# Patient Record
Sex: FEMALE | Race: WHITE | NOT HISPANIC OR LATINO | Employment: OTHER | URBAN - METROPOLITAN AREA
[De-identification: names, ages, dates, MRNs, and addresses within clinical notes are randomized per-mention and may not be internally consistent; named-entity substitution may affect disease eponyms.]

---

## 2017-01-25 ENCOUNTER — COMMUNICATION - HEALTHEAST (OUTPATIENT)
Dept: ONCOLOGY | Facility: HOSPITAL | Age: 67
End: 2017-01-25

## 2017-03-03 ENCOUNTER — COMMUNICATION - HEALTHEAST (OUTPATIENT)
Dept: ADMINISTRATIVE | Facility: HOSPITAL | Age: 67
End: 2017-03-03

## 2017-03-06 ENCOUNTER — OFFICE VISIT - HEALTHEAST (OUTPATIENT)
Dept: ONCOLOGY | Facility: HOSPITAL | Age: 67
End: 2017-03-06

## 2017-03-06 ENCOUNTER — AMBULATORY - HEALTHEAST (OUTPATIENT)
Dept: INFUSION THERAPY | Facility: HOSPITAL | Age: 67
End: 2017-03-06

## 2017-03-06 DIAGNOSIS — M85.80 OSTEOPENIA: ICD-10-CM

## 2017-03-06 DIAGNOSIS — C50.512 BREAST CANCER OF LOWER-OUTER QUADRANT OF LEFT FEMALE BREAST (H): ICD-10-CM

## 2017-06-26 ENCOUNTER — RECORDS - HEALTHEAST (OUTPATIENT)
Dept: GENERAL RADIOLOGY | Age: 67
End: 2017-06-26

## 2017-06-26 ENCOUNTER — OFFICE VISIT - HEALTHEAST (OUTPATIENT)
Dept: FAMILY MEDICINE | Facility: CLINIC | Age: 67
End: 2017-06-26

## 2017-06-26 DIAGNOSIS — R05.9 COUGH: ICD-10-CM

## 2017-06-26 DIAGNOSIS — J18.9 PNEUMONIA OF RIGHT LOWER LOBE DUE TO INFECTIOUS ORGANISM: ICD-10-CM

## 2017-07-05 ENCOUNTER — OFFICE VISIT - HEALTHEAST (OUTPATIENT)
Dept: FAMILY MEDICINE | Facility: CLINIC | Age: 67
End: 2017-07-05

## 2017-07-05 DIAGNOSIS — J18.9 PNEUMONIA OF RIGHT LOWER LOBE DUE TO INFECTIOUS ORGANISM: ICD-10-CM

## 2017-08-11 ENCOUNTER — COMMUNICATION - HEALTHEAST (OUTPATIENT)
Dept: ONCOLOGY | Facility: HOSPITAL | Age: 67
End: 2017-08-11

## 2017-08-11 ENCOUNTER — HOSPITAL ENCOUNTER (OUTPATIENT)
Dept: MAMMOGRAPHY | Facility: HOSPITAL | Age: 67
Discharge: HOME | End: 2017-08-11
Attending: FAMILY MEDICINE

## 2017-08-11 ENCOUNTER — COMMUNICATION - HEALTHEAST (OUTPATIENT)
Dept: ADMINISTRATIVE | Facility: HOSPITAL | Age: 67
End: 2017-08-11

## 2017-08-11 DIAGNOSIS — Z12.31 VISIT FOR SCREENING MAMMOGRAM: ICD-10-CM

## 2017-08-12 ENCOUNTER — COMMUNICATION - HEALTHEAST (OUTPATIENT)
Dept: FAMILY MEDICINE | Facility: CLINIC | Age: 67
End: 2017-08-12

## 2017-08-25 ENCOUNTER — COMMUNICATION - HEALTHEAST (OUTPATIENT)
Dept: ONCOLOGY | Facility: HOSPITAL | Age: 67
End: 2017-08-25

## 2017-08-29 ENCOUNTER — AMBULATORY - HEALTHEAST (OUTPATIENT)
Dept: INFUSION THERAPY | Facility: HOSPITAL | Age: 67
End: 2017-08-29

## 2017-08-29 ENCOUNTER — OFFICE VISIT - HEALTHEAST (OUTPATIENT)
Dept: ONCOLOGY | Facility: HOSPITAL | Age: 67
End: 2017-08-29

## 2017-08-29 DIAGNOSIS — C50.512 BREAST CANCER OF LOWER-OUTER QUADRANT OF LEFT FEMALE BREAST (H): ICD-10-CM

## 2017-08-29 DIAGNOSIS — M81.0 AGE-RELATED OSTEOPOROSIS WITHOUT CURRENT PATHOLOGICAL FRACTURE: ICD-10-CM

## 2017-08-29 DIAGNOSIS — M85.80 OSTEOPENIA: ICD-10-CM

## 2017-09-01 ENCOUNTER — OFFICE VISIT - HEALTHEAST (OUTPATIENT)
Dept: FAMILY MEDICINE | Facility: CLINIC | Age: 67
End: 2017-09-01

## 2017-09-01 DIAGNOSIS — R05.9 COUGH: ICD-10-CM

## 2017-09-07 ENCOUNTER — COMMUNICATION - HEALTHEAST (OUTPATIENT)
Dept: FAMILY MEDICINE | Facility: CLINIC | Age: 67
End: 2017-09-07

## 2017-09-08 ENCOUNTER — OFFICE VISIT - HEALTHEAST (OUTPATIENT)
Dept: FAMILY MEDICINE | Facility: CLINIC | Age: 67
End: 2017-09-08

## 2017-09-08 DIAGNOSIS — R05.9 COUGH: ICD-10-CM

## 2017-09-21 ENCOUNTER — OFFICE VISIT - HEALTHEAST (OUTPATIENT)
Dept: FAMILY MEDICINE | Facility: CLINIC | Age: 67
End: 2017-09-21

## 2017-09-21 DIAGNOSIS — R05.9 COUGH: ICD-10-CM

## 2018-02-05 ENCOUNTER — RECORDS - HEALTHEAST (OUTPATIENT)
Dept: ADMINISTRATIVE | Facility: OTHER | Age: 68
End: 2018-02-05

## 2018-02-14 ENCOUNTER — RECORDS - HEALTHEAST (OUTPATIENT)
Dept: ADMINISTRATIVE | Facility: OTHER | Age: 68
End: 2018-02-14

## 2018-02-14 ENCOUNTER — RECORDS - HEALTHEAST (OUTPATIENT)
Dept: BONE DENSITY | Facility: CLINIC | Age: 68
End: 2018-02-14

## 2018-02-14 DIAGNOSIS — C50.512 MALIGNANT NEOPLASM OF LOWER-OUTER QUADRANT OF LEFT FEMALE BREAST (H): ICD-10-CM

## 2018-02-14 DIAGNOSIS — M85.80 OTHER SPECIFIED DISORDERS OF BONE DENSITY AND STRUCTURE, UNSPECIFIED SITE: ICD-10-CM

## 2018-02-14 DIAGNOSIS — M81.0 AGE-RELATED OSTEOPOROSIS WITHOUT CURRENT PATHOLOGICAL FRACTURE: ICD-10-CM

## 2018-02-21 ENCOUNTER — COMMUNICATION - HEALTHEAST (OUTPATIENT)
Dept: ONCOLOGY | Facility: HOSPITAL | Age: 68
End: 2018-02-21

## 2018-02-26 ENCOUNTER — COMMUNICATION - HEALTHEAST (OUTPATIENT)
Dept: ADMINISTRATIVE | Facility: HOSPITAL | Age: 68
End: 2018-02-26

## 2018-02-28 ENCOUNTER — OFFICE VISIT - HEALTHEAST (OUTPATIENT)
Dept: ONCOLOGY | Facility: HOSPITAL | Age: 68
End: 2018-02-28

## 2018-02-28 ENCOUNTER — AMBULATORY - HEALTHEAST (OUTPATIENT)
Dept: INFUSION THERAPY | Facility: HOSPITAL | Age: 68
End: 2018-02-28

## 2018-02-28 DIAGNOSIS — Z17.0 MALIGNANT NEOPLASM OF LOWER-OUTER QUADRANT OF LEFT BREAST OF FEMALE, ESTROGEN RECEPTOR POSITIVE (H): ICD-10-CM

## 2018-02-28 DIAGNOSIS — M85.80 OSTEOPENIA: ICD-10-CM

## 2018-02-28 DIAGNOSIS — C50.512 BREAST CANCER OF LOWER-OUTER QUADRANT OF LEFT FEMALE BREAST (H): ICD-10-CM

## 2018-02-28 DIAGNOSIS — C50.512 MALIGNANT NEOPLASM OF LOWER-OUTER QUADRANT OF LEFT BREAST OF FEMALE, ESTROGEN RECEPTOR POSITIVE (H): ICD-10-CM

## 2018-02-28 LAB
ALBUMIN SERPL-MCNC: 3.9 G/DL (ref 3.5–5)
ALP SERPL-CCNC: 102 U/L (ref 45–120)
ALT SERPL W P-5'-P-CCNC: 19 U/L (ref 0–45)
ANION GAP SERPL CALCULATED.3IONS-SCNC: 8 MMOL/L (ref 5–18)
AST SERPL W P-5'-P-CCNC: 26 U/L (ref 0–40)
BILIRUB SERPL-MCNC: 1.1 MG/DL (ref 0–1)
BUN SERPL-MCNC: 14 MG/DL (ref 8–22)
CALCIUM SERPL-MCNC: 9.6 MG/DL (ref 8.5–10.5)
CHLORIDE BLD-SCNC: 104 MMOL/L (ref 98–107)
CO2 SERPL-SCNC: 28 MMOL/L (ref 22–31)
CREAT SERPL-MCNC: 0.89 MG/DL (ref 0.6–1.1)
GFR SERPL CREATININE-BSD FRML MDRD: >60 ML/MIN/1.73M2
GLUCOSE BLD-MCNC: 94 MG/DL (ref 70–125)
POTASSIUM BLD-SCNC: 3.9 MMOL/L (ref 3.5–5)
PROT SERPL-MCNC: 8.1 G/DL (ref 6–8)
SODIUM SERPL-SCNC: 140 MMOL/L (ref 136–145)

## 2018-06-04 ENCOUNTER — OFFICE VISIT - HEALTHEAST (OUTPATIENT)
Dept: FAMILY MEDICINE | Facility: CLINIC | Age: 68
End: 2018-06-04

## 2018-06-04 DIAGNOSIS — N88.9 CERVIX ABNORMALITY: ICD-10-CM

## 2018-06-04 DIAGNOSIS — M65.30 TRIGGER FINGER: ICD-10-CM

## 2018-06-04 DIAGNOSIS — C50.512 MALIGNANT NEOPLASM OF LOWER-OUTER QUADRANT OF LEFT BREAST OF FEMALE, ESTROGEN RECEPTOR POSITIVE (H): ICD-10-CM

## 2018-06-04 DIAGNOSIS — K21.9 GERD (GASTROESOPHAGEAL REFLUX DISEASE): ICD-10-CM

## 2018-06-04 DIAGNOSIS — Z17.0 MALIGNANT NEOPLASM OF LOWER-OUTER QUADRANT OF LEFT BREAST OF FEMALE, ESTROGEN RECEPTOR POSITIVE (H): ICD-10-CM

## 2018-06-04 DIAGNOSIS — Z01.419 WELL WOMAN EXAM: ICD-10-CM

## 2018-06-04 LAB
ALBUMIN SERPL-MCNC: 4 G/DL (ref 3.5–5)
ALP SERPL-CCNC: 100 U/L (ref 45–120)
ALT SERPL W P-5'-P-CCNC: 13 U/L (ref 0–45)
ANION GAP SERPL CALCULATED.3IONS-SCNC: 11 MMOL/L (ref 5–18)
AST SERPL W P-5'-P-CCNC: 25 U/L (ref 0–40)
BILIRUB SERPL-MCNC: 1.1 MG/DL (ref 0–1)
BUN SERPL-MCNC: 16 MG/DL (ref 8–22)
CALCIUM SERPL-MCNC: 9.8 MG/DL (ref 8.5–10.5)
CHLORIDE BLD-SCNC: 106 MMOL/L (ref 98–107)
CHOLEST SERPL-MCNC: 183 MG/DL
CO2 SERPL-SCNC: 25 MMOL/L (ref 22–31)
CREAT SERPL-MCNC: 0.85 MG/DL (ref 0.6–1.1)
ERYTHROCYTE [DISTWIDTH] IN BLOOD BY AUTOMATED COUNT: 12 % (ref 11–14.5)
FASTING STATUS PATIENT QL REPORTED: YES
GFR SERPL CREATININE-BSD FRML MDRD: >60 ML/MIN/1.73M2
GLUCOSE BLD-MCNC: 83 MG/DL (ref 70–125)
HBA1C MFR BLD: 5.6 % (ref 3.5–6)
HCT VFR BLD AUTO: 37.5 % (ref 35–47)
HDLC SERPL-MCNC: 57 MG/DL
HGB BLD-MCNC: 12.6 G/DL (ref 12–16)
LDLC SERPL CALC-MCNC: 112 MG/DL
MCH RBC QN AUTO: 31 PG (ref 27–34)
MCHC RBC AUTO-ENTMCNC: 33.6 G/DL (ref 32–36)
MCV RBC AUTO: 92 FL (ref 80–100)
PLATELET # BLD AUTO: 317 THOU/UL (ref 140–440)
PMV BLD AUTO: 7.2 FL (ref 7–10)
POTASSIUM BLD-SCNC: 4.1 MMOL/L (ref 3.5–5)
PROT SERPL-MCNC: 7.6 G/DL (ref 6–8)
RBC # BLD AUTO: 4.06 MILL/UL (ref 3.8–5.4)
SODIUM SERPL-SCNC: 142 MMOL/L (ref 136–145)
TRIGL SERPL-MCNC: 70 MG/DL
WBC: 8.5 THOU/UL (ref 4–11)

## 2018-06-06 LAB
HPV SOURCE: NORMAL
HUMAN PAPILLOMA VIRUS 16 DNA: NEGATIVE
HUMAN PAPILLOMA VIRUS 18 DNA: NEGATIVE
HUMAN PAPILLOMA VIRUS FINAL DIAGNOSIS: NORMAL
HUMAN PAPILLOMA VIRUS OTHER HR: NEGATIVE
SPECIMEN DESCRIPTION: NORMAL

## 2018-06-08 ENCOUNTER — COMMUNICATION - HEALTHEAST (OUTPATIENT)
Dept: FAMILY MEDICINE | Facility: CLINIC | Age: 68
End: 2018-06-08

## 2018-06-14 LAB
BKR LAB AP ABNORMAL BLEEDING: NO
BKR LAB AP BIRTH CONTROL/HORMONES: NORMAL
BKR LAB AP CERVICAL APPEARANCE: NORMAL
BKR LAB AP GYN ADEQUACY: NORMAL
BKR LAB AP GYN INTERPRETATION: NORMAL
BKR LAB AP HPV REFLEX: NORMAL
BKR LAB AP LMP: NORMAL
BKR LAB AP PATIENT STATUS: NORMAL
BKR LAB AP PREVIOUS ABNORMAL: NORMAL
BKR LAB AP PREVIOUS NORMAL: 2016
HIGH RISK?: NO
PATH REPORT.COMMENTS IMP SPEC: NORMAL
RESULT FLAG (HE HISTORICAL CONVERSION): NORMAL

## 2018-06-18 ENCOUNTER — COMMUNICATION - HEALTHEAST (OUTPATIENT)
Dept: ONCOLOGY | Facility: HOSPITAL | Age: 68
End: 2018-06-18

## 2018-08-13 ENCOUNTER — HOSPITAL ENCOUNTER (OUTPATIENT)
Dept: MAMMOGRAPHY | Facility: CLINIC | Age: 68
Discharge: HOME | End: 2018-08-13
Attending: INTERNAL MEDICINE

## 2018-08-13 DIAGNOSIS — Z12.31 VISIT FOR SCREENING MAMMOGRAM: ICD-10-CM

## 2018-08-29 ENCOUNTER — OFFICE VISIT - HEALTHEAST (OUTPATIENT)
Dept: ONCOLOGY | Facility: HOSPITAL | Age: 68
End: 2018-08-29

## 2018-08-29 ENCOUNTER — AMBULATORY - HEALTHEAST (OUTPATIENT)
Dept: INFUSION THERAPY | Facility: HOSPITAL | Age: 68
End: 2018-08-29

## 2018-08-29 DIAGNOSIS — C50.512 MALIGNANT NEOPLASM OF LOWER-OUTER QUADRANT OF LEFT BREAST OF FEMALE, ESTROGEN RECEPTOR POSITIVE (H): ICD-10-CM

## 2018-08-29 DIAGNOSIS — Z17.0 MALIGNANT NEOPLASM OF LOWER-OUTER QUADRANT OF LEFT BREAST OF FEMALE, ESTROGEN RECEPTOR POSITIVE (H): ICD-10-CM

## 2018-08-29 LAB
ALBUMIN SERPL-MCNC: 4.2 G/DL (ref 3.5–5)
ALP SERPL-CCNC: 101 U/L (ref 45–120)
ALT SERPL W P-5'-P-CCNC: 14 U/L (ref 0–45)
ANION GAP SERPL CALCULATED.3IONS-SCNC: 10 MMOL/L (ref 5–18)
AST SERPL W P-5'-P-CCNC: 26 U/L (ref 0–40)
BILIRUB SERPL-MCNC: 1 MG/DL (ref 0–1)
BUN SERPL-MCNC: 17 MG/DL (ref 8–22)
CALCIUM SERPL-MCNC: 10.1 MG/DL (ref 8.5–10.5)
CHLORIDE BLD-SCNC: 102 MMOL/L (ref 98–107)
CO2 SERPL-SCNC: 28 MMOL/L (ref 22–31)
CREAT SERPL-MCNC: 0.99 MG/DL (ref 0.6–1.1)
GFR SERPL CREATININE-BSD FRML MDRD: 56 ML/MIN/1.73M2
GLUCOSE BLD-MCNC: 94 MG/DL (ref 70–125)
POTASSIUM BLD-SCNC: 4 MMOL/L (ref 3.5–5)
PROT SERPL-MCNC: 8.2 G/DL (ref 6–8)
SODIUM SERPL-SCNC: 140 MMOL/L (ref 136–145)

## 2018-09-11 ENCOUNTER — COMMUNICATION - HEALTHEAST (OUTPATIENT)
Dept: FAMILY MEDICINE | Facility: CLINIC | Age: 68
End: 2018-09-11

## 2018-09-11 DIAGNOSIS — Z23 IMMUNIZATION DUE: ICD-10-CM

## 2018-09-13 ENCOUNTER — AMBULATORY - HEALTHEAST (OUTPATIENT)
Dept: NURSING | Facility: CLINIC | Age: 68
End: 2018-09-13

## 2018-09-13 DIAGNOSIS — Z23 NEED FOR VACCINATION: ICD-10-CM

## 2018-11-20 ENCOUNTER — OFFICE VISIT - HEALTHEAST (OUTPATIENT)
Dept: FAMILY MEDICINE | Facility: CLINIC | Age: 68
End: 2018-11-20

## 2018-11-20 DIAGNOSIS — K08.89 PAIN IN A TOOTH OR TEETH: ICD-10-CM

## 2018-11-20 DIAGNOSIS — R10.13 DYSPEPSIA: ICD-10-CM

## 2018-12-31 ENCOUNTER — OFFICE VISIT - HEALTHEAST (OUTPATIENT)
Dept: FAMILY MEDICINE | Facility: CLINIC | Age: 68
End: 2018-12-31

## 2018-12-31 DIAGNOSIS — R10.13 EPIGASTRIC ABDOMINAL PAIN: ICD-10-CM

## 2018-12-31 DIAGNOSIS — R11.0 NAUSEA: ICD-10-CM

## 2019-01-07 ENCOUNTER — RECORDS - HEALTHEAST (OUTPATIENT)
Dept: ADMINISTRATIVE | Facility: OTHER | Age: 69
End: 2019-01-07

## 2019-01-08 ENCOUNTER — RECORDS - HEALTHEAST (OUTPATIENT)
Dept: ADMINISTRATIVE | Facility: OTHER | Age: 69
End: 2019-01-08

## 2019-01-17 ENCOUNTER — AMBULATORY - HEALTHEAST (OUTPATIENT)
Dept: FAMILY MEDICINE | Facility: CLINIC | Age: 69
End: 2019-01-17

## 2019-01-17 ENCOUNTER — COMMUNICATION - HEALTHEAST (OUTPATIENT)
Dept: FAMILY MEDICINE | Facility: CLINIC | Age: 69
End: 2019-01-17

## 2019-01-17 DIAGNOSIS — K29.40 ATROPHIC GASTRITIS WITHOUT HEMORRHAGE: ICD-10-CM

## 2019-01-30 ENCOUNTER — RECORDS - HEALTHEAST (OUTPATIENT)
Dept: ADMINISTRATIVE | Facility: OTHER | Age: 69
End: 2019-01-30

## 2019-01-31 ENCOUNTER — RECORDS - HEALTHEAST (OUTPATIENT)
Dept: ADMINISTRATIVE | Facility: OTHER | Age: 69
End: 2019-01-31

## 2019-02-05 ENCOUNTER — RECORDS - HEALTHEAST (OUTPATIENT)
Dept: ADMINISTRATIVE | Facility: OTHER | Age: 69
End: 2019-02-05

## 2019-03-07 ENCOUNTER — OFFICE VISIT - HEALTHEAST (OUTPATIENT)
Dept: ONCOLOGY | Facility: HOSPITAL | Age: 69
End: 2019-03-07

## 2019-03-07 ENCOUNTER — AMBULATORY - HEALTHEAST (OUTPATIENT)
Dept: INFUSION THERAPY | Facility: HOSPITAL | Age: 69
End: 2019-03-07

## 2019-03-07 ENCOUNTER — TRANSFERRED RECORDS (OUTPATIENT)
Dept: HEALTH INFORMATION MANAGEMENT | Facility: CLINIC | Age: 69
End: 2019-03-07

## 2019-03-07 DIAGNOSIS — C50.512 MALIGNANT NEOPLASM OF LOWER-OUTER QUADRANT OF LEFT BREAST OF FEMALE, ESTROGEN RECEPTOR POSITIVE (H): ICD-10-CM

## 2019-03-07 DIAGNOSIS — Z17.0 MALIGNANT NEOPLASM OF LOWER-OUTER QUADRANT OF LEFT BREAST OF FEMALE, ESTROGEN RECEPTOR POSITIVE (H): ICD-10-CM

## 2019-03-07 LAB
ALBUMIN SERPL-MCNC: 3.8 G/DL (ref 3.5–5)
ALP SERPL-CCNC: 135 U/L (ref 45–120)
ALT SERPL W P-5'-P-CCNC: 25 U/L (ref 0–45)
ANION GAP SERPL CALCULATED.3IONS-SCNC: 10 MMOL/L (ref 5–18)
AST SERPL W P-5'-P-CCNC: 35 U/L (ref 0–40)
BILIRUB SERPL-MCNC: 0.7 MG/DL (ref 0–1)
BUN SERPL-MCNC: 15 MG/DL (ref 8–22)
CALCIUM SERPL-MCNC: 9.3 MG/DL (ref 8.5–10.5)
CHLORIDE BLD-SCNC: 106 MMOL/L (ref 98–107)
CO2 SERPL-SCNC: 26 MMOL/L (ref 22–31)
CREAT SERPL-MCNC: 0.8 MG/DL (ref 0.6–1.1)
GFR SERPL CREATININE-BSD FRML MDRD: >60 ML/MIN/1.73M2
GLUCOSE BLD-MCNC: 89 MG/DL (ref 70–125)
POTASSIUM BLD-SCNC: 4 MMOL/L (ref 3.5–5)
PROT SERPL-MCNC: 7.7 G/DL (ref 6–8)
SODIUM SERPL-SCNC: 142 MMOL/L (ref 136–145)

## 2019-04-15 ENCOUNTER — COMMUNICATION - HEALTHEAST (OUTPATIENT)
Dept: ONCOLOGY | Facility: HOSPITAL | Age: 69
End: 2019-04-15

## 2019-04-15 DIAGNOSIS — C50.512 BREAST CANCER OF LOWER-OUTER QUADRANT OF LEFT FEMALE BREAST (H): ICD-10-CM

## 2019-06-26 ENCOUNTER — OFFICE VISIT - HEALTHEAST (OUTPATIENT)
Dept: FAMILY MEDICINE | Facility: CLINIC | Age: 69
End: 2019-06-26

## 2019-06-26 ENCOUNTER — RECORDS - HEALTHEAST (OUTPATIENT)
Dept: GENERAL RADIOLOGY | Facility: CLINIC | Age: 69
End: 2019-06-26

## 2019-06-26 DIAGNOSIS — S89.92XA KNEE INJURY, LEFT, INITIAL ENCOUNTER: ICD-10-CM
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
## 2019-06-26 DIAGNOSIS — S89.92XA UNSPECIFIED INJURY OF LEFT LOWER LEG, INITIAL ENCOUNTER: ICD-10-CM

## 2019-06-27 ENCOUNTER — COMMUNICATION - HEALTHEAST (OUTPATIENT)
Dept: FAMILY MEDICINE | Facility: CLINIC | Age: 69
End: 2019-06-27

## 2019-09-10 ENCOUNTER — OFFICE VISIT - HEALTHEAST (OUTPATIENT)
Dept: FAMILY MEDICINE | Facility: CLINIC | Age: 69
End: 2019-09-10

## 2019-09-10 DIAGNOSIS — Z11.59 ENCOUNTER FOR HEPATITIS C SCREENING TEST FOR LOW RISK PATIENT: ICD-10-CM

## 2019-09-10 DIAGNOSIS — Z00.00 ENCOUNTER FOR MEDICARE ANNUAL WELLNESS EXAM: ICD-10-CM

## 2019-09-10 DIAGNOSIS — K29.40 ATROPHIC GASTRITIS WITHOUT HEMORRHAGE: ICD-10-CM

## 2019-09-10 DIAGNOSIS — Z17.0 MALIGNANT NEOPLASM OF LOWER-OUTER QUADRANT OF LEFT BREAST OF FEMALE, ESTROGEN RECEPTOR POSITIVE (H): ICD-10-CM

## 2019-09-10 DIAGNOSIS — C50.512 MALIGNANT NEOPLASM OF LOWER-OUTER QUADRANT OF LEFT BREAST OF FEMALE, ESTROGEN RECEPTOR POSITIVE (H): ICD-10-CM

## 2019-09-10 DIAGNOSIS — R74.8 ELEVATED ALKALINE PHOSPHATASE LEVEL: ICD-10-CM

## 2019-09-10 DIAGNOSIS — Z13.220 LIPID SCREENING: ICD-10-CM

## 2019-09-10 LAB
ALBUMIN SERPL-MCNC: 3.9 G/DL (ref 3.5–5)
ALP SERPL-CCNC: 142 U/L (ref 45–120)
ALT SERPL W P-5'-P-CCNC: 13 U/L (ref 0–45)
ANION GAP SERPL CALCULATED.3IONS-SCNC: 11 MMOL/L (ref 5–18)
AST SERPL W P-5'-P-CCNC: 25 U/L (ref 0–40)
BILIRUB SERPL-MCNC: 0.9 MG/DL (ref 0–1)
BUN SERPL-MCNC: 13 MG/DL (ref 8–22)
CALCIUM SERPL-MCNC: 9.9 MG/DL (ref 8.5–10.5)
CHLORIDE BLD-SCNC: 104 MMOL/L (ref 98–107)
CHOLEST SERPL-MCNC: 212 MG/DL
CO2 SERPL-SCNC: 26 MMOL/L (ref 22–31)
CREAT SERPL-MCNC: 0.88 MG/DL (ref 0.6–1.1)
FASTING STATUS PATIENT QL REPORTED: YES
GFR SERPL CREATININE-BSD FRML MDRD: >60 ML/MIN/1.73M2
GLUCOSE BLD-MCNC: 84 MG/DL (ref 70–125)
HDLC SERPL-MCNC: 57 MG/DL
LDLC SERPL CALC-MCNC: 139 MG/DL
POTASSIUM BLD-SCNC: 4.4 MMOL/L (ref 3.5–5)
PROT SERPL-MCNC: 7.7 G/DL (ref 6–8)
SODIUM SERPL-SCNC: 141 MMOL/L (ref 136–145)
TRIGL SERPL-MCNC: 82 MG/DL

## 2019-09-10 ASSESSMENT — ANXIETY QUESTIONNAIRES
2. NOT BEING ABLE TO STOP OR CONTROL WORRYING: NOT AT ALL
1. FEELING NERVOUS, ANXIOUS, OR ON EDGE: NOT AT ALL

## 2019-09-10 ASSESSMENT — MIFFLIN-ST. JEOR: SCORE: 1285.53

## 2019-09-11 LAB
GGT SERPL-CCNC: 22 U/L (ref 0–50)
HCV AB SERPL QL IA: NEGATIVE

## 2019-09-13 ENCOUNTER — COMMUNICATION - HEALTHEAST (OUTPATIENT)
Dept: FAMILY MEDICINE | Facility: CLINIC | Age: 69
End: 2019-09-13

## 2019-09-23 ENCOUNTER — OFFICE VISIT - HEALTHEAST (OUTPATIENT)
Dept: FAMILY MEDICINE | Facility: CLINIC | Age: 69
End: 2019-09-23

## 2019-09-23 DIAGNOSIS — L03.116 CELLULITIS OF LEFT LOWER EXTREMITY: ICD-10-CM

## 2019-10-01 ENCOUNTER — HOSPITAL ENCOUNTER (OUTPATIENT)
Dept: MAMMOGRAPHY | Facility: CLINIC | Age: 69
Discharge: HOME | End: 2019-10-01
Attending: INTERNAL MEDICINE

## 2019-10-01 ENCOUNTER — COMMUNICATION - HEALTHEAST (OUTPATIENT)
Dept: FAMILY MEDICINE | Facility: CLINIC | Age: 69
End: 2019-10-01

## 2019-10-01 DIAGNOSIS — Z12.31 VISIT FOR SCREENING MAMMOGRAM: ICD-10-CM

## 2019-10-01 LAB — MAMMOGRAM: NORMAL

## 2019-12-19 ENCOUNTER — OFFICE VISIT - HEALTHEAST (OUTPATIENT)
Dept: FAMILY MEDICINE | Facility: CLINIC | Age: 69
End: 2019-12-19

## 2019-12-19 DIAGNOSIS — J40 BRONCHITIS: ICD-10-CM

## 2019-12-31 ENCOUNTER — OFFICE VISIT - HEALTHEAST (OUTPATIENT)
Dept: FAMILY MEDICINE | Facility: CLINIC | Age: 69
End: 2019-12-31

## 2019-12-31 ENCOUNTER — RECORDS - HEALTHEAST (OUTPATIENT)
Dept: GENERAL RADIOLOGY | Facility: CLINIC | Age: 69
End: 2019-12-31

## 2019-12-31 DIAGNOSIS — R05.3 PERSISTENT COUGH: ICD-10-CM

## 2019-12-31 DIAGNOSIS — R05.9 COUGH: ICD-10-CM

## 2019-12-31 DIAGNOSIS — K29.40 ATROPHIC GASTRITIS WITHOUT HEMORRHAGE: ICD-10-CM

## 2020-01-10 ENCOUNTER — OFFICE VISIT - HEALTHEAST (OUTPATIENT)
Dept: FAMILY MEDICINE | Facility: CLINIC | Age: 70
End: 2020-01-10

## 2020-01-10 DIAGNOSIS — R05.3 PERSISTENT COUGH: ICD-10-CM

## 2020-01-10 LAB
BASOPHILS # BLD AUTO: 0 THOU/UL (ref 0–0.2)
BASOPHILS NFR BLD AUTO: 1 % (ref 0–2)
EOSINOPHIL # BLD AUTO: 0.1 THOU/UL (ref 0–0.4)
EOSINOPHIL NFR BLD AUTO: 2 % (ref 0–6)
ERYTHROCYTE [DISTWIDTH] IN BLOOD BY AUTOMATED COUNT: 12.2 % (ref 11–14.5)
HCT VFR BLD AUTO: 37.8 % (ref 35–47)
HGB BLD-MCNC: 12.6 G/DL (ref 12–16)
LYMPHOCYTES # BLD AUTO: 1.4 THOU/UL (ref 0.8–4.4)
LYMPHOCYTES NFR BLD AUTO: 22 % (ref 20–40)
MCH RBC QN AUTO: 30.3 PG (ref 27–34)
MCHC RBC AUTO-ENTMCNC: 33.4 G/DL (ref 32–36)
MCV RBC AUTO: 91 FL (ref 80–100)
MONOCYTES # BLD AUTO: 0.5 THOU/UL (ref 0–0.9)
MONOCYTES NFR BLD AUTO: 8 % (ref 2–10)
NEUTROPHILS # BLD AUTO: 4.5 THOU/UL (ref 2–7.7)
NEUTROPHILS NFR BLD AUTO: 68 % (ref 50–70)
PLATELET # BLD AUTO: 391 THOU/UL (ref 140–440)
PMV BLD AUTO: 7.4 FL (ref 7–10)
RBC # BLD AUTO: 4.17 MILL/UL (ref 3.8–5.4)
WBC: 6.6 THOU/UL (ref 4–11)

## 2020-01-12 LAB
B PARAPERT DNA SPEC QL NAA+PROBE: NOT DETECTED
B PERT DNA SPEC QL NAA+PROBE: NOT DETECTED
BORDETELLA COMMENT: NORMAL

## 2020-01-14 ENCOUNTER — COMMUNICATION - HEALTHEAST (OUTPATIENT)
Dept: FAMILY MEDICINE | Facility: CLINIC | Age: 70
End: 2020-01-14

## 2020-01-21 ENCOUNTER — COMMUNICATION - HEALTHEAST (OUTPATIENT)
Dept: FAMILY MEDICINE | Facility: CLINIC | Age: 70
End: 2020-01-21

## 2020-01-21 DIAGNOSIS — R05.3 PERSISTENT COUGH: ICD-10-CM

## 2020-01-21 DIAGNOSIS — J32.9 CHRONIC SINUSITIS, UNSPECIFIED LOCATION: ICD-10-CM

## 2020-01-22 ENCOUNTER — OFFICE VISIT - HEALTHEAST (OUTPATIENT)
Dept: FAMILY MEDICINE | Facility: CLINIC | Age: 70
End: 2020-01-22

## 2020-01-22 ENCOUNTER — COMMUNICATION - HEALTHEAST (OUTPATIENT)
Dept: FAMILY MEDICINE | Facility: CLINIC | Age: 70
End: 2020-01-22

## 2020-01-22 DIAGNOSIS — R05.3 PERSISTENT COUGH: ICD-10-CM

## 2020-01-22 DIAGNOSIS — J32.9 CHRONIC SINUSITIS, UNSPECIFIED LOCATION: ICD-10-CM

## 2020-01-27 ENCOUNTER — COMMUNICATION - HEALTHEAST (OUTPATIENT)
Dept: OTOLARYNGOLOGY | Facility: CLINIC | Age: 70
End: 2020-01-27

## 2020-01-27 ENCOUNTER — OFFICE VISIT - HEALTHEAST (OUTPATIENT)
Dept: OTOLARYNGOLOGY | Facility: CLINIC | Age: 70
End: 2020-01-27

## 2020-01-27 ENCOUNTER — TRANSFERRED RECORDS (OUTPATIENT)
Dept: HEALTH INFORMATION MANAGEMENT | Facility: CLINIC | Age: 70
End: 2020-01-27

## 2020-01-27 DIAGNOSIS — R22.1 NECK SWELLING: ICD-10-CM

## 2020-01-27 DIAGNOSIS — R05.3 CHRONIC COUGH: ICD-10-CM

## 2020-01-27 DIAGNOSIS — R22.1 NECK MASS: ICD-10-CM

## 2020-01-28 ENCOUNTER — COMMUNICATION - HEALTHEAST (OUTPATIENT)
Dept: FAMILY MEDICINE | Facility: CLINIC | Age: 70
End: 2020-01-28

## 2020-01-29 ENCOUNTER — COMMUNICATION - HEALTHEAST (OUTPATIENT)
Dept: OTOLARYNGOLOGY | Facility: CLINIC | Age: 70
End: 2020-01-29

## 2020-01-29 ENCOUNTER — MEDICAL CORRESPONDENCE (OUTPATIENT)
Dept: HEALTH INFORMATION MANAGEMENT | Facility: CLINIC | Age: 70
End: 2020-01-29

## 2020-01-29 DIAGNOSIS — R22.1 NECK MASS: ICD-10-CM

## 2020-01-29 NOTE — TELEPHONE ENCOUNTER
FUTURE VISIT INFORMATION      FUTURE VISIT INFORMATION:    Date: 1/31/2020    Time: 1:20PM    Location: [facility]  REFERRAL INFORMATION:    Referring provider:  Josiah Sanders MD     Referring providers clinic:  [facility]      Reason for visit/diagnosis  Neck Mass    RECORDS REQUESTED FROM:       Clinic name Comments Records Status Imaging Status   [facility]   1/29/2020 referral   1/27/2020 notes with Dr Sanders Care Everywhere     [facility] Medicine/OB 01/22/2020 notes with Sofy Liang MD   Care Everywhere     [facility] Radiology imaging 1/27/2020 CT Neck and CT Sinus Care Everywhere req 1/29/2020 - PACS   [facility] images 10/14/16 CT Neck  Care Everywhere  req 1/30/2020 - PACS                 1/29/2020 sent a fax to [facility] for images - amay   1/30/2020 10:40am images were done at [facility] radiology, called and images will be in PACs shortly. Patient also had 2016 images done at [facility], calling for those images as well. - Amay

## 2020-01-30 ENCOUNTER — DOCUMENTATION ONLY (OUTPATIENT)
Dept: CARE COORDINATION | Facility: CLINIC | Age: 70
End: 2020-01-30

## 2020-01-30 ENCOUNTER — TRANSFERRED RECORDS (OUTPATIENT)
Dept: HEALTH INFORMATION MANAGEMENT | Facility: CLINIC | Age: 70
End: 2020-01-30

## 2020-01-30 ENCOUNTER — HOSPITAL ENCOUNTER (OUTPATIENT)
Dept: ULTRASOUND IMAGING | Facility: HOSPITAL | Age: 70
Discharge: HOME | End: 2020-01-30
Attending: OTOLARYNGOLOGY | Admitting: RADIOLOGY

## 2020-01-30 DIAGNOSIS — R22.1 NECK MASS: ICD-10-CM

## 2020-01-31 ENCOUNTER — OFFICE VISIT (OUTPATIENT)
Dept: OTOLARYNGOLOGY | Facility: CLINIC | Age: 70
End: 2020-01-31
Payer: COMMERCIAL

## 2020-01-31 ENCOUNTER — COMMUNICATION - HEALTHEAST (OUTPATIENT)
Dept: ONCOLOGY | Facility: HOSPITAL | Age: 70
End: 2020-01-31

## 2020-01-31 ENCOUNTER — THERAPY VISIT (OUTPATIENT)
Dept: OTOLARYNGOLOGY | Facility: CLINIC | Age: 70
End: 2020-01-31
Payer: COMMERCIAL

## 2020-01-31 ENCOUNTER — PRE VISIT (OUTPATIENT)
Dept: OTOLARYNGOLOGY | Facility: CLINIC | Age: 70
End: 2020-01-31

## 2020-01-31 VITALS
OXYGEN SATURATION: 99 % | SYSTOLIC BLOOD PRESSURE: 145 MMHG | DIASTOLIC BLOOD PRESSURE: 81 MMHG | WEIGHT: 169.5 LBS | HEART RATE: 80 BPM

## 2020-01-31 DIAGNOSIS — R22.1 NECK MASS: Primary | ICD-10-CM

## 2020-01-31 DIAGNOSIS — R22.1 NECK MASS: ICD-10-CM

## 2020-01-31 DIAGNOSIS — C50.512 BREAST CANCER OF LOWER-OUTER QUADRANT OF LEFT FEMALE BREAST (H): ICD-10-CM

## 2020-01-31 DIAGNOSIS — R13.10 DYSPHAGIA, UNSPECIFIED TYPE: Primary | ICD-10-CM

## 2020-01-31 DIAGNOSIS — E07.9 THYROID MASS: ICD-10-CM

## 2020-01-31 RX ORDER — ANASTROZOLE 1 MG/1
TABLET ORAL
Status: ON HOLD | COMMUNITY
Start: 2019-04-15 | End: 2020-03-06

## 2020-01-31 RX ORDER — PREDNISONE 10 MG/1
TABLET ORAL
COMMUNITY
Start: 2020-01-22 | End: 2020-02-14

## 2020-01-31 RX ORDER — LORATADINE 10 MG/1
10 TABLET ORAL
Status: ON HOLD | COMMUNITY
Start: 2017-09-08 | End: 2020-03-11

## 2020-01-31 ASSESSMENT — PAIN SCALES - GENERAL: PAINLEVEL: NO PAIN (0)

## 2020-01-31 NOTE — LETTER
"1/31/2020       RE: Chuyita Porter  [address]  [place] [facility] [address]     Dear Colleague,    Thank you for referring your patient, Chuyita Porter, to the [facility] EAR NOSE AND THROAT at [facility]. Please see a copy of my visit note below.    Dear Dr. Sanders:    I had the pleasure of meeting Chuyita Porter in consultation today at the [facility] Otolaryng[facility] at your request.     History of Present Illness:   Chuyita Porter is a 69 year old woman with a large thyroid mass. The patient says that she regularly has a cough in the winter time, developed it this year around Thanksgiving. This was treated with a zpak, with no improvement in the cough or congestion. Then around New Years Jodi her breathing became more noisy. She had a CXR and was treated with a nebulizer along with \"sinus tablets\" which helped some. She has a persistent feeling of something sticking in the back of her throat. She was treated with prednisone with some improvement but then she plateaued and then became worse. She has been sleeping sitting up because she has trouble with breathing. She feels like she breathes better when laying on her right side. She has been recently placed back on a longer course of steroids and feels like today is a good day for her breathing. She has occasional feeling of throat fullness. She says that with regards to her swallowing she has to think about she eats. She takes pills with yogurt to help swallow them. She feels that at times the swallowing of the pills blocks her from breathing. She feels like periodically her voice changes. She feels a compressive sense in her neck. She denies any coughing with drinking. She has no sticking of pills. She has no odynophagia. She has no neck mass. She has never been on thyroid medications. She had radiation to her neck related to her breast cancer radiation. She has some dyspnea on exertion. She denies any apnea but " does have snoring. She had a biopsy performed yesterday at [facility].    Past medical history: Breast cancer treated with lumpectomy and radiation (2005 and 2015)    Past surgical history: Bilateral breast lumpectomy (2005 and 2015), T&A as kid    Social history: No smoking, no chewing tobacco, no alcohol use. Retired -  at [employer]. Lives with     Family history: negative for thyroid cancer    MEDICATIONS:     Current Outpatient Medications   Medication Sig Dispense Refill     anastrozole (ARIMIDEX) 1 MG tablet TAKE 1 TABLET EVERY DAY       loratadine (CLARITIN) 10 MG tablet Take 10 mg by mouth       predniSONE (DELTASONE) 10 MG tablet Take 4 tabs daily for 5 days, then 3 tabs daily for 5 days, then 2 tabs daily for 5 days, then 1 tab daily for 5 days, then stop..       calcium carbonate-vitamin D (CALCIUM HIGH POTENCY/VITAMIN D) 600-200 MG-UNIT TABS Take 1 tablet by mouth       Multiple Vitamins-Minerals (MULTIVITAMIN ADULT PO) Take 1 tablet by mouth daily         ALLERGIES:  No Known Allergies    HABITS/SOCIAL HISTORY:   No smoking, no chewing tobacco, no alcohol use  Retired -  at [employer]  Lives with     Social History     Socioeconomic History     Marital status:      Spouse name: Not on file     Number of children: Not on file     Years of education: Not on file     Highest education level: Not on file   Occupational History     Not on file   Social Needs     Financial resource strain: Not on file     Food insecurity:     Worry: Not on file     Inability: Not on file     Transportation needs:     Medical: Not on file     Non-medical: Not on file   Tobacco Use     Smoking status: Never Smoker     Smokeless tobacco: Never Used   Substance and Sexual Activity     Alcohol use: Never     Drug use: Never     Sexual activity: Yes     Partners: Male     Birth control/protection: Post-menopausal   Lifestyle     Physical activity:     Days per week: Not on file      Minutes per session: Not on file     Stress: Not on file   Relationships     Social connections:     Talks on phone: Not on file     Gets together: Not on file     Attends Sikhism service: Not on file     Active member of club or organization: Not on file     Attends meetings of clubs or organizations: Not on file     Relationship status: Not on file     Intimate partner violence:     Fear of current or ex partner: Not on file     Emotionally abused: Not on file     Physically abused: Not on file     Forced sexual activity: Not on file   Other Topics Concern     Not on file   Social History Narrative     Not on file       PAST MEDICAL HISTORY: No past medical history on file.     PAST SURGICAL HISTORY:   Past Surgical History:   Procedure Laterality Date     ADENOIDECTOMY       TONSILLECTOMY         FAMILY HISTORY:  No family history on file.    REVIEW OF SYSTEMS:  12 point ROS was negative other than the symptoms noted above in the HPI.  Patient Supplied Answers to Review of Systems  [facility] ROS 1/31/2020   Ears, Nose, Throat Nasal congestion or drainage   Cardiopulmonary Cough, Breathing problems         PHYSICAL EXAMINATION:   BP (!) 145/81   Pulse 80   Wt 76.9 kg (169 lb 8 oz)   SpO2 99%    Appearance:   normal; NAD, age-appropriate appearance, well-developed, normal habitus   Communication:   normal; communicates verbally, normal voice quality   Head/Face:   inspection -  Normal; no scars or visible lesions   Salivary glands -  Normal size, no tenderness, swelling, or palpable masses   Facial strength -  Normal and symmetric bilateral; H/B I/VI   Skin:  normal, no rash   Ocular Motility:  normal occular movements   Ears:  auricle (AD) -  normal  EAC (AD) -  normal  TM (AD) -  Normal, no effusion  auricle (AS) -  normal  EAC (AS) -  normal  TM (AS) -  Normal, no effusion  Normal clinical speech reception   Nose:  Ext. inspection -  Normal  Internal Inspection -  Normal mucosa, septum, and turbinates    Nasopharynx:  normal mucosa, no masses  Scar tissue from adenoidectomy   Oral Cavity:  lips -  Normal mucosa, oral competence, and stoma size   Age-appropriate dentition, healthy gingival mucosa   Hard palate, buccal, floor of mouth mucosa normal   Tongue - normal movement, no lesions   Oropharynx:  mucosa -  Normal, no lesions  soft palate -  Normal, no lesions, no asymmetry, normal elevation  tonsils -  absent   Hypopharynx:  Normal pyriform sinus and pharyngeal wall mucosa   No pooled secretions    Larynx:  Epiglottis, AE folds, false vocal cords, true vocal cords, arytenoids normal in appearance, bilaterally mobile cords    Neck: No visible mass or asymmetry   Wide neck circumference  thyroid -  Enlarged on right, hard to discern borders  Normal range of motion   Lymphatic:  no abnormal nodes   Cardiovascular: warm, pink, well-perfused extremities without swelling, tenderness, or edema   Respiratory: Normal respiratory effort, no stridor   Neuro/Psych.:  mood/affect -  normal  mental status -  normal       PROCEDURES:   Flexible fiberoptic laryngoscopy: Scope exam was indicated due to thyroid mass. Verbal consent was obtained. The nasal cavity was prepped with an aerosolized solution of topical anesthetic and vasoconstrictive agent. The scope was passed through the anterior nasal cavity and advanced. Inspection of the nasopharynx revealed no gross abnormality. The base of tongue and vallecula are normal. The epiglottis, AE folds, false cords, true cords, arytenoids are normal.  Inspection of the larynx revealed bilaterally mobile vocal cords. Pyriform sinuses are symmetric. The airway is patent. Procedure tolerated well with no immediate complications noted.      RESULTS REVIEWED:   I reviewed the referral records which are summarized above    I independently reviewed the CT scan images which show a large thyroid mass with no clear plane between the mass and the esophagus, concerns for intratracheal extension,  narrowing of the distal trachea, abutting of carotid and subclavian, no obvious lymphadenopathy    IMPRESSION AND PLAN:   Chuyita Porter is a 69 year old woman who is referred for evaluation of a thyroid mass. I explained to the patient and her  that this mass is quite extensive and potentially involving the esophagus and trachea as well as abutting the carotid and subclavian.      I explained to the patient that the management cannot be determined until a pathologic diagnosis is obtained. She had a biopsy done at [facility] yesterday. We will need to have the slides reviewed here. The pathology could certainly be a malignancy especially in light of her previous radiation for breast cancer.    We briefly discussed that if this requires surgical resection, this would potentially be a very large operation and would need to be done likely in conjunction with thoracic surgery given the inferior extent of the mass.     We discussed if this is a pathology that cannot be treated surgically, then we would need to determine appropriate treatment in conjunction with our radiation and medical oncology colleagues. Her airway would also be of consideration. She has compressive narrowing of the trachea in addition to potential intraluminal involvement. Given the distal extent, I am not confident a trach would bypass the issue completely.    I did have her see SLP today for strategies with swallowing and because they will likely be involved in her future care.    We will see her back in about 2 weeks and plan on reviewing her imaging and path at tumor board, most likely on 2/14/2020 to give time to obtain the slides and have them reviewed.    Thank you very much for the opportunity to participate in the care of your patient.      Kiersten Valdez MD, M.D.  Otolaryngology- Head & Neck Surgery    This note was dictated with voice recognition software and then edited. Please excuse any unintentional errors.     CC:  Juan Jose Sanders,  MD [facility]

## 2020-01-31 NOTE — PROGRESS NOTES
[facility] VOICE CLINIC    Clinician: PEGGY Abreu M.A. (music), CFY-SLP  Seen in conjunction with: Dr. Valdez  Patient: Chuyita Porter  Date of Visit: 1/31/2020    HISTORY  PATIENT INFORMATION  Chuyita Porter was seen for brief allied health visit today in conjunction with ENT clinic visit per MD request.  Patient has newly identified right thyroid lesion and plan of treatment is pending further testing and tumor board recommendation.  Introduced self and services.  Patient stated the only difficulties she is having at this time is occasional feeling of pharyngeal stasis when she takes her pills.  She takes pills in a purée texture, primarily yogurt and states she occasionally feels like this gets stuck.  Denies coughing with p.o. and odynophagia.  Trained patient to use a liquid wash following taking her pills with purée.  Informed patient SLP services of swallowing may be needed depending on course of treatment.  Patient denied further questions at this time.  I provided the patient with my contact info and encouraged her to reach out if she had questions.     No skilled services provided today.   Time spent with patient: 5 minutes.     No charge for today s session; charges will be billed at the completion of the evaluation  NO CHARGE FACILITY FEE (56997)    ICD-10 code (s): R13.10 (Dysphagia, unspecified)    PEGGY Abreu M.A. (music), CFY-SLP  Speech Language Pathology Clinical Fellow  formerly [facility] Trained Vocologist   [facility] Voice [facility]   [phone]  [email or web address]

## 2020-01-31 NOTE — NURSING NOTE
Chief Complaint   Patient presents with     Consult     neck mass   BP (!) 145/81   Pulse 80   Wt 76.9 kg (169 lb 8 oz)   SpO2 99%     Radha Lloyd LPN

## 2020-01-31 NOTE — LETTER
"1/31/2020      RE: Chuyita Porter  [address]  [place] [address]       Dear Dr. Sanders:    I had the pleasure of meeting Chuyita Porter in consultation today at the [facility] Otol[facility] at your request.     History of Present Illness:   Chuyita Porter is a 69 year old woman with a large thyroid mass. The patient says that she regularly has a cough in the winter time, developed it this year around Thanksgiving. This was treated with a zpak, with no improvement in the cough or congestion. Then around New Years Jodi her breathing became more noisy. She had a CXR and was treated with a nebulizer along with \"sinus tablets\" which helped some. She has a persistent feeling of something sticking in the back of her throat. She was treated with prednisone with some improvement but then she plateaued and then became worse. She has been sleeping sitting up because she has trouble with breathing. She feels like she breathes better when laying on her right side. She has been recently placed back on a longer course of steroids and feels like today is a good day for her breathing. She has occasional feeling of throat fullness. She says that with regards to her swallowing she has to think about she eats. She takes pills with yogurt to help swallow them. She feels that at times the swallowing of the pills blocks her from breathing. She feels like periodically her voice changes. She feels a compressive sense in her neck. She denies any coughing with drinking. She has no sticking of pills. She has no odynophagia. She has no neck mass. She has never been on thyroid medications. She had radiation to her neck related to her breast cancer radiation. She has some dyspnea on exertion. She denies any apnea but does have snoring. She had a biopsy performed yesterday at [facility].    Past medical history: Breast cancer treated with lumpectomy and radiation (2005 and 2015)    Past surgical history: Bilateral breast " lumpectomy (2005 and 2015), T&A as kid    Social history: No smoking, no chewing tobacco, no alcohol use. Retired -  at [employer]. Lives with     Family history: negative for thyroid cancer    MEDICATIONS:     Current Outpatient Medications   Medication Sig Dispense Refill     anastrozole (ARIMIDEX) 1 MG tablet TAKE 1 TABLET EVERY DAY       loratadine (CLARITIN) 10 MG tablet Take 10 mg by mouth       predniSONE (DELTASONE) 10 MG tablet Take 4 tabs daily for 5 days, then 3 tabs daily for 5 days, then 2 tabs daily for 5 days, then 1 tab daily for 5 days, then stop..       calcium carbonate-vitamin D (CALCIUM HIGH POTENCY/VITAMIN D) 600-200 MG-UNIT TABS Take 1 tablet by mouth       Multiple Vitamins-Minerals (MULTIVITAMIN ADULT PO) Take 1 tablet by mouth daily         ALLERGIES:  No Known Allergies    HABITS/SOCIAL HISTORY:   No smoking, no chewing tobacco, no alcohol use  Retired -  at [employer]  Lives with     Social History     Socioeconomic History     Marital status:      Spouse name: Not on file     Number of children: Not on file     Years of education: Not on file     Highest education level: Not on file   Occupational History     Not on file   Social Needs     Financial resource strain: Not on file     Food insecurity:     Worry: Not on file     Inability: Not on file     Transportation needs:     Medical: Not on file     Non-medical: Not on file   Tobacco Use     Smoking status: Never Smoker     Smokeless tobacco: Never Used   Substance and Sexual Activity     Alcohol use: Never     Drug use: Never     Sexual activity: Yes     Partners: Male     Birth control/protection: Post-menopausal   Lifestyle     Physical activity:     Days per week: Not on file     Minutes per session: Not on file     Stress: Not on file   Relationships     Social connections:     Talks on phone: Not on file     Gets together: Not on file     Attends Hindu service: Not on file      Active member of club or organization: Not on file     Attends meetings of clubs or organizations: Not on file     Relationship status: Not on file     Intimate partner violence:     Fear of current or ex partner: Not on file     Emotionally abused: Not on file     Physically abused: Not on file     Forced sexual activity: Not on file   Other Topics Concern     Not on file   Social History Narrative     Not on file       PAST MEDICAL HISTORY: No past medical history on file.     PAST SURGICAL HISTORY:   Past Surgical History:   Procedure Laterality Date     ADENOIDECTOMY       TONSILLECTOMY         FAMILY HISTORY:  No family history on file.    REVIEW OF SYSTEMS:  12 point ROS was negative other than the symptoms noted above in the HPI.  Patient Supplied Answers to Review of Systems  [facility] ROS 1/31/2020   Ears, Nose, Throat Nasal congestion or drainage   Cardiopulmonary Cough, Breathing problems         PHYSICAL EXAMINATION:   BP (!) 145/81   Pulse 80   Wt 76.9 kg (169 lb 8 oz)   SpO2 99%    Appearance:   normal; NAD, age-appropriate appearance, well-developed, normal habitus   Communication:   normal; communicates verbally, normal voice quality   Head/Face:   inspection -  Normal; no scars or visible lesions   Salivary glands -  Normal size, no tenderness, swelling, or palpable masses   Facial strength -  Normal and symmetric bilateral; H/B I/VI   Skin:  normal, no rash   Ocular Motility:  normal occular movements   Ears:  auricle (AD) -  normal  EAC (AD) -  normal  TM (AD) -  Normal, no effusion  auricle (AS) -  normal  EAC (AS) -  normal  TM (AS) -  Normal, no effusion  Normal clinical speech reception   Nose:  Ext. inspection -  Normal  Internal Inspection -  Normal mucosa, septum, and turbinates   Nasopharynx:  normal mucosa, no masses  Scar tissue from adenoidectomy   Oral Cavity:  lips -  Normal mucosa, oral competence, and stoma size   Age-appropriate dentition, healthy gingival mucosa   Hard palate,  buccal, floor of mouth mucosa normal   Tongue - normal movement, no lesions   Oropharynx:  mucosa -  Normal, no lesions  soft palate -  Normal, no lesions, no asymmetry, normal elevation  tonsils -  absent   Hypopharynx:  Normal pyriform sinus and pharyngeal wall mucosa   No pooled secretions    Larynx:  Epiglottis, AE folds, false vocal cords, true vocal cords, arytenoids normal in appearance, bilaterally mobile cords    Neck: No visible mass or asymmetry   Wide neck circumference  thyroid -  Enlarged on right, hard to discern borders  Normal range of motion   Lymphatic:  no abnormal nodes   Cardiovascular: warm, pink, well-perfused extremities without swelling, tenderness, or edema   Respiratory: Normal respiratory effort, no stridor   Neuro/Psych.:  mood/affect -  normal  mental status -  normal       PROCEDURES:   Flexible fiberoptic laryngoscopy: Scope exam was indicated due to thyroid mass. Verbal consent was obtained. The nasal cavity was prepped with an aerosolized solution of topical anesthetic and vasoconstrictive agent. The scope was passed through the anterior nasal cavity and advanced. Inspection of the nasopharynx revealed no gross abnormality. The base of tongue and vallecula are normal. The epiglottis, AE folds, false cords, true cords, arytenoids are normal.  Inspection of the larynx revealed bilaterally mobile vocal cords. Pyriform sinuses are symmetric. The airway is patent. Procedure tolerated well with no immediate complications noted.      RESULTS REVIEWED:   I reviewed the referral records which are summarized above    I independently reviewed the CT scan images which show a large thyroid mass with no clear plane between the mass and the esophagus, concerns for intratracheal extension, narrowing of the distal trachea, abutting of carotid and subclavian, no obvious lymphadenopathy    IMPRESSION AND PLAN:   Chuyita Porter is a 69 year old woman who is referred for evaluation of a thyroid mass.  I explained to the patient and her  that this mass is quite extensive and potentially involving the esophagus and trachea as well as abutting the carotid and subclavian.      I explained to the patient that the management cannot be determined until a pathologic diagnosis is obtained. She had a biopsy done at [facility] yesterday. We will need to have the slides reviewed here. The pathology could certainly be a malignancy especially in light of her previous radiation for breast cancer.    We briefly discussed that if this requires surgical resection, this would potentially be a very large operation and would need to be done likely in conjunction with thoracic surgery given the inferior extent of the mass.     We discussed if this is a pathology that cannot be treated surgically, then we would need to determine appropriate treatment in conjunction with our radiation and medical oncology colleagues. Her airway would also be of consideration. She has compressive narrowing of the trachea in addition to potential intraluminal involvement. Given the distal extent, I am not confident a trach would bypass the issue completely.    I did have her see SLP today for strategies with swallowing and because they will likely be involved in her future care.    We will see her back in about 2 weeks and plan on reviewing her imaging and path at tumor board, most likely on 2/14/2020 to give time to obtain the slides and have them reviewed.    Thank you very much for the opportunity to participate in the care of your patient.      Kiersten Valdez MD, M.D.  Otolaryngology- Head & Neck Surgery      This note was dictated with voice recognition software and then edited. Please excuse any unintentional errors.         CC:  Juan Jose Sanders MD  [facility]      Kiersten Valdez MD

## 2020-02-02 NOTE — PROGRESS NOTES
"Dear Dr. Sanders:    I had the pleasure of meeting Chuyita Porter in consultation today at the [facility] at your request.     History of Present Illness:   Chuyita Porter is a 69 year old woman with a large thyroid mass. The patient says that she regularly has a cough in the winter time, developed it this year around Thanksgiving. This was treated with a zpak, with no improvement in the cough or congestion. Then around New Years Jodi her breathing became more noisy. She had a CXR and was treated with a nebulizer along with \"sinus tablets\" which helped some. She has a persistent feeling of something sticking in the back of her throat. She was treated with prednisone with some improvement but then she plateaued and then became worse. She has been sleeping sitting up because she has trouble with breathing. She feels like she breathes better when laying on her right side. She has been recently placed back on a longer course of steroids and feels like today is a good day for her breathing. She has occasional feeling of throat fullness. She says that with regards to her swallowing she has to think about she eats. She takes pills with yogurt to help swallow them. She feels that at times the swallowing of the pills blocks her from breathing. She feels like periodically her voice changes. She feels a compressive sense in her neck. She denies any coughing with drinking. She has no sticking of pills. She has no odynophagia. She has no neck mass. She has never been on thyroid medications. She had radiation to her neck related to her breast cancer radiation. She has some dyspnea on exertion. She denies any apnea but does have snoring. She had a biopsy performed yesterday at [facility].    Past medical history: Breast cancer treated with lumpectomy and radiation (2005 and 2015)    Past surgical history: Bilateral breast lumpectomy (2005 and 2015), T&A as kid    Social history: No smoking, no " chewing tobacco, no alcohol use. Retired -  at [employer]. Lives with     Family history: negative for thyroid cancer    MEDICATIONS:     Current Outpatient Medications   Medication Sig Dispense Refill     anastrozole (ARIMIDEX) 1 MG tablet TAKE 1 TABLET EVERY DAY       loratadine (CLARITIN) 10 MG tablet Take 10 mg by mouth       predniSONE (DELTASONE) 10 MG tablet Take 4 tabs daily for 5 days, then 3 tabs daily for 5 days, then 2 tabs daily for 5 days, then 1 tab daily for 5 days, then stop..       calcium carbonate-vitamin D (CALCIUM HIGH POTENCY/VITAMIN D) 600-200 MG-UNIT TABS Take 1 tablet by mouth       Multiple Vitamins-Minerals (MULTIVITAMIN ADULT PO) Take 1 tablet by mouth daily         ALLERGIES:  No Known Allergies    HABITS/SOCIAL HISTORY:   No smoking, no chewing tobacco, no alcohol use  Retired -  at [employer]  Lives with     Social History     Socioeconomic History     Marital status:      Spouse name: Not on file     Number of children: Not on file     Years of education: Not on file     Highest education level: Not on file   Occupational History     Not on file   Social Needs     Financial resource strain: Not on file     Food insecurity:     Worry: Not on file     Inability: Not on file     Transportation needs:     Medical: Not on file     Non-medical: Not on file   Tobacco Use     Smoking status: Never Smoker     Smokeless tobacco: Never Used   Substance and Sexual Activity     Alcohol use: Never     Drug use: Never     Sexual activity: Yes     Partners: Male     Birth control/protection: Post-menopausal   Lifestyle     Physical activity:     Days per week: Not on file     Minutes per session: Not on file     Stress: Not on file   Relationships     Social connections:     Talks on phone: Not on file     Gets together: Not on file     Attends Bahai service: Not on file     Active member of club or organization: Not on file     Attends meetings  of clubs or organizations: Not on file     Relationship status: Not on file     Intimate partner violence:     Fear of current or ex partner: Not on file     Emotionally abused: Not on file     Physically abused: Not on file     Forced sexual activity: Not on file   Other Topics Concern     Not on file   Social History Narrative     Not on file       PAST MEDICAL HISTORY: No past medical history on file.     PAST SURGICAL HISTORY:   Past Surgical History:   Procedure Laterality Date     ADENOIDECTOMY       TONSILLECTOMY         FAMILY HISTORY:  No family history on file.    REVIEW OF SYSTEMS:  12 point ROS was negative other than the symptoms noted above in the HPI.  Patient Supplied Answers to Review of Systems  [facility] ROS 1/31/2020   Ears, Nose, Throat Nasal congestion or drainage   Cardiopulmonary Cough, Breathing problems         PHYSICAL EXAMINATION:   BP (!) 145/81   Pulse 80   Wt 76.9 kg (169 lb 8 oz)   SpO2 99%    Appearance:   normal; NAD, age-appropriate appearance, well-developed, normal habitus   Communication:   normal; communicates verbally, normal voice quality   Head/Face:   inspection -  Normal; no scars or visible lesions   Salivary glands -  Normal size, no tenderness, swelling, or palpable masses   Facial strength -  Normal and symmetric bilateral; H/B I/VI   Skin:  normal, no rash   Ocular Motility:  normal occular movements   Ears:  auricle (AD) -  normal  EAC (AD) -  normal  TM (AD) -  Normal, no effusion  auricle (AS) -  normal  EAC (AS) -  normal  TM (AS) -  Normal, no effusion  Normal clinical speech reception   Nose:  Ext. inspection -  Normal  Internal Inspection -  Normal mucosa, septum, and turbinates   Nasopharynx:  normal mucosa, no masses  Scar tissue from adenoidectomy   Oral Cavity:  lips -  Normal mucosa, oral competence, and stoma size   Age-appropriate dentition, healthy gingival mucosa   Hard palate, buccal, floor of mouth mucosa normal   Tongue - normal movement, no lesions    Oropharynx:  mucosa -  Normal, no lesions  soft palate -  Normal, no lesions, no asymmetry, normal elevation  tonsils -  absent   Hypopharynx:  Normal pyriform sinus and pharyngeal wall mucosa   No pooled secretions    Larynx:  Epiglottis, AE folds, false vocal cords, true vocal cords, arytenoids normal in appearance, bilaterally mobile cords    Neck: No visible mass or asymmetry   Wide neck circumference  thyroid -  Enlarged on right, hard to discern borders  Normal range of motion   Lymphatic:  no abnormal nodes   Cardiovascular: warm, pink, well-perfused extremities without swelling, tenderness, or edema   Respiratory: Normal respiratory effort, no stridor   Neuro/Psych.:  mood/affect -  normal  mental status -  normal       PROCEDURES:   Flexible fiberoptic laryngoscopy: Scope exam was indicated due to thyroid mass. Verbal consent was obtained. The nasal cavity was prepped with an aerosolized solution of topical anesthetic and vasoconstrictive agent. The scope was passed through the anterior nasal cavity and advanced. Inspection of the nasopharynx revealed no gross abnormality. The base of tongue and vallecula are normal. The epiglottis, AE folds, false cords, true cords, arytenoids are normal.  Inspection of the larynx revealed bilaterally mobile vocal cords. Pyriform sinuses are symmetric. The airway is patent. Procedure tolerated well with no immediate complications noted.      RESULTS REVIEWED:   I reviewed the referral records which are summarized above    I independently reviewed the CT scan images which show a large thyroid mass with no clear plane between the mass and the esophagus, concerns for intratracheal extension, narrowing of the distal trachea, abutting of carotid and subclavian, no obvious lymphadenopathy    IMPRESSION AND PLAN:   Chuyita Porter is a 69 year old woman who is referred for evaluation of a thyroid mass. I explained to the patient and her  that this mass is quite extensive  and potentially involving the esophagus and trachea as well as abutting the carotid and subclavian.      I explained to the patient that the management cannot be determined until a pathologic diagnosis is obtained. She had a biopsy done at [facility] yesterday. We will need to have the slides reviewed here. The pathology could certainly be a malignancy especially in light of her previous radiation for breast cancer.    We briefly discussed that if this requires surgical resection, this would potentially be a very large operation and would need to be done likely in conjunction with thoracic surgery given the inferior extent of the mass.     We discussed if this is a pathology that cannot be treated surgically, then we would need to determine appropriate treatment in conjunction with our radiation and medical oncology colleagues. Her airway would also be of consideration. She has compressive narrowing of the trachea in addition to potential intraluminal involvement. Given the distal extent, I am not confident a trach would bypass the issue completely.    I did have her see SLP today for strategies with swallowing and because they will likely be involved in her future care.    We will see her back in about 2 weeks and plan on reviewing her imaging and path at tumor board, most likely on 2/14/2020 to give time to obtain the slides and have them reviewed.    Thank you very much for the opportunity to participate in the care of your patient.      Kiersten Valdez MD, M.D.  Otolaryngology- Head & Neck Surgery      This note was dictated with voice recognition software and then edited. Please excuse any unintentional errors.         CC:  Juan Jose Sanders MD  [facility]

## 2020-02-03 ENCOUNTER — COMMUNICATION - HEALTHEAST (OUTPATIENT)
Dept: FAMILY MEDICINE | Facility: CLINIC | Age: 70
End: 2020-02-03

## 2020-02-03 ENCOUNTER — TELEPHONE (OUTPATIENT)
Dept: OTOLARYNGOLOGY | Facility: CLINIC | Age: 70
End: 2020-02-03

## 2020-02-03 ENCOUNTER — PATIENT OUTREACH (OUTPATIENT)
Dept: OTOLARYNGOLOGY | Facility: CLINIC | Age: 70
End: 2020-02-03

## 2020-02-03 LAB
LAB AP CHARGES (HE HISTORICAL CONVERSION): ABNORMAL
PATH REPORT.COMMENTS IMP SPEC: ABNORMAL
PATH REPORT.COMMENTS IMP SPEC: ABNORMAL
PATH REPORT.FINAL DX SPEC: ABNORMAL
PATH REPORT.MICROSCOPIC SPEC OTHER STN: ABNORMAL
RESULT FLAG (HE HISTORICAL CONVERSION): ABNORMAL

## 2020-02-03 NOTE — TELEPHONE ENCOUNTER
Records Requested      Facility  [facility]   Ph. [phone]  Fx.    Outcome Pathology slides are pending, was provided the case number (Case: [id number]). Sent a request for pathology slides when they are ready to get mailed out.   *trackin     2020 3:30PM received pathology from [facility], sent to surgical path with consult form - Amay

## 2020-02-03 NOTE — PROGRESS NOTES
Received a call from patient with questions regarding her prednisone taper. She is concerned that as she tapers from the prednisone she will start to have new symptoms of shortness of breath and breathing difficulty. Currently denies any breathing difficulty or shortness of breath and is tapering. She is questioning if Dr. Valdez would write her a prescription to have on hand if she does start to have concerns.     Reviewed with Dr. Valdez and advised patient that she can contact writer if she starts to have concerns or seek care at the ER with any urgent breathing concerns. She will continue her steroid taper as she was instructed and she has not had any issues currently.     She will contact writer with concerns or with further questions. Also provided patient with on call number to call after clinic hours or over weekend with urgent concerns.     Ansley Cardoso, RN, BSN

## 2020-02-10 PROCEDURE — 00000346 ZZHCL STATISTIC REVIEW OUTSIDE SLIDES TC 88321: Performed by: OTOLARYNGOLOGY

## 2020-02-11 LAB — COPATH REPORT: NORMAL

## 2020-02-11 NOTE — PROGRESS NOTES
Head & Neck Tumor Conference Note   Tumor Conference:  ENT  Specialties Present:  Medical oncology, Pathology, Radiology, Radiation oncology, Surgery  Patient Status:  A current patient  Pathology:  Discussed (see comment)  Treatment to Date:  None  Clinical Trial Eligibility:  Not discussed  Recommended Plan:  Biopsy, Referral to (see comment)  Did the review exceed 30 minutes?:  did not         Status: Current  Staff: Dr. Valdez    Tumor Site: Right thyroid lobe  Tumor Pathology: Indeterminate, possibly B-cell lymphoma  Tumor Stage: Indeterminate  Tumor Treatment: None to date    Reason for Review: Review imaging, pathology, and POC    Brief History: Chuyita Porter is a 69-year-old woman who is referred for evaluation of a thyroid mass. I explained to the patient and her  that this mass is quite extensive and potentially involving the esophagus and trachea as well as abutting the carotid and subclavian. Her imaging was reviewed at Tumor Board on 1/31/2020, at which time the recommendation was to follow-up pathology results.    Pertinent PMH: No past medical history on file.     Smoking Hx:   Social History     Tobacco Use     Smoking status: Never Smoker     Smokeless tobacco: Never Used   Substance Use Topics     Alcohol use: Never     Drug use: Never     Imaging: CT Neck (1/27/2020)-  There is a large right-sided thyroid lesion possibly with involvement of right lateral esophageal wall and irregularity of the right tracheal mucosal surface with possible extension into the tracheal lumen. The lesion is abutting the subclavian and carotid arteries. The strap muscles are involved. The prevertebral muscles are likely involved on the right side.    Pathology:   - Fine needle aspiration (obtained from [facility]; 1/30/2020)- indeterminate, but pathology concerning for possible B-cell lymphoma    Tumor Board Recommendation:   Discussion: This is a 69-year-old female with a recently discovered right thyroid  lesion. Pathology was reviewed today- pathology consistent with lymphoma.    Plan: Pulmonology and thoracic surgery have been consulted for possible tracheal stent, prior to treatment. Incisional biopsy by otolaryngology, versus biopsy by pulmonology, at time of stenting. Likely treatment with radiation.      Alejandra Reed MD  Otolaryngology- Head and Neck Surgery  Please contact ENT with questions by dialing [phone] and entering job code [phone] when prompted.      Documentation / Disclaimer Cancer Tumor Board Note  Cancer tumor board recommendations do not override what is determined to be reasonable care and treatment, which is dependent on the circumstances of a patient's case; the patient's medical, social, and personal concerns; and the clinical judgment of the oncologist [physician].

## 2020-02-14 ENCOUNTER — TUMOR CONFERENCE (OUTPATIENT)
Dept: ONCOLOGY | Facility: CLINIC | Age: 70
End: 2020-02-14
Payer: MEDICARE

## 2020-02-14 ENCOUNTER — ANCILLARY PROCEDURE (OUTPATIENT)
Dept: CT IMAGING | Facility: CLINIC | Age: 70
End: 2020-02-14
Attending: OTOLARYNGOLOGY
Payer: COMMERCIAL

## 2020-02-14 ENCOUNTER — APPOINTMENT (OUTPATIENT)
Dept: RADIATION ONCOLOGY | Facility: CLINIC | Age: 70
End: 2020-02-14
Attending: RADIOLOGY
Payer: MEDICARE

## 2020-02-14 ENCOUNTER — PREP FOR PROCEDURE (OUTPATIENT)
Dept: SURGERY | Facility: CLINIC | Age: 70
End: 2020-02-14

## 2020-02-14 ENCOUNTER — OFFICE VISIT (OUTPATIENT)
Dept: OTOLARYNGOLOGY | Facility: CLINIC | Age: 70
End: 2020-02-14
Payer: COMMERCIAL

## 2020-02-14 VITALS
HEART RATE: 125 BPM | DIASTOLIC BLOOD PRESSURE: 94 MMHG | SYSTOLIC BLOOD PRESSURE: 162 MMHG | WEIGHT: 165.9 LBS | OXYGEN SATURATION: 97 %

## 2020-02-14 VITALS
HEART RATE: 99 BPM | DIASTOLIC BLOOD PRESSURE: 88 MMHG | OXYGEN SATURATION: 96 % | SYSTOLIC BLOOD PRESSURE: 157 MMHG | WEIGHT: 166.1 LBS

## 2020-02-14 DIAGNOSIS — C76.0 HEAD AND NECK CANCER (H): Primary | ICD-10-CM

## 2020-02-14 DIAGNOSIS — E07.9 THYROID MASS: Primary | ICD-10-CM

## 2020-02-14 DIAGNOSIS — E07.9 THYROID MASS: ICD-10-CM

## 2020-02-14 DIAGNOSIS — R22.1 NECK MASS: Primary | ICD-10-CM

## 2020-02-14 DIAGNOSIS — R22.1 NECK MASS: ICD-10-CM

## 2020-02-14 PROCEDURE — G0463 HOSPITAL OUTPT CLINIC VISIT: HCPCS | Mod: 25 | Performed by: RADIOLOGY

## 2020-02-14 PROCEDURE — 77290 THER RAD SIMULAJ FIELD CPLX: CPT | Performed by: RADIOLOGY

## 2020-02-14 RX ORDER — IOPAMIDOL 755 MG/ML
81 INJECTION, SOLUTION INTRAVASCULAR ONCE
Status: COMPLETED | OUTPATIENT
Start: 2020-02-14 | End: 2020-02-14

## 2020-02-14 RX ORDER — PREDNISONE 20 MG/1
40 TABLET ORAL DAILY
Qty: 6 TABLET | Refills: 0 | Status: ON HOLD | OUTPATIENT
Start: 2020-02-14 | End: 2020-03-06

## 2020-02-14 RX ADMIN — IOPAMIDOL 81 ML: 755 INJECTION, SOLUTION INTRAVASCULAR at 13:52

## 2020-02-14 ASSESSMENT — ENCOUNTER SYMPTOMS
DOUBLE VISION: 0
FLANK PAIN: 0
TINGLING: 0
FEVER: 0
HEMATURIA: 0
SHORTNESS OF BREATH: 0
WEIGHT LOSS: 0
SORE THROAT: 0
CHILLS: 0
BACK PAIN: 0
EYE PAIN: 0
CONSTIPATION: 0
DEPRESSION: 0
VOMITING: 0
BRUISES/BLEEDS EASILY: 0
HEADACHES: 0
FALLS: 0
NERVOUS/ANXIOUS: 1
BLURRED VISION: 0
DIAPHORESIS: 0
BLOOD IN STOOL: 0
SEIZURES: 0
NECK PAIN: 0
NAUSEA: 0
INSOMNIA: 0
DIZZINESS: 0
DIARRHEA: 0
FREQUENCY: 0

## 2020-02-14 ASSESSMENT — PAIN SCALES - GENERAL: PAINLEVEL: NO PAIN (0)

## 2020-02-14 NOTE — LETTER
2/14/2020       RE: Chuyita Porter  [address]  [facility] [address]     Dear Colleague,    Thank you for referring your patient, Chuyita Porter, to the [facility] EAR NOSE AND THROAT at [facility]. Please see a copy of my visit note below.    Dear Dr. Sanders:    I had the pleasure of seeing Chuyita Porter in follow-up today at the [facility].     History of Present Illness:   Chuyita Porter is a 69 year old woman with a large thyroid mass.  She experienced a cough for some time.  There was no improvement with antibiotics.  She developed noisy breathing around New Year's Jodi. She had a CXR and was treated with a nebulizer. She was treated with prednisone with some improvement but then she plateaued and then became worse. She started having to sleep sitting up because she has trouble with breathing. She feels like she breathes better when laying on her right side. She was recently placed back on a longer course of steroids which helped with her breathing.  This was weaned off about a week ago.  She had a CT scan which demonstrated a large thyroid mass with no clear plane between the mass and the esophagus, concerns for intratracheal extension, narrowing of the distal trachea, abutting of carotid and subclavian, no obvious lymphadenopathy.  She had biopsy performed on 1/30/2020 at [facility]. Pathology was reviewed here at the [facility] and was most consistent with a B-cell lymphoma that could not be further characterized. Her case was reviewed at tumor conference today with plans for likely urgent radiation.    She thinks that since about Monday her breathing has become progressively worse after the steroids were stopped.  She is unable to lay flat still.  She denies any fevers, chills, or nightsweats. She denies any changes in her swallowing.  She does feel like her neck is more tight and this is been progressing  gradually.      MEDICATIONS:     Current Outpatient Medications   Medication Sig Dispense Refill     predniSONE (DELTASONE) 20 MG tablet Take 2 tablets (40 mg) by mouth daily 6 tablet 0     anastrozole (ARIMIDEX) 1 MG tablet TAKE 1 TABLET EVERY DAY       calcium carbonate-vitamin D (CALCIUM HIGH POTENCY/VITAMIN D) 600-200 MG-UNIT TABS Take 1 tablet by mouth       loratadine (CLARITIN) 10 MG tablet Take 10 mg by mouth       Multiple Vitamins-Minerals (MULTIVITAMIN ADULT PO) Take 1 tablet by mouth daily         ALLERGIES:  No Known Allergies    HABITS/SOCIAL HISTORY:   No smoking, no chewing tobacco, no alcohol use  Retired -  at [employer]  Lives with     Social History     Socioeconomic History     Marital status:      Spouse name: Not on file     Number of children: 2     Years of education: Not on file     Highest education level: Not on file   Occupational History     Not on file   Social Needs     Financial resource strain: Not on file     Food insecurity:     Worry: Not on file     Inability: Not on file     Transportation needs:     Medical: Not on file     Non-medical: Not on file   Tobacco Use     Smoking status: Never Smoker     Smokeless tobacco: Never Used   Substance and Sexual Activity     Alcohol use: Never     Drug use: Never     Sexual activity: Yes     Partners: Male     Birth control/protection: Post-menopausal   Lifestyle     Physical activity:     Days per week: Not on file     Minutes per session: Not on file     Stress: Not on file   Relationships     Social connections:     Talks on phone: Not on file     Gets together: Not on file     Attends Buddhist service: Not on file     Active member of club or organization: Not on file     Attends meetings of clubs or organizations: Not on file     Relationship status: Not on file     Intimate partner violence:     Fear of current or ex partner: Not on file     Emotionally abused: Not on file     Physically abused: Not on  file     Forced sexual activity: Not on file   Other Topics Concern     Not on file   Social History Narrative     Not on file       PAST MEDICAL HISTORY:   Past Medical History:   Diagnosis Date     Breast cancer (H) 2005     Hx of radiation therapy     2005 +2015        PAST SURGICAL HISTORY:   Past Surgical History:   Procedure Laterality Date     ADENOIDECTOMY       LUMPECTOMY BREAST      RT lunmpectomy 2005 with radiation. Lt lumpectomy 2015 with radiation     TONSILLECTOMY         FAMILY HISTORY:    Family History   Problem Relation Age of Onset     Breast Cancer Paternal Aunt         2 aunts who had breast cancer in their 70's       REVIEW OF SYSTEMS:  12 point ROS was negative other than the symptoms noted above in the HPI.  Patient Supplied Answers to Review of Systems   ENT ROS 2/14/2020   Ears, Nose, Throat -   Cardiopulmonary Breathing problems         PHYSICAL EXAMINATION:   BP (!) 162/94   Pulse 125   Wt 75.3 kg (165 lb 14.4 oz)   SpO2 97%    Appearance:   normal; NAD, age-appropriate appearance, well-developed, normal habitus   Communication:   normal; communicates verbally, normal voice quality   Head/Face:   inspection -  Normal; no scars or visible lesions   Ears:  auricle (AD) -  normal  auricle (AS) -  normal  Normal clinical speech reception   Nose:  Ext. inspection -  Normal   Oral Cavity:  lips -  Normal mucosa, oral competence, and stoma size   Age-appropriate dentition   Neck: No visible mass or asymmetry   Wide neck circumference   Respiratory: Normal respiratory effort, no stridor   Neuro/Psych.:  mood/affect -  normal  mental status -  normal       PROCEDURES:         RESULTS REVIEWED:   TEST(S):   26 slides, case #[id number]     FINAL DIAGNOSIS:   CASE FROM [place], [place] ([id number], OBTAINED   01/30/2020):   A. Thyroid, right, mass fine needle aspirate with cell block   - Concerning for possible B-cell lymphoma, recommend excisional biopsy   with complete  "lymphoma workup or, at   minimum, ample core biopsy sampling with complete lymphoma workup if   further evaluation is clinically   indicated   - Please read the Comment     COMMENT:   - Accompanying report for flow cytometric immunophenotyping performed on   aspirated cells for this case   describes a minor population of kappa-monotypic B-cells. Accompanying   report for FISH performed on aspirated   cells by [facility] reports \"Absent\" for clonal rearrangements for BCL6,   MYC, and IGH/BCL2.   - All of the slides for this case along with accompanying reports are   reviewed by [facility] Cardona and Tomy   (Hematopathology) who concur with the above diagnosis and the following   comments:       - Detection of minor populations of clonal B-cells, even in a lymph   node, is not necessarily indicative of   malignancy (Blood 118:2976), particularly when detected in a very limited   cell aspirate specimen such as this   one.       - Comparison of CD3 positive small lymphocytes to CD20 positive cells   on the limited cell block tissue   shows that the CD20 B-cells are comparable in size to the small   (presumably reactive) T-cells.       IMPRESSION AND PLAN:   Chuyita Porter is a 69 year old woman with a large thyroid mass, consistent with a likely lymphoma on her previous FNA.     Her case was discussed at tumor board today. The biggest concern at this time is her airway status as she has considerable narrowing of her trachea from extrinsic compression. I spoke with Dr Stone from thoracic surgery earlier this week and we discussed a tracheal stent with concurrent PEG tube placement due to concerns for esophageal compression with the stent placement. I spoke with Dr Montoya during the patient's clinic visit and he agreeable to the plan. He did state that the stent may migrate once the tumor starts regressing which can cause pain issues. I think we do not have any other option as the concern would be tumor swelling with treatment " which could cause further airway compromise. Dr Montoya is amenable to doing the stent placement on Monday. I spoke with his  and care coordinator to help coordinate the procedure being scheduled. We will plan on a postoperative admission for airway monitoring and likely try to start urgent treatment.    I discussed with the patient that we also need to get more tissue for a definitive diagnosis. We could do a core biopsy but I am concerned about sedation without having her airway secured. Since she already needs a procedure in the OR for the stent placement I can perform a small open incisional biopsy to obtain further tissue for pathology.     I have reached out to the thoracic surgery team to try to coordinate the PEG happening on the same day. I explained to the patient that the goal would be for the PEG to be as a security in case of dysphagia from the stent or with treatment. I would advocate her still eating as long as possible but the PEG will provide us ability to ensure nutrition to not delay any of her treatment. I had her obtain a CT chest today to assess ability to place the PEG by thoracic surgery.    I spoke with Dr Ray from radiation oncology on the phone during clinic today. Given the airway compression he agrees that she will likely need urgent start of treatment once the stent is placed. He cannot start treatment until the stent is placed due to concerns for airway edema causing progression of her obstruction. He saw her in clinic today as an urgent consult so he can be prepared to start treatment next week.    The family did have lots of overall prognosis questions. I explained that right now we do not even have a specific diagnosis other than likely lymphoma. Once we have a diagnosis this will help with answering this question. We will need to have her see medical oncology once we have a tissue diagnosis.    While awaiting the procedure on Monday we will start her back on a course of  prednisone to try to obtain some decrease in size of the mass and improvement in her breathing. If she does not improve we will do 4 mg dexamethasone Q6H oral course at the recommendation of radiation oncology.    The patient was understandably slightly overwhelmed by the information today and the rush to do workup and start treatment but were grateful for the expedited care.       Thank you very much for the opportunity to participate in the care of your patient.      Kiersten Valdez MD, M.D.  Otolaryngology- Head & Neck Surgery      This note was dictated with voice recognition software and then edited. Please excuse any unintentional errors.         I spent a total of 60 minutes (1230-130) with the patient and coordination of care with > 75% of time spent in counseling to the patient about care recommendations as per above.        CC:  Josiah Sanders MD  [facility]  [address] [place], [place] [address]  [facility] [address]      Charly Ray MD  Department of Radiation Oncology  [facility]

## 2020-02-14 NOTE — Clinical Note
2/14/2020       RE: Chuyita Porter  [address]  [facility] [address]     Dear Colleague,    Thank you for referring your patient, Chuyita Porter, to the RADIATION ONCOLOGY CLINIC. Please see a copy of my visit note below.    No notes on file    Again, thank you for allowing me to participate in the care of your patient.      Sincerely,    Charly Ray MD

## 2020-02-14 NOTE — NURSING NOTE
Chief Complaint   Patient presents with     Follow Up     review tumor board recommendation     Breathing Problem     short of breath and wheezing        BP (!) 162/94   Pulse 125   Wt 75.3 kg (165 lb 14.4 oz)   SpO2 97%     Ani Macdonald LPN

## 2020-02-14 NOTE — NURSING NOTE
Radiation Therapy Patient Education    Person involved with teaching: Patient,  and Granddaughters    Patient educational needs for self management of treatment-related side effects assessment completed.  EPIC Patient Ed tab contains Patient Learning Assessment    Education Materials Given  Sim pamphlet and schedule    Educational Topics Discussed  Side effects expected, Pain management, Skin care, Activity, Nutrition and weight loss and When to call MD/RN    Response To Teaching  Verbalizes understanding    Referrals sent: None    Chemotherapy?  No

## 2020-02-14 NOTE — LETTER
2/14/2020      RE: Chuyita Porter  [address]  [facility] [address]       Attending addendum:   I saw and examined the patient with the resident and agree with the documented plan of care.    Ms. Porter is a 69 year old female with an oncologic history significant for bilateral breast cancer treated with lumpectomy and adjuvant radiotherapy in 2005 (right breast) and 2015 (left breast). From her description to me it appears that she received tangents alone to each breast although we are currently working on obtaining her outside radiotherapy records.     She now presents with a large central neck mass which extends into the mediastinum and causes extrinsic compression of the airway just proximal to the ezekiel. A definitive tissue diagnosis has not yet been obtained but preliminary pathology is suspicious for a B-cell neoplasm. She has been restarted on steroids earlier today by my ENT colleagues due to increased difficulty breathing over the past week and is scheduled for airway stenting on 2/17/2020. The plan is to also proceed with biopsy of the mass intraoperatively in order to potentially establish a diagnosis.    Ms. Porter is referred to radiation oncology clinic today to discuss the possible use of radiotherapy to prevent further disease progression and airway compromise. I recommended that she undergo a CT simulation in order to expedite planning if we need to start radiotherapy urgently over the weekend and this was completed following our exam. If she is unable to undergo stent placement, we can consider radiotherapy to reduce the size of her thoracic tumor burden and relieve her airway obstruction. The one concern I have with this approach would be transient edema and enlargement of the mass with initiation of radiotherapy that in turn could exacerbate her respiratory symptoms. For this reason, I strongly agree with my ENT colleagues on continuing steroids for the time being as the risk of airway  obstruction far outweighs the risk of reduced diagnostic yield of subsequent tumor sampling. The radiation oncology service will continue to follow along with updated recommendations as the situation dictates.    Charly Ray MD/PhD    Dept of Radiation Oncology  [facility]           Department of Radiation Oncology  [address] [address]  [phone]       Consultation Note    Name: Chuyita Porter MRN: [id number]   : 1950   Date of Service: 2020 Referring: Dr. Kiersten Valdez / head and neck surgery     Reason for consultation: Consideration of radiotherapy for treatment of a suspected lymphoma with airway compression    History of Present Illness   Ms. Porter is a 69 year old female with a history of right breast cancer status post lumpectomy and adjuvant radiotherapy in  and left breast cancer status post lumpectomy and adjuvant radiotherapy in . She initially had a long history of chronic cough and congestion over the last year which was thought to be sinusitis. She tried antibiotics and antiallergic medications with no relief. She then sought medical attention after she experienced right neck swelling and worsening shortness of breath on 2020. She had a CT neck which demonstrated a large mass measuring 6.0 x 6.0 x 8.6 cm arising from the thyroid, extending into the superior mediastinum with encasement of the trachea and right lateral esophagus. No evidence of lymphadenopathy. She also had a CT sinus which showed no evidence of sinusitis. She then underwent an ultrasound guided biopsy at [facility] on 2020. Surgical pathology ([id number]) was felt to be consistent with B-cell lymphoma. Flow cytometry showed CD20 positive, kappa light chain monoclonal B-cell population (13%) and CD19 negative. The patient was referred to Dr. Valdez in ENT on 2020. The patient's case was presented  at ENT multidisciplinary tumor board earlier today. After review of imaging and pathology, the recommendation was placement of a tracheal stent with incisional biopsy for a definitive tissue diagnosis and work-up and referral to radiation oncology for consideration of urgent radiotherapy given her airway compression.    On interview today, Ms. Porter reports a longstanding cough with congestion that has been resistant to conservative therapies. She started noticing an enlarging right neck mass with worsening of her breathing to the degree that she could not lie flat over the last 4 months. She otherwise denies any fever, chills, headaches, nausea, vomiting, weakness, numbness, other neck swellings, lymphadenopathy, urinary or bowel symptoms, weight or appetite changes.     Past Medical History:  Bilateral breast cancer    Past Surgical History:  Bilateral lumpectomy (right side on 2005 and left side on 2015)    Chemotherapy History:  None    Radiation History:  Adjuvant radiotherapy to the right and left breast in 2005 and 2015 (prior RT records are not available at time of visit).     Pregnant: No  Implanted Cardiac Devices: No    Medications:  Iopamidol  Prednisone    Allergies:  No known allergies    Social History:  No smoking, no chewing tobacco, no alcohol use. Retired -  at [employer]. Lives with     Family History:  Paternal aunts: breast cancer    Review of Systems   A 10-point review of systems was performed. Pertinent findings are noted in the HPI.    Physical Exam   ECOG Status: 0    Vitals:  BP: 157/88  P: 99  Weight: 75.3 kg    General: 69 year old female seated on the examination table in NAD  HEENT: NC/AT. EOMI. No rhinorrhea or epistaxis. MMM. No visible oral cavity lesions.  Neck: Significant bilateral neck fulness. There is a large firm mass extending caudally from the thyroid into the superior mediastinum. No palpable cervical adenopathy bilaterally.  Pulmonary: No wheezing,  stridor or respiratory distress.  Skin: Normal color and turgor  Neuro: A/Ox3. CN II-XII grossly intact.    Imaging/Path/Labs   Imaging: Reviewed    Path: Reviewed    Assessment    Ms. Porter is a 69 year old female with a presumed lymphoma with encasement of the trachea and esophagus with concern for airway obstruction.    Plan   We agree with the multidisciplinary tumor board's recommendation regarding airway stent placement and biopsy for tissue diagnosis with consideration of radiotherapy to prevent further airway compromise. We discussed the rationale, logistics and side effects associated with the treatment. At end of discussion, the patient agreed with the plan and signed the treatment consent. She underwent the simulation shortly after this visit.    The patient was seen and discussed with staff, Dr. Ray. Thank you for involving us in the care of this patient. Please feel free to contact us with questions or concerns at any time.    Randell Ackerman MD  PGY-3 Resident, Radiation Oncology  [facility]            HPI  INITIAL PATIENT ASSESSMENT    Diagnosis: Hx of breast cancer presenting with thoraxic mass    Prior radiation therapy:   Working on getting records. Rt breast radiation 2005, Lt breast radiation 2015.    Prior chemotherapy: None    Prior hormonal therapy:Yes: Arimadex    Pain Eval:  Denies    Psychosocial  Living arrangements:   Fall Risk: independent  Falls Risk Screening Questions - Consult    1. Have you fallen in the past one week?  No.  2. Have you felt unsteady when walking or standing in the past one week?  No.     referral needs: Not needed    Advanced Directive: Yes - Location: with their   Implantable Cardiac Device? No      Review of Systems   Constitutional: Negative for chills, diaphoresis, fever, malaise/fatigue and weight loss.   HENT: Negative for ear pain, hearing loss, nosebleeds and sore throat.    Eyes: Negative for  blurred vision, double vision and pain.   Respiratory: Negative for shortness of breath (Can't lay on her back at this time).    Cardiovascular: Negative for chest pain (But feels like she has something sitting on her chest) and leg swelling.   Gastrointestinal: Negative for blood in stool, constipation, diarrhea, nausea and vomiting.   Genitourinary: Negative for flank pain, frequency, hematuria and urgency.   Musculoskeletal: Negative for back pain, falls, joint pain and neck pain.   Skin: Negative for rash.   Neurological: Negative for dizziness, tingling, seizures and headaches.   Endo/Heme/Allergies: Does not bruise/bleed easily.   Psychiatric/Behavioral: Negative for depression. The patient is nervous/anxious (With appointments and dx). The patient does not have insomnia.        Nurse face-to-face time: Level 4:  15 min face to face time            Charly Ray MD

## 2020-02-14 NOTE — PROGRESS NOTES
Attending addendum:   I saw and examined the patient with the resident and agree with the documented plan of care.    Ms. Porter is a 69 year old female with an oncologic history significant for bilateral breast cancer treated with lumpectomy and adjuvant radiotherapy in 2005 (right breast) and 2015 (left breast). From her description to me it appears that she received tangents alone to each breast although we are currently working on obtaining her outside radiotherapy records.     She now presents with a large central neck mass which extends into the mediastinum and causes extrinsic compression of the airway just proximal to the ezekiel. A definitive tissue diagnosis has not yet been obtained but preliminary pathology is suspicious for a B-cell neoplasm. She has been restarted on steroids earlier today by my ENT colleagues due to increased difficulty breathing over the past week and is scheduled for airway stenting on 2/17/2020. The plan is to also proceed with biopsy of the mass intraoperatively in order to potentially establish a diagnosis.    Ms. Porter is referred to radiation oncology clinic today to discuss the possible use of radiotherapy to prevent further disease progression and airway compromise. I recommended that she undergo a CT simulation in order to expedite planning if we need to start radiotherapy urgently over the weekend and this was completed following our exam. If she is unable to undergo stent placement, we can consider radiotherapy to reduce the size of her thoracic tumor burden and relieve her airway obstruction. The one concern I have with this approach would be transient edema and enlargement of the mass with initiation of radiotherapy that in turn could exacerbate her respiratory symptoms. For this reason, I strongly agree with my ENT colleagues on continuing steroids for the time being as the risk of airway obstruction far outweighs the risk of reduced diagnostic yield of subsequent tumor  sampling. The radiation oncology service will continue to follow along with updated recommendations as the situation dictates.    Charly Ray MD/PhD    Dept of Radiation Oncology  [facility]           Department of Radiation Oncology  [address] [address] [phone]       Consultation Note    Name: Chuyita Porter MRN: [id number]   : 1950   Date of Service: 2020 Referring: Dr. Kiersten Valdez / head and neck surgery     Reason for consultation: Consideration of radiotherapy for treatment of a suspected lymphoma with airway compression    History of Present Illness   Ms. Porter is a 69 year old female with a history of right breast cancer status post lumpectomy and adjuvant radiotherapy in  and left breast cancer status post lumpectomy and adjuvant radiotherapy in . She initially had a long history of chronic cough and congestion over the last year which was thought to be sinusitis. She tried antibiotics and antiallergic medications with no relief. She then sought medical attention after she experienced right neck swelling and worsening shortness of breath on 2020. She had a CT neck which demonstrated a large mass measuring 6.0 x 6.0 x 8.6 cm arising from the thyroid, extending into the superior mediastinum with encasement of the trachea and right lateral esophagus. No evidence of lymphadenopathy. She also had a CT sinus which showed no evidence of sinusitis. She then underwent an ultrasound guided biopsy at [facility] on 2020. Surgical pathology ([id number]) was felt to be consistent with B-cell lymphoma. Flow cytometry showed CD20 positive, kappa light chain monoclonal B-cell population (13%) and CD19 negative. The patient was referred to Dr. Valdez in ENT on 2020. The patient's case was presented at ENT multidisciplinary tumor board earlier today. After review of imaging and  pathology, the recommendation was placement of a tracheal stent with incisional biopsy for a definitive tissue diagnosis and work-up and referral to radiation oncology for consideration of urgent radiotherapy given her airway compression.    On interview today, Ms. Porter reports a longstanding cough with congestion that has been resistant to conservative therapies. She started noticing an enlarging right neck mass with worsening of her breathing to the degree that she could not lie flat over the last 4 months. She otherwise denies any fever, chills, headaches, nausea, vomiting, weakness, numbness, other neck swellings, lymphadenopathy, urinary or bowel symptoms, weight or appetite changes.     Past Medical History:  Bilateral breast cancer    Past Surgical History:  Bilateral lumpectomy (right side on 2005 and left side on 2015)    Chemotherapy History:  None    Radiation History:  Adjuvant radiotherapy to the right and left breast in 2005 and 2015 (prior RT records are not available at time of visit).     Pregnant: No  Implanted Cardiac Devices: No    Medications:  Iopamidol  Prednisone    Allergies:  No known allergies    Social History:  No smoking, no chewing tobacco, no alcohol use. Retired -  at [employer]. Lives with     Family History:  Paternal aunts: breast cancer    Review of Systems   A 10-point review of systems was performed. Pertinent findings are noted in the HPI.    Physical Exam   ECOG Status: 0    Vitals:  BP: 157/88  P: 99  Weight: 75.3 kg    General: 69 year old female seated on the examination table in NAD  HEENT: NC/AT. EOMI. No rhinorrhea or epistaxis. MMM. No visible oral cavity lesions.  Neck: Significant bilateral neck fulness. There is a large firm mass extending caudally from the thyroid into the superior mediastinum. No palpable cervical adenopathy bilaterally.  Pulmonary: No wheezing, stridor or respiratory distress.  Skin: Normal color and turgor  Neuro: A/Ox3.  CN II-XII grossly intact.    Imaging/Path/Labs   Imaging: Reviewed    Path: Reviewed    Assessment    Ms. Porter is a 69 year old female with a presumed lymphoma with encasement of the trachea and esophagus with concern for airway obstruction.    Plan   We agree with the multidisciplinary tumor board's recommendation regarding airway stent placement and biopsy for tissue diagnosis with consideration of radiotherapy to prevent further airway compromise. We discussed the rationale, logistics and side effects associated with the treatment. At end of discussion, the patient agreed with the plan and signed the treatment consent. She underwent the simulation shortly after this visit.    The patient was seen and discussed with staff, Dr. Ray. Thank you for involving us in the care of this patient. Please feel free to contact us with questions or concerns at any time.    Randell Ackerman MD  PGY-3 Resident, Radiation Oncology  [facility]

## 2020-02-14 NOTE — PROGRESS NOTES
HPI  INITIAL PATIENT ASSESSMENT    Diagnosis: Hx of breast cancer presenting with thoraxic mass    Prior radiation therapy:   Working on getting records. Rt breast radiation 2005, Lt breast radiation 2015.    Prior chemotherapy: None    Prior hormonal therapy:Yes: Arimadex    Pain Eval:  Denies    Psychosocial  Living arrangements:   Fall Risk: independent  Falls Risk Screening Questions - Consult    1. Have you fallen in the past one week?  No.  2. Have you felt unsteady when walking or standing in the past one week?  No.     referral needs: Not needed    Advanced Directive: Yes - Location: with their   Implantable Cardiac Device? No      Review of Systems   Constitutional: Negative for chills, diaphoresis, fever, malaise/fatigue and weight loss.   HENT: Negative for ear pain, hearing loss, nosebleeds and sore throat.    Eyes: Negative for blurred vision, double vision and pain.   Respiratory: Negative for shortness of breath (Can't lay on her back at this time).    Cardiovascular: Negative for chest pain (But feels like she has something sitting on her chest) and leg swelling.   Gastrointestinal: Negative for blood in stool, constipation, diarrhea, nausea and vomiting.   Genitourinary: Negative for flank pain, frequency, hematuria and urgency.   Musculoskeletal: Negative for back pain, falls, joint pain and neck pain.   Skin: Negative for rash.   Neurological: Negative for dizziness, tingling, seizures and headaches.   Endo/Heme/Allergies: Does not bruise/bleed easily.   Psychiatric/Behavioral: Negative for depression. The patient is nervous/anxious (With appointments and dx). The patient does not have insomnia.        Nurse face-to-face time: Level 4:  15 min face to face time

## 2020-02-15 ENCOUNTER — ANESTHESIA EVENT (OUTPATIENT)
Dept: SURGERY | Facility: CLINIC | Age: 70
DRG: 840 | End: 2020-02-15
Payer: COMMERCIAL

## 2020-02-15 ENCOUNTER — TELEPHONE (OUTPATIENT)
Dept: OTOLARYNGOLOGY | Facility: CLINIC | Age: 70
End: 2020-02-15

## 2020-02-15 NOTE — TELEPHONE ENCOUNTER
Brief Otolaryngology Note    I was called by this patient regarding subjective tachycardia and warm sensation. Patient was started on oral steroids this evening for airway swelling associated with mass. Patient reports that her breathing has improved since starting steroids.    Patient was reassured that the improvement in her breathing is encouraging, and that the symptoms of tachycardia and warm sensation are not uncommon with systemic steroids. Patient was counseled to seek medical attention should she develop worsening breathing, chest pain, or continued sensation of tachycardia. Patient expressed understanding and agreement with the plan of care. All of patient's questions were answered.      Alejandra Reed MD  Otolaryngology- Head and Neck Surgery  Please contact ENT with questions by dialing [phone] and entering job code [phone] when prompted.

## 2020-02-15 NOTE — PROGRESS NOTES
Dear Dr. Sanders:    I had the pleasure of seeing Chuyita Porter in follow-up today at the [facility].     History of Present Illness:   Chuyita Porter is a 69 year old woman with a large thyroid mass.  She experienced a cough for some time.  There was no improvement with antibiotics.  She developed noisy breathing around New Year's Jodi. She had a CXR and was treated with a nebulizer. She was treated with prednisone with some improvement but then she plateaued and then became worse. She started having to sleep sitting up because she has trouble with breathing. She feels like she breathes better when laying on her right side. She was recently placed back on a longer course of steroids which helped with her breathing.  This was weaned off about a week ago.  She had a CT scan which demonstrated a large thyroid mass with no clear plane between the mass and the esophagus, concerns for intratracheal extension, narrowing of the distal trachea, abutting of carotid and subclavian, no obvious lymphadenopathy.  She had biopsy performed on 1/30/2020 at [facility]. Pathology was reviewed here at the [facility] and was most consistent with a B-cell lymphoma that could not be further characterized. Her case was reviewed at tumor conference today with plans for likely urgent radiation.    She thinks that since about Monday her breathing has become progressively worse after the steroids were stopped.  She is unable to lay flat still.  She denies any fevers, chills, or nightsweats. She denies any changes in her swallowing.  She does feel like her neck is more tight and this is been progressing gradually.      MEDICATIONS:     Current Outpatient Medications   Medication Sig Dispense Refill     predniSONE (DELTASONE) 20 MG tablet Take 2 tablets (40 mg) by mouth daily 6 tablet 0     anastrozole (ARIMIDEX) 1 MG tablet TAKE 1 TABLET EVERY DAY       calcium carbonate-vitamin D (CALCIUM HIGH  POTENCY/VITAMIN D) 600-200 MG-UNIT TABS Take 1 tablet by mouth       loratadine (CLARITIN) 10 MG tablet Take 10 mg by mouth       Multiple Vitamins-Minerals (MULTIVITAMIN ADULT PO) Take 1 tablet by mouth daily         ALLERGIES:  No Known Allergies    HABITS/SOCIAL HISTORY:   No smoking, no chewing tobacco, no alcohol use  Retired -  at [employer]  Lives with     Social History     Socioeconomic History     Marital status:      Spouse name: Not on file     Number of children: 2     Years of education: Not on file     Highest education level: Not on file   Occupational History     Not on file   Social Needs     Financial resource strain: Not on file     Food insecurity:     Worry: Not on file     Inability: Not on file     Transportation needs:     Medical: Not on file     Non-medical: Not on file   Tobacco Use     Smoking status: Never Smoker     Smokeless tobacco: Never Used   Substance and Sexual Activity     Alcohol use: Never     Drug use: Never     Sexual activity: Yes     Partners: Male     Birth control/protection: Post-menopausal   Lifestyle     Physical activity:     Days per week: Not on file     Minutes per session: Not on file     Stress: Not on file   Relationships     Social connections:     Talks on phone: Not on file     Gets together: Not on file     Attends Latter day service: Not on file     Active member of club or organization: Not on file     Attends meetings of clubs or organizations: Not on file     Relationship status: Not on file     Intimate partner violence:     Fear of current or ex partner: Not on file     Emotionally abused: Not on file     Physically abused: Not on file     Forced sexual activity: Not on file   Other Topics Concern     Not on file   Social History Narrative     Not on file       PAST MEDICAL HISTORY:   Past Medical History:   Diagnosis Date     Breast cancer (H) 2005     Hx of radiation therapy     2005 +2015        PAST SURGICAL HISTORY:    Past Surgical History:   Procedure Laterality Date     ADENOIDECTOMY       LUMPECTOMY BREAST      RT lunmpectomy 2005 with radiation. Lt lumpectomy 2015 with radiation     TONSILLECTOMY         FAMILY HISTORY:    Family History   Problem Relation Age of Onset     Breast Cancer Paternal Aunt         2 aunts who had breast cancer in their 70's       REVIEW OF SYSTEMS:  12 point ROS was negative other than the symptoms noted above in the HPI.  Patient Supplied Answers to Review of Systems  [facility] ROS 2/14/2020   Ears, Nose, Throat -   Cardiopulmonary Breathing problems         PHYSICAL EXAMINATION:   BP (!) 162/94   Pulse 125   Wt 75.3 kg (165 lb 14.4 oz)   SpO2 97%    Appearance:   normal; NAD, age-appropriate appearance, well-developed, normal habitus   Communication:   normal; communicates verbally, normal voice quality   Head/Face:   inspection -  Normal; no scars or visible lesions   Ears:  auricle (AD) -  normal  auricle (AS) -  normal  Normal clinical speech reception   Nose:  Ext. inspection -  Normal   Oral Cavity:  lips -  Normal mucosa, oral competence, and stoma size   Age-appropriate dentition   Neck: No visible mass or asymmetry   Wide neck circumference   Respiratory: Normal respiratory effort, no stridor   Neuro/Psych.:  mood/affect -  normal  mental status -  normal       PROCEDURES:         RESULTS REVIEWED:   TEST(S):   26 slides, case #[id number]     FINAL DIAGNOSIS:   CASE FROM [place], [place] ([id number], OBTAINED   01/30/2020):   A. Thyroid, right, mass fine needle aspirate with cell block   - Concerning for possible B-cell lymphoma, recommend excisional biopsy   with complete lymphoma workup or, at   minimum, ample core biopsy sampling with complete lymphoma workup if   further evaluation is clinically   indicated   - Please read the Comment     COMMENT:   - Accompanying report for flow cytometric immunophenotyping performed on   aspirated cells for this case   describes  "a minor population of kappa-monotypic B-cells. Accompanying   report for FISH performed on aspirated   cells by [facility] reports \"Absent\" for clonal rearrangements for BCL6,   MYC, and IGH/BCL2.   - All of the slides for this case along with accompanying reports are   reviewed by [facility] Cardona and Tomy   (Hematopathology) who concur with the above diagnosis and the following   comments:       - Detection of minor populations of clonal B-cells, even in a lymph   node, is not necessarily indicative of   malignancy (Blood 118:2976), particularly when detected in a very limited   cell aspirate specimen such as this   one.       - Comparison of CD3 positive small lymphocytes to CD20 positive cells   on the limited cell block tissue   shows that the CD20 B-cells are comparable in size to the small   (presumably reactive) T-cells.       IMPRESSION AND PLAN:   Chuyita Porter is a 69 year old woman with a large thyroid mass, consistent with a likely lymphoma on her previous FNA.     Her case was discussed at tumor board today. The biggest concern at this time is her airway status as she has considerable narrowing of her trachea from extrinsic compression. I spoke with Dr Stone from thoracic surgery earlier this week and we discussed a tracheal stent with concurrent PEG tube placement due to concerns for esophageal compression with the stent placement. I spoke with Dr Montoya during the patient's clinic visit and he agreeable to the plan. He did state that the stent may migrate once the tumor starts regressing which can cause pain issues. I think we do not have any other option as the concern would be tumor swelling with treatment which could cause further airway compromise. Dr Montoya is amenable to doing the stent placement on Monday. I spoke with his  and care coordinator to help coordinate the procedure being scheduled. We will plan on a postoperative admission for airway monitoring and likely try to start urgent " treatment.    I discussed with the patient that we also need to get more tissue for a definitive diagnosis. We could do a core biopsy but I am concerned about sedation without having her airway secured. Since she already needs a procedure in the OR for the stent placement I can perform a small open incisional biopsy to obtain further tissue for pathology.     I have reached out to the thoracic surgery team to try to coordinate the PEG happening on the same day. I explained to the patient that the goal would be for the PEG to be as a security in case of dysphagia from the stent or with treatment. I would advocate her still eating as long as possible but the PEG will provide us ability to ensure nutrition to not delay any of her treatment. I had her obtain a CT chest today to assess ability to place the PEG by thoracic surgery.    I spoke with Dr Ray from radiation oncology on the phone during clinic today. Given the airway compression he agrees that she will likely need urgent start of treatment once the stent is placed. He cannot start treatment until the stent is placed due to concerns for airway edema causing progression of her obstruction. He saw her in clinic today as an urgent consult so he can be prepared to start treatment next week.    The family did have lots of overall prognosis questions. I explained that right now we do not even have a specific diagnosis other than likely lymphoma. Once we have a diagnosis this will help with answering this question. We will need to have her see medical oncology once we have a tissue diagnosis.    While awaiting the procedure on Monday we will start her back on a course of prednisone to try to obtain some decrease in size of the mass and improvement in her breathing. If she does not improve we will do 4 mg dexamethasone Q6H oral course at the recommendation of radiation oncology.    The patient was understandably slightly overwhelmed by the information today and the  rush to do workup and start treatment but were grateful for the expedited care.       Thank you very much for the opportunity to participate in the care of your patient.      Kiersten Valdez MD, M.D.  Otolaryngology- Head & Neck Surgery      This note was dictated with voice recognition software and then edited. Please excuse any unintentional errors.         I spent a total of 60 minutes (1230-130) with the patient and coordination of care with > 75% of time spent in counseling to the patient about care recommendations as per above.        CC:  Josiah Sanders MD  [facility]  [address] [place], [place] [address]  [facility] [id number]      Charly Ray MD  Department of Radiation Oncology  [facility]

## 2020-02-16 RX ORDER — ONDANSETRON 4 MG/1
4 TABLET, ORALLY DISINTEGRATING ORAL EVERY 30 MIN PRN
Status: CANCELLED | OUTPATIENT
Start: 2020-02-16

## 2020-02-16 RX ORDER — FENTANYL CITRATE 50 UG/ML
25-50 INJECTION, SOLUTION INTRAMUSCULAR; INTRAVENOUS EVERY 5 MIN PRN
Status: CANCELLED | OUTPATIENT
Start: 2020-02-16

## 2020-02-16 RX ORDER — HYDRALAZINE HYDROCHLORIDE 20 MG/ML
2.5-5 INJECTION INTRAMUSCULAR; INTRAVENOUS EVERY 10 MIN PRN
Status: CANCELLED | OUTPATIENT
Start: 2020-02-16

## 2020-02-16 RX ORDER — SODIUM CHLORIDE, SODIUM LACTATE, POTASSIUM CHLORIDE, CALCIUM CHLORIDE 600; 310; 30; 20 MG/100ML; MG/100ML; MG/100ML; MG/100ML
INJECTION, SOLUTION INTRAVENOUS CONTINUOUS
Status: CANCELLED | OUTPATIENT
Start: 2020-02-16

## 2020-02-16 RX ORDER — LABETALOL HYDROCHLORIDE 5 MG/ML
10 INJECTION, SOLUTION INTRAVENOUS
Status: CANCELLED | OUTPATIENT
Start: 2020-02-16

## 2020-02-16 RX ORDER — DEXAMETHASONE SODIUM PHOSPHATE 4 MG/ML
4 INJECTION, SOLUTION INTRA-ARTICULAR; INTRALESIONAL; INTRAMUSCULAR; INTRAVENOUS; SOFT TISSUE
Status: CANCELLED | OUTPATIENT
Start: 2020-02-16

## 2020-02-16 RX ORDER — ONDANSETRON 2 MG/ML
4 INJECTION INTRAMUSCULAR; INTRAVENOUS EVERY 30 MIN PRN
Status: CANCELLED | OUTPATIENT
Start: 2020-02-16

## 2020-02-16 RX ORDER — NALOXONE HYDROCHLORIDE 0.4 MG/ML
.1-.4 INJECTION, SOLUTION INTRAMUSCULAR; INTRAVENOUS; SUBCUTANEOUS
Status: CANCELLED | OUTPATIENT
Start: 2020-02-16 | End: 2020-02-17

## 2020-02-17 ENCOUNTER — RECORDS - HEALTHEAST (OUTPATIENT)
Dept: ADMINISTRATIVE | Facility: OTHER | Age: 70
End: 2020-02-17

## 2020-02-17 ENCOUNTER — HOSPITAL ENCOUNTER (INPATIENT)
Facility: CLINIC | Age: 70
LOS: 18 days | Discharge: NURSING, REHAB OR LONG-TERM CARE | DRG: 840 | End: 2020-03-06
Attending: OTOLARYNGOLOGY | Admitting: OTOLARYNGOLOGY
Payer: COMMERCIAL

## 2020-02-17 ENCOUNTER — APPOINTMENT (OUTPATIENT)
Dept: GENERAL RADIOLOGY | Facility: CLINIC | Age: 70
DRG: 840 | End: 2020-02-17
Attending: STUDENT IN AN ORGANIZED HEALTH CARE EDUCATION/TRAINING PROGRAM
Payer: COMMERCIAL

## 2020-02-17 ENCOUNTER — ANESTHESIA (OUTPATIENT)
Dept: SURGERY | Facility: CLINIC | Age: 70
DRG: 840 | End: 2020-02-17
Payer: COMMERCIAL

## 2020-02-17 DIAGNOSIS — E07.9 THYROID MASS: ICD-10-CM

## 2020-02-17 DIAGNOSIS — B37.0 THRUSH: ICD-10-CM

## 2020-02-17 DIAGNOSIS — C83.398 DIFFUSE LARGE B-CELL LYMPHOMA OF EXTRANODAL SITE: Primary | ICD-10-CM

## 2020-02-17 PROBLEM — J98.59 MEDIASTINAL MASS: Status: ACTIVE | Noted: 2020-02-17

## 2020-02-17 LAB
ABO + RH BLD: NORMAL
ABO + RH BLD: NORMAL
ALBUMIN SERPL-MCNC: 3 G/DL (ref 3.4–5)
ALP SERPL-CCNC: 88 U/L (ref 40–150)
ALT SERPL W P-5'-P-CCNC: 20 U/L (ref 0–50)
ANION GAP SERPL CALCULATED.3IONS-SCNC: 2 MMOL/L (ref 3–14)
AST SERPL W P-5'-P-CCNC: 21 U/L (ref 0–45)
BASE DEFICIT BLDA-SCNC: 0.9 MMOL/L
BILIRUB SERPL-MCNC: 0.3 MG/DL (ref 0.2–1.3)
BLD GP AB SCN SERPL QL: NORMAL
BLOOD BANK CMNT PATIENT-IMP: NORMAL
BUN SERPL-MCNC: 14 MG/DL (ref 7–30)
CALCIUM SERPL-MCNC: 8.8 MG/DL (ref 8.5–10.1)
CAP COMMENT: ABNORMAL
CHLORIDE SERPL-SCNC: 106 MMOL/L (ref 94–109)
CO2 SERPL-SCNC: 29 MMOL/L (ref 20–32)
CREAT SERPL-MCNC: 0.8 MG/DL (ref 0.52–1.04)
ERYTHROCYTE [DISTWIDTH] IN BLOOD BY AUTOMATED COUNT: 14.4 % (ref 10–15)
GFR SERPL CREATININE-BSD FRML MDRD: 76 ML/MIN/{1.73_M2}
GLUCOSE BLDC GLUCOMTR-MCNC: 74 MG/DL (ref 70–99)
GLUCOSE BLDC GLUCOMTR-MCNC: 94 MG/DL (ref 70–99)
GLUCOSE SERPL-MCNC: 102 MG/DL (ref 70–99)
HBA1C MFR BLD: 5.8 % (ref 0–5.6)
HCO3 BLD-SCNC: 23 MMOL/L (ref 21–28)
HCT VFR BLD AUTO: 34.3 % (ref 35–47)
HGB BLD-MCNC: 11.1 G/DL (ref 11.7–15.7)
LAB AP CHARGES (HE HISTORICAL CONVERSION): ABNORMAL
LAB AP INITIAL CYTO EVAL (HE HISTORICAL CONVERSION): ABNORMAL
LAB MED GENERAL PATH INTERP (HE HISTORICAL CONVERSION): ABNORMAL
LACTATE BLD-SCNC: 0.9 MMOL/L (ref 0.7–2)
MAGNESIUM SERPL-MCNC: 2.2 MG/DL (ref 1.6–2.3)
MCH RBC QN AUTO: 30.6 PG (ref 26.5–33)
MCHC RBC AUTO-ENTMCNC: 32.4 G/DL (ref 31.5–36.5)
MCV RBC AUTO: 95 FL (ref 78–100)
MRSA DNA SPEC QL NAA+PROBE: NEGATIVE
O2/TOTAL GAS SETTING VFR VENT: 50 %
OXYHGB MFR BLD: 99 % (ref 92–100)
PATH REPORT.ADDENDUM SPEC: ABNORMAL
PATH REPORT.ADDENDUM SPEC: ABNORMAL
PATH REPORT.COMMENTS IMP SPEC: ABNORMAL
PATH REPORT.COMMENTS IMP SPEC: ABNORMAL
PATH REPORT.FINAL DX SPEC: ABNORMAL
PATH REPORT.MICROSCOPIC SPEC OTHER STN: ABNORMAL
PATH REPORT.MICROSCOPIC SPEC OTHER STN: ABNORMAL
PATH REPORT.RELEVANT HX SPEC: ABNORMAL
PCO2 BLD: 35 MM HG (ref 35–45)
PH BLD: 7.43 PH (ref 7.35–7.45)
PHOSPHATE SERPL-MCNC: 2.3 MG/DL (ref 2.5–4.5)
PLATELET # BLD AUTO: 254 10E9/L (ref 150–450)
PO2 BLD: 210 MM HG (ref 80–105)
POTASSIUM SERPL-SCNC: 3.4 MMOL/L (ref 3.4–5.3)
PROT SERPL-MCNC: 6.5 G/DL (ref 6.8–8.8)
RBC # BLD AUTO: 3.63 10E12/L (ref 3.8–5.2)
SODIUM SERPL-SCNC: 137 MMOL/L (ref 133–144)
SPECIMEN DESCRIPTION: ABNORMAL
SPECIMEN EXP DATE BLD: NORMAL
SPECIMEN SOURCE: NORMAL
WBC # BLD AUTO: 7.7 10E9/L (ref 4–11)

## 2020-02-17 PROCEDURE — 40000275 ZZH STATISTIC RCP TIME EA 10 MIN

## 2020-02-17 PROCEDURE — 25800030 ZZH RX IP 258 OP 636: Performed by: STUDENT IN AN ORGANIZED HEALTH CARE EDUCATION/TRAINING PROGRAM

## 2020-02-17 PROCEDURE — 88185 FLOWCYTOMETRY/TC ADD-ON: CPT | Performed by: OTOLARYNGOLOGY

## 2020-02-17 PROCEDURE — 87640 STAPH A DNA AMP PROBE: CPT | Performed by: STUDENT IN AN ORGANIZED HEALTH CARE EDUCATION/TRAINING PROGRAM

## 2020-02-17 PROCEDURE — 87641 MR-STAPH DNA AMP PROBE: CPT | Performed by: STUDENT IN AN ORGANIZED HEALTH CARE EDUCATION/TRAINING PROGRAM

## 2020-02-17 PROCEDURE — 86900 BLOOD TYPING SEROLOGIC ABO: CPT | Performed by: ANESTHESIOLOGY

## 2020-02-17 PROCEDURE — 25000125 ZZHC RX 250: Performed by: ANESTHESIOLOGY

## 2020-02-17 PROCEDURE — 85027 COMPLETE CBC AUTOMATED: CPT | Performed by: STUDENT IN AN ORGANIZED HEALTH CARE EDUCATION/TRAINING PROGRAM

## 2020-02-17 PROCEDURE — 27210794 ZZH OR GENERAL SUPPLY STERILE: Performed by: OTOLARYNGOLOGY

## 2020-02-17 PROCEDURE — 93010 ELECTROCARDIOGRAM REPORT: CPT | Mod: 59 | Performed by: INTERNAL MEDICINE

## 2020-02-17 PROCEDURE — 88261 CHROMOSOME ANALYSIS 5: CPT | Performed by: OTOLARYNGOLOGY

## 2020-02-17 PROCEDURE — 88239 TISSUE CULTURE TUMOR: CPT | Performed by: OTOLARYNGOLOGY

## 2020-02-17 PROCEDURE — 25000125 ZZHC RX 250: Performed by: STUDENT IN AN ORGANIZED HEALTH CARE EDUCATION/TRAINING PROGRAM

## 2020-02-17 PROCEDURE — 0DH63UZ INSERTION OF FEEDING DEVICE INTO STOMACH, PERCUTANEOUS APPROACH: ICD-10-PCS | Performed by: THORACIC SURGERY (CARDIOTHORACIC VASCULAR SURGERY)

## 2020-02-17 PROCEDURE — 36415 COLL VENOUS BLD VENIPUNCTURE: CPT | Performed by: STUDENT IN AN ORGANIZED HEALTH CARE EDUCATION/TRAINING PROGRAM

## 2020-02-17 PROCEDURE — 00000146 ZZHCL STATISTIC GLUCOSE BY METER IP

## 2020-02-17 PROCEDURE — 84100 ASSAY OF PHOSPHORUS: CPT | Performed by: STUDENT IN AN ORGANIZED HEALTH CARE EDUCATION/TRAINING PROGRAM

## 2020-02-17 PROCEDURE — 80053 COMPREHEN METABOLIC PANEL: CPT | Performed by: STUDENT IN AN ORGANIZED HEALTH CARE EDUCATION/TRAINING PROGRAM

## 2020-02-17 PROCEDURE — 83735 ASSAY OF MAGNESIUM: CPT | Performed by: STUDENT IN AN ORGANIZED HEALTH CARE EDUCATION/TRAINING PROGRAM

## 2020-02-17 PROCEDURE — 25800030 ZZH RX IP 258 OP 636: Performed by: INTERNAL MEDICINE

## 2020-02-17 PROCEDURE — 40001004 ZZHCL STATISTIC FLOW INT 9-15 ABY TC 88188: Performed by: OTOLARYNGOLOGY

## 2020-02-17 PROCEDURE — 83036 HEMOGLOBIN GLYCOSYLATED A1C: CPT | Performed by: STUDENT IN AN ORGANIZED HEALTH CARE EDUCATION/TRAINING PROGRAM

## 2020-02-17 PROCEDURE — 71000014 ZZH RECOVERY PHASE 1 LEVEL 2 FIRST HR: Performed by: OTOLARYNGOLOGY

## 2020-02-17 PROCEDURE — 25000128 H RX IP 250 OP 636: Performed by: STUDENT IN AN ORGANIZED HEALTH CARE EDUCATION/TRAINING PROGRAM

## 2020-02-17 PROCEDURE — 5A1955Z RESPIRATORY VENTILATION, GREATER THAN 96 CONSECUTIVE HOURS: ICD-10-PCS | Performed by: THORACIC SURGERY (CARDIOTHORACIC VASCULAR SURGERY)

## 2020-02-17 PROCEDURE — 20000004 ZZH R&B ICU UMMC

## 2020-02-17 PROCEDURE — 37000009 ZZH ANESTHESIA TECHNICAL FEE, EACH ADDTL 15 MIN: Performed by: OTOLARYNGOLOGY

## 2020-02-17 PROCEDURE — 82805 BLOOD GASES W/O2 SATURATION: CPT | Performed by: STUDENT IN AN ORGANIZED HEALTH CARE EDUCATION/TRAINING PROGRAM

## 2020-02-17 PROCEDURE — 25000128 H RX IP 250 OP 636: Performed by: INTERNAL MEDICINE

## 2020-02-17 PROCEDURE — 88280 CHROMOSOME KARYOTYPE STUDY: CPT | Performed by: OTOLARYNGOLOGY

## 2020-02-17 PROCEDURE — 40000014 ZZH STATISTIC ARTERIAL MONITORING DAILY

## 2020-02-17 PROCEDURE — 25000566 ZZH SEVOFLURANE, EA 15 MIN: Performed by: OTOLARYNGOLOGY

## 2020-02-17 PROCEDURE — 88271 CYTOGENETICS DNA PROBE: CPT | Performed by: OTOLARYNGOLOGY

## 2020-02-17 PROCEDURE — 41000018 ZZH PER-PERFUSION 1ST 30 MIN: Performed by: OTOLARYNGOLOGY

## 2020-02-17 PROCEDURE — 86850 RBC ANTIBODY SCREEN: CPT | Performed by: ANESTHESIOLOGY

## 2020-02-17 PROCEDURE — 27211024 ZZHC OR SUPPLY OTHER OPNP: Performed by: OTOLARYNGOLOGY

## 2020-02-17 PROCEDURE — 88341 IMHCHEM/IMCYTCHM EA ADD ANTB: CPT | Performed by: OTOLARYNGOLOGY

## 2020-02-17 PROCEDURE — 36000059 ZZH SURGERY LEVEL 3 EA 15 ADDTL MIN UMMC: Performed by: OTOLARYNGOLOGY

## 2020-02-17 PROCEDURE — 40000986 XR CHEST PORT 1 VW

## 2020-02-17 PROCEDURE — 88305 TISSUE EXAM BY PATHOLOGIST: CPT | Performed by: OTOLARYNGOLOGY

## 2020-02-17 PROCEDURE — 88342 IMHCHEM/IMCYTCHM 1ST ANTB: CPT | Performed by: OTOLARYNGOLOGY

## 2020-02-17 PROCEDURE — 37000008 ZZH ANESTHESIA TECHNICAL FEE, 1ST 30 MIN: Performed by: OTOLARYNGOLOGY

## 2020-02-17 PROCEDURE — 88275 CYTOGENETICS 100-300: CPT | Performed by: OTOLARYNGOLOGY

## 2020-02-17 PROCEDURE — 0BB18ZX EXCISION OF TRACHEA, VIA NATURAL OR ARTIFICIAL OPENING ENDOSCOPIC, DIAGNOSTIC: ICD-10-PCS | Performed by: INTERNAL MEDICINE

## 2020-02-17 PROCEDURE — 88264 CHROMOSOME ANALYSIS 20-25: CPT | Performed by: OTOLARYNGOLOGY

## 2020-02-17 PROCEDURE — 88184 FLOWCYTOMETRY/ TC 1 MARKER: CPT | Performed by: OTOLARYNGOLOGY

## 2020-02-17 PROCEDURE — 3E0G76Z INTRODUCTION OF NUTRITIONAL SUBSTANCE INTO UPPER GI, VIA NATURAL OR ARTIFICIAL OPENING: ICD-10-PCS | Performed by: THORACIC SURGERY (CARDIOTHORACIC VASCULAR SURGERY)

## 2020-02-17 PROCEDURE — 86901 BLOOD TYPING SEROLOGIC RH(D): CPT | Performed by: ANESTHESIOLOGY

## 2020-02-17 PROCEDURE — 36000061 ZZH SURGERY LEVEL 3 W FLUORO 1ST 30 MIN - UMMC: Performed by: OTOLARYNGOLOGY

## 2020-02-17 PROCEDURE — 00000159 ZZHCL STATISTIC H-SEND OUTS PREP: Performed by: OTOLARYNGOLOGY

## 2020-02-17 PROCEDURE — 83605 ASSAY OF LACTIC ACID: CPT | Performed by: STUDENT IN AN ORGANIZED HEALTH CARE EDUCATION/TRAINING PROGRAM

## 2020-02-17 PROCEDURE — 94002 VENT MGMT INPAT INIT DAY: CPT

## 2020-02-17 PROCEDURE — 40000170 ZZH STATISTIC PRE-PROCEDURE ASSESSMENT II: Performed by: OTOLARYNGOLOGY

## 2020-02-17 PROCEDURE — 99291 CRITICAL CARE FIRST HOUR: CPT | Mod: 25 | Performed by: SURGERY

## 2020-02-17 RX ORDER — SODIUM CHLORIDE, SODIUM LACTATE, POTASSIUM CHLORIDE, CALCIUM CHLORIDE 600; 310; 30; 20 MG/100ML; MG/100ML; MG/100ML; MG/100ML
INJECTION, SOLUTION INTRAVENOUS CONTINUOUS
Status: DISCONTINUED | OUTPATIENT
Start: 2020-02-17 | End: 2020-02-17 | Stop reason: HOSPADM

## 2020-02-17 RX ORDER — EPHEDRINE SULFATE 50 MG/ML
INJECTION, SOLUTION INTRAMUSCULAR; INTRAVENOUS; SUBCUTANEOUS PRN
Status: DISCONTINUED | OUTPATIENT
Start: 2020-02-17 | End: 2020-02-17

## 2020-02-17 RX ORDER — DEXTROSE MONOHYDRATE 25 G/50ML
25-50 INJECTION, SOLUTION INTRAVENOUS
Status: DISCONTINUED | OUTPATIENT
Start: 2020-02-17 | End: 2020-03-06 | Stop reason: HOSPADM

## 2020-02-17 RX ORDER — POTASSIUM CHLORIDE 1.5 G/1.58G
20-40 POWDER, FOR SOLUTION ORAL
Status: DISCONTINUED | OUTPATIENT
Start: 2020-02-17 | End: 2020-03-06 | Stop reason: HOSPADM

## 2020-02-17 RX ORDER — NICOTINE POLACRILEX 4 MG
15-30 LOZENGE BUCCAL
Status: DISCONTINUED | OUTPATIENT
Start: 2020-02-17 | End: 2020-03-06 | Stop reason: HOSPADM

## 2020-02-17 RX ORDER — FENTANYL CITRATE 50 UG/ML
0.5 INJECTION, SOLUTION INTRAMUSCULAR; INTRAVENOUS
Status: DISCONTINUED | OUTPATIENT
Start: 2020-02-17 | End: 2020-02-18

## 2020-02-17 RX ORDER — LIDOCAINE 40 MG/G
CREAM TOPICAL
Status: DISCONTINUED | OUTPATIENT
Start: 2020-02-17 | End: 2020-02-17 | Stop reason: HOSPADM

## 2020-02-17 RX ORDER — CEFAZOLIN SODIUM 1 G/3ML
INJECTION, POWDER, FOR SOLUTION INTRAMUSCULAR; INTRAVENOUS PRN
Status: DISCONTINUED | OUTPATIENT
Start: 2020-02-17 | End: 2020-02-17

## 2020-02-17 RX ORDER — FENTANYL CITRATE 50 UG/ML
INJECTION, SOLUTION INTRAMUSCULAR; INTRAVENOUS PRN
Status: DISCONTINUED | OUTPATIENT
Start: 2020-02-17 | End: 2020-02-17

## 2020-02-17 RX ORDER — POTASSIUM CHLORIDE 7.45 MG/ML
10 INJECTION INTRAVENOUS
Status: DISCONTINUED | OUTPATIENT
Start: 2020-02-17 | End: 2020-03-06 | Stop reason: HOSPADM

## 2020-02-17 RX ORDER — POLYETHYLENE GLYCOL 3350 17 G/17G
17 POWDER, FOR SOLUTION ORAL DAILY PRN
Status: DISCONTINUED | OUTPATIENT
Start: 2020-02-17 | End: 2020-02-22

## 2020-02-17 RX ORDER — DEXMEDETOMIDINE HYDROCHLORIDE 4 UG/ML
0.2-0.7 INJECTION, SOLUTION INTRAVENOUS CONTINUOUS
Status: DISCONTINUED | OUTPATIENT
Start: 2020-02-17 | End: 2020-02-20

## 2020-02-17 RX ORDER — METOPROLOL TARTRATE 1 MG/ML
1-2 INJECTION, SOLUTION INTRAVENOUS EVERY 5 MIN PRN
Status: DISCONTINUED | OUTPATIENT
Start: 2020-02-17 | End: 2020-02-17 | Stop reason: HOSPADM

## 2020-02-17 RX ORDER — ALLOPURINOL 100 MG/1
100 TABLET ORAL DAILY
Status: DISCONTINUED | OUTPATIENT
Start: 2020-02-18 | End: 2020-02-18

## 2020-02-17 RX ORDER — NALOXONE HYDROCHLORIDE 0.4 MG/ML
.1-.4 INJECTION, SOLUTION INTRAMUSCULAR; INTRAVENOUS; SUBCUTANEOUS
Status: DISCONTINUED | OUTPATIENT
Start: 2020-02-17 | End: 2020-02-17

## 2020-02-17 RX ORDER — MAGNESIUM SULFATE HEPTAHYDRATE 40 MG/ML
4 INJECTION, SOLUTION INTRAVENOUS EVERY 4 HOURS PRN
Status: DISCONTINUED | OUTPATIENT
Start: 2020-02-17 | End: 2020-03-06 | Stop reason: HOSPADM

## 2020-02-17 RX ORDER — OXYCODONE HCL 5 MG/5 ML
5 SOLUTION, ORAL ORAL EVERY 4 HOURS PRN
Status: DISCONTINUED | OUTPATIENT
Start: 2020-02-17 | End: 2020-03-06 | Stop reason: HOSPADM

## 2020-02-17 RX ORDER — ONDANSETRON 2 MG/ML
4 INJECTION INTRAMUSCULAR; INTRAVENOUS EVERY 6 HOURS PRN
Status: DISCONTINUED | OUTPATIENT
Start: 2020-02-17 | End: 2020-02-27

## 2020-02-17 RX ORDER — PROPOFOL 10 MG/ML
5-75 INJECTION, EMULSION INTRAVENOUS CONTINUOUS
Status: DISCONTINUED | OUTPATIENT
Start: 2020-02-17 | End: 2020-02-17

## 2020-02-17 RX ORDER — ONDANSETRON 4 MG/1
4 TABLET, ORALLY DISINTEGRATING ORAL EVERY 6 HOURS PRN
Status: DISCONTINUED | OUTPATIENT
Start: 2020-02-17 | End: 2020-02-27

## 2020-02-17 RX ORDER — HYDRALAZINE HYDROCHLORIDE 20 MG/ML
2.5-5 INJECTION INTRAMUSCULAR; INTRAVENOUS EVERY 10 MIN PRN
Status: DISCONTINUED | OUTPATIENT
Start: 2020-02-17 | End: 2020-02-17 | Stop reason: HOSPADM

## 2020-02-17 RX ORDER — POTASSIUM CL/LIDO/0.9 % NACL 10MEQ/0.1L
10 INTRAVENOUS SOLUTION, PIGGYBACK (ML) INTRAVENOUS
Status: DISCONTINUED | OUTPATIENT
Start: 2020-02-17 | End: 2020-03-06 | Stop reason: HOSPADM

## 2020-02-17 RX ORDER — IPRATROPIUM BROMIDE AND ALBUTEROL SULFATE 2.5; .5 MG/3ML; MG/3ML
3 SOLUTION RESPIRATORY (INHALATION) EVERY 4 HOURS PRN
Status: DISCONTINUED | OUTPATIENT
Start: 2020-02-17 | End: 2020-03-06 | Stop reason: HOSPADM

## 2020-02-17 RX ORDER — FENTANYL CITRATE 50 UG/ML
25-50 INJECTION, SOLUTION INTRAMUSCULAR; INTRAVENOUS
Status: DISCONTINUED | OUTPATIENT
Start: 2020-02-17 | End: 2020-02-17 | Stop reason: HOSPADM

## 2020-02-17 RX ORDER — DEXAMETHASONE SODIUM PHOSPHATE 4 MG/ML
INJECTION, SOLUTION INTRA-ARTICULAR; INTRALESIONAL; INTRAMUSCULAR; INTRAVENOUS; SOFT TISSUE PRN
Status: DISCONTINUED | OUTPATIENT
Start: 2020-02-17 | End: 2020-02-17

## 2020-02-17 RX ORDER — SODIUM CHLORIDE, SODIUM LACTATE, POTASSIUM CHLORIDE, CALCIUM CHLORIDE 600; 310; 30; 20 MG/100ML; MG/100ML; MG/100ML; MG/100ML
INJECTION, SOLUTION INTRAVENOUS CONTINUOUS PRN
Status: DISCONTINUED | OUTPATIENT
Start: 2020-02-17 | End: 2020-02-17

## 2020-02-17 RX ORDER — PANTOPRAZOLE SODIUM 40 MG/1
40 TABLET, DELAYED RELEASE ORAL DAILY
Status: DISCONTINUED | OUTPATIENT
Start: 2020-02-18 | End: 2020-02-27

## 2020-02-17 RX ORDER — PREDNISONE 20 MG/1
40 TABLET ORAL DAILY
Status: DISCONTINUED | OUTPATIENT
Start: 2020-02-17 | End: 2020-02-17

## 2020-02-17 RX ORDER — ONDANSETRON 4 MG/1
4 TABLET, ORALLY DISINTEGRATING ORAL EVERY 30 MIN PRN
Status: DISCONTINUED | OUTPATIENT
Start: 2020-02-17 | End: 2020-02-17 | Stop reason: HOSPADM

## 2020-02-17 RX ORDER — POTASSIUM CHLORIDE 750 MG/1
20-40 TABLET, EXTENDED RELEASE ORAL
Status: DISCONTINUED | OUTPATIENT
Start: 2020-02-17 | End: 2020-03-06 | Stop reason: HOSPADM

## 2020-02-17 RX ORDER — NALOXONE HYDROCHLORIDE 0.4 MG/ML
.1-.4 INJECTION, SOLUTION INTRAMUSCULAR; INTRAVENOUS; SUBCUTANEOUS
Status: DISCONTINUED | OUTPATIENT
Start: 2020-02-17 | End: 2020-03-06 | Stop reason: HOSPADM

## 2020-02-17 RX ORDER — HYDROMORPHONE HYDROCHLORIDE 1 MG/ML
0.5 INJECTION, SOLUTION INTRAMUSCULAR; INTRAVENOUS; SUBCUTANEOUS
Status: DISCONTINUED | OUTPATIENT
Start: 2020-02-17 | End: 2020-02-18

## 2020-02-17 RX ORDER — DEXMEDETOMIDINE HYDROCHLORIDE 4 UG/ML
0.2-0.7 INJECTION, SOLUTION INTRAVENOUS CONTINUOUS
Status: DISCONTINUED | OUTPATIENT
Start: 2020-02-17 | End: 2020-02-17 | Stop reason: HOSPADM

## 2020-02-17 RX ORDER — DEXAMETHASONE SODIUM PHOSPHATE 4 MG/ML
4 INJECTION, SOLUTION INTRA-ARTICULAR; INTRALESIONAL; INTRAMUSCULAR; INTRAVENOUS; SOFT TISSUE EVERY 6 HOURS
Status: DISCONTINUED | OUTPATIENT
Start: 2020-02-17 | End: 2020-02-19

## 2020-02-17 RX ORDER — NOREPINEPHRINE BITARTRATE 0.06 MG/ML
0.03-0.4 INJECTION, SOLUTION INTRAVENOUS CONTINUOUS
Status: DISCONTINUED | OUTPATIENT
Start: 2020-02-17 | End: 2020-02-17 | Stop reason: HOSPADM

## 2020-02-17 RX ORDER — PROPOFOL 10 MG/ML
10-20 INJECTION, EMULSION INTRAVENOUS EVERY 30 MIN PRN
Status: DISCONTINUED | OUTPATIENT
Start: 2020-02-17 | End: 2020-02-17

## 2020-02-17 RX ORDER — POTASSIUM CHLORIDE 29.8 MG/ML
20 INJECTION INTRAVENOUS
Status: DISCONTINUED | OUTPATIENT
Start: 2020-02-17 | End: 2020-03-06 | Stop reason: HOSPADM

## 2020-02-17 RX ORDER — ONDANSETRON 2 MG/ML
4 INJECTION INTRAMUSCULAR; INTRAVENOUS EVERY 30 MIN PRN
Status: DISCONTINUED | OUTPATIENT
Start: 2020-02-17 | End: 2020-02-17 | Stop reason: HOSPADM

## 2020-02-17 RX ADMIN — Medication 5 MG: at 14:55

## 2020-02-17 RX ADMIN — DEXAMETHASONE SODIUM PHOSPHATE 4 MG: 4 INJECTION, SOLUTION INTRAMUSCULAR; INTRAVENOUS at 20:17

## 2020-02-17 RX ADMIN — MIDAZOLAM 1 MG: 1 INJECTION INTRAMUSCULAR; INTRAVENOUS at 14:58

## 2020-02-17 RX ADMIN — DEXAMETHASONE SODIUM PHOSPHATE 20 MG: 4 INJECTION, SOLUTION INTRA-ARTICULAR; INTRALESIONAL; INTRAMUSCULAR; INTRAVENOUS; SOFT TISSUE at 14:58

## 2020-02-17 RX ADMIN — FENTANYL CITRATE 50 MCG: 50 INJECTION, SOLUTION INTRAMUSCULAR; INTRAVENOUS at 15:16

## 2020-02-17 RX ADMIN — DEXMEDETOMIDINE 0.7 MCG/KG/HR: 100 INJECTION, SOLUTION, CONCENTRATE INTRAVENOUS at 19:54

## 2020-02-17 RX ADMIN — PHENYLEPHRINE HYDROCHLORIDE 100 MCG: 10 INJECTION INTRAVENOUS at 15:08

## 2020-02-17 RX ADMIN — SODIUM PHOSPHATE, MONOBASIC, MONOHYDRATE AND SODIUM PHOSPHATE, DIBASIC, ANHYDROUS 15 MMOL: 276; 142 INJECTION, SOLUTION INTRAVENOUS at 19:55

## 2020-02-17 RX ADMIN — LIDOCAINE HYDROCHLORIDE 3 ML: 40 INJECTION, SOLUTION RETROBULBAR; TOPICAL at 14:18

## 2020-02-17 RX ADMIN — NOREPINEPHRINE BITARTRATE 6.4 MCG: 1 INJECTION, SOLUTION, CONCENTRATE INTRAVENOUS at 15:19

## 2020-02-17 RX ADMIN — DEXMEDETOMIDINE HYDROCHLORIDE 1 MCG/KG/HR: 4 INJECTION, SOLUTION INTRAVENOUS at 14:37

## 2020-02-17 RX ADMIN — PHENYLEPHRINE HYDROCHLORIDE 100 MCG: 10 INJECTION INTRAVENOUS at 14:55

## 2020-02-17 RX ADMIN — SODIUM CHLORIDE, POTASSIUM CHLORIDE, SODIUM LACTATE AND CALCIUM CHLORIDE: 600; 310; 30; 20 INJECTION, SOLUTION INTRAVENOUS at 14:30

## 2020-02-17 RX ADMIN — Medication 10 MG: at 15:15

## 2020-02-17 RX ADMIN — NOREPINEPHRINE BITARTRATE 6.4 MCG: 1 INJECTION, SOLUTION, CONCENTRATE INTRAVENOUS at 15:27

## 2020-02-17 RX ADMIN — MIDAZOLAM 1 MG: 1 INJECTION INTRAMUSCULAR; INTRAVENOUS at 14:34

## 2020-02-17 RX ADMIN — DEXMEDETOMIDINE 0.7 MCG/KG/HR: 100 INJECTION, SOLUTION, CONCENTRATE INTRAVENOUS at 18:18

## 2020-02-17 RX ADMIN — ENOXAPARIN SODIUM 40 MG: 40 INJECTION SUBCUTANEOUS at 20:14

## 2020-02-17 RX ADMIN — Medication 50 MCG/HR: at 20:13

## 2020-02-17 RX ADMIN — PHENYLEPHRINE HYDROCHLORIDE 100 MCG: 10 INJECTION INTRAVENOUS at 15:15

## 2020-02-17 RX ADMIN — CEFAZOLIN 2 G: 1 INJECTION, POWDER, FOR SOLUTION INTRAMUSCULAR; INTRAVENOUS at 14:58

## 2020-02-17 ASSESSMENT — MIFFLIN-ST. JEOR
SCORE: 1312.75
SCORE: 1305.75

## 2020-02-17 ASSESSMENT — ACTIVITIES OF DAILY LIVING (ADL)
AMBULATION: 0-->INDEPENDENT
DRESS: 0-->INDEPENDENT
SWALLOWING: 0-->SWALLOWS FOODS/LIQUIDS WITHOUT DIFFICULTY
RETIRED_COMMUNICATION: 0-->UNDERSTANDS/COMMUNICATES WITHOUT DIFFICULTY
ADLS_ACUITY_SCORE: 22
TOILETING: 0-->INDEPENDENT
TRANSFERRING: 0-->INDEPENDENT
BATHING: 0-->INDEPENDENT
RETIRED_EATING: 0-->INDEPENDENT
FALL_HISTORY_WITHIN_LAST_SIX_MONTHS: NO
COGNITION: 0 - NO COGNITION ISSUES REPORTED

## 2020-02-17 NOTE — CONSULTS
Interventional Pulmonology          Initial Inpatient Consultation Note                                     February 17, 2020            Patient: Chuyita Porter    Date of Admission: 2/17/2020  Reason for Consultation: Central airway obstruction   Requesting Physician: MICU      Assessment:   Chuyita Porter is a 69 year old woman admitted on 2/17/2020 for central airway obstruction. Interventional Pulmonology is consulted today for this reason. Newly diagnosed anterior mediastinal mass with significant compression of the trachea. 1/30/20 percutaneous FNA demonstrates B cell lymphoma. Symptomatic improvement as an outpatient with steroids. Presented at head/neck conference here. Has completed simulation for radiation. Unclear if she has met with heme/onc thus far. Taken to OR as outpatient today for planned open neck biopsy, tracheal stent and PEG. Fiberoptically intubated while awake with 8.5 armored ETT, positioned ~1 cm proximal to the mainstem ezekiel (becomes obstructed if any higher). There is >75% mixed extrinsic>intrinsic endo-tracheal stenosis with mild burden of endoluminal tumor identified. This was biopsied and negated the need for open neck biopsy. We are unable to place a tracheal stent at this time due to a variety of technical limitations. A PEG tube is being placed by Dr. Parks at this time. We have had extensive conversations with surgery, radiation oncology, and her family; the plan is to keep her intubated with urgent initiation of radiation therapy tomorrow. She will need to remain intubated for at least the next several days as we get her through the maximal degree of added inflammation expected for the radiation therapy itself.      Plan:    Please consult heme/onc to initiate inpatient evaluation and treatment discussions     Please consult radiation oncology for plans to begin urgent radiation to begin tomorrow    Recommend obtaining usual labs but to include tumor lysis  "work-up    Continue outpatient steroids    Ensure deep sedation for tonight to prevent self-extubation    ETT is secured so that distal end is beyond distal point of obstruction; do not pull ETT back beyond where it is currently secured at the time of transfer out of the OR    In an emergency, re-intubate fiberoptically with armored ETT if able    In an emergency, this patient can not undergo trach/cric due to mass    This plan has been discussed with the patient and primary service        Thank you for this consultation, we will continue to follow along. Please see Durga for the on-call IP attending schedule, or page the fellow at [phone] with any questions. For future Interventional Pulmonology consults, the Interventional Pulmonology consult order is now active within the pulmonary consult order panel.               Chief Complaint: \"I know I have a mass pushing on my wind pipe\"    History of Present Illness:   Chuyita Porter is a 69 year old woman admitted on 2/17/2020 from the OR for central airway obstruction and impending airway compromise. Chuyita has an anterior mediastinal mass for which she is s/p percutaneous mass FNA on 1/30/20 at [facility] where a diagnosis of B-cell lymphoma was obtained (but unable to further characterize without additional tissue). The most recent CT (2/14/20) demonstrates a 8x6.7x10 CM anterior mediastinal mass with marked extrinsic compression along nearly the entirety of the trachea. No stridor or hemoptysis but worsening dyspnea/exercise intolerance and orthopnea over the last few weeks. She was presented at tumor board and last seen in the ENT clinic on 2/14. Has been back on steroids with improvement in her dyspnea. Seen in the outpatient surgical clinic where an open surgical biopsy was planned, to be done contemporaneously with airway stenting, PEG, and likely admission.  She was seen by rad/onc (Dr. Ray) on 2/14 and has undergone simulation for radiation. " She is otherwise treatment naive.     In pre-op, we had an in depth discussion with the patient and her family regarding the risks of proceeding with intubation and attempts at airway stenting. We also discussed the likelihood she would remain admitted and likely even intubated as we move forward with starting therapy. Dr. Montoya discussed this case with her radiation Oncologist today.     In the OR, she was intubated fiberoptically with an armored ETT. This was passed under direct visualization beyond the most distal point of extrinsic tracheal compression, ~1 CM proximal to the mainstem ezekiel. There is significant extrinsic compression along the entirety of the trachea (>75% extrinsically compressed); this is a mixed type obstruction as there is also evidence of endobronchial tumor which was biopsied. We are unable to place a tracheal stent at this time.            Data:   All pertinent laboratory and imaging data reviewed.      2/14/20 CT Chest:  On my interpretation, there is a large anterior mediastinal mass with significant extrinsic compression on the trachea.          Past Medical History:     Past Medical History:   Diagnosis Date     Breast cancer (H) 2005     Hx of radiation therapy     2005 +2015            Past Surgical History:     Past Surgical History:   Procedure Laterality Date     ADENOIDECTOMY       LUMPECTOMY BREAST      RT lunmpectomy 2005 with radiation. Lt lumpectomy 2015 with radiation     TONSILLECTOMY              Social History:     Social History     Socioeconomic History     Marital status:      Spouse name: Not on file     Number of children: 2     Years of education: Not on file     Highest education level: Not on file   Occupational History     Not on file   Social Needs     Financial resource strain: Not on file     Food insecurity:     Worry: Not on file     Inability: Not on file     Transportation needs:     Medical: Not on file     Non-medical: Not on file   Tobacco Use      Smoking status: Never Smoker     Smokeless tobacco: Never Used   Substance and Sexual Activity     Alcohol use: Never     Drug use: Never     Sexual activity: Yes     Partners: Male     Birth control/protection: Post-menopausal   Lifestyle     Physical activity:     Days per week: Not on file     Minutes per session: Not on file     Stress: Not on file   Relationships     Social connections:     Talks on phone: Not on file     Gets together: Not on file     Attends [employer] service: Not on file     Active member of club or organization: Not on file     Attends meetings of clubs or organizations: Not on file     Relationship status: Not on file     Intimate partner violence:     Fear of current or ex partner: Not on file     Emotionally abused: Not on file     Physically abused: Not on file     Forced sexual activity: Not on file   Other Topics Concern     Not on file   Social History Narrative     Not on file          Family History:     Family History   Problem Relation Age of Onset     Breast Cancer Paternal Aunt         2 aunts who had breast cancer in their 70's            Allergies:   No Known Allergies         Medications:       EPINEPHrine 0.2mg (0.2mL of 1:1,000) in 20mL saline   Topical Once     EPINEPHrine 0.2mg (0.2mL of 1:1,000) in 20mL saline   Topical Once     sodium chloride (PF)  3 mL Intracatheter Q8H            Review of Systems:   A 12 point review of systems is negative aside for as noted above         Physical Exam:   Temp:  [98.1  F (36.7  C)] 98.1  F (36.7  C)  Pulse:  [84] 84  Resp:  [18] 18  BP: (141)/(76) 141/76  SpO2:  [99 %] 99 %  General: Awake, alert and in no apparent distress, no stridor, sitting upright in bed  EENT: mmm  Neck: Trachea supple/midline  Lungs: CTAB  Cardiovascular: Normal S1 and S2.  RRR.    Abdomen: Soft, non-tender, non-distended   MSK: No edema, no clubbing   Neurologic: AOx3, moving all extremities   Skin: Warm/dry, no obvious rash        Herrera Dean MD,  2/17/2020, 3:28 PM  Interventional Pulmonology Fellow  Department of Pulmonary, Allergy, Critical Care & Sleep Medicine   Pager: [phone]

## 2020-02-17 NOTE — ANESTHESIA POSTPROCEDURE EVALUATION
Anesthesia POST Procedure Evaluation    Patient: Chuyita Porter   MRN:     [id number] Gender:   female   Age:    69 year old :      1950        Preoperative Diagnosis: Thyroid mass [E07.9]   Procedure(s):  DIAGNOSTIC BRONCHOSCOPY WITH BIOPSY,.  Endoscopic insertion tube gastrostomy   Postop Comments: No value filed.       Anesthesia Type:  Not documented  General    Reportable Event: NO     PAIN: Uncomplicated   Sign Out status: Comfortable, Well controlled pain     PONV: No PONV   Sign Out status:  No Nausea or Vomiting     Neuro/Psych: Uneventful perioperative course   Sign Out Status: Preoperative baseline; Age appropriate mentation     Airway/Resp.: Uneventful perioperative course   Sign Out Status: Airway Device present     Airway Device: ETT                 Reason: Planned (pre-op)     CV: Uneventful perioperative course   Sign Out status: Appropriate BP and perfusion indices; Appropriate HR/Rhythm     Disposition:   Sign Out in:  PACU  Disposition:  Phase II; Home  Recovery Course: Uneventful  Follow-Up: Not required           Last Anesthesia Record Vitals:  CRNA VITALS  2020 1514 - 2020 1558      2020             Pulse:  64    ART BP:  118/76    ART Mean:  93    SpO2:  100 %    Resp Rate (observed):  12    Resp Rate (set):  12          Last PACU Vitals:  Vitals Value Taken Time   /68 2020  3:50 PM   Temp     Pulse 71 2020  3:50 PM   Resp     SpO2 100 % 2020  3:57 PM   Temp src     NIBP     Pulse     SpO2     Resp     Temp     Ht Rate     Temp 2     Vitals shown include unvalidated device data.      Electronically Signed By: Manuel Vigil MD, 2020, 3:58 PM

## 2020-02-17 NOTE — LETTER
Transition Communication Hand-off for Care Transitions to Next Level of Care Provider    Name: Chuyita Porter  : 1950  MRN #: [id number]  Primary Care Provider: Phyllis Juarez     Primary Clinic: [address] [address]     Reason for Hospitalization:  Thyroid mass [E07.9]  Admit Date/Time: 2020 12:05 PM  Discharge Date: 3/6/20  Payor Source: Payor: HUMANA / Plan: HUMANA MEDICARE ADVANTAGE / Product Type: Medicare        Reason for Communication Hand-off Referral: Care Transitions    Discharge Plan: [facility]  [address] [place] [address]  [id number]  [phone]     Discharge Needs Assessment:  Needs      Most Recent Value   Equipment Currently Used at Home  none        Follow-up specialty is recommended: Yes    Follow-up plan:    Future Appointments   Date Time Provider Department Center   3/7/2020  6:00 AM Zina Scott OT [facility]   3/13/2020  9:45 AM  [facility] LAB DRAW ONL [facility]   3/13/2020 10:20 AM Magali Bruner PA-C [facility]   3/13/2020 12:00 PM  ONCOLOGY INFUSION [facility]   2020  7:30 AM [address] [place] [address]   2020  1:00 PM Rosy Sprague MD [facility]     JACQUI Harp Hematology/Oncology Social Worker  Phone: [phone]  Pager: [phone]  [email or web address]

## 2020-02-17 NOTE — H&P
[facility], [place]    History and Physical - MICU Service        Date of Admission:  2/17/2020    Assessment & Plan   Chuyita Porter is a 69 year old female admitted on 2/17/2020. She has a history of breast cancer s/p radiation (2005, 2015) and is admitted for 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     PLAN:    ===NEURO===    Sedation: Precedex gtt ALISE 0 to -1 (0.7 on 2/17)  Analgesia: Fentanyl gtt      ===CARDIOVASCULAR===    No acute issues       ===PULMONARY===    Central airway obstruction  Impending airway obstruction noted during workup for mediastinal mass shown to be B-cell lymphoma on previous FNA (1/30/20). Patient intubated during biopsy and decision to maintain ETT for early stages of treatment. Plan for rad-onc to initiate chemotherapy on 2/18 and monitor size of mass to determine when ETT can be safely removed.   - Daily CXR to confirm ETT positioning  - ETT at ~1 cm above ezekiel    Ventilation Mode: CMV/AC  (Continuous Mandatory Ventilation/ Assist Control)  FiO2 (%): 50 %  Rate Set (breaths/minute): 14 breaths/min  Tidal Volume Set (mL): 400 mL  PEEP (cm H2O): 5 cmH2O  Oxygen Concentration (%): 50 %  Resp: 14      ===GASTROINTESTINAL===    GI Prophylaxis  - Pantoprazole 40mg daily     Nutrition  PEG tube placed 2/17.  - Nutrition consult placed for TF    ===RENAL===    Concern for Tumor Lysis Syndrome  Plan for radiation therapy beginning 2/18.   - BID Phos, Ca, Uric Acid  - Consider rasburicase  - Begin allopurinol 100mg daily   - Consider aggressive hydration if signs of TLS     ===HEME/ONC===     B-cell Lymphoma  8x6.7x10cm anterior mediastinal mass shown to be B-cell lymphoma on previous biopsy (1/30/20), now s/p FNA.  - Decadron 4mg QID  - Plan for radiation therapy per Rad-Onc  - Rad-Onc, Med-Onc consulted     ===ENDOCRINE===    Steroid associated hyperglycemia  - Medium resistance sliding scale insulin      ===INFECTIOUS DISEASE===    No  "acute issues     ===SKIN/MSK===    No acute issues    - PT/OT consulted     Diet: NPO for Medical/Clinical Reasons Except for: No Exceptions    Fluids: N/A  DVT Prophylaxis: Enoxaparin (Lovenox) SQ and Pneumatic Compression Devices  Reid Catheter: not present  Code Status: Full Code  DNR per patient conversation at bedside    Disposition Plan   Expected discharge: > 7 days, recommended to TBD once radiation treatment intiated and no longer requiring intubation to protect airway.  Entered: Crystal Chen MD 02/17/2020, 5:49 PM       The patient's care was discussed with the Attending Physician, Dr. Knight, Bedside Nurse, Patient and Patient's Family.    Crystal Chen MD  [facility] Service  [facility], [place]  Pager: [phone], phone [phone]  Please see sticky note for cross cover information  ______________________________________________________________________    Chief Complaint   Dyspnea    History is obtained from the patient and electronic health record    History of Present Illness   Chuyita Porter is a 69 year old female with PMH of breast cancer s/p radiation (2005, 2015) and is admitted for 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    Patient first noticed symptoms of difficulty breathing and feeling of pressure on her neck/chest which she described as feeling like \"a albert cat curled up on my chest.\" FNA biopsy on 1/30/20 demonstrated B cell lymphoma. She was initially managed as an outpatient with oral steroids. Her case was completed at head/neck conference on morning of 2/17/20. She was taken to OR today for planned open neck biopsy, tracheal stent and PEG tube placement as part of planned treatment regimen. However, in OR, decision made to intubate patient with concern for threatened airway. And 8.5 armored ETT, ~1cm proximal to the mainstem ezekiel was placed and patient is to remain intubated for the next several days as radiation therapy is initiated. "     Rad-Onc, Med-Onc, ENT, and Interventional Pulm are all participating in this patient's care.     Review of Systems    The 5 point Review of Systems is negative other than noted in the HPI or here.     Past Medical History    I have reviewed this patient's medical history and updated it with pertinent information if needed.   Past Medical History:   Diagnosis Date     Breast cancer (H) 2005     Hx of radiation therapy     2005 +2015        Past Surgical History   I have reviewed this patient's surgical history and updated it with pertinent information if needed.  Past Surgical History:   Procedure Laterality Date     ADENOIDECTOMY       LUMPECTOMY BREAST      RT lunmpectomy 2005 with radiation. Lt lumpectomy 2015 with radiation     TONSILLECTOMY          Social History   I have reviewed this patient's social history and updated it with pertinent information if needed. Chuyita Porter  reports that she has never smoked. She has never used smokeless tobacco. She reports that she does not drink alcohol or use drugs.    Family History   I have reviewed this patient's family history and updated it with pertinent information if needed.   Family History   Problem Relation Age of Onset     Breast Cancer Paternal Aunt         2 aunts who had breast cancer in their 70's       Prior to Admission Medications   Prior to Admission Medications   Prescriptions Last Dose Informant Patient Reported? Taking?   Multiple Vitamins-Minerals (MULTIVITAMIN ADULT PO) Past Week at Unknown time  Yes Yes   Sig: Take 1 tablet by mouth daily   anastrozole (ARIMIDEX) 1 MG tablet 2/16/2020 at 2100  Yes Yes   Sig: TAKE 1 TABLET EVERY DAY   calcium carbonate-vitamin D (CALCIUM HIGH POTENCY/VITAMIN D) 600-200 MG-UNIT TABS Past Week at Unknown time  Yes Yes   Sig: Take 1 tablet by mouth   loratadine (CLARITIN) 10 MG tablet Past Week at Unknown time  Yes Yes   Sig: Take 10 mg by mouth   predniSONE (DELTASONE) 20 MG tablet 2/16/2020 at 1230  No Yes    Sig: Take 2 tablets (40 mg) by mouth daily      Facility-Administered Medications: None     Allergies   No Known Allergies  Objective    Temp: 97  F (36.1  C) Temp  Min: 97  F (36.1  C)  Max: 98.1  F (36.7  C)  Resp: 14 Resp  Min: 14  Max: 21  SpO2: 100 % SpO2  Min: 99 %  Max: 100 %  Heart Rate: 70 Heart Rate  Min: 70  Max: 85  BP: 112/80 Systolic (24hrs), Av , Min:104 , Max:148   Diastolic (24hrs), Av, Min:70, Max:103      Ventilation Mode: CMV/AC  (Continuous Mandatory Ventilation/ Assist Control)  FiO2 (%): 50 %  Rate Set (breaths/minute): 14 breaths/min  Tidal Volume Set (mL): 400 mL  PEEP (cm H2O): 5 cmH2O  Oxygen Concentration (%): 50 %  Resp: 14      PIP:  Plateau:     I/O last 3 completed shifts:  In: 300 [I.V.:300]  Out: -     Physical Exam  Constitutional: Alert, no distress and cooperative   HEENT: Normocephalic, PERLL, EOMI, anicteric sclera, palpable anterior cervical mass  Cardiovascular: RRR. No murmurs, gallops or rub, No edema or JVD.  Respiratory: Shallow breaths with ventilator, No wheezes, No rhonchi    Gastrointestinal: Abdomen soft, non-tender. BS normal.   : Deferred  Musculoskeletal: Extremities normal with no gross deformities noted, normal muscle tone, peripheral pulses normal and normal ROM   Skin: No suspicious lesions or rashes  Neurologic: Gait normal. Reflexes normal and symmetric. Sensation grossly WNL.  Psychiatric: Mentation appears normal and affect normal/bright      Labs:  Arterial Blood Gas  No lab results found in last 7 days.    Venous Blood Gas  No lab results found in last 7 days.    CBC  Recent Labs   Lab 20  1735   WBC 7.7   RBC 3.63*   HGB 11.1*   HCT 34.3*   MCV 95   MCH 30.6   MCHC 32.4   RDW 14.4          Coagulation  No lab results found in last 7 days.   No lab results found in last 7 days.     Basic Metabolic Panel  No lab results found in last 7 days.        Data   Data reviewed today: I reviewed all medications, new labs and imaging  results over the last 24 hours.     Recent Labs   Lab 02/17/20  1735   WBC 7.7   HGB 11.1*   MCV 95        No results found for this or any previous visit (from the past 24 hour(s)).

## 2020-02-17 NOTE — ANESTHESIA CARE TRANSFER NOTE
Patient: Chuyita Porter    Procedure(s):  DIAGNOSTIC BRONCHOSCOPY WITH BIOPSY,.  Endoscopic insertion tube gastrostomy    Diagnosis: Thyroid mass [E07.9]  Diagnosis Additional Information: No value filed.    Anesthesia Type:   General     Note:  Airway :ETT  Patient transferred to:PACU  Comments: Sedated with precidex gtt.  Intubated 500, 1460% peep 5.  VSS.Handoff Report: Identifed the Patient, Identified the Reponsible Provider, Reviewed the pertinent medical history, Discussed the surgical course, Reviewed Intra-OP anesthesia mangement and issues during anesthesia, Set expectations for post-procedure period and Allowed opportunity for questions and acknowledgement of understanding      Vitals: (Last set prior to Anesthesia Care Transfer)    CRNA VITALS  2/17/2020 1514 - 2/17/2020 1605      2/17/2020             ART BP:  130/86    Ht Rate:  76                Electronically Signed By: BRITNEY Solano CRNA  February 17, 2020  4:05 PM

## 2020-02-17 NOTE — OP NOTE
Procedure Date: 02/17/2020      PREOPERATIVE DIAGNOSES:   1.  Anterior mediastinal mass with significant compression of the trachea.   2.  Need for durable enteral access.      POSTOPERATIVE DIAGNOSES:   1.  Anterior mediastinal mass with significant compression of the trachea.   2.  Need for durable enteral access.      PROCEDURES PERFORMED:  EGD and PEG tube placement.      This was a combined case with Dr. Deanne Montoya.  Please see his note for his part of the operation.  I personally performed an EGD and percutaneous endoscopic gastrostomy tube placement.      ANESTHESIA:  General endotracheal anesthesia.      SURGEON:  Jaswinder Smith MD      ASSISTANT:  Garcia Zhu MD, General Surgery resident      DESCRIPTION OF PROCEDURE IN DETAIL:  The patient was taken to the operating room, placed in a supine position.  The patient was intubated by a combination of Interventional Pulmonology and Anesthesiology.   The patient had an armored ET tube placed past the level of compression of the airway.  Once the airway was established, then the case was turned over to me to perform the PEG tube.  We introduced the adult scope into the esophagus and advanced into the level of the stomach, which was insufflated and transilluminated.  The patient was placed in reverse Trendelenburg.  The gastric lumen was accessed with a needle, 2 fingerbreadths below the costal margin.  A wire was advanced, which was grasped and pulled out through the mouth.  The PEG tube was connected to the wire, which was then pulled from the abdominal side, bringing the PEG tube through the abdominal wall.  Then, we placed a 3-point gastropexy around the PEG tube.  Hemostasis was confirmed.  The bolster was secured at 3 cm.  The tube was cut to the appropriate length and connected to gravity.      All instrument and sponge counts were correct at the end of the case.  There were no complications.       ESTIMATED BLOOD LOSS:  Minimal.      SPECIMENS:   None from our part of the operation.      DRAINS AND TUBES:  A 20-[place] Ponsky tube to gravity.  The patient was then transferred to the MICU intubated for airway protection, with plans of having in-house radiation and chemotherapy.         RADHA VALERIO MD             D: 2020   T: 2020   MT: KRISHNA      Name:     MELODY ANDERSEN   MRN:      [id number]        Account:        [id number]   :      1950           Procedure Date: 2020      Document: [id number]

## 2020-02-17 NOTE — ANESTHESIA PROCEDURE NOTES
Arterial Line Procedure Note  Staff:     Anesthesiologist:  Manuel Vigil MD    Resident/CRNA:  Tucker Luis MD    Arterial line performed by resident/CRNA in presence of a teaching physician    Location: In OR Before Induction  Procedure Start/Stop Times:     patient identified, IV checked, site marked, risks and benefits discussed, informed consent, monitors and equipment checked, pre-op evaluation and at physician/surgeon's request      Correct Patient: Yes      Correct Position: Yes      Correct Site: Yes      Correct Procedure: Yes      Correct Laterality:  Yes    Site Marked:  Yes  Line Placement:     Procedure:  Arterial Line    Insertion Site:  Radial    Insertion laterality:  Right    Skin Prep: Chloraprep      Patient Prep: patient draped, mask, sterile gloves, hat and hand hygiene      Local skin infiltration:  1% lidocaine    amount (mL):  2    Ultrasound Guided?: Yes      Artery evaluated via ultrasound confirming patency.   Using realtime imaging, the artery was punctured and the needle was observed entering the artery.      Catheter size:  20 gauge, 12 cm    Cath secured with: anchor securement device      Dressing:  Tegaderm and Occlusive Gauze    Complications:  None obvious    Arterial waveform: Yes      IBP within 10% of NIBP: Yes

## 2020-02-17 NOTE — ANESTHESIA PREPROCEDURE EVALUATION
Anesthesia Pre-Procedure Evaluation    Patient: Chuyita Porter   MRN:     [id number] Gender:   female   Age:    69 year old :      1950        Preoperative Diagnosis: Thyroid mass [E07.9]   Procedure(s):  Open biopsy of neck  BRONCHOSCOPY WITH STENT INSERTION     LABS:  CBC: No results found for: WBC, HGB, HCT, PLT  BMP: No results found for: NA, POTASSIUM, CHLORIDE, CO2, BUN, CR, GLC  COAGS: No results found for: PTT, INR, FIBR  POC: No results found for: BGM, HCG, HCGS  OTHER: No results found for: PH, LACT, A1C, GABI, PHOS, MAG, ALBUMIN, PROTTOTAL, ALT, AST, GGT, ALKPHOS, BILITOTAL, BILIDIRECT, LIPASE, AMYLASE, ANTIONETTE, TSH, T4, T3, CRP, SED     Preop Vitals    BP Readings from Last 3 Encounters:   20 (!) 157/88   20 (!) 162/94   20 (!) 145/81    Pulse Readings from Last 3 Encounters:   20 99   20 125   20 80      Resp Readings from Last 3 Encounters:   No data found for Resp    SpO2 Readings from Last 3 Encounters:   20 96%   20 97%   20 99%      Temp Readings from Last 1 Encounters:   No data found for Temp    Ht Readings from Last 1 Encounters:   No data found for Ht      Wt Readings from Last 1 Encounters:   20 75.3 kg (166 lb 1.6 oz)    There is no height or weight on file to calculate BMI.     LDA:        Past Medical History:   Diagnosis Date     Breast cancer (H)      Hx of radiation therapy      +      Past Surgical History:   Procedure Laterality Date     ADENOIDECTOMY       LUMPECTOMY BREAST      RT lunmpectomy  with radiation. Lt lumpectomy  with radiation     TONSILLECTOMY        No Known Allergies     Anesthesia Evaluation     . Pt has had prior anesthetic. Type: General           ROS/MED HX    ENT/Pulmonary: Comment: Extrinsic compression of airway proximal to ezekiel from large central neck mass arising from thyroid?  Patient experiences dyspnea when supine.  6x6x8.6cm  Probably B cell lymphoma    (+)other ENT-  large thyroid mass withr tracheal compression, , . .    Neurologic:  - neg neurologic ROS     Cardiovascular:  - neg cardiovascular ROS   (+) ----. : . . . :. . No previous cardiac testing       METS/Exercise Tolerance:  3 - Able to walk 1-2 blocks without stopping   Hematologic:  - neg hematologic  ROS       Musculoskeletal:  - neg musculoskeletal ROS       GI/Hepatic:        (-) GERD   Renal/Genitourinary:         Endo:  - neg endo ROS   (+) Chronic steroid usage for Date most recently used: anterior mediastinal mass,.      Psychiatric:         Infectious Disease:         Malignancy:   (+) Malignancy History of Lymphoma/Leukemia and Breast  Breast CA Remission status post Surgery, Chemo and Radiation. Lymph CA Active status post,         Other:                         PHYSICAL EXAM:   Mental Status/Neuro: A/A/O   Airway: Facies: Thick Neck  Mallampati: III  Mouth/Opening: Limited  TM distance: < 6 cm  Neck ROM: Limited   Respiratory: Auscultation: CTAB     Resp. Rate: Normal     Resp. Effort: Normal      CV: Rhythm: Regular  Rate: Age appropriate  Heart: Normal Sounds  Edema: None   Comments: Neck full,      Dental: Normal Dentition                Assessment:   ASA SCORE: 4      Smoking Status:  Non-Smoker/Unknown        Plan:   Anes. Type:  General   Pre-Medication: None   Induction:  IV (Standard)   Airway: ETT; Oral; FOB   Access/Monitoring: PIV; A-Line   Maintenance: Balanced     Postop Plan:   Postop Pain: Opioids  Postop Sedation/Airway: Not planned  Disposition: Inpatient/Admit     PONV Management:   Adult Risk Factors: Female, Non-Smoker, Postop Opioids   Prevention: Ondansetron, Dexamethasone     CONSENT: Direct conversation   Plan and risks discussed with: Patient          Comments for Plan/Consent:  Plan:  GA via mask induction with spontaneous ventilation versus awake fiberoptic    Femoral venous access vs saphenous vein  - IVC distribution    Possible [facility] standby for ECMO     Rigid bronchoscopy in  room    MD Lisy Fountain MD

## 2020-02-17 NOTE — PROCEDURES
INTERVENTIONAL PULMONOLOGY       Procedure(s):    A flexible bronchoscopy  Airway exam  Endobronchial forcep biopsies (1 sites)    Procedure Date: 2/17/2020    Indication:  Central airway obstruction, anterior mediastinal mass    Attending of Record:  Deanne Montoya MD    Interventional Pulmonary Fellow   Herrera Dean MD     Trainees Present:   None    Medications:    General Anesthesia - See anesthesia flowsheet for details    Sedation Time:   Per Anesthesia Care Provider    Time Out:  Performed    The patient's medical record has been reviewed.  The indication for the procedure was reviewed.  The necessary history and physical examination was performed and reviewed.  The risks, benefits and alternatives of the procedure were discussed with the the patient in detail and she had the opportunity to ask questions.  I discussed in particular the potential complications including risks of minor or life-threatening bleeding and/or infection, respiratory failure, vocal cord trauma / paralysis, pneumothorax, and discomfort. Sedation risks were also discussed including abnormal heart rhythms, low blood pressure, and respiratory failure. All questions were answered to the best of my ability.  Verbal and written informed consent was obtained.  The proposed procedure and the patient's identification were verified prior to the procedure by the physician and the nurse.    The patient was assessed for the adequacy for the procedure and to receive medications.   Mental Status:  Alert and oriented x 3  Airway examination:  Class III (Visualization of only the base of uvula)  Pulmonary:  Clear to ausculation bilaterally  CV:  RRR, no murmurs or gallops  ASA Grade:  (IV)  Severe systemic disease that is a constant threat to life    After clinical evaluation and reviewing the indication, risks, alternatives and benefits of the procedure the patient was deemed to be in satisfactory condition to undergo the procedure.      A  Tuberculosis risk assessment was performed:  The patient has no known RISK of Tuberculosis    The procedure was performed in a negative airflow room: The patient could not be moved to a negative airflow room because of needed OR for the procedure    Maneuvers / Procedure:    A Flexible  bronchoscope was used for the procedure. The patient was orally intubated with a # 8.5 ETT and the flexible scope was passed through (see anesthesia note, this was an awake/upright fiberoptic intubation with an 8.5 armored ETT).     Airway Examination: A complete airway examination was performed from the distal trachea to the subsegmental level in each lobe of both lungs.  Pertinent findings include >75% fixed/structural, mixed extrinsic/inta-luminal (predominantly extrinsic) tracheal stenosis with involves the upper, middle and lower thirds of the trachea. There are a few areas of polypoid appearing, fungating foci of tumor noted as well.     Endobronchial biopsies: One Site - The bronchoscope was inserted.  Topical epinephrine was not administered.  The biopsy forceps were introduced and endobronchial biopsies were obtained from the trachea.  A total of 5 biopsies were obtained in RPMI.    Any disposable equipment was visually inspected and deemed to be intact immediately post procedure.      Relevant Pictures      Recommendations:     Successful airway examination    Successful endotracheal biopsies sent for RPMI    Keep intubated, admit to ICU, see consult note from today     Await pending results in the next 3-5 business days          Herrera Dean MD, 2/17/2020, 4:04 PM  Interventional Pulmonology Fellow  Department of Pulmonary, Allergy, Critical Care & Sleep Medicine   Pager: [phone]        I was present with the patient, Chuyita Porter, for the entire viewing portion of the bronchscopy procedure (including scope insertion and withdrawal) and agree with the interpretation and report as documented by Dr. Dean.    Deanne Montoya MD

## 2020-02-18 ENCOUNTER — ALLIED HEALTH/NURSE VISIT (OUTPATIENT)
Dept: RADIATION ONCOLOGY | Facility: CLINIC | Age: 70
End: 2020-02-18
Attending: RADIOLOGY
Payer: MEDICARE

## 2020-02-18 ENCOUNTER — APPOINTMENT (OUTPATIENT)
Dept: GENERAL RADIOLOGY | Facility: CLINIC | Age: 70
DRG: 840 | End: 2020-02-18
Attending: OTOLARYNGOLOGY
Payer: COMMERCIAL

## 2020-02-18 ENCOUNTER — APPOINTMENT (OUTPATIENT)
Dept: GENERAL RADIOLOGY | Facility: CLINIC | Age: 70
DRG: 840 | End: 2020-02-18
Attending: STUDENT IN AN ORGANIZED HEALTH CARE EDUCATION/TRAINING PROGRAM
Payer: COMMERCIAL

## 2020-02-18 ENCOUNTER — TELEPHONE (OUTPATIENT)
Dept: RADIATION ONCOLOGY | Facility: CLINIC | Age: 70
End: 2020-02-18

## 2020-02-18 ENCOUNTER — APPOINTMENT (OUTPATIENT)
Dept: PHYSICAL THERAPY | Facility: CLINIC | Age: 70
DRG: 840 | End: 2020-02-18
Attending: OTOLARYNGOLOGY
Payer: COMMERCIAL

## 2020-02-18 LAB
ANION GAP SERPL CALCULATED.3IONS-SCNC: 4 MMOL/L (ref 3–14)
BASOPHILS # BLD AUTO: 0 10E9/L (ref 0–0.2)
BASOPHILS NFR BLD AUTO: 0.2 %
BUN SERPL-MCNC: 13 MG/DL (ref 7–30)
CA-I BLD-MCNC: 4.8 MG/DL (ref 4.4–5.2)
CA-I BLD-MCNC: 4.9 MG/DL (ref 4.4–5.2)
CALCIUM SERPL-MCNC: 8.5 MG/DL (ref 8.5–10.1)
CHLORIDE SERPL-SCNC: 108 MMOL/L (ref 94–109)
CO2 SERPL-SCNC: 26 MMOL/L (ref 20–32)
COPATH REPORT: NORMAL
CREAT SERPL-MCNC: 0.74 MG/DL (ref 0.52–1.04)
DIFFERENTIAL METHOD BLD: ABNORMAL
EOSINOPHIL # BLD AUTO: 0 10E9/L (ref 0–0.7)
EOSINOPHIL NFR BLD AUTO: 0.1 %
ERYTHROCYTE [DISTWIDTH] IN BLOOD BY AUTOMATED COUNT: 14.4 % (ref 10–15)
GFR SERPL CREATININE-BSD FRML MDRD: 83 ML/MIN/{1.73_M2}
GLUCOSE BLDC GLUCOMTR-MCNC: 100 MG/DL (ref 70–99)
GLUCOSE BLDC GLUCOMTR-MCNC: 113 MG/DL (ref 70–99)
GLUCOSE BLDC GLUCOMTR-MCNC: 117 MG/DL (ref 70–99)
GLUCOSE BLDC GLUCOMTR-MCNC: 131 MG/DL (ref 70–99)
GLUCOSE BLDC GLUCOMTR-MCNC: 133 MG/DL (ref 70–99)
GLUCOSE BLDC GLUCOMTR-MCNC: 134 MG/DL (ref 70–99)
GLUCOSE SERPL-MCNC: 133 MG/DL (ref 70–99)
HBV CORE AB SERPL QL IA: NONREACTIVE
HBV SURFACE AB SERPL IA-ACNC: 0 M[IU]/ML
HBV SURFACE AG SERPL QL IA: NONREACTIVE
HCT VFR BLD AUTO: 34.4 % (ref 35–47)
HGB BLD-MCNC: 11.2 G/DL (ref 11.7–15.7)
HIV 1+2 AB+HIV1 P24 AG SERPL QL IA: NONREACTIVE
IMM GRANULOCYTES # BLD: 0 10E9/L (ref 0–0.4)
IMM GRANULOCYTES NFR BLD: 0.3 %
INTERPRETATION ECG - MUSE: NORMAL
INTERPRETATION ECG - MUSE: NORMAL
LDH SERPL L TO P-CCNC: 230 U/L (ref 81–234)
LDH SERPL L TO P-CCNC: 269 U/L (ref 81–234)
LYMPHOCYTES # BLD AUTO: 0.5 10E9/L (ref 0.8–5.3)
LYMPHOCYTES NFR BLD AUTO: 4 %
MCH RBC QN AUTO: 30.3 PG (ref 26.5–33)
MCHC RBC AUTO-ENTMCNC: 32.6 G/DL (ref 31.5–36.5)
MCV RBC AUTO: 93 FL (ref 78–100)
MONOCYTES # BLD AUTO: 0.6 10E9/L (ref 0–1.3)
MONOCYTES NFR BLD AUTO: 4.8 %
NEUTROPHILS # BLD AUTO: 11.3 10E9/L (ref 1.6–8.3)
NEUTROPHILS NFR BLD AUTO: 90.6 %
NRBC # BLD AUTO: 0 10*3/UL
NRBC BLD AUTO-RTO: 0 /100
PHOSPHATE SERPL-MCNC: 4.2 MG/DL (ref 2.5–4.5)
PHOSPHATE SERPL-MCNC: 4.9 MG/DL (ref 2.5–4.5)
PLATELET # BLD AUTO: 257 10E9/L (ref 150–450)
POTASSIUM SERPL-SCNC: 4.3 MMOL/L (ref 3.4–5.3)
POTASSIUM SERPL-SCNC: 4.6 MMOL/L (ref 3.4–5.3)
RBC # BLD AUTO: 3.7 10E12/L (ref 3.8–5.2)
SODIUM SERPL-SCNC: 138 MMOL/L (ref 133–144)
URATE SERPL-MCNC: 4.2 MG/DL (ref 2.6–6)
URATE SERPL-MCNC: 4.2 MG/DL (ref 2.6–6)
WBC # BLD AUTO: 12.5 10E9/L (ref 4–11)

## 2020-02-18 PROCEDURE — 40000275 ZZH STATISTIC RCP TIME EA 10 MIN

## 2020-02-18 PROCEDURE — 71045 X-RAY EXAM CHEST 1 VIEW: CPT

## 2020-02-18 PROCEDURE — 25000128 H RX IP 250 OP 636: Performed by: STUDENT IN AN ORGANIZED HEALTH CARE EDUCATION/TRAINING PROGRAM

## 2020-02-18 PROCEDURE — 99291 CRITICAL CARE FIRST HOUR: CPT | Mod: 24 | Performed by: INTERNAL MEDICINE

## 2020-02-18 PROCEDURE — G0499 HEPB SCREEN HIGH RISK INDIV: HCPCS | Performed by: INTERNAL MEDICINE

## 2020-02-18 PROCEDURE — 97162 PT EVAL MOD COMPLEX 30 MIN: CPT | Mod: GP

## 2020-02-18 PROCEDURE — 84550 ASSAY OF BLOOD/URIC ACID: CPT | Performed by: STUDENT IN AN ORGANIZED HEALTH CARE EDUCATION/TRAINING PROGRAM

## 2020-02-18 PROCEDURE — 80048 BASIC METABOLIC PNL TOTAL CA: CPT | Performed by: STUDENT IN AN ORGANIZED HEALTH CARE EDUCATION/TRAINING PROGRAM

## 2020-02-18 PROCEDURE — 97110 THERAPEUTIC EXERCISES: CPT | Mod: GP

## 2020-02-18 PROCEDURE — 25000125 ZZHC RX 250: Performed by: STUDENT IN AN ORGANIZED HEALTH CARE EDUCATION/TRAINING PROGRAM

## 2020-02-18 PROCEDURE — 36415 COLL VENOUS BLD VENIPUNCTURE: CPT | Performed by: INTERNAL MEDICINE

## 2020-02-18 PROCEDURE — 27210429 ZZH NUTRITION PRODUCT INTERMEDIATE LITER

## 2020-02-18 PROCEDURE — 20000004 ZZH R&B ICU UMMC

## 2020-02-18 PROCEDURE — 86704 HEP B CORE ANTIBODY TOTAL: CPT | Performed by: INTERNAL MEDICINE

## 2020-02-18 PROCEDURE — 77300 RADIATION THERAPY DOSE PLAN: CPT | Performed by: RADIOLOGY

## 2020-02-18 PROCEDURE — 40000281 ZZH STATISTIC TRANSPORT TIME EA 15 MIN

## 2020-02-18 PROCEDURE — 82330 ASSAY OF CALCIUM: CPT | Performed by: STUDENT IN AN ORGANIZED HEALTH CARE EDUCATION/TRAINING PROGRAM

## 2020-02-18 PROCEDURE — 40000141 ZZH STATISTIC PERIPHERAL IV START W/O US GUIDANCE

## 2020-02-18 PROCEDURE — 84550 ASSAY OF BLOOD/URIC ACID: CPT | Performed by: INTERNAL MEDICINE

## 2020-02-18 PROCEDURE — 84100 ASSAY OF PHOSPHORUS: CPT | Performed by: INTERNAL MEDICINE

## 2020-02-18 PROCEDURE — 25000132 ZZH RX MED GY IP 250 OP 250 PS 637: Performed by: STUDENT IN AN ORGANIZED HEALTH CARE EDUCATION/TRAINING PROGRAM

## 2020-02-18 PROCEDURE — 40000014 ZZH STATISTIC ARTERIAL MONITORING DAILY

## 2020-02-18 PROCEDURE — 97530 THERAPEUTIC ACTIVITIES: CPT | Mod: GP

## 2020-02-18 PROCEDURE — 93010 ELECTROCARDIOGRAM REPORT: CPT | Performed by: INTERNAL MEDICINE

## 2020-02-18 PROCEDURE — 25000128 H RX IP 250 OP 636: Performed by: OTOLARYNGOLOGY

## 2020-02-18 PROCEDURE — 77334 RADIATION TREATMENT AID(S): CPT | Performed by: RADIOLOGY

## 2020-02-18 PROCEDURE — 77412 RADIATION TX DELIVERY LVL 3: CPT | Performed by: RADIOLOGY

## 2020-02-18 PROCEDURE — 94003 VENT MGMT INPAT SUBQ DAY: CPT

## 2020-02-18 PROCEDURE — 83615 LACTATE (LD) (LDH) ENZYME: CPT | Performed by: INTERNAL MEDICINE

## 2020-02-18 PROCEDURE — 99223 1ST HOSP IP/OBS HIGH 75: CPT | Mod: GC | Performed by: INTERNAL MEDICINE

## 2020-02-18 PROCEDURE — 87389 HIV-1 AG W/HIV-1&-2 AB AG IA: CPT | Performed by: INTERNAL MEDICINE

## 2020-02-18 PROCEDURE — 00000146 ZZHCL STATISTIC GLUCOSE BY METER IP

## 2020-02-18 PROCEDURE — 86706 HEP B SURFACE ANTIBODY: CPT | Performed by: INTERNAL MEDICINE

## 2020-02-18 PROCEDURE — 84100 ASSAY OF PHOSPHORUS: CPT | Performed by: STUDENT IN AN ORGANIZED HEALTH CARE EDUCATION/TRAINING PROGRAM

## 2020-02-18 PROCEDURE — 77295 3-D RADIOTHERAPY PLAN: CPT | Performed by: RADIOLOGY

## 2020-02-18 PROCEDURE — 84132 ASSAY OF SERUM POTASSIUM: CPT | Performed by: INTERNAL MEDICINE

## 2020-02-18 PROCEDURE — 25800030 ZZH RX IP 258 OP 636: Performed by: STUDENT IN AN ORGANIZED HEALTH CARE EDUCATION/TRAINING PROGRAM

## 2020-02-18 PROCEDURE — 82330 ASSAY OF CALCIUM: CPT | Performed by: INTERNAL MEDICINE

## 2020-02-18 PROCEDURE — 85025 COMPLETE CBC W/AUTO DIFF WBC: CPT | Performed by: STUDENT IN AN ORGANIZED HEALTH CARE EDUCATION/TRAINING PROGRAM

## 2020-02-18 PROCEDURE — 40000986 XR ABDOMEN PORT 1 VW

## 2020-02-18 PROCEDURE — 82962 GLUCOSE BLOOD TEST: CPT

## 2020-02-18 PROCEDURE — 77280 THER RAD SIMULAJ FIELD SMPL: CPT | Performed by: RADIOLOGY

## 2020-02-18 RX ORDER — DEXTROSE MONOHYDRATE 100 MG/ML
INJECTION, SOLUTION INTRAVENOUS CONTINUOUS PRN
Status: DISCONTINUED | OUTPATIENT
Start: 2020-02-18 | End: 2020-03-06 | Stop reason: HOSPADM

## 2020-02-18 RX ORDER — ALLOPURINOL 300 MG/1
300 TABLET ORAL DAILY
Status: DISCONTINUED | OUTPATIENT
Start: 2020-02-19 | End: 2020-02-24

## 2020-02-18 RX ORDER — SODIUM CHLORIDE, SODIUM LACTATE, POTASSIUM CHLORIDE, CALCIUM CHLORIDE 600; 310; 30; 20 MG/100ML; MG/100ML; MG/100ML; MG/100ML
INJECTION, SOLUTION INTRAVENOUS CONTINUOUS
Status: DISCONTINUED | OUTPATIENT
Start: 2020-02-18 | End: 2020-02-18

## 2020-02-18 RX ORDER — SODIUM CHLORIDE, SODIUM LACTATE, POTASSIUM CHLORIDE, CALCIUM CHLORIDE 600; 310; 30; 20 MG/100ML; MG/100ML; MG/100ML; MG/100ML
INJECTION, SOLUTION INTRAVENOUS CONTINUOUS
Status: DISCONTINUED | OUTPATIENT
Start: 2020-02-18 | End: 2020-02-24

## 2020-02-18 RX ORDER — FENTANYL CITRATE 50 UG/ML
25-50 INJECTION, SOLUTION INTRAMUSCULAR; INTRAVENOUS
Status: DISCONTINUED | OUTPATIENT
Start: 2020-02-18 | End: 2020-02-24

## 2020-02-18 RX ORDER — LORATADINE 10 MG/1
10 TABLET ORAL DAILY
Status: DISCONTINUED | OUTPATIENT
Start: 2020-02-18 | End: 2020-03-06 | Stop reason: HOSPADM

## 2020-02-18 RX ADMIN — Medication 40 MG: at 14:16

## 2020-02-18 RX ADMIN — SODIUM CHLORIDE, POTASSIUM CHLORIDE, SODIUM LACTATE AND CALCIUM CHLORIDE: 600; 310; 30; 20 INJECTION, SOLUTION INTRAVENOUS at 11:56

## 2020-02-18 RX ADMIN — DEXMEDETOMIDINE 0.4 MCG/KG/HR: 100 INJECTION, SOLUTION, CONCENTRATE INTRAVENOUS at 04:45

## 2020-02-18 RX ADMIN — DEXAMETHASONE SODIUM PHOSPHATE 4 MG: 4 INJECTION, SOLUTION INTRAMUSCULAR; INTRAVENOUS at 15:08

## 2020-02-18 RX ADMIN — ENOXAPARIN SODIUM 40 MG: 40 INJECTION SUBCUTANEOUS at 07:54

## 2020-02-18 RX ADMIN — LORATADINE 10 MG: 10 TABLET ORAL at 16:05

## 2020-02-18 RX ADMIN — DEXAMETHASONE SODIUM PHOSPHATE 4 MG: 4 INJECTION, SOLUTION INTRAMUSCULAR; INTRAVENOUS at 03:56

## 2020-02-18 RX ADMIN — DEXAMETHASONE SODIUM PHOSPHATE 4 MG: 4 INJECTION, SOLUTION INTRAMUSCULAR; INTRAVENOUS at 20:18

## 2020-02-18 RX ADMIN — DEXAMETHASONE SODIUM PHOSPHATE 4 MG: 4 INJECTION, SOLUTION INTRAMUSCULAR; INTRAVENOUS at 11:47

## 2020-02-18 ASSESSMENT — ACTIVITIES OF DAILY LIVING (ADL)
ADLS_ACUITY_SCORE: 16

## 2020-02-18 ASSESSMENT — MIFFLIN-ST. JEOR: SCORE: 1320.75

## 2020-02-18 NOTE — CONSULTS
HEMATOLOGY CONSULT NOTE    Subjective     REFERRAL SOURCE  MICU 1    CONSULT INDICATION:  B-cell lymphoma mediastinal mass    HISTORY OF PRESENT ILLNESS:  Ms. Chuyita Porter is a 69 year old female with history of [place]+ right-sided breast cancer in 2005 and left-sided breast cancer in 2015 (both treated with lumpectomy and adjuvant XRT/anastrozole), osteopenia, and atrophic gastritis who was recently found to have a large right thyroid/mediastinal mass with FNA concerning for B-cell lymphoma.  She was admitted to the MICU on 2/17/2020 for intubation/airway protection while initiating radiation therapy.    Patient was seen by ENT at [facility] on 1/27/2020 for several months of progressive nonproductive cough (did not respond to azithromycin, Flonase, antihistamines, and prednisone burst) and more recently some midline neck swelling and worsening shortness of breath. CT neck and sinuses was obtained and showed a 6 x 6 x 8.6 cm bulky mass arising along the inferior thyroid gland extending into the superior mediastinum and lateral aspect of the trachea and esophagus, with moderate to severe narrowing of the airway.  Ultrasound-guided FNA biopsy on 1/30/2020 later returned suspicious for B-cell neoplasm with flow cytometry showing 13% CD20+ kappa light chain restricted monoclonal B-cell population (reviewed here as well).  Patient's case was discussed at [facility] ENT multidisciplinary tumor board on 2/14/2020 where the consensus was to proceed to more definitive tissue biopsy and airway stenting followed by radiation therapy to prevent further airway compromise.  She was also started on dexamethasone 4 mg q6h at that time.  She has not yet had a chance to meet with hematology / oncology here.    Patient underwent bronchoscopy with interventional pulmonology yesterday, which showed greater than 75% tracheal stenosis (predominantly extrinsic) and a few areas of polypoid appearing fungating  foci of tumor noted as well.  A total of 5 endobronchial biopsies were obtained.  However, a tracheal stent was unable to be placed.  After further discussion, she was kept intubated and admitted to the MICU, with plans to remain intubated while initiating urgent radiation therapy.  She had already gone radiation simulation on 2/14/2020; therefore, she will likely commence radiotherapy this afternoon with tentative plan of 30 Gy in 10 daily fractions.    Patient states (communicating via writing) that her SOB has improved since starting steroids. Otherwise denies fever/chills, night sweats, unintentional weight loss, chest pain, N/V, abd pain, diarrhea, or bleeding.     Past Medical History:  1. ER/ID+ right-sided breast cancer in 2005 s/p lumpectomy and adjuvant XRT/anastrozole for 5 years  2. ER/ID+ left-sided breast cancer in 2015 s/p lumpectomy and adjuvant XRT/anastrozole (due to finish 8/2020)  3. Osteopenia  4. Atrophic gastritis    Family History:  Breast cancer in 2 paternal aunts (both in their 70s).  Lung cancer, stomach cancer, and kidney cancer on mother's side (in 70-80s).   She does not think she had BRCA testing done with breast cancer diagnoses.     Social History:  Never smoker. Denies alcohol use.  Lives in [place] with  Ranjit. Has 2 daughters, one lives in [place] and one lives in [place].   Retired but previously worked at [employer] for 47 years.      No Known Allergies  Medications Prior to Admission   Medication Sig Dispense Refill Last Dose     anastrozole (ARIMIDEX) 1 MG tablet TAKE 1 TABLET EVERY DAY   2/16/2020 at 2100     calcium carbonate-vitamin D (CALCIUM HIGH POTENCY/VITAMIN D) 600-200 MG-UNIT TABS Take 1 tablet by mouth   Past Week at Unknown time     loratadine (CLARITIN) 10 MG tablet Take 10 mg by mouth   Past Week at Unknown time     Multiple Vitamins-Minerals (MULTIVITAMIN ADULT PO) Take 1 tablet by mouth daily   Past Week at Unknown time     predniSONE  "(DELTASONE) 20 MG tablet Take 2 tablets (40 mg) by mouth daily 6 tablet 0 2020 at 1230       REVIEW OF SYSTEMS:  Reviewed and negative other than that mentioned in HPI.     Objective     VITAL SIGNS:  BP 93/69   Pulse 52   Temp 96.8  F (36  C) (Axillary)   Resp 12   Ht 1.676 m (5' 6\")   Wt 76.9 kg (169 lb 8.5 oz)   SpO2 100%   BMI 27.36 kg/m       PHYSICAL EXAM:  Vitals reviewed.  General: Intubated but awake and in no acute distress.   HEENT: Normocephalic, atraumatic.   Resp: Intubated, normal work of breathing.   Abd: Soft, nontender, nondistended.   Ext: No peripheral edema, cyanosis, or clubbing.   Neuro: CN II-XII grossly intact. Moves all extremities spontaneously.   Skin: No rash, unusual bruising or prominent lesions.    LABS:  Lab Results   Component Value Date    WBC 12.5 (H) 2020    HGB 11.2 (L) 2020    HCT 34.4 (L) 2020    MCV 93 2020     2020     Lab Results   Component Value Date     2020    HCO3 23 2020    BUN 13 2020    ANIONGAP 4 2020     Lab Results   Component Value Date    ALT 20 2020    AST 21 2020    ALKPHOS 88 2020     No results found for: INR, PT    IMAGIN20 OSH CT Neck:  1.  Bulky mass arising along the inferior thyroid gland extending into the  superior mediastinum measuring 6.0 x 6.0 x 8.6 cm. This extends along the right lateral aspect of the trachea and esophagus with partial encasement of the subglottic trachea. Flattening of the intrathoracic trachea with moderate to  severe narrowing of the airway. Considerations include primary thyroid neoplasm or lymphoma. This should be amenable to ultrasound-guided tissue sampling.  2.  No mass or lymphadenopathy elsewhere within the neck.    20 CT Chest:  1. 8.0 x 6.7 x 10.0 cm mass centered in the right anterior  mediastinum, appearing to arise from the right lobe of the thyroid  gland. Mass effect within the mediastinum, including " compression of  the superior vena cava with associated collateral vessels seen in the  neck.  2. Clear lungs without evidence for additional malignant involvement  in the chest or upper abdomen.      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  FNA suggests B-cell lymphoma but this will need to be confirmed by endobronchial biopsy (taken 2/17/20). This may be a secondary malignancy related to previous radiotherapy for breast cancer in both 2005 and 2015 (radiation records are being obtained).    Due to urgent airway obstruction, she has not yet had a chance to meet with Hematology-Oncology and undergo staging prior to initiating radiation. The tentative plan is for 10 fractions of daily radiation.  We will not plan to initiate any chemotherapy until this is completed. However, we will complete staging work-up and pretreatment evaluation while she is inpatient, as outlined below. Once patient is ready to transfer out of the ICU, she will likely need to transfer to the Heme Malignancy service.     Recommendations:  1. Will follow-up on more definitive tissue biopsy and flow cytometry/cytogenetics from 2/17/20.  2. Patient will need staging with PET-CT (please obtain as soon as possible) and unilateral bone marrow biopsy. We will let you know about timing of bone marrow biopsy.   3. In preparation for chemotherapy, please obtain HIV and hepatitis serologies (added on for you) and TTE to evaluate heart function.   4. Monitor TLS labs (BMP, Phos, uric acid) BID and LDH daily.   5. Start allopurinol 300 mg  daily and mIVF for TLS prophylaxis.   6. Please check coags once (INR, aPTT, fibrinogen) just to make sure baseline is normal.   7. Would hold patient's home anastrozole for now due to increased VTE risk currently.   8. Consider outpatient genetic testing given personal history of bilateral breast cancer and significant family history of cancers.     ADDENDUM:  After discussion with Dr. Sprague (Heme Malignancy attending), we would like to switch her dexamethasone to prednisone 100 mg BID instead.     Thank you for this consult. This patient was seen and discussed with Dr. Thomas.    Susu Santillan MD   Hematology-Oncology Fellow, PGY-4        HEMATOLOGY STAFF:  Seen with fellow, whose note reflects our joint evaluation, assessment, and plan.      Thomas Thomas MD  Associate Professor of Medicine  Division of Hematology, Oncology, and Transplantation  Director, [facility]

## 2020-02-18 NOTE — PLAN OF CARE
ICU End of Shift Summary. See flowsheets for vital signs and detailed assessment.    Changes this shift: Pt is alert and oriented x4. States pain is well tolerated with fentanyl pump at 50. HR dropped from 70's to 40's overnight; MD notified, EKG done. Sinus ramin. Pt unable to void, straight cathed for 500 mLs. Pt states she is unable to lie on her left side due to increasing SOB. Intubated. LS clear. PEG to gravity.     Plan: Start radiation treatment. Continue to monitor and assess pt condition. Notify MD of any changes.

## 2020-02-18 NOTE — PROGRESS NOTES
SPIRITUAL HEALTH SERVICES  SPIRITUAL ASSESSMENT Progress Note  [facility] ([place] 4C  On-Call    REASON FOR [facility]LAIN[facility] CARE: Hospital  request    ILLNESS CIRCUMSTANCES:   Reflective conversation shared with Chuyita and her  (Ranjit) integrating aspects of her serious illness and family narratives.     Context of Serious Illness/Symptom(s) - Due to Chuyita's intubation, Ranjit functioned as the default spokesperson during our conversation. Chuyita, however, was quite non-verbally participative during our interaction.    Resources for Support - 2 daughters, grandkids; relatives; friends; Rastafarian [place] community    DISTRESS:     Emotional/Spiritual/Existential Distress - They affirmed that her recent diagnosis related to her having to be intubated has been challenging.     Pentecostalism Distress - not discussed    Social/Cultural/Economic Distress - not discussed    SPIRITUAL/Adventist COPING:     Samaritan/Mariya - Discussed how their Rastafarian mariya is a positive source of coping through the activity within their parish as well as their volunteer activity that is a reflection of their mariya.    Spiritual Practice(s) - Prayer, which was shared. Also discussed the many ways in which they volunteer, especially with how much Chuyita crochets.     Emotional/Relational/Existential Connections - Importance of family discussed during this conversation.    GOALS OF CARE:    Goals of Care - They spoke of Chuyita just beginning the radiation treatment today and are hopeful that he need to protect her airway will diminish.    Meaning/Sense-Making - They spoke of her cancer treatment with the expectation of desired outcomes.    PLAN: I will inform unit  of care provided. I have no plan for follow-up today.    Vargas Kaye, MPH, M.Div., [facility]  Staff   Pager [phone]  [facility] remains available 24/7 for emergent requests/referrals, either by having the switchboard page the on-call  or by entering an  ASAP/STAT consult in Epic (this will also page the on-call ).

## 2020-02-18 NOTE — PHARMACY
Pharmacy Tube Feeding Consult    Medication reviewed for administration by feeding tube and for potential food/drug interactions.    Recommendation: No changes are needed at this time.     Pharmacy will continue to follow as new medications are ordered.    Corin RubalcavaD

## 2020-02-18 NOTE — PROGRESS NOTES
02/18/20 1500   Quick Adds   Type of Visit Initial PT Evaluation   Student Supervision-Eval Only    Student Supervision Direct supervision provided;Therapy services provided with the co-signing licensed therapist guiding and directing the services, and providing the skilled judgement and assessment throughout the session       Present no   Living Environment   Lives With spouse   Living Arrangements house   Home Accessibility stairs within home   Number of Stairs, Within Home, Primary 7   Stair Railings, Within Home, Primary other (see comments)  (1 rail; split level home)   Transportation Anticipated car, drives self   Living Environment Comment No stairs to enter home, stairs within home, bedroom on second floor,  able to drive pt   Self-Care   Usual Activity Tolerance good   Current Activity Tolerance moderate   Regular Exercise Yes   Activity/Exercise Type walking  (daily walks with )   Equipment Currently Used at Home none   Activity/Exercise/Self-Care Comment Pt IND with ADLs prior to admission; exercises frequently with ,  is able to assist pt with ADLs once home   Functional Level Prior   Ambulation 0-->independent   Transferring 0-->independent   Cognition 0 - no cognition issues reported   Fall history within last six months no   Which of the above functional risks had a recent onset or change? none   General Information   Onset of Illness/Injury or Date of Surgery - Date 02/17/20   Patient/Family Goals Statement return home and to get better   Pertinent History of Current Problem (include personal factors and/or comorbidities that impact the POC) 69 year old woman admitted to the ICU for airway protection.  The patient has a mediastinal mass with over 75% stenosis of the trachea.  An ET tube has been placed beyond the obstruction.  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   Precautions/Limitations other (see comments)  (strict bed rest)  "  General Observations Pt supine in bed upon arrival, very pleasant and agreeable to therapy   Cognitive Status Examination   Orientation orientation to person, place and time   Level of Consciousness alert;lethargic/somnolent   Follows Commands and Answers Questions 100% of the time   Personal Safety and Judgment intact   Memory intact   Integumentary/Edema   Integumentary/Edema Comments no significant edema noted   Posture    Posture Comments not assessed d/t medical status   Range of Motion (ROM)   ROM Comment B LE/UE AROM grossly WFL   Strength   Strength Comments B LE/UE grossly WFL   Bed Mobility   Bed Mobility Comments MinAx1 for B rolling   Transfer Skills   Transfer Comments not assessed d/t medical status   Gait   Gait Comments not assessed d/t medical status   Balance   Balance Comments not assessed d/t medical status   General Therapy Interventions   Planned Therapy Interventions bed mobility training;gait training;ROM;stretching;strengthening;transfer training;home program guidelines   Clinical Impression   Criteria for Skilled Therapeutic Intervention yes, treatment indicated   PT Diagnosis impaired functional mobility   Influenced by the following impairments decreased activity tolerance, bed rest restriction, medical status, fatigue   Functional limitations due to impairments bed mobility, transfers, gait, stairs   Clinical Presentation Evolving/Changing   Clinical Presentation Rationale clinical judgement   Clinical Decision Making (Complexity) Moderate complexity   Therapy Frequency 3x/week   Predicted Duration of Therapy Intervention (days/wks) 4 weeks   Anticipated Discharge Disposition Other (see comments)  (defer until formal mobility can be assessed)   Risk & Benefits of therapy have been explained Yes   Patient, Family & other staff in agreement with plan of care Yes   [facility] AM-PAC  \"6 Clicks\" V.2 Basic Mobility Inpatient Short Form   1. Turning from your back to your side while " in a flat bed without using bedrails? 3 - A Little   2. Moving from lying on your back to sitting on the side of a flat bed without using bedrails? 1 - Total   3. Moving to and from a bed to a chair (including a wheelchair)? 1 - Total   4. Standing up from a chair using your arms (e.g., wheelchair, or bedside chair)? 1 - Total   5. To walk in hospital room? 1 - Total   6. Climbing 3-5 steps with a railing? 1 - Total   Basic Mobility Raw Score (Score out of 24.Lower scores equate to lower levels of function) 8   Total Evaluation Time   Total Evaluation Time (Minutes) 15

## 2020-02-18 NOTE — PROGRESS NOTES
I discussed intraoperative findings from the OR with the patient's family. Given IP team inability to place stent, patient will need to remain intubated and be started on radiation.    Dr Montoya and I both spoke with Dr Ray from radiation oncology who has already performed CT simulation last Friday at my urgent request. He is aware of the need for treatment to be started tomorrow, aware that patient will be intubated. He recommends starting dexamethasone 4 mg QID to help with swelling and potentially decrease tumor while starting treatment.     Endotracheal tube will need to be carefully maintained in place as she has significant airway collapse/compression and is unable to lay flat. Do not allow patient to self extubate. If she were to self extubate, will need to be reintubated, likely in upright position, use wire reinforced ETT. Laying the patient flat resulted in drop in BP. 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 A tracheostomy will not overcome the extrinsic compression issues due to the distal location.    Will follow-up pathology from biopsy today in the OR. Medical oncology has not seen patient as outpatient given urgent nature of the procedure today arranged on Friday and previous inconclusive tissue diagnosis.    Kiersten Valdez MD    Department of Otolaryngology

## 2020-02-18 NOTE — PROGRESS NOTES
"CLINICAL NUTRITION SERVICES - ASSESSMENT NOTE     Nutrition Prescription    RECOMMENDATIONS FOR MDs/PROVIDERS TO ORDER:  Consider endo consult if pt has a dx of newly diagnosed DM type 2.    Malnutrition Status:    Pt does not meet two of the established criteria necessary for diagnosing malnutrition but is at risk for malnutrition.    Recommendations already ordered by Registered Dietitian (RD):  Nutren 1.5 @ 55 mL/hr to provide 1980 kcals (31 kcal/kg/day), 90 g PRO (1.4 g/kg/day), 1003 mL H2O, 232 g CHO and no fiber daily. No plan to start multivitamin with TF d/t pt actively having cancer.       REASON FOR ASSESSMENT  Chuyita Porter is a 69 year old female assessed by the dietitian for Provider Order - Registered Dietitian to Assess and Order TF per Medical Nutrition Therapy Protocol.    NUTRITION HISTORY  PEG placed on 2/17. Unable to speak with pt given intubation though she is able to gesture with hands. Per OSH chart notes from Rad Onc, no issues with weight or appetite PTA. Pt was unable to comfortably lie flat over the past 4 months and breathing during sleep was difficult. Pt had been taking PO meds via puree (yogurt mostly).     CURRENT NUTRITION ORDERS  Diet: NPO  Intake/Tolerance: as above    LABS  Labs reviewed- no h/o DM2 listed in pt's chart, and she has been on corticosteroids (on decadron now). However, HgbA1c is 5.8% (2/17) and BG are high at a range of 113-133 mg/dL so far today.    MEDICATIONS  Medications reviewed    ANTHROPOMETRICS  Height: 167.6 cm (5' 6\")  Admission Weight: 75.4 kg (2/17/20)  Most Recent Weight: 76.9 kg (169 lb 8.5 oz)    IBW: 59.1 kg  BMI: Overweight BMI 25-29.9  Weight History:   Wt Readings from Last 10 Encounters:   02/18/20 76.9 kg (169 lb 8.5 oz)   02/14/20 75.3 kg (166 lb 1.6 oz)   02/14/20 75.3 kg (165 lb 14.4 oz)   01/31/20 76.9 kg (169 lb 8 oz)   10/14/16 80.7 kg    Dosing Weight: 63 kg (adj wt using adm wt of 75.4 kg)    ASSESSED NUTRITION NEEDS  Estimated Energy " Needs: 0449-9993 kcals/day (25 - 30 kcals/kg)  Justification: Maintenance  Estimated Protein Needs: 75-95 g/day (1.2 - 1.5 g/kg)  Justification: Hypercatabolism with acute illness  Estimated Fluid Needs: Per provider pending fluid status    PHYSICAL FINDINGS  See malnutrition section below.  Sarcopenia     MALNUTRITION  % Intake: Decreased intake does not meet criteria  % Weight Loss: None noted  Subcutaneous Fat Loss: None observed  Muscle Loss: Scapular bone, Thoracic region (clavicle, acromium bone, deltoid, trapezius, pectoral), Upper arm (bicep, tricep) and Posterior calf: mild, most likely r/t sarcopenia  Fluid Accumulation/Edema: None noted  Malnutrition Diagnosis: Pt does not meet two of the established criteria necessary for diagnosing malnutrition but is at risk for malnutrition.    NUTRITION DIAGNOSIS  Inadequate protein-energy intake related to resp status inhibiting ability to take PO as evidenced by pt NPO x 1 day, pt with mild muscle wasting, pt has a new PEG in place but no TF started yet.       INTERVENTIONS  Implementation  Nutrition Education: See education flowsheet   Collaboration with other providers  Enteral Nutrition - Initiate via PEG    Goals  Total avg nutritional intake to meet a minimum of 25 kcal/kg and 1.2 g PRO/kg daily (per dosing wt 63 kg).     Monitoring/Evaluation  Progress toward goals will be monitored and evaluated per protocol.    Kat Dumont RD, LD  ([facility] diet[facility], lni- [phone])

## 2020-02-18 NOTE — PLAN OF CARE
4C:  Discharge Planner PT   Patient plan for discharge: unknown, open to rehab  Current status: Pt A/Ox4, intubated VC/AC PEEP 5 FiO2 30%. Completed initial PT evaluation. Completed supine B LE and UE exercises x 10-20 repetitions. Further activity limited d/t bed rest orders. All VSS.   Barriers to return to prior living situation: medical status, decreased activity tolerance, weakness  Recommendations for discharge: defer until formal mobility can be assessed  Rationale for recommendations: clinical judgement; pt on strict bed rest at this time therefore unable to make an appropriate discharge recommendation.       Entered by: Macie Woodall 02/18/2020 3:27 PM

## 2020-02-18 NOTE — PLAN OF CARE
OT 4C. Hold. OT orders acknowledged and appreciated. Per conversation with PT, pt with strict bedrest orders,not appropriate for two disciplines to follow at this time. PT to follow to address AROM. OT to hold and follow from a periphery.

## 2020-02-18 NOTE — PROGRESS NOTES
I discussed Ms. Porter's case with Savage Montoya and José Miguel yesterday following attempted stent placement. She has previously undergone a radiotherapy simulation session for treatment planning purposes on Friday, 2/14/2020. We will coordinate with the ICU team to have her brought down this afternoon to commence radiotherapy to the mediastinal mass with the goal of reducing her disease burden and relieving her airway and SVC obstruction. My tentative plan is to deliver a dose of 30 Gy / 10 fractions with daily treatments while she remains hospitalized. In the meantime, please continue dexamethasone (4 mg QID) to mitigate radiation-induced edema and transient worsening of the mass effect of the tumor with the initiation of treatment.    Charly Ray MD/PhD    Dept of Radiation Oncology  [facility]

## 2020-02-18 NOTE — OP NOTE
Date of Procedure: 2/17/2020    Attending Physician: Kiersetn Valdez MD    Resident Physicians: Em Cheney MD    Procedure:  Standby services - 30 minutes    Preoperative Diagnosis:   1. Extrinsic tracheal compression  2. Concern for lymphoma    Postoperative Diagnosis: same    Anesthesia: General    Complications: None    Findings:  Significant tracheal compression from the large thyroid mass with inability of patient to lay flat without drop in blood pressure - preventing placement of tracheal stent by pulmonary  Intratracheal tumor biopsied by pulmonary service, decision made not to proceed with open thyroid biopsy    Indications:  Chuyita Porter is a 69 year old woman with a large thyroid mass concerning for a B cell lymphoma.  She developed progressive respiratory complaints with inability to lay down while sleeping. She had a CT scan which demonstrated a large thyroid mass with no clear plane between the mass and the esophagus, concerns for intratracheal extension, narrowing of the distal trachea, abutting of carotid and subclavian, no obvious lymphadenopathy.  She had biopsy performed on 1/30/2020 at [facility]. Pathology was reviewed here at the [place] and was most consistent with a B-cell lymphoma that could not be further characterized. Her case was reviewed at tumor conference with plans for likely urgent radiation. She requires placement of a tracheal stent in order to secure her airway in the setting of airway edema from radiation, additional tissue for diagnosis, and PEG placement.     Description of Procedure:  After informed consent was obtained, the patient was brought back to the main operating room and placed in an upright position. Anesthesia performed an awake fiberoptic intubation. Bronchoscopy was then performed by Dr Montoya's team. The patient was attempted to be placed supine with drop in blood pressure. Dr Montoya team made the decision that they were unable to safely perform  the tracheal stent placement. There was tumor identified within the tracheal lumen and biopsies were obtained by Dr Montoya for further tissue diagnosis. Given the additional tissue obtained by the pulmonary team, the decision was made not to proceed with the open thyroid biopsy. The decision was made to proceed with PEG placement given the likely future tracheal stent placement. The decision was made to leave the patient intubated and she was transported to the MICU in stable condition with no immediate complications.    I was present in the OR from the time of intubation until the PEG placement portion of the procedure.      Kiersten Valdez MD    Department of Otolaryngology

## 2020-02-18 NOTE — PROGRESS NOTES
[facility], [place]    Progress Note - MICU Service        Date of Admission:  2/17/2020    Assessment & Plan   Chuyita Porter is a 69 year old female admitted on 2/17/2020. 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     Changes today:  - irradiation initiated today  - Heme consulted, Rad-Onc consulted  - Begin TLS prophylaxis with mIVF and allopurinol 300mg daily  - okay to use PEG tube  - Begin tube feeds  - Will need repeat CT Chest on Friday, 2/21/20        PLAN:     ===NEURO===     Sedation: Precedex gtt ALISE 0 to -1   - If patient bradycardic or hypotensive in setting of precedex gtt, consider versed gtt as alternative sedation   - If patient agitated or concern for tube displacement, increase sedation to ALISE -3 to -4 as needed to prevent ETT displacement    Analgesia: Fentanyl gtt      ===CARDIOVASCULAR===     Bradycardia  SVC Syndrome  Patient with asymptomatic bradycardia overnight. Known SVC syndrome vs Precedex gtt.   - If bradycardia becomes symptomatic, discontinue precedex and begin versed gtt instead  - If hypotensive, elevate HOB; do not lay patient flat 2/2 known SVC syndrome     Need for TTE  Per Heme, will need TTE prior to initiating chemotherapy.  - plan to order TTE prior to discharge.      ===PULMONARY===     Central airway obstruction  Impending airway obstruction noted during workup for mediastinal mass shown to be B-cell lymphoma on previous FNA (1/30/20). Patient intubated during biopsy and decision to maintain ETT for early stages of treatment. Plan for rad-onc to initiate chemotherapy on 2/18 and monitor size of mass to determine when ETT can be safely removed.   - Will need repeat CT Chest on Friday, 2/21/20   - Daily CXR to confirm ETT positioning  - ETT at ~1 cm above ezekiel  - Bedrest restricted to prevent ETT displacement in setting  of threatened airway     ===GASTROINTESTINAL===     GI Prophylaxis  - Pantoprazole 40mg daily      Nutrition  PEG tube placed 2/17.  - Per thoracic surgery, okay to use PEG as of 2/18  - Nutrition consult placed for TF      ===RENAL===     Tumor Lysis Syndrome Prophylaxis  Radiation therapy beginning 2/18.   - Heme consulted, appreciate recs  - BID BMP, Phos, Uric Acid labs  - Cardiac telemetry  - Electrolyte replacement protocol  - Begin allopurinol 300mg daily   - mIVF, LR at 125cc/hr    ===HEME/ONC===      B-cell Lymphoma  8x6.7x10cm anterior mediastinal mass shown to be B-cell lymphoma on previous biopsy (1/30/20), now s/p FNA.  - Pathology (2/17) pending  - Decadron 4mg QID  - Plan for radiation therapy per Rad-Onc  - Rad-Onc, Heme consulted  - Per Heme, when patient extubated/no longer requiring MICU, can transfer to malignant heme service for further inpatient cares     ===ENDOCRINE===     Steroid associated hyperglycemia  - Medium resistance sliding scale insulin      ===INFECTIOUS DISEASE===     No acute issues     ===SKIN/MSK===     No acute issues     - PT/OT consulted     Diet: Adult Formula Drip Feeding: Continuous Nutren 1.5; Gastrostomy; Goal Rate: 55; mL/hr; Medication - Feeding Tube Flush Frequency: At least 15-30 mL water before and after medication administration and with tube clogging; Amount to Send (Nutrition u...  NPO for Medical/Clinical Reasons Except for: NPO but receiving Tube Feeding, Other; Specify: Meds via PEG tube    Fluids: NA  Lines: PIV, osborn, PEG, ETT  DVT Prophylaxis: Enoxaparin (Lovenox) SQ and Pneumatic Compression Devices  Osborn Catheter: not present  Code Status: DNR      Disposition Plan   Expected discharge: > 7 days, recommended to TBD once initiated radiation/chemotherapy and extubated or with safe disposition plan in place.  Entered: Crystal Chen MD 02/18/2020, 4:35 PM       The patient's care was discussed with the Attending Physician, Dr. Mirza, Bedside Nurse,  Patient and Patient's Family.    Crystal Chen MD  MICU Service  [facility], [place]  Pager: [phone] or phone [phone]  Please see sticky note for cross cover information      Attending note:  Patient seen, examined and discussed with the Resident physicians and SHANTELL. All data reviewed including vital signs, laboratory studies, medications and imaging.  The patient remains critically ill with acute respiratory failure, mediastinal mass with biopsy showing B cell lymphoma, significant extrinsic tracheal compression.  I personally managed her ventilator to manage her acute respiratory failure and her endotracheal tube with the tracheal stenosis.  To start XRT today, need to follow closely for tumor lysis syndrome.     Total Critical Care 40 minutes excluding teaching time and time for procedures     Pamela Mirza MD  [phone]  ______________________________________________________________________    Interval History   NAEO. Patient sedated on precedex. Per nursing, ETT at 26, was at 28. Also, patient asymptomatic bradycardia in the 40s overnight. HOB elevated per IP note. Patient awake, easily rouse to voice No complaints. Denies pain. Okay with sedation if needed to maintain ETT placement.    Objective    Temp: 98  F (36.7  C) Temp  Min: 96.8  F (36  C)  Max: 98.8  F (37.1  C)  Resp: 12 Resp  Min: 8  Max: 33  SpO2: 100 % SpO2  Min: 98 %  Max: 100 %  Heart Rate: (S) (!) 39 Heart Rate  Min: 39  Max: 77  BP: 91/53 Systolic (24hrs), Av , Min:91 , Max:112   Diastolic (24hrs), Av, Min:53, Max:80      Ventilation Mode: CMV/AC  (Continuous Mandatory Ventilation/ Assist Control)  FiO2 (%): 30 %  Rate Set (breaths/minute): 12 breaths/min  Tidal Volume Set (mL): 400 mL  PEEP (cm H2O): 5 cmH2O  Pressure Support (cm H2O): 7 cmH2O  Oxygen Concentration (%): 30 %  Resp: 12      PIP: 14  Plateau:  13    I/O last 3 completed shifts:  In: 697.36 [I.V.:697.36]  Out: 780 [Urine:750; Emesis/NG  output:30]    Physical Exam  Constitutional: awake, alert, interactive  HEENT: ETT at 27 (repositioned overnight per charting--ok per IP)  Cardiovascular: RRR. No murmurs, gallops or rub, No edema or JVD.   Respiratory:  Rhonchi RLL; decreased breath sounds mid-chest, decreased breath sounds LLL  Gastrointestinal: Abdomen soft, non-tender. BS normal.  : Deferred  Musculoskeletal: Extremities normal with no gross deformities noted   Skin: No suspicious lesions or rashes  Neurologic: moving all extremities, full ROM  Psychiatric: affect bright/appropriate      Labs:  Arterial Blood Gas  Recent Labs   Lab 02/17/20  1826   PH 7.43   PCO2 35   PO2 210*   HCO3 23   O2PER 50.0       Venous Blood Gas  Recent Labs   Lab 02/17/20  1826   O2PER 50.0       CBC  Recent Labs   Lab 02/18/20 0345 02/17/20  1735   WBC 12.5* 7.7   RBC 3.70* 3.63*   HGB 11.2* 11.1*   HCT 34.4* 34.3*   MCV 93 95   MCH 30.3 30.6   MCHC 32.6 32.4   RDW 14.4 14.4    254       Coagulation  No lab results found in last 7 days.   No lab results found in last 7 days.     Basic Metabolic Panel  Recent Labs   Lab 02/18/20  1411 02/18/20  0345 02/17/20  1735   NA  --  138 137   POTASSIUM 4.6 4.3 3.4   CHLORIDE  --  108 106   CO2  --  26 29   ANIONGAP  --  4 2*   GLC  --  133* 102*   BUN  --  13 14   CR  --  0.74 0.80   GABI  --  8.5 8.8           Data reviewed today: I reviewed all medications, new labs and imaging results over the last 24 hours.       Data   Recent Labs   Lab 02/18/20  1411 02/18/20  0345 02/17/20  1735   WBC  --  12.5* 7.7   HGB  --  11.2* 11.1*   MCV  --  93 95   PLT  --  257 254   NA  --  138 137   POTASSIUM 4.6 4.3 3.4   CHLORIDE  --  108 106   CO2  --  26 29   BUN  --  13 14   CR  --  0.74 0.80   ANIONGAP  --  4 2*   GABI  --  8.5 8.8   GLC  --  133* 102*   ALBUMIN  --   --  3.0*   PROTTOTAL  --   --  6.5*   BILITOTAL  --   --  0.3   ALKPHOS  --   --  88   ALT  --   --  20   AST  --   --  21     Recent Results (from the past 24  hour(s))   XR Chest Port 1 View    Narrative    EXAM: XR CHEST PORT 1 VW  2/17/2020 6:31 PM     HISTORY:  Monitor ETT placement in OR.       COMPARISON:  12/14/2020    FINDINGS:   Single frontal radiograph of the chest.    The trachea slightly rightward deviated. Upper mediastinal opacity  represents known mediastinal mass. The endotracheal tube projects over  the mid/low thoracic trachea.No pneumothorax. Minimal basilar  atelectasis.    The included osseous structures, soft tissues and upper abdomen and  are within normal limits.        Impression    IMPRESSION: Endotracheal tube projects over the mid/low thoracic  trachea.     I have personally reviewed the examination and initial interpretation  and I agree with the findings.    NAVJOT CABALLERO MD   XR Chest Port 1 View    Narrative    EXAM: XR CHEST PORT 1 VW  2/18/2020 6:34 AM     HISTORY:  confirm ETT tube positioning       COMPARISON:  Chest x-ray 2/17/2020    FINDINGS:   Portable semiupright view of the chest. Endotracheal tube projects  over the mid to lower thoracic trachea. Cardiac silhouette is stable  in size. Persistent low lung volumes with interstitial pulmonary  opacities. No significant effusion. No pneumothorax.      Impression    IMPRESSION:   Endotracheal tube projects over the mid to low thoracic trachea.    I have personally reviewed the examination and initial interpretation  and I agree with the findings.    YULY MCKEON MD   XR Abdomen Port 1 View    Narrative    EXAMINATION: XR ABDOMEN PORT 1 VW, 2/18/2020 2:14 PM    COMPARISON: None    HISTORY: PEG placement    FINDINGS: Gastrostomy tube in the stomach. Calcified mass in the  pelvis likely representing fibroid. Unremarkable appearance of the  colon. No air-filled dilated small bowel loops.      Impression    IMPRESSION: Gastrostomy tube overlying the stomach.     MARCO PONCE MD     Medications     dexmedetomidine 0.2 mcg/kg/hr (02/18/20 1600)     dextrose       fentaNYL 50  mcg/hr (02/18/20 1600)     lactated ringers 125 mL/hr at 02/18/20 1600       [START ON 2/19/2020] allopurinol  300 mg Oral Daily     dexamethasone  4 mg Intravenous Q6H     enoxaparin ANTICOAGULANT  40 mg Subcutaneous Q24H     insulin aspart  1-6 Units Subcutaneous Q4H     loratadine  10 mg Oral or Feeding Tube Daily     pantoprazole  40 mg Oral Daily    Or     pantoprazole  40 mg Oral or Feeding Tube Daily

## 2020-02-18 NOTE — PROGRESS NOTES
"Otolaryngology Progress Note  02/18/2020    S: No acute events overnight. Patient awake and alert and reports that she is doing well. Had her first round of radiation therapy today and said it went well, sleeping through most of it.     O:  BP 91/53   Pulse 57   Temp 98  F (36.7  C) (Axillary)   Resp 13   Ht 1.676 m (5' 6\")   Wt 76.9 kg (169 lb 8.5 oz)   SpO2 100%   BMI 27.36 kg/m     General: Sitting upright, comfortably in bed. No acute distress.   HEENT: 8.5 wire-reinforced oral ETT in place and secured at 26cm at the lips. Well-secured.   Respiratory: Comfortable on vent.   Ventilation Mode: CMV/AC  (Continuous Mandatory Ventilation/ Assist Control)  FiO2 (%): 30 %  Rate Set (breaths/minute): 12 breaths/min  Tidal Volume Set (mL): 400 mL  PEEP (cm H2O): 5 cmH2O  Pressure Support (cm H2O): 7 cmH2O  Oxygen Concentration (%): 30 %  Resp: 13       A/P: Chuyita Porter is a 69 year old female with a large central neck mass extending into the mediastinum and causing extrinsic compression of the trachea just proximal to the ezekiel. She is now s/p awake fiberoptic intubation and biopsy in the operating room on 2/17/2020.       -dexamethasone 4 mg QID to help with swelling and potentially decrease tumor while starting treatment.   -Endotracheal tube will need to be carefully maintained in place as she has significant airway collapse/compression and is unable to lay flat. Do not allow patient to self extubate. If she were to self extubate, will need to be reintubated, likely in upright position, use wire reinforced ETT. Laying the patient flat resulted in drop in BP. 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 A tracheostomy will not overcome the extrinsic compression issues due to the distal location.   -Will follow-up pathology     Patient and plan discussed with staff, Dr. José Miguel Estrada MD PGY-4  Otolaryngology-Head & Neck Surgery  Please contact ENT by dialing [phone] and entering job code [phone] when prompted.       "

## 2020-02-18 NOTE — PLAN OF CARE
Admitted/transferred from: PACU at 1730.   Reason for admission/transfer: Planned radiation therapy of mediastinal B-cell lymphoma that requires patient to be intubated for it.  Patient status upon admission/transfer: Intubated. Alert and opens eyes spontaneously. Precedex infusing at 0.7 mcg/kg/hr. PEG tube to gravity.    Interventions: Fentanyl drip started at 50 mcg/hr. Phosphorus currently being replaced for a level of 2.3.   Plan: Maintain ETT for early stages of treatment that includes starting radiation tomorrow. Nutrition to see patient for TF. PT/OT to assess patient.  2 RN skin assessment: Completed by Natacha Hubbard, MARIKA and Rhonda Kelly RN.  Result of skin assessment and interventions/actions: Skin WNL. No interventions needed.  Height, weight, drug calc weight: Done.  Patient belongings (see Flowsheet - Adult Profile for details): Belongings remain with patient.  MDRO education (if applicable): N/A.

## 2020-02-19 ENCOUNTER — APPOINTMENT (OUTPATIENT)
Dept: GENERAL RADIOLOGY | Facility: CLINIC | Age: 70
DRG: 840 | End: 2020-02-19
Attending: STUDENT IN AN ORGANIZED HEALTH CARE EDUCATION/TRAINING PROGRAM
Payer: COMMERCIAL

## 2020-02-19 ENCOUNTER — APPOINTMENT (OUTPATIENT)
Dept: CARDIOLOGY | Facility: CLINIC | Age: 70
DRG: 840 | End: 2020-02-19
Attending: OTOLARYNGOLOGY
Payer: COMMERCIAL

## 2020-02-19 ENCOUNTER — APPOINTMENT (OUTPATIENT)
Dept: CT IMAGING | Facility: CLINIC | Age: 70
DRG: 840 | End: 2020-02-19
Attending: STUDENT IN AN ORGANIZED HEALTH CARE EDUCATION/TRAINING PROGRAM
Payer: COMMERCIAL

## 2020-02-19 LAB
ANION GAP SERPL CALCULATED.3IONS-SCNC: 3 MMOL/L (ref 3–14)
ANION GAP SERPL CALCULATED.3IONS-SCNC: 4 MMOL/L (ref 3–14)
APTT PPP: 31 SEC (ref 22–37)
BASOPHILS # BLD AUTO: 0 10E9/L (ref 0–0.2)
BASOPHILS NFR BLD AUTO: 0.1 %
BUN SERPL-MCNC: 25 MG/DL (ref 7–30)
BUN SERPL-MCNC: 27 MG/DL (ref 7–30)
CALCIUM SERPL-MCNC: 8.5 MG/DL (ref 8.5–10.1)
CALCIUM SERPL-MCNC: 8.7 MG/DL (ref 8.5–10.1)
CHLORIDE SERPL-SCNC: 107 MMOL/L (ref 94–109)
CHLORIDE SERPL-SCNC: 109 MMOL/L (ref 94–109)
CO2 SERPL-SCNC: 27 MMOL/L (ref 20–32)
CO2 SERPL-SCNC: 28 MMOL/L (ref 20–32)
CREAT SERPL-MCNC: 0.64 MG/DL (ref 0.52–1.04)
CREAT SERPL-MCNC: 0.69 MG/DL (ref 0.52–1.04)
DIFFERENTIAL METHOD BLD: ABNORMAL
EOSINOPHIL # BLD AUTO: 0 10E9/L (ref 0–0.7)
EOSINOPHIL NFR BLD AUTO: 0 %
ERYTHROCYTE [DISTWIDTH] IN BLOOD BY AUTOMATED COUNT: 14.6 % (ref 10–15)
FIBRINOGEN PPP-MCNC: 478 MG/DL (ref 200–420)
GFR SERPL CREATININE-BSD FRML MDRD: 90 ML/MIN/{1.73_M2}
GFR SERPL CREATININE-BSD FRML MDRD: >90 ML/MIN/{1.73_M2}
GLUCOSE BLDC GLUCOMTR-MCNC: 132 MG/DL (ref 70–99)
GLUCOSE BLDC GLUCOMTR-MCNC: 154 MG/DL (ref 70–99)
GLUCOSE BLDC GLUCOMTR-MCNC: 163 MG/DL (ref 70–99)
GLUCOSE BLDC GLUCOMTR-MCNC: 170 MG/DL (ref 70–99)
GLUCOSE BLDC GLUCOMTR-MCNC: 172 MG/DL (ref 70–99)
GLUCOSE BLDC GLUCOMTR-MCNC: 177 MG/DL (ref 70–99)
GLUCOSE SERPL-MCNC: 172 MG/DL (ref 70–99)
GLUCOSE SERPL-MCNC: 185 MG/DL (ref 70–99)
HCT VFR BLD AUTO: 34 % (ref 35–47)
HGB BLD-MCNC: 10.8 G/DL (ref 11.7–15.7)
IMM GRANULOCYTES # BLD: 0 10E9/L (ref 0–0.4)
IMM GRANULOCYTES NFR BLD: 0.5 %
INR PPP: 1.21 (ref 0.86–1.14)
LDH SERPL L TO P-CCNC: 210 U/L (ref 81–234)
LYMPHOCYTES # BLD AUTO: 0.7 10E9/L (ref 0.8–5.3)
LYMPHOCYTES NFR BLD AUTO: 8.3 %
MCH RBC QN AUTO: 30.3 PG (ref 26.5–33)
MCHC RBC AUTO-ENTMCNC: 31.8 G/DL (ref 31.5–36.5)
MCV RBC AUTO: 95 FL (ref 78–100)
MONOCYTES # BLD AUTO: 0.8 10E9/L (ref 0–1.3)
MONOCYTES NFR BLD AUTO: 9.6 %
NEUTROPHILS # BLD AUTO: 6.9 10E9/L (ref 1.6–8.3)
NEUTROPHILS NFR BLD AUTO: 81.5 %
NRBC # BLD AUTO: 0 10*3/UL
NRBC BLD AUTO-RTO: 0 /100
PHOSPHATE SERPL-MCNC: 2.1 MG/DL (ref 2.5–4.5)
PHOSPHATE SERPL-MCNC: 2.6 MG/DL (ref 2.5–4.5)
PLATELET # BLD AUTO: 266 10E9/L (ref 150–450)
POTASSIUM SERPL-SCNC: 4.2 MMOL/L (ref 3.4–5.3)
POTASSIUM SERPL-SCNC: 4.4 MMOL/L (ref 3.4–5.3)
RBC # BLD AUTO: 3.57 10E12/L (ref 3.8–5.2)
SODIUM SERPL-SCNC: 138 MMOL/L (ref 133–144)
SODIUM SERPL-SCNC: 140 MMOL/L (ref 133–144)
URATE SERPL-MCNC: 3.8 MG/DL (ref 2.6–6)
URATE SERPL-MCNC: 4.8 MG/DL (ref 2.6–6)
WBC # BLD AUTO: 8.5 10E9/L (ref 4–11)

## 2020-02-19 PROCEDURE — 82962 GLUCOSE BLOOD TEST: CPT

## 2020-02-19 PROCEDURE — 25000128 H RX IP 250 OP 636: Performed by: STUDENT IN AN ORGANIZED HEALTH CARE EDUCATION/TRAINING PROGRAM

## 2020-02-19 PROCEDURE — 85384 FIBRINOGEN ACTIVITY: CPT | Performed by: STUDENT IN AN ORGANIZED HEALTH CARE EDUCATION/TRAINING PROGRAM

## 2020-02-19 PROCEDURE — 84100 ASSAY OF PHOSPHORUS: CPT | Performed by: STUDENT IN AN ORGANIZED HEALTH CARE EDUCATION/TRAINING PROGRAM

## 2020-02-19 PROCEDURE — 83615 LACTATE (LD) (LDH) ENZYME: CPT | Performed by: STUDENT IN AN ORGANIZED HEALTH CARE EDUCATION/TRAINING PROGRAM

## 2020-02-19 PROCEDURE — 25000125 ZZHC RX 250: Performed by: STUDENT IN AN ORGANIZED HEALTH CARE EDUCATION/TRAINING PROGRAM

## 2020-02-19 PROCEDURE — 25800030 ZZH RX IP 258 OP 636: Performed by: STUDENT IN AN ORGANIZED HEALTH CARE EDUCATION/TRAINING PROGRAM

## 2020-02-19 PROCEDURE — 40000986 XR CHEST PORT 1 VW

## 2020-02-19 PROCEDURE — 25000132 ZZH RX MED GY IP 250 OP 250 PS 637: Performed by: STUDENT IN AN ORGANIZED HEALTH CARE EDUCATION/TRAINING PROGRAM

## 2020-02-19 PROCEDURE — 25000131 ZZH RX MED GY IP 250 OP 636 PS 637

## 2020-02-19 PROCEDURE — 74177 CT ABD & PELVIS W/CONTRAST: CPT

## 2020-02-19 PROCEDURE — 40000264 ECHOCARDIOGRAM COMPLETE

## 2020-02-19 PROCEDURE — 25500064 ZZH RX 255 OP 636: Performed by: INTERNAL MEDICINE

## 2020-02-19 PROCEDURE — 84100 ASSAY OF PHOSPHORUS: CPT | Performed by: INTERNAL MEDICINE

## 2020-02-19 PROCEDURE — 84550 ASSAY OF BLOOD/URIC ACID: CPT | Performed by: STUDENT IN AN ORGANIZED HEALTH CARE EDUCATION/TRAINING PROGRAM

## 2020-02-19 PROCEDURE — 80048 BASIC METABOLIC PNL TOTAL CA: CPT | Performed by: INTERNAL MEDICINE

## 2020-02-19 PROCEDURE — 93010 ELECTROCARDIOGRAM REPORT: CPT | Performed by: INTERNAL MEDICINE

## 2020-02-19 PROCEDURE — 94003 VENT MGMT INPAT SUBQ DAY: CPT

## 2020-02-19 PROCEDURE — 87799 DETECT AGENT NOS DNA QUANT: CPT | Performed by: STUDENT IN AN ORGANIZED HEALTH CARE EDUCATION/TRAINING PROGRAM

## 2020-02-19 PROCEDURE — 25000128 H RX IP 250 OP 636: Performed by: INTERNAL MEDICINE

## 2020-02-19 PROCEDURE — 40000275 ZZH STATISTIC RCP TIME EA 10 MIN

## 2020-02-19 PROCEDURE — 36415 COLL VENOUS BLD VENIPUNCTURE: CPT | Performed by: INTERNAL MEDICINE

## 2020-02-19 PROCEDURE — 20000004 ZZH R&B ICU UMMC

## 2020-02-19 PROCEDURE — 36415 COLL VENOUS BLD VENIPUNCTURE: CPT | Performed by: STUDENT IN AN ORGANIZED HEALTH CARE EDUCATION/TRAINING PROGRAM

## 2020-02-19 PROCEDURE — 71045 X-RAY EXAM CHEST 1 VIEW: CPT

## 2020-02-19 PROCEDURE — 25000131 ZZH RX MED GY IP 250 OP 636 PS 637: Performed by: STUDENT IN AN ORGANIZED HEALTH CARE EDUCATION/TRAINING PROGRAM

## 2020-02-19 PROCEDURE — 93005 ELECTROCARDIOGRAM TRACING: CPT

## 2020-02-19 PROCEDURE — 80048 BASIC METABOLIC PNL TOTAL CA: CPT | Performed by: STUDENT IN AN ORGANIZED HEALTH CARE EDUCATION/TRAINING PROGRAM

## 2020-02-19 PROCEDURE — 99291 CRITICAL CARE FIRST HOUR: CPT | Mod: 24 | Performed by: INTERNAL MEDICINE

## 2020-02-19 PROCEDURE — 85610 PROTHROMBIN TIME: CPT | Performed by: STUDENT IN AN ORGANIZED HEALTH CARE EDUCATION/TRAINING PROGRAM

## 2020-02-19 PROCEDURE — 93306 TTE W/DOPPLER COMPLETE: CPT | Mod: 26 | Performed by: INTERNAL MEDICINE

## 2020-02-19 PROCEDURE — 84550 ASSAY OF BLOOD/URIC ACID: CPT | Performed by: INTERNAL MEDICINE

## 2020-02-19 PROCEDURE — 85025 COMPLETE CBC W/AUTO DIFF WBC: CPT | Performed by: STUDENT IN AN ORGANIZED HEALTH CARE EDUCATION/TRAINING PROGRAM

## 2020-02-19 PROCEDURE — 85730 THROMBOPLASTIN TIME PARTIAL: CPT | Performed by: STUDENT IN AN ORGANIZED HEALTH CARE EDUCATION/TRAINING PROGRAM

## 2020-02-19 RX ORDER — IOPAMIDOL 755 MG/ML
104 INJECTION, SOLUTION INTRAVASCULAR ONCE
Status: COMPLETED | OUTPATIENT
Start: 2020-02-19 | End: 2020-02-19

## 2020-02-19 RX ORDER — LIDOCAINE 40 MG/G
CREAM TOPICAL
Status: DISCONTINUED | OUTPATIENT
Start: 2020-02-19 | End: 2020-02-21

## 2020-02-19 RX ORDER — HEPARIN SODIUM,PORCINE 10 UNIT/ML
2-5 VIAL (ML) INTRAVENOUS
Status: DISCONTINUED | OUTPATIENT
Start: 2020-02-19 | End: 2020-03-06 | Stop reason: HOSPADM

## 2020-02-19 RX ORDER — PREDNISONE 5 MG/ML
100 SOLUTION ORAL 2 TIMES DAILY
Status: DISCONTINUED | OUTPATIENT
Start: 2020-02-19 | End: 2020-02-19

## 2020-02-19 RX ORDER — PREDNISONE 50 MG/1
100 TABLET ORAL 2 TIMES DAILY
Status: DISCONTINUED | OUTPATIENT
Start: 2020-02-19 | End: 2020-02-21

## 2020-02-19 RX ORDER — SALIVA STIMULANT COMB. NO.3
2 SPRAY, NON-AEROSOL (ML) MUCOUS MEMBRANE 4 TIMES DAILY PRN
Status: DISCONTINUED | OUTPATIENT
Start: 2020-02-19 | End: 2020-03-06 | Stop reason: HOSPADM

## 2020-02-19 RX ADMIN — INSULIN ASPART 1 UNITS: 100 INJECTION, SOLUTION INTRAVENOUS; SUBCUTANEOUS at 12:21

## 2020-02-19 RX ADMIN — Medication 40 MG: at 08:52

## 2020-02-19 RX ADMIN — HUMAN ALBUMIN MICROSPHERES AND PERFLUTREN 6 ML: 10; .22 INJECTION, SOLUTION INTRAVENOUS at 13:30

## 2020-02-19 RX ADMIN — INSULIN ASPART 1 UNITS: 100 INJECTION, SOLUTION INTRAVENOUS; SUBCUTANEOUS at 04:23

## 2020-02-19 RX ADMIN — DEXMEDETOMIDINE 0.2 MCG/KG/HR: 100 INJECTION, SOLUTION, CONCENTRATE INTRAVENOUS at 00:08

## 2020-02-19 RX ADMIN — Medication 50 MCG/HR: at 18:19

## 2020-02-19 RX ADMIN — ALLOPURINOL 300 MG: 300 TABLET ORAL at 14:53

## 2020-02-19 RX ADMIN — PREDNISONE 100 MG: 50 TABLET ORAL at 12:21

## 2020-02-19 RX ADMIN — DEXMEDETOMIDINE 0.2 MCG/KG/HR: 100 INJECTION, SOLUTION, CONCENTRATE INTRAVENOUS at 20:50

## 2020-02-19 RX ADMIN — SODIUM PHOSPHATE, MONOBASIC, MONOHYDRATE AND SODIUM PHOSPHATE, DIBASIC, ANHYDROUS 15 MMOL: 276; 142 INJECTION, SOLUTION INTRAVENOUS at 18:10

## 2020-02-19 RX ADMIN — IOPAMIDOL 104 ML: 755 INJECTION, SOLUTION INTRAVENOUS at 14:30

## 2020-02-19 RX ADMIN — DEXAMETHASONE SODIUM PHOSPHATE 4 MG: 4 INJECTION, SOLUTION INTRAMUSCULAR; INTRAVENOUS at 04:12

## 2020-02-19 RX ADMIN — SODIUM CHLORIDE, POTASSIUM CHLORIDE, SODIUM LACTATE AND CALCIUM CHLORIDE: 600; 310; 30; 20 INJECTION, SOLUTION INTRAVENOUS at 12:27

## 2020-02-19 RX ADMIN — ENOXAPARIN SODIUM 40 MG: 40 INJECTION SUBCUTANEOUS at 08:47

## 2020-02-19 RX ADMIN — DEXAMETHASONE SODIUM PHOSPHATE 4 MG: 4 INJECTION, SOLUTION INTRAMUSCULAR; INTRAVENOUS at 08:57

## 2020-02-19 RX ADMIN — SODIUM CHLORIDE, POTASSIUM CHLORIDE, SODIUM LACTATE AND CALCIUM CHLORIDE: 600; 310; 30; 20 INJECTION, SOLUTION INTRAVENOUS at 20:50

## 2020-02-19 RX ADMIN — INSULIN ASPART 1 UNITS: 100 INJECTION, SOLUTION INTRAVENOUS; SUBCUTANEOUS at 09:00

## 2020-02-19 RX ADMIN — LORATADINE 10 MG: 10 TABLET ORAL at 08:52

## 2020-02-19 RX ADMIN — SODIUM CHLORIDE, POTASSIUM CHLORIDE, SODIUM LACTATE AND CALCIUM CHLORIDE: 600; 310; 30; 20 INJECTION, SOLUTION INTRAVENOUS at 04:09

## 2020-02-19 RX ADMIN — INSULIN ASPART 1 UNITS: 100 INJECTION, SOLUTION INTRAVENOUS; SUBCUTANEOUS at 16:22

## 2020-02-19 RX ADMIN — PREDNISONE 100 MG: 50 TABLET ORAL at 20:51

## 2020-02-19 ASSESSMENT — ACTIVITIES OF DAILY LIVING (ADL)
ADLS_ACUITY_SCORE: 16

## 2020-02-19 ASSESSMENT — MIFFLIN-ST. JEOR: SCORE: 1325.75

## 2020-02-19 NOTE — PROGRESS NOTES
"IP Note    E: some concern with changes in ETT depth.  Patient started radiotherapy today.    S: per nursing, patient doing well, fairly comfortable on the vent, no issues with pressures.  Patient shakes head no to discomfort.    O: BP (!) 89/58 (BP Location: Left arm)   Pulse (!) 45   Temp 97.9  F (36.6  C) (Axillary)   Resp 14   Ht 1.676 m (5' 6\")   Wt 76.9 kg (169 lb 8.5 oz)   SpO2 100%   BMI 27.36 kg/m    Ventilator pressures within normal limits.    CXR interpreted: ETT at reasonable position.    CXR discussed with ICU MD    A/P: 69F with neck mass due to lymphoma, complicated by respiratory failure.  Now intubated undergoing treatment.  ETT looks to be in good position.   -monitor ETT depth closely with ETT depth on tube, CXR and and vent parameters.   -if she self extubates, should be reintubated fiberoptically   -she cannot be trach/cric due to neck anatomy    We will follow with you. Please contact us with questions.    Yoni Santamaria MD  Interventional Pulm  15 minutes, 100% counseling and coordination of care.  "

## 2020-02-19 NOTE — PROGRESS NOTES
"Otolaryngology Progress Note  February 19, 2020    S: No acute events overnight. Received first dose of radiation therapy, waiting for second course this afternoon as unable to attend this morning. She is doing well, has not noted new swelling or changes in breathing. She states that she slept well last night.    O: /68   Pulse 60   Temp 98.5  F (36.9  C)   Resp 16   Ht 1.676 m (5' 6\")   Wt 77.4 kg (170 lb 10.2 oz)   SpO2 99%   BMI 27.54 kg/m     General: Sitting up in bed, no acute distress   HEENT: EOMI.    Pulmonary: Breathing non-labored, no stridor, no accessory muscle use, endotracheally intubated with 8.5 reinforced breathing tube secured at 27 1/2 cm at the lip.    A/P: Chuyita Porter is a 69 year old female with a past medical history of breast cancer, large right neck mass most consistent with B-cell lymphoma now s/p attempted tracheal stent, tracheal mass biopsy, and PEG tube placement 2/17/2020. She remains endotracheally intubated with the tube secured at 27.5 cm at the teeth.     - Decadron per Med Onc, Radiation Oncology, and MICU to help with swelling and potentially decrease tumor while starting treatment.   - Endotracheal tube will need to be carefully maintained in place as she has significant airway collapse/compression and is unable to lay flat. Do not allow patient to self extubate. If she were to self extubate, will need to be reintubated, likely in upright position, use wire reinforced ETT. Laying the patient flat resulted in drop in BP. 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 A tracheostomy will not overcome the extrinsic compression issues due to the distal location.   -Will follow-up pathology results    -- Patient and above plan discussed with Dr. Valdez.    Em Cheney MD PGY1  Otolaryngology - Head & Neck Surgery   Please page the on-call resident with questions. If after hours, contact ENT with questions by dialing [phone] and entering job code [phone] when prompted.    "

## 2020-02-19 NOTE — PROGRESS NOTES
[facility], [place]    Progress Note - MICU Service        Date of Admission:  2/17/2020    Assessment & Plan   Chuyita Porter is a 69 year old female admitted on 2/17/2020. 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     Changes today:  - Begin prednisone to 100mg BID; stop decadron QID  - CT AP for staging prior to initiating chemotherapy on 2/20  - TTE prior to beginning chemotherapy  - Continue TLS prophylaxis with mIVF and allopurinol 300mg daily  - biotene spray for dry mouth     PLAN:     ===NEURO===     Sedation: Precedex gtt ALISE 0 to -1   - If patient bradycardic or hypotensive in setting of precedex gtt, consider versed gtt as alternative sedation   - If patient agitated or concern for tube displacement, increase sedation to ALISE -3 to -4 as needed to prevent ETT displacement    Analgesia: Fentanyl gtt      ===CARDIOVASCULAR===     Bradycardia  SVC Syndrome  Patient with asymptomatic bradycardia overnight. Known SVC syndrome vs Precedex gtt.   - If bradycardia becomes symptomatic, discontinue precedex and begin versed gtt instead  - If hypotensive, elevate HOB; do not lay patient flat 2/2 known SVC syndrome     Need for TTE  Per Heme, will need TTE prior to initiating chemotherapy on 2/20.  - TTE on 2/19, normal LV function with LVEF 60-65%.     ===PULMONARY===     Central airway obstruction  Impending airway obstruction noted during workup for mediastinal mass shown to be B-cell lymphoma on previous FNA (1/30/20). Patient intubated during biopsy and decision to maintain ETT for early stages of treatment. Plan for rad-onc to initiate chemotherapy on 2/18 and monitor size of mass to determine when ETT can be safely removed.   - Plan to repeat CT Chest on Friday, 2/21/20   - Daily CXR to confirm ETT positioning  - ETT at ~1 cm above ezekiel  - Bedrest  restricted to prevent ETT displacement in setting of threatened airway     ===GASTROINTESTINAL===     GI Prophylaxis  - Pantoprazole 40mg daily      Nutrition  PEG tube placed 2/17. Nutrition consulted for TF.     ===RENAL===     Tumor Lysis Syndrome Prophylaxis  Radiation therapy beginning 2/18.   - Heme consulted, appreciate recs  - BID BMP, Phos, Uric Acid labs  - Cardiac telemetry  - Electrolyte replacement protocol  - Allopurinol 300mg daily   - mIVF, LR at 125cc/hr    ===HEME/ONC===      B-cell Lymphoma  8x6.7x10cm anterior mediastinal mass shown to be B-cell lymphoma on previous biopsy (1/30/20), now s/p FNA. Per Rad-Onc, no further radiation therapy. Plan to begin chemotherapy on 2/20/2020.   - Pathology (2/17) pending  - CT AP for staging prior to initiating chemotherapy on 2/20  - TTE prior to beginning chemotherapy  - Begin prednisone to 100mg BID; stop decadron QID  - Per Heme, when patient extubated/no longer requiring MICU, can transfer to malignant heme service for further inpatient cares  - IR consulted to place port for chemotherapy to begin on 2/20.      ===ENDOCRINE===     Steroid associated hyperglycemia  - High resistance sliding scale insulin      ===INFECTIOUS DISEASE===     No acute issues     ===SKIN/MSK===     No acute issues     - PT/OT consulted     Diet: Adult Formula Drip Feeding: Continuous Nutren 1.5; Gastrostomy; Goal Rate: 55; mL/hr; Medication - Feeding Tube Flush Frequency: At least 15-30 mL water before and after medication administration and with tube clogging; Amount to Send (Nutrition u...  NPO for Medical/Clinical Reasons Except for: NPO but receiving Tube Feeding, Other; Specify: Meds via PEG tube    Fluids: NA  Lines: PIV, osborn, PEG, ETT  DVT Prophylaxis: Enoxaparin (Lovenox) SQ and Pneumatic Compression Devices  Osborn Catheter: in place, indication: Retention  Code Status: DNR      Disposition Plan   Expected discharge: > 7 days, recommended to TBD once initiated  radiation/chemotherapy and extubated or with safe disposition plan in place.  Entered: Crystal Chen MD 2020, 7:03 AM       The patient's care was discussed with the Attending Physician, Dr. Mirza, Bedside Nurse, Patient and Patient's Family.    Crystal Chen MD  [facility] Service  [facility], [place]  Pager: [phone] or phone [phone]  Please see sticky note for cross cover information      Attending note:  Patient seen, examined and discussed with the Resident physicians and SHANTELL. All data reviewed including vital signs, laboratory studies, medications and imaging.  The patient remains critically ill with acute respiratory failure, mediastinal mass with biopsy showing B cell lymphoma, significant extrinsic tracheal compression.  I personally managed her ventilator to manage her acute respiratory failure and her endotracheal tube with the tracheal stenosis.  Day #2 XRT, need to follow closely for tumor lysis syndrome.  Comfortable on ventilator.  Oncology following. Significant bradycardia when lays flat.     Total Critical Care 40 minutes excluding teaching time and time for procedures     Pamela Mirza MD  [phone]  ______________________________________________________________________    Interval History   NAEO. Patient sedated on precedex. Per nursing, ETT at 26, was advanced to 27 overnight. Also, patient with MAPs 60-65 overnight. Per RN notes, patient refusing repositioning due to ETT discomfort. Discussed need for repositioning to prevent pressure sores--patient agreeable. Patient complains of dry mouth--biotene spray given.     4 point ROS otherwise negative.    Objective    Temp: 98  F (36.7  C) Temp  Min: 96.8  F (36  C)  Max: 98.2  F (36.8  C)  Resp: 12 Resp  Min: 8  Max: 33  SpO2: 100 % SpO2  Min: 98 %  Max: 100 %  Heart Rate: 62 Heart Rate  Min: 39  Max: 68  BP: 99/81 Systolic (24hrs), Av , Min:75 , Max:111   Diastolic (24hrs), Av, Min:47, Max:81      Ventilation  Mode: CMV/AC  (Continuous Mandatory Ventilation/ Assist Control)  FiO2 (%): 30 %  Rate Set (breaths/minute): 12 breaths/min  Tidal Volume Set (mL): 400 mL  PEEP (cm H2O): 5 cmH2O  Pressure Support (cm H2O): 7 cmH2O  Oxygen Concentration (%): 30 %  Resp: 12    PIP: 14    I/O last 3 completed shifts:  In: 3003.76 [I.V.:2413.76; NG/GT:160]  Out: 835 [Urine:805; Emesis/NG output:30]     Physical Exam  Constitutional: awake, alert, interactive  HEENT: ETT at 27   Cardiovascular: RRR. No murmurs, gallops or rub, No edema or JVD.   Respiratory:  Good air movement throughout. No stridor  Gastrointestinal: Abdomen soft, non-tender. BS normal.  : Deferred  Musculoskeletal: Extremities normal with no gross deformities noted   Skin: No suspicious lesions or rashes  Neurologic: moving all extremities, full ROM  Psychiatric: affect bright/appropriate      Labs:  Arterial Blood Gas  Recent Labs   Lab 02/17/20  1826   PH 7.43   PCO2 35   PO2 210*   HCO3 23   O2PER 50.0       Venous Blood Gas  Recent Labs   Lab 02/17/20  1826   O2PER 50.0       CBC  Recent Labs   Lab 02/19/20  0438 02/18/20  0345 02/17/20  1735   WBC 8.5 12.5* 7.7   RBC 3.57* 3.70* 3.63*   HGB 10.8* 11.2* 11.1*   HCT 34.0* 34.4* 34.3*   MCV 95 93 95   MCH 30.3 30.3 30.6   MCHC 31.8 32.6 32.4   RDW 14.6 14.4 14.4    257 254       Coagulation  Recent Labs   Lab 02/19/20  0438   INR 1.21*      Recent Labs   Lab 02/19/20  0438   PTT 31        Basic Metabolic Panel  Recent Labs   Lab 02/19/20  0438 02/18/20  1411 02/18/20  0345 02/17/20  1735     --  138 137   POTASSIUM 4.2 4.6 4.3 3.4   CHLORIDE 107  --  108 106   CO2 28  --  26 29   ANIONGAP 3  --  4 2*   *  --  133* 102*   BUN 27  --  13 14   CR 0.69  --  0.74 0.80   GABI 8.7  --  8.5 8.8     Results for MLEODY ANDERSEN (MRN [id number]) as of 2/19/2020 07:06   Ref. Range 2/18/2020 14:11 2/19/2020 04:38   Uric Acid Latest Ref Range: 2.6 - 6.0 mg/dL 4.2 4.8   Lactate Dehydrogenase Latest Ref Range:  81 - 234 U/L 269 (H) 210   Phosphorus Latest Ref Range: 2.5 - 4.5 mg/dL 4.2 2.6       Data reviewed today: I reviewed all medications, new labs and imaging results over the last 24 hours.       Data   Recent Labs   Lab 02/19/20  0438 02/18/20  1411 02/18/20  0345 02/17/20  1735   WBC 8.5  --  12.5* 7.7   HGB 10.8*  --  11.2* 11.1*   MCV 95  --  93 95     --  257 254   INR 1.21*  --   --   --      --  138 137   POTASSIUM 4.2 4.6 4.3 3.4   CHLORIDE 107  --  108 106   CO2 28  --  26 29   BUN 27  --  13 14   CR 0.69  --  0.74 0.80   ANIONGAP 3  --  4 2*   GABI 8.7  --  8.5 8.8   *  --  133* 102*   ALBUMIN  --   --   --  3.0*   PROTTOTAL  --   --   --  6.5*   BILITOTAL  --   --   --  0.3   ALKPHOS  --   --   --  88   ALT  --   --   --  20   AST  --   --   --  21     Recent Results (from the past 24 hour(s))   XR Abdomen Port 1 View    Narrative    EXAMINATION: XR ABDOMEN PORT 1 , 2/18/2020 2:14 PM    COMPARISON: None    HISTORY: PEG placement    FINDINGS: Gastrostomy tube in the stomach. Calcified mass in the  pelvis likely representing fibroid. Unremarkable appearance of the  colon. No air-filled dilated small bowel loops.      Impression    IMPRESSION: Gastrostomy tube overlying the stomach.     MARCO PONCE MD     Medications     dexmedetomidine 0.2 mcg/kg/hr (02/19/20 0600)     dextrose       fentaNYL 50 mcg/hr (02/19/20 0600)     lactated ringers 125 mL/hr at 02/19/20 0600       allopurinol  300 mg Oral Daily     dexamethasone  4 mg Intravenous Q6H     enoxaparin ANTICOAGULANT  40 mg Subcutaneous Q24H     insulin aspart  1-6 Units Subcutaneous Q4H     loratadine  10 mg Oral or Feeding Tube Daily     pantoprazole  40 mg Oral Daily    Or     pantoprazole  40 mg Oral or Feeding Tube Daily

## 2020-02-19 NOTE — PLAN OF CARE
ICU End of Shift Summary. See flowsheets for vital signs and detailed assessment.    Changes this shift: Sinus ramin overnight. MAPs consistently 60-65, provider aware and advised to continue to montitor. Resp status stable, ETT 27 @ lip. Pt on strict bedrest. Refusing q2h turns at R/L positioning in bed d/t discomfort r/t ETT tube. Reid with adequate urine output. Alert, able to make needs known and utilize written communication. ETT noted to be 26 @ lip at 0630, provider notified and order received to advance to 27. Repeat Xray ordered.     Plan:  Continue radiation treatments, maintain stable airway

## 2020-02-19 NOTE — PROGRESS NOTES
ICU End of Shift Summary. See flowsheets for vital signs and detailed assessment.    Changes this shift: ETT advanced this morning per MICU and ENT. CXR confirmed placement. Currently secured at 27.5 at the lip. Tidal volumes and peak pressures are within normal limits, patient is in no acute respiratory distress, oxygen saturations 100%. Patient is alert and communicates with whiteboard. Moves all extremities. Precedex gtt at 0.2 and fentanyl at 50. Patient denies pain/anxiety. Continues to be bradycardic in the 50s. Blood pressures stable. Tube feeds at goal.  Adequate UOP via osborn catheter. No BM today. Patient is passing gas. Phos replaced.     12-lead EKG and bedside echocardiogram completed today. Radiation treatment and CT abdomen pelvis completed today.     Need central access for chemotherapy. PICC placement canceled r/t history of breast cancer and lymph node removal. MICU to follow up with different option for central access.     Plan: Closely monitor ETT, keep it secured at 27.5. Chemotherapy start tomorrow.

## 2020-02-19 NOTE — PROGRESS NOTES
Brief IP Note    Patient is stable with low peak pressures.   Appears ETT has advanced proximally per recent CXR however clinically stable without change in airway pressures.   If ETT advancement is needed, recommend performing with bronchoscopic assistance.   Please call IP for any questions or assistance.     Mason Lovett MD   of Medicine  Interventional Pulmonary  Department of Pulmonary, Allergy, Critical Care and Sleep Medicine   [facility]  Pager: [phone]   Office: [phone]  Email: [email or web address]    Natacha PIMENTEL, OCN  Clinical Nurse Specialist  Department of Thoracic Surgery  Office: [phone]  Email: [email or web address]    Jeane Solis  Interventional Pulmonology Surgery Scheduler  Office: [phone]  Email: [email or web address]

## 2020-02-19 NOTE — PROGRESS NOTES
I rounded on the patient on 2/19/2020. Patient is doing well. Not sedated, sitting in bed and crocheting. Has no pain. She had her first XRT yesterday and repeat again today at 9:30 am. Nursing had questions about tube positioning. Unable to see in notes where tube was secured at the lips at end of procedure, per sign on door, supposed to be at 27 cm. Tube is about 26-26.5 at the lips. Spoke with MICU resident since not clear what the plan is for placement. Questions regarding dosing of steroids - recommend conversation with med onc and rad onc to determine, defer to their recommendations.    Kiersten Valdez MD    Department of Otolaryngology

## 2020-02-19 NOTE — PLAN OF CARE
ICU End of Shift Summary. See flowsheets for vital signs and detailed assessment.    Changes this shift: Pt went for her first radiation treatment. Denies pain, SOB, or discomfort. PS for 15 minutes this morning w/ multiple apneic spells. Utilizing 50 mcg/hr of Fentanyl & 0.2 of Dex for pt comfort. Continues to be bradycardic in the 50s consistently & dropping to 40s occasionally. No change in ETT position, daily CXR. Reid placed for retention. Started on TF via new PEG placed 2/17, c/o no discomfort. Pt remains on strict bedrest. Refuses left side positioning.     Plan:  Hem/Onc and Rad/Onc continue to follow pt. QID IV steroids. Plan for 10 radiation treatments followed by possible chemo. Plan to remain intubated during this time until tumor size shrinks for safe removal.

## 2020-02-19 NOTE — PROGRESS NOTES
Ms. Porter's case was discussed with Dr. Sprague from medical oncology earlier today. With a working diagnosis of a primary mediastinal B-cell lymphoma, my medical oncology colleagues are planning to initiate chemotherapy and requested that I hold further radiotherapy to avoid potential toxicity with combined modality therapy.     I discussed this with Ms. Porter and her family and we will discontinue treatment after today's fraction.    Radiation oncology will sign off at this time. Please feel free to re-consult the radiation oncology service as needed.    Charly Ray MD/PhD    Dept of Radiation Oncology  [facility]

## 2020-02-19 NOTE — PROGRESS NOTES
PICC ordered but patient had bilateral lymphenodes removed. Dr. Crystal Chen notified. Recommended IR consult.

## 2020-02-20 ENCOUNTER — APPOINTMENT (OUTPATIENT)
Dept: PHYSICAL THERAPY | Facility: CLINIC | Age: 70
DRG: 840 | End: 2020-02-20
Attending: OTOLARYNGOLOGY
Payer: COMMERCIAL

## 2020-02-20 ENCOUNTER — APPOINTMENT (OUTPATIENT)
Dept: GENERAL RADIOLOGY | Facility: CLINIC | Age: 70
DRG: 840 | End: 2020-02-20
Attending: STUDENT IN AN ORGANIZED HEALTH CARE EDUCATION/TRAINING PROGRAM
Payer: COMMERCIAL

## 2020-02-20 PROBLEM — C83.398 DIFFUSE LARGE B-CELL LYMPHOMA OF EXTRANODAL SITE: Status: ACTIVE | Noted: 2020-02-20

## 2020-02-20 LAB
ANION GAP SERPL CALCULATED.3IONS-SCNC: 2 MMOL/L (ref 3–14)
ANION GAP SERPL CALCULATED.3IONS-SCNC: <1 MMOL/L (ref 3–14)
BASOPHILS # BLD AUTO: 0 10E9/L (ref 0–0.2)
BASOPHILS NFR BLD AUTO: 0.1 %
BUN SERPL-MCNC: 21 MG/DL (ref 7–30)
BUN SERPL-MCNC: 22 MG/DL (ref 7–30)
CALCIUM SERPL-MCNC: 8.5 MG/DL (ref 8.5–10.1)
CALCIUM SERPL-MCNC: 8.5 MG/DL (ref 8.5–10.1)
CHLORIDE SERPL-SCNC: 106 MMOL/L (ref 94–109)
CHLORIDE SERPL-SCNC: 110 MMOL/L (ref 94–109)
CO2 SERPL-SCNC: 30 MMOL/L (ref 20–32)
CO2 SERPL-SCNC: 34 MMOL/L (ref 20–32)
CREAT SERPL-MCNC: 0.59 MG/DL (ref 0.52–1.04)
CREAT SERPL-MCNC: 0.67 MG/DL (ref 0.52–1.04)
DIFFERENTIAL METHOD BLD: ABNORMAL
EBV DNA # SPEC NAA+PROBE: NORMAL {COPIES}/ML
EBV DNA SPEC NAA+PROBE-LOG#: NORMAL {LOG_COPIES}/ML
EOSINOPHIL # BLD AUTO: 0 10E9/L (ref 0–0.7)
EOSINOPHIL NFR BLD AUTO: 0 %
ERYTHROCYTE [DISTWIDTH] IN BLOOD BY AUTOMATED COUNT: 14.7 % (ref 10–15)
GFR SERPL CREATININE-BSD FRML MDRD: >90 ML/MIN/{1.73_M2}
GFR SERPL CREATININE-BSD FRML MDRD: >90 ML/MIN/{1.73_M2}
GLUCOSE BLDC GLUCOMTR-MCNC: 104 MG/DL (ref 70–99)
GLUCOSE BLDC GLUCOMTR-MCNC: 104 MG/DL (ref 70–99)
GLUCOSE BLDC GLUCOMTR-MCNC: 131 MG/DL (ref 70–99)
GLUCOSE BLDC GLUCOMTR-MCNC: 135 MG/DL (ref 70–99)
GLUCOSE BLDC GLUCOMTR-MCNC: 165 MG/DL (ref 70–99)
GLUCOSE BLDC GLUCOMTR-MCNC: 171 MG/DL (ref 70–99)
GLUCOSE SERPL-MCNC: 110 MG/DL (ref 70–99)
GLUCOSE SERPL-MCNC: 157 MG/DL (ref 70–99)
HCT VFR BLD AUTO: 32.6 % (ref 35–47)
HGB BLD-MCNC: 10.1 G/DL (ref 11.7–15.7)
IMM GRANULOCYTES # BLD: 0.1 10E9/L (ref 0–0.4)
IMM GRANULOCYTES NFR BLD: 0.4 %
INTERPRETATION ECG - MUSE: NORMAL
LDH SERPL L TO P-CCNC: 221 U/L (ref 81–234)
LYMPHOCYTES # BLD AUTO: 0.3 10E9/L (ref 0.8–5.3)
LYMPHOCYTES NFR BLD AUTO: 2.1 %
MCH RBC QN AUTO: 30 PG (ref 26.5–33)
MCHC RBC AUTO-ENTMCNC: 31 G/DL (ref 31.5–36.5)
MCV RBC AUTO: 97 FL (ref 78–100)
MONOCYTES # BLD AUTO: 0.6 10E9/L (ref 0–1.3)
MONOCYTES NFR BLD AUTO: 4.7 %
NEUTROPHILS # BLD AUTO: 12.5 10E9/L (ref 1.6–8.3)
NEUTROPHILS NFR BLD AUTO: 92.7 %
NRBC # BLD AUTO: 0 10*3/UL
NRBC BLD AUTO-RTO: 0 /100
PHOSPHATE SERPL-MCNC: 2.2 MG/DL (ref 2.5–4.5)
PHOSPHATE SERPL-MCNC: 2.8 MG/DL (ref 2.5–4.5)
PLATELET # BLD AUTO: 260 10E9/L (ref 150–450)
POTASSIUM SERPL-SCNC: 3.9 MMOL/L (ref 3.4–5.3)
POTASSIUM SERPL-SCNC: 4.4 MMOL/L (ref 3.4–5.3)
RBC # BLD AUTO: 3.37 10E12/L (ref 3.8–5.2)
SODIUM SERPL-SCNC: 141 MMOL/L (ref 133–144)
SODIUM SERPL-SCNC: 142 MMOL/L (ref 133–144)
URATE SERPL-MCNC: 2.2 MG/DL (ref 2.6–6)
URATE SERPL-MCNC: 2.8 MG/DL (ref 2.6–6)
WBC # BLD AUTO: 13.5 10E9/L (ref 4–11)

## 2020-02-20 PROCEDURE — 99291 CRITICAL CARE FIRST HOUR: CPT | Mod: 24 | Performed by: INTERNAL MEDICINE

## 2020-02-20 PROCEDURE — 25800030 ZZH RX IP 258 OP 636: Performed by: STUDENT IN AN ORGANIZED HEALTH CARE EDUCATION/TRAINING PROGRAM

## 2020-02-20 PROCEDURE — 83615 LACTATE (LD) (LDH) ENZYME: CPT | Performed by: INTERNAL MEDICINE

## 2020-02-20 PROCEDURE — 71045 X-RAY EXAM CHEST 1 VIEW: CPT

## 2020-02-20 PROCEDURE — 25000132 ZZH RX MED GY IP 250 OP 250 PS 637: Performed by: STUDENT IN AN ORGANIZED HEALTH CARE EDUCATION/TRAINING PROGRAM

## 2020-02-20 PROCEDURE — 84100 ASSAY OF PHOSPHORUS: CPT | Performed by: INTERNAL MEDICINE

## 2020-02-20 PROCEDURE — 40000275 ZZH STATISTIC RCP TIME EA 10 MIN

## 2020-02-20 PROCEDURE — 25000131 ZZH RX MED GY IP 250 OP 636 PS 637

## 2020-02-20 PROCEDURE — 82962 GLUCOSE BLOOD TEST: CPT

## 2020-02-20 PROCEDURE — 97110 THERAPEUTIC EXERCISES: CPT | Mod: GP

## 2020-02-20 PROCEDURE — 27210429 ZZH NUTRITION PRODUCT INTERMEDIATE LITER

## 2020-02-20 PROCEDURE — 85025 COMPLETE CBC W/AUTO DIFF WBC: CPT | Performed by: INTERNAL MEDICINE

## 2020-02-20 PROCEDURE — 20000004 ZZH R&B ICU UMMC

## 2020-02-20 PROCEDURE — 84550 ASSAY OF BLOOD/URIC ACID: CPT | Performed by: INTERNAL MEDICINE

## 2020-02-20 PROCEDURE — 3E04305 INTRODUCTION OF OTHER ANTINEOPLASTIC INTO CENTRAL VEIN, PERCUTANEOUS APPROACH: ICD-10-PCS

## 2020-02-20 PROCEDURE — 94003 VENT MGMT INPAT SUBQ DAY: CPT

## 2020-02-20 PROCEDURE — 36415 COLL VENOUS BLD VENIPUNCTURE: CPT | Performed by: INTERNAL MEDICINE

## 2020-02-20 PROCEDURE — 25000125 ZZHC RX 250: Performed by: STUDENT IN AN ORGANIZED HEALTH CARE EDUCATION/TRAINING PROGRAM

## 2020-02-20 PROCEDURE — 80048 BASIC METABOLIC PNL TOTAL CA: CPT | Performed by: INTERNAL MEDICINE

## 2020-02-20 RX ORDER — LORAZEPAM 0.5 MG/1
0.5 TABLET ORAL EVERY 4 HOURS PRN
Status: DISCONTINUED | OUTPATIENT
Start: 2020-02-20 | End: 2020-02-27

## 2020-02-20 RX ADMIN — PREDNISONE 100 MG: 50 TABLET ORAL at 08:48

## 2020-02-20 RX ADMIN — PREDNISONE 100 MG: 50 TABLET ORAL at 20:32

## 2020-02-20 RX ADMIN — LORATADINE 10 MG: 10 TABLET ORAL at 08:48

## 2020-02-20 RX ADMIN — SODIUM PHOSPHATE, MONOBASIC, MONOHYDRATE AND SODIUM PHOSPHATE, DIBASIC, ANHYDROUS 15 MMOL: 276; 142 INJECTION, SOLUTION INTRAVENOUS at 08:48

## 2020-02-20 RX ADMIN — SODIUM CHLORIDE, POTASSIUM CHLORIDE, SODIUM LACTATE AND CALCIUM CHLORIDE: 600; 310; 30; 20 INJECTION, SOLUTION INTRAVENOUS at 14:05

## 2020-02-20 RX ADMIN — SODIUM CHLORIDE, POTASSIUM CHLORIDE, SODIUM LACTATE AND CALCIUM CHLORIDE: 600; 310; 30; 20 INJECTION, SOLUTION INTRAVENOUS at 05:58

## 2020-02-20 RX ADMIN — Medication 40 MG: at 08:48

## 2020-02-20 RX ADMIN — ALLOPURINOL 300 MG: 300 TABLET ORAL at 08:47

## 2020-02-20 ASSESSMENT — ACTIVITIES OF DAILY LIVING (ADL)
ADLS_ACUITY_SCORE: 15
ADLS_ACUITY_SCORE: 15
ADLS_ACUITY_SCORE: 16
ADLS_ACUITY_SCORE: 15

## 2020-02-20 NOTE — PLAN OF CARE
ICU End of Shift Summary. See flowsheets for vital signs and detailed assessment.    Changes this shift: ETT at 27.5cm throughout shift. Vitally stable. New diffuse rash noted, MICU provider notified. Pt asymptomatic and denied itchiness, pain or other discomfort. IV maintenance. Dex and fentanyl unchanged. AM phos low, replacement ordered and awaiting arrival from pharmacy. Sinus ramin majority of night. Reid with good urine output. Refusing q2h repositioning and positioning on L side.  updated overnight.    Plan:  Central access placement today by IR, chemo

## 2020-02-20 NOTE — PLAN OF CARE
ICU End of Shift Summary. See flowsheets for vital signs and detailed assessment.    Changes this shift: A/O x 4, denies pain. Precedex gtt discontinued. Afebrile, VSS. Minimal secretions from ETT. Placement remains unchanged. TF resumed this evening at previous goal rate of 55 mL/hr.     Plan: Plan to receive 1st dose of IV chemo tomorrow, 2/21 via PIV for 1x dose. Reassess Monday/Tuesday mass size and reevaluate port/PICC placement.       Problem: Pain Acute  Goal: Optimal Pain Control  2/20/2020 1642 by Taryn aMrina, RN  Outcome: Improving  2/20/2020 0651 by Rosalba Anderson, RN  Outcome: No Change

## 2020-02-20 NOTE — PROGRESS NOTES
"Otolaryngology Progress Note  February 20, 2020    S: No acute events overnight. Pathology showing diffuse large B-cell lymphoma, radiation therapy discontinued with the plan to begin chemotherapy today.     O: /81   Pulse 58   Temp 98.9  F (37.2  C) (Axillary)   Resp 16   Ht 1.676 m (5' 6\")   Wt 77.4 kg (170 lb 10.2 oz)   SpO2 100%   BMI 27.54 kg/m     General: Sleeping in bed, no acute distress   HEENT: EOMI.    Pulmonary: Breathing non-labored, no stridor, no accessory muscle use, endotracheally intubated with 8.5 reinforced breathing tube secured at 27 1/2 cm at the lip.    A/P: Chuyita Porter is a 69 year old female with a past medical history of breast cancer, large right neck mass with pathology demonstrating diffuse B-cell lymphoma now s/p attempted tracheal stent, tracheal mass biopsy, and PEG tube placement 2/17/2020. She remains endotracheally intubated with the tube secured at 27.5 cm at the lip.     - Decadron per Med Onc, Radiation Oncology, and MICU to help with swelling and potentially decrease tumor while starting treatment.   - Endotracheal tube will need to be carefully maintained in place as she has significant airway collapse/compression and is unable to lay flat. Do not allow patient to self extubate. If she were to self extubate, will need to be reintubated, likely in upright position, use wire reinforced ETT. Laying the patient flat resulted in drop in BP. 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 A tracheostomy will not overcome the extrinsic compression issues due to the distal location.    -- Patient and above plan discussed with Dr. Valdez.    Em Cheney MD PGY1  Otolaryngology - Head & Neck Surgery   Please page the on-call resident with questions. If after hours, contact ENT with questions by dialing [phone] and entering job code [phone] when prompted.    "

## 2020-02-20 NOTE — CONSULTS
IR consulted for chest port placement    This is a 69 year old female with a past medical history of breast cancer, large right neck mass most consistent with B-cell lymphoma now s/p attempted tracheal stent, tracheal mass biopsy, and PEG tube placement 2/17/2020. Team planning to initiate chemotherapy and have requested access for this. PICC placement was deferred by vascular due to breast cancer hx.    Case and imaging was reviewed with Dr. Duvall from IR. IR would not offer inpatient port placement due to known increased risk of infection. Would recommend PICC line placement (the upper extremity veins do look open on most recent CT chest) vs IJ/femoral CVC for chemotherapy initiation. In general we would recommend avoiding PICC lines in the setting of hx of LN dissection, but in a patient with hx of remote bilateral LN dissection and now with new malignancy she will require upper extremity access. Risk of thrombosis and swelling should be discussed with the patient. Additionally, there was some concern regarding worsening hypotension and bradycardia with laying flat. IR will need the patient to lay flat for any intervention that we do.    Recommendations were discussed with ICU team.    Tracey Biggs DNP, APRN  Interventional Radiology   Pager: [phone]

## 2020-02-20 NOTE — PLAN OF CARE
4C:  Discharge Planner PT   Patient plan for discharge: unknown, open to rehab  Current status: Pt A/Ox4, intubated VC/AC PEEP 5 FiO2 30%. Completed initial PT evaluation. Completed supine B LE and UE exercises x 20-30 repetitions. Further activity limited d/t bed rest orders. All VSS.   Barriers to return to prior living situation: medical status, decreased activity tolerance, weakness  Recommendations for discharge: defer until formal mobility can be assessed  Rationale for recommendations: clinical judgement; pt on strict bed rest at this time therefore unable to make an appropriate discharge recommendation.       Entered by: Macie Woodall 02/20/2020 10:48 AM

## 2020-02-20 NOTE — PROGRESS NOTES
"Hematology&Oncology Progress Note:  Chuyita Porter    Date: 02/20/2020  Admission Date: 2/17/2020  Primary Heme/Oncologist:    INTERVAL HISTORY:   She feels good.  No shortness of breath or chest pain.  Patient still on ventilator with minimal setting for airway protection given care with mediastinal mass.  No nausea or vomiting or abdominal pain, patient has a PEG tube.  No fever or chills, vital signs are stable except mild sinus bradycardia like from pracedex. Otherwise, she is waiting for central IV access today for potential start chemo today.     R-CHOP will start today, Rituximab will start tomorrow 2/21/20.     ROS: Negative other than as stated in above interval history.      PHYSICAL EXAM:  BP 99/58 (BP Location: Left arm)   Pulse 66   Temp 97.7  F (36.5  C) (Axillary)   Resp 12   Ht 1.676 m (5' 6\")   Wt 77.4 kg (170 lb 10.2 oz)   SpO2 99%   BMI 27.54 kg/m    Wt Readings from Last 3 Encounters:   02/19/20 77.4 kg (170 lb 10.2 oz)   02/14/20 75.3 kg (166 lb 1.6 oz)   02/14/20 75.3 kg (165 lb 14.4 oz)     General:  no acute distress.  Heme/Lymph: No overt bleeding. No cervical or clavicular adenopathy.  HEENT: NCAT. PERRL, EOMI, anicteric sclera. ETT.   Neck: supple. No JVD.  Lungs clear to auscultation bilaterally.  CVS: RRR. No murmur or rub.   Abd: Soft, NT, ND, BS+. No palpable masses or organomegaly. PEG tube in place.   Skin: No rash.  EXTs: No edema.   Neurologic: A&O x 3, CNs 2-12 grossly intact, speech normal, gait normal, sensation to light touch grossly WNL. Grossly non-focal. Strength WNL          Current Facility-Administered Medications   Medication     allopurinol (ZYLOPRIM) tablet 300 mg     artificial saliva (BIOTENE MT) solution 2 spray     dextrose 10% infusion     glucose gel 15-30 g    Or     dextrose 50 % injection 25-50 mL    Or     glucagon injection 1 mg     enoxaparin ANTICOAGULANT (LOVENOX) injection 40 mg     EPINEPHrine 200 mcg/20 mL (1:100,000) in NS (Pharmacy Prepared " for OR)     fentaNYL (PF) (SUBLIMAZE) injection 25-50 mcg     fentaNYL (SUBLIMAZE) infusion     heparin lock flush 10 UNIT/ML injection 2-5 mL     HOLD MEDICATION (one time)     insulin aspart (NovoLOG) injection (RAPID ACTING)     ipratropium - albuterol 0.5 mg/2.5 mg/3 mL (DUONEB) neb solution 3 mL     lactated ringers infusion     lidocaine (LMX4) cream     lidocaine 1 % 0.1-1 mL     loratadine (CLARITIN) tablet 10 mg     LORazepam (ATIVAN) tablet 0.5 mg     magnesium sulfate 4 g in 100 mL sterile water (premade)     naloxone (NARCAN) injection 0.1-0.4 mg     ondansetron (ZOFRAN-ODT) ODT tab 4 mg    Or     ondansetron (ZOFRAN) injection 4 mg     oxyCODONE (ROXICODONE) solution 5 mg     pantoprazole (PROTONIX) EC tablet 40 mg    Or     pantoprazole (PROTONIX) 2 mg/mL suspension 40 mg     polyethylene glycol (MIRALAX) Packet 17 g     potassium chloride (KLOR-CON) Packet 20-40 mEq     potassium chloride 10 mEq in 100 mL intermittent infusion with 10 mg lidocaine     potassium chloride 10 mEq in 100 mL sterile water intermittent infusion (premix)     potassium chloride 20 mEq in 50 mL intermittent infusion     potassium chloride ER (KLOR-CON M) CR tablet 20-40 mEq     predniSONE (DELTASONE) tablet 100 mg     sodium chloride (PF) 0.9% PF flush 5-50 mL     sodium phosphate 15 mmol in D5W intermittent infusion     sodium phosphate 20 mmol in D5W intermittent infusion     sodium phosphate 25 mmol in D5W intermittent infusion       LABS:  CBC  Recent Labs   Lab 02/20/20  0522 02/19/20  0438 02/18/20  0345 02/17/20  1735   WBC 13.5* 8.5 12.5* 7.7   RBC 3.37* 3.57* 3.70* 3.63*   HGB 10.1* 10.8* 11.2* 11.1*   HCT 32.6* 34.0* 34.4* 34.3*   MCV 97 95 93 95   MCH 30.0 30.3 30.3 30.6   MCHC 31.0* 31.8 32.6 32.4   RDW 14.7 14.6 14.4 14.4    266 257 254     CMP  Recent Labs   Lab 02/20/20  0522 02/19/20  1645 02/19/20  0438 02/18/20  1411 02/18/20  0345 02/17/20  1735    140 138  --  138 137   POTASSIUM 4.4 4.4 4.2  4.6 4.3 3.4   CHLORIDE 110* 109 107  --  108 106   CO2 30 27 28  --  26 29   ANIONGAP 2* 4 3  --  4 2*   * 185* 172*  --  133* 102*   BUN 22 25 27  --  13 14   CR 0.59 0.64 0.69  --  0.74 0.80   GFRESTIMATED >90 >90 90  --  83 76   GFRESTBLACK >90 >90 >90  --  >90 88   GABI 8.5 8.5 8.7  --  8.5 8.8   MAG  --   --   --   --   --  2.2   PHOS 2.2* 2.1* 2.6 4.2 4.9* 2.3*   PROTTOTAL  --   --   --   --   --  6.5*   ALBUMIN  --   --   --   --   --  3.0*   BILITOTAL  --   --   --   --   --  0.3   ALKPHOS  --   --   --   --   --  88   AST  --   --   --   --   --  21   ALT  --   --   --   --   --  20     INR  Recent Labs   Lab 02/19/20  0438   INR 1.21*   PTT 31         IMAGING/PROCEDURES: Reviewed  ASSESSMENT/PLAN:  A 69-year-old female who presented with several months of worsening cough and shortness of breath and was found to have a large right thyroid/superior mediastinal mass with encasement of the subglottic trachea causing greater than 75% tracheal stenosis.  FNA of right thyroid suggests B-cell lymphoma.  This was confirmed by endobronchial biopsy (taken 2/17/20). Patient went to the OR on 2/17/2020 where she had PEG tube and it was planned to have tracheal tube but was risky and instead she had an elective ET tube to protect her airway.  Patient case was discussed in ENT tumor board and plan to have 10 sessions of radiotherapy.  However oncology was consulted and plan to start chemotherapy on 2/20/ 2020.  Patient received radiation only 2 sessions on 2/17-18.    # Extranodal Diffuse large B-cell lymphoma, non-germinal center type of thyroid  - 2/14/20 CT chest showed 8.0 x 6.7 x 10.0 cm mass centered in the right anterior mediastinum, appearing to arise from the right lobe of the thyroid gland. Mass effect within the mediastinum, including compression of  the superior vena cava with associated collateral vessels seen in the  neck.  - 2/19 CT abdomen and pelvis unremarkable  - PET scan is preferable but could  not have it given patient on ventilator   -  Unilateral bone marrow biopsy is still considered at some point for staging.   - IR consulted to place port for chemotherapy to begin on 2/20.  - Coag (INR, aPTT, fibrinogen)  WNL   -  HIV and Hep panel negative   - Echo:EF of 60-65%, no valve issues.  - FISH studies for BCL-2, BCL-6, and MYC rearrangement are pending.  - Dex 4 mg q 6h was started then changed to prednisone 100 mg bid on 2/18/20  - Plan to start R-EPOCH today 2/20. Rituximab scheduled 2/21/2020.   - Issues in having a port and instead we consider Midline.  Patient can not have PICC line in her extremities giving previous radiation for breast cancer.  - Response to the treatment will be evaluated on Monday/Tuesday next week with deflated cuff of ET tube and see if there is any leak, also imaging is needed to reassess her mass.     OI PPx:   - none so far    #TLS prophylaxis:   - TLS labs (BMP, Phos, uric acid) BID and LDH daily  - Allopurinol and IV fluid    # Airway obstruction 2/2 to mediastinal mass  -  now intubated    # History of right breast cancer in 2005 and left breast cancer in 2015  - Would hold patient's home anastrozole for now due to increased VTE risk currently.      GI Prophylaxis  - Pantoprazole 40mg daily      Nutrition  PEG tube placed 2/17. Nutrition consulted for TF.       Patient is seen and examined by Deborah Ellis.  Assessment and plan are discussed and delivered to the patient.     Latha Pickett MD  Hematology&Oncology Fellow  Pager: [phone]

## 2020-02-21 ENCOUNTER — APPOINTMENT (OUTPATIENT)
Dept: GENERAL RADIOLOGY | Facility: CLINIC | Age: 70
DRG: 840 | End: 2020-02-21
Attending: STUDENT IN AN ORGANIZED HEALTH CARE EDUCATION/TRAINING PROGRAM
Payer: COMMERCIAL

## 2020-02-21 ENCOUNTER — APPOINTMENT (OUTPATIENT)
Dept: GENERAL RADIOLOGY | Facility: CLINIC | Age: 70
DRG: 840 | End: 2020-02-21
Attending: OTOLARYNGOLOGY
Payer: COMMERCIAL

## 2020-02-21 LAB
ALBUMIN SERPL-MCNC: 2.6 G/DL (ref 3.4–5)
ALP SERPL-CCNC: 124 U/L (ref 40–150)
ALT SERPL W P-5'-P-CCNC: 102 U/L (ref 0–50)
ANION GAP SERPL CALCULATED.3IONS-SCNC: 2 MMOL/L (ref 3–14)
ANION GAP SERPL CALCULATED.3IONS-SCNC: 2 MMOL/L (ref 3–14)
ANION GAP SERPL CALCULATED.3IONS-SCNC: 3 MMOL/L (ref 3–14)
AST SERPL W P-5'-P-CCNC: 88 U/L (ref 0–45)
BASOPHILS # BLD AUTO: 0 10E9/L (ref 0–0.2)
BASOPHILS # BLD AUTO: 0 10E9/L (ref 0–0.2)
BASOPHILS NFR BLD AUTO: 0.2 %
BASOPHILS NFR BLD AUTO: 0.2 %
BILIRUB SERPL-MCNC: 0.4 MG/DL (ref 0.2–1.3)
BUN SERPL-MCNC: 22 MG/DL (ref 7–30)
BUN SERPL-MCNC: 23 MG/DL (ref 7–30)
BUN SERPL-MCNC: 25 MG/DL (ref 7–30)
CALCIUM SERPL-MCNC: 8.5 MG/DL (ref 8.5–10.1)
CALCIUM SERPL-MCNC: 8.6 MG/DL (ref 8.5–10.1)
CALCIUM SERPL-MCNC: 8.6 MG/DL (ref 8.5–10.1)
CHLORIDE SERPL-SCNC: 107 MMOL/L (ref 94–109)
CO2 SERPL-SCNC: 31 MMOL/L (ref 20–32)
CO2 SERPL-SCNC: 32 MMOL/L (ref 20–32)
CO2 SERPL-SCNC: 32 MMOL/L (ref 20–32)
COPATH REPORT: NORMAL
CREAT SERPL-MCNC: 0.57 MG/DL (ref 0.52–1.04)
CREAT SERPL-MCNC: 0.6 MG/DL (ref 0.52–1.04)
CREAT SERPL-MCNC: 0.65 MG/DL (ref 0.52–1.04)
DIFFERENTIAL METHOD BLD: ABNORMAL
DIFFERENTIAL METHOD BLD: ABNORMAL
EOSINOPHIL # BLD AUTO: 0 10E9/L (ref 0–0.7)
EOSINOPHIL # BLD AUTO: 0 10E9/L (ref 0–0.7)
EOSINOPHIL NFR BLD AUTO: 0 %
EOSINOPHIL NFR BLD AUTO: 0 %
ERYTHROCYTE [DISTWIDTH] IN BLOOD BY AUTOMATED COUNT: 14.7 % (ref 10–15)
ERYTHROCYTE [DISTWIDTH] IN BLOOD BY AUTOMATED COUNT: 14.9 % (ref 10–15)
GFR SERPL CREATININE-BSD FRML MDRD: >90 ML/MIN/{1.73_M2}
GLUCOSE BLDC GLUCOMTR-MCNC: 147 MG/DL (ref 70–99)
GLUCOSE BLDC GLUCOMTR-MCNC: 157 MG/DL (ref 70–99)
GLUCOSE BLDC GLUCOMTR-MCNC: 167 MG/DL (ref 70–99)
GLUCOSE BLDC GLUCOMTR-MCNC: 177 MG/DL (ref 70–99)
GLUCOSE BLDC GLUCOMTR-MCNC: 182 MG/DL (ref 70–99)
GLUCOSE SERPL-MCNC: 130 MG/DL (ref 70–99)
GLUCOSE SERPL-MCNC: 160 MG/DL (ref 70–99)
GLUCOSE SERPL-MCNC: 171 MG/DL (ref 70–99)
HCT VFR BLD AUTO: 34.4 % (ref 35–47)
HCT VFR BLD AUTO: 36.5 % (ref 35–47)
HGB BLD-MCNC: 10.6 G/DL (ref 11.7–15.7)
HGB BLD-MCNC: 11.5 G/DL (ref 11.7–15.7)
IMM GRANULOCYTES # BLD: 0.2 10E9/L (ref 0–0.4)
IMM GRANULOCYTES # BLD: 0.4 10E9/L (ref 0–0.4)
IMM GRANULOCYTES NFR BLD: 1.8 %
IMM GRANULOCYTES NFR BLD: 3 %
LDH SERPL L TO P-CCNC: 245 U/L (ref 81–234)
LDH SERPL L TO P-CCNC: 267 U/L (ref 81–234)
LDH SERPL L TO P-CCNC: 315 U/L (ref 81–234)
LYMPHOCYTES # BLD AUTO: 0.4 10E9/L (ref 0.8–5.3)
LYMPHOCYTES # BLD AUTO: 0.4 10E9/L (ref 0.8–5.3)
LYMPHOCYTES NFR BLD AUTO: 3.3 %
LYMPHOCYTES NFR BLD AUTO: 3.4 %
MCH RBC QN AUTO: 30 PG (ref 26.5–33)
MCH RBC QN AUTO: 30.5 PG (ref 26.5–33)
MCHC RBC AUTO-ENTMCNC: 30.8 G/DL (ref 31.5–36.5)
MCHC RBC AUTO-ENTMCNC: 31.5 G/DL (ref 31.5–36.5)
MCV RBC AUTO: 97 FL (ref 78–100)
MCV RBC AUTO: 98 FL (ref 78–100)
MONOCYTES # BLD AUTO: 0.6 10E9/L (ref 0–1.3)
MONOCYTES # BLD AUTO: 0.8 10E9/L (ref 0–1.3)
MONOCYTES NFR BLD AUTO: 4.7 %
MONOCYTES NFR BLD AUTO: 6.2 %
NEUTROPHILS # BLD AUTO: 11.2 10E9/L (ref 1.6–8.3)
NEUTROPHILS # BLD AUTO: 11.8 10E9/L (ref 1.6–8.3)
NEUTROPHILS NFR BLD AUTO: 87.3 %
NEUTROPHILS NFR BLD AUTO: 89.9 %
NRBC # BLD AUTO: 0 10*3/UL
NRBC # BLD AUTO: 0 10*3/UL
NRBC BLD AUTO-RTO: 0 /100
NRBC BLD AUTO-RTO: 0 /100
PHOSPHATE SERPL-MCNC: 2 MG/DL (ref 2.5–4.5)
PHOSPHATE SERPL-MCNC: 2.2 MG/DL (ref 2.5–4.5)
PHOSPHATE SERPL-MCNC: 2.9 MG/DL (ref 2.5–4.5)
PLATELET # BLD AUTO: 267 10E9/L (ref 150–450)
PLATELET # BLD AUTO: 332 10E9/L (ref 150–450)
PLATELET # BLD EST: ABNORMAL 10*3/UL
POTASSIUM SERPL-SCNC: 3.7 MMOL/L (ref 3.4–5.3)
POTASSIUM SERPL-SCNC: 4.1 MMOL/L (ref 3.4–5.3)
POTASSIUM SERPL-SCNC: 4.2 MMOL/L (ref 3.4–5.3)
PROT SERPL-MCNC: 6.2 G/DL (ref 6.8–8.8)
RBC # BLD AUTO: 3.53 10E12/L (ref 3.8–5.2)
RBC # BLD AUTO: 3.77 10E12/L (ref 3.8–5.2)
SODIUM SERPL-SCNC: 140 MMOL/L (ref 133–144)
SODIUM SERPL-SCNC: 142 MMOL/L (ref 133–144)
SODIUM SERPL-SCNC: 142 MMOL/L (ref 133–144)
URATE SERPL-MCNC: 1.9 MG/DL (ref 2.6–6)
URATE SERPL-MCNC: 2 MG/DL (ref 2.6–6)
URATE SERPL-MCNC: 2.3 MG/DL (ref 2.6–6)
WBC # BLD AUTO: 12.4 10E9/L (ref 4–11)
WBC # BLD AUTO: 13.5 10E9/L (ref 4–11)

## 2020-02-21 PROCEDURE — 40000556 ZZH STATISTIC PERIPHERAL IV START W US GUIDANCE

## 2020-02-21 PROCEDURE — 94003 VENT MGMT INPAT SUBQ DAY: CPT

## 2020-02-21 PROCEDURE — 80048 BASIC METABOLIC PNL TOTAL CA: CPT | Performed by: INTERNAL MEDICINE

## 2020-02-21 PROCEDURE — 25000132 ZZH RX MED GY IP 250 OP 250 PS 637: Performed by: STUDENT IN AN ORGANIZED HEALTH CARE EDUCATION/TRAINING PROGRAM

## 2020-02-21 PROCEDURE — 40000275 ZZH STATISTIC RCP TIME EA 10 MIN

## 2020-02-21 PROCEDURE — 20000004 ZZH R&B ICU UMMC

## 2020-02-21 PROCEDURE — 71045 X-RAY EXAM CHEST 1 VIEW: CPT | Mod: 77

## 2020-02-21 PROCEDURE — 25000131 ZZH RX MED GY IP 250 OP 636 PS 637

## 2020-02-21 PROCEDURE — 83615 LACTATE (LD) (LDH) ENZYME: CPT | Performed by: INTERNAL MEDICINE

## 2020-02-21 PROCEDURE — 99291 CRITICAL CARE FIRST HOUR: CPT | Mod: 24 | Performed by: INTERNAL MEDICINE

## 2020-02-21 PROCEDURE — 25000125 ZZHC RX 250: Performed by: STUDENT IN AN ORGANIZED HEALTH CARE EDUCATION/TRAINING PROGRAM

## 2020-02-21 PROCEDURE — 36415 COLL VENOUS BLD VENIPUNCTURE: CPT | Performed by: HOSPITALIST

## 2020-02-21 PROCEDURE — 25800030 ZZH RX IP 258 OP 636: Performed by: STUDENT IN AN ORGANIZED HEALTH CARE EDUCATION/TRAINING PROGRAM

## 2020-02-21 PROCEDURE — 25800030 ZZH RX IP 258 OP 636: Performed by: HOSPITALIST

## 2020-02-21 PROCEDURE — 84100 ASSAY OF PHOSPHORUS: CPT | Performed by: INTERNAL MEDICINE

## 2020-02-21 PROCEDURE — 40000141 ZZH STATISTIC PERIPHERAL IV START W/O US GUIDANCE

## 2020-02-21 PROCEDURE — 25000128 H RX IP 250 OP 636: Performed by: INTERNAL MEDICINE

## 2020-02-21 PROCEDURE — 84100 ASSAY OF PHOSPHORUS: CPT | Performed by: HOSPITALIST

## 2020-02-21 PROCEDURE — 25000128 H RX IP 250 OP 636: Performed by: STUDENT IN AN ORGANIZED HEALTH CARE EDUCATION/TRAINING PROGRAM

## 2020-02-21 PROCEDURE — 31622 DX BRONCHOSCOPE/WASH: CPT | Mod: GC | Performed by: INTERNAL MEDICINE

## 2020-02-21 PROCEDURE — 84550 ASSAY OF BLOOD/URIC ACID: CPT | Performed by: HOSPITALIST

## 2020-02-21 PROCEDURE — 31622 DX BRONCHOSCOPE/WASH: CPT

## 2020-02-21 PROCEDURE — 85025 COMPLETE CBC W/AUTO DIFF WBC: CPT | Performed by: HOSPITALIST

## 2020-02-21 PROCEDURE — 25000128 H RX IP 250 OP 636: Performed by: HOSPITALIST

## 2020-02-21 PROCEDURE — 36415 COLL VENOUS BLD VENIPUNCTURE: CPT | Performed by: INTERNAL MEDICINE

## 2020-02-21 PROCEDURE — 83615 LACTATE (LD) (LDH) ENZYME: CPT | Performed by: HOSPITALIST

## 2020-02-21 PROCEDURE — 25000125 ZZHC RX 250

## 2020-02-21 PROCEDURE — 85025 COMPLETE CBC W/AUTO DIFF WBC: CPT | Performed by: INTERNAL MEDICINE

## 2020-02-21 PROCEDURE — 82962 GLUCOSE BLOOD TEST: CPT

## 2020-02-21 PROCEDURE — 80053 COMPREHEN METABOLIC PANEL: CPT | Performed by: HOSPITALIST

## 2020-02-21 PROCEDURE — 71045 X-RAY EXAM CHEST 1 VIEW: CPT

## 2020-02-21 PROCEDURE — 25000128 H RX IP 250 OP 636

## 2020-02-21 PROCEDURE — 25800030 ZZH RX IP 258 OP 636

## 2020-02-21 PROCEDURE — 84550 ASSAY OF BLOOD/URIC ACID: CPT | Performed by: INTERNAL MEDICINE

## 2020-02-21 PROCEDURE — 27211261 ZZH BRONCHOSCOPE, DISPOSABLE

## 2020-02-21 PROCEDURE — 94640 AIRWAY INHALATION TREATMENT: CPT

## 2020-02-21 PROCEDURE — 27211040 ZZH CONTINUOUS NEBULIZER MICRO PUMP

## 2020-02-21 PROCEDURE — 0BC18ZZ EXTIRPATION OF MATTER FROM TRACHEA, VIA NATURAL OR ARTIFICIAL OPENING ENDOSCOPIC: ICD-10-PCS | Performed by: INTERNAL MEDICINE

## 2020-02-21 RX ORDER — LIDOCAINE 40 MG/G
CREAM TOPICAL
Status: DISCONTINUED | OUTPATIENT
Start: 2020-02-21 | End: 2020-02-21

## 2020-02-21 RX ORDER — NALOXONE HYDROCHLORIDE 0.4 MG/ML
.1-.4 INJECTION, SOLUTION INTRAMUSCULAR; INTRAVENOUS; SUBCUTANEOUS
Status: DISCONTINUED | OUTPATIENT
Start: 2020-02-21 | End: 2020-02-22

## 2020-02-21 RX ORDER — LIDOCAINE HYDROCHLORIDE 10 MG/ML
INJECTION, SOLUTION EPIDURAL; INFILTRATION; INTRACAUDAL; PERINEURAL
Status: COMPLETED
Start: 2020-02-21 | End: 2020-02-21

## 2020-02-21 RX ORDER — MEPERIDINE HYDROCHLORIDE 25 MG/ML
25 INJECTION INTRAMUSCULAR; INTRAVENOUS; SUBCUTANEOUS EVERY 30 MIN PRN
Status: DISCONTINUED | OUTPATIENT
Start: 2020-02-21 | End: 2020-02-27

## 2020-02-21 RX ORDER — PROCHLORPERAZINE MALEATE 5 MG
5-10 TABLET ORAL EVERY 6 HOURS PRN
Status: DISCONTINUED | OUTPATIENT
Start: 2020-02-21 | End: 2020-03-06 | Stop reason: HOSPADM

## 2020-02-21 RX ORDER — DIPHENHYDRAMINE HCL 25 MG
50 CAPSULE ORAL ONCE
Status: COMPLETED | OUTPATIENT
Start: 2020-02-22 | End: 2020-02-22

## 2020-02-21 RX ORDER — LIDOCAINE 40 MG/G
CREAM TOPICAL
Status: DISCONTINUED | OUTPATIENT
Start: 2020-02-21 | End: 2020-02-27

## 2020-02-21 RX ORDER — PREDNISONE 50 MG/1
100 TABLET ORAL DAILY
Status: COMPLETED | OUTPATIENT
Start: 2020-02-22 | End: 2020-02-25

## 2020-02-21 RX ORDER — EPINEPHRINE 1 MG/ML
0.3 INJECTION, SOLUTION, CONCENTRATE INTRAVENOUS EVERY 5 MIN PRN
Status: DISCONTINUED | OUTPATIENT
Start: 2020-02-21 | End: 2020-02-27

## 2020-02-21 RX ORDER — ONDANSETRON 8 MG/1
16 TABLET, FILM COATED ORAL ONCE
Status: COMPLETED | OUTPATIENT
Start: 2020-02-21 | End: 2020-02-21

## 2020-02-21 RX ORDER — SODIUM CHLORIDE 9 MG/ML
1000 INJECTION, SOLUTION INTRAVENOUS CONTINUOUS PRN
Status: DISCONTINUED | OUTPATIENT
Start: 2020-02-21 | End: 2020-02-27 | Stop reason: CLARIF

## 2020-02-21 RX ORDER — ACETYLCYSTEINE 100 MG/ML
4 SOLUTION ORAL; RESPIRATORY (INHALATION) 3 TIMES DAILY
Status: DISCONTINUED | OUTPATIENT
Start: 2020-02-21 | End: 2020-02-27

## 2020-02-21 RX ORDER — DEXTROSE MONOHYDRATE 50 MG/ML
10-20 INJECTION, SOLUTION INTRAVENOUS
Status: DISCONTINUED | OUTPATIENT
Start: 2020-02-22 | End: 2020-03-03

## 2020-02-21 RX ORDER — METHYLPREDNISOLONE SODIUM SUCCINATE 125 MG/2ML
125 INJECTION, POWDER, LYOPHILIZED, FOR SOLUTION INTRAMUSCULAR; INTRAVENOUS
Status: DISCONTINUED | OUTPATIENT
Start: 2020-02-21 | End: 2020-02-24

## 2020-02-21 RX ORDER — ALBUTEROL SULFATE 0.83 MG/ML
2.5 SOLUTION RESPIRATORY (INHALATION)
Status: DISCONTINUED | OUTPATIENT
Start: 2020-02-21 | End: 2020-02-24

## 2020-02-21 RX ORDER — LORAZEPAM 2 MG/ML
.5-1 INJECTION INTRAMUSCULAR EVERY 6 HOURS PRN
Status: DISCONTINUED | OUTPATIENT
Start: 2020-02-21 | End: 2020-02-27

## 2020-02-21 RX ORDER — DIPHENHYDRAMINE HYDROCHLORIDE 50 MG/ML
50 INJECTION INTRAMUSCULAR; INTRAVENOUS
Status: DISCONTINUED | OUTPATIENT
Start: 2020-02-21 | End: 2020-02-24

## 2020-02-21 RX ORDER — LORAZEPAM 0.5 MG/1
.5-1 TABLET ORAL EVERY 6 HOURS PRN
Status: DISCONTINUED | OUTPATIENT
Start: 2020-02-21 | End: 2020-02-27

## 2020-02-21 RX ORDER — ALBUTEROL SULFATE 90 UG/1
1-2 AEROSOL, METERED RESPIRATORY (INHALATION)
Status: DISCONTINUED | OUTPATIENT
Start: 2020-02-21 | End: 2020-02-24

## 2020-02-21 RX ORDER — ACETAMINOPHEN 325 MG/1
650 TABLET ORAL ONCE
Status: COMPLETED | OUTPATIENT
Start: 2020-02-22 | End: 2020-02-22

## 2020-02-21 RX ORDER — ALBUTEROL SULFATE 0.83 MG/ML
2.5 SOLUTION RESPIRATORY (INHALATION) EVERY 6 HOURS PRN
Status: DISCONTINUED | OUTPATIENT
Start: 2020-02-21 | End: 2020-03-06 | Stop reason: HOSPADM

## 2020-02-21 RX ADMIN — FENTANYL CITRATE 50 MCG: 50 INJECTION, SOLUTION INTRAMUSCULAR; INTRAVENOUS at 11:46

## 2020-02-21 RX ADMIN — SODIUM CHLORIDE 95 MG: 9 INJECTION, SOLUTION INTRAVENOUS at 22:41

## 2020-02-21 RX ADMIN — CYCLOPHOSPHAMIDE 1425 MG: 2 INJECTION, POWDER, FOR SOLUTION INTRAVENOUS; ORAL at 23:09

## 2020-02-21 RX ADMIN — ALLOPURINOL 300 MG: 300 TABLET ORAL at 07:52

## 2020-02-21 RX ADMIN — SODIUM CHLORIDE 150 MG: 9 INJECTION, SOLUTION INTRAVENOUS at 17:19

## 2020-02-21 RX ADMIN — SODIUM PHOSPHATE, MONOBASIC, MONOHYDRATE AND SODIUM PHOSPHATE, DIBASIC, ANHYDROUS 15 MMOL: 276; 142 INJECTION, SOLUTION INTRAVENOUS at 13:49

## 2020-02-21 RX ADMIN — LORAZEPAM 0.5 MG: 0.5 TABLET ORAL at 17:14

## 2020-02-21 RX ADMIN — PREDNISONE 100 MG: 50 TABLET ORAL at 07:52

## 2020-02-21 RX ADMIN — ENOXAPARIN SODIUM 40 MG: 40 INJECTION SUBCUTANEOUS at 07:52

## 2020-02-21 RX ADMIN — ACETYLCYSTEINE 4 ML: 100 INHALANT RESPIRATORY (INHALATION) at 19:51

## 2020-02-21 RX ADMIN — ALBUTEROL SULFATE 2.5 MG: 2.5 SOLUTION RESPIRATORY (INHALATION) at 19:51

## 2020-02-21 RX ADMIN — LORATADINE 10 MG: 10 TABLET ORAL at 07:52

## 2020-02-21 RX ADMIN — Medication 50 MCG/HR: at 17:31

## 2020-02-21 RX ADMIN — Medication 40 MG: at 07:52

## 2020-02-21 RX ADMIN — SODIUM PHOSPHATE, MONOBASIC, MONOHYDRATE 15 MMOL: 276; 142 INJECTION, SOLUTION INTRAVENOUS at 09:33

## 2020-02-21 RX ADMIN — FENTANYL CITRATE 50 MCG: 50 INJECTION, SOLUTION INTRAMUSCULAR; INTRAVENOUS at 18:31

## 2020-02-21 RX ADMIN — SODIUM CHLORIDE, POTASSIUM CHLORIDE, SODIUM LACTATE AND CALCIUM CHLORIDE: 600; 310; 30; 20 INJECTION, SOLUTION INTRAVENOUS at 04:09

## 2020-02-21 RX ADMIN — SODIUM CHLORIDE, POTASSIUM CHLORIDE, SODIUM LACTATE AND CALCIUM CHLORIDE: 600; 310; 30; 20 INJECTION, SOLUTION INTRAVENOUS at 20:26

## 2020-02-21 RX ADMIN — LIDOCAINE HYDROCHLORIDE 3 ML: 10 INJECTION, SOLUTION EPIDURAL; INFILTRATION; INTRACAUDAL; PERINEURAL at 18:42

## 2020-02-21 RX ADMIN — ONDANSETRON HYDROCHLORIDE 16 MG: 8 TABLET, FILM COATED ORAL at 17:15

## 2020-02-21 RX ADMIN — VINCRISTINE SULFATE 2 MG: 1 INJECTION, SOLUTION INTRAVENOUS at 22:34

## 2020-02-21 RX ADMIN — FENTANYL CITRATE 50 MCG: 50 INJECTION, SOLUTION INTRAMUSCULAR; INTRAVENOUS at 18:04

## 2020-02-21 ASSESSMENT — ACTIVITIES OF DAILY LIVING (ADL)
ADLS_ACUITY_SCORE: 17
ADLS_ACUITY_SCORE: 15
ADLS_ACUITY_SCORE: 17
ADLS_ACUITY_SCORE: 15

## 2020-02-21 ASSESSMENT — MIFFLIN-ST. JEOR: SCORE: 1370.75

## 2020-02-21 NOTE — PROGRESS NOTES
"Otolaryngology Progress Note  February 21, 2020    S: No acute events overnight. Patient beginning chemotherapy today. Patient and  doing well, patient's breathing is stable, no questions this morning. Requested repositioning of endotracheal tube.    O: /77   Pulse 66   Temp 98.1  F (36.7  C) (Axillary)   Resp 16   Ht 1.676 m (5' 6\")   Wt 81.9 kg (180 lb 8.9 oz)   SpO2 98%   BMI 29.14 kg/m     General: Sitting up in bed, no acute distress   HEENT: EOMI.    Pulmonary: Breathing non-labored, no stridor, no accessory muscle use, endotracheally intubated with 8.5 reinforced breathing tube secured at 27 1/2 cm at the lip.    A/P: Chuyita Porter is a 69 year old female with a past medical history of breast cancer, large right neck mass with pathology demonstrating diffuse B-cell lymphoma now s/p attempted tracheal stent, tracheal mass biopsy, and PEG tube placement 2/17/2020. She remains endotracheally intubated with the tube secured at 27.5 cm at the lip.     - Decadron per Med Onc, Radiation Oncology, and MICU to help with swelling and potentially decrease tumor while starting treatment.   - Endotracheal tube will need to be carefully maintained in place as she has significant airway collapse/compression and is unable to lay flat. Do not allow patient to self extubate. If she were to self extubate, will need to be reintubated, likely in upright position, use wire reinforced ETT. Laying the patient flat resulted in drop in BP. 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 A tracheostomy will not overcome the extrinsic compression issues due to the distal location.    -- Patient and above plan will be discussed with Dr. Valdez.    Em Cheney MD PGY1  Otolaryngology - Head & Neck Surgery   Please page the on-call resident with questions. If after hours, contact ENT with questions by dialing [phone] and entering job code [phone] when prompted.    "

## 2020-02-21 NOTE — PROGRESS NOTES
"SPIRITUAL HEALTH SERVICES  SPIRITUAL ASSESSMENT Progress Note  [facility] ([place]) 4C     REFERRAL SOURCE: Length of stay    Pt welcomed me into room with gestures as she was intubated. She readily communicated with me via white board:    She gave a \"thumbs up\" response when asked how she was doing.    Said she is concerned about her  who is concerned about her health. They have been ,arried 46 years and have 2 daughters and a few grandchildren.    She was crocheting a hat for newborns when I arrived.    Her  arrived and was in tears as he expressed his love for his wife. He expressed gratitude for my visit as he said, \"I was hoping to get a  to visit today.\"      Her  and daughters give the pt strong support.    Her  desrcibed the pt as \"the most optimistic person I know.\"     PATIENT ANOINTED by pt's home  Father Gilbert Juarez 2/21/2020     PLAN: I hope to visit again this Sunday if pt is on 4th fl.    Gilbert Juarez M.Div.     Pager [phone]    "

## 2020-02-21 NOTE — PROGRESS NOTES
[facility], [place]    Progress Note - MICU Service        Date of Admission:  2/17/2020    Assessment & Plan   Chuyita Porter is a 69 year old female admitted on 2/17/2020. 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      Changes today:  - IR consulted re: PICC/port; per IR, no procedure if patient cannot lie flat  - Stopped precedex gtt and now with PRN ativan PO  - Chemotherapy will be initiated via PIV with plan for eventual port placement  - Decreased IVF today iso edema, plan to increase to mIVF 125cc/hr when chemotherapy begins as TLS PPX      PLAN:     ===NEURO===     Sedation: Ativan 0.5mg PO q4h PRN     Analgesia: Fentanyl gtt      ===CARDIOVASCULAR===     Bradycardia, improved  SVC Syndrome  Patient with asymptomatic bradycardia 2/. Known SVC syndrome vs Precedex gtt. Stopped precedex gtt 2/20 and hypotension/bradycardia is largely resolved  - If hypotensive, elevate HOB; do not lay patient flat 2/2 known SVC syndrome  - Patient cannot lie flat for procedures at this time 2/2 positional hypotension     Need for TTE, competed  Per Heme, will need TTE prior to initiating chemotherapy on 2/20.  - TTE on 2/19, normal LV function with LVEF 60-65%.     ===PULMONARY===     Central airway obstruction  Impending airway obstruction noted during workup for mediastinal mass shown to be B-cell lymphoma on previous FNA (1/30/20). Patient intubated during biopsy and decision to maintain ETT for early stages of treatment. Plan for rad-onc to initiate chemotherapy on 2/18 and monitor size of mass to determine when ETT can be safely removed.   - Plan to repeat CT Chest on Friday, 2/21/20   - Daily CXR to confirm ETT positioning  - ETT at ~1 cm above ezekiel (tube position marked at 27.5 at the teeth)  - Bedrest restricted to prevent ETT displacement in setting of  threatened airway     ===GASTROINTESTINAL===     GI Prophylaxis  - Pantoprazole 40mg daily      Nutrition  - PEG tube placed 2/17. Nutrition consulted for TF (Nutren 1.5 at 55mL/hr)     ===RENAL===     Tumor Lysis Syndrome Prophylaxis  Radiation therapy 2/18-2/19. Plan to initiate chemotherapy 2/21.   - Heme consulted, appreciate recs  - BID BMP, Phos, Uric Acid labs  - Cardiac telemetry  - Electrolyte replacement protocol  - Allopurinol 300mg daily   - mIVF, LR at 125cc/hr -- decreased temporarily to 75cc/hr on 2/20 iso edema     ===HEME/ONC===      B-cell Lymphoma  8x6.7x10cm anterior mediastinal mass shown to be B-cell lymphoma on previous biopsy (1/30/20), now s/p FNA. Surgical pathology (2/17) shows diffuse large B-cell lymphoma, non-germinal center type. FISH studies for BCL-2, BCL-6, and MYC rearrangement are pending at this time. Per Rad-Onc, no further radiation therapy. Plan to begin chemotherapy on 2/21/2020 via PIV. CT AP for staging prior to initiating chemotherapy completed on 2/20.   - Prednisone to 100mg BID  - Per Heme, when patient no longer requiring MICU, transfer to inpatient malignant heme service  - IR consulted re: PICC/port; per IR, no procedure if patient cannot lie flat; will administer chemo via PIV for now     ===ENDOCRINE===     Steroid associated hyperglycemia  - High resistance sliding scale insulin      ===INFECTIOUS DISEASE===     No acute issues     ===SKIN/MSK===     No acute issues     - PT/OT consulted       Diet: Adult Formula Drip Feeding: Continuous Nutren 1.5; Gastrostomy; Goal Rate: 55; mL/hr; Medication - Feeding Tube Flush Frequency: At least 15-30 mL water before and after medication administration and with tube clogging; Infuse @ 55 mL/hr.    Fluids: 75 ml/hr LR  Lines: PIV, osborn, PEG, ETT  DVT Prophylaxis: Enoxaparin (Lovenox) SQ and Pneumatic Compression Devices  Osborn Catheter: in place, indication: Retention  Code Status: DNR      Disposition Plan   Expected  discharge: > 7 days, recommended to TBD once extubated, chemotherapy cycles completed per HemoOnc.  Entered: Crystal Chen MD 2020, 10:23 PM       The patient's care was discussed with the Attending Physician, Dr. Mirza, Bedside Nurse, Patient and Patient's Family.    Crystal Chen MD  [facility] Service  [facility], [place]  Pager: [phone]  Please see sticky note for cross cover information      Attending note:  Patient seen, examined and discussed with the Resident physicians and SHANTELL. All data reviewed including vital signs, laboratory studies, medications and imaging.  The patient remains critically ill with acute respiratory failure, mediastinal mass with biopsy showing B cell lymphoma, significant extrinsic tracheal compression.  I personally managed her ventilator to manage her acute respiratory failure and her endotracheal tube with the tracheal stenosis.  Completed 2 days of XRT, Oncology now switching to chemotherapy.  No evidence of tumor lysis syndrome.  Comfortable on ventilator.  Repeat chest CT on Monday with re-examination of her airway.       Total Critical Care 40 minutes excluding teaching time and time for procedures     Pamela Mirza MD  [phone]  ______________________________________________________________________    Interval History   NAEO. Patient appears comfortable. Questions answered. Family updated as well.    4 point ROS otherwise negative.    Data reviewed today: I reviewed all medications, new labs and imaging results over the last 24 hours.    Objective    Temp: 99.2  F (37.3  C) Temp  Min: 97.3  F (36.3  C)  Max: 99.2  F (37.3  C)  Resp: 17 Resp  Min: 11  Max: 27  SpO2: 96 % SpO2  Min: 90 %  Max: 100 %  Heart Rate: 73 Heart Rate  Min: 44  Max: 76  BP: 116/72 Systolic (24hrs), Av , Min:86 , Max:130   Diastolic (24hrs), Av, Min:52, Max:81    I/O last 3 completed shifts:  In: 4582.43 [I.V.:3652.43; NG/GT:270]  Out: 1135  [Urine:1135]    Ventilation Mode: CMV/AC  (Continuous Mandatory Ventilation/ Assist Control)  FiO2 (%): 30 %  Rate Set (breaths/minute): 12 breaths/min  Tidal Volume Set (mL): 400 mL  PEEP (cm H2O): 5 cmH2O  Pressure Support (cm H2O): 7 cmH2O  Oxygen Concentration (%): 30 %  Resp: 17    PIP: 10.2  Secretions: Scant    Physical Exam  Constitutional: awake, alert, interactive  HEENT: ETT at 27.5 (per chart)  Cardiovascular: RRR. No murmurs, gallops or rub, No edema or JVD.   Respiratory:  Good air movement throughout. No stridor  Gastrointestinal: Abdomen soft, non-tender. BS normal.  : Deferred  Musculoskeletal: Extremities normal with no gross deformities noted   Skin: No suspicious lesions or rashes  Neurologic: moving all extremities, full ROM  Psychiatric: affect bright/appropriate      Labs:    CBC  Recent Labs   Lab 02/20/20  0522 02/19/20  0438 02/18/20  0345   WBC 13.5* 8.5 12.5*   RBC 3.37* 3.57* 3.70*   HGB 10.1* 10.8* 11.2*   HCT 32.6* 34.0* 34.4*   MCV 97 95 93   MCH 30.0 30.3 30.3   MCHC 31.0* 31.8 32.6   RDW 14.7 14.6 14.4    266 257       Coagulation  Recent Labs   Lab 02/19/20  0438   INR 1.21*      Recent Labs   Lab 02/19/20  0438   PTT 31        Basic Metabolic Panel  Recent Labs   Lab 02/20/20  1409 02/20/20  0522 02/19/20  1645    142 140   POTASSIUM 3.9 4.4 4.4   CHLORIDE 106 110* 109   CO2 34* 30 27   ANIONGAP <1* 2* 4   * 157* 185*   BUN 21 22 25   CR 0.67 0.59 0.64   GABI 8.5 8.5 8.5         Data   Recent Labs   Lab 02/20/20  1409 02/20/20  0522 02/19/20  1645 02/19/20  0438  02/18/20  0345 02/17/20  1735   WBC  --  13.5*  --  8.5  --  12.5* 7.7   HGB  --  10.1*  --  10.8*  --  11.2* 11.1*   MCV  --  97  --  95  --  93 95   PLT  --  260  --  266  --  257 254   INR  --   --   --  1.21*  --   --   --     142 140 138  --  138 137   POTASSIUM 3.9 4.4 4.4 4.2   < > 4.3 3.4   CHLORIDE 106 110* 109 107  --  108 106   CO2 34* 30 27 28  --  26 29   BUN 21 22 25 27  --  13 14    CR 0.67 0.59 0.64 0.69  --  0.74 0.80   ANIONGAP <1* 2* 4 3  --  4 2*   GABI 8.5 8.5 8.5 8.7  --  8.5 8.8   * 157* 185* 172*  --  133* 102*   ALBUMIN  --   --   --   --   --   --  3.0*   PROTTOTAL  --   --   --   --   --   --  6.5*   BILITOTAL  --   --   --   --   --   --  0.3   ALKPHOS  --   --   --   --   --   --  88   ALT  --   --   --   --   --   --  20   AST  --   --   --   --   --   --  21    < > = values in this interval not displayed.     Recent Results (from the past 24 hour(s))   XR Chest Port 1 View    Narrative    Exam: XR CHEST PORT 1 VW, 2/20/2020 5:35 AM    Indication: confirm ETT tube positioning    Comparison: 2/19/2020.    Findings:   Single portable AP view of the chest. ET tube tip mid thoracic trachea  2.5 cm from the ezekiel. Lung volumes are lower. Cardiac silhouette is  unchanged. Stable small bilateral pleural effusions with overlying  basilar opacities. Mild pulmonary vascular congestion. No  pneumothorax. Visualized upper abdomen is unremarkable.  Dextroscoliotic curvature thoracic spine.      Impression    Impression:   1. Endotracheal tube in the low thoracic trachea 2.5 cm from the  ezekiel.  2. Stable small bilateral pleural effusions with overlying basilar  opacities.    I have personally reviewed the examination and initial interpretation  and I agree with the findings.    MARCO PONCE MD     Medications     dextrose       fentaNYL 50 mcg/hr (02/20/20 2100)     lactated ringers 75 mL/hr at 02/20/20 1408       allopurinol  300 mg Oral Daily     enoxaparin ANTICOAGULANT  40 mg Subcutaneous Q24H     insulin aspart  1-12 Units Subcutaneous Q4H DAMON     loratadine  10 mg Oral or Feeding Tube Daily     pantoprazole  40 mg Oral Daily    Or     pantoprazole  40 mg Oral or Feeding Tube Daily     predniSONE  100 mg Oral or Feeding Tube BID

## 2020-02-21 NOTE — PROGRESS NOTES
[facility] [place]    Progress Note - MICU Service        Date of Admission:  2/17/2020  Date of Service: 02/21/2020    Assessment & Plan   Chuyita Porter is a 69 year old female admitted on 2/17/2020. She has a history of breast cancer s/p radiation (2005, 2015) and is admitted for planned chemoradiation for bulky mediastinal DLBCL NOS involving thyroid now s/p biopsy and intubation due to threatened airway.      Changes today:  - R-CHOP will be initiated today via PIV with plan for eventual port placement  - TLS labs BID  - will discuss with interventional pulmonary if patient needs strict bed rest  - IR consulted re: PICC/port; per IR, no procedure if patient cannot lie flat  - mIVF, LR at 125cc/hr -- decreased temporarily to 50 cc/hr on 2/20 for pulmonary and peripheral edema      PLAN:     ===NEURO===     Sedation: Ativan 0.5 mg PO q4h PRN     Analgesia: Fentanyl gtt      ===CARDIOVASCULAR===    Bradycardia, improved  SVC Syndrome  Patient with asymptomatic bradycardia 2/. Known SVC syndrome vs Precedex gtt. Stopped precedex gtt 2/20 and hypotension/bradycardia is largely resolved  - If hypotensive, elevate HOB; do not lay patient flat 2/2 known SVC syndrome  - Patient cannot lie flat for procedures at this time 2/2 positional hypotension      ===PULMONARY===     Central airway obstruction  Impending airway obstruction noted during workup for mediastinal mass shown to be B-cell lymphoma on previous FNA (1/30/20). Patient intubated during biopsy and decision to maintain ETT for early stages of treatment. Plan for rad-onc to initiate chemotherapy on 2/18 and monitor size of mass to determine when ETT can be safely removed.   - Plan to repeat CT Chest early next week after receiving chemotherapy  - Daily CXR to confirm ETT positioning  - ETT at ~1 cm above ezekiel (tube position marked at 27.5 at the teeth)  - Bedrest restricted to prevent ETT displacement in setting of  threatened airway     ===GASTROINTESTINAL===     GI Prophylaxis  - Pantoprazole 40mg daily      Nutrition  - PEG tube placed 2/17  - Nutrition consulted for TF (Nutren 1.5 at 55mL/hr)     ===RENAL===     Tumor Lysis Syndrome Prophylaxis  Radiation therapy 2/18-2/19. Plan to initiate chemotherapy 2/21.   - Heme consulted, appreciate recs  - BID BMP, Phos, Uric Acid labs  - Cardiac telemetry  - Electrolyte replacement protocol  - Allopurinol 300 mg daily   - mIVF, LR at 125cc/hr -- decreased temporarily to 50 cc/hr on 2/20 for pulmonary and peripheral edema     ===HEME/ONC===      # Stage IIE bulky mediastinal B-cell Lymphoma  8x6.7x10cm anterior mediastinal mass shown to be B-cell lymphoma on previous biopsy (1/30/20), now s/p FNA. Surgical pathology (2/17) shows diffuse large B-cell lymphoma, non-germinal center type. FISH studies for BCL-2, BCL-6, and MYC rearrangement are pending at this time. Per Rad-Onc, no further radiation therapy. Plan to begin chemotherapy on 2/21/2020 via PIV. CT AP for staging prior to initiating chemotherapy completed on 2/20. TTE on 2/19 prior to initiation of chemotherapy showed normal LV function with LVEF 60-65%.  - R-CHOP will be initiated today via PIV with plan for eventual port placement  - Prednisone to 100 mg BID  - Per Heme, when patient no longer requiring MICU, transfer to inpatient malignant heme service  - IR consulted re: PICC/port; per IR, no procedure if patient cannot lie flat; will administer chemo via PIV for now     ===ENDOCRINE===     Steroid associated hyperglycemia  - High resistance sliding scale insulin      ===INFECTIOUS DISEASE===     No acute issues     ===SKIN/MSK===     No acute issues     - PT/OT consulted       Diet: Adult Formula Drip Feeding: Continuous Nutren 1.5; Gastrostomy; Goal Rate: 55; mL/hr; Medication - Feeding Tube Flush Frequency: At least 15-30 mL water before and after medication administration and with tube clogging; Infuse @ 55 mL/hr.     Fluids: 50 ml/hr LR  Lines: PIV, osborn, PEG, ETT  DVT Prophylaxis: Enoxaparin (Lovenox) SQ and Pneumatic Compression Devices  Osborn Catheter: in place, indication: Retention  Code Status: DNR      Disposition Plan   Expected discharge: > 7 days, recommended to TBD once extubated, [facility]  Entered: Charly Fontenot MD 2020, 7:10 AM       The patient's care was discussed with the Attending Physician, Dr. Mirza, Bedside Nurse, Patient and Patient's Family.    Charly Fontenot MD on 2020 at 7:10 AM  Internal Medicine PGY-3  Pager [phone]      Attending note:  Patient seen, examined and discussed with the Resident physicians and SHANTELL. All data reviewed including vital signs, laboratory studies, medications and imaging.  The patient remains critically ill with acute respiratory failure, mediastinal mass with biopsy showing B cell lymphoma, significant extrinsic tracheal compression.  I personally managed her ventilator to manage her acute respiratory failure and her endotracheal tube with the tracheal stenosis.  To start chemotherapy today.  No evidence of tumor lysis syndrome.  Comfortable on ventilator.  Repeat chest CT on Monday with re-examination of her airway and assess for ability to extubate.       Total Critical Care 40 minutes excluding teaching time and time for procedures     Pamela Mirza MD  [phone]  ______________________________________________________________________    Interval History   NAEO. Patient appears comfortable. ETT is comfortable, no secretions.    4 point ROS otherwise negative.    Data reviewed today: I reviewed all medications, new labs and imaging results over the last 24 hours.    Objective    Temp: 98.8  F (37.1  C) Temp  Min: 97.3  F (36.3  C)  Max: 99.2  F (37.3  C)  Resp: 11 Resp  Min: 11  Max: 27  SpO2: 100 % SpO2  Min: 90 %  Max: 100 %  Heart Rate: 66 Heart Rate  Min: 44  Max: 85  BP: 135/80 Systolic (24hrs), Av , Min:99  , Max:141   Diastolic (24hrs), Av, Min:58, Max:81    I/O last 3 completed shifts:  In: 3439.6 [I.V.:2544.6; NG/GT:290]  Out: 975 [Urine:975]    Ventilation Mode: CMV/AC  (Continuous Mandatory Ventilation/ Assist Control)  FiO2 (%): 30 %  Rate Set (breaths/minute): 12 breaths/min  Tidal Volume Set (mL): 400 mL  PEEP (cm H2O): 5 cmH2O  Oxygen Concentration (%): 30 %  Resp: 11    PIP: 14  Secretions: Scant    Physical Exam  Constitutional: awake, alert, interactive  HEENT: ETT in good position, mild crackles, spontaneously breathing on vent  Cardiovascular: RRR. No murmurs, gallops or rub, No edema or JVD.   Respiratory:  Good air movement throughout. No stridor  Gastrointestinal: Abdomen soft, non-tender. BS normal.  : Deferred  Musculoskeletal: Extremities normal with no gross deformities noted   Skin: No suspicious lesions or rashes  Neurologic: moving all extremities, full ROM  Psychiatric: affect bright/appropriate    Labs:    CBC  Recent Labs   Lab 20  0522 20  0438   WBC 12.4* 13.5* 8.5   RBC 3.53* 3.37* 3.57*   HGB 10.6* 10.1* 10.8*   HCT 34.4* 32.6* 34.0*   MCV 98 97 95   MCH 30.0 30.0 30.3   MCHC 30.8* 31.0* 31.8   RDW 14.7 14.7 14.6    260 266     Coagulation  Recent Labs   Lab 20  0438   INR 1.21*      Recent Labs   Lab 20  0438   PTT 31        Basic Metabolic Panel  Recent Labs   Lab 20  1409 20  05    141 142   POTASSIUM 4.1 3.9 4.4   CHLORIDE 107 106 110*   CO2 31 34* 30   ANIONGAP 2* <1* 2*   * 110* 157*   BUN 25 21 22   CR 0.65 0.67 0.59   GABI 8.5 8.5 8.5     Data   Recent Labs   Lab 20  1409 20  0522  20  0438  20  1735   WBC 12.4*  --  13.5*  --  8.5   < > 7.7   HGB 10.6*  --  10.1*  --  10.8*   < > 11.1*   MCV 98  --  97  --  95   < > 95     --  260  --  266   < > 254   INR  --   --   --   --  1.21*  --   --     141 142   < > 138   < > 137   POTASSIUM 4.1  3.9 4.4   < > 4.2   < > 3.4   CHLORIDE 107 106 110*   < > 107   < > 106   CO2 31 34* 30   < > 28   < > 29   BUN 25 21 22   < > 27   < > 14   CR 0.65 0.67 0.59   < > 0.69   < > 0.80   ANIONGAP 2* <1* 2*   < > 3   < > 2*   GABI 8.5 8.5 8.5   < > 8.7   < > 8.8   * 110* 157*   < > 172*   < > 102*   ALBUMIN  --   --   --   --   --   --  3.0*   PROTTOTAL  --   --   --   --   --   --  6.5*   BILITOTAL  --   --   --   --   --   --  0.3   ALKPHOS  --   --   --   --   --   --  88   ALT  --   --   --   --   --   --  20   AST  --   --   --   --   --   --  21    < > = values in this interval not displayed.     No results found for this or any previous visit (from the past 24 hour(s)).  Medications     dextrose       fentaNYL 50 mcg/hr (02/21/20 0700)     lactated ringers 75 mL/hr at 02/21/20 0409       allopurinol  300 mg Oral Daily     enoxaparin ANTICOAGULANT  40 mg Subcutaneous Q24H     insulin aspart  1-12 Units Subcutaneous Q4H DAMON     loratadine  10 mg Oral or Feeding Tube Daily     pantoprazole  40 mg Oral Daily    Or     pantoprazole  40 mg Oral or Feeding Tube Daily     predniSONE  100 mg Oral or Feeding Tube BID     sodium chloride (PF)  3 mL Intracatheter Q8H

## 2020-02-21 NOTE — PLAN OF CARE
ICU End of Shift Summary. See flowsheets for vital signs and detailed assessment.    Changes this shift: Higher PIPs noted this afternoon, STAT chest xray obtained. Phos low despite PO dosing, IV currently infusing. 1x dose of Fentanyl given with excessive coughing and pain to ETT on throat. MIVF decreased to 50 mL/hr.     Plan:  Pre-med for 1st dose of chemo this evening.       Problem: Pain Acute  Goal: Optimal Pain Control  2/21/2020 1659 by Taryn Marina, RN  Outcome: Improving  2/21/2020 0703 by Sunita Starr, RN  Outcome: No Change

## 2020-02-21 NOTE — PLAN OF CARE
ICU End of Shift Summary. See flowsheets for vital signs and detailed assessment.    Changes this shift: No acute changes, pt rested comfortably overnight, ETT remains in place.  Phos replacement ordered for 2.2; new PIV for chemo today.    Plan: Replace phos when available, continue to monitor.

## 2020-02-22 ENCOUNTER — APPOINTMENT (OUTPATIENT)
Dept: GENERAL RADIOLOGY | Facility: CLINIC | Age: 70
DRG: 840 | End: 2020-02-22
Attending: STUDENT IN AN ORGANIZED HEALTH CARE EDUCATION/TRAINING PROGRAM
Payer: COMMERCIAL

## 2020-02-22 LAB
ALBUMIN SERPL-MCNC: 2.2 G/DL (ref 3.4–5)
ALP SERPL-CCNC: 125 U/L (ref 40–150)
ALT SERPL W P-5'-P-CCNC: 86 U/L (ref 0–50)
ANION GAP SERPL CALCULATED.3IONS-SCNC: 1 MMOL/L (ref 3–14)
ANION GAP SERPL CALCULATED.3IONS-SCNC: 1 MMOL/L (ref 3–14)
AST SERPL W P-5'-P-CCNC: 52 U/L (ref 0–45)
BASOPHILS # BLD AUTO: 0 10E9/L (ref 0–0.2)
BASOPHILS NFR BLD AUTO: 0.2 %
BILIRUB DIRECT SERPL-MCNC: <0.1 MG/DL (ref 0–0.2)
BILIRUB SERPL-MCNC: 0.2 MG/DL (ref 0.2–1.3)
BUN SERPL-MCNC: 20 MG/DL (ref 7–30)
BUN SERPL-MCNC: 22 MG/DL (ref 7–30)
CALCIUM SERPL-MCNC: 7.6 MG/DL (ref 8.5–10.1)
CALCIUM SERPL-MCNC: 8.1 MG/DL (ref 8.5–10.1)
CHLORIDE SERPL-SCNC: 106 MMOL/L (ref 94–109)
CHLORIDE SERPL-SCNC: 106 MMOL/L (ref 94–109)
CO2 SERPL-SCNC: 32 MMOL/L (ref 20–32)
CO2 SERPL-SCNC: 33 MMOL/L (ref 20–32)
CREAT SERPL-MCNC: 0.58 MG/DL (ref 0.52–1.04)
CREAT SERPL-MCNC: 0.62 MG/DL (ref 0.52–1.04)
DIFFERENTIAL METHOD BLD: ABNORMAL
EOSINOPHIL # BLD AUTO: 0 10E9/L (ref 0–0.7)
EOSINOPHIL NFR BLD AUTO: 0 %
ERYTHROCYTE [DISTWIDTH] IN BLOOD BY AUTOMATED COUNT: 14.1 % (ref 10–15)
GFR SERPL CREATININE-BSD FRML MDRD: >90 ML/MIN/{1.73_M2}
GFR SERPL CREATININE-BSD FRML MDRD: >90 ML/MIN/{1.73_M2}
GLUCOSE BLDC GLUCOMTR-MCNC: 113 MG/DL (ref 70–99)
GLUCOSE BLDC GLUCOMTR-MCNC: 119 MG/DL (ref 70–99)
GLUCOSE BLDC GLUCOMTR-MCNC: 119 MG/DL (ref 70–99)
GLUCOSE BLDC GLUCOMTR-MCNC: 134 MG/DL (ref 70–99)
GLUCOSE BLDC GLUCOMTR-MCNC: 146 MG/DL (ref 70–99)
GLUCOSE BLDC GLUCOMTR-MCNC: 192 MG/DL (ref 70–99)
GLUCOSE BLDC GLUCOMTR-MCNC: 76 MG/DL (ref 70–99)
GLUCOSE SERPL-MCNC: 142 MG/DL (ref 70–99)
GLUCOSE SERPL-MCNC: 148 MG/DL (ref 70–99)
GRAM STN SPEC: NORMAL
GRAM STN SPEC: NORMAL
HCT VFR BLD AUTO: 34.4 % (ref 35–47)
HGB BLD-MCNC: 10.8 G/DL (ref 11.7–15.7)
IMM GRANULOCYTES # BLD: 0.3 10E9/L (ref 0–0.4)
IMM GRANULOCYTES NFR BLD: 2.7 %
LDH SERPL L TO P-CCNC: 224 U/L (ref 81–234)
LDH SERPL L TO P-CCNC: 276 U/L (ref 81–234)
LYMPHOCYTES # BLD AUTO: 1.1 10E9/L (ref 0.8–5.3)
LYMPHOCYTES NFR BLD AUTO: 9.4 %
MAGNESIUM SERPL-MCNC: 2.2 MG/DL (ref 1.6–2.3)
MCH RBC QN AUTO: 30.2 PG (ref 26.5–33)
MCHC RBC AUTO-ENTMCNC: 31.4 G/DL (ref 31.5–36.5)
MCV RBC AUTO: 96 FL (ref 78–100)
MONOCYTES # BLD AUTO: 1.2 10E9/L (ref 0–1.3)
MONOCYTES NFR BLD AUTO: 9.6 %
NEUTROPHILS # BLD AUTO: 9.4 10E9/L (ref 1.6–8.3)
NEUTROPHILS NFR BLD AUTO: 78.1 %
NRBC # BLD AUTO: 0 10*3/UL
NRBC BLD AUTO-RTO: 0 /100
PHOSPHATE SERPL-MCNC: 1.6 MG/DL (ref 2.5–4.5)
PHOSPHATE SERPL-MCNC: 2.6 MG/DL (ref 2.5–4.5)
PLATELET # BLD AUTO: 271 10E9/L (ref 150–450)
POTASSIUM SERPL-SCNC: 3.6 MMOL/L (ref 3.4–5.3)
POTASSIUM SERPL-SCNC: 3.7 MMOL/L (ref 3.4–5.3)
POTASSIUM SERPL-SCNC: 3.8 MMOL/L (ref 3.4–5.3)
PROT SERPL-MCNC: 5.4 G/DL (ref 6.8–8.8)
RBC # BLD AUTO: 3.58 10E12/L (ref 3.8–5.2)
SODIUM SERPL-SCNC: 140 MMOL/L (ref 133–144)
SODIUM SERPL-SCNC: 140 MMOL/L (ref 133–144)
SPECIMEN SOURCE: NORMAL
URATE SERPL-MCNC: 1.7 MG/DL (ref 2.6–6)
URATE SERPL-MCNC: 1.8 MG/DL (ref 2.6–6)
WBC # BLD AUTO: 12 10E9/L (ref 4–11)

## 2020-02-22 PROCEDURE — 25000132 ZZH RX MED GY IP 250 OP 250 PS 637: Performed by: STUDENT IN AN ORGANIZED HEALTH CARE EDUCATION/TRAINING PROGRAM

## 2020-02-22 PROCEDURE — 80048 BASIC METABOLIC PNL TOTAL CA: CPT | Performed by: STUDENT IN AN ORGANIZED HEALTH CARE EDUCATION/TRAINING PROGRAM

## 2020-02-22 PROCEDURE — 71045 X-RAY EXAM CHEST 1 VIEW: CPT

## 2020-02-22 PROCEDURE — 27210429 ZZH NUTRITION PRODUCT INTERMEDIATE LITER

## 2020-02-22 PROCEDURE — 84100 ASSAY OF PHOSPHORUS: CPT | Performed by: INTERNAL MEDICINE

## 2020-02-22 PROCEDURE — 25800030 ZZH RX IP 258 OP 636: Performed by: HOSPITALIST

## 2020-02-22 PROCEDURE — 94003 VENT MGMT INPAT SUBQ DAY: CPT

## 2020-02-22 PROCEDURE — 80048 BASIC METABOLIC PNL TOTAL CA: CPT | Performed by: INTERNAL MEDICINE

## 2020-02-22 PROCEDURE — 82962 GLUCOSE BLOOD TEST: CPT

## 2020-02-22 PROCEDURE — 20000004 ZZH R&B ICU UMMC

## 2020-02-22 PROCEDURE — 85025 COMPLETE CBC W/AUTO DIFF WBC: CPT | Performed by: INTERNAL MEDICINE

## 2020-02-22 PROCEDURE — 83615 LACTATE (LD) (LDH) ENZYME: CPT | Performed by: STUDENT IN AN ORGANIZED HEALTH CARE EDUCATION/TRAINING PROGRAM

## 2020-02-22 PROCEDURE — 94640 AIRWAY INHALATION TREATMENT: CPT

## 2020-02-22 PROCEDURE — 25000132 ZZH RX MED GY IP 250 OP 250 PS 637: Performed by: HOSPITALIST

## 2020-02-22 PROCEDURE — 25000128 H RX IP 250 OP 636: Performed by: HOSPITALIST

## 2020-02-22 PROCEDURE — 83010 ASSAY OF HAPTOGLOBIN QUANT: CPT | Performed by: INTERNAL MEDICINE

## 2020-02-22 PROCEDURE — 87205 SMEAR GRAM STAIN: CPT | Performed by: STUDENT IN AN ORGANIZED HEALTH CARE EDUCATION/TRAINING PROGRAM

## 2020-02-22 PROCEDURE — 36415 COLL VENOUS BLD VENIPUNCTURE: CPT | Performed by: STUDENT IN AN ORGANIZED HEALTH CARE EDUCATION/TRAINING PROGRAM

## 2020-02-22 PROCEDURE — 25000125 ZZHC RX 250: Performed by: STUDENT IN AN ORGANIZED HEALTH CARE EDUCATION/TRAINING PROGRAM

## 2020-02-22 PROCEDURE — 87070 CULTURE OTHR SPECIMN AEROBIC: CPT | Performed by: STUDENT IN AN ORGANIZED HEALTH CARE EDUCATION/TRAINING PROGRAM

## 2020-02-22 PROCEDURE — 94640 AIRWAY INHALATION TREATMENT: CPT | Mod: 76

## 2020-02-22 PROCEDURE — 40000275 ZZH STATISTIC RCP TIME EA 10 MIN

## 2020-02-22 PROCEDURE — 83615 LACTATE (LD) (LDH) ENZYME: CPT | Performed by: INTERNAL MEDICINE

## 2020-02-22 PROCEDURE — 36415 COLL VENOUS BLD VENIPUNCTURE: CPT | Performed by: RADIOLOGY

## 2020-02-22 PROCEDURE — 83735 ASSAY OF MAGNESIUM: CPT | Performed by: RADIOLOGY

## 2020-02-22 PROCEDURE — 25800030 ZZH RX IP 258 OP 636: Performed by: STUDENT IN AN ORGANIZED HEALTH CARE EDUCATION/TRAINING PROGRAM

## 2020-02-22 PROCEDURE — 36415 COLL VENOUS BLD VENIPUNCTURE: CPT | Performed by: INTERNAL MEDICINE

## 2020-02-22 PROCEDURE — 84550 ASSAY OF BLOOD/URIC ACID: CPT | Performed by: STUDENT IN AN ORGANIZED HEALTH CARE EDUCATION/TRAINING PROGRAM

## 2020-02-22 PROCEDURE — 80076 HEPATIC FUNCTION PANEL: CPT | Performed by: INTERNAL MEDICINE

## 2020-02-22 PROCEDURE — 25000131 ZZH RX MED GY IP 250 OP 636 PS 637: Performed by: HOSPITALIST

## 2020-02-22 PROCEDURE — 84550 ASSAY OF BLOOD/URIC ACID: CPT | Performed by: INTERNAL MEDICINE

## 2020-02-22 PROCEDURE — 25000128 H RX IP 250 OP 636: Performed by: STUDENT IN AN ORGANIZED HEALTH CARE EDUCATION/TRAINING PROGRAM

## 2020-02-22 PROCEDURE — 84100 ASSAY OF PHOSPHORUS: CPT | Performed by: STUDENT IN AN ORGANIZED HEALTH CARE EDUCATION/TRAINING PROGRAM

## 2020-02-22 PROCEDURE — 99291 CRITICAL CARE FIRST HOUR: CPT | Performed by: PHYSICIAN ASSISTANT

## 2020-02-22 PROCEDURE — 84132 ASSAY OF SERUM POTASSIUM: CPT | Performed by: RADIOLOGY

## 2020-02-22 RX ORDER — POLYETHYLENE GLYCOL 3350 17 G/17G
17 POWDER, FOR SOLUTION ORAL 2 TIMES DAILY PRN
Status: DISCONTINUED | OUTPATIENT
Start: 2020-02-22 | End: 2020-02-24

## 2020-02-22 RX ADMIN — DEXTROSE MONOHYDRATE 20 ML: 50 INJECTION, SOLUTION INTRAVENOUS at 21:48

## 2020-02-22 RX ADMIN — ALLOPURINOL 300 MG: 300 TABLET ORAL at 07:59

## 2020-02-22 RX ADMIN — ACETYLCYSTEINE 4 ML: 100 INHALANT RESPIRATORY (INHALATION) at 19:38

## 2020-02-22 RX ADMIN — DIPHENHYDRAMINE HYDROCHLORIDE 50 MG: 25 CAPSULE ORAL at 09:18

## 2020-02-22 RX ADMIN — ALBUTEROL SULFATE 2.5 MG: 2.5 SOLUTION RESPIRATORY (INHALATION) at 19:37

## 2020-02-22 RX ADMIN — Medication 40 MG: at 08:00

## 2020-02-22 RX ADMIN — Medication 400 MCG: at 21:48

## 2020-02-22 RX ADMIN — ENOXAPARIN SODIUM 40 MG: 40 INJECTION SUBCUTANEOUS at 07:59

## 2020-02-22 RX ADMIN — RITUXIMAB 700 MG: 10 INJECTION, SOLUTION INTRAVENOUS at 09:59

## 2020-02-22 RX ADMIN — LORATADINE 10 MG: 10 TABLET ORAL at 07:59

## 2020-02-22 RX ADMIN — IPRATROPIUM BROMIDE AND ALBUTEROL SULFATE 3 ML: .5; 3 SOLUTION RESPIRATORY (INHALATION) at 13:12

## 2020-02-22 RX ADMIN — ACETYLCYSTEINE 4 ML: 100 INHALANT RESPIRATORY (INHALATION) at 13:12

## 2020-02-22 RX ADMIN — SODIUM CHLORIDE, POTASSIUM CHLORIDE, SODIUM LACTATE AND CALCIUM CHLORIDE: 600; 310; 30; 20 INJECTION, SOLUTION INTRAVENOUS at 17:34

## 2020-02-22 RX ADMIN — SODIUM PHOSPHATE, MONOBASIC, MONOHYDRATE AND SODIUM PHOSPHATE, DIBASIC, ANHYDROUS 20 MMOL: 276; 142 INJECTION, SOLUTION INTRAVENOUS at 07:59

## 2020-02-22 RX ADMIN — ACETAMINOPHEN 650 MG: 325 TABLET, FILM COATED ORAL at 09:19

## 2020-02-22 RX ADMIN — IPRATROPIUM BROMIDE AND ALBUTEROL SULFATE 3 ML: .5; 3 SOLUTION RESPIRATORY (INHALATION) at 07:37

## 2020-02-22 RX ADMIN — ACETYLCYSTEINE 4 ML: 100 INHALANT RESPIRATORY (INHALATION) at 07:38

## 2020-02-22 RX ADMIN — POTASSIUM & SODIUM PHOSPHATES POWDER PACK 280-160-250 MG 1 PACKET: 280-160-250 PACK at 15:55

## 2020-02-22 RX ADMIN — POTASSIUM & SODIUM PHOSPHATES POWDER PACK 280-160-250 MG 1 PACKET: 280-160-250 PACK at 20:40

## 2020-02-22 RX ADMIN — PREDNISONE 100 MG: 50 TABLET ORAL at 07:59

## 2020-02-22 RX ADMIN — POLYETHYLENE GLYCOL 3350 17 G: 17 POWDER, FOR SOLUTION ORAL at 07:59

## 2020-02-22 ASSESSMENT — ACTIVITIES OF DAILY LIVING (ADL)
ADLS_ACUITY_SCORE: 17

## 2020-02-22 ASSESSMENT — MIFFLIN-ST. JEOR: SCORE: 1347.75

## 2020-02-22 NOTE — PROGRESS NOTES
"Hematology&Oncology Progress Note:  Chuyita Porter    Date: 02/21/2020  Admission Date: 2/17/2020  Primary Heme/Oncologist:    INTERVAL HISTORY:   She feels good.  No new events. Has peripheral IV for chemotherapy.       No shortness of breath or chest pain.  Patient still on ventilator with minimal setting for airway protection given care with mediastinal mass.  No nausea or vomiting or abdominal pain, patient has a PEG tube.  No fever or chills, vital signs are stable except mild sinus bradycardia like from pracedex. Otherwise, she is waiting for central IV access today for potential start chemo today.     R-CHOP will start today, Rituximab will start tomorrow 2/22/20.     ROS: Negative other than as stated in above interval history.      PHYSICAL EXAM:  /66 (BP Location: Left arm)   Pulse 59   Temp 97.7  F (36.5  C) (Axillary)   Resp 11   Ht 1.676 m (5' 6\")   Wt 81.9 kg (180 lb 8.9 oz)   SpO2 100%   BMI 29.14 kg/m    Wt Readings from Last 3 Encounters:   02/21/20 81.9 kg (180 lb 8.9 oz)   02/14/20 75.3 kg (166 lb 1.6 oz)   02/14/20 75.3 kg (165 lb 14.4 oz)     General:  no acute distress.  Heme/Lymph: No overt bleeding. No cervical or clavicular adenopathy.  HEENT: NCAT. PERRL, EOMI, anicteric sclera. ETT.   Neck: supple. No JVD.  Lungs clear to auscultation bilaterally.  CVS: RRR. No murmur or rub.   Abd: Soft, NT, ND, BS+. No palpable masses or organomegaly. PEG tube in place.   Skin: No rash.  EXTs: No edema.   Neurologic: A&O x 3, CNs 2-12 grossly intact, speech normal, gait normal, sensation to light touch grossly WNL. Grossly non-focal. Strength WNL          Current Facility-Administered Medications   Medication     [START ON 2/22/2020] acetaminophen (TYLENOL) tablet 650 mg     acetylcysteine (MUCOMYST) 10 % nebulizer solution 4 mL     albuterol (PROAIR HFA/PROVENTIL HFA/VENTOLIN HFA) 108 (90 Base) MCG/ACT inhaler 1-2 puff     albuterol (PROVENTIL) neb solution 2.5 mg     albuterol (PROVENTIL) " neb solution 2.5 mg     allopurinol (ZYLOPRIM) tablet 300 mg     artificial saliva (BIOTENE MT) solution 2 spray     cyclophosphamide (CYTOXAN) 1,425 mg in sodium chloride 0.9 % 347 mL infusion     dextrose 10% infusion     [START ON 2/22/2020] dextrose 5 % flush PRE/POST medication     [START ON 2/22/2020] dextrose 5 % flush PRE/POST medication     glucose gel 15-30 g    Or     dextrose 50 % injection 25-50 mL    Or     glucagon injection 1 mg     [START ON 2/22/2020] diphenhydrAMINE (BENADRYL) capsule 50 mg     diphenhydrAMINE (BENADRYL) injection 50 mg     DOXOrubicin (ADRIAMYCIN) 95 mg in sodium chloride 0.9 % 103 mL infusion     enoxaparin ANTICOAGULANT (LOVENOX) injection 40 mg     EPINEPHrine PF (ADRENALIN) injection 0.3 mg     fentaNYL (PF) (SUBLIMAZE) injection 25-50 mcg     fentaNYL (SUBLIMAZE) infusion     [START ON 2/22/2020] filgrastim 15 mcg/mL (in Dextrose) (NEUPOGEN) infusion 400 mcg     heparin lock flush 10 UNIT/ML injection 2-5 mL     insulin aspart (NovoLOG) injection (RAPID ACTING)     ipratropium - albuterol 0.5 mg/2.5 mg/3 mL (DUONEB) neb solution 3 mL     lactated ringers infusion     lidocaine (LMX4) cream     lidocaine 1 % 0.1-1 mL     loratadine (CLARITIN) tablet 10 mg     LORazepam (ATIVAN) injection 0.5-1 mg     LORazepam (ATIVAN) tablet 0.5 mg     LORazepam (ATIVAN) tablet 0.5-1 mg     magnesium sulfate 4 g in 100 mL sterile water (premade)     MEDICATION INSTRUCTION     meperidine (DEMEROL) injection 25 mg     methylPREDNISolone sodium succinate (solu-MEDROL) injection 125 mg     naloxone (NARCAN) injection 0.1-0.4 mg     naloxone (NARCAN) injection 0.1-0.4 mg     ondansetron (ZOFRAN-ODT) ODT tab 4 mg    Or     ondansetron (ZOFRAN) injection 4 mg     oxyCODONE (ROXICODONE) solution 5 mg     pantoprazole (PROTONIX) EC tablet 40 mg    Or     pantoprazole (PROTONIX) 2 mg/mL suspension 40 mg     polyethylene glycol (MIRALAX) Packet 17 g     potassium chloride (KLOR-CON) Packet 20-40 mEq      potassium chloride 10 mEq in 100 mL intermittent infusion with 10 mg lidocaine     potassium chloride 10 mEq in 100 mL sterile water intermittent infusion (premix)     potassium chloride 20 mEq in 50 mL intermittent infusion     potassium chloride ER (KLOR-CON M) CR tablet 20-40 mEq     [START ON 2/22/2020] predniSONE (DELTASONE) tablet 100 mg     prochlorperazine (COMPAZINE) injection 5-10 mg     prochlorperazine (COMPAZINE) tablet 5-10 mg     [START ON 2/22/2020] riTUXimab (RITUXAN) 700 mg in sodium chloride 0.9 % 700 mL     sodium chloride (PF) 0.9% PF flush 3 mL     sodium chloride (PF) 0.9% PF flush 3 mL     sodium chloride (PF) 0.9% PF flush 5-50 mL     sodium chloride 0.9% infusion     sodium phosphate 15 mmol in D5W intermittent infusion     sodium phosphate 20 mmol in D5W intermittent infusion     sodium phosphate 25 mmol in D5W intermittent infusion     vinCRIStine (ONCOVIN) 2 mg in sodium chloride 0.9 % 30 mL infusion       LABS:  Lab Results   Component Value Date    WBC 13.5 (H) 02/21/2020    ANEU 11.8 (H) 02/21/2020    HGB 11.5 (L) 02/21/2020    HCT 36.5 02/21/2020     02/21/2020     02/21/2020    POTASSIUM 3.7 02/21/2020    CHLORIDE 107 02/21/2020    CO2 32 02/21/2020     (H) 02/21/2020    BUN 22 02/21/2020    CR 0.57 02/21/2020    MAG 2.2 02/17/2020    INR 1.21 (H) 02/19/2020    AST 88 (H) 02/21/2020     (H) 02/21/2020         IMAGING/PROCEDURES: Reviewed  ASSESSMENT/PLAN:    A 69-year-old female with newly diagnosed DLBCL non-GCB stage IIE with neck/thyroid/anterior mediastinal mass. Not PML based on biopsy.     IPI 2 (extranodal ds, age)  S/p XRT 2 sessions on 2/17-18.  Start CHOP today   Rituximab tomorrow.  I explained the rationale and expectations today.      Mass effect within the mediastinum, including compression of  the superior vena cava with associated collateral vessels seen in the  neck.  - 2/19 CT abdomen and pelvis unremarkable  - PET scan is preferable  but could not have it given patient on ventilator   -  Unilateral bone marrow biopsy is still considered at some point for staging.   - IR consulted to place port for chemotherapy to begin on 2/20.  - Coag (INR, aPTT, fibrinogen)  WNL   -  HIV and Hep panel negative   - Echo:EF of 60-65%, no valve issues.  - FISH studies for BCL-2, BCL-6, and MYC rearrangement are negative.   - Dex 4 mg q 6h was started then changed to prednisone 100 mg bid on 2/18/20  - Response to the treatment will be evaluated on Monday/Tuesday next week with deflated cuff of ET tube and see if there is any leak, also imaging is needed to reassess her mass.     OI PPx:   - none so far    #TLS prophylaxis:   - TLS labs (BMP, Phos, uric acid) BID and LDH daily  - Allopurinol and IV fluid    # Airway obstruction 2/2 to mediastinal mass  -  now intubated    # History of right breast cancer in 2005 and left breast cancer in 2015  - Would hold patient's home anastrozole for now due to increased VTE risk currently.      GI Prophylaxis  - Pantoprazole 40mg daily      Nutrition  PEG tube placed 2/17. Nutrition consulted for TF.     Rosy Sprague MD  Associate Professor of Medicine  [phone]

## 2020-02-22 NOTE — PROGRESS NOTES
"Hematology&Oncology Progress Note:  Chuyita Porter    Date: 02/22/2020  Admission Date: 2/17/2020  Primary Heme/Oncologist:    INTERVAL HISTORY:   No events over night. She feels good this am and no SOB or chest pain. Patient still on ventilator for airway protection given care with mediastinal mass.  No nausea or vomiting or abdominal pain, patient has a PEG tube and osborn catheter. VSS and no complains.   She started CHOP-R yesterday and will get Rituximab today. No chemo side effects.       ROS: Negative other than as stated in above interval history.      PHYSICAL EXAM:  /57   Pulse 59   Temp 97.7  F (36.5  C) (Axillary)   Resp 14   Ht 1.676 m (5' 6\")   Wt 79.6 kg (175 lb 7.8 oz)   SpO2 93%   BMI 28.32 kg/m    Wt Readings from Last 3 Encounters:   02/22/20 79.6 kg (175 lb 7.8 oz)   02/14/20 75.3 kg (166 lb 1.6 oz)   02/14/20 75.3 kg (165 lb 14.4 oz)     General:  no acute distress.  Heme/Lymph: No overt bleeding. No cervical or clavicular adenopathy.  HEENT: NCAT. PERRL, EOMI, anicteric sclera. ETT.   Neck: supple. No JVD.  Lungs clear to auscultation bilaterally.  CVS: RRR. No murmur or rub.   Abd: Soft, NT, ND, BS+. No palpable masses or organomegaly. PEG tube in place.   Skin: No rash.  EXTs: No edema.   Neurologic: A&O x 3.          Current Facility-Administered Medications   Medication     acetylcysteine (MUCOMYST) 10 % nebulizer solution 4 mL     albuterol (PROAIR HFA/PROVENTIL HFA/VENTOLIN HFA) 108 (90 Base) MCG/ACT inhaler 1-2 puff     albuterol (PROVENTIL) neb solution 2.5 mg     albuterol (PROVENTIL) neb solution 2.5 mg     allopurinol (ZYLOPRIM) tablet 300 mg     artificial saliva (BIOTENE MT) solution 2 spray     dextrose 10% infusion     dextrose 5 % flush PRE/POST medication     dextrose 5 % flush PRE/POST medication     glucose gel 15-30 g    Or     dextrose 50 % injection 25-50 mL    Or     glucagon injection 1 mg     diphenhydrAMINE (BENADRYL) injection 50 mg     enoxaparin " ANTICOAGULANT (LOVENOX) injection 40 mg     EPINEPHrine PF (ADRENALIN) injection 0.3 mg     fentaNYL (PF) (SUBLIMAZE) injection 25-50 mcg     fentaNYL (SUBLIMAZE) infusion     filgrastim 15 mcg/mL (in Dextrose) (NEUPOGEN) infusion 400 mcg     heparin lock flush 10 UNIT/ML injection 2-5 mL     insulin aspart (NovoLOG) injection (RAPID ACTING)     ipratropium - albuterol 0.5 mg/2.5 mg/3 mL (DUONEB) neb solution 3 mL     lactated ringers infusion     lidocaine (LMX4) cream     lidocaine 1 % 0.1-1 mL     loratadine (CLARITIN) tablet 10 mg     LORazepam (ATIVAN) injection 0.5-1 mg     LORazepam (ATIVAN) tablet 0.5 mg     LORazepam (ATIVAN) tablet 0.5-1 mg     magnesium sulfate 4 g in 100 mL sterile water (premade)     MEDICATION INSTRUCTION     meperidine (DEMEROL) injection 25 mg     methylPREDNISolone sodium succinate (solu-MEDROL) injection 125 mg     naloxone (NARCAN) injection 0.1-0.4 mg     ondansetron (ZOFRAN-ODT) ODT tab 4 mg    Or     ondansetron (ZOFRAN) injection 4 mg     oxyCODONE (ROXICODONE) solution 5 mg     pantoprazole (PROTONIX) EC tablet 40 mg    Or     pantoprazole (PROTONIX) 2 mg/mL suspension 40 mg     polyethylene glycol (MIRALAX) Packet 17 g     potassium & sodium phosphates (NEUTRA-PHOS) Packet 1 packet     potassium chloride (KLOR-CON) Packet 20-40 mEq     potassium chloride 10 mEq in 100 mL intermittent infusion with 10 mg lidocaine     potassium chloride 10 mEq in 100 mL sterile water intermittent infusion (premix)     potassium chloride 20 mEq in 50 mL intermittent infusion     potassium chloride ER (KLOR-CON M) CR tablet 20-40 mEq     predniSONE (DELTASONE) tablet 100 mg     prochlorperazine (COMPAZINE) injection 5-10 mg     prochlorperazine (COMPAZINE) tablet 5-10 mg     sodium chloride (PF) 0.9% PF flush 3 mL     sodium chloride (PF) 0.9% PF flush 3 mL     sodium chloride (PF) 0.9% PF flush 5-50 mL     sodium chloride 0.9% infusion     sodium phosphate 15 mmol in D5W intermittent infusion      sodium phosphate 20 mmol in D5W intermittent infusion     sodium phosphate 25 mmol in D5W intermittent infusion       LABS:  CBC  Recent Labs   Lab 02/22/20  0442 02/21/20  1053 02/21/20  0418 02/20/20  0522   WBC 12.0* 13.5* 12.4* 13.5*   RBC 3.58* 3.77* 3.53* 3.37*   HGB 10.8* 11.5* 10.6* 10.1*   HCT 34.4* 36.5 34.4* 32.6*   MCV 96 97 98 97   MCH 30.2 30.5 30.0 30.0   MCHC 31.4* 31.5 30.8* 31.0*   RDW 14.1 14.9 14.7 14.7    332 267 260     CMP  Recent Labs   Lab 02/22/20  0442 02/22/20  0150 02/21/20  1657 02/21/20  1053 02/21/20  0418  02/17/20  1735     --  142 142 140   < > 137   POTASSIUM 3.6 3.7 3.7 4.2 4.1   < > 3.4   CHLORIDE 106  --  107 107 107   < > 106   CO2 33*  --  32 32 31   < > 29   ANIONGAP 1*  --  3 2* 2*   < > 2*   *  --  160* 130* 171*   < > 102*   BUN 22  --  22 23 25   < > 14   CR 0.62  --  0.57 0.60 0.65   < > 0.80   GFRESTIMATED >90  --  >90 >90 >90   < > 76   GFRESTBLACK >90  --  >90 >90 >90   < > 88   GABI 8.1*  --  8.6 8.6 8.5   < > 8.8   MAG  --  2.2  --   --   --   --  2.2   PHOS 1.6*  --  2.9 2.0* 2.2*   < > 2.3*   PROTTOTAL 5.4*  --   --  6.2*  --   --  6.5*   ALBUMIN 2.2*  --   --  2.6*  --   --  3.0*   BILITOTAL 0.2  --   --  0.4  --   --  0.3   ALKPHOS 125  --   --  124  --   --  88   AST 52*  --   --  88*  --   --  21   ALT 86*  --   --  102*  --   --  20    < > = values in this interval not displayed.     INR  Recent Labs   Lab 02/19/20  0438   INR 1.21*   PTT 31         IMAGING/PROCEDURES: Reviewed  ASSESSMENT/PLAN:  A 69-year-old female who presented with several months of worsening cough and shortness of breath and was found to have a large right thyroid/superior mediastinal mass with encasement of the subglottic trachea causing greater than 75% tracheal stenosis.  FNA of right thyroid suggests B-cell lymphoma.  This was confirmed by endobronchial biopsy (taken 2/17/20). Patient went to the OR on 2/17/2020 where she had PEG tube and it was planned to  have tracheal tube but was risky and instead she had an elective ET tube to protect her airway.  Patient case was discussed in ENT tumor board and plan to have 10 sessions of radiotherapy.  However oncology was consulted and plan to start chemotherapy on 2/20/ 2020.  Patient received radiation only 2 sessions on 2/17-18.    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     - 2/14/20 CT chest showed 8.0 x 6.7 x 10.0 cm mass centered in the right anterior mediastinum, appearing to arise from the right lobe of the thyroid gland. Mass effect within the mediastinum, including compression of  the SVC with associated collateral vessels seen in the neck.  - No mediastinal LN involvement   - 2/19 CT abdomen and pelvis unremarkable  - PET scan is preferable but could not have it given patient on ventilator   -  Unilateral bone marrow biopsy is still considered at some point for staging.   - IR consulted for port placement however, could not have for H/o radiation and mass effect on her chest.   - Has IV peripheral   - Coag (INR, aPTT, fibrinogen)  WNL   -  HIV and Hep panel negative   - Echo:EF of 60-65%, no valve issues.  - FISH studies for BCL-2, BCL-6, and MYC rearrangement are negative.  - Dex 4 mg q 6h was started then changed to prednisone 100 mg bid on 2/18/20  - R-EPOCH started 2/21/20. Rituximab scheduled 2/22/2020.   - Issues in having a port and instead we consider Midline.  Patient can not have PICC line in her extremities giving previous radiation for breast cancer.  - Response to the treatment will be evaluated on Monday/Tuesday next week with deflated cuff of ET tube and see if there is any leak, also imaging is needed to reassess her mass.     OI PPx:   - none so far    #TLS prophylaxis:   - TLS labs (BMP, Phos, uric acid) BID and LDH daily  - Allopurinol and IV fluid    # Airway obstruction 2/2 to mediastinal mass  -   now intubated    # History of right breast cancer in 2005 and left breast cancer in 2015  - Would hold patient's home anastrozole for now due to increased VTE risk currently.      GI Prophylaxis  - Pantoprazole 40mg daily      Nutrition  PEG tube placed 2/17. Nutrition consulted for TF.       Patient is seen and examined by Deborah Ellis.  Assessment and plan are discussed and delivered to the patient.     Latha Pickett MD  Hematology&Oncology Fellow  Pager: [phone]

## 2020-02-22 NOTE — PLAN OF CARE
Nursing Focus: Chemotherapy  D: Positive blood return via PIV. Insertion site is clean/dry/intact, dressing intact with no complaints of pain.  Urine output is recorded in intake in Doc Flowsheet.    I: Premedications given per order (see electronic medical administration record). Vincristine started to infuse by gravity over 5 minutes. Reviewed pt teaching on chemotherapy side effects.  Pt denies need for further teaching. Chemotherapy double checked per protocol by two chemotherapy competent RN's.   A: Tolerating procedure well. Denies nausea and or pain.   P: Continue to monitor urine output and symptoms of nausea. Screen for symptoms of toxicity.    Nursing Focus: Chemotherapy  D: Positive blood return via PIV. Insertion site is clean/dry/intact, dressing intact with no complaints of pain.  Urine output is recorded in intake in Doc Flowsheet.    I: Premedications given per order (see electronic medical administration record). Doxorubicin started to infuse over 30 minutes by gravity. Reviewed pt teaching on chemotherapy side effects.  Pt denies need for further teaching. Chemotherapy double checked per protocol by two chemotherapy competent RN's.   A: Tolerating procedure well. Denies nausea and or pain.   P: Continue to monitor urine output and symptoms of nausea. Screen for symptoms of toxicity.    Nursing Focus: Chemotherapy  D: Positive blood return via PIV. Insertion site is clean/dry/intact, dressing intact with no complaints of pain.  Urine output is recorded in intake in Doc Flowsheet.    I: Premedications given per order (see electronic medical administration record). Cyclophosphamide started to infuse over 60 minutes. Reviewed pt teaching on chemotherapy side effects.  Pt denies need for further teaching. Chemotherapy double checked per protocol by two chemotherapy competent RN's.   A: Tolerating procedure well. Denies nausea and or pain.   P: Continue to monitor urine output and symptoms of nausea.  Screen for symptoms of toxicity.

## 2020-02-22 NOTE — PROGRESS NOTES
[facility], [place]    Progress Note - MICU Service        Date of Admission:  2/17/2020  Date of Service: 02/22/2020    Assessment & Plan   Chuyita Porter is a 69 year old female admitted on 2/17/2020. She has a history of breast cancer s/p radiation (2005, 2015) and is admitted for planned chemoradiation for bulky mediastinal DLBCL NOS involving thyroid now s/p biopsy and intubation due to threatened airway.      Changes today:  - Rituxan today  - TLS labs BID  - IR consulted re: PICC/port; per IR, no procedure if patient cannot lie flat  - mIVF, LR at 125cc/hr -- decreased temporarily to 50 cc/hr on 2/20 for pulmonary and peripheral edema  - Continue to monitor peak pressures  - Palliative care consult placed       PLAN:     ===NEURO===     Sedation: Ativan 0.5 mg PO q4h PRN     Analgesia: Fentanyl gtt      ===CARDIOVASCULAR===    Bradycardia, improved  SVC Syndrome  Patient with asymptomatic bradycardia 2/. Known SVC syndrome vs Precedex gtt. Stopped precedex gtt 2/20 and hypotension/bradycardia is largely resolved  - If hypotensive, elevate HOB; do not lay patient flat 2/2 known SVC syndrome  - Patient cannot lie flat for procedures at this time 2/2 positional hypotension      ===PULMONARY===     Central airway obstruction  Impending airway obstruction noted during workup for mediastinal mass shown to be B-cell lymphoma on previous FNA (1/30/20). Patient intubated during biopsy and decision to maintain ETT for early stages of treatment. Plan for rad-onc to initiate chemotherapy on 2/18 and monitor size of mass to determine when ETT can be safely removed.   - Plan to repeat CT Chest early next week after receiving chemotherapy  - Daily CXR to confirm ETT positioning  - ETT at ~1 cm above ezekiel (tube position marked at 27.5 at the teeth)  - Bedrest restricted to prevent ETT displacement in setting of threatened airway     ===GASTROINTESTINAL===     GI Prophylaxis  -  Pantoprazole 40mg daily      Nutrition  - PEG tube placed 2/17  - Nutrition consulted for TF (Nutren 1.5 at 55mL/hr)     ===RENAL===     Tumor Lysis Syndrome Prophylaxis  Radiation therapy 2/18-2/19. Plan to initiate chemotherapy 2/21.   - Heme consulted, appreciate recs  - BID BMP, Phos, Uric Acid labs  - Cardiac telemetry  - Electrolyte replacement protocol  - Allopurinol 300 mg daily   - mIVF, LR at 125cc/hr -- decreased temporarily to 50 cc/hr on 2/20 for pulmonary and peripheral edema     ===HEME/ONC===      Stage IIE bulky mediastinal B-cell Lymphoma  8x6.7x10cm anterior mediastinal mass shown to be B-cell lymphoma on previous biopsy (1/30/20), now s/p FNA. Surgical pathology (2/17) shows diffuse large B-cell lymphoma, non-germinal center type. FISH studies for BCL-2, BCL-6, and MYC rearrangement are pending at this time. Per Rad-Onc, no further radiation therapy. Plan to begin chemotherapy on 2/21/2020 via PIV. CT AP for staging prior to initiating chemotherapy completed on 2/20. TTE on 2/19 prior to initiation of chemotherapy showed normal LV function with LVEF 60-65%.  - R-CHOP via PIV wit plan for eventual port placementh  - Prednisone to 100 mg BID  - Per Heme, when patient no longer requiring MICU, transfer to inpatient malignant heme service  - IR consulted re: PICC/port; per IR, no procedure if patient cannot lie flat; will administer chemo via PIV for now     ===ENDOCRINE===     Steroid associated hyperglycemia  - High resistance sliding scale insulin      ===INFECTIOUS DISEASE===     No acute issues     ===SKIN/MSK===     No acute issues     - PT/OT consulted       Diet: Adult Formula Drip Feeding: Continuous Nutren 1.5; Gastrostomy; Goal Rate: 55; mL/hr; Medication - Feeding Tube Flush Frequency: At least 15-30 mL water before and after medication administration and with tube clogging; Infuse @ 55 mL/hr.    Fluids: 50 ml/hr LR  Lines: PIV, osborn, PEG, ETT  DVT Prophylaxis: Enoxaparin (Lovenox) SQ  and Pneumatic Compression Devices  Reid Catheter: in place, indication: Retention  Code Status: DNR      Disposition Plan   Expected discharge: > 7 days, recommended to TBD once extubated, chemotherapy cycles completed per [facility].  Entered: Anna Salinas MD 2020, 6:51 AM       The patient's care was discussed with the Attending Physician, Dr. Dean.    Anna Salinas MD on 2020 at 7:10 AM  Internal Medicine PGY-3    ______________________________________________________________________    Interval History   NAEO. Patient appears comfortable. ETT is comfortable, no secretions. Denies chest pain. Less uncomfortable after bronch yesterday with removal of debris    4 point ROS otherwise negative.    Data reviewed today: I reviewed all medications, new labs and imaging results over the last 24 hours.    Objective    Temp: 98.7  F (37.1  C) Temp  Min: 97.3  F (36.3  C)  Max: 98.7  F (37.1  C)  Resp: 12 Resp  Min: 8  Max: 39  SpO2: 98 % SpO2  Min: 93 %  Max: 100 %  Heart Rate: 58 Heart Rate  Min: 52  Max: 90  BP: 100/52 Systolic (24hrs), Av , Min:100 , Max:185   Diastolic (24hrs), Av, Min:52, Max:103    I/O last 3 completed shifts:  In: 3765.75 [I.V.:2062.75; NG/GT:470; IV Piggyback:133]  Out: 1800 [Urine:1800]    Ventilation Mode: CMV/AC  (Continuous Mandatory Ventilation/ Assist Control)  FiO2 (%): 30 %  Rate Set (breaths/minute): 12 breaths/min  Tidal Volume Set (mL): 400 mL  PEEP (cm H2O): 5 cmH2O  Pressure Support (cm H2O): 7 cmH2O  Oxygen Concentration (%): 30 %  Resp: 12      Physical Exam  Constitutional: awake, alert, interactive  HEENT: ETT in good position, spontaneously breathing on vent  Cardiovascular: RRR. No murmurs, gallops or rub, No edema or JVD.   Respiratory:  Good air movement throughout. No stridor  Gastrointestinal: Abdomen soft, non-tender. BS normal.  Musculoskeletal: Extremities normal with no gross deformities noted   Skin: No suspicious lesions or rashes  Neurologic:  moving all extremities, full ROM  Psychiatric: affect bright/appropriate    Labs/Imaging  Reviewed in Epic. Pertinent discussed in A&P.

## 2020-02-22 NOTE — PLAN OF CARE
ICU End of Shift Summary. See flowsheets for vital signs and detailed assessment.    Changes this shift: Tolerated Rituximab without complications. Afebrile, VSS. A/O x 4. Order received for up to chair therapy - ETT remains positioned well. No BM, PRN meds ordered. Sputum from bronch 2/21 sent for analysis. Palliative consult for additional support for .     Plan: Maintain safety of ETT while up in chair. Encourage rehab participation. Reassess tumor on Monday.

## 2020-02-22 NOTE — PROVIDER NOTIFICATION
DATE/TIME  (DOT-TD, DOT-NOW) CHEMO CHECK ACTIVITY (REGIMEN & DOSE CHECK, DAY, DOSE #, NAME OF CHEMO #1)  CHEMO DRUG #2  CHEMO DRUG #3 NAME OF RN #1 (USE DOT-ME HERE) NAME OF RN#2 (2ND RN TO LOG IN SEPARATELY)   2/21/2020  10:37 PM   Vincristine double check  Doxoruibicin double check  cyclophosphamide double check  Cruz Siegel, RN   Jr Lee RN

## 2020-02-22 NOTE — PROGRESS NOTES
Called to bedside for increased peak pressures. Dr. Logan performed bedside bronchoscopy and removed debris per photo.

## 2020-02-22 NOTE — PLAN OF CARE
Problem: Adult Inpatient Plan of Care  Goal: Optimal Comfort and Wellbeing  Outcome: No Change     ICU End of Shift Summary. See flowsheets for vital signs and detailed assessment.    Changes this shift: Pt alert and oriented x 4, strong, moving all extremities, and helps with repositioning. Chemo administered by 7D RN. Pt not complaining of any side effects. Moderate amount of clear, thin secretions out of ETT. RN noted frequent PVCs and bradycardia - MD notified - labs drawn and EKG ordered - WNL. No other changes overnight.    Plan: Repeat scan early next week. Continue to monitor respiratory status.

## 2020-02-22 NOTE — PROGRESS NOTES
[facility] MICU STAFF BRIEF ICU NOTE    Hospital day #: 6  Ventilator day #: 6    Assessment:  Chuyita Porter is a 69 year old woman admitted on 2/17/2020 with acute hypoxemic respiratory failure in the setting of central airway obstruction from mixed extrinsic/intraluminal obstruction from DLBCL. She came in as an outpatient for planned open neck biopsy; we fiberoptically intubtaed her with an #8.5 armored ETT in the OR under stable conditions and performed endo-tracheal biopsies which negated the need for open neck biopsy. We were unable to attempt tracheal stent placement as we initially planned on doing. She also had a contemporaneous PEG placed and was admitted to the MICU as planned to begin therapy. Overnight had acute increase in peak pressures requiring urgent bronchoscopy with removal of bloody appearing devitalized tissue from the distal end of the ETT with resolution of the high peak pressures. The ETT remains about 1 CM above the ezekiel (27 cm at the lip) which corresponds to the least obstructed distal segment of trachea. She received two fractions of radiation (the last session was 2/19) and she was started on R-CHOP on 2/21 with plans to start Rituxan today. She is receiving adjunct high dose steroids.  Serial labs as requested. Receiving tube feeds via PEG. Port-A-Cath placement being postponed by IR due to inability to position supine. A chest CT is planned for Monday. Receiving range of motion with PT/OT, I would like to see her up in a chair.    Acute Issues:    Diffuse large B-cell lymphoma    SVC syndrome    Mixed extrinsic/endoluminal tracheal obstruction    Central airway obstruction     Protein calorie malnutrition     Normocytic anemia    Leukocytosis    Plan:    Started R-CHOP yesterday; Rituxan infusion day #1 today    Continues on high dose steroids    Serial TLS and standard labs continue    Continue tube feeds; port-A-cath when stable per IR    A chest CT is planned for  Monday     Aim for up in chair today    Emergent Intubation Plan:    She has an #8.5 armored ETT and was intubated upright/awake fiberoptically without issue    The ETT should be secured at 27 cm at the lip so the distal end extends beyond mid-tracheal compression    She can be intubated with DL in an emergency; if possible, awake fiberoptic is far more preferable     She can NOT undergo tracheostomy or emergency cricothyrotomy at this time due to tumor loaction        Please see the resident/FNP note from today for full details. The patient was seen and examined with the resident/fellow physician and/or FNP.  We have discussed the patient in detail and I agree with the findings, assessment, and plan as documented when the separate resident/FNP note was cosigned on this day. The plan was formulated in conjunction with pharmacy, ICU nurses, and respiratory therapist. I have evaluated all laboratory values and imaging studies for the past 24 hours. I have reviewed all the consults that have been ordered and are active for this patient.      Critical Care Time: 30 min.  I spent this time (excluding procedures) personally providing and directing critical care services at the bedside and on the critical care unit.          Herrera Dean MD, 2/22/2020, 7:48 AM  MICU Attending  Department of Pulmonary, Allergy, Critical Care & Sleep Medicine   Pager: [phone]

## 2020-02-22 NOTE — PROCEDURES
Bronchoscopy Note     Procedure: Bronchoscopy (diagnostic and therapeutic)     Indication:      Performed by: Monique Logan MD      Anesthesia: Sedated with fentanyl; 1% lidocaine through ETT     Findings: Following administration of lidocaine through the ETT, the bronchoscope was introduced. The ETT was patent without kinks or narrowing until ~1-2cm to the tip. At this point a large mass of what appeared to be mucous possibly mixed necrotic tumor was visualized. Approximately 80-85% of the ETT lumen was blocked by this mass. Saline was instilled and the mass was gradually broken apart and suctioned out with the therapeutic (large) bronchoscopy (see image below). A large suction catheter was also introduced and additional material was suctioned out through this. After removal of this material, the ezekiel was clearly visualized. The right and left mainstem bronchi and segmental bronchi were visualized and were patent. The bronchoscopy was slowly removed and a few remaining pieces of tissue were suctioned and removed. The full length of the ETT was visualized to be patent before removal of the bronchoscope. Peak pressures improved from ~45 pre-procedure to ~18 post-procedure.    No immediate complications. Staff physician Dr. Mirza was present for the entire procedure.    Monique Logan MD  Critical Care Fellow      Attending note:  Bedside bronchoscopy for evaluation of increased peak airway pressures with history of anterior mediastinal lymphoma.  Large mucus plug at end of endotracheal tube, successfully removed. I was preset for the entire viewing period from scope insertion to scope removal.    Pamela Mirza MD  [phone]

## 2020-02-22 NOTE — PROGRESS NOTES
"Otolaryngology Progress Note  February 22, 2020    S: No acute events overnight. Patient tolerating chemotherapy well. Underwent bronchoscopy yesterday with removal of airway debris after increased peak inspiratory pressures were noted. Ventilation settings stable this morning, no respiratory distress. Moderate clear, thin secretions noted from tube overnight by nursing. Patient has been crocheting to pass the time.    O: /68   Pulse 59   Temp 97.7  F (36.5  C) (Axillary)   Resp 25   Ht 1.676 m (5' 6\")   Wt 79.6 kg (175 lb 7.8 oz)   SpO2 93%   BMI 28.32 kg/m     General: Sitting up in bed, no acute distress   HEENT: EOMI.    Pulmonary: Breathing non-labored, no stridor, no accessory muscle use, endotracheally intubated with 8.5 reinforced breathing tube secured at 27 1/2 cm at the lip.    A/P: Chuyita Porter is a 69 year old female with a past medical history of breast cancer, large right neck mass with pathology demonstrating diffuse B-cell lymphoma now s/p attempted tracheal stent, tracheal mass biopsy, and PEG tube placement 2/17/2020. She remains endotracheally intubated with the tube secured at 27.5 cm at the lip.     - Steroids per Med Onc, Radiation Oncology, and MICU to help with swelling and potentially decrease tumor while starting treatment.   - Endotracheal tube will need to be carefully maintained in place as she has significant airway collapse/compression and is unable to lay flat. Do not allow patient to self extubate. If she were to self extubate, will need to be reintubated, likely in upright position, use wire reinforced ETT. Laying the patient flat resulted in drop in BP. 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 A tracheostomy will not overcome the extrinsic compression issues due to the distal location.    -- Patient seen and discussed with Dr. Valdez.    Lorraine Burciaga MD PGY-2  Otolaryngology - Head & Neck Surgery   Please page the on-call resident with questions. If after hours, contact ENT " with questions by dialing [phone] and entering job code [phone] when prompted.

## 2020-02-23 ENCOUNTER — APPOINTMENT (OUTPATIENT)
Dept: GENERAL RADIOLOGY | Facility: CLINIC | Age: 70
DRG: 840 | End: 2020-02-23
Attending: STUDENT IN AN ORGANIZED HEALTH CARE EDUCATION/TRAINING PROGRAM
Payer: COMMERCIAL

## 2020-02-23 LAB
ANION GAP SERPL CALCULATED.3IONS-SCNC: 2 MMOL/L (ref 3–14)
BASOPHILS # BLD AUTO: 0.1 10E9/L (ref 0–0.2)
BASOPHILS NFR BLD AUTO: 0.2 %
BUN SERPL-MCNC: 20 MG/DL (ref 7–30)
CALCIUM SERPL-MCNC: 8.1 MG/DL (ref 8.5–10.1)
CHLORIDE SERPL-SCNC: 108 MMOL/L (ref 94–109)
CO2 SERPL-SCNC: 33 MMOL/L (ref 20–32)
CREAT SERPL-MCNC: 0.53 MG/DL (ref 0.52–1.04)
DIFFERENTIAL METHOD BLD: ABNORMAL
EOSINOPHIL # BLD AUTO: 0 10E9/L (ref 0–0.7)
EOSINOPHIL NFR BLD AUTO: 0 %
ERYTHROCYTE [DISTWIDTH] IN BLOOD BY AUTOMATED COUNT: 14 % (ref 10–15)
GFR SERPL CREATININE-BSD FRML MDRD: >90 ML/MIN/{1.73_M2}
GLUCOSE BLDC GLUCOMTR-MCNC: 118 MG/DL (ref 70–99)
GLUCOSE BLDC GLUCOMTR-MCNC: 136 MG/DL (ref 70–99)
GLUCOSE BLDC GLUCOMTR-MCNC: 146 MG/DL (ref 70–99)
GLUCOSE BLDC GLUCOMTR-MCNC: 161 MG/DL (ref 70–99)
GLUCOSE BLDC GLUCOMTR-MCNC: 200 MG/DL (ref 70–99)
GLUCOSE SERPL-MCNC: 124 MG/DL (ref 70–99)
HCT VFR BLD AUTO: 34.5 % (ref 35–47)
HGB BLD-MCNC: 11 G/DL (ref 11.7–15.7)
IMM GRANULOCYTES # BLD: 0.3 10E9/L (ref 0–0.4)
IMM GRANULOCYTES NFR BLD: 1.2 %
LDH SERPL L TO P-CCNC: 221 U/L (ref 81–234)
LYMPHOCYTES # BLD AUTO: 0.4 10E9/L (ref 0.8–5.3)
LYMPHOCYTES NFR BLD AUTO: 1.4 %
MCH RBC QN AUTO: 30.2 PG (ref 26.5–33)
MCHC RBC AUTO-ENTMCNC: 31.9 G/DL (ref 31.5–36.5)
MCV RBC AUTO: 95 FL (ref 78–100)
MONOCYTES # BLD AUTO: 1.1 10E9/L (ref 0–1.3)
MONOCYTES NFR BLD AUTO: 4.4 %
NEUTROPHILS # BLD AUTO: 23.2 10E9/L (ref 1.6–8.3)
NEUTROPHILS NFR BLD AUTO: 92.8 %
NRBC # BLD AUTO: 0 10*3/UL
NRBC BLD AUTO-RTO: 0 /100
PHOSPHATE SERPL-MCNC: 2.1 MG/DL (ref 2.5–4.5)
PHOSPHATE SERPL-MCNC: 2.8 MG/DL (ref 2.5–4.5)
PLATELET # BLD AUTO: 244 10E9/L (ref 150–450)
POTASSIUM SERPL-SCNC: 4.2 MMOL/L (ref 3.4–5.3)
RBC # BLD AUTO: 3.64 10E12/L (ref 3.8–5.2)
SODIUM SERPL-SCNC: 142 MMOL/L (ref 133–144)
URATE SERPL-MCNC: 1.9 MG/DL (ref 2.6–6)
WBC # BLD AUTO: 25 10E9/L (ref 4–11)

## 2020-02-23 PROCEDURE — 36415 COLL VENOUS BLD VENIPUNCTURE: CPT | Performed by: STUDENT IN AN ORGANIZED HEALTH CARE EDUCATION/TRAINING PROGRAM

## 2020-02-23 PROCEDURE — 25000128 H RX IP 250 OP 636: Performed by: STUDENT IN AN ORGANIZED HEALTH CARE EDUCATION/TRAINING PROGRAM

## 2020-02-23 PROCEDURE — 40000275 ZZH STATISTIC RCP TIME EA 10 MIN

## 2020-02-23 PROCEDURE — 20000004 ZZH R&B ICU UMMC

## 2020-02-23 PROCEDURE — 25000125 ZZHC RX 250: Performed by: STUDENT IN AN ORGANIZED HEALTH CARE EDUCATION/TRAINING PROGRAM

## 2020-02-23 PROCEDURE — 83615 LACTATE (LD) (LDH) ENZYME: CPT | Performed by: INTERNAL MEDICINE

## 2020-02-23 PROCEDURE — 85025 COMPLETE CBC W/AUTO DIFF WBC: CPT | Performed by: INTERNAL MEDICINE

## 2020-02-23 PROCEDURE — 25000131 ZZH RX MED GY IP 250 OP 636 PS 637: Performed by: HOSPITALIST

## 2020-02-23 PROCEDURE — 71045 X-RAY EXAM CHEST 1 VIEW: CPT

## 2020-02-23 PROCEDURE — 84100 ASSAY OF PHOSPHORUS: CPT | Performed by: STUDENT IN AN ORGANIZED HEALTH CARE EDUCATION/TRAINING PROGRAM

## 2020-02-23 PROCEDURE — 94640 AIRWAY INHALATION TREATMENT: CPT | Mod: 76

## 2020-02-23 PROCEDURE — 25000128 H RX IP 250 OP 636: Performed by: INTERNAL MEDICINE

## 2020-02-23 PROCEDURE — 94003 VENT MGMT INPAT SUBQ DAY: CPT

## 2020-02-23 PROCEDURE — 80048 BASIC METABOLIC PNL TOTAL CA: CPT | Performed by: INTERNAL MEDICINE

## 2020-02-23 PROCEDURE — 25800030 ZZH RX IP 258 OP 636

## 2020-02-23 PROCEDURE — 27210429 ZZH NUTRITION PRODUCT INTERMEDIATE LITER

## 2020-02-23 PROCEDURE — 94640 AIRWAY INHALATION TREATMENT: CPT

## 2020-02-23 PROCEDURE — 25800030 ZZH RX IP 258 OP 636: Performed by: STUDENT IN AN ORGANIZED HEALTH CARE EDUCATION/TRAINING PROGRAM

## 2020-02-23 PROCEDURE — 25000132 ZZH RX MED GY IP 250 OP 250 PS 637: Performed by: STUDENT IN AN ORGANIZED HEALTH CARE EDUCATION/TRAINING PROGRAM

## 2020-02-23 PROCEDURE — 84550 ASSAY OF BLOOD/URIC ACID: CPT | Performed by: INTERNAL MEDICINE

## 2020-02-23 PROCEDURE — 84100 ASSAY OF PHOSPHORUS: CPT | Performed by: INTERNAL MEDICINE

## 2020-02-23 PROCEDURE — 36415 COLL VENOUS BLD VENIPUNCTURE: CPT | Performed by: INTERNAL MEDICINE

## 2020-02-23 PROCEDURE — 99291 CRITICAL CARE FIRST HOUR: CPT | Performed by: PHYSICIAN ASSISTANT

## 2020-02-23 PROCEDURE — 82962 GLUCOSE BLOOD TEST: CPT

## 2020-02-23 RX ORDER — CODEINE PHOSPHATE AND GUAIFENESIN 10; 100 MG/5ML; MG/5ML
5 SOLUTION ORAL EVERY 4 HOURS PRN
Status: DISCONTINUED | OUTPATIENT
Start: 2020-02-23 | End: 2020-03-06 | Stop reason: HOSPADM

## 2020-02-23 RX ORDER — AMOXICILLIN 250 MG
1 CAPSULE ORAL 2 TIMES DAILY
Status: DISCONTINUED | OUTPATIENT
Start: 2020-02-23 | End: 2020-02-27

## 2020-02-23 RX ADMIN — POTASSIUM & SODIUM PHOSPHATES POWDER PACK 280-160-250 MG 1 PACKET: 280-160-250 PACK at 15:56

## 2020-02-23 RX ADMIN — ONDANSETRON 4 MG: 2 INJECTION INTRAMUSCULAR; INTRAVENOUS at 14:26

## 2020-02-23 RX ADMIN — PREDNISONE 100 MG: 50 TABLET ORAL at 07:46

## 2020-02-23 RX ADMIN — Medication 40 MG: at 07:31

## 2020-02-23 RX ADMIN — POTASSIUM & SODIUM PHOSPHATES POWDER PACK 280-160-250 MG 1 PACKET: 280-160-250 PACK at 12:38

## 2020-02-23 RX ADMIN — SENNOSIDES AND DOCUSATE SODIUM 1 TABLET: 8.6; 5 TABLET ORAL at 20:12

## 2020-02-23 RX ADMIN — SODIUM PHOSPHATE, MONOBASIC, MONOHYDRATE AND SODIUM PHOSPHATE, DIBASIC, ANHYDROUS 15 MMOL: 276; 142 INJECTION, SOLUTION INTRAVENOUS at 07:46

## 2020-02-23 RX ADMIN — POLYETHYLENE GLYCOL 3350 17 G: 17 POWDER, FOR SOLUTION ORAL at 07:45

## 2020-02-23 RX ADMIN — ALLOPURINOL 300 MG: 300 TABLET ORAL at 07:46

## 2020-02-23 RX ADMIN — Medication 50 MCG/HR: at 22:05

## 2020-02-23 RX ADMIN — SENNOSIDES AND DOCUSATE SODIUM 1 TABLET: 8.6; 5 TABLET ORAL at 12:38

## 2020-02-23 RX ADMIN — ENOXAPARIN SODIUM 40 MG: 40 INJECTION SUBCUTANEOUS at 07:45

## 2020-02-23 RX ADMIN — ACETYLCYSTEINE 4 ML: 100 INHALANT RESPIRATORY (INHALATION) at 08:57

## 2020-02-23 RX ADMIN — LORAZEPAM 0.5 MG: 0.5 TABLET ORAL at 17:58

## 2020-02-23 RX ADMIN — POTASSIUM & SODIUM PHOSPHATES POWDER PACK 280-160-250 MG 1 PACKET: 280-160-250 PACK at 07:46

## 2020-02-23 RX ADMIN — ALBUTEROL SULFATE 2.5 MG: 2.5 SOLUTION RESPIRATORY (INHALATION) at 19:34

## 2020-02-23 RX ADMIN — POTASSIUM & SODIUM PHOSPHATES POWDER PACK 280-160-250 MG 1 PACKET: 280-160-250 PACK at 20:12

## 2020-02-23 RX ADMIN — LORATADINE 10 MG: 10 TABLET ORAL at 07:46

## 2020-02-23 RX ADMIN — FENTANYL CITRATE 25 MCG: 50 INJECTION, SOLUTION INTRAMUSCULAR; INTRAVENOUS at 20:12

## 2020-02-23 RX ADMIN — SODIUM CHLORIDE, POTASSIUM CHLORIDE, SODIUM LACTATE AND CALCIUM CHLORIDE: 600; 310; 30; 20 INJECTION, SOLUTION INTRAVENOUS at 07:45

## 2020-02-23 RX ADMIN — SODIUM CHLORIDE, POTASSIUM CHLORIDE, SODIUM LACTATE AND CALCIUM CHLORIDE: 600; 310; 30; 20 INJECTION, SOLUTION INTRAVENOUS at 22:05

## 2020-02-23 RX ADMIN — ACETYLCYSTEINE 4 ML: 100 INHALANT RESPIRATORY (INHALATION) at 19:34

## 2020-02-23 RX ADMIN — ALBUTEROL SULFATE 2.5 MG: 2.5 SOLUTION RESPIRATORY (INHALATION) at 08:57

## 2020-02-23 ASSESSMENT — ACTIVITIES OF DAILY LIVING (ADL)
ADLS_ACUITY_SCORE: 17
ADLS_ACUITY_SCORE: 17
ADLS_ACUITY_SCORE: 18
ADLS_ACUITY_SCORE: 17
ADLS_ACUITY_SCORE: 18
ADLS_ACUITY_SCORE: 18

## 2020-02-23 ASSESSMENT — MIFFLIN-ST. JEOR: SCORE: 1406.75

## 2020-02-23 NOTE — PROGRESS NOTES
[facility], [place]    Progress Note - MICU Service        Date of Admission:  2/17/2020  Date of Service: 02/23/2020    Assessment & Plan   Chuyita Porter is a 69 year old female admitted on 2/17/2020. She has a history of breast cancer s/p radiation (2005, 2015) and is admitted for planned chemoradiation for bulky mediastinal DLBCL NOS involving thyroid now s/p biopsy and intubation due to threatened airway.      Changes today:  - Continue to monitor peak pressures  - Lantus 10U in addition to sliding scale insulin for steroid associated hyperglycemia  - mIVF at 75   - not on bedrest -- can cautiously get up to chair/ambulate with assist and special care regarding ETT stability      PLAN:     ===NEURO===     Sedation: Ativan 0.5 mg PO q4h PRN     Analgesia: Fentanyl gtt  -- decrease fentanyl gtt and replace with PRN oxycodone as tolerated   - Oxycodone oral solution 5mg q4h PRN      ===CARDIOVASCULAR===    Bradycardia, improved  SVC Syndrome  Patient with asymptomatic bradycardia 2/. Known SVC syndrome vs Precedex gtt. Stopped precedex gtt 2/20 and hypotension/bradycardia is largely resolved  - If hypotensive, elevate HOB; do not lay patient flat 2/2 known SVC syndrome  - Patient cannot lie flat for procedures at this time 2/2 positional hypotension      ===PULMONARY===     Central airway obstruction  Impending airway obstruction noted during workup for mediastinal mass shown to be B-cell lymphoma on previous FNA (1/30/20). Patient intubated during biopsy and decision to maintain ETT for early stages of treatment. Plan for rad-onc to initiate chemotherapy on 2/18 and monitor size of mass to determine when ETT can be safely removed. On exam 2/23, deflated ETT with NO cuff leak heard.  - Plan to repeat CT Chest early next week after receiving chemotherapy  - PRN CXR to confirm ETT positioning  - ETT at ~1 cm above ezekiel (tube position marked at 27.5 at the teeth)  - Activity  up with assist and careful attention to ETT stability, as ETT displacement will threaten airway     ===GASTROINTESTINAL===     GI Prophylaxis  - Pantoprazole 40mg daily      Nutrition  - PEG tube placed 2/17  - Nutrition consulted for TF (Nutren 1.5 at 55mL/hr)     ===RENAL===     Tumor Lysis Syndrome Prophylaxis  Radiation therapy 2/18-2/19. Plan to initiate chemotherapy 2/21.   - Heme consulted, appreciate recs  - Daily BMP, Phos, Uric Acid, LDH labs -- switch to BID if concerning for TLS  - Cardiac telemetry  - Electrolyte replacement protocol  - Allopurinol 300 mg daily   - mIVF, LR at 75 ml/hr -- consider increase to 125 ml/hr if signs of TLS on labs     ===HEME/ONC===      Stage IIE bulky mediastinal B-cell Lymphoma  8x6.7x10cm anterior mediastinal mass shown to be B-cell lymphoma on previous biopsy (1/30/20), now s/p FNA. Surgical pathology (2/17) shows diffuse large B-cell lymphoma, non-germinal center type. FISH studies for BCL-2, BCL-6, and MYC rearrangement are pending at this time. Per Rad-Onc, no further radiation therapy. Plan to begin chemotherapy on 2/21/2020 via PIV. CT AP for staging prior to initiating chemotherapy completed on 2/20. TTE on 2/19 prior to initiation of chemotherapy showed normal LV function with LVEF 60-65%.  - R-CHOP via PIV with plan for eventual port placement  - Prednisone to 100 mg BID  - Per Heme, when patient no longer requiring MICU, transfer to inpatient malignant heme service  - IR consulted re: PICC/port; per IR, no procedure if patient cannot lie flat; will administer chemo via PIV for now  - Plan to CT Chest on Tuesday/Wednesday of coming week     ===ENDOCRINE===     Steroid associated hyperglycemia  - High resistance sliding scale insulin   - Lantus 10U daily (2/23- )     ===INFECTIOUS DISEASE===     No acute issues     ===SKIN/MSK===     No acute issues     - PT/OT consulted       Diet: Adult Formula Drip Feeding: Continuous Nutren 1.5; Gastrostomy; Goal Rate: 55;  mL/hr; Medication - Feeding Tube Flush Frequency: At least 15-30 mL water before and after medication administration and with tube clogging; Infuse @ 55 mL/hr.    Fluids: 50 ml/hr LR  Lines: PIV, osborn, PEG, ETT  DVT Prophylaxis: Enoxaparin (Lovenox) SQ and Pneumatic Compression Devices  Osborn Catheter: in place, indication: Retention  Code Status: DNR      Disposition Plan   Expected discharge: > 7 days, recommended to TBD once extubated, chemotherapy cycles completed per HemoOn.  Entered: Crystal Chen MD 2020, 9:14 AM       The patient's care was discussed with the Attending Physician, Dr. Dean.    Crystal Chen MD on 2020 at 7:10 AM  Internal Medicine PGY-3    ______________________________________________________________________    Interval History   NAEO. Patient appears comfortable. ETT is comfortable, no secretions. Denies chest pain. Asking for cough medicine. No BM since .     4 point ROS otherwise negative.    Data reviewed today: I reviewed all medications, new labs and imaging results over the last 24 hours.    Objective    Temp: 98.2  F (36.8  C) Temp  Min: 97.8  F (36.6  C)  Max: 98.6  F (37  C)  Resp: 15 Resp  Min: 10  Max: 25  SpO2: 98 % SpO2  Min: 92 %  Max: 99 %  Heart Rate: 64 Heart Rate  Min: 63  Max: 84  BP: 115/65 Systolic (24hrs), Av , Min:95 , Max:124   Diastolic (24hrs), Av, Min:57, Max:77      I/O last 3 completed shifts:  In: 4475.67 [I.V.:1885.67; NG/GT:570; IV Piggyback:700]  Out: 995 [Urine:995]    Ventilation Mode: CMV/AC  (Continuous Mandatory Ventilation/ Assist Control)  FiO2 (%): 30 %  Rate Set (breaths/minute): 12 breaths/min  Tidal Volume Set (mL): 400 mL  PEEP (cm H2O): 5 cmH2O  Oxygen Concentration (%): 30 %  Resp: 15    PIP: 12     Physical Exam  Constitutional: awake, alert, interactive  HEENT: ETT in good position, spontaneously breathing on vent   Cardiovascular: RRR. No murmurs, gallops or rub, No edema or JVD.   Respiratory:  Good air  movement throughout. No stridor.  Gastrointestinal: Abdomen soft, non-tender. BS normal.  Musculoskeletal: Extremities normal with no gross deformities noted   Skin: No suspicious lesions or rashes  Neurologic: moving all extremities, full ROM  Psychiatric: affect bright/appropriate    Labs:    CBC RESULTS:   Recent Labs   Lab Test 02/23/20  0344   WBC 25.0*   RBC 3.64*   HGB 11.0*   HCT 34.5*   MCV 95   MCH 30.2   MCHC 31.9   RDW 14.0          Results for MELODY ANDERSEN (MRN [id number]) as of 2/23/2020 07:06   Ref. Range 2/22/2020 04:42 2/22/2020 13:31 2/22/2020 16:08 2/23/2020 03:44   Sodium Latest Ref Range: 133 - 144 mmol/L 140 140  142   Potassium Latest Ref Range: 3.4 - 5.3 mmol/L 3.6 3.8  4.2   Chloride Latest Ref Range: 94 - 109 mmol/L 106 106  108   Carbon Dioxide Latest Ref Range: 20 - 32 mmol/L 33 (H) 32  33 (H)   Urea Nitrogen Latest Ref Range: 7 - 30 mg/dL 22 20  20   Creatinine Latest Ref Range: 0.52 - 1.04 mg/dL 0.62 0.58  0.53   GFR Estimate Latest Ref Range: >60 mL/min/1.73_m2 >90 >90  >90   GFR Estimate If Black Latest Ref Range: >60 mL/min/1.73_m2 >90 >90  >90   Calcium Latest Ref Range: 8.5 - 10.1 mg/dL 8.1 (L) 7.6 (L)  8.1 (L)   Anion Gap Latest Ref Range: 3 - 14 mmol/L 1 (L) 1 (L)  2 (L)   Phosphorus Latest Ref Range: 2.5 - 4.5 mg/dL 1.6 (L)  2.6 2.1 (L)   Albumin Latest Ref Range: 3.4 - 5.0 g/dL 2.2 (L)      Protein Total Latest Ref Range: 6.8 - 8.8 g/dL 5.4 (L)      Bilirubin Total Latest Ref Range: 0.2 - 1.3 mg/dL 0.2      Alkaline Phosphatase Latest Ref Range: 40 - 150 U/L 125      ALT Latest Ref Range: 0 - 50 U/L 86 (H)      AST Latest Ref Range: 0 - 45 U/L 52 (H)      Bilirubin Direct Latest Ref Range: 0.0 - 0.2 mg/dL <0.1      Lactate Dehydrogenase Latest Ref Range: 81 - 234 U/L 224  276 (H) 221   Uric Acid Latest Ref Range: 2.6 - 6.0 mg/dL 1.8 (L)  1.7 (L) 1.9 (L)         Labs/Imaging  Reviewed in Epic. Pertinent discussed in A&P.

## 2020-02-23 NOTE — PROGRESS NOTES
"SPIRITUAL HEALTH SERVICES  SPIRITUAL ASSESSMENT Progress Note  [facility] ([place]) 4C     The  said, \"She is feeing a little queasy right now.\" I said I would try again.    PLAN: I will follow while pt is on 4th floor.    Gilbert Juarez M.Div.     Pager [phone]    "

## 2020-02-23 NOTE — PROGRESS NOTES
[facility] MICU STAFF BRIEF ICU NOTE    Hospital day #: 7  Ventilator day #: 7    Assessment:  Chuyita Porter is a 69 year old woman admitted on 2/17/2020 with acute hypoxemic respiratory failure in the setting of central airway obstruction from mixed extrinsic/intraluminal obstruction from DLBCL. She came in as an outpatient for planned open neck biopsy; we fiberoptically intubtaed her in the OR with an #8.5 armored ETT and performed endo-tracheal biopsies which negated the need for open neck biopsy. We were unable to attempt tracheal stent placement as we initially planned on doing. She also had a contemporaneous PEG placed and was admitted to the MICU as planned to begin therapy.  ETT remains about 1 CM above the ezekiel (27 cm at the lip) which corresponds to the least obstructed distal segment of trachea. She received two fractions of radiation (the last session was 2/19) and she was started on R-CHOP on 2/21 and receiving adjunct high dose steroids.  Serial labs as requested. Receiving tube feeds via PEG. Port-A-Cath placement being postponed by IR due to inability to position supine. A chest CT is planned for Monday. Receiving range of motion with PT/OT and we have started getting her up into a chair. There was no cuff leak when checked today.    Acute Issues:    Diffuse large B-cell lymphoma    SVC syndrome    Mixed extrinsic/endoluminal tracheal obstruction    Central airway obstruction     Protein calorie malnutrition     Normocytic anemia    Leukocytosis    Plan:    Chemotherapy as per Oncology     Continues on high dose steroids    Serial TLS and standard labs continue    Continue tube feeds; port-A-cath when stable per IR    A chest CT is planned for early week (must have pre-extubation)    Continue up in chair    Wean Fentanyl infusion further     Remove osborn     End time placed on LR infusion (TF at goal)    TLS labs daily    Emergent Intubation Plan:    She has an #8.5 armored ETT and  was intubated upright/awake fiberoptically without issue    The ETT should be secured at 27 cm at the lip so the distal end extends beyond mid-tracheal compression    She can be intubated with DL in an emergency; if possible, awake fiberoptic is far more preferable     She can NOT undergo tracheostomy or emergency cricothyrotomy at this time due to tumor loaction        Please see the resident/FNP note from today for full details. The patient was seen and examined with the resident/fellow physician and/or FNP.  We have discussed the patient in detail and I agree with the findings, assessment, and plan as documented when the separate resident/FNP note was cosigned on this day. The plan was formulated in conjunction with pharmacy, ICU nurses, and respiratory therapist. I have evaluated all laboratory values and imaging studies for the past 24 hours. I have reviewed all the consults that have been ordered and are active for this patient.      Critical Care Time: 30 min.  I spent this time (excluding procedures) personally providing and directing critical care services at the bedside and on the critical care unit.          Herrera Dean MD, 2/23/2020, 7:35 AM  Staff Physician  Department of Pulmonary, Allergy, Critical Care & Sleep Medicine   Pager: [phone]

## 2020-02-23 NOTE — PLAN OF CARE
"ICU End of Shift Summary. See flowsheets for vital signs and detailed assessment.    Changes this shift: Weaned Fentanyl, continue to wean as able; denies pain. Used PRN Ativan x1 this evening for some reported anxiety and \"queasiness\". Pressure support trial for ~2 hrs, tolerated well (7/5). Zofran given x1 for complaints of \"queasiness\".  Stood at side of bed with staff and marched in place, sat up in chair for several hours throughout day. No BM but denies discomfort; bowel regimen increased. Adequate urine output. IVMF continues at 75 ml/hr. Phos replaced per protocol, recheck WNL. Family updated at bedside.    Plan:  Continue POC, notify provider of changes.      "

## 2020-02-23 NOTE — PROGRESS NOTES
"Hematology&Oncology Progress Note:  Chuyita Porter    Date: 02/23/2020  Admission Date: 2/17/2020  Primary Heme/Oncologist:    INTERVAL HISTORY:   No events over night. Doing fine and still on ventilatorhas some whitish secretion. No SOB or chest pain. No nausea or vomiting or abdominal pain, patient has a PEG tube and osborn catheter. VSS and no complains.   Her WBC increased today to 25K and we held G-CSF.       ROS: Negative other than as stated in above interval history.      PHYSICAL EXAM:  /67   Pulse 63   Temp 98.2  F (36.8  C) (Axillary)   Resp 13   Ht 1.676 m (5' 6\")   Wt 85.5 kg (188 lb 7.9 oz)   SpO2 97%   BMI 30.42 kg/m    Wt Readings from Last 3 Encounters:   02/23/20 85.5 kg (188 lb 7.9 oz)   02/14/20 75.3 kg (166 lb 1.6 oz)   02/14/20 75.3 kg (165 lb 14.4 oz)     General:  no acute distress. Intubated. PEG and osborn.   Heme/Lymph: No overt bleeding. No cervical or clavicular adenopathy.  HEENT: NCAT. PERRL, EOMI, anicteric sclera. ETT.   Neck: supple. No JVD.  Lungs clear to auscultation bilaterally.  CVS: RRR. No murmur or rub.   Abd: Soft, NT, ND, BS+. No palpable masses or organomegaly. PEG tube in place.   Skin: No rash.  EXTs: No edema.   Neurologic: A&O x 3.      Current Facility-Administered Medications   Medication     acetylcysteine (MUCOMYST) 10 % nebulizer solution 4 mL     albuterol (PROAIR HFA/PROVENTIL HFA/VENTOLIN HFA) 108 (90 Base) MCG/ACT inhaler 1-2 puff     albuterol (PROVENTIL) neb solution 2.5 mg     albuterol (PROVENTIL) neb solution 2.5 mg     allopurinol (ZYLOPRIM) tablet 300 mg     artificial saliva (BIOTENE MT) solution 2 spray     dextrose 10% infusion     dextrose 5 % flush PRE/POST medication     dextrose 5 % flush PRE/POST medication     glucose gel 15-30 g    Or     dextrose 50 % injection 25-50 mL    Or     glucagon injection 1 mg     diphenhydrAMINE (BENADRYL) injection 50 mg     enoxaparin ANTICOAGULANT (LOVENOX) injection 40 mg     EPINEPHrine PF " (ADRENALIN) injection 0.3 mg     fentaNYL (PF) (SUBLIMAZE) injection 25-50 mcg     fentaNYL (SUBLIMAZE) infusion     [Held by provider] filgrastim 15 mcg/mL (in Dextrose) (NEUPOGEN) infusion 400 mcg     heparin lock flush 10 UNIT/ML injection 2-5 mL     insulin aspart (NovoLOG) injection (RAPID ACTING)     ipratropium - albuterol 0.5 mg/2.5 mg/3 mL (DUONEB) neb solution 3 mL     lactated ringers infusion     lidocaine (LMX4) cream     lidocaine 1 % 0.1-1 mL     loratadine (CLARITIN) tablet 10 mg     LORazepam (ATIVAN) injection 0.5-1 mg     LORazepam (ATIVAN) tablet 0.5 mg     LORazepam (ATIVAN) tablet 0.5-1 mg     magnesium sulfate 4 g in 100 mL sterile water (premade)     MEDICATION INSTRUCTION     meperidine (DEMEROL) injection 25 mg     methylPREDNISolone sodium succinate (solu-MEDROL) injection 125 mg     naloxone (NARCAN) injection 0.1-0.4 mg     ondansetron (ZOFRAN-ODT) ODT tab 4 mg    Or     ondansetron (ZOFRAN) injection 4 mg     oxyCODONE (ROXICODONE) solution 5 mg     pantoprazole (PROTONIX) EC tablet 40 mg    Or     pantoprazole (PROTONIX) 2 mg/mL suspension 40 mg     polyethylene glycol (MIRALAX) Packet 17 g     potassium & sodium phosphates (NEUTRA-PHOS) Packet 1 packet     potassium chloride (KLOR-CON) Packet 20-40 mEq     potassium chloride 10 mEq in 100 mL intermittent infusion with 10 mg lidocaine     potassium chloride 10 mEq in 100 mL sterile water intermittent infusion (premix)     potassium chloride 20 mEq in 50 mL intermittent infusion     potassium chloride ER (KLOR-CON M) CR tablet 20-40 mEq     predniSONE (DELTASONE) tablet 100 mg     prochlorperazine (COMPAZINE) injection 5-10 mg     prochlorperazine (COMPAZINE) tablet 5-10 mg     sodium chloride (PF) 0.9% PF flush 3 mL     sodium chloride (PF) 0.9% PF flush 3 mL     sodium chloride (PF) 0.9% PF flush 5-50 mL     sodium chloride 0.9% infusion     sodium phosphate 15 mmol in D5W intermittent infusion     sodium phosphate 20 mmol in D5W  intermittent infusion     sodium phosphate 25 mmol in D5W intermittent infusion       LABS:  CBC  Recent Labs   Lab 02/23/20  0344 02/22/20  0442 02/21/20  1053 02/21/20  0418   WBC 25.0* 12.0* 13.5* 12.4*   RBC 3.64* 3.58* 3.77* 3.53*   HGB 11.0* 10.8* 11.5* 10.6*   HCT 34.5* 34.4* 36.5 34.4*   MCV 95 96 97 98   MCH 30.2 30.2 30.5 30.0   MCHC 31.9 31.4* 31.5 30.8*   RDW 14.0 14.1 14.9 14.7    271 332 267     CMP  Recent Labs   Lab 02/23/20  0344 02/22/20  1608 02/22/20  1331 02/22/20  0442 02/22/20  0150 02/21/20  1657 02/21/20  1053  02/17/20  1735     --  140 140  --  142 142   < > 137   POTASSIUM 4.2  --  3.8 3.6 3.7 3.7 4.2   < > 3.4   CHLORIDE 108  --  106 106  --  107 107   < > 106   CO2 33*  --  32 33*  --  32 32   < > 29   ANIONGAP 2*  --  1* 1*  --  3 2*   < > 2*   *  --  148* 142*  --  160* 130*   < > 102*   BUN 20  --  20 22  --  22 23   < > 14   CR 0.53  --  0.58 0.62  --  0.57 0.60   < > 0.80   GFRESTIMATED >90  --  >90 >90  --  >90 >90   < > 76   GFRESTBLACK >90  --  >90 >90  --  >90 >90   < > 88   GABI 8.1*  --  7.6* 8.1*  --  8.6 8.6   < > 8.8   MAG  --   --   --   --  2.2  --   --   --  2.2   PHOS 2.1* 2.6  --  1.6*  --  2.9 2.0*   < > 2.3*   PROTTOTAL  --   --   --  5.4*  --   --  6.2*  --  6.5*   ALBUMIN  --   --   --  2.2*  --   --  2.6*  --  3.0*   BILITOTAL  --   --   --  0.2  --   --  0.4  --  0.3   ALKPHOS  --   --   --  125  --   --  124  --  88   AST  --   --   --  52*  --   --  88*  --  21   ALT  --   --   --  86*  --   --  102*  --  20    < > = values in this interval not displayed.     INR  Recent Labs   Lab 02/19/20  0438   INR 1.21*   PTT 31         IMAGING/PROCEDURES: Reviewed  ASSESSMENT/PLAN:  A 69-year-old female who presented with several months of worsening cough and shortness of breath and was found to have a large right thyroid/superior mediastinal mass with encasement of the subglottic trachea causing greater than 75% tracheal stenosis.  FNA of right  thyroid suggests B-cell lymphoma.  This was confirmed by endobronchial biopsy (taken 2/17/20). Patient went to the OR on 2/17/2020 where she had PEG tube and it was planned to have tracheal tube but was risky and instead she had an elective ET tube to protect her airway.  Patient case was discussed in ENT tumor board and plan to have 10 sessions of radiotherapy.  However oncology was consulted and plan to start chemotherapy on 2/20/ 2020.  Patient received radiation only 2 sessions on 2/17-18.    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   - 2/14/20 CT chest showed 8.0 x 6.7 x 10.0 cm mass centered in the right anterior mediastinum, appearing to arise from the right lobe of the thyroid gland. Mass effect within the mediastinum, including compression of  the SVC with associated collateral vessels seen in the neck.  - No mediastinal LN involvement   - 2/19 CT abdomen and pelvis unremarkable  - PET scan is preferable but could not have it given patient on ventilator   -  Unilateral bone marrow biopsy is still considered at some point for staging.   - IR consulted for port placement however, could not have for H/o radiation and mass effect on her chest.   - Has IV peripheral   - Coag (INR, aPTT, fibrinogen)  WNL   -  HIV and Hep panel negative   - Echo:EF of 60-65%, no valve issues.  - FISH studies for BCL-2, BCL-6, and MYC rearrangement are negative.  - Dex 4 mg q 6h was started then changed to prednisone 100 mg bid on 2/18/20  - R-EPOCH started 2/21/20. Rituximab scheduled 2/22/2020.   - Issues in having a port and instead we consider Midline.  Patient can not have PICC line in her extremities giving previous radiation for breast cancer.  - Response to the treatment will be evaluated on Monday/Tuesday next week with deflated cuff of ET tube and see if there is any leak, also imaging is needed to reassess her mass.      Leukocytosis   - WBC increased today to 25K 2/23 from 12k 2/22  - No sign/sym of infection and leukocytosis is likely from G-CSF  - 2/23 held G-CSF.     OI PPx:   - none so far    #TLS prophylaxis:   - TLS labs (BMP, Phos, uric acid) BID and LDH daily  - Allopurinol and IV fluid    # Airway obstruction 2/2 to mediastinal mass  -  now intubated    # History of right breast cancer in 2005 and left breast cancer in 2015  - Would hold patient's home anastrozole for now due to increased VTE risk currently.      GI Prophylaxis  - Pantoprazole 40mg daily      Nutrition  PEG tube placed 2/17. Nutrition consulted for TF.     Patient is seen and examined by Rosy Ellis and CARLTON.  Assessment and plan are discussed and delivered to the patient.     Latha Pickett MD  Hematology&Oncology Fellow  Pager: [phone]

## 2020-02-23 NOTE — PROGRESS NOTES
"Otolaryngology Progress Note  February 23, 2020    S: No acute events overnight. Patient tolerating chemotherapy well, rituximab added yesterday. Ventilation settings stable this morning, no respiratory distress. Patient has been crocheting to pass the time. Sat up in chair once yesterday, ETT position stable.    O: /65   Pulse 63   Temp 98.2  F (36.8  C)   Resp 15   Ht 1.676 m (5' 6\")   Wt 85.5 kg (188 lb 7.9 oz)   SpO2 98%   BMI 30.42 kg/m     General: Sitting up in bed, no acute distress   HEENT: EOMI.    Pulmonary: Breathing non-labored, no stridor, no accessory muscle use, endotracheally intubated with 8.5 reinforced breathing tube secured at 27 1/2 cm at the lip. Neck swelling stable, soft.    A/P: Chuyita Porter is a 69 year old female with a past medical history of breast cancer, large right neck mass with pathology demonstrating diffuse B-cell lymphoma now s/p attempted tracheal stent, tracheal mass biopsy, and PEG tube placement 2/17/2020. She remains endotracheally intubated with the tube secured at 27.5 cm at the lip.     - Steroids per Med Onc, Radiation Oncology, and MICU to help with swelling and potentially decrease tumor while starting treatment.   - Endotracheal tube will need to be carefully maintained in place as she has significant airway collapse/compression and is unable to lay flat. Do not allow patient to self extubate. If she were to self extubate, will need to be reintubated, likely in upright position, use wire reinforced ETT. Laying the patient flat resulted in drop in BP. 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 A tracheostomy will not overcome the extrinsic compression issues due to the distal location.    -- Patient discussed with Dr. Valdez.    Em Cheney MD   Otolaryngology - Head & Neck Surgery   Please page the on-call resident with questions. If after hours, contact ENT with questions by dialing [phone] and entering job code [phone] when prompted.    "

## 2020-02-23 NOTE — PLAN OF CARE
Problem: Adult Inpatient Plan of Care  Goal: Optimal Comfort and Wellbeing  Outcome: No Change    ICU End of Shift Summary. See flowsheets for vital signs and detailed assessment.    Changes this shift: Pt alert and oriented x 4 and moves all extremities. No pain. Moderate amount of clear, thin secretions out of ETT. PIPs in high teens - low 20's during shift. UOP lower this AM - MD notified - no new orders at this time. No other changes overnight.    Plan: Repeat scan early this week. Continue to monitor respiratory status.

## 2020-02-24 ENCOUNTER — APPOINTMENT (OUTPATIENT)
Dept: GENERAL RADIOLOGY | Facility: CLINIC | Age: 70
DRG: 840 | End: 2020-02-24
Attending: STUDENT IN AN ORGANIZED HEALTH CARE EDUCATION/TRAINING PROGRAM
Payer: COMMERCIAL

## 2020-02-24 ENCOUNTER — APPOINTMENT (OUTPATIENT)
Dept: PHYSICAL THERAPY | Facility: CLINIC | Age: 70
DRG: 840 | End: 2020-02-24
Attending: OTOLARYNGOLOGY
Payer: COMMERCIAL

## 2020-02-24 LAB
ANION GAP SERPL CALCULATED.3IONS-SCNC: 1 MMOL/L (ref 3–14)
BACTERIA SPEC CULT: NO GROWTH
BASOPHILS # BLD AUTO: 0.1 10E9/L (ref 0–0.2)
BASOPHILS NFR BLD AUTO: 0.2 %
BUN SERPL-MCNC: 21 MG/DL (ref 7–30)
CALCIUM SERPL-MCNC: 8 MG/DL (ref 8.5–10.1)
CHLORIDE SERPL-SCNC: 106 MMOL/L (ref 94–109)
CO2 SERPL-SCNC: 32 MMOL/L (ref 20–32)
COPATH REPORT: NORMAL
CREAT SERPL-MCNC: 0.54 MG/DL (ref 0.52–1.04)
DIFFERENTIAL METHOD BLD: ABNORMAL
EOSINOPHIL # BLD AUTO: 0 10E9/L (ref 0–0.7)
EOSINOPHIL NFR BLD AUTO: 0 %
ERYTHROCYTE [DISTWIDTH] IN BLOOD BY AUTOMATED COUNT: 14 % (ref 10–15)
GFR SERPL CREATININE-BSD FRML MDRD: >90 ML/MIN/{1.73_M2}
GLUCOSE BLDC GLUCOMTR-MCNC: 110 MG/DL (ref 70–99)
GLUCOSE BLDC GLUCOMTR-MCNC: 115 MG/DL (ref 70–99)
GLUCOSE BLDC GLUCOMTR-MCNC: 127 MG/DL (ref 70–99)
GLUCOSE BLDC GLUCOMTR-MCNC: 137 MG/DL (ref 70–99)
GLUCOSE BLDC GLUCOMTR-MCNC: 165 MG/DL (ref 70–99)
GLUCOSE BLDC GLUCOMTR-MCNC: 182 MG/DL (ref 70–99)
GLUCOSE SERPL-MCNC: 140 MG/DL (ref 70–99)
HAPTOGLOB SERPL-MCNC: 269 MG/DL (ref 32–197)
HCT VFR BLD AUTO: 31.8 % (ref 35–47)
HGB BLD-MCNC: 10.1 G/DL (ref 11.7–15.7)
IMM GRANULOCYTES # BLD: 0.3 10E9/L (ref 0–0.4)
IMM GRANULOCYTES NFR BLD: 1.4 %
LDH SERPL L TO P-CCNC: 203 U/L (ref 81–234)
LYMPHOCYTES # BLD AUTO: 0.2 10E9/L (ref 0.8–5.3)
LYMPHOCYTES NFR BLD AUTO: 1 %
MCH RBC QN AUTO: 30.1 PG (ref 26.5–33)
MCHC RBC AUTO-ENTMCNC: 31.8 G/DL (ref 31.5–36.5)
MCV RBC AUTO: 95 FL (ref 78–100)
MONOCYTES # BLD AUTO: 0.5 10E9/L (ref 0–1.3)
MONOCYTES NFR BLD AUTO: 2.4 %
NEUTROPHILS # BLD AUTO: 21.2 10E9/L (ref 1.6–8.3)
NEUTROPHILS NFR BLD AUTO: 95 %
NRBC # BLD AUTO: 0 10*3/UL
NRBC BLD AUTO-RTO: 0 /100
PHOSPHATE SERPL-MCNC: 2.4 MG/DL (ref 2.5–4.5)
PLATELET # BLD AUTO: 197 10E9/L (ref 150–450)
POTASSIUM SERPL-SCNC: 4.2 MMOL/L (ref 3.4–5.3)
RBC # BLD AUTO: 3.36 10E12/L (ref 3.8–5.2)
SODIUM SERPL-SCNC: 140 MMOL/L (ref 133–144)
SPECIMEN SOURCE: NORMAL
URATE SERPL-MCNC: 2.1 MG/DL (ref 2.6–6)
WBC # BLD AUTO: 22.3 10E9/L (ref 4–11)

## 2020-02-24 PROCEDURE — 94640 AIRWAY INHALATION TREATMENT: CPT | Mod: 76

## 2020-02-24 PROCEDURE — 85025 COMPLETE CBC W/AUTO DIFF WBC: CPT | Performed by: INTERNAL MEDICINE

## 2020-02-24 PROCEDURE — 25800030 ZZH RX IP 258 OP 636: Performed by: STUDENT IN AN ORGANIZED HEALTH CARE EDUCATION/TRAINING PROGRAM

## 2020-02-24 PROCEDURE — 27210429 ZZH NUTRITION PRODUCT INTERMEDIATE LITER

## 2020-02-24 PROCEDURE — 84100 ASSAY OF PHOSPHORUS: CPT | Performed by: INTERNAL MEDICINE

## 2020-02-24 PROCEDURE — 36415 COLL VENOUS BLD VENIPUNCTURE: CPT | Performed by: INTERNAL MEDICINE

## 2020-02-24 PROCEDURE — 25000132 ZZH RX MED GY IP 250 OP 250 PS 637: Performed by: STUDENT IN AN ORGANIZED HEALTH CARE EDUCATION/TRAINING PROGRAM

## 2020-02-24 PROCEDURE — 94003 VENT MGMT INPAT SUBQ DAY: CPT

## 2020-02-24 PROCEDURE — 20000004 ZZH R&B ICU UMMC

## 2020-02-24 PROCEDURE — 83615 LACTATE (LD) (LDH) ENZYME: CPT | Performed by: INTERNAL MEDICINE

## 2020-02-24 PROCEDURE — 25000125 ZZHC RX 250: Performed by: STUDENT IN AN ORGANIZED HEALTH CARE EDUCATION/TRAINING PROGRAM

## 2020-02-24 PROCEDURE — 94640 AIRWAY INHALATION TREATMENT: CPT

## 2020-02-24 PROCEDURE — 74019 RADEX ABDOMEN 2 VIEWS: CPT

## 2020-02-24 PROCEDURE — 25000131 ZZH RX MED GY IP 250 OP 636 PS 637: Performed by: HOSPITALIST

## 2020-02-24 PROCEDURE — 25800030 ZZH RX IP 258 OP 636

## 2020-02-24 PROCEDURE — 40000275 ZZH STATISTIC RCP TIME EA 10 MIN

## 2020-02-24 PROCEDURE — 97530 THERAPEUTIC ACTIVITIES: CPT | Mod: GP

## 2020-02-24 PROCEDURE — 99221 1ST HOSP IP/OBS SF/LOW 40: CPT | Performed by: NURSE PRACTITIONER

## 2020-02-24 PROCEDURE — 80048 BASIC METABOLIC PNL TOTAL CA: CPT | Performed by: INTERNAL MEDICINE

## 2020-02-24 PROCEDURE — 82962 GLUCOSE BLOOD TEST: CPT

## 2020-02-24 PROCEDURE — 25000128 H RX IP 250 OP 636: Performed by: STUDENT IN AN ORGANIZED HEALTH CARE EDUCATION/TRAINING PROGRAM

## 2020-02-24 PROCEDURE — 84550 ASSAY OF BLOOD/URIC ACID: CPT | Performed by: INTERNAL MEDICINE

## 2020-02-24 PROCEDURE — 99291 CRITICAL CARE FIRST HOUR: CPT | Mod: GC | Performed by: INTERNAL MEDICINE

## 2020-02-24 RX ORDER — POLYETHYLENE GLYCOL 3350 17 G/17G
17 POWDER, FOR SOLUTION ORAL 2 TIMES DAILY
Status: DISCONTINUED | OUTPATIENT
Start: 2020-02-24 | End: 2020-02-27

## 2020-02-24 RX ORDER — ALLOPURINOL 100 MG/1
100 TABLET ORAL DAILY
Status: DISCONTINUED | OUTPATIENT
Start: 2020-02-25 | End: 2020-02-28

## 2020-02-24 RX ORDER — MINERAL OIL 100 G/100G
1 OIL RECTAL ONCE
Status: COMPLETED | OUTPATIENT
Start: 2020-02-24 | End: 2020-02-24

## 2020-02-24 RX ADMIN — ALBUTEROL SULFATE 2.5 MG: 2.5 SOLUTION RESPIRATORY (INHALATION) at 19:58

## 2020-02-24 RX ADMIN — LORAZEPAM 0.5 MG: 0.5 TABLET ORAL at 01:34

## 2020-02-24 RX ADMIN — IPRATROPIUM BROMIDE AND ALBUTEROL SULFATE 3 ML: .5; 3 SOLUTION RESPIRATORY (INHALATION) at 14:03

## 2020-02-24 RX ADMIN — SENNOSIDES AND DOCUSATE SODIUM 1 TABLET: 8.6; 5 TABLET ORAL at 08:03

## 2020-02-24 RX ADMIN — POTASSIUM & SODIUM PHOSPHATES POWDER PACK 280-160-250 MG 1 PACKET: 280-160-250 PACK at 08:03

## 2020-02-24 RX ADMIN — IPRATROPIUM BROMIDE AND ALBUTEROL SULFATE 3 ML: .5; 3 SOLUTION RESPIRATORY (INHALATION) at 09:19

## 2020-02-24 RX ADMIN — ONDANSETRON 4 MG: 2 INJECTION INTRAMUSCULAR; INTRAVENOUS at 00:57

## 2020-02-24 RX ADMIN — ONDANSETRON 4 MG: 2 INJECTION INTRAMUSCULAR; INTRAVENOUS at 07:59

## 2020-02-24 RX ADMIN — LORAZEPAM 0.5 MG: 0.5 TABLET ORAL at 15:47

## 2020-02-24 RX ADMIN — SODIUM CHLORIDE, POTASSIUM CHLORIDE, SODIUM LACTATE AND CALCIUM CHLORIDE: 600; 310; 30; 20 INJECTION, SOLUTION INTRAVENOUS at 10:57

## 2020-02-24 RX ADMIN — Medication 40 MG: at 08:03

## 2020-02-24 RX ADMIN — ENOXAPARIN SODIUM 40 MG: 40 INJECTION SUBCUTANEOUS at 08:01

## 2020-02-24 RX ADMIN — MINERAL OIL 1 ENEMA: 118 ENEMA RECTAL at 05:35

## 2020-02-24 RX ADMIN — ACETYLCYSTEINE 4 ML: 100 INHALANT RESPIRATORY (INHALATION) at 14:03

## 2020-02-24 RX ADMIN — ACETYLCYSTEINE 4 ML: 100 INHALANT RESPIRATORY (INHALATION) at 19:58

## 2020-02-24 RX ADMIN — POLYETHYLENE GLYCOL 3350 17 G: 17 POWDER, FOR SOLUTION ORAL at 19:31

## 2020-02-24 RX ADMIN — SODIUM PHOSPHATE, MONOBASIC, MONOHYDRATE AND SODIUM PHOSPHATE, DIBASIC, ANHYDROUS 15 MMOL: 276; 142 INJECTION, SOLUTION INTRAVENOUS at 08:39

## 2020-02-24 RX ADMIN — POLYETHYLENE GLYCOL 3350 17 G: 17 POWDER, FOR SOLUTION ORAL at 08:03

## 2020-02-24 RX ADMIN — ACETYLCYSTEINE 4 ML: 100 INHALANT RESPIRATORY (INHALATION) at 09:19

## 2020-02-24 RX ADMIN — ALLOPURINOL 300 MG: 300 TABLET ORAL at 08:03

## 2020-02-24 RX ADMIN — SENNOSIDES AND DOCUSATE SODIUM 1 TABLET: 8.6; 5 TABLET ORAL at 19:31

## 2020-02-24 RX ADMIN — LORATADINE 10 MG: 10 TABLET ORAL at 08:03

## 2020-02-24 RX ADMIN — PREDNISONE 100 MG: 50 TABLET ORAL at 08:03

## 2020-02-24 ASSESSMENT — ACTIVITIES OF DAILY LIVING (ADL)
ADLS_ACUITY_SCORE: 16

## 2020-02-24 ASSESSMENT — MIFFLIN-ST. JEOR: SCORE: 1428.75

## 2020-02-24 NOTE — PLAN OF CARE
Problem: Nausea and Vomiting (Chemotherapy Effects)  Goal: Fluid and Electrolyte Balance  Outcome: No Change     ICU End of Shift Summary. See flowsheets for vital signs and detailed assessment.    Changes this shift: Pt alert and oriented x 4 and up to chair with assist of two with gait belt and walker. Pt felt quite uncomfortable, so fentanyl gtt increased back to 50 mcg/hr. PRN Zofran given for nausea. No BM since 2/16 - MD notified - abd xray ordered and scheduled bowel meds. One time enema given with no results. Reid removed around 0000 - pt unable to void and bladder scanned for 372 mL - MD notified - straight cathed for 375 mL. No other changes overnight.     Plan: Continue to monitor respiratory status. Repeat enema this AM. Replace Ph. Repeat scan today.

## 2020-02-24 NOTE — PLAN OF CARE
Discharge Planner PT   Patient plan for discharge: Unknown  Current status: Pt on pressure support 40% FIO2. Assisted EOB, standing and then pivoted to chair with min to mod A and walker. Mainly limited by anxiety/dizziness. BP elevated 180s/100s upon initial sitting. Slowly improved/lowered with prolonged sitting/standing.  Barriers to return to prior living situation: Anxiety, deconditioning  Recommendations for discharge: TCU  Rationale for recommendations: Current level of function       Entered by: Prosper Baer 02/24/2020 12:57 PM

## 2020-02-24 NOTE — PROGRESS NOTES
[facility] [place]    Progress Note - MICU Service        Date of Admission:  2/17/2020  Date of Service: 02/24/2020    Assessment & Plan   Chuyita Porter is a 69 year old female admitted on 2/17/2020. She has a history of breast cancer s/p radiation (2005, 2015) and is admitted for planned chemoradiation for bulky mediastinal DLBCL NOS involving thyroid now s/p biopsy and intubation due to threatened airway.      Changes today:  - Continue to closely monitor peak pressures  - Continue Lantus 10U in addition to sliding scale insulin for steroid associated hyperglycemia  - Discontinued mIVF and begin free water flushes 150mL q4h  - Decrease allopurinol from 300 to 100mg daily  - Decrease fentanyl gtt and replace with PRN oxycodone as tolerated  - Bowel regimen: BID miralax, BID senna; PRN enemas; consider lactulose or methylnaltrexone  - not on bedrest -- can cautiously get up to chair/ambulate with assist and special care regarding ETT stability  - Plan for CT Chest on Wednesday 2/26      PLAN:     ===NEURO===     Sedation: Ativan 0.5 mg PO q4h PRN     Analgesia: Fentanyl gtt  -- decrease fentanyl gtt and replace with PRN oxycodone as tolerated   - Oxycodone oral solution 5mg q4h PRN      ===CARDIOVASCULAR===    Bradycardia, improved  SVC Syndrome  Patient with asymptomatic bradycardia 2/. Known SVC syndrome vs Precedex gtt. Stopped precedex gtt 2/20 and hypotension/bradycardia is largely resolved  - If hypotensive, elevate HOB; do not lay patient flat 2/2 known SVC syndrome  - Patient cannot lie flat for procedures at this time 2/2 positional hypotension      ===PULMONARY===     Central airway obstruction  Impending airway obstruction noted during workup for mediastinal mass shown to be B-cell lymphoma on previous FNA (1/30/20). Patient intubated during biopsy and decision to maintain ETT for early stages of treatment. Plan to monitor size of mass to determine when ETT can  be safely removed. Per IP, ETT will need to be removed in controlled setting (?OR) as high risk of airway obstruction. On exam 2/23, deflated ETT with NO cuff leak heard.   - Plan to repeat CT Chest to assess progress on Wednesday 2/26  - Daily CXR, with additional PRN CXR to confirm ETT positioning  - ETT at ~1 cm above ezekiel (tube position marked at 27.5 at the teeth)  - Activity up with assist and careful attention to ETT stability, as ETT displacement will threaten airway     ===GASTROINTESTINAL===     Bowel regimen  Constipation  No bowel movement since 2/16. Abdominal XR on 2/23 showing large stool burden.   - Scheduled Miralax BID  - Scheduled Senna BID  - PRN enema until bowel movement  - Consider methynaltrexone iso fentanyl gtt  - Per HemeOnc, vincristine can cause constipation and lactulose q2-3h with free water via PEG is effective    GI Prophylaxis  - Pantoprazole 40mg daily      Nutrition  - PEG tube placed 2/17  - Nutrition consulted for TF (Nutren 1.5 at 55mL/hr)  - Free water flushes 150 mL q4h      ===RENAL===     Tumor Lysis Syndrome Prophylaxis  Radiation therapy 2/18-2/19. Initiated R-EPOCH 2/21.   - Heme consulted, appreciate recs  - Daily BMP, Phos, Uric Acid, LDH labs   - Cardiac telemetry, okay to discontinue  - Electrolyte replacement protocol  - Allopurinol 300 mg decreased to 100 mg daily on 2/14  - Discontinue mIVF and increased free water flushes as above     ===HEME/ONC===      Stage IIE bulky mediastinal B-cell Lymphoma  8x6.7x10cm anterior mediastinal mass shown to be B-cell lymphoma on previous biopsy (1/30/20), now s/p FNA. Surgical pathology (2/17) shows diffuse large B-cell lymphoma, non-germinal center type. FISH studies for BCL-2, BCL-6, and MYC rearrangement are negative. Per Rad-Onc, no further radiation therapy. Plan to begin chemotherapy on 2/21/2020 via PIV. CT AP for staging prior to initiating chemotherapy completed on 2/20. TTE on 2/19 prior to initiation of  chemotherapy showed normal LV function with LVEF 60-65%. IR consulted re: PICC/port; per IR, no procedure if patient cannot lie flat; will administer chemo via PIV for now.   - R-EPOCH via PIV with plan for eventual port placement  - Doses:    Fosaprepitant//VincristineCyclophosphamide/Doxorubicin: 2/21   Rituximab: 2/22  - Prednisone to 100 mg BID  - Per Heme, when patient no longer requiring MICU, transfer to inpatient malignant heme service  - Plan to CT Chest to assess progress on Wednesday 2.26     ===ENDOCRINE===     Steroid associated hyperglycemia  - High resistance sliding scale insulin   - Lantus 10U daily (2/23- )     ===INFECTIOUS DISEASE===     No acute issues     ===SKIN/MSK===     No acute issues     - PT/OT consulted       Diet: Adult Formula Drip Feeding: Continuous Nutren 1.5; Gastrostomy; Goal Rate: 55; mL/hr; Medication - Feeding Tube Flush Frequency: At least 15-30 mL water before and after medication administration and with tube clogging; Infuse @ 55 mL/hr.    Fluids: 50 ml/hr LR  Lines: PIV, osborn, PEG, ETT  DVT Prophylaxis: Enoxaparin (Lovenox) SQ and Pneumatic Compression Devices  Osborn Catheter: not present  Code Status: DNR      Disposition Plan   Expected discharge: > 7 days, recommended to TBD once extubated, chemotherapy cycles completed per HemoOnc.  Entered: Crystal Chen MD 02/24/2020, 2:59 PM       The patient's care was discussed with the Attending Physician, Dr. Dean.    Crystal Chen MD on 2/21/2020 at 7:10 AM  Internal Medicine PGY-3    ______________________________________________________________________    Interval History   NAEO. Per nursing, recurrent urinary retention requiring straight cath x2 overnight. Still no BM since 2/16. Enema attempted 2/24 early AM, with no BM.     4 point ROS otherwise negative.    Data reviewed today: I reviewed all medications, new labs and imaging results over the last 24 hours.    Objective    Temp: 98  F (36.7  C) Temp  Min: 97.9  F  (36.6  C)  Max: 98.9  F (37.2  C)  Resp: 10 Resp  Min: 10  Max: 26  SpO2: 97 % SpO2  Min: 94 %  Max: 100 %  Heart Rate: 71 Heart Rate  Min: 68  Max: 120  BP: 131/75 Systolic (24hrs), Av , Min:112 , Max:183   Diastolic (24hrs), Av, Min:57, Max:105      I/O last 3 completed shifts:  In: 3953.13 [I.V.:2078.13; NG/GT:555]  Out: 1410 [Urine:1410]    Ventilation Mode: CMV/AC  (Continuous Mandatory Ventilation/ Assist Control)  FiO2 (%): 30 %  Rate Set (breaths/minute): 12 breaths/min  Tidal Volume Set (mL): 400 mL  PEEP (cm H2O): 5 cmH2O  Pressure Support (cm H2O): 7 cmH2O  Oxygen Concentration (%): 30 %  Resp: 10    PIP: 17 up from 14 per chart, but PIP 10 on evaluation at bedside    Physical Exam  Constitutional: awake, alert, interactive  HEENT: ETT in good position, spontaneously breathing on vent   Cardiovascular: RRR. No murmurs, gallops or rub, No edema or JVD.   Respiratory:  Good air movement throughout. No stridor.  Gastrointestinal: Abdomen soft, non-tender. Hypoactive bowel sounds.  Musculoskeletal: Extremities normal with no gross deformities noted   Skin: No suspicious lesions or rashes  Neurologic: moving all extremities, full ROM  Psychiatric: affect bright/appropriate    Labs:    CBC  Recent Labs   Lab 20   WBC 22.3* 25.0* 12.0*   RBC 3.36* 3.64* 3.58*   HGB 10.1* 11.0* 10.8*   HCT 31.8* 34.5* 34.4*   MCV 95 95 96   MCH 30.1 30.2 30.2   MCHC 31.8 31.9 31.4*   RDW 14.0 14.0 14.1    244 271       Coagulation  Recent Labs   Lab 20  0438   INR 1.21*      Recent Labs   Lab 20  0438   PTT 31        Basic Metabolic Panel  Recent Labs   Lab 20  1331    142 140   POTASSIUM 4.2 4.2 3.8   CHLORIDE 106 108 106   CO2 32 33* 32   ANIONGAP 1* 2* 1*   * 124* 148*   BUN 21 20 20   CR 0.54 0.53 0.58   GABI 8.0* 8.1* 7.6*       Imaging      Exam: XR ABDOMEN PORT 2 VW, 2020 1:38 AM     Indication: Last  BM 2/16, concern for ileus vs. SBO, please obtain  supine and decubitus films     Comparison: CT 2/19/2020     Findings:   Single portable AP radiograph of the abdomen. Air distended colon. PEG  tube. No portal venous gas, pneumoperitoneum, or pneumatosis. Lung  bases demonstrate small bilateral pleural effusions and overlying  atelectasis. Heart size is within normal limits. Soft tissues within  normal limits. No acute osseous abnormality.                                                                      Impression:   1. Air distention of large bowel without evidence of small bowel  obstruction.  2. Small bilateral pleural effusions with overlying atelectasis.

## 2020-02-24 NOTE — PROGRESS NOTES
"Otolaryngology Progress Note  February 24, 2020    S: No acute events overnight. Pressure supported for 2 hours yesterday. Able to get out of bed and to chair cautiously. CT chest planned today.    O: BP (!) 147/83   Pulse 70   Temp 98.9  F (37.2  C) (Axillary)   Resp 16   Ht 1.676 m (5' 6\")   Wt 87.7 kg (193 lb 5.5 oz)   SpO2 97%   BMI 31.21 kg/m     General: Sitting up in bed, no acute distress   HEENT: EOMI.    Pulmonary: Breathing non-labored, no stridor, no accessory muscle use, endotracheally intubated with 8.5 reinforced breathing tube secured at 27 cm at the lip. Neck swelling stable, soft.    A/P: Chuyita Porter is a 69 year old female with a past medical history of breast cancer, large right neck mass with pathology demonstrating diffuse B-cell lymphoma now s/p attempted tracheal stent, tracheal mass biopsy, and PEG tube placement 2/17/2020. She remains endotracheally intubated with the tube secured at 27 cm at the lip.     - Steroids per Med Onc, Radiation Oncology, and MICU to help with swelling and potentially decrease tumor while starting treatment.    - Endotracheal tube will need to be carefully maintained in place as she has significant airway collapse/compression and is unable to lay flat. Do not allow patient to self extubate. If she were to self extubate, will need to be reintubated, likely in upright position, use wire reinforced ETT. Laying the patient flat resulted in drop in BP. 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 A tracheostomy will not overcome the extrinsic compression issues due to the distal location.    -- Patient discussed with Dr. Valdez.    Em Cheney MD   Otolaryngology - Head & Neck Surgery   Please page the on-call resident with questions. If after hours, contact ENT with questions by dialing [phone] and entering job code [phone] when prompted.    "

## 2020-02-24 NOTE — PROGRESS NOTES
"Social Work: Assessment with Discharge Plan    Patient Name:  Chuyita Porter  :  1950  Age:  69 year old  MRN:  [id number]  Risk/Complexity Score:  Filed Complexity Screen Score: 6  Completed assessment with:  Pt & pt's  Ranjit    Presenting Information   Reason for Referral:  Length of stay  Date of Intake:  2020  Referral Source:  Chart Review  Decision Maker:  Pt at baseline  Alternate Decision Maker:  Pt's  per [facility] policy  Health Care Directive:  None in chart. Readdress when pt extubated.  Living Situation:  House with . House has several levels.  Previous Functional Status:  Independent  Patient and family understanding of hospitalization:  \"It's the cancer.\"  Cultural/Language/Spiritual Considerations:  Taoist. Pt has supportive Hoahaoism community  Adjustment to Illness:  Appropriate. Pt's  states \"I go home and cry every night because she's not there,\" but otherwise appears in good spirits. Pt intubated so unable to speak, but nodding and shaking head appropriately.    Physical Health  Reason for Admission:    1. Diffuse large B-cell lymphoma of extranodal site (H)    2. Thyroid mass    3. Thyroid mass      Services Needed/Recommended:  Other:  Pending therapy recommendations    Mental Health/Chemical Dependency  Diagnosis:  None  Support/Services in Place:  N/A  Services Needed/Recommended:  N/A    Support System  Significant relationship at present time:  Spouse  Family of origin is available for support:  Yes - 2 adult daughters  Other support available:  Extended family, friends, Hoahaoism community  Gaps in support system:  None  Patient is caregiver to:  None     Provider Information   Primary Care Physician:  Phyllis Juarez   [phone]   Clinic:  [address] / [facility] *      :  N/A    Financial   Income Source:  Not discussed  Financial Concerns:  None identified  Insurance:    Payor/Plan Subscriber " Name Rel Member # Group #   [employer] - [employer]* [facility] [id number]      [address] [address]       Discharge Plan   Patient and family discharge goal:  Home, but open to rehab if necessary  Provided education on discharge plan:  YES  Patient agreeable to discharge plan:  YES. Pt and  indicated that they are open to TCU if needed.  said they have friends and family who have been to rehab in the past, and SW recommended talking to those people to get recommendations for facilities if wanted.  A list of Medicare Certified Facilities was provided to the patient and/or family to encourage patient choice. Patient's choices for facility are:  Premature to provide list. However, pt has [employer] Medicare Advantage, which will limit options for placement.  Education was given to pt/family that star ratings are updated/maintained by Medicare and can be reviewed by visiting www.medicare.gov Yes  Will NH provide Skilled rehabilitation or complex medical:  YES  General information regarding anticipated insurance coverage and possible out of pocket cost was discussed. Patient and patient's family are aware patient may incur the cost of transportation to the facility, pending insurance payment: YES  Barriers to discharge:  Pt remains critically ill in ICU    Discharge Recommendations   Anticipated Disposition:  TBD  Transportation Needs:  Other:  Pending pt needs at discharge  Name of Transportation Company and Phone:  TBD    Additional comments   SW met with pt and pt's  in room. SW introduced self and role of SW in ICU. Pt and  were pleasant and cooperative and open to SW visit. They deny psychosocial concerns at this time. SW will continue to remain available for patient and family support, discharge planning, other resources and support PRN.    Macie Parham, RENETTA, [facility]  ICU    [facility]   P: [phone]  Pager: [phone]

## 2020-02-24 NOTE — PLAN OF CARE
ICU End of Shift Summary. See flowsheets for vital signs and detailed assessment.    Changes this shift: Tolerated PST 7/5 for 2 hours, tiring out at the end. Continues to require straight cath for retention. No BM, scheduled bowel regimen increased. Pivoted to chair, complaining of dizziness with movement.     Plan: Chest CT on Wednesday to evaluate tumor size. Notify MICU team with concerns/changes.

## 2020-02-24 NOTE — CONSULTS
[facility]  Palliative Care Consultation Note    Patient: Chuyita Porter  Date of Admission:  2/17/2020    [facility]ing Clinician / Team:  [facility]  Reason for consult: Patient and family support    Recommendations: Palliative care will continue to follow for support.  -Symptoms appear reasonably controlled at this time.  There are some underlying anxiety regarding the tenuousness of her airway.  She leans heavily into her family support as well as her renita to get through difficult times.  -Seemed affirmed by the optimism of her oncologist and ENT staff that she will respond to treatment.  Awaiting results of CT scheduled today to reevaluate neck/airway restrictive lesion.  -Palliative Religion  to follow    These recommendations have been discussed with       Thank you for the opportunity to participate in the care of this patient and family. Our team: will continue to follow.     During regular M-F work hours -- if you are not sure who specifically to contact -- please contact us by sending a text page to our team consult pager at [phone].    After regular work hours and on weekends/holidays, you can call our answering service at [phone]. Also, who's on call for us is available in [employer].   Yousuf TAN NP ACHPN  Nurse Practitioner- Lead Advanced Practice Provider  [facility] Palliative Medicine Consult Service   [phone]  Total of 45 minutes was spent in patient face-to-face time, with 50 minutes total time spent- the remainder 5 minutes in care coordination and reviewing case with primary team.  Assessments:   Chuyita Porter is a 69 year old female with sx of several months of increasing shortness of breath and activity intolerance, some sense of needing to be careful with swallowing, with a known past medical history of breast cancer, large right neck mass with pathology demonstrating diffuse B-cell lymphoma, now s/p attempted  tracheal stent, tracheal mass biopsy, and PEG tube placement 2/17/2020.The patient has a mediastinal mass with over 75% stenosis of the trachea.  An ET tube has been placed beyond the obstruction. 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 Airway vulnerable to collapse if not intubated. Oncology optimistic that disease will respond to treatment  Today, the patient was seen for PC consult in support of family.    Prognosis, Goals, & Planning:      Functional Status just prior to hospitalization: 1 (Restricted in physically strenuous activity but ambulatory and able to carry out work of a light or sedentary nature)      Prognosis, Goals, and/or Advance Care Planning were addressed today: Yes        Summary/Comments: Patient hopeful that treatments may result in significant symptom management improvement and prolong life expectancy.  Very supportive family.  Has completed healthcare directives and her adult children are aware of her wishes as is her spouse.      Patient's decision making preferences: shared with support from loved ones          Patient has decision-making capacity today for complex decisions: Partial (needs assistance with complex decisions)            I have concerns about the patient/family's health literacy today: No           Patient has a completed Health Care Directive: No.       Code status: DNR    Coping, Meaning, & Spirituality:   Mood, coping, and/or meaning in the context of serious illness were addressed today: Yes  Summary/Comments: Strong renita and family support provides context and meeting to her life.  She enjoys leisure activities of crocheting and crafts.  She and her  enjoy traveling and supporting their granddaughters in their athletic activities.    Social:     Living situation: Lives at [facility] in [place]. 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     History of Present Illness:  History gathered today from: family/loved ones, medical chart  Patient feels well cared for and has good communication with her primary team.  Denies pain or discomfort at rest.  Oral ET tube sometimes causes distress.  She is able to sleep.  Spouse is present and helps keep her relaxed.   Key Palliative Symptom Data:  We are not helping to manage these symptoms currently in this patient.    Patient is on opioids: bowels not assessed today.    ROS:  Comprehensive ROS is reviewed and is negative except as here & per HPI     Past Medical History:  Past Medical History:   Diagnosis Date     Breast cancer (H) 2005     Hx of radiation therapy     2005 +2015        Past Surgical History:  Past Surgical History:   Procedure Laterality Date     ADENOIDECTOMY       BRONCHOSCOPY (RIGID OR FLEXIBLE), DIAGNOSTIC N/A 2/17/2020    Procedure: DIAGNOSTIC BRONCHOSCOPY WITH BIOPSY,.;  Surgeon: Jered Montoya MD;  Location: [facility] OR     ENDOSCOPIC INSERTION TUBE GASTROSTOMY Left 2/17/2020    Procedure: Endoscopic insertion tube gastrostomy;  Surgeon: Jaswinder Parker MD;  Location: [facility] OR     LUMPECTOMY BREAST      RT lunmpectomy 2005 with radiation. Lt lumpectomy 2015 with radiation     TONSILLECTOMY           Family History:  Family History   Problem Relation Age of Onset     Breast Cancer Paternal Aunt         2 aunts who had breast cancer in their 70's         Allergies:  No Known Allergies     Medications:  I have reviewed this patient's medication profile and medications from this hospitalization.    Physical Exam:  Vital Signs: Temp: 98.9  F (37.2  C) Temp src: Axillary BP: 123/72 Pulse: 72 Heart Rate: 70 Resp: 11 SpO2: 97 % O2 Device: Mechanical Ventilator    Weight: 193 lbs 5.49  oz  General appearance: Patient sitting upright in bedside chair with spouse present.  She is able to make needs known either through handwriting or gestures.  HEENT: Orally intubated , large neck mass readily visible.  Symmetric facial features.  Dentition appear to be in good repair.  Respiratory: Good air movement.  Mechanical breath sounds.  No wheeze.  Cardiovascular: RRR.  S1-S2 without murmur.  Pulses regular.  Heart rate in the 70s at rest.  Monitor with sinus rhythm.  Abdomen: Brief exam without focal findings.  Soft nontender hypoactive bowel sounds no masses.  Musculoskeletal: No redness or swelling of exposed joint surfaces.  Appears to have movement of all fours.  Not observed transferring or ambulating.  Integument: No visible or suspicious lesions or rashes on exposed surfaces.  No open areas.  Neurologic: Moving all extremities.  Appears to have full range of motion.  No resting tremor.  Psychiatric: Appears to have reasonable fund of knowledge.  Affect appropriate.  Communicates well with writing materials and with facial gestures.  Has forward thinking goals.  Data reviewed:  ROUTINE LABS (Last four results)  CMP  Recent Labs   Lab 02/24/20  0442 02/23/20  1537 02/23/20  0344 02/22/20  1608 02/22/20  1331 02/22/20  0442 02/22/20  0150  02/21/20  1053  02/17/20  1735     --  142  --  140 140  --    < > 142   < > 137   POTASSIUM 4.2  --  4.2  --  3.8 3.6 3.7   < > 4.2   < > 3.4   CHLORIDE 106  --  108  --  106 106  --    < > 107   < > 106   CO2 32  --  33*  --  32 33*  --    < > 32   < > 29   ANIONGAP 1*  --  2*  --  1* 1*  --    < > 2*   < > 2*   *  --  124*  --  148* 142*  --    < > 130*   < > 102*   BUN 21  --  20  --  20 22  --    < > 23   < > 14   CR 0.54  --  0.53  --  0.58 0.62  --    < > 0.60   < > 0.80   GFRESTIMATED >90  --  >90  --  >90 >90  --    < > >90   < > 76   GFRESTBLACK >90  --  >90  --  >90 >90  --    < > >90   < > 88   GABI 8.0*  --  8.1*  --  7.6* 8.1*  --    < > 8.6    < > 8.8   MAG  --   --   --   --   --   --  2.2  --   --   --  2.2   PHOS 2.4* 2.8 2.1* 2.6  --  1.6*  --    < > 2.0*   < > 2.3*   PROTTOTAL  --   --   --   --   --  5.4*  --   --  6.2*  --  6.5*   ALBUMIN  --   --   --   --   --  2.2*  --   --  2.6*  --  3.0*   BILITOTAL  --   --   --   --   --  0.2  --   --  0.4  --  0.3   ALKPHOS  --   --   --   --   --  125  --   --  124  --  88   AST  --   --   --   --   --  52*  --   --  88*  --  21   ALT  --   --   --   --   --  86*  --   --  102*  --  20    < > = values in this interval not displayed.     CBC  Recent Labs   Lab 02/24/20  0442 02/23/20  0344 02/22/20  0442 02/21/20  1053   WBC 22.3* 25.0* 12.0* 13.5*   RBC 3.36* 3.64* 3.58* 3.77*   HGB 10.1* 11.0* 10.8* 11.5*   HCT 31.8* 34.5* 34.4* 36.5   MCV 95 95 96 97   MCH 30.1 30.2 30.2 30.5   MCHC 31.8 31.9 31.4* 31.5   RDW 14.0 14.0 14.1 14.9    244 271 332     INR  Recent Labs   Lab 02/19/20  0438   INR 1.21*     Arterial Blood Gas  Recent Labs   Lab 02/17/20  1826   PH 7.43   PCO2 35   PO2 210*   HCO3 23   O2PER 50.0

## 2020-02-25 ENCOUNTER — APPOINTMENT (OUTPATIENT)
Dept: GENERAL RADIOLOGY | Facility: CLINIC | Age: 70
DRG: 840 | End: 2020-02-25
Attending: PHYSICIAN ASSISTANT
Payer: COMMERCIAL

## 2020-02-25 ENCOUNTER — APPOINTMENT (OUTPATIENT)
Dept: PHYSICAL THERAPY | Facility: CLINIC | Age: 70
DRG: 840 | End: 2020-02-25
Attending: OTOLARYNGOLOGY
Payer: COMMERCIAL

## 2020-02-25 ENCOUNTER — APPOINTMENT (OUTPATIENT)
Dept: CT IMAGING | Facility: CLINIC | Age: 70
DRG: 840 | End: 2020-02-25
Attending: STUDENT IN AN ORGANIZED HEALTH CARE EDUCATION/TRAINING PROGRAM
Payer: COMMERCIAL

## 2020-02-25 ENCOUNTER — APPOINTMENT (OUTPATIENT)
Dept: GENERAL RADIOLOGY | Facility: CLINIC | Age: 70
DRG: 840 | End: 2020-02-25
Attending: STUDENT IN AN ORGANIZED HEALTH CARE EDUCATION/TRAINING PROGRAM
Payer: COMMERCIAL

## 2020-02-25 DIAGNOSIS — C83.398 DIFFUSE LARGE B-CELL LYMPHOMA OF EXTRANODAL SITE: Primary | ICD-10-CM

## 2020-02-25 LAB
ANION GAP SERPL CALCULATED.3IONS-SCNC: <1 MMOL/L (ref 3–14)
BASOPHILS # BLD AUTO: 0 10E9/L (ref 0–0.2)
BASOPHILS NFR BLD AUTO: 0.2 %
BUN SERPL-MCNC: 20 MG/DL (ref 7–30)
CALCIUM SERPL-MCNC: 7.9 MG/DL (ref 8.5–10.1)
CHLORIDE SERPL-SCNC: 102 MMOL/L (ref 94–109)
CO2 SERPL-SCNC: 35 MMOL/L (ref 20–32)
CREAT SERPL-MCNC: 0.48 MG/DL (ref 0.52–1.04)
DIFFERENTIAL METHOD BLD: ABNORMAL
EOSINOPHIL # BLD AUTO: 0 10E9/L (ref 0–0.7)
EOSINOPHIL NFR BLD AUTO: 0 %
ERYTHROCYTE [DISTWIDTH] IN BLOOD BY AUTOMATED COUNT: 14 % (ref 10–15)
GFR SERPL CREATININE-BSD FRML MDRD: >90 ML/MIN/{1.73_M2}
GLUCOSE BLDC GLUCOMTR-MCNC: 107 MG/DL (ref 70–99)
GLUCOSE BLDC GLUCOMTR-MCNC: 117 MG/DL (ref 70–99)
GLUCOSE BLDC GLUCOMTR-MCNC: 123 MG/DL (ref 70–99)
GLUCOSE BLDC GLUCOMTR-MCNC: 125 MG/DL (ref 70–99)
GLUCOSE BLDC GLUCOMTR-MCNC: 138 MG/DL (ref 70–99)
GLUCOSE BLDC GLUCOMTR-MCNC: 151 MG/DL (ref 70–99)
GLUCOSE BLDC GLUCOMTR-MCNC: 96 MG/DL (ref 70–99)
GLUCOSE SERPL-MCNC: 114 MG/DL (ref 70–99)
HCT VFR BLD AUTO: 30.4 % (ref 35–47)
HGB BLD-MCNC: 9.8 G/DL (ref 11.7–15.7)
IMM GRANULOCYTES # BLD: 0.2 10E9/L (ref 0–0.4)
IMM GRANULOCYTES NFR BLD: 1.3 %
LDH SERPL L TO P-CCNC: 183 U/L (ref 81–234)
LYMPHOCYTES # BLD AUTO: 0.2 10E9/L (ref 0.8–5.3)
LYMPHOCYTES NFR BLD AUTO: 1.4 %
MCH RBC QN AUTO: 30.2 PG (ref 26.5–33)
MCHC RBC AUTO-ENTMCNC: 32.2 G/DL (ref 31.5–36.5)
MCV RBC AUTO: 94 FL (ref 78–100)
MONOCYTES # BLD AUTO: 0.1 10E9/L (ref 0–1.3)
MONOCYTES NFR BLD AUTO: 0.6 %
NEUTROPHILS # BLD AUTO: 15.4 10E9/L (ref 1.6–8.3)
NEUTROPHILS NFR BLD AUTO: 96.5 %
NRBC # BLD AUTO: 0 10*3/UL
NRBC BLD AUTO-RTO: 0 /100
PHOSPHATE SERPL-MCNC: 2.5 MG/DL (ref 2.5–4.5)
PLATELET # BLD AUTO: 172 10E9/L (ref 150–450)
POTASSIUM SERPL-SCNC: 4.6 MMOL/L (ref 3.4–5.3)
RADIOLOGIST FLAGS: ABNORMAL
RBC # BLD AUTO: 3.24 10E12/L (ref 3.8–5.2)
SODIUM SERPL-SCNC: 136 MMOL/L (ref 133–144)
URATE SERPL-MCNC: 1.9 MG/DL (ref 2.6–6)
WBC # BLD AUTO: 15.9 10E9/L (ref 4–11)

## 2020-02-25 PROCEDURE — 71045 X-RAY EXAM CHEST 1 VIEW: CPT

## 2020-02-25 PROCEDURE — 25000132 ZZH RX MED GY IP 250 OP 250 PS 637: Performed by: STUDENT IN AN ORGANIZED HEALTH CARE EDUCATION/TRAINING PROGRAM

## 2020-02-25 PROCEDURE — 25000132 ZZH RX MED GY IP 250 OP 250 PS 637: Performed by: PHYSICIAN ASSISTANT

## 2020-02-25 PROCEDURE — 80048 BASIC METABOLIC PNL TOTAL CA: CPT | Performed by: INTERNAL MEDICINE

## 2020-02-25 PROCEDURE — 25000128 H RX IP 250 OP 636: Performed by: STUDENT IN AN ORGANIZED HEALTH CARE EDUCATION/TRAINING PROGRAM

## 2020-02-25 PROCEDURE — 25000125 ZZHC RX 250: Performed by: STUDENT IN AN ORGANIZED HEALTH CARE EDUCATION/TRAINING PROGRAM

## 2020-02-25 PROCEDURE — 25000131 ZZH RX MED GY IP 250 OP 636 PS 637: Performed by: HOSPITALIST

## 2020-02-25 PROCEDURE — 99291 CRITICAL CARE FIRST HOUR: CPT | Performed by: INTERNAL MEDICINE

## 2020-02-25 PROCEDURE — 97110 THERAPEUTIC EXERCISES: CPT | Mod: GP

## 2020-02-25 PROCEDURE — 82962 GLUCOSE BLOOD TEST: CPT

## 2020-02-25 PROCEDURE — 94640 AIRWAY INHALATION TREATMENT: CPT | Mod: 76

## 2020-02-25 PROCEDURE — 20000004 ZZH R&B ICU UMMC

## 2020-02-25 PROCEDURE — 71260 CT THORAX DX C+: CPT

## 2020-02-25 PROCEDURE — 40000275 ZZH STATISTIC RCP TIME EA 10 MIN

## 2020-02-25 PROCEDURE — 94640 AIRWAY INHALATION TREATMENT: CPT

## 2020-02-25 PROCEDURE — 36415 COLL VENOUS BLD VENIPUNCTURE: CPT | Performed by: INTERNAL MEDICINE

## 2020-02-25 PROCEDURE — 85025 COMPLETE CBC W/AUTO DIFF WBC: CPT | Performed by: INTERNAL MEDICINE

## 2020-02-25 PROCEDURE — 84100 ASSAY OF PHOSPHORUS: CPT | Performed by: INTERNAL MEDICINE

## 2020-02-25 PROCEDURE — 40000809 ZZH STATISTIC NO DOCUMENTATION TO SUPPORT CHARGE

## 2020-02-25 PROCEDURE — 70491 CT SOFT TISSUE NECK W/DYE: CPT

## 2020-02-25 PROCEDURE — 74018 RADEX ABDOMEN 1 VIEW: CPT

## 2020-02-25 PROCEDURE — 99291 CRITICAL CARE FIRST HOUR: CPT | Performed by: PHYSICIAN ASSISTANT

## 2020-02-25 PROCEDURE — 40000281 ZZH STATISTIC TRANSPORT TIME EA 15 MIN

## 2020-02-25 PROCEDURE — 94003 VENT MGMT INPAT SUBQ DAY: CPT

## 2020-02-25 PROCEDURE — 83615 LACTATE (LD) (LDH) ENZYME: CPT | Performed by: INTERNAL MEDICINE

## 2020-02-25 PROCEDURE — 25800030 ZZH RX IP 258 OP 636: Performed by: PHYSICIAN ASSISTANT

## 2020-02-25 PROCEDURE — 25000132 ZZH RX MED GY IP 250 OP 250 PS 637: Performed by: ANESTHESIOLOGY

## 2020-02-25 PROCEDURE — 84550 ASSAY OF BLOOD/URIC ACID: CPT | Performed by: INTERNAL MEDICINE

## 2020-02-25 PROCEDURE — 27210429 ZZH NUTRITION PRODUCT INTERMEDIATE LITER

## 2020-02-25 RX ORDER — BISACODYL 10 MG
10 SUPPOSITORY, RECTAL RECTAL DAILY PRN
Status: DISCONTINUED | OUTPATIENT
Start: 2020-02-25 | End: 2020-03-06 | Stop reason: HOSPADM

## 2020-02-25 RX ORDER — IOPAMIDOL 755 MG/ML
100 INJECTION, SOLUTION INTRAVASCULAR ONCE
Status: COMPLETED | OUTPATIENT
Start: 2020-02-25 | End: 2020-02-25

## 2020-02-25 RX ADMIN — ACETYLCYSTEINE 4 ML: 100 INHALANT RESPIRATORY (INHALATION) at 07:34

## 2020-02-25 RX ADMIN — IOPAMIDOL 100 ML: 755 INJECTION, SOLUTION INTRAVENOUS at 11:45

## 2020-02-25 RX ADMIN — ENOXAPARIN SODIUM 40 MG: 40 INJECTION SUBCUTANEOUS at 07:43

## 2020-02-25 RX ADMIN — SENNOSIDES AND DOCUSATE SODIUM 1 TABLET: 8.6; 5 TABLET ORAL at 07:43

## 2020-02-25 RX ADMIN — ALBUTEROL SULFATE 2.5 MG: 2.5 SOLUTION RESPIRATORY (INHALATION) at 12:36

## 2020-02-25 RX ADMIN — SODIUM CHLORIDE 500 ML: 9 INJECTION, SOLUTION INTRAVENOUS at 10:10

## 2020-02-25 RX ADMIN — POLYETHYLENE GLYCOL 3350 17 G: 17 POWDER, FOR SOLUTION ORAL at 19:50

## 2020-02-25 RX ADMIN — LORAZEPAM 0.5 MG: 0.5 TABLET ORAL at 11:06

## 2020-02-25 RX ADMIN — PREDNISONE 100 MG: 50 TABLET ORAL at 07:43

## 2020-02-25 RX ADMIN — ACETYLCYSTEINE 4 ML: 100 INHALANT RESPIRATORY (INHALATION) at 12:37

## 2020-02-25 RX ADMIN — IPRATROPIUM BROMIDE AND ALBUTEROL SULFATE 3 ML: .5; 3 SOLUTION RESPIRATORY (INHALATION) at 19:38

## 2020-02-25 RX ADMIN — BISACODYL 10 MG: 10 SUPPOSITORY RECTAL at 12:45

## 2020-02-25 RX ADMIN — GUAIFENESIN AND CODEINE PHOSPHATE 5 ML: 10; 100 LIQUID ORAL at 12:53

## 2020-02-25 RX ADMIN — ACETYLCYSTEINE 4 ML: 100 INHALANT RESPIRATORY (INHALATION) at 19:38

## 2020-02-25 RX ADMIN — Medication 40 MG: at 07:43

## 2020-02-25 RX ADMIN — ALBUTEROL SULFATE 2.5 MG: 2.5 SOLUTION RESPIRATORY (INHALATION) at 07:34

## 2020-02-25 RX ADMIN — SENNOSIDES AND DOCUSATE SODIUM 1 TABLET: 8.6; 5 TABLET ORAL at 19:50

## 2020-02-25 RX ADMIN — OXYCODONE HYDROCHLORIDE 5 MG: 5 SOLUTION ORAL at 07:43

## 2020-02-25 RX ADMIN — LORATADINE 10 MG: 10 TABLET ORAL at 07:43

## 2020-02-25 RX ADMIN — LORAZEPAM 0.5 MG: 0.5 TABLET ORAL at 18:12

## 2020-02-25 RX ADMIN — ALLOPURINOL 100 MG: 100 TABLET ORAL at 07:43

## 2020-02-25 RX ADMIN — POLYETHYLENE GLYCOL 3350 17 G: 17 POWDER, FOR SOLUTION ORAL at 07:43

## 2020-02-25 RX ADMIN — ONDANSETRON 4 MG: 2 INJECTION INTRAMUSCULAR; INTRAVENOUS at 19:50

## 2020-02-25 ASSESSMENT — ACTIVITIES OF DAILY LIVING (ADL)
ADLS_ACUITY_SCORE: 16

## 2020-02-25 ASSESSMENT — MIFFLIN-ST. JEOR: SCORE: 1441.75

## 2020-02-25 NOTE — PROGRESS NOTES
"Otolaryngology Progress Note  February 25, 2020    S: No acute events overnight. Pressure supported for 2 hours yesterday. Able to get out of bed and to chair cautiously. Planning for CT chest pending ability to lie flat. This morning patient without complaints.     O: /74   Pulse 72   Temp 98.2  F (36.8  C) (Axillary)   Resp 12   Ht 1.676 m (5' 6\")   Wt 87.7 kg (193 lb 5.5 oz)   SpO2 99%   BMI 31.21 kg/m     General: Sitting up in bed, no acute distress   HEENT: EOMI.    Pulmonary: Breathing non-labored, no stridor, no accessory muscle use, endotracheally intubated with 8.5 reinforced breathing tube secured at 27.5 cm at the lip. Neck swelling stable, soft.    A/P: Chuyita Porter is a 69 year old female with a past medical history of breast cancer, large right neck mass with pathology demonstrating diffuse B-cell lymphoma now s/p attempted tracheal stent, tracheal mass biopsy, and PEG tube placement 2/17/2020. She remains endotracheally intubated with the tube secured at 27.5 cm at the lip.     - Steroids per Med Onc, Radiation Oncology, and MICU to help with swelling and potentially decrease tumor while starting treatment.    - Endotracheal tube will need to be carefully maintained in place as she has significant airway collapse/compression and is unable to lay flat. Do not allow patient to self extubate. If she were to self extubate, will need to be reintubated, likely in upright position, use wire reinforced ETT. Laying the patient flat resulted in drop in BP. 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 A tracheostomy will not overcome the extrinsic compression issues due to the distal location.    -- Patient will be discussed with Dr. Valdez.    Em Cheney MD   Otolaryngology - Head & Neck Surgery   Please page the on-call resident with questions. If after hours, contact ENT with questions by dialing [phone] and entering job code [phone] when prompted.    "

## 2020-02-25 NOTE — PLAN OF CARE
OT/4C - OT previously holding due to bedrest orders. Pt now with updated activity orders and has started pivoting to the chair with PT. Pt would likely benefit from initiation of OT services. Will check back and/or reschedule as appropriate.

## 2020-02-25 NOTE — PROGRESS NOTES
I rounded on the patient on 2/25/2020. Patient quite anxious this am about the CT scan. I spent time providing reassurance to her this morning.     On exam patient is tearful. Remains on vent. ETT in place in good position.    Continue airway cares as previously recommended. F/u CT scan for airway findings. Ideally will get extubated in coming days to reduce injury to vocal cords from prolonged intubation as a tracheostomy will not bypass the compression issue.    Kiersten Valdez MD    Department of Otolaryngology    I agree with the resident's note from the same day except where it differs from my own.

## 2020-02-25 NOTE — PLAN OF CARE
Discharge Planner PT   Patient plan for discharge: Unknown  Current status: Pt A/Ox4, intubated VC/AC, PEEP 5, 30% FiO2. Completed supine B LE and UE exercises x 20 repetitions. All VSS.  Barriers to return to prior living situation: Anxiety, deconditioning, medical status  Recommendations for discharge: TCU  Rationale for recommendations: Current level of function; pt will continue to benefit from therapy services to increase functional mobility and independence.        Entered by: Macie Woodall 02/25/2020 9:46 AM

## 2020-02-25 NOTE — PLAN OF CARE
ICU End of Shift Summary. See flowsheets for vital signs and detailed assessment.    Changes this shift: ETT retracted 2cm per MICU MD, no follow-up xray odered. Currently comfortable on PST 7/5 since 1640. Tolerated laying flat for CT without hypotension or bradycardia. Fentanyl gtt off, denies pain but coughs frequently and strongly against ETT, lidocaine neb available. Anxiety controlled with PRN Ativan. PRN suppository given in addition to scheduled bowel meds, only smears resulted. Abdomen slightly more distended, patient denies pain/discomfort and has active bowel sounds, abdominal xray obtained.     Plan: Considering extubation tomorrow 2/26 in the OR. Notify MICU team with concerns/changes.

## 2020-02-25 NOTE — PROGRESS NOTES
Interventional Pulmonology Update:  Chuyita Porter had a repeat CT today which demonstrates significant interval reduction in size of the anterior mediastinal mass. There is still some apparent mass effect on the proximal 1/3 of the trachea; difficult to determine the full extent with a reinforced endotracheal tube in place. Still unclear if she will require tracheal stenting. I agree it makes sense to extubate Chuyita in the OR with interventional pulmonology. During extubation we can examine the trachea and make a determination in real time if stenting is necessary prior to return to the ICU. A case request for the OR has been sent and final timing is still TBD. Primary team updated of plan.        Herrera Dean MD, 2/25/2020, 2:26 PM  Interventional Pulmonology Fellow  Department of Pulmonary, Allergy, Critical Care & Sleep Medicine   Pager: [phone]

## 2020-02-25 NOTE — PROGRESS NOTES
"Hematology&Oncology Progress Note:  Chuyita Porter    Date: 02/25/2020  Admission Date: 2/17/2020  Primary Heme/Oncologist:    INTERVAL HISTORY:   Continues to be on ventilator.  No fever or chills and VSS. Doing fine overall. Awake and communicable by board writing.   She had small BM.   Her WBC is down form 22.3 to 15.9 K.   CT chest and neck showed improvement in mass size from 8x7x6 cm to approximately 5.5 x 4.5 x 6.1 cm within the upper mediastinum.     ROS: Negative other than as stated in above interval history.  PHYSICAL EXAM:  /69   Pulse 72   Temp 98.9  F (37.2  C) (Axillary)   Resp 13   Ht 1.676 m (5' 6\")   Wt 89 kg (196 lb 3.4 oz)   SpO2 97%   BMI 31.67 kg/m    Wt Readings from Last 3 Encounters:   02/25/20 89 kg (196 lb 3.4 oz)   02/14/20 75.3 kg (166 lb 1.6 oz)   02/14/20 75.3 kg (165 lb 14.4 oz)     General:  no acute distress. Intubated. PEG and osborn.   Heme/Lymph: No overt bleeding. No cervical or clavicular adenopathy.  HEENT: NCAT. PERRL, EOMI, anicteric sclera. ETT.   Neck: supple. No JVD.  Lungs clear to auscultation bilaterally.  CVS: RRR. No murmur or rub.   Abd: Soft, NT, ND, BS+. No palpable masses or organomegaly. PEG tube in place.   Skin: No rash.  EXTs: No edema.   Neurologic: A&O x 3.      Current Facility-Administered Medications   Medication     acetylcysteine (MUCOMYST) 10 % nebulizer solution 4 mL     albuterol (PROVENTIL) neb solution 2.5 mg     allopurinol (ZYLOPRIM) tablet 100 mg     artificial saliva (BIOTENE MT) solution 2 spray     bisacodyl (DULCOLAX) Suppository 10 mg     dextrose 10% infusion     dextrose 5 % flush PRE/POST medication     dextrose 5 % flush PRE/POST medication     glucose gel 15-30 g    Or     dextrose 50 % injection 25-50 mL    Or     glucagon injection 1 mg     enoxaparin ANTICOAGULANT (LOVENOX) injection 40 mg     EPINEPHrine PF (ADRENALIN) injection 0.3 mg     fentaNYL (SUBLIMAZE) infusion     [Held by provider] filgrastim 15 mcg/mL (in " Dextrose) (NEUPOGEN) infusion 400 mcg     guaiFENesin-codeine (ROBITUSSIN AC) 100-10 MG/5ML solution 5 mL     heparin lock flush 10 UNIT/ML injection 2-5 mL     insulin aspart (NovoLOG) injection (RAPID ACTING)     ipratropium - albuterol 0.5 mg/2.5 mg/3 mL (DUONEB) neb solution 3 mL     lidocaine (LMX4) cream     lidocaine 1 % 0.1-1 mL     loratadine (CLARITIN) tablet 10 mg     LORazepam (ATIVAN) injection 0.5-1 mg     LORazepam (ATIVAN) tablet 0.5 mg     LORazepam (ATIVAN) tablet 0.5-1 mg     magnesium sulfate 4 g in 100 mL sterile water (premade)     MEDICATION INSTRUCTION     meperidine (DEMEROL) injection 25 mg     naloxone (NARCAN) injection 0.1-0.4 mg     ondansetron (ZOFRAN-ODT) ODT tab 4 mg    Or     ondansetron (ZOFRAN) injection 4 mg     oxyCODONE (ROXICODONE) solution 5 mg     pantoprazole (PROTONIX) EC tablet 40 mg    Or     pantoprazole (PROTONIX) 2 mg/mL suspension 40 mg     polyethylene glycol (MIRALAX) Packet 17 g     potassium chloride (KLOR-CON) Packet 20-40 mEq     potassium chloride 10 mEq in 100 mL intermittent infusion with 10 mg lidocaine     potassium chloride 10 mEq in 100 mL sterile water intermittent infusion (premix)     potassium chloride 20 mEq in 50 mL intermittent infusion     potassium chloride ER (KLOR-CON M) CR tablet 20-40 mEq     prochlorperazine (COMPAZINE) injection 5-10 mg     prochlorperazine (COMPAZINE) tablet 5-10 mg     senna-docusate (SENOKOT-S/PERICOLACE) 8.6-50 MG per tablet 1 tablet     sodium chloride (PF) 0.9% PF flush 3 mL     sodium chloride (PF) 0.9% PF flush 3 mL     sodium chloride (PF) 0.9% PF flush 5-50 mL     sodium chloride 0.9% infusion     sodium phosphate 15 mmol in D5W intermittent infusion     sodium phosphate 20 mmol in D5W intermittent infusion     sodium phosphate 25 mmol in D5W intermittent infusion       LABS:  CBC  Recent Labs   Lab 02/25/20  0313 02/24/20  0442 02/23/20  0344 02/22/20  0442   WBC 15.9* 22.3* 25.0* 12.0*   RBC 3.24* 3.36* 3.64*  3.58*   HGB 9.8* 10.1* 11.0* 10.8*   HCT 30.4* 31.8* 34.5* 34.4*   MCV 94 95 95 96   MCH 30.2 30.1 30.2 30.2   MCHC 32.2 31.8 31.9 31.4*   RDW 14.0 14.0 14.0 14.1    197 244 271     CMP  Recent Labs   Lab 02/25/20  0313 02/24/20  0442 02/23/20  1537 02/23/20  0344  02/22/20  1331 02/22/20  0442 02/22/20  0150  02/21/20  1053    140  --  142  --  140 140  --    < > 142   POTASSIUM 4.6 4.2  --  4.2  --  3.8 3.6 3.7   < > 4.2   CHLORIDE 102 106  --  108  --  106 106  --    < > 107   CO2 35* 32  --  33*  --  32 33*  --    < > 32   ANIONGAP <1* 1*  --  2*  --  1* 1*  --    < > 2*   * 140*  --  124*  --  148* 142*  --    < > 130*   BUN 20 21  --  20  --  20 22  --    < > 23   CR 0.48* 0.54  --  0.53  --  0.58 0.62  --    < > 0.60   GFRESTIMATED >90 >90  --  >90  --  >90 >90  --    < > >90   GFRESTBLACK >90 >90  --  >90  --  >90 >90  --    < > >90   GABI 7.9* 8.0*  --  8.1*  --  7.6* 8.1*  --    < > 8.6   MAG  --   --   --   --   --   --   --  2.2  --   --    PHOS 2.5 2.4* 2.8 2.1*   < >  --  1.6*  --    < > 2.0*   PROTTOTAL  --   --   --   --   --   --  5.4*  --   --  6.2*   ALBUMIN  --   --   --   --   --   --  2.2*  --   --  2.6*   BILITOTAL  --   --   --   --   --   --  0.2  --   --  0.4   ALKPHOS  --   --   --   --   --   --  125  --   --  124   AST  --   --   --   --   --   --  52*  --   --  88*   ALT  --   --   --   --   --   --  86*  --   --  102*    < > = values in this interval not displayed.     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  FNA of right thyroid suggests B-cell lymphoma.  This was confirmed by endobronchial biopsy (taken 2/17/20). Patient went to the OR on 2/17/2020 where she  had PEG tube and elective ET tube to protect her airway (  tracheal tube was risky).  Patient case was discussed in ENT tumor board and plan to have 10 sessions of radiotherapy.   Patient received radiation only 2 sessions on 2/17-18. However oncology was consulted patient started on R-CHOP 2/21/ 2020.     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   - 2/14/20 CT chest showed 8.0 x 6.7 x 10.0 cm mass centered in the right anterior mediastinum, appearing to arise from the right lobe of the thyroid gland. Mass effect within the mediastinum, including compression of  the SVC with associated collateral vessels seen in the neck.  - No mediastinal LN involvement   - 2/19 CT abdomen and pelvis unremarkable. PET scan is preferable but could not have it given patient on ventilator   -  Unilateral bone marrow biopsy is still considered at some point for staging.   - IR consulted for port placement however, could not have for H/o radiation and mass effect on her chest.   - Has IV peripheral   - Coag (INR, aPTT, fibrinogen)  WNL   -  HIV and Hep panel negative   - Echo:EF of 60-65%, no valve issues.  - FISH studies for BCL-2, BCL-6, and MYC rearrangement are negative.  - Dex 4 mg q 6h was started then changed to prednisone 100 mg bid on 2/18/20, pred completed on day5.   - R-CHOP started 2/21/20. Rituximab scheduled 2/22/2020.   - Issues in having a port and instead we consider Midline.  Patient can not have PICC line in her extremities giving previous radiation for breast cancer.  - 2/25 CT chest and neck showed improvement in mass size from 8.0 x 6.7 x 10.0 cm to approximately 5.5 x 4.5 x 6.1 cm within the upper mediastinum.   - 2/23 ICU team tried to deflate cuff of ET tube and was no leak likely for small tube or still pressure from the mass effect.      Leukocytosis   - WBC improving  - No sign/sym of infection and leukocytosis  is likely from G-CSF  - 2/23 held G-CSF.     OI PPx:   - none so far    #TLS prophylaxis:   - TLS labs (BMP, Phos, uric acid) daily   - Allopurinol 100mg   - IV fluid discontinued     # Airway obstruction 2/2 to mediastinal mass  -  now intubated    # History of right breast cancer in 2005 and left breast cancer in 2015  - Would hold patient's home anastrozole for now due to increased VTE risk currently.      GI Prophylaxis  - Pantoprazole 40mg daily     Constipation   - on Mirlax and senna-S  - Have lactulose for now till have BM  - Patient on fentanyl drip and may opoid related at some degree, Relistor is suggested tomorrow if she did not have BM.      Nutrition  PEG tube placed 2/17. Nutrition consulted for TF.     Patient is seen and examined by Rosy Ellis and I.  Assessment and plan are discussed and delivered to the patient.     Latha Pickett MD  Hematology&Oncology Fellow  Pager: [phone]     Attending note.   I have independently seen and examined the patient, reviewed the laboratory results and imaging and I have discussed todays' plan with the team of midlevel provider/fellow. My independent assessment and critical recommendations for the patient's care today are summarized below:       Mrs. Fernandez has newly diagnosed DLBCL non-GCB involving thyroid and anterior mediastinum. No myc and blc-2 re-arrangement.   Did well with  CHOP chemotherapy and Rituximab per SOC. Hope she can extubate in 2-3 days . Plan to decrease DXM dose.     Overall her prognosis is very good. Cont enteral feeding,.   Plan CT neck+chest on Tuesday.   No TLS, ok to stop checking. Low risk now.     Rosy Sprague MD  Associate Professor of Medicine  [phone]

## 2020-02-25 NOTE — PROGRESS NOTES
MICU PROGRESS NOTE  02/25/2020        Date of Service (when I saw the patient): 02/25/2020    ASSESSMENT: Chuyita Porter is a 69 year old female admitted on 2/17/2020. She has a history of breast cancer s/p radiation (2005, 2015) and is admitted for planned chemoradiation for bulky mediastinal DLBCL NOS involving thyroid now s/p biopsy and intubation due to threatened airway    CHANGES and MAJOR THINGS TODAY:   -  bolus now  - X-ray abdomen for evaluation of constipation   - CT chest for evaluation of mediastinal mass effect.   - Tap water enema  - Lactulose after x-ray   - Hold fentanyl gtt.     PLAN:    Neurological:  # Sedation-  GCS of 15, RASS 0. Alert and oriented. Ativan 0.5 mg PO q4h PRN   # Pain: Had been on fentanyl gtt, currently on hold.   - Monitor neurological status. Delirium prevention and precautions.   Pulmonary:  #  Central Airway Obstruction   - Impending airway obstruction noted during workup for mediastinal mass shown to be B-cell lymphoma on previous FNA (1/30/20). Patient intubated during biopsy and decision to maintain ETT for early stages of treatment. Plan to monitor size of mass to determine when ETT can be safely removed. Per IP, ETT will need to be removed in controlled setting (?OR) as high risk of airway obstruction. On exam 2/23, deflated ETT with NO cuff leak heard.   - Plan to repeat CT Chest and neck soft tissue to assess progress on Wednesday 2/25  - Daily CXR, with additional PRN CXR to confirm ETT positioning  - ETT at ~1 cm above ezekiel (tube position marked at 27.5 at the teeth)  - Activity up with assist and careful attention to ETT stability, as ETT displacement will threaten airway     - VENT: CMV/AC: 12/400/5/30%  - Vent plan:   - Ventilatory bundle, HOB elevation for VAP prevention.     Cardiovascular:    #  SVC Syndrome   # Bradycardia -  Resolved. She had asymptomatic Suspect 2/2 precedex  - If hypotensive, elevate HOB; do not lay patient flat 2/2 known SVC  syndrome  - Patient cannot lie flat for procedures at this time 2/2 positional hypotension      - Last Echo:   Interpretation Summary:   Global and regional left ventricular function is normal with an EF of 60-65%.  Right ventricular function, chamber size, wall motion, and thickness are  normal.  No pericardial effusion is present.  Previous study not available for comparison.  - Monitor hemodynamic status. .     GI/Nutrition:    # Constipation  No bowel movement since 2/16. Abdominal XR on 2/23 showing large stool burden  - Repeat X-ray abdomen now  - Tap water enema .   - Scheduled Miralax BID  - Scheduled Senna BID  -  Consider methynaltrexone iso fentanyl gtt  - Per HemeOnc, vincristine can cause constipation and lactulose q2-3h with free water via PEG is effective    # Protein calorie deficit malnutrition   - Tube feeds: Nutren 1.5 55cc/hr.  Free water 150 q4h.   - Appreciate RD following   Adult Formula Drip Feeding: Continuous Nutren 1.5; Gastrostomy; Goal Rate: 55; mL/hr; Medication - Feeding Tube Flush Frequency: At least 15-30 mL water before and after medication administration and with tube clogging; Infuse @ 55 mL/hr     GI Prophylaxis  - Pantoprazole 40mg daily     Fluids/Electrolytes:  # No acute issues   - No  for IV fluid hydration.   - electrolyte replacement protocol in place.     Renal:   # Tumor Lysis Syndrome Prophylaxis  Radiation therapy 2/18-2/19. Initiated R-EPOCH 2/21.   - Appreciate hematology/onology following.   - Daily BMP, Phos, Uric Acid, LDH labs   - Cardiac telemetry, okay to discontinue  - Electrolyte replacement protocol  - Continue allopurinol 100 mg daily, decreased from 300 mg on 2/14/20    Intake/Output Summary (Last 24 hours) at 2/25/2020 1024  Last data filed at 2/25/2020 1000  Gross per 24 hour   Intake 3642.13 ml   Output 2670 ml   Net 972.13 ml       Endocrine:  # Steroid induced hyperglycemia   - Sliding scale for glucose management. Goal to keep BG < 180 for optimal  healing   -  Infectious disease:   # Leukocytosis - steroid associated   - No indications for antibiotics.  - Monitor for s/sx of infection.      Hematology/Oncology:    # Stage IIE bulky mediastinal B-cell Lymphoma  # Critical illness anemia.   8x6.7x10cm anterior mediastinal mass shown to be B-cell lymphoma on previous biopsy (1/30/20), now s/p FNA. Surgical pathology (2/17) shows diffuse large B-cell lymphoma, non-germinal center type. FISH studies for BCL-2, BCL-6, and MYC rearrangement are negative. Per Rad-Onc, no further radiation therapy. Plan to begin chemotherapy on 2/21/2020 via PIV. CT AP for staging prior to initiating chemotherapy completed on 2/20. TTE on 2/19 prior to initiation of chemotherapy showed normal LV function with LVEF 60-65%. IR consulted re: PICC/port; per IR, no procedure if patient cannot lie flat; will administer chemo via PIV for now.   - R-EPOCH via PIV with plan for eventual port placement  - Doses:                Fosaprepitant//VincristineCyclophosphamide/Doxorubicin: 2/21               Rituximab: 2/22  - Prednisone to 100 mg BID  - Per Heme, when patient no longer requiring MICU, transfer to inpatient malignant heme service  - Plan to CT Chest and neck soft tissue to assess progress on Wednesday 2.26       Musculoskeletal:  # Weakness and deconditioning of critical illness   - Physical and occupational therapy consults.    Skin:  #  No acute issues   - dilgent cares to prevent skin breakdown and wound formation.      General Cares/Prophylaxis:    DVT Prophylaxis: Enoxaparin (Lovenox) SQ  GI Prophylaxis: PPI  Restraints: Restraints for medical healing needed: NO    Lines/ tubes/ drains:  - ETT, Reid, PIV, PEG     Disposition:  - MICU    Patient seen, findings and plan discussed with surgical ICU staff, Dr. Pompa .    Time spent on this Encounter   Billing:  I spent 35 minutes bedside and on the inpatient unit today managing the critical care of Chuyita Porter in relation to  the issues listed in this note.      Matthew Parsons  MICU  ====================================  INTERVAL HISTORY:   Course overnight reviewed. No acute event overnight. No complaints but anxious about lying flat for CT scan. Denies fever, chills, CP, abdominal pain.     ROS 10 Points ROS completed, all negative except what is mentioned in interval history.     OBJECTIVE:   1. VITAL SIGNS:   Temp:  [97.7  F (36.5  C)-98.7  F (37.1  C)] 98.7  F (37.1  C)  Heart Rate:  [] 80  Resp:  [10-26] 13  BP: (104-183)/() 134/76  FiO2 (%):  [30 %] 30 %  SpO2:  [95 %-100 %] 95 %  Ventilation Mode: CMV/AC  (Continuous Mandatory Ventilation/ Assist Control)  FiO2 (%): 30 %  Rate Set (breaths/minute): 12 breaths/min  Tidal Volume Set (mL): 400 mL  PEEP (cm H2O): 5 cmH2O  Pressure Support (cm H2O): 7 cmH2O  Oxygen Concentration (%): 30 %  Resp: 13      2. INTAKE/ OUTPUT:   I/O last 3 completed shifts:  In: 3267.13 [I.V.:941.13; NG/GT:1006]  Out: 2195 [Urine:2195]    Temp  Av.2  F (36.8  C)  Min: 96.8  F (36  C)  Max: 99.2  F (37.3  C)  Arterial Line BP  Min: 82/71  Max: 130/76  Arterial Line MAP (mmHg)  Av.7 mmHg  Min: 74 mmHg  Max: 92 mmHg      Pulse  Av.6  Min: 45  Max: 87 Resp  Av.8  Min: 8  Max: 39  FiO2 (%)  Av.3 %  Min: 30 %  Max: 50 %  SpO2  Av.3 %  Min: 90 %  Max: 100 %         Intake/Output Summary (Last 24 hours) at 2020 1024  Last data filed at 2020 1000  Gross per 24 hour   Intake 3642.13 ml   Output 2670 ml   Net 972.13 ml       3. PHYSICAL EXAMINATION:  General: Anxious apppearing  HEENT:ETT in place.   Neuro: A&Ox3, NAD.   Pulm/Resp: Clear breath sounds bilaterally without rhonchi, crackles or wheeze, breathing non-labored  CV: RRR, No m/g/c/r   Abdomen: Soft, non-distended, non-tender, no rebound tenderness or guarding, no masses  : osborn catheter in place, urine yellow and clear  Skin:  No rashes or lesion   MSK/Extremities: no peripheral edema, moving all  extremities, peripheral pulses intact, calves non-tender , extremities well perfused    4. INVESTIGATIONS:   Arterial Blood Gases   No lab results found in last 7 days.  Complete Blood Count   Recent Labs   Lab 02/25/20 0313 02/24/20 0442 02/23/20 0344 02/22/20 0442   WBC 15.9* 22.3* 25.0* 12.0*   HGB 9.8* 10.1* 11.0* 10.8*    197 244 271     Basic Metabolic Panel  Recent Labs   Lab 02/25/20 0313 02/24/20 0442 02/23/20  0344 02/22/20  1331    140 142 140   POTASSIUM 4.6 4.2 4.2 3.8   CHLORIDE 102 106 108 106   CO2 35* 32 33* 32   BUN 20 21 20 20   CR 0.48* 0.54 0.53 0.58   * 140* 124* 148*     Liver Function Tests  Recent Labs   Lab 02/22/20  0442 02/21/20  1053 02/19/20  0438   AST 52* 88*  --    ALT 86* 102*  --    ALKPHOS 125 124  --    BILITOTAL 0.2 0.4  --    ALBUMIN 2.2* 2.6*  --    INR  --   --  1.21*     Pancreatic Enzymes  No lab results found in last 7 days.  Coagulation Profile  Recent Labs   Lab 02/19/20 0438   INR 1.21*   PTT 31         5. RADIOLOGY:   No results found for this or any previous visit (from the past 24 hour(s)).    =========================================

## 2020-02-25 NOTE — PLAN OF CARE
ICU End of Shift Summary. See flowsheets for vital signs and detailed assessment.    Changes this shift: A/O x4. Denies pain. Fentanyl gtt on at 50 mcg/hr to assist with ETT tolerance. Ativan given x1 for anxiety. VSS on 30%/PEEP 5. PST in am and tolerated well. TF at goal. Constipation continues but verbalizes passing of gas. Unable to void on own so osborn placed for retention. Up to chair for most of day.  and daughters at bedside and updated on plan of care along with pt.     Plan: Monitor and assess. Notify MD of changes. Continue with plan of care.

## 2020-02-26 ENCOUNTER — APPOINTMENT (OUTPATIENT)
Dept: GENERAL RADIOLOGY | Facility: CLINIC | Age: 70
DRG: 840 | End: 2020-02-26
Attending: OTOLARYNGOLOGY
Payer: COMMERCIAL

## 2020-02-26 ENCOUNTER — APPOINTMENT (OUTPATIENT)
Dept: PHYSICAL THERAPY | Facility: CLINIC | Age: 70
DRG: 840 | End: 2020-02-26
Attending: OTOLARYNGOLOGY
Payer: COMMERCIAL

## 2020-02-26 ENCOUNTER — ANESTHESIA (OUTPATIENT)
Dept: SURGERY | Facility: CLINIC | Age: 70
End: 2020-02-26

## 2020-02-26 LAB
ANION GAP SERPL CALCULATED.3IONS-SCNC: 3 MMOL/L (ref 3–14)
BASOPHILS # BLD AUTO: 0 10E9/L (ref 0–0.2)
BASOPHILS NFR BLD AUTO: 0 %
BUN SERPL-MCNC: 19 MG/DL (ref 7–30)
CALCIUM SERPL-MCNC: 7.8 MG/DL (ref 8.5–10.1)
CHLORIDE SERPL-SCNC: 100 MMOL/L (ref 94–109)
CO2 SERPL-SCNC: 32 MMOL/L (ref 20–32)
CREAT SERPL-MCNC: 0.5 MG/DL (ref 0.52–1.04)
DIFFERENTIAL METHOD BLD: ABNORMAL
EOSINOPHIL # BLD AUTO: 0 10E9/L (ref 0–0.7)
EOSINOPHIL NFR BLD AUTO: 0 %
ERYTHROCYTE [DISTWIDTH] IN BLOOD BY AUTOMATED COUNT: 13.9 % (ref 10–15)
GFR SERPL CREATININE-BSD FRML MDRD: >90 ML/MIN/{1.73_M2}
GLUCOSE BLDC GLUCOMTR-MCNC: 105 MG/DL (ref 70–99)
GLUCOSE BLDC GLUCOMTR-MCNC: 113 MG/DL (ref 70–99)
GLUCOSE BLDC GLUCOMTR-MCNC: 123 MG/DL (ref 70–99)
GLUCOSE BLDC GLUCOMTR-MCNC: 85 MG/DL (ref 70–99)
GLUCOSE BLDC GLUCOMTR-MCNC: 87 MG/DL (ref 70–99)
GLUCOSE BLDC GLUCOMTR-MCNC: 95 MG/DL (ref 70–99)
GLUCOSE SERPL-MCNC: 117 MG/DL (ref 70–99)
HCT VFR BLD AUTO: 29.5 % (ref 35–47)
HGB BLD-MCNC: 9.8 G/DL (ref 11.7–15.7)
LDH SERPL L TO P-CCNC: 197 U/L (ref 81–234)
LYMPHOCYTES # BLD AUTO: 0.1 10E9/L (ref 0.8–5.3)
LYMPHOCYTES NFR BLD AUTO: 0.9 %
MCH RBC QN AUTO: 30.9 PG (ref 26.5–33)
MCHC RBC AUTO-ENTMCNC: 33.2 G/DL (ref 31.5–36.5)
MCV RBC AUTO: 93 FL (ref 78–100)
MONOCYTES # BLD AUTO: 0 10E9/L (ref 0–1.3)
MONOCYTES NFR BLD AUTO: 0 %
NEUTROPHILS # BLD AUTO: 9.9 10E9/L (ref 1.6–8.3)
NEUTROPHILS NFR BLD AUTO: 99.1 %
PHOSPHATE SERPL-MCNC: 2.5 MG/DL (ref 2.5–4.5)
PLATELET # BLD AUTO: 176 10E9/L (ref 150–450)
PLATELET # BLD EST: ABNORMAL 10*3/UL
POTASSIUM SERPL-SCNC: 4.2 MMOL/L (ref 3.4–5.3)
RBC # BLD AUTO: 3.17 10E12/L (ref 3.8–5.2)
RBC MORPH BLD: NORMAL
SODIUM SERPL-SCNC: 135 MMOL/L (ref 133–144)
URATE SERPL-MCNC: 1.9 MG/DL (ref 2.6–6)
WBC # BLD AUTO: 10 10E9/L (ref 4–11)

## 2020-02-26 PROCEDURE — 82962 GLUCOSE BLOOD TEST: CPT

## 2020-02-26 PROCEDURE — 97530 THERAPEUTIC ACTIVITIES: CPT | Mod: GP

## 2020-02-26 PROCEDURE — 94640 AIRWAY INHALATION TREATMENT: CPT | Mod: 76

## 2020-02-26 PROCEDURE — 94640 AIRWAY INHALATION TREATMENT: CPT

## 2020-02-26 PROCEDURE — 25000128 H RX IP 250 OP 636: Performed by: STUDENT IN AN ORGANIZED HEALTH CARE EDUCATION/TRAINING PROGRAM

## 2020-02-26 PROCEDURE — 40000275 ZZH STATISTIC RCP TIME EA 10 MIN

## 2020-02-26 PROCEDURE — 85025 COMPLETE CBC W/AUTO DIFF WBC: CPT | Performed by: INTERNAL MEDICINE

## 2020-02-26 PROCEDURE — 94003 VENT MGMT INPAT SUBQ DAY: CPT

## 2020-02-26 PROCEDURE — 25000132 ZZH RX MED GY IP 250 OP 250 PS 637: Performed by: STUDENT IN AN ORGANIZED HEALTH CARE EDUCATION/TRAINING PROGRAM

## 2020-02-26 PROCEDURE — 40000986 XR CHEST PORT 1 VW

## 2020-02-26 PROCEDURE — 25000125 ZZHC RX 250: Performed by: STUDENT IN AN ORGANIZED HEALTH CARE EDUCATION/TRAINING PROGRAM

## 2020-02-26 PROCEDURE — 25000125 ZZHC RX 250

## 2020-02-26 PROCEDURE — 25800025 ZZH RX 258: Performed by: STUDENT IN AN ORGANIZED HEALTH CARE EDUCATION/TRAINING PROGRAM

## 2020-02-26 PROCEDURE — 84100 ASSAY OF PHOSPHORUS: CPT | Performed by: INTERNAL MEDICINE

## 2020-02-26 PROCEDURE — 97110 THERAPEUTIC EXERCISES: CPT | Mod: GP

## 2020-02-26 PROCEDURE — 83615 LACTATE (LD) (LDH) ENZYME: CPT | Performed by: INTERNAL MEDICINE

## 2020-02-26 PROCEDURE — 25000128 H RX IP 250 OP 636: Performed by: INTERNAL MEDICINE

## 2020-02-26 PROCEDURE — 36415 COLL VENOUS BLD VENIPUNCTURE: CPT | Performed by: INTERNAL MEDICINE

## 2020-02-26 PROCEDURE — 20000004 ZZH R&B ICU UMMC

## 2020-02-26 PROCEDURE — 80048 BASIC METABOLIC PNL TOTAL CA: CPT | Performed by: INTERNAL MEDICINE

## 2020-02-26 PROCEDURE — 31645 BRNCHSC W/THER ASPIR 1ST: CPT | Mod: GC | Performed by: INTERNAL MEDICINE

## 2020-02-26 PROCEDURE — 0BP1XDZ REMOVAL OF INTRALUMINAL DEVICE FROM TRACHEA, EXTERNAL APPROACH: ICD-10-PCS | Performed by: INTERNAL MEDICINE

## 2020-02-26 PROCEDURE — 27210429 ZZH NUTRITION PRODUCT INTERMEDIATE LITER

## 2020-02-26 PROCEDURE — 31622 DX BRONCHOSCOPE/WASH: CPT

## 2020-02-26 PROCEDURE — 84550 ASSAY OF BLOOD/URIC ACID: CPT | Performed by: INTERNAL MEDICINE

## 2020-02-26 PROCEDURE — 99233 SBSQ HOSP IP/OBS HIGH 50: CPT | Mod: 25 | Performed by: INTERNAL MEDICINE

## 2020-02-26 PROCEDURE — 40000047 ZZH STATISTIC CTO2 CONT OXYGEN TECH TIME EA 90 MIN

## 2020-02-26 RX ORDER — VECURONIUM BROMIDE 1 MG/ML
10 INJECTION, POWDER, LYOPHILIZED, FOR SOLUTION INTRAVENOUS ONCE
Status: DISCONTINUED | OUTPATIENT
Start: 2020-02-26 | End: 2020-02-26

## 2020-02-26 RX ORDER — FENTANYL CITRATE 50 UG/ML
INJECTION, SOLUTION INTRAMUSCULAR; INTRAVENOUS
Status: DISCONTINUED
Start: 2020-02-26 | End: 2020-02-26 | Stop reason: HOSPADM

## 2020-02-26 RX ORDER — PROPOFOL 10 MG/ML
INJECTION, EMULSION INTRAVENOUS PRN
Status: COMPLETED | OUTPATIENT
Start: 2020-02-26 | End: 2020-02-26

## 2020-02-26 RX ORDER — LIDOCAINE HYDROCHLORIDE 10 MG/ML
INJECTION, SOLUTION EPIDURAL; INFILTRATION; INTRACAUDAL; PERINEURAL
Status: COMPLETED
Start: 2020-02-26 | End: 2020-02-26

## 2020-02-26 RX ORDER — PROPOFOL 10 MG/ML
5-75 INJECTION, EMULSION INTRAVENOUS CONTINUOUS
Status: DISCONTINUED | OUTPATIENT
Start: 2020-02-26 | End: 2020-02-26

## 2020-02-26 RX ADMIN — IPRATROPIUM BROMIDE AND ALBUTEROL SULFATE 3 ML: .5; 3 SOLUTION RESPIRATORY (INHALATION) at 19:36

## 2020-02-26 RX ADMIN — ONDANSETRON 4 MG: 2 INJECTION INTRAMUSCULAR; INTRAVENOUS at 09:56

## 2020-02-26 RX ADMIN — ALLOPURINOL 100 MG: 100 TABLET ORAL at 08:28

## 2020-02-26 RX ADMIN — PROPOFOL 20 MG: 10 INJECTION, EMULSION INTRAVENOUS at 11:30

## 2020-02-26 RX ADMIN — ALBUTEROL SULFATE 2.5 MG: 2.5 SOLUTION RESPIRATORY (INHALATION) at 07:37

## 2020-02-26 RX ADMIN — ACETYLCYSTEINE 4 ML: 100 INHALANT RESPIRATORY (INHALATION) at 19:36

## 2020-02-26 RX ADMIN — ONDANSETRON 4 MG: 2 INJECTION INTRAMUSCULAR; INTRAVENOUS at 22:06

## 2020-02-26 RX ADMIN — Medication 40 MG: at 08:30

## 2020-02-26 RX ADMIN — DEXTROSE MONOHYDRATE 1000 ML: 100 INJECTION, SOLUTION INTRAVENOUS at 10:39

## 2020-02-26 RX ADMIN — PROPOFOL 30 MCG/KG/MIN: 10 INJECTION, EMULSION INTRAVENOUS at 11:28

## 2020-02-26 RX ADMIN — PROPOFOL 20 MG: 10 INJECTION, EMULSION INTRAVENOUS at 11:35

## 2020-02-26 RX ADMIN — LORAZEPAM 0.5 MG: 0.5 TABLET ORAL at 23:53

## 2020-02-26 RX ADMIN — LORAZEPAM 0.5 MG: 0.5 TABLET ORAL at 04:12

## 2020-02-26 RX ADMIN — SENNOSIDES AND DOCUSATE SODIUM 1 TABLET: 8.6; 5 TABLET ORAL at 19:23

## 2020-02-26 RX ADMIN — ALBUTEROL SULFATE 2.5 MG: 2.5 SOLUTION RESPIRATORY (INHALATION) at 12:03

## 2020-02-26 RX ADMIN — SENNOSIDES AND DOCUSATE SODIUM 1 TABLET: 8.6; 5 TABLET ORAL at 08:28

## 2020-02-26 RX ADMIN — LIDOCAINE HYDROCHLORIDE 30 MG: 10 INJECTION, SOLUTION EPIDURAL; INFILTRATION; INTRACAUDAL; PERINEURAL at 11:18

## 2020-02-26 RX ADMIN — ENOXAPARIN SODIUM 40 MG: 40 INJECTION SUBCUTANEOUS at 08:28

## 2020-02-26 RX ADMIN — LORATADINE 10 MG: 10 TABLET ORAL at 08:28

## 2020-02-26 RX ADMIN — LORAZEPAM 0.5 MG: 0.5 TABLET ORAL at 09:56

## 2020-02-26 RX ADMIN — POLYETHYLENE GLYCOL 3350 17 G: 17 POWDER, FOR SOLUTION ORAL at 08:28

## 2020-02-26 RX ADMIN — ONDANSETRON 4 MG: 4 TABLET, ORALLY DISINTEGRATING ORAL at 17:06

## 2020-02-26 RX ADMIN — PROPOFOL 40 MG: 10 INJECTION, EMULSION INTRAVENOUS at 11:40

## 2020-02-26 RX ADMIN — ACETYLCYSTEINE 4 ML: 100 INHALANT RESPIRATORY (INHALATION) at 07:37

## 2020-02-26 ASSESSMENT — ACTIVITIES OF DAILY LIVING (ADL)
ADLS_ACUITY_SCORE: 17
ADLS_ACUITY_SCORE: 17
ADLS_ACUITY_SCORE: 16
ADLS_ACUITY_SCORE: 17
ADLS_ACUITY_SCORE: 16
ADLS_ACUITY_SCORE: 17

## 2020-02-26 ASSESSMENT — MIFFLIN-ST. JEOR: SCORE: 1431.75

## 2020-02-26 NOTE — PROGRESS NOTES
Interventional Pulmonology          Follow-up Inpatient Progress Note                                      February 26, 2020              Patient: Chuyita Porter  Date of Service: 2/26/2020  Date of Consultation: 2/17/20    Date of Admission: 2/17/2020      Assessment & Plan:   Chuyita Porter is a 69 year old woman with a planned admission through the PACU on 2/17/2020 with central airway obstruction secondary to treatment naive DLBCL. Interventional Pulmonology was initially consulted for this reason. She was awake/fiberoptically intubtaed with an #8.5 armored ETT with ease. S/p PEG placement. She received two fractions of radiation (the last session was 2/19) and she was started on R-CHOP on 2/21. A repeat CT on 2/25 demonstrated interval reduction in size of the anterior mediastinal mass from 8x7x6 cm to 5.5 x 4.5 x 6.1 cm. There is still approximation with the proximal 1/3 of the trachea, but difficult to assess the degree of extrinsic compression with the reinforced ETT in position. Today she has a positive, audible cuff leak for the first time. She is receiving adjunct high dose steroids. Aside from concerns of airway compromise, there are no additional barriers to extubation. Ideally she should be extubated in the OR with interventional pulmonology available for immediate stent placement if necessary. Unfortunately, there are no OR slots available for the remainder of this week. In addition, given how proximal a stent would need to be placed, it's unclear how well this would be tolerated. I also appreciate how quickly the tumor is decreasing in size over a short time interval with therapy. Our plans today are to move forward with a bedside bronchoscopy as we withdrawal the ETT to the thoracic inlet; this will allow us to directly inspect the degree of extrinsic/endoluminal tracheal obstruction present and better inform our next steps.    Plan:    Bronchoscopy with ETT withdrawal (not extubation)  at 1130 AM today      Thank you for this consultation, we will continue to follow along. Please see [facility] for the on-call IP attending schedule, or page the fellow at [phone] with any questions. For future Interventional Pulmonology consults, the Interventional Pulmonology consult order is now active within the pulmonary consult order panel.             Interval History:   Doing well day to day, ventilator settings remain minimal, some additional suctioning this AM, feeling anxious about future extubation plans.                       Data:   All pertinent laboratory and imaging data reviewed.      2/25/20 CT Chest:  Decreased size of the biopsy-proven mediastinal/lower neck lymphoma  from 2/14/2020 and noticeably less mass effect on the surrounding  vascular structures. This is only partially visualized on this  examination, please see dedicated chest CT for further details  regarding the mediastinal mass.         Review of Systems:   A 12 point review of systems is negative aside for as noted above         Physical Exam:   Temp:  [98  F (36.7  C)-99.8  F (37.7  C)] 98  F (36.7  C)  Heart Rate:  [] 84  Resp:  [0-23] 14  BP: (104-165)/(57-89) 120/69  FiO2 (%):  [30 %] 30 %  SpO2:  [94 %-100 %] 96 %    Intake/Output Summary (Last 24 hours) at 2/26/2020 1052  Last data filed at 2/26/2020 1000  Gross per 24 hour   Intake 2192.03 ml   Output 4025 ml   Net -1832.97 ml       General: Awake, alert, mildly anxiosu  EENT: dry mm  Neck: Trachea supple/midline  Lungs: CTAB, intubated  Cardiovascular: Normal S1 and S2.  RRR.    Abdomen: Soft, non-tender, non-distended   MSK: No edema  Neurologic: AOx3, moving all extremities   Skin: Warm/dry         Herrera Dean MD, 2/26/2020, 10:52 AM  Interventional Pulmonology Fellow  Department of Pulmonary, Allergy, Critical Care & Sleep Medicine   Pager: [phone]

## 2020-02-26 NOTE — PLAN OF CARE
ICU End of Shift Summary. See flowsheets for vital signs and detailed assessment.     Changes this shift: PRN ativan given for anxiety. Pt expressing worries about possible extubation today, explained to pt that ETT would be removed under controlled environment (ie OR), pt understands. Gagging on ETT this shift, zofran given, with 1 episode of emesis. Chest XR following pt's concerns of increased gagging and c/o not being able to breathe, follow up CXR was not obtained following retraction of ETT yesterday (per MD). ETT stable and in correct position. Reid remains in place for retention, with good UO.      Plan: Possible extubation today 2/26 in OR. Notify MICU of changes. Continue POC.

## 2020-02-26 NOTE — PROGRESS NOTES
[facility], [place]    Progress Note - MICU Service        Date of Admission:  2/17/2020  Date of Service: 02/26/2020    Assessment & Plan   Chuyita Porter is a 69 year old female admitted on 2/17/2020. She has a history of breast cancer s/p radiation (2005, 2015) and is admitted for planned chemoradiation for bulky mediastinal DLBCL NOS involving thyroid now s/p biopsy and intubation due to threatened airway.      Changes today:  - Planned extubation in OR per IP and ENT if schedule allows  - Overnight with nausea, CXR shows ETT mid-trachea, monitor  - Continue bowel regimen      PLAN:     ===NEURO===     Sedation: Ativan 0.5 mg PO q4h PRN     Analgesia:    - d/c fentanyl gtt    - Oxycodone oral solution 5mg q4h PRN      ===CARDIOVASCULAR===    Bradycardia, improved  SVC Syndrome  Patient with asymptomatic bradycardia 2/. Known SVC syndrome vs Precedex gtt. Stopped precedex gtt 2/20 and hypotension/bradycardia is largely resolved  - If hypotensive, elevate HOB; do not lay patient flat 2/2 known SVC syndrome  - Patient cannot lie flat for procedures at this time 2/2 positional hypotension      ===PULMONARY===     Central airway obstruction  Impending airway obstruction noted during workup for mediastinal mass shown to be B-cell lymphoma on previous FNA (1/30/20). Patient intubated during biopsy and decision to maintain ETT for early stages of treatment. Plan to monitor size of mass to determine when ETT can be safely removed. Per IP, ETT will need to be removed in controlled setting (?OR) as high risk of airway obstruction. On exam 2/23, deflated ETT with NO cuff leak heard.  Repeat CT Chest/Neck 2/25 shows significant reduction in tumor burden.  - ETT at ~1 cm above ezekiel (tube position marked at 25 at the teeth)  - Activity up with assist and careful attention to ETT stability, as ETT displacement will threaten airway  - Cuff leak on bedside exam today, 2/26, IP/ENT will  perform bedside bronch and assess airway this afternoon      ===GASTROINTESTINAL===     Bowel regimen  Constipation  No bowel movement since 2/16. Abdominal XR on 2/23 showing large stool burden. Bowel movement overnight 2/25.   - Scheduled Miralax BID  - Scheduled Senna BID  - Hold scheduled bowel regimen if stools become loose    GI Prophylaxis  - Pantoprazole 40mg daily      Nutrition  - PEG tube placed 2/17  - Nutrition consulted for TF (Nutren 1.5 at 55mL/hr)  - Free water flushes 60 mL q4h      ===RENAL===     Tumor Lysis Syndrome Prophylaxis  Radiation therapy 2/18-2/19. Initiated R-EPOCH 2/21.   - Heme consulted, appreciate recs  - Daily BMP, Phos, Uric Acid, LDH labs -- TB with heme-onc re:   - Cardiac telemetry, okay to discontinue  - Electrolyte replacement protocol  - Allopurinol 100 mg daily on 2/14  ===HEME/ONC===      Stage IIE bulky mediastinal B-cell Lymphoma  8x6.7x10cm anterior mediastinal mass shown to be B-cell lymphoma on previous biopsy (1/30/20), now s/p FNA. Surgical pathology (2/17) shows diffuse large B-cell lymphoma, non-germinal center type. FISH studies for BCL-2, BCL-6, and MYC rearrangement are negative. Per Rad-Onc, no further radiation therapy. Plan to begin chemotherapy on 2/21/2020 via PIV. CT AP for staging prior to initiating chemotherapy completed on 2/20. TTE on 2/19 prior to initiation of chemotherapy showed normal LV function with LVEF 60-65%. IR consulted re: PICC/port; per IR, no procedure if patient cannot lie flat; will administer chemo via PIV for now.   - R-EPOCH via PIV with plan for eventual port placement  - Doses:    Fosaprepitant//VincristineCyclophosphamide/Doxorubicin: 2/21   Rituximab: 2/22  - Prednisone to 100 mg BID for 7 days (now stopped)  - Per Heme, when patient no longer requiring MICU, transfer to inpatient malignant heme service     ===ENDOCRINE===     Steroid associated hyperglycemia  - High resistance sliding scale insulin -- taper down now that  steroids have stopped     ===INFECTIOUS DISEASE===     No acute issues     ===SKIN/MSK===     No acute issues     - PT/OT consulted       Diet: Adult Formula Drip Feeding: Continuous Nutren 1.5; Gastrostomy; Goal Rate: 55; mL/hr; Medication - Feeding Tube Flush Frequency: At least 15-30 mL water before and after medication administration and with tube clogging; Infuse @ 55 mL/hr.    Fluids: 50 ml/hr LR  Lines: PIV, osborn, PEG, ETT  DVT Prophylaxis: Enoxaparin (Lovenox) SQ and Pneumatic Compression Devices  Osborn Catheter: in place, indication: Retention;Strict 1-2 Hour I&O, Strict 1-2 Hour I&O  Code Status: DNR      Disposition Plan   Expected discharge: > 7 days, recommended to TBD once extubated, chemotherapy cycles completed per HemoOnc.  Entered: Crystal Chen MD 2020, 7:11 AM       The patient's care was discussed with the Attending Physician, Dr. Dean.    Crystal Chen MD  Internal Medicine, PGY-1  Pager: [phone]  ______________________________________________________________________    Interval History   NAEO. ETT withdrawn 2 cm 2/2 CT Chest showed ETT in right mainstem. Nausea/emesis overnight. One large BM overnight.     4 point ROS otherwise negative.    Data reviewed today: I reviewed all medications, new labs and imaging results over the last 24 hours.    Objective    Temp: 98.4  F (36.9  C) Temp  Min: 98.4  F (36.9  C)  Max: 99.8  F (37.7  C)  Resp: 15 Resp  Min: 9  Max: 23  SpO2: 99 % SpO2  Min: 94 %  Max: 100 %  Heart Rate: 77 Heart Rate  Min: 71  Max: 128  BP: 105/68 Systolic (24hrs), Av , Min:104 , Max:165   Diastolic (24hrs), Av, Min:57, Max:89      I/O last 3 completed shifts:  In: 2966.03 [I.V.:59.03; NG/GT:1087; IV Piggyback:500]  Out: 3775 [Urine:3775]    Ventilation Mode: CMV/AC  (Continuous Mandatory Ventilation/ Assist Control)  FiO2 (%): 30 %  Rate Set (breaths/minute): 12 breaths/min  Tidal Volume Set (mL): 400 mL  PEEP (cm H2O): 5 cmH2O  Pressure Support (cm H2O):  7 cmH2O  Oxygen Concentration (%): 30 %  Resp: 15    PIP: 12      Physical Exam  Constitutional: awake, alert, interactive  HEENT: ETT  present, spontaneously breathing on vent  Cardiovascular: RRR. No murmurs, gallops or rub, No edema or JVD.   Respiratory:  Good air movement throughout. No stridor.  Gastrointestinal: Abdomen soft, non-tender. Mildly decreased bowel sounds.  Musculoskeletal: Extremities normal with no gross deformities noted   Skin: No suspicious lesions or rashes  Neurologic: moving all extremities, full ROM  Psychiatric: affect bright/appropriate    Labs:    CBC  Recent Labs   Lab 02/26/20 0417 02/25/20 0313 02/24/20 0442   WBC 10.0 15.9* 22.3*   RBC 3.17* 3.24* 3.36*   HGB 9.8* 9.8* 10.1*   HCT 29.5* 30.4* 31.8*   MCV 93 94 95   MCH 30.9 30.2 30.1   MCHC 33.2 32.2 31.8   RDW 13.9 14.0 14.0    172 197       Coagulation  No lab results found in last 7 days.   No lab results found in last 7 days.     Basic Metabolic Panel  Recent Labs   Lab 02/26/20 0417 02/25/20 0313 02/24/20 0442    136 140   POTASSIUM 4.2 4.6 4.2   CHLORIDE 100 102 106   CO2 32 35* 32   ANIONGAP 3 <1* 1*   * 114* 140*   BUN 19 20 21   CR 0.50* 0.48* 0.54   GABI 7.8* 7.9* 8.0*       Imaging    EXAMINATION: XR CHEST PORT 1 VW, 2/26/2020 12:50 AM     INDICATION: ETT placement     COMPARISON: CT 2/25/2020. Plain film 2/25/2020.     FINDINGS: Single portable AP radiograph the chest. ET tube projects in  the mid thoracic trachea. Cardiomediastinal silhouette is stable in  size. Low lung volumes. Prominent pulmonary vasculature. Small  bilateral pleural effusions with overlying basilar opacities. No acute  osseous abnormality.                                                                      IMPRESSION:  1. ET tube projects over the mid thoracic trachea.  2. Small bilateral pleural effusions with overlying  atelectasis/consolidation, unchanged from 2/25/2020..     I have personally reviewed the examination  and initial interpretation  and I agree with the findings.         Exam: XR ABDOMEN PORT 2 VW, 2/24/2020 1:38 AM     Indication: Last BM 2/16, concern for ileus vs. SBO, please obtain  supine and decubitus films     Comparison: CT 2/19/2020     Findings:   Single portable AP radiograph of the abdomen. Air distended colon. PEG  tube. No portal venous gas, pneumoperitoneum, or pneumatosis. Lung  bases demonstrate small bilateral pleural effusions and overlying  atelectasis. Heart size is within normal limits. Soft tissues within  normal limits. No acute osseous abnormality.                                                                      Impression:   1. Air distention of large bowel without evidence of small bowel  obstruction.  2. Small bilateral pleural effusions with overlying atelectasis.

## 2020-02-26 NOTE — PROCEDURES
INTERVENTIONAL PULMONOLOGY       Procedure(s):    A flexible bronchoscopy  Airway exam    Procedure Date: 2/26/2020    Indication:  DLBCL with central airway obstruction     Attending of Record:  MD Tesfaye Wells MD    Interventional Pulmonary Fellow   Herrera Dean MD     Trainees Present:   None    Medications:    Propofol 70 mg IV bolus  Propofol 40 mcg/kg/min    Sedation Time:   10 minutes    Time Out:  Performed    The patient's medical record has been reviewed.  The indication for the procedure was reviewed.  The necessary history and physical examination was performed and reviewed.  The risks, benefits and alternatives of the procedure were discussed with the the patient in detail and the patients respresentative () had the opportunity to ask questions.  I discussed in particular the potential complications including risks of minor or life-threatening bleeding and/or infection, respiratory failure, vocal cord trauma / paralysis, pneumothorax, and discomfort. Sedation risks were also discussed including abnormal heart rhythms, low blood pressure, and respiratory failure. All questions were answered to the best of my ability.  Verbal and written informed consent was obtained.  The proposed procedure and the patient's identification were verified prior to the procedure by the physician and the nurse.    The patient was assessed for the adequacy for the procedure and to receive medications.   Mental Status:  Alert and oriented x 3  Airway examination:  Intubated  Pulmonary:  Clear to ausculation bilaterally  CV:  RRR, no murmurs or gallops  ASA Grade:  (III)  Severe systemic disease    After clinical evaluation and reviewing the indication, risks, alternatives and benefits of the procedure the patient was deemed to be in satisfactory condition to undergo the procedure.      A Tuberculosis risk assessment was performed:  The patient has no known RISK of Tuberculosis    The procedure  was performed in a negative airflow room: The patient could not be moved to a negative airflow room because of needed OR for the procedure    Maneuvers / Procedure:    A Flexible  bronchoscope was used for the procedure. The patient was previously orally intubated with a #8.5 armored ETT and the flexible scope was passed through.      Airway Examination: A complete airway examination was performed from the distal trachea to the subsegmental level in each lobe of both lungs.  Pertinent findings include sharp mainstem ezekiel. Patent R and L mainstem bronchi. Small, white secretions in the proximal airways suctioned with ease. The ETT balloon was next deflated and the ETT slowly withdrawn with fiberoptic visualization of the airway. The ETT with withdrawn to the level of the cricoid cartilage. Most previously seen endoluminal tumor has resolved/regressed in size and there is no endoluminal tracheal obstruction. There is no extrinsic tracheal compression. The distal, mid and proximal trachea is patent and without clinically significant stenosis. There is 25-50% dynamic collapse of the mid trachea during coughing, but no dynamic respirophasic collapse seen during quiet, positive pressure ventilation.          Any disposable equipment was visually inspected and deemed to be intact immediately post procedure.      Relevant Pictures  Mid trachea       Proximal trachea      Recommendations:     Successful bronchoscopy with airway inspection    No appreciable extrinsic or endoluminal tracheal obstruction while on positive pressure ventilation    OK to proceed with trial of extubation today        Herrera Dean MD, 2/26/2020, 12:44 PM  Interventional Pulmonology Fellow  Department of Pulmonary, Allergy, Critical Care & Sleep Medicine   Pager: [phone]

## 2020-02-26 NOTE — PROGRESS NOTES
"Bronchoscopy Risk Assessment Guidelines      A. Patient symptoms to consider when assessing pulmonary TB risk are:    I. Cough greater than 3 weeks; and fever, hemoptysis, pleuritic chest    pain, weight loss greater than 10 lbs, night sweats, fatigue, infiltrates on    upper lobes or superior segments of lower lobes, cavitation on chest    x-ray.   B. Patient risk factors to consider when assessing pulmonary TB risk are:    I. Exposure to known TB case, foreign-born persons (within 5 years of    arrival to [place]), residence in a crowded setting (correctional facility,     long-term care center, etc.), persons with HIV or immunosuppression.    Patients with symptoms and risk factors should generally be considered \"suspect risk\" and bronchoscopies should be performed in airborne precautions.    This patient has NO KNOWN RISK of Tuberculosis (proceed with bronchoscopy)    Specimens sent: no  Complications: None  Scope used: #[id number]  Slim  Attending Physician: Dr. Bony Marina, RT on 2/26/2020 at 12:22 PM  "

## 2020-02-26 NOTE — PROGRESS NOTES
[facility], [place]  Procedure Note          Extubation:       Chuyita Porter  MRN# [id number]   February 26, 2020, 1:39 PM         Patient extubated at: February 26, 2020, 1:30 PM   Supplemental Oxygen: Via face mask at 4 liters per minute   Cough: The cough is good and productive   Secretion Mode: PRN suction with assistance   Secretion Amount: Moderate amount, moderately thick and white / yellow in color   Respiratory Exam:: Breath sounds: clear and diminished     Location: bilaterally   Skin Exam:: Patient color: natural   Patient Status: Currently appears comfortable                   Recorded by Viki Hinojosa

## 2020-02-26 NOTE — PLAN OF CARE
Discharge Planner PT   4C:   Patient plan for discharge: Unknown  Current status: Pt A/Ox4, 3LPM via mask. Completed supine B LE and UE exercises x 20 repetitions. Supine > sit with Min Ax1, sat EOB for 5 minutes, pt c/o mild dizziness. STS from EOB and pivot transfer to chair completed with FWW + CGA. All VSS.  Barriers to return to prior living situation: Anxiety, deconditioning, medical status  Recommendations for discharge: TCU  Rationale for recommendations: Current level of function; pt will continue to benefit from therapy services to increase functional mobility and independence.        Entered by: Macie Wodoall 02/26/2020 4:01 PM

## 2020-02-26 NOTE — PROGRESS NOTES
"SPIRITUAL HEALTH SERVICES  SPIRITUAL ASSESSMENT Progress Note  [facility] ([place] 4C     I shared a short visit with the pt and her . I gave the pt ashes as she requested and we prayed together. The  was excited that the pt was just taken off of the ventilator and brething on her own. He offered her encouraging words, saying, \"see honey, I told you you could do this [be extubated].\"     PLAN: I will follow while pt is on 4th floor.    Gilbert Juarez M.Div.     Pager [phone]    "

## 2020-02-26 NOTE — PROGRESS NOTES
"Otolaryngology Progress Note  February 26, 2020    S: No acute events overnight. Pressure supported for a few hours yesterday. Able to get out of bed and to chair cautiously. ETT withdrawn by 2 cm yesterday after CT chest on 2/25 showed near ETT passed into the right mainstem bronchi. Planning to go to OR today with Interventional Pulmonology for endotracheal tube withdrawal with bronchoscopy today    O: /67 (BP Location: Left arm)   Pulse 72   Temp 98  F (36.7  C) (Axillary)   Resp 21   Ht 1.676 m (5' 6\")   Wt 88 kg (194 lb 0.1 oz)   SpO2 97%   BMI 31.31 kg/m     General: Sitting up in bed, no acute distress   HEENT: EOMI.    Pulmonary: Breathing non-labored, no stridor, no accessory muscle use, endotracheally intubated with 8.5 reinforced breathing tube secured at 25 cm at the lip. Neck swelling stable, soft.    A/P: Chuyita Porter is a 69 year old female with a past medical history of breast cancer, large right neck mass with pathology demonstrating diffuse B-cell lymphoma now s/p attempted tracheal stent, tracheal mass biopsy, and PEG tube placement 2/17/2020. She remains endotracheally intubated with the tube secured at 25 cm at the lip.     - Steroids per Med Onc, Radiation Oncology, and MICU to help with swelling and potentially decrease tumor while starting treatment.    - Endotracheal tube will need to be carefully maintained in place as she has significant airway collapse/compression and is unable to lay flat. Do not allow patient to self extubate. If she were to self extubate, will need to be reintubated, likely in upright position, use wire reinforced ETT. Laying the patient flat resulted in drop in BP. 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 A tracheostomy will not overcome the extrinsic compression issues due to the distal location.    -- Patient discussed with Dr. Valdez.    Em Cheney MD   Otolaryngology - Head & Neck Surgery   Please page the on-call resident with questions. If after hours, contact " ENT with questions by dialing [phone] and entering job code [phone] when prompted.

## 2020-02-27 ENCOUNTER — APPOINTMENT (OUTPATIENT)
Dept: PHYSICAL THERAPY | Facility: CLINIC | Age: 70
DRG: 840 | End: 2020-02-27
Attending: OTOLARYNGOLOGY
Payer: COMMERCIAL

## 2020-02-27 ENCOUNTER — APPOINTMENT (OUTPATIENT)
Dept: SPEECH THERAPY | Facility: CLINIC | Age: 70
DRG: 840 | End: 2020-02-27
Attending: STUDENT IN AN ORGANIZED HEALTH CARE EDUCATION/TRAINING PROGRAM
Payer: COMMERCIAL

## 2020-02-27 ENCOUNTER — ONCOLOGY VISIT (OUTPATIENT)
Dept: RADIATION ONCOLOGY | Facility: CLINIC | Age: 70
End: 2020-02-27

## 2020-02-27 ENCOUNTER — APPOINTMENT (OUTPATIENT)
Dept: GENERAL RADIOLOGY | Facility: CLINIC | Age: 70
DRG: 840 | End: 2020-02-27
Attending: STUDENT IN AN ORGANIZED HEALTH CARE EDUCATION/TRAINING PROGRAM
Payer: COMMERCIAL

## 2020-02-27 ENCOUNTER — RECORDS - HEALTHEAST (OUTPATIENT)
Dept: ADMINISTRATIVE | Facility: OTHER | Age: 70
End: 2020-02-27

## 2020-02-27 ENCOUNTER — APPOINTMENT (OUTPATIENT)
Dept: OCCUPATIONAL THERAPY | Facility: CLINIC | Age: 70
DRG: 840 | End: 2020-02-27
Attending: OTOLARYNGOLOGY
Payer: COMMERCIAL

## 2020-02-27 LAB
ALBUMIN SERPL-MCNC: 2.1 G/DL (ref 3.4–5)
ALP SERPL-CCNC: 107 U/L (ref 40–150)
ALT SERPL W P-5'-P-CCNC: 69 U/L (ref 0–50)
ANION GAP SERPL CALCULATED.3IONS-SCNC: 4 MMOL/L (ref 3–14)
AST SERPL W P-5'-P-CCNC: 36 U/L (ref 0–45)
BASE EXCESS BLDV CALC-SCNC: 8.4 MMOL/L
BASOPHILS # BLD AUTO: 0 10E9/L (ref 0–0.2)
BASOPHILS NFR BLD AUTO: 0 %
BILIRUB DIRECT SERPL-MCNC: 0.1 MG/DL (ref 0–0.2)
BILIRUB SERPL-MCNC: 0.5 MG/DL (ref 0.2–1.3)
BUN SERPL-MCNC: 17 MG/DL (ref 7–30)
CALCIUM SERPL-MCNC: 8 MG/DL (ref 8.5–10.1)
CHLORIDE SERPL-SCNC: 100 MMOL/L (ref 94–109)
CO2 SERPL-SCNC: 31 MMOL/L (ref 20–32)
CREAT SERPL-MCNC: 0.54 MG/DL (ref 0.52–1.04)
DIFFERENTIAL METHOD BLD: ABNORMAL
EOSINOPHIL # BLD AUTO: 0 10E9/L (ref 0–0.7)
EOSINOPHIL NFR BLD AUTO: 0.9 %
ERYTHROCYTE [DISTWIDTH] IN BLOOD BY AUTOMATED COUNT: 14 % (ref 10–15)
GFR SERPL CREATININE-BSD FRML MDRD: >90 ML/MIN/{1.73_M2}
GLUCOSE BLDC GLUCOMTR-MCNC: 118 MG/DL (ref 70–99)
GLUCOSE BLDC GLUCOMTR-MCNC: 129 MG/DL (ref 70–99)
GLUCOSE BLDC GLUCOMTR-MCNC: 130 MG/DL (ref 70–99)
GLUCOSE BLDC GLUCOMTR-MCNC: 85 MG/DL (ref 70–99)
GLUCOSE BLDC GLUCOMTR-MCNC: 93 MG/DL (ref 70–99)
GLUCOSE SERPL-MCNC: 100 MG/DL (ref 70–99)
HCO3 BLDV-SCNC: 32 MMOL/L (ref 21–28)
HCT VFR BLD AUTO: 30.2 % (ref 35–47)
HGB BLD-MCNC: 10 G/DL (ref 11.7–15.7)
LYMPHOCYTES # BLD AUTO: 0.1 10E9/L (ref 0.8–5.3)
LYMPHOCYTES NFR BLD AUTO: 2.7 %
MCH RBC QN AUTO: 30.7 PG (ref 26.5–33)
MCHC RBC AUTO-ENTMCNC: 33.1 G/DL (ref 31.5–36.5)
MCV RBC AUTO: 93 FL (ref 78–100)
METAMYELOCYTES # BLD: 0 10E9/L
METAMYELOCYTES NFR BLD MANUAL: 0.9 %
MONOCYTES # BLD AUTO: 0 10E9/L (ref 0–1.3)
MONOCYTES NFR BLD AUTO: 0 %
NEUTROPHILS # BLD AUTO: 3.3 10E9/L (ref 1.6–8.3)
NEUTROPHILS NFR BLD AUTO: 95.5 %
NEUTS HYPERSEG BLD QL SMEAR: PRESENT
O2/TOTAL GAS SETTING VFR VENT: ABNORMAL %
PCO2 BLDV: 41 MM HG (ref 40–50)
PH BLDV: 7.5 PH (ref 7.32–7.43)
PLATELET # BLD AUTO: 166 10E9/L (ref 150–450)
PLATELET # BLD EST: ABNORMAL 10*3/UL
PO2 BLDV: 55 MM HG (ref 25–47)
POTASSIUM SERPL-SCNC: 4.1 MMOL/L (ref 3.4–5.3)
PROT SERPL-MCNC: 5.2 G/DL (ref 6.8–8.8)
RBC # BLD AUTO: 3.26 10E12/L (ref 3.8–5.2)
SODIUM SERPL-SCNC: 135 MMOL/L (ref 133–144)
WBC # BLD AUTO: 3.5 10E9/L (ref 4–11)

## 2020-02-27 PROCEDURE — 40000275 ZZH STATISTIC RCP TIME EA 10 MIN

## 2020-02-27 PROCEDURE — 20600000 ZZH R&B BMT

## 2020-02-27 PROCEDURE — 80048 BASIC METABOLIC PNL TOTAL CA: CPT | Performed by: INTERNAL MEDICINE

## 2020-02-27 PROCEDURE — 82803 BLOOD GASES ANY COMBINATION: CPT | Performed by: STUDENT IN AN ORGANIZED HEALTH CARE EDUCATION/TRAINING PROGRAM

## 2020-02-27 PROCEDURE — 80076 HEPATIC FUNCTION PANEL: CPT | Performed by: PHYSICIAN ASSISTANT

## 2020-02-27 PROCEDURE — 36415 COLL VENOUS BLD VENIPUNCTURE: CPT | Performed by: INTERNAL MEDICINE

## 2020-02-27 PROCEDURE — 97110 THERAPEUTIC EXERCISES: CPT | Mod: GP

## 2020-02-27 PROCEDURE — 92526 ORAL FUNCTION THERAPY: CPT | Mod: GN | Performed by: SPEECH-LANGUAGE PATHOLOGIST

## 2020-02-27 PROCEDURE — 71045 X-RAY EXAM CHEST 1 VIEW: CPT

## 2020-02-27 PROCEDURE — 97165 OT EVAL LOW COMPLEX 30 MIN: CPT | Mod: GO

## 2020-02-27 PROCEDURE — 25000132 ZZH RX MED GY IP 250 OP 250 PS 637: Performed by: PHYSICIAN ASSISTANT

## 2020-02-27 PROCEDURE — 25000128 H RX IP 250 OP 636: Performed by: PHYSICIAN ASSISTANT

## 2020-02-27 PROCEDURE — 97530 THERAPEUTIC ACTIVITIES: CPT | Mod: GP

## 2020-02-27 PROCEDURE — 80076 HEPATIC FUNCTION PANEL: CPT | Performed by: INTERNAL MEDICINE

## 2020-02-27 PROCEDURE — 97535 SELF CARE MNGMENT TRAINING: CPT | Mod: GO

## 2020-02-27 PROCEDURE — 82962 GLUCOSE BLOOD TEST: CPT

## 2020-02-27 PROCEDURE — 25000125 ZZHC RX 250: Performed by: STUDENT IN AN ORGANIZED HEALTH CARE EDUCATION/TRAINING PROGRAM

## 2020-02-27 PROCEDURE — 99231 SBSQ HOSP IP/OBS SF/LOW 25: CPT | Performed by: NURSE PRACTITIONER

## 2020-02-27 PROCEDURE — 92610 EVALUATE SWALLOWING FUNCTION: CPT | Mod: GN | Performed by: SPEECH-LANGUAGE PATHOLOGIST

## 2020-02-27 PROCEDURE — 25000128 H RX IP 250 OP 636: Performed by: STUDENT IN AN ORGANIZED HEALTH CARE EDUCATION/TRAINING PROGRAM

## 2020-02-27 PROCEDURE — 25000132 ZZH RX MED GY IP 250 OP 250 PS 637: Performed by: STUDENT IN AN ORGANIZED HEALTH CARE EDUCATION/TRAINING PROGRAM

## 2020-02-27 PROCEDURE — 99233 SBSQ HOSP IP/OBS HIGH 50: CPT | Mod: GC | Performed by: INTERNAL MEDICINE

## 2020-02-27 PROCEDURE — 85025 COMPLETE CBC W/AUTO DIFF WBC: CPT | Performed by: INTERNAL MEDICINE

## 2020-02-27 PROCEDURE — 27210429 ZZH NUTRITION PRODUCT INTERMEDIATE LITER

## 2020-02-27 PROCEDURE — 99233 SBSQ HOSP IP/OBS HIGH 50: CPT | Performed by: INTERNAL MEDICINE

## 2020-02-27 PROCEDURE — 94640 AIRWAY INHALATION TREATMENT: CPT

## 2020-02-27 RX ORDER — ONDANSETRON 8 MG/1
8 TABLET, ORALLY DISINTEGRATING ORAL EVERY 8 HOURS PRN
Status: DISCONTINUED | OUTPATIENT
Start: 2020-02-27 | End: 2020-03-06 | Stop reason: HOSPADM

## 2020-02-27 RX ORDER — SENNOSIDES 8.6 MG
8.6 TABLET ORAL 2 TIMES DAILY PRN
Status: DISCONTINUED | OUTPATIENT
Start: 2020-02-27 | End: 2020-03-06 | Stop reason: HOSPADM

## 2020-02-27 RX ORDER — LORAZEPAM 0.5 MG/1
.5-1 TABLET ORAL EVERY 4 HOURS PRN
Status: DISCONTINUED | OUTPATIENT
Start: 2020-02-27 | End: 2020-02-28

## 2020-02-27 RX ORDER — ONDANSETRON 2 MG/ML
8 INJECTION INTRAMUSCULAR; INTRAVENOUS EVERY 8 HOURS PRN
Status: DISCONTINUED | OUTPATIENT
Start: 2020-02-27 | End: 2020-03-06 | Stop reason: HOSPADM

## 2020-02-27 RX ADMIN — ENOXAPARIN SODIUM 40 MG: 40 INJECTION SUBCUTANEOUS at 08:55

## 2020-02-27 RX ADMIN — ONDANSETRON 8 MG: 2 INJECTION INTRAMUSCULAR; INTRAVENOUS at 22:09

## 2020-02-27 RX ADMIN — ALLOPURINOL 100 MG: 100 TABLET ORAL at 08:47

## 2020-02-27 RX ADMIN — PANTOPRAZOLE SODIUM 40 MG: 40 TABLET, DELAYED RELEASE ORAL at 08:46

## 2020-02-27 RX ADMIN — LORAZEPAM 0.5 MG: 0.5 TABLET ORAL at 20:13

## 2020-02-27 RX ADMIN — LORAZEPAM 0.5 MG: 0.5 TABLET ORAL at 19:57

## 2020-02-27 RX ADMIN — LORATADINE 10 MG: 10 TABLET ORAL at 08:46

## 2020-02-27 RX ADMIN — ONDANSETRON 4 MG: 4 TABLET, ORALLY DISINTEGRATING ORAL at 08:47

## 2020-02-27 RX ADMIN — IPRATROPIUM BROMIDE AND ALBUTEROL SULFATE 3 ML: .5; 3 SOLUTION RESPIRATORY (INHALATION) at 22:15

## 2020-02-27 ASSESSMENT — ACTIVITIES OF DAILY LIVING (ADL)
PREVIOUS_RESPONSIBILITIES: MEAL PREP;HOUSEKEEPING;LAUNDRY;SHOPPING;MEDICATION MANAGEMENT;DRIVING
ADLS_ACUITY_SCORE: 16
ADLS_ACUITY_SCORE: 15
ADLS_ACUITY_SCORE: 15
ADLS_ACUITY_SCORE: 16
ADLS_ACUITY_SCORE: 16
ADLS_ACUITY_SCORE: 15

## 2020-02-27 ASSESSMENT — MIFFLIN-ST. JEOR: SCORE: 1359.15

## 2020-02-27 ASSESSMENT — PAIN DESCRIPTION - DESCRIPTORS
DESCRIPTORS: SORE;DISCOMFORT
DESCRIPTORS: SORE;DISCOMFORT

## 2020-02-27 NOTE — PROGRESS NOTES
[facility]  Palliative Care Daily Progress Note       Recommendations & Counseling     -Symptoms appear reasonably controlled at this time.  There are some underlying anxiety but this is improved following successful extubation yesterday. She has some irritation in her throat and some softness to vocal quality.  She leans heavily into her family support as well as her renita to get through difficult times.  -Seemed affirmed by the optimism of her oncologist and ENT and ICU staff that she will respond to treatment.    -Palliative Restorationism  to continue to follow      Assessments          Chuyita Porter is a 69 year old female with sx of several months of increasing shortness of breath and activity intolerance, some sense of needing to be careful with swallowing, with a known past medical history of breast cancer, large right neck mass with pathology demonstrating diffuse B-cell lymphoma, now s/p attempted tracheal stent, tracheal mass biopsy, and PEG tube placement 2/17/2020.The patient has a mediastinal mass with over 75% stenosis of the trachea.  An ET tube had been placed beyond the obstruction. 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 Airway vulnerable to collapse if not intubated. Oncology optimistic that disease will respond to treatment. Having evidence of airway improvement she was extubated without incident on 2/26/20  Today, the patient was seen for follow up. PC will likely sign off after transfer to floor and if she remains stable    Prognosis, Goals, or Advance Care Planning was addressed today with: No.  Mood, coping, and/or meaning in the context of serious illness were addressed today: No.  Summary/Comments: see consult note   Yousuf TAN NP ACHPN  Nurse Practitioner- Lead Advanced Practice Provider  [facility] Palliative Medicine Consult Service   [phone]  TT spent: 25 minutes of which 15 minutes were spent in direct  face to face contact with patient/family.       Interval History:     Chart review/discussion with unit or clinical team members:   Very pleased with progress and her successful extubation yesterday. Mildly anxious about probable discharge to the floor later today    Key Palliative Symptoms:  # Anxiety severity the last 12 hours: low           Review of Systems:     Besides above, an additional 4 system ROS was reviewed and is unremarkable          Medications:     I have reviewed this patient's medication profile and medications during this hospitalization.           Physical Exam:   Vitals were reviewed  Temp: 98.4  F (36.9  C) Temp src: Axillary BP: 127/74   Heart Rate: 92 Resp: 16 SpO2: 98 % O2 Device: Nasal cannula Oxygen Delivery: 2 LPM  General appearance: Patient sitting upright in bedside chair with spouse present. She has been extubated and able to make needs known.  HEENT: NC/AT , large neck mass  visible.  Symmetric facial features.  Dentition appear to be in good repair.  Respiratory: Good air movement. No wheeze. Improved BS R> L side  Cardiovascular: RRR.  S1-S2 without murmur.  Pulses regular.  Heart rate in the 80s at rest.  Monitor with sinus rhythm.  Abdomen: Brief exam without focal findings.  Soft nontender hypoactive bowel sounds no masses.  Musculoskeletal: No redness or swelling of exposed joint surfaces.  Appears to have movement of all fours.  Not observed transferring or ambulating.  Integument: No visible or suspicious lesions or rashes on exposed surfaces.  No open areas.  Neurologic: Moving all extremities. Appears to have full range of motion.  No resting tremor.  Psychiatric: Appears to have reasonable fund of knowledge.  Affect appropriate.             Data Reviewed:     ROUTINE LABS (Last four results)  CMP  Recent Labs   Lab 02/27/20  0355 02/26/20  0417 02/25/20  0313 02/24/20  0442 02/23/20  1537  02/22/20  0442 02/22/20  0150  02/21/20  1053    135 136 140  --    < > 140   --    < > 142   POTASSIUM 4.1 4.2 4.6 4.2  --    < > 3.6 3.7   < > 4.2   CHLORIDE 100 100 102 106  --    < > 106  --    < > 107   CO2 31 32 35* 32  --    < > 33*  --    < > 32   ANIONGAP 4 3 <1* 1*  --    < > 1*  --    < > 2*   * 117* 114* 140*  --    < > 142*  --    < > 130*   BUN 17 19 20 21  --    < > 22  --    < > 23   CR 0.54 0.50* 0.48* 0.54  --    < > 0.62  --    < > 0.60   GFRESTIMATED >90 >90 >90 >90  --    < > >90  --    < > >90   GFRESTBLACK >90 >90 >90 >90  --    < > >90  --    < > >90   GABI 8.0* 7.8* 7.9* 8.0*  --    < > 8.1*  --    < > 8.6   MAG  --   --   --   --   --   --   --  2.2  --   --    PHOS  --  2.5 2.5 2.4* 2.8   < > 1.6*  --    < > 2.0*   PROTTOTAL 5.2*  --   --   --   --   --  5.4*  --   --  6.2*   ALBUMIN 2.1*  --   --   --   --   --  2.2*  --   --  2.6*   BILITOTAL 0.5  --   --   --   --   --  0.2  --   --  0.4   ALKPHOS 107  --   --   --   --   --  125  --   --  124   AST 36  --   --   --   --   --  52*  --   --  88*   ALT 69*  --   --   --   --   --  86*  --   --  102*    < > = values in this interval not displayed.     CBC  Recent Labs   Lab 02/27/20 0355 02/26/20 0417 02/25/20 0313 02/24/20  0442   WBC 3.5* 10.0 15.9* 22.3*   RBC 3.26* 3.17* 3.24* 3.36*   HGB 10.0* 9.8* 9.8* 10.1*   HCT 30.2* 29.5* 30.4* 31.8*   MCV 93 93 94 95   MCH 30.7 30.9 30.2 30.1   MCHC 33.1 33.2 32.2 31.8   RDW 14.0 13.9 14.0 14.0    176 172 197     INRNo lab results found in last 7 days.  Arterial Blood Gas  Recent Labs   Lab 02/27/20  0355   O2PER 1 liter

## 2020-02-27 NOTE — PLAN OF CARE
Discharge Planner PT   5C:   Patient plan for discharge: Unknown  Current status: Pt A/Ox4, 3LPM via NC. Completed supine B LE exercises x 20 repetitions. Supine > sit with SBA, sat EOB for 5 minutes, pt c/o mild lightheadedness. STS from EOB with CGA + FWW; amb 15ft forward and backward in room. STS to and from toilet with CGA + FWW; assist needed for pericares. All VSS.   Barriers to return to prior living situation: Anxiety, deconditioning, medical status  Recommendations for discharge: TCU  Rationale for recommendations: Current level of function; pt will continue to benefit from therapy services to increase functional mobility and independence.        Entered by: Macie Woodall 02/27/2020 3:01 PM

## 2020-02-27 NOTE — PLAN OF CARE
OT 4C: Eval and treatment initiated  Discharge Planner OT   Patient plan for discharge: Home with assist  Current status: Pt requires CGA repeated sit<>stands x 3. Pt performed sit<>stand pivot transfer recliner>BSC with CGA. Pt required mod-max A for toilet hygiene and garment management. Pt tolerated ~6 min. static standing for g/h tasks. VSS on 2L O2 via nc. Pt tolerated activity well.   Barriers to return to prior living situation: deconditioning, anxiety, acute medical status  Recommendations for discharge: ARU  Rationale for recommendations: Pt is currently below baseline with regards to mobility and independence with self cares and will benefit from continued skilled therapy intervention to address deficits. Anticipate pt would tolerate 3 hours of therapy/day. Pt is progressing well with therapies and highly motivated towards return to PLOF. Pending medical stability and length of hospitalization, pt to potentially progress to return home with assist prn for higher level ADL/IADLs.         Entered by: Tracey Tiwari 02/27/2020 10:55 AM

## 2020-02-27 NOTE — PROGRESS NOTES
Otolaryngology - Head & Neck Surgery Progress Note  February 27, 2020     ON/S: Bronched at bedside, extubated yesterday. On 2L NC. Has some odynophagia. Voice soft, but improved since extubation. No shortness of breath or increased work of breathing. Cleared by SLP for clear liquid diet with nectar thick liquids.    Up in chair, NAD  Voice soft  NLB on 2L NC    Appears well and stable on 2L NC following extubation 2/26/2020.    Em Cheney MD

## 2020-02-27 NOTE — PLAN OF CARE
Discharge Planner SLP   Patient plan for discharge: not discussed today  Current status: Clinical swallow evaluation completed per MD order. Patient presents with moderate oropharyngeal dysphagia characterized by weak, slow oral motor movement, mild-moderately impaired oral bolus control, reduced laryngeal elevation, repeat swallows, and intermittent signs of aspiration after limited oral intake of thin liquid and puree consistencies. Patient also reported sensation of mild pharyngeal retention with puree consistency. No overt aspiration signs occurred with small sips of nectar thick liquid. No oral residue remained. Generalized weakness/deconditioning as well as weak cough and throat clear effort and dysphonia place patient at elevated risk for silent aspiration or inability to protect airway.     Recommend cautiously initiate clear liquid diet nectar thick consistency given swallow strategies (fully upright position or up in chair, no straws, small single sips). Please crush pills if able and serve with nectar thick liquids. Monitor for signs/symptoms of aspiration and hold PO if occur.    Barriers to return to prior living situation: medical status; dysphagia  Recommendations for discharge: ARU  Rationale for recommendations: continued SLP for dysphagia for safe return to baseline oral diet       Entered by: Sunita Paula 02/27/2020 1:04 PM

## 2020-02-27 NOTE — PROGRESS NOTES
[facility] [place]  Hematology / Oncology Transfer Accept Note    Date of Admission: 2/17/2020  Date of Service (when I saw the patient): 02/27/2020     Assessment & Plan   Chuyita Porter is a 69 year old female with history of breast cancer and newly diagnosed mediastinal mass, pathology of which is consistent with DLBCL. She was admitted for planned chemoradiation with elective ET for threatened airway and PEG tube placement for enteral access.  She was admitted to ICU on 2/17, underwent radiation x 2 fractions on 2/18 and 2/19, then initiated chemotherapy with RCHOP (D1=2/21/20). ET tube was removed on 2/26. She is transferring to Heme Malignancy service on 2/27.      # Extranodal Diffuse large B-cell lymphoma, non-germinal center type  Will follow with Dr. Sprague  On 2/14/20, CT chest showed 8.0 x 6.7 x 10.0 cm mass centered in the right anterior mediastinum with mass effect within the mediastinum, including compression of the SVC with associated collateral vessels seen in the neck. No mediastinal LN involvement. She underwent two fractions of radiotherapy on 2/18 and 2/19. Initiated R-CHOP chemotherapy on 2/21. Repeat CT Chest imaging on 2/25 demonstrated decreased size of mass.   - 2/19 CT A/P done to help with staging (wanted PET/CT but unable to get with pt on ventilator). Will order PET CT scan as now has ETT removed.  - will start arranging outpatient [facility] f/u  - no e/o TLS, continue Allopurinol     # History of right breast cancer in 2005 and left breast cancer in 2015  - currently holding patient's home anastrozole for now due to increased VTE at present. Will reassess this prior to discharge. Per Care Everywhere, appears she has followed with Dr. Gallardo. As per last clinic note, Dr. Gallardo had recommended anastrozole as prophylaxis and to continue until 2020. Can likely discontinue this medication at discharge.      # Constipation, resolved. Now with  loose stools likely related to bowel regimen. Monitor.     # Anxiety  # N/V  - encouraged to try ativan in the evening      FEN  - no current IVFs  - lyte repletion per protocol  - PEG tube placed 2/17, Nutrition consulted for TF. Continue.   - SLP assessed today, ok to trial CLD with nectar thick liquids    PPx  - VTE: Lovenox 40mg every day  - GI: s/p Protonix (now off steroids)  - PT/OT    Dispo: Current recs from therapy teams are TCU vs ARU. PM&R consulted today.   - unit SW aware  - will need to ensure remains clinically stable s/p ETT removal  - determine plan for TFs/PEG tube with trial of diet advancement. Could continue to work on this as outpt at TCU/ARU as well  - request f/u for ongoing [facility] f/u    Discussed with Dr. Chaudhari.      Genesis Mao PA-C  Heme/Onc  [phone]    Interval History   ETT removed on 2/26. Pt has been stable thereafter. Transferring from ICU to [facility] Malignancy service today.     Seen this afternoon. Chuyita is doing well. Denies throat pain or difficulty swallowing; states the nectar thick liquids are actually somewhat soothing. Continues with cough, intermittently productive of phlegm, which is clearing with suction as needed. Denies feeling of SOB or shallow breathing. Can occasionally feel heart racing but denies chest pain. Denies HA. Denies current nausea but states she had some N/V the other night, which she attributed to anxiety. Does think her feet are more puffy but otherwise not feeling significantly fluid overloaded.           Physical Exam   Temp: 98.9  F (37.2  C) Temp src: Oral BP: 109/66   Heart Rate: 92 Resp: 16 SpO2: 98 % O2 Device: Nasal cannula Oxygen Delivery: 2 LPM  Vitals:    02/24/20 0000 02/25/20 0400 02/26/20 0000   Weight: 87.7 kg (193 lb 5.5 oz) 89 kg (196 lb 3.4 oz) 88 kg (194 lb 0.1 oz)     Vital Signs with Ranges  Temp:  [98.3  F (36.8  C)-99.6  F (37.6  C)] 98.9  F (37.2  C)  Heart Rate:  [] 92  Resp:  [9-30] 16  BP:  ()/() 109/66  FiO2 (%):  [30 %-35 %] 35 %  SpO2:  [92 %-100 %] 98 %  I/O last 3 completed shifts:  In: 1917.77 [I.V.:307.77; NG/GT:565]  Out: 4045 [Urine:4045]    Constitutional: Pleasant female seen sitting up in bed. Awake, alert, cooperative, no apparent distress.  at bedside for support.   HEENT: NC/AT. MMM.   Respiratory: No increased work of breathing, good effort, on RA. Lungs with soft crackles in R>L base. No wheezing.  Cardiovascular: Mildly tachycardic, regular rate. No murmur noted.  GI: Normal BS. Abd soft, mild tenderness near PEG site.   Skin: Warm, dry.   Musculoskeletal: Grossly normal in appearance.   Neurologic: Awake, alert, grossly nonfocal.   Neuropsychiatric: Soft voice. Calm, normal eye contact, affect congruent.       Medications     dextrose Stopped (02/26/20 1600)     - MEDICATION INSTRUCTIONS -         allopurinol  100 mg Oral Daily     dextrose 5% water  10-20 mL Intracatheter Daily at 8 pm     dextrose 5% water  10-20 mL Intracatheter Daily at 8 pm     enoxaparin ANTICOAGULANT  40 mg Subcutaneous Q24H     [Held by provider] filgrastim (NEUPOGEN/GRANIX) intravenous  5 mcg/kg (Treatment Plan Recorded) Intravenous Daily at 8 pm     insulin aspart  1-3 Units Subcutaneous TID AC     insulin aspart  1-3 Units Subcutaneous At Bedtime     insulin aspart  1-12 Units Subcutaneous Q4H DAMON     loratadine  10 mg Oral or Feeding Tube Daily     sodium chloride (PF)  3 mL Intracatheter Q8H       Data   Results for orders placed or performed during the hospital encounter of 02/17/20 (from the past 24 hour(s))   CBC with platelets differential   Result Value Ref Range    WBC 3.5 (L) 4.0 - 11.0 10e9/L    RBC Count 3.26 (L) 3.8 - 5.2 10e12/L    Hemoglobin 10.0 (L) 11.7 - 15.7 g/dL    Hematocrit 30.2 (L) 35.0 - 47.0 %    MCV 93 78 - 100 fl    MCH 30.7 26.5 - 33.0 pg    MCHC 33.1 31.5 - 36.5 g/dL    RDW 14.0 10.0 - 15.0 %    Platelet Count 166 150 - 450 10e9/L    Diff Method Manual  Differential     % Neutrophils 95.5 %    % Lymphocytes 2.7 %    % Monocytes 0.0 %    % Eosinophils 0.9 %    % Basophils 0.0 %    % Metamyelocytes 0.9 %    Absolute Neutrophil 3.3 1.6 - 8.3 10e9/L    Absolute Lymphocytes 0.1 (L) 0.8 - 5.3 10e9/L    Absolute Monocytes 0.0 0.0 - 1.3 10e9/L    Absolute Eosinophils 0.0 0.0 - 0.7 10e9/L    Absolute Basophils 0.0 0.0 - 0.2 10e9/L    Absolute Metamyelocytes 0.0 0 10e9/L    Hyper Segmented PMNs Present     Platelet Estimate Confirming automated cell count    Basic metabolic panel   Result Value Ref Range    Sodium 135 133 - 144 mmol/L    Potassium 4.1 3.4 - 5.3 mmol/L    Chloride 100 94 - 109 mmol/L    Carbon Dioxide 31 20 - 32 mmol/L    Anion Gap 4 3 - 14 mmol/L    Glucose 100 (H) 70 - 99 mg/dL    Urea Nitrogen 17 7 - 30 mg/dL    Creatinine 0.54 0.52 - 1.04 mg/dL    GFR Estimate >90 >60 mL/min/[1.73_m2]    GFR Estimate If Black >90 >60 mL/min/[1.73_m2]    Calcium 8.0 (L) 8.5 - 10.1 mg/dL   Blood gas venous   Result Value Ref Range    Ph Venous 7.50 (H) 7.32 - 7.43 pH    PCO2 Venous 41 40 - 50 mm Hg    PO2 Venous 55 (H) 25 - 47 mm Hg    Bicarbonate Venous 32 (H) 21 - 28 mmol/L    Base Excess Venous 8.4 mmol/L    FIO2 1 liter    Hepatic panel   Result Value Ref Range    Bilirubin Direct 0.1 0.0 - 0.2 mg/dL    Bilirubin Total 0.5 0.2 - 1.3 mg/dL    Albumin 2.1 (L) 3.4 - 5.0 g/dL    Protein Total 5.2 (L) 6.8 - 8.8 g/dL    Alkaline Phosphatase 107 40 - 150 U/L    ALT 69 (H) 0 - 50 U/L    AST 36 0 - 45 U/L   XR Chest Port 1 View    Narrative    EXAM: XR CHEST PORT 1 VW  2/27/2020 6:09 AM     HISTORY:  Mediastinal lymphoma extubated on 2/26       COMPARISON:  Chest x-ray 2/26/2020    FINDINGS:   Portable semiupright view of the chest. Removal of endotracheal tube.  Cardiac silhouette is within normal limits. Small bilateral pleural  effusions with overlying basilar opacities. Low lung volumes with  prominent pulmonary vasculature. No pneumothorax. Scoliotic curvature  of thoracic  spine. Visualized upper abdomen is within normal limits.      Impression    IMPRESSION:   1. Removal of endotracheal tube.  2. Small bilateral pleural effusions with overlying  atelectasis/consolidation.    I have personally reviewed the examination and initial interpretation  and I agree with the findings.    YULY MCKEON MD     I have seen, interviewed, and examined the patient independently.  I have reviewed the vital signs and labs.  This note reflects my assessment and plan.    Feels very well, on NC since yesterday. Will get PET today.   Will contact primary oncologist regarding anastrazole: would ideally hold it while treating lymphoma.      Maria Del Carmen Chaudhari MD/PhD

## 2020-02-27 NOTE — PLAN OF CARE
ICU End of Shift Summary. See flowsheets for vital signs and detailed assessment.    Changes this shift: Pt bronched at bedside to evaluate airway and obstruction. Pt extubated to oxymask, tolerating well, VSS. Up to chair. Intermittent nausea, improved with zofran. 1 BM.     Plan: Continue to monitor respiratory status, Alert team to any changes. Possible transfer to floor tomorrow.      Problem: Adult Inpatient Plan of Care  Goal: Absence of Hospital-Acquired Illness or Injury  2/26/2020 1918 by Da Rucker RN  Outcome: No Change     Problem: Pain Acute  Goal: Optimal Pain Control  2/26/2020 1918 by Da Rucker RN  Outcome: No Change     Problem: Anemia (Chemotherapy Effects)  Goal: Anemia Symptom Improvement  2/26/2020 1918 by Da Rucker RN  Outcome: No Change     Problem: Urinary Bleeding Risk or Actual (Chemotherapy Effects)  Goal: Absence of Hematuria  2/26/2020 1918 by Da Rucker RN  Outcome: No Change     Problem: Nausea and Vomiting (Chemotherapy Effects)  Goal: Fluid and Electrolyte Balance  2/26/2020 1918 by Da Rucker RN  Outcome: No Change     Problem: Neurotoxicity (Chemotherapy Effects)  Goal: Neurotoxicity Symptom Control  2/26/2020 1918 by Da Rucker RN  Outcome: No Change     Problem: Neutropenia (Chemotherapy Effects)  Goal: Absence of Infection  2/26/2020 1918 by Da Rucker RN  Outcome: No Change     Problem: Oral Mucositis (Chemotherapy Effects)  Goal: Improved Oral Mucous Membrane Integrity  2/26/2020 1918 by Da Rucker RN  Outcome: No Change     Problem: Thrombocytopenia Bleeding Risk (Chemotherapy Effects)  Goal: Absence of Bleeding  2/26/2020 1918 by Da Rucker RN  Outcome: No Change     Problem: Fatigue (Radiation, External Beam)  Goal: Improved Activity Tolerance  2/26/2020 1918 by Da Rucker RN  Outcome: No Change     Problem: Radiation Skin Reaction (Radiation, External Beam)  Goal: Skin Health and Integrity  2/26/2020 1918 by Da Rucker  RN  Outcome: No Change     Problem: Adult Inpatient Plan of Care  Goal: Plan of Care Review  2/26/2020 1918 by Da Rukcer RN  Outcome: Improving     Problem: Adult Inpatient Plan of Care  Goal: Patient-Specific Goal (Individualization)  2/26/2020 1918 by Da Rucker RN  Outcome: Improving     Problem: Adult Inpatient Plan of Care  Goal: Optimal Comfort and Wellbeing  2/26/2020 1918 by Da Rucker RN  Outcome: Improving     Problem: Adult Inpatient Plan of Care  Goal: Readiness for Transition of Care  2/26/2020 1918 by Da Rucker RN  Outcome: Improving     Problem: Adult Inpatient Plan of Care  Goal: Rounds/Family Conference  2/26/2020 1918 by Da Rucker RN  Outcome: Improving

## 2020-02-27 NOTE — PROGRESS NOTES
"   02/27/20 1132   General Information   Onset Date 02/17/20   Start of Care Date 02/27/20   Referring Physician Cole Vera MD    Patient Profile Review/OT: Additional Occupational Profile Info See Profile for full history and prior level of function   Patient/Family Goals Statement Patient did not state but agreed to swallow evaluation. Patient's  present.   Swallowing Evaluation Bedside swallow evaluation   Behaviorial Observations Alert  (up in chair;appeared weak/deconditioned)   Mode of current nutrition NPO;PEG   Respiratory Status O2 Supply;Intubated on (date);Extubated on (date)  (intubated 2/17-2/26/2020)   Type of O2 supply Nasal cannula  (2L)   Comments Per MD note: \" Chuyita Porter is a 69 year old female with a past medical history of breast cancer, large right neck mass with pathology demonstrating diffuse B-cell lymphoma now s/p attempted tracheal stent, tracheal mass biopsy, and PEG tube placement 2/17/2020.\" \"She received two fractions of radiation (the last session was 2/19) and she was started on R-CHOP on 2/21. A repeat CT on 2/25 demonstrated interval reduction in size of the anterior mediastinal mass from 8x7x6 cm to 5.5 x 4.5 x 6.1 cm.\" CT soft tissue neck 2/25/2020 revealed no evidence of abnormalities in nasopharynx, oropharynx, hypopharynx or glottis and mary base appeared normal. CXR 2/26/2020 revealed small bilateral pleural effusions with overlying atelectasis/consolidation. RN reported patient able to swallow pills with applesauce this morning. Patient reported some sensation of sticking in pharynx.   Clinical Swallow Evaluation   Oral Musculature generally intact  (generalized weakness/deconditioning noted)   Structural Abnormalities none present   Dentition present and adequate   Mucosal Quality dry   Mandibular Strength and Mobility intact   Oral Labial Strength and Mobility WFL   Lingual Strength and Mobility impaired coordination  (mild)   Velar Elevation " intact   Buccal Strength and Mobility intact   Laryngeal Function Cough;Throat clear;Swallow;Voicing initiated;Dry swallow palpated  (weak throat clear/cough effort; hoarse,breathy,soft voice)   Clinical Swallow Eval: Thin Liquid Texture Trial   Mode of Presentation, Thin Liquids spoon;fed by clinician;self-fed   Volume of Liquid or Food Presented 2 ice chips; 4 sips water by spoon   Oral Phase of Swallow Poor AP movement   Pharyngeal Phase of Swallow impaired;reduction in laryngeal movement;repeated swallows;coughing/choking  (weak coughing x1)   Diagnostic Statement High risk for aspiration   Clinical Swallow Eval: Nectar Thick Liquid Texture Trial   Mode of Presentation, Nectar spoon;cup;fed by clinician;self-fed   Volume of Nectar Presented 1-2 oz nectar thick water   Oral Phase, Nectar WFL   Pharyngeal Phase, Nectar impaired;reduction in laryngeal movement;repeated swallows   Diagnostic Statement No overt aspiration signs   Clinical Swallow Eval: Puree Solid Texture Trial   Mode of Presentation, Puree spoon;self-fed   Volume of Puree Presented 3 small bites applesauce   Oral Phase, Puree Poor AP movement   Pharyngeal Phase, Puree impaired;reduction in laryngeal movement;repeated swallows;feeling of something stuck in throat;wet vocal quality after swallow  (mild phlegmy vocal quality)   Diagnostic Statement Cue to clear throat effective in clearing phlegmy vocal quality.   General Therapy Interventions   Planned Therapy Interventions Dysphagia Treatment   Dysphagia treatment Oropharyngeal exercise training;Modified diet education;Instruction of safe swallow strategies   Swallow Eval: Clinical Impressions   Skilled Criteria for Therapy Intervention Skilled criteria met.  Treatment indicated.   Functional Assessment Scale (FAS) 3   Treatment Diagnosis moderate oropharyngeal dysphagia   Diet texture recommendations Clear liquid;Nectar thick liquids   Recommended Feeding/Eating Techniques maintain upright posture  during/after eating for 30 mins;no straws;small sips/bites   Demonstrates Need for Referral to Another Service   (involved)   Therapy Frequency Daily   Predicted Duration of Therapy Intervention (days/wks) 1 week   Anticipated Discharge Disposition inpatient rehabilitation facility   Risks and Benefits of Treatment have been explained. Yes   Patient, family and/or staff in agreement with Plan of Care Yes   Clinical Impression Comments Patient presents with moderate oropharyngeal dysphagia characterized by weak, slow oral motor movement, mild-moderately impaired oral bolus control, reduced laryngeal elevation, repeat swallows, and intermittent signs of aspiration after limited oral intake of thin liquid and puree consistencies. Patient also reported sensation of mild pharyngeal retention with puree consistency. No overt aspiration signs occurred with small sips of nectar thick liquid. No oral residue remained. Generalized weakness/deconditioning as well as weak cough and throat clear effort and dysphonia place patient at elevated risk for silent aspiration or inability to protect airway. Recommend cautiously initiate clear liquid diet nectar thick consistency given swallow strategies (fully upright position or up in chair, no straws, small single sips). Monitor for signs/symptoms of aspiration and hold PO if occur.   Total Evaluation Time   Total Evaluation Time (Minutes) 10

## 2020-02-27 NOTE — PROGRESS NOTES
Transferred to: 5C at 1230  Status at time of transfer: Stable, 2L NC. A/Ox4  Belongings: With Pt and   Reid removed? (if no, why?): N/A  Chart and medications: Given to RN on unit  Family notified: With patient

## 2020-02-27 NOTE — PROVIDER NOTIFICATION
Paged Genesis TALBOT:   can you clarify, does that pt need to have thickened liquids? the ICU nurse insisted that she needed nectar thickened liquids. thanks

## 2020-02-27 NOTE — PROGRESS NOTES
02/27/20 1015   Quick Adds   Type of Visit Initial Occupational Therapy Evaluation   Living Environment   Lives With spouse   Living Arrangements house   Home Accessibility stairs within home   Number of Stairs, Within Home, Primary   (Multi-level/ split-level home )   Transportation Anticipated car, drives self;family or friend will provide   Living Environment Comment Pt lives in a multi level home with her . Pt's bedroom is on 2nd level. Bathrooms available on all levels. Pt recently had walk in shower installed.   Self-Care   Usual Activity Tolerance good   Current Activity Tolerance fair   Regular Exercise Yes   Activity/Exercise Type walking   Exercise Amount/Frequency daily   Equipment Currently Used at Home none   Activity/Exercise/Self-Care Comment Pt was independent with ADLs prior to admission. Pt walked daily with  (goal=4-6,000 steps).  available prn for support with self cares.   Functional Level   Ambulation 0-->independent   Transferring 0-->independent   Toileting 0-->independent   Bathing 0-->independent   Dressing 0-->independent   Eating 0-->independent   Communication 0-->understands/communicates without difficulty   Swallowing 0-->swallows foods/liquids without difficulty   Cognition 0 - no cognition issues reported   Fall history within last six months no   Which of the above functional risks had a recent onset or change? none       Present no   Language English   General Information   Onset of Illness/Injury or Date of Surgery - Date 02/17/20   Referring Physician Anna Salinas MD   Patient/Family Goals Statement Return to home and PLOF   Additional Occupational Profile Info/Pertinent History of Current Problem Chuyita Porter is a 69 year old female admitted on 2/17/2020. She has a history of breast cancer s/p radiation (2005, 2015) and is admitted for 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    Precautions/Limitations oxygen  "therapy device and L/min   Weight-Bearing Status - LUE full weight-bearing   Weight-Bearing Status - RUE full weight-bearing   Weight-Bearing Status - LLE full weight-bearing   Weight-Bearing Status - RLE full weight-bearing   Heart Disease Risk Factors Age   General Observations Pt is pleasant and agreeable towards therapy   General Info Comments Activity: ambulate with assist   Cognitive Status Examination   Orientation orientation to person, place and time   Level of Consciousness alert   Follows Commands (Cognition) WNL   Memory intact   Attention No deficits were identified   Organization/Problem Solving No deficits were identified   Executive Function No deficits were identified   Visual Perception   Visual Perception No deficits were identified   Visual Perception Comments Pt wears \"cheaters\" for reading only   Sensory Examination   Sensory Quick Adds No deficits were identified   Pain Assessment   Patient Currently in Pain Yes, see Vital Sign flowsheet  (Pt reports minor pain in upper legs)   Integumentary/Edema   Integumentary/Edema no deficits were identifed   Posture   Posture not impaired   Range of Motion (ROM)   ROM Comment Not formally assessed, however harriet. UE and LE ROM appears WFL   Strength   Strength Comments Not formally assessed however harriet. UE and LE strength appears WFL, slightly decreased due to prolonged time in bed.   Hand Strength   Hand Strength Comments Not formally assessed however bilateral  strength appears WFL during g/h tasks   Transfer Skill: Sit to Stand   Level of Bennett: Sit/Stand contact guard   Physical Assist/Nonphysical Assist: Sit/Stand supervision   Transfer Skill: Sit to Stand full weight-bearing   Transfer Skill: Toilet Transfer   Level of Bennett: Toilet contact guard   Physical Assist/Nonphysical Assist: Toilet verbal cues   Weight-Bearing Restrictions: Toilet full weight-bearing   Toilet Transfer Skill Comments Pivot transfer recliner>BSC   Lower Body " "Dressing   Level of Smiley: Dress Lower Body stand-by assist   Physical Assist/Nonphysical Assist: Dress Lower Body supervision   Toileting   Level of Smiley: Toilet moderate assist (50% patients effort)   Physical Assist/Nonphysical Assist: Toilet 1 person assist  (For toilet hygiene)   Grooming   Level of Smiley: Grooming contact guard  (With standing g/h)   Physical Assist/Nonphysical Assist: Grooming set-up required;supervision   Instrumental Activities of Daily Living (IADL)   Previous Responsibilities meal prep;housekeeping;laundry;shopping;medication management;driving   IADL Comments Pt was ind. with all IADLs prior to admission. Pt reports spouse available prn for assistance with IADL completion.   Activities of Daily Living Analysis   Impairments Contributing to Impaired Activities of Daily Living strength decreased   General Therapy Interventions   Planned Therapy Interventions ADL retraining;IADL retraining;strengthening;transfer training;home program guidelines   Clinical Impression   Criteria for Skilled Therapeutic Interventions Met yes, treatment indicated   OT Diagnosis Decreased independence in ADLs/IADLs and functional mobility   Influenced by the following impairments deconditioning, dizziness, weakness   Assessment of Occupational Performance 3-5 Performance Deficits   Identified Performance Deficits dressing, bathing, g/h, toileting, functional transfers and mobility   Clinical Decision Making (Complexity) Low complexity   Therapy Frequency 6x/week   Predicted Duration of Therapy Intervention (days/wks) 3/5/20   Anticipated Equipment Needs at Discharge shower chair   Anticipated Discharge Disposition Home with Assist   Risks and Benefits of Treatment have been explained. Yes   Patient, Family & other staff in agreement with plan of care Yes   [facility] AM-PAC TM \"6 Clicks\"   2016, Trustees of [facility], under license to [employer].  All rights reserved.   6 " "Clicks Short Forms Daily Activity Inpatient Short Form   [facility] AM-PAC  \"6 Clicks\" Daily Activity Inpatient Short Form   1. Putting on and taking off regular lower body clothing? 3 - A Little   2. Bathing (including washing, rinsing, drying)? 2 - A Lot   3. Toileting, which includes using toilet, bedpan or urinal? 3 - A Little   4. Putting on and taking off regular upper body clothing? 3 - A Little   5. Taking care of personal grooming such as brushing teeth? 3 - A Little   6. Eating meals? 4 - None   Daily Activity Raw Score (Score out of 24.Lower scores equate to lower levels of function) 18   Total Evaluation Time   Total Evaluation Time (Minutes) 5     "

## 2020-02-27 NOTE — PROGRESS NOTES
[facility], [facility]    Progress Note - MICU Service        Date of Admission:  2/17/2020  Date of Service: 02/27/2020    Assessment & Plan   Chuyita Porter is a 69 year old female admitted on 2/17/2020. She has a history of breast cancer s/p radiation (2005, 2015) and is admitted for planned chemoradiation for bulky mediastinal DLBCL NOS involving thyroid now s/p biopsy and intubation due to threatened airway.      Changes today:  - Extubated on 2/26  - SLP consulted for swallow eval  - Stable for transfer to floor today      PLAN:     ===NEURO===     Sedation: Ativan 0.5 mg PO q4h PRN     Analgesia: Oxycodone oral solution 5mg q4h PRN      ===CARDIOVASCULAR===    Bradycardia, improved  SVC Syndrome, improved  Patient with asymptomatic bradycardia 2/. Known SVC syndrome vs Precedex gtt. Stopped precedex gtt 2/20 and hypotension/bradycardia is largely resolved. If patient hypotensive, elevate HOB to confirm no recurrence of SVC syndrome.      ===PULMONARY===     Central airway obstruction, improved  Impending airway obstruction noted during workup for mediastinal mass shown to be B-cell lymphoma on previous FNA (1/30/20). Patient intubated during biopsy and decision to maintain ETT for early stages of treatment. Plan to monitor size of mass to determine when ETT can be safely removed. Per IP, ETT will need to be removed in controlled setting (?OR) as high risk of airway obstruction. On exam 2/23, deflated ETT with NO cuff leak heard.  Repeat CT Chest/Neck 2/25 shows significant reduction in tumor burden.  - Extubated on 2/26     ===GASTROINTESTINAL===    Constipation, resolved  No bowel movement since 2/16. Abdominal XR on 2/23 showing large stool burden. Bowel movement overnight 2/25.   - Senna, PRN    GI Prophylaxis-- discontinued  - Pantoprazole 40mg daily stopped on 2/27     Nutrition  - PEG tube placed 2/17  - Nutrition consulted for TF (Nutren 1.5 at 55mL/hr)  - Free  water flushes 60 mL q4h  - SLP consulted for bedside swallow eval     ===RENAL===     Tumor Lysis Syndrome Prophylaxis  Radiation therapy 2/18-2/19. Initiated R-EPOCH 2/21.   - Heme consulted, appreciate recs  - BMP, Phos, Uric Acid, LDH labs q48h  - Cardiac telemetry, okay to discontinue  - Electrolyte replacement protocol  - Allopurinol 100 mg daily on 2/14    ===HEME/ONC===      Stage IIE bulky mediastinal B-cell Lymphoma  8x6.7x10cm anterior mediastinal mass shown to be B-cell lymphoma on previous biopsy (1/30/20), now s/p FNA. Surgical pathology (2/17) shows diffuse large B-cell lymphoma, non-germinal center type. FISH studies for BCL-2, BCL-6, and MYC rearrangement are negative. Per Rad-Onc, no further radiation therapy. Plan to begin chemotherapy on 2/21/2020 via PIV. CT AP for staging prior to initiating chemotherapy completed on 2/20. TTE on 2/19 prior to initiation of chemotherapy showed normal LV function with LVEF 60-65%. IR consulted re: PICC/port; per IR, no procedure if patient cannot lie flat; will administer chemo via PIV for now.   - R-EPOCH via PIV with plan for eventual port placement  - Doses:    Fosaprepitant//VincristineCyclophosphamide/Doxorubicin: 2/21   Rituximab: 2/22  - Prednisone to 100 mg BID for 7 days (now stopped)  - Plan to transfer to malignant heme service today     ===ENDOCRINE===     Steroid associated hyperglycemia, resolving  - Low resistance sliding scale insulin -- tapering down now that steroids have stopped     ===INFECTIOUS DISEASE===     No acute issues     ===SKIN/MSK===     No acute issues     - PT/OT consulted       Diet: Adult Formula Drip Feeding: Continuous Nutren 1.5; Gastrostomy; Goal Rate: 55; mL/hr; Medication - Feeding Tube Flush Frequency: At least 15-30 mL water before and after medication administration and with tube clogging; Infuse @ 55 mL/hr.    Fluids: 50 ml/hr LR  Lines: PIV, osborn, PEG, ETT  DVT Prophylaxis: Enoxaparin (Lovenox) SQ and Pneumatic  Compression Devices  Reid Catheter: in place, indication: Retention;Strict 1-2 Hour I&O, Retention  Code Status: DNR      Disposition Plan   Expected discharge: > 7 days, recommended to TBD once extubated, chemotherapy cycles completed per [facility].  Entered: Crystal Chen MD 2020, 7:12 AM       The patient's care was discussed with the Attending Physician, Dr. Dean.    Crystal Chen MD  Internal Medicine, PGY-1  Pager: [phone]  ______________________________________________________________________    Interval History   NAEO. Extubated on . Comfortable overnight, except some nausea.     4 point ROS otherwise negative.    Data reviewed today: I reviewed all medications, new labs and imaging results over the last 24 hours.    Objective    Temp: 98.4  F (36.9  C) Temp  Min: 98  F (36.7  C)  Max: 99.6  F (37.6  C)  Resp: 30 Resp  Min: 9  Max: 30  SpO2: 95 % SpO2  Min: 92 %  Max: 100 %  Heart Rate: 106 Heart Rate  Min: 75  Max: 112  BP: 132/77 Systolic (24hrs), Av , Min:52 , Max:151   Diastolic (24hrs), Av, Min:26, Max:140      I/O last 3 completed shifts:  In: 1917.77 [I.V.:307.77; NG/GT:565]  Out: 4045 [Urine:4045]      Physical Exam  Constitutional: awake, alert, interactive  HEENT: No longer intubated, moderate swelling of neck  Cardiovascular: RRR. No murmurs, gallops or rub, No edema or JVD.   Respiratory:  Good air movement throughout. No rhonchi, no wheeze  Gastrointestinal: Abdomen soft, non-tender. BS+  Musculoskeletal: Extremities normal with no gross deformities noted   Skin: No suspicious lesions or rashes  Neurologic: moving all extremities, full ROM  Psychiatric: affect bright/appropriate    Labs:    CBC  Recent Labs   Lab 20  0355 20  0417 20  0313   WBC 3.5* 10.0 15.9*   RBC 3.26* 3.17* 3.24*   HGB 10.0* 9.8* 9.8*   HCT 30.2* 29.5* 30.4*   MCV 93 93 94   MCH 30.7 30.9 30.2   MCHC 33.1 33.2 32.2   RDW 14.0 13.9 14.0    176 172       Coagulation  No lab  results found in last 7 days.   No lab results found in last 7 days.     Basic Metabolic Panel  Recent Labs   Lab 02/27/20  0355 02/26/20  0417 02/25/20  0313    135 136   POTASSIUM 4.1 4.2 4.6   CHLORIDE 100 100 102   CO2 31 32 35*   ANIONGAP 4 3 <1*   * 117* 114*   BUN 17 19 20   CR 0.54 0.50* 0.48*   GABI 8.0* 7.8* 7.9*       Imaging    CXR, 2/27: read pending

## 2020-02-27 NOTE — PROGRESS NOTES
CLINICAL NUTRITION SERVICES - REASSESSMENT NOTE   Nutrition Prescription    RECOMMENDATIONS FOR MDs/PROVIDERS TO ORDER:  -Advance diet per SLP recs. Continue TF as ordered.  -FWF adjustments per primary team. If need to meet full hydration needs, rec: 150 mL q4h, which provides (TF + FWF) a total of 1903 mL (30 mL/kg).       Malnutrition Status:    Patient does not meet two of the established criteria necessary for diagnosing malnutrition    Recommendations already ordered by Registered Dietitian (RD):  -Continue TF via PEG as ordered: Pt currently on restricted/limited diet (no indication to cycle), feels good on current formula and going to bathroom well. Declined changing to fiber-containing formula at this time.    Future/Additional Recommendations:  1. Monitor diet advancement per SLP recs - if slow diet advancement, consider cycled vs bolus feeds if needs more freedom from pump and longer-term TF need.    2. Monitor appropriateness for ONS and tapering/discontinuing TF.  -->Note: TF anticipated for short-term despite PEG placement    3. If pt becomes constipated, rec fiber-containing formula:  Isosource 1.5 @ goal 55 ml/hr (1320 ml/day) to provide 1980 kcals (31 kcal/kg/day), 90 g PRO (1.2 g/kg/day), 1003 ml free H2O, 232 g CHO and 20 g Fiber daily.       EVALUATION OF THE PROGRESS TOWARD GOALS   Diet: None (NPO) -- SLP consulted    Enteral Access: PEG placed 2/17 (emergent decision)    Nutrition Support: EN  2/18 - Current: Nutren 1.5 @ 55 mL/hr = 1980 kcals (31 kcal/kg/day), 90 g PRO (1.4 g/kg/day), 1003 mL H2O, 232 g CHO and no fiber daily.    Enteral Intake: Reached goal TF rate on 2/19. Tolerating TF at goal rate. Some nausea at night which she relates to anxiety. 7-day average enteral nutrition infusions: 1147 mL TF = 1721 kcals (27 kcal/kg, meets minimum assessed kcal needs) and 78 g protein (1.2 g protein/kg, meets minimum assessed protein needs).     NEW FINDINGS     -General: Transferred to  floor  unit.    -Wt trends: Wt gain overall since admit. Suspect largely fluid-related given net positive I/Os +14.6 L since admit. Dosing wt of 63 kg remains appropriate.  02/26/20 0000 88 kg (194 lb 0.1 oz)   02/25/20 0400 89 kg (196 lb 3.4 oz) -- highest wt this admit   02/24/20 0000 87.7 kg (193 lb 5.5 oz)   02/23/20 0000 85.5 kg (188 lb 7.9 oz)   02/22/20 0000 79.6 kg (175 lb 7.8 oz)   02/21/20 0400 81.9 kg (180 lb 8.9 oz)   02/19/20 0400 77.4 kg (170 lb 10.2 oz)   02/18/20 0400 76.9 kg (169 lb 8.5 oz)   02/17/20 1730 76.1 kg (167 lb 12.3 oz)   02/17/20 1229 75.4 kg (166 lb 3.6 oz) -- lowest wt this admit      -Labs: Reviewed. Nothing notable.    -GI: Appears to have no BM from admit up until 2/25 (7 days). Last BM today per I/Os. She feels good on formula.    -Respiratory: Extubated 2/26    -Skin: No skin breakdown noted per RN flowsheets.     -Meds & Vitamin/Mineral Supplementation: Reviewed, notable for: High dose sliding scale insulin q4h + low dose sliding scale insulin TID before meals + HS  -->No multivitamin started d/t oncology diagnosis - Note: TF at goal provides 100% RDIs of vitamins and minerals.     -Endocrine: BG 80s-130s over past 24 hrs which is acceptable    MALNUTRITION  % Intake: Decreased intake does not meet criteria  % Weight Loss: None noted  Subcutaneous Fat Loss: None observed  Muscle Loss: Thoracic region (clavicle, acromium bone, deltoid, trapezius, pectoral), Upper arm (bicep, tricep) and Posterior calf: All Mild --> query if sarcopenia  Fluid Accumulation/Edema: None noted  Malnutrition Diagnosis: Patient does not meet two of the established criteria necessary for diagnosing malnutrition    Previous Goals   Total avg nutritional intake to meet a minimum of 25 kcal/kg and 1.2 g PRO/kg daily (per dosing wt 63 kg).  Evaluation: Met    Previous Nutrition Diagnosis  Inadequate protein-energy intake related to resp status inhibiting ability to take PO as evidenced by pt NPO x 1 day, pt with  mild muscle wasting, pt has a new PEG in place but no TF started yet.  Evaluation: No longer applicable, nutrition diagnosis changed below    CURRENT NUTRITION DIAGNOSIS  Inadequate oral intake related to recent extubation and dysphagia as evidenced by SLP diet recommendations for nectar-thick, clear liquid diet and pt's continued reliance on EN to meet 100% minimum nutrition needs at this time.      INTERVENTIONS  Implementation  -Enteral Nutrition - Continue as ordered    -Collaboration with other providers: Discussed with Hem/Onc regarding pt's nighttime anxiety contributing to nausea and emesis once recently. Relayed that pt had reported the ICU team had mentioned an anti-anxiety could be made available if she desired. Hem/Onc were considering Ativan for pt. Team anticipated her tumor will shrink fairly fast, she would be able to advance her diet fairly quickly and the need for TF would be more short-term. RD shared no need to cycle prior to discontinuing TF, once diet/PO improves.    -Nutrition education (pt and pt's ): Provided education on RD role, TF, and nectar-thick, clear liquid die, reviewing menu options with pt's  Ranjit and pt.     Goals  Total avg nutritional intake to meet a minimum of 25 kcal/kg and 1.2 g PRO/kg daily (per dosing wt 63 kg).    Monitoring/Evaluation  Progress toward goals will be monitored and evaluated per protocol.     Jeannette Laurent RD, LD  Pager: [phone]

## 2020-02-27 NOTE — PLAN OF CARE
ICU End of Shift Summary. See flowsheets for vital signs and detailed assessment.     Changes this shift: Stable on 1-2L NC overnight, attempted to wean to RA, but desatted when asleep. All other vitals stable. C/o nausea this shift, improved after zofran. Continues to have diarrhea, has been  Taking miralax and senna for large stool burden. Reid in place for retention.      Plan: Possibly transfer to floor today pending transfer orders. Will continue to monitor and follow POC.

## 2020-02-28 ENCOUNTER — APPOINTMENT (OUTPATIENT)
Dept: PET IMAGING | Facility: CLINIC | Age: 70
DRG: 840 | End: 2020-02-28
Attending: PHYSICIAN ASSISTANT
Payer: COMMERCIAL

## 2020-02-28 ENCOUNTER — APPOINTMENT (OUTPATIENT)
Dept: PHYSICAL THERAPY | Facility: CLINIC | Age: 70
DRG: 840 | End: 2020-02-28
Attending: OTOLARYNGOLOGY
Payer: COMMERCIAL

## 2020-02-28 DIAGNOSIS — C83.398 DIFFUSE LARGE B-CELL LYMPHOMA OF EXTRANODAL SITE: Primary | ICD-10-CM

## 2020-02-28 LAB
ANION GAP SERPL CALCULATED.3IONS-SCNC: 4 MMOL/L (ref 3–14)
BASOPHILS # BLD AUTO: 0 10E9/L (ref 0–0.2)
BASOPHILS NFR BLD AUTO: 0 %
BUN SERPL-MCNC: 16 MG/DL (ref 7–30)
CALCIUM SERPL-MCNC: 8.2 MG/DL (ref 8.5–10.1)
CHLORIDE SERPL-SCNC: 103 MMOL/L (ref 94–109)
CO2 SERPL-SCNC: 29 MMOL/L (ref 20–32)
CREAT SERPL-MCNC: 0.53 MG/DL (ref 0.52–1.04)
DIFFERENTIAL METHOD BLD: ABNORMAL
EOSINOPHIL # BLD AUTO: 0 10E9/L (ref 0–0.7)
EOSINOPHIL NFR BLD AUTO: 0.9 %
ERYTHROCYTE [DISTWIDTH] IN BLOOD BY AUTOMATED COUNT: 14 % (ref 10–15)
GFR SERPL CREATININE-BSD FRML MDRD: >90 ML/MIN/{1.73_M2}
GLUCOSE BLDC GLUCOMTR-MCNC: 100 MG/DL (ref 70–99)
GLUCOSE BLDC GLUCOMTR-MCNC: 113 MG/DL (ref 70–99)
GLUCOSE BLDC GLUCOMTR-MCNC: 80 MG/DL (ref 70–99)
GLUCOSE BLDC GLUCOMTR-MCNC: 90 MG/DL (ref 70–99)
GLUCOSE BLDC GLUCOMTR-MCNC: 97 MG/DL (ref 70–99)
GLUCOSE BLDC GLUCOMTR-MCNC: 99 MG/DL (ref 70–99)
GLUCOSE SERPL-MCNC: 113 MG/DL (ref 70–99)
HCT VFR BLD AUTO: 30.6 % (ref 35–47)
HGB BLD-MCNC: 9.7 G/DL (ref 11.7–15.7)
HSV1 IGG SERPL QL IA: 1.6 AI (ref 0–0.8)
HSV2 IGG SERPL QL IA: <0.2 AI (ref 0–0.8)
LDH SERPL L TO P-CCNC: 191 U/L (ref 81–234)
LYMPHOCYTES # BLD AUTO: 0.2 10E9/L (ref 0.8–5.3)
LYMPHOCYTES NFR BLD AUTO: 26.1 %
MAGNESIUM SERPL-MCNC: 2.3 MG/DL (ref 1.6–2.3)
MCH RBC QN AUTO: 30 PG (ref 26.5–33)
MCHC RBC AUTO-ENTMCNC: 31.7 G/DL (ref 31.5–36.5)
MCV RBC AUTO: 95 FL (ref 78–100)
MONOCYTES # BLD AUTO: 0 10E9/L (ref 0–1.3)
MONOCYTES NFR BLD AUTO: 0 %
NEUTROPHILS # BLD AUTO: 0.6 10E9/L (ref 1.6–8.3)
NEUTROPHILS NFR BLD AUTO: 73 %
PHOSPHATE SERPL-MCNC: 2.9 MG/DL (ref 2.5–4.5)
PLATELET # BLD AUTO: 147 10E9/L (ref 150–450)
POTASSIUM SERPL-SCNC: 3.8 MMOL/L (ref 3.4–5.3)
RBC # BLD AUTO: 3.23 10E12/L (ref 3.8–5.2)
RBC MORPH BLD: NORMAL
SODIUM SERPL-SCNC: 136 MMOL/L (ref 133–144)
URATE SERPL-MCNC: 1.6 MG/DL (ref 2.6–6)
WBC # BLD AUTO: 0.8 10E9/L (ref 4–11)

## 2020-02-28 PROCEDURE — 99233 SBSQ HOSP IP/OBS HIGH 50: CPT | Performed by: INTERNAL MEDICINE

## 2020-02-28 PROCEDURE — 84100 ASSAY OF PHOSPHORUS: CPT | Performed by: INTERNAL MEDICINE

## 2020-02-28 PROCEDURE — 25000128 H RX IP 250 OP 636: Performed by: PHYSICIAN ASSISTANT

## 2020-02-28 PROCEDURE — 34300033 ZZH RX 343: Performed by: INTERNAL MEDICINE

## 2020-02-28 PROCEDURE — 85025 COMPLETE CBC W/AUTO DIFF WBC: CPT | Performed by: INTERNAL MEDICINE

## 2020-02-28 PROCEDURE — 70491 CT SOFT TISSUE NECK W/DYE: CPT

## 2020-02-28 PROCEDURE — 27210429 ZZH NUTRITION PRODUCT INTERMEDIATE LITER

## 2020-02-28 PROCEDURE — 25000128 H RX IP 250 OP 636: Performed by: STUDENT IN AN ORGANIZED HEALTH CARE EDUCATION/TRAINING PROGRAM

## 2020-02-28 PROCEDURE — 83735 ASSAY OF MAGNESIUM: CPT | Performed by: INTERNAL MEDICINE

## 2020-02-28 PROCEDURE — 86696 HERPES SIMPLEX TYPE 2 TEST: CPT | Performed by: INTERNAL MEDICINE

## 2020-02-28 PROCEDURE — 36415 COLL VENOUS BLD VENIPUNCTURE: CPT | Performed by: INTERNAL MEDICINE

## 2020-02-28 PROCEDURE — 80048 BASIC METABOLIC PNL TOTAL CA: CPT | Performed by: INTERNAL MEDICINE

## 2020-02-28 PROCEDURE — 82962 GLUCOSE BLOOD TEST: CPT

## 2020-02-28 PROCEDURE — 84550 ASSAY OF BLOOD/URIC ACID: CPT | Performed by: INTERNAL MEDICINE

## 2020-02-28 PROCEDURE — 97110 THERAPEUTIC EXERCISES: CPT | Mod: GP

## 2020-02-28 PROCEDURE — 74177 CT ABD & PELVIS W/CONTRAST: CPT

## 2020-02-28 PROCEDURE — 97530 THERAPEUTIC ACTIVITIES: CPT | Mod: GP

## 2020-02-28 PROCEDURE — 25000132 ZZH RX MED GY IP 250 OP 250 PS 637: Performed by: STUDENT IN AN ORGANIZED HEALTH CARE EDUCATION/TRAINING PROGRAM

## 2020-02-28 PROCEDURE — 25000128 H RX IP 250 OP 636: Performed by: INTERNAL MEDICINE

## 2020-02-28 PROCEDURE — A9552 F18 FDG: HCPCS | Performed by: INTERNAL MEDICINE

## 2020-02-28 PROCEDURE — 20600000 ZZH R&B BMT

## 2020-02-28 PROCEDURE — 25000132 ZZH RX MED GY IP 250 OP 250 PS 637: Performed by: PHYSICIAN ASSISTANT

## 2020-02-28 PROCEDURE — 83615 LACTATE (LD) (LDH) ENZYME: CPT | Performed by: INTERNAL MEDICINE

## 2020-02-28 PROCEDURE — 86695 HERPES SIMPLEX TYPE 1 TEST: CPT | Performed by: INTERNAL MEDICINE

## 2020-02-28 RX ORDER — FLUCONAZOLE 40 MG/ML
200 POWDER, FOR SUSPENSION ORAL DAILY
Status: DISCONTINUED | OUTPATIENT
Start: 2020-02-28 | End: 2020-03-04

## 2020-02-28 RX ORDER — ACYCLOVIR 200 MG/1
400 CAPSULE ORAL 2 TIMES DAILY
Status: DISCONTINUED | OUTPATIENT
Start: 2020-02-28 | End: 2020-03-04

## 2020-02-28 RX ORDER — ALLOPURINOL 100 MG/1
100 TABLET ORAL DAILY
Status: DISCONTINUED | OUTPATIENT
Start: 2020-02-28 | End: 2020-02-28

## 2020-02-28 RX ORDER — LORAZEPAM 2 MG/ML
.5-1 INJECTION INTRAMUSCULAR EVERY 6 HOURS PRN
Status: DISCONTINUED | OUTPATIENT
Start: 2020-02-28 | End: 2020-02-28

## 2020-02-28 RX ORDER — LORAZEPAM 2 MG/ML
.5-1 INJECTION INTRAMUSCULAR EVERY 4 HOURS PRN
Status: DISCONTINUED | OUTPATIENT
Start: 2020-02-28 | End: 2020-03-06 | Stop reason: HOSPADM

## 2020-02-28 RX ORDER — NYSTATIN 100000/ML
500000 SUSPENSION, ORAL (FINAL DOSE FORM) ORAL 4 TIMES DAILY
Status: DISCONTINUED | OUTPATIENT
Start: 2020-02-28 | End: 2020-03-06 | Stop reason: HOSPADM

## 2020-02-28 RX ORDER — ACYCLOVIR 200 MG/1
400 CAPSULE ORAL 2 TIMES DAILY
Status: DISCONTINUED | OUTPATIENT
Start: 2020-02-28 | End: 2020-02-28

## 2020-02-28 RX ORDER — LEVOFLOXACIN 25 MG/ML
250 SOLUTION ORAL DAILY
Status: DISCONTINUED | OUTPATIENT
Start: 2020-02-28 | End: 2020-02-29

## 2020-02-28 RX ORDER — LORAZEPAM 0.5 MG/1
.5-1 TABLET ORAL EVERY 4 HOURS PRN
Status: DISCONTINUED | OUTPATIENT
Start: 2020-02-28 | End: 2020-03-06 | Stop reason: HOSPADM

## 2020-02-28 RX ORDER — IOPAMIDOL 755 MG/ML
45-150 INJECTION, SOLUTION INTRAVASCULAR ONCE
Status: COMPLETED | OUTPATIENT
Start: 2020-02-28 | End: 2020-02-28

## 2020-02-28 RX ORDER — ALLOPURINOL 300 MG/1
300 TABLET ORAL DAILY
Status: COMPLETED | OUTPATIENT
Start: 2020-02-29 | End: 2020-03-05

## 2020-02-28 RX ADMIN — NYSTATIN 500000 UNITS: 100000 SUSPENSION ORAL at 15:57

## 2020-02-28 RX ADMIN — FLUDEOXYGLUCOSE F-18 10.21 MCI.: 500 INJECTION, SOLUTION INTRAVENOUS at 13:30

## 2020-02-28 RX ADMIN — NYSTATIN 500000 UNITS: 100000 SUSPENSION ORAL at 20:41

## 2020-02-28 RX ADMIN — ACYCLOVIR 400 MG: 200 CAPSULE ORAL at 20:40

## 2020-02-28 RX ADMIN — LORAZEPAM 1 MG: 0.5 TABLET ORAL at 13:14

## 2020-02-28 RX ADMIN — ENOXAPARIN SODIUM 40 MG: 40 INJECTION SUBCUTANEOUS at 09:56

## 2020-02-28 RX ADMIN — LORATADINE 10 MG: 10 TABLET ORAL at 09:56

## 2020-02-28 RX ADMIN — ACYCLOVIR 400 MG: 200 CAPSULE ORAL at 09:55

## 2020-02-28 RX ADMIN — IOPAMIDOL 107 ML: 755 INJECTION, SOLUTION INTRAVENOUS at 14:35

## 2020-02-28 RX ADMIN — ALLOPURINOL 100 MG: 100 TABLET ORAL at 09:56

## 2020-02-28 RX ADMIN — LORAZEPAM 1 MG: 2 INJECTION INTRAMUSCULAR; INTRAVENOUS at 02:01

## 2020-02-28 RX ADMIN — FILGRASTIM 400 MCG: 480 INJECTION, SOLUTION INTRAVENOUS; SUBCUTANEOUS at 20:41

## 2020-02-28 RX ADMIN — LORAZEPAM 1 MG: 0.5 TABLET ORAL at 20:40

## 2020-02-28 RX ADMIN — LEVOFLOXACIN 250 MG: 25 SOLUTION ORAL at 15:56

## 2020-02-28 RX ADMIN — FLUCONAZOLE 200 MG: 40 POWDER, FOR SUSPENSION ORAL at 15:56

## 2020-02-28 ASSESSMENT — ACTIVITIES OF DAILY LIVING (ADL)
ADLS_ACUITY_SCORE: 15
ADLS_ACUITY_SCORE: 16
ADLS_ACUITY_SCORE: 15
ADLS_ACUITY_SCORE: 16

## 2020-02-28 ASSESSMENT — MIFFLIN-ST. JEOR: SCORE: 1345.54

## 2020-02-28 ASSESSMENT — PAIN DESCRIPTION - DESCRIPTORS
DESCRIPTORS: SORE;DISCOMFORT

## 2020-02-28 NOTE — PLAN OF CARE
Afebrile. Intermittent tachycardia, diaphoretic when tachy, pt denies chest pain. OVSS on 2 L NC, cont pulse ox on. 1 loose/soft stool. Zofran x1 given for nausea with relief. Ativan PO x1 and ativan IV x1 given for anxiety with relief. [place]-thick liq, pt having pain while swallowing. Tube feedings at goal rate of 55 mL/hr. Tube feedings to be stopped @ 0700 due to PET scan @ 1330. Frequent cough, utilizing suction at bedside. Neb treatment x1 given with some relief. Up to bedside commode with SBA, pt calls appropriately. Bed alarm on. Speech pathology to see pt today. No replacements needed. No coverage needed. Continue with POC.       Problem: Adult Inpatient Plan of Care  Goal: Plan of Care Review  2/28/2020 0606 by Maine Fairchild, RN  Outcome: No Change     Problem: Adult Inpatient Plan of Care  Goal: Absence of Hospital-Acquired Illness or Injury  2/28/2020 0606 by Maine Fairchild, RN  Outcome: No Change     Problem: Adult Inpatient Plan of Care  Goal: Readiness for Transition of Care  2/28/2020 0606 by Maine Fairchild, RN  Outcome: No Change     Problem: Neutropenia (Chemotherapy Effects)  Goal: Absence of Infection  Outcome: No Change     Problem: Radiation Skin Reaction (Radiation, External Beam)  Goal: Skin Health and Integrity  Outcome: No Change

## 2020-02-28 NOTE — PROGRESS NOTES
Social Work Services Progress Note    Hospital Day: 12  Date of Initial Social Work Evaluation:  2/24/20  Collaborated with:  Patient; patient's spouse; patient's daughter; Genesis Mao PA-C; PMNR Provider    Data:  Chuyita Porter is a 69 year old female with history of breast cancer and newly diagnosed mediastinal mass, pathology of which is consistent with DLBCL. She was admitted for planned chemoradiation with elective ET tube for threatened airway and PEG tube placement for enteral access.  She was admitted to ICU post procedures on 2/17, underwent radiation x 2 fractions on 2/18 and 2/19, then initiated chemotherapy with [facility] (D1=2/21/20). ET tube was removed on 2/26. She transferred to the Heme Malignancy service on 2/27. She has been stable since transfer. Having PET scan on 2/28, then will be awaiting ARU placement.     PT recommending TCU. OT recommending ARU. PMNR completed - ARU recommended at time of discharge.    Intervention:  SWer met with patient, patient's spouse, and patient's daughter in their hospital room; introduced self and explained role. Discussed PT/OT recommendations of ARU placement at time of discharge; patient and family confirmed recommendations.      SWer discussed the ARU referral process. Provided the Medicare Compare list for ARUs, with associated star ratings to assist with choice for referrals/discharge planning. SWer provided education regarding the star ratings and that they are updated/maintained by Medicare and can be reviewed by visiting www.medicare.gov. SWer also provided the [facility] Medicare approved ARU facilities to indicate which facilities are covered by the patient's insurance.     After discussing the two facilities that are available for patient to discharge to, patient and spouse [facility]; initial [facility] submitted.    SWer also discussed transportation needs at time of discharge. Patient and family wish for  wheelchair transport be arranged at time of discharge.      No further questions or concerns reported at this time. SWer provided contact information and encouraged patient to call if any further needs arise.    Assessment:  See bedside nurse, medical team, PT/OT notes.    Plan:    Anticipated Disposition:  [facility] ARU    Barriers to d/c plan:  Insurance approval    Follow Up:  SWer will continue to be available for any discharge planning and needs.    JACQUI Harp  7D Hematology/Oncology Social Worker  Phone: [phone]  Pager: [phone]  [email or web address]

## 2020-02-28 NOTE — PLAN OF CARE
Discharge Planner PT   Patient plan for discharge: Did not discuss  Current status: Patient had challenging night and overall increased fatigue today. Patient tolerated supine exercises well. Patient transferred supine<>sit EOB with SBA, gets light headed needing increased time to improve. Transfer to commode with CGA. Patient modA  for pericares. Patient tolerated standing 2x~20 seconds; poor activity tolerance related to fatigue and light headedness. BP appropriate. Patient transferred back into bed, encouraged to sit up at EOB regularly throughout the day.  Barriers to return to prior living situation: Medical, poor activity tolerance, impaired balance and safety concerns with functional mobility.  Recommendations for discharge: ARU  Rationale for recommendations: Patient has multi-disciplinary needs and would benefit from higher intensity rehab to improve functional status more quickly.        Entered by: Benita Boggs 02/28/2020 10:41 AM

## 2020-02-28 NOTE — PLAN OF CARE
"/74 (BP Location: Left arm)   Pulse 72   Temp 98.4  F (36.9  C) (Axillary)   Resp 16   Ht 1.676 m (5' 6\")   Wt 80.7 kg (178 lb)   SpO2 98%   BMI 28.73 kg/m    S: Pt transferred from  today.   B: Hx of breast cancer s/p radiation. She was scheduled for admission for planned chemo/radiation for new mediastinal mass / DLBCL with thyroid involvement. 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   A:    Neuro - Alert & oriented.   Cardiac - wnl  Respiratory- O2 sats % on 2L NC. She does drop to low to mid 90s with activity. Pt reports breathing comfortably.   GI/- Voiding spontaneously post osborn. BM today - bowel meds held this morning. BS active / present in all quad. Commode at bedside for safety.   Diet/Appetite- Clear / nectar thick diet. Tube feeds via PEG tube currently going at goal rate 55ml/hr. Patient denies nausea. She will need to be NPO at 7am tomorrow for PET scan.   Activity- Up with assist x 1 to commode. Continue education/encouragement on position changes while in bed/chair to decrease incidence of pressure injury.   Pain- Denies pain.   Skin- No skin concerns.   LDAs- PIV in left arm and 2 PIVs in right arm. PEG tube.   Labs/replacements-     Recommendation/Plan:  Continue with POC             Problem: Adult Inpatient Plan of Care  Goal: Plan of Care Review  2/27/2020 1805 by Willow Gonzales RN  Outcome: No Change     Problem: Adult Inpatient Plan of Care  Goal: Patient-Specific Goal (Individualization)  2/27/2020 1805 by Willow Gonzales RN  Outcome: No Change     Problem: Adult Inpatient Plan of Care  Goal: Absence of Hospital-Acquired Illness or Injury  2/27/2020 1805 by Willow Gonzales RN  Outcome: No Change     Problem: Adult Inpatient Plan of Care  Goal: Readiness for Transition of Care  2/27/2020 1805 by Willow Gonzales RN  Outcome: No Change     Problem: Adult Inpatient Plan of Care  Goal: Optimal Comfort and " Wellbeing  2/27/2020 1805 by Willow Gonzales RN  Outcome: Change based on patient need/priority     Problem: Thrombocytopenia Bleeding Risk (Chemotherapy Effects)  Goal: Absence of Bleeding  2/27/2020 1805 by Willow Gonzales RN  Outcome: Change based on patient need/priority     Problem: Fatigue (Radiation, External Beam)  Goal: Improved Activity Tolerance  2/27/2020 1805 by Willow Gonzales RN  Outcome: Change based on patient need/priority

## 2020-02-28 NOTE — PROGRESS NOTES
[facility], [place]  Hematology / Oncology Transfer Accept Note    Date of Admission: 2/17/2020  Date of Service (when I saw the patient): 02/28/2020     Assessment & Plan   Chuyita Porter is a 69 year old female with history of breast cancer and newly diagnosed mediastinal mass, pathology of which is consistent with DLBCL. She was admitted for planned chemoradiation with elective ET tube for threatened airway and PEG tube placement for enteral access.  She was admitted to ICU post procedures on 2/17, underwent radiation x 2 fractions on 2/18 and 2/19, then initiated chemotherapy with RCHOP (D1=2/21/20). ET tube was removed on 2/26. She transferred to the [facility] Mal[facility]ancy service on 2/27. She has been stable since transfer. Having PET scan on 2/28, then will be awaiting ARU placement.     Today  - PET/CT today, ok to give Ativan prior to imaging to help with anxiety  - PM&R consult  - currently NPO (prior to PET). After able to resume PO, can continue to trial diet advancement per SLP ongoing recs and, in meantime, continue TFs for nutrition.   - start prophy meds for neutropenia: ACV, Fluconazole, Levaquin  - start neupogen daily due to ANC <1.0  - adjusted meds to solutions as able, or ok to give via feeding tube       # Extranodal Diffuse large B-cell lymphoma, non-germinal center type  Will follow with Dr. Sprague  On 2/14/20, CT chest showed 8.0 x 6.7 x 10.0 cm mass centered in the right anterior mediastinum with mass effect within the mediastinum, including compression of the SVC with associated collateral vessels seen in the neck. No mediastinal LN involvement. Planned endotracheal biopsies and placement of ET tube, along with PEG tube placement, done on 2/17. No stent was placed due to technical complications per Interventional Pulmonology note. Biopsy confirmed DLBCL. She underwent two fractions of radiotherapy on 2/18 and 2/19. Initiated R-CHOP chemotherapy on 2/21. Repeat CT  Chest imaging on 2/25 demonstrated decreased size of mass.   - 2/19 CT A/P done to help with staging (wanted PET/CT but unable to get with pt on ventilator). PET/CT now that pt extubated; planned to be done today  - have requested outpatient [facility] f/u with Dr. Sprague close to anticipated start of next cycle  - no e/o TLS, continue Allopurinol  - ANC <1.0 today. Will start Neupogen subcutaneous daily. Would plan to continue until ANC >1.0 for 2 consecutive days.     # History of right breast cancer in 2005 and left breast cancer in 2015  - currently holding patient's home anastrozole for now due to increased VTE risk. She follows with Dr. Gallardo. As per last clinic note, Dr. Gallardo had recommended anastrozole as prophylaxis and to continue until 2020. Can likely discontinue this medication at discharge and have pt follow-up with Dr. Gallardo.     # ID PPx  - started ppx ACV  - start Fluconazole 200mg daily, Levaquin 250mg daily. Continue until ANC >1.0     # Constipation, resolved. Now with loose stools likely related to bowel regimen and TFs. Monitor.     # Anxiety  # N/V  - encouraged to try ativan PRN  - additional PRN antiemetics available    # Nutrition  Had PEG tube placed by Thoracic surgery team on 2/17. Per outpatient ENT note, goal of PEG was empiric should she have dysphagia from originally planned stent placement or with planned therapy. NO stent was placed. PEG tube was utilized while pt was intubated and continues to be utilized for TFs as we slowly advance her diet as able.   - Nutrition consulted for tube feeds  - appreciate SLP following. Continue to trial diet advancement per SLP ongoing recs. When better able to advance diet, can reassess need for TFs/PEG tube.      FEN  - no current IVFs  - lyte repletion per protocol  - nutrition as above. Was NPO prior to PET scan today    PPx  - VTE: Lovenox 40mg every day  - GI: s/p Protonix (now off steroids)  - PT/OT    Dispo: Current  recommendation from therapy teams is ARU. PM&R consulted; final note pending at time of this note. Pt will be medically ready to discharge after PET scan completed, pending placement.   - unit SW aware  - at TCU/ARU, can continue to work on diet advancement and determine need for ongoing TFs/PEG tube  - have requested f/u for ongoing [facility] f/u. Would not be able to go to clinic appts if in ARU, but should not need provider visit until week of 3/9 (next cycle would be due ~3/13). Could get BIW labs at ARU/TCU.   - per d/w SW today, Neupogen should NOT disqualify pt from ARU. Ok to place order for this on discharge med rec (rather than having to dispense here for pt to take over to ARU).     Discussed with Dr. Chaudhari.      Genesis Mao PA-C  Heme/Onc  [phone]    Interval History   No acute events overnight, afebrile. Breathing is stable. Denies difficulty with SOB. She continues with loose cough, occasionally clearing phlegm with suction but she expressed nervousness about possibly suctioning out pills. Endorsed throat pain with swallowing pills so we discussed using the feeding tube for meds or adjusting meds to liquid form if possible. She is anxious about the PET scan. Had loose stool overnight. Denies nausea.         Physical Exam   Temp: 97.9  F (36.6  C) Temp src: Axillary BP: 116/72 Pulse: 92 Heart Rate: 90 Resp: 18 SpO2: 95 % O2 Device: Nasal cannula Oxygen Delivery: 2 LPM  Vitals:    02/26/20 0000 02/27/20 1245 02/28/20 0824   Weight: 88 kg (194 lb 0.1 oz) 80.7 kg (178 lb) 79.4 kg (175 lb)     Vital Signs with Ranges  Temp:  [97.6  F (36.4  C)-98.6  F (37  C)] 97.9  F (36.6  C)  Pulse:  [92] 92  Heart Rate:  [88-98] 90  Resp:  [16-18] 18  BP: (108-132)/(63-75) 116/72  SpO2:  [95 %-99 %] 95 %  I/O last 3 completed shifts:  In: 920 [P.O.:120; NG/GT:30]  Out: 2190 [Urine:2190]    Constitutional: Pleasant female seen sitting up in bed. Awake, alert, cooperative, no apparent distress.   at bedside for support.   HEENT: NC/AT. MMM.   Respiratory: No increased work of breathing, good effort, on RA. Lungs with soft crackles in R>L base. No wheezing.  Cardiovascular: RRR. No murmur noted.  GI: PEG site looks c/d/i, no surrounding erythema. Normal BS. Abd soft, mild tenderness near PEG site.   Skin: Warm, dry.   Musculoskeletal: Grossly normal in appearance. Mild b/l LE edema.  Neurologic: Awake, alert, grossly nonfocal.   Neuropsychiatric: Soft voice. Calm, normal eye contact, affect congruent.       Medications     dextrose Stopped (02/26/20 1600)     - MEDICATION INSTRUCTIONS -         acyclovir  400 mg Oral or Feeding Tube BID     [START ON 2/29/2020] allopurinol  300 mg Oral or Feeding Tube Daily     dextrose 5% water  10-20 mL Intracatheter Daily at 8 pm     dextrose 5% water  10-20 mL Intracatheter Daily at 8 pm     enoxaparin ANTICOAGULANT  40 mg Subcutaneous Q24H     filgrastim (NEUPOGEN/GRANIX) subcutaneous  5 mcg/kg (Dosing Weight) Subcutaneous Daily at 8 pm     fluconazole  200 mg Oral or Feeding Tube Daily     insulin aspart  1-4 Units Subcutaneous Q4H     levofloxacin  250 mg Oral or Feeding Tube Daily     loratadine  10 mg Oral or Feeding Tube Daily     sodium chloride (PF)  3 mL Intracatheter Q8H       Data   Results for orders placed or performed during the hospital encounter of 02/17/20 (from the past 24 hour(s))   CBC with platelets differential   Result Value Ref Range    WBC 0.8 (LL) 4.0 - 11.0 10e9/L    RBC Count 3.23 (L) 3.8 - 5.2 10e12/L    Hemoglobin 9.7 (L) 11.7 - 15.7 g/dL    Hematocrit 30.6 (L) 35.0 - 47.0 %    MCV 95 78 - 100 fl    MCH 30.0 26.5 - 33.0 pg    MCHC 31.7 31.5 - 36.5 g/dL    RDW 14.0 10.0 - 15.0 %    Platelet Count 147 (L) 150 - 450 10e9/L    Diff Method Manual Differential     % Neutrophils 73.0 %    % Lymphocytes 26.1 %    % Monocytes 0.0 %    % Eosinophils 0.9 %    % Basophils 0.0 %    Absolute Neutrophil 0.6 (L) 1.6 - 8.3 10e9/L    Absolute  Lymphocytes 0.2 (L) 0.8 - 5.3 10e9/L    Absolute Monocytes 0.0 0.0 - 1.3 10e9/L    Absolute Eosinophils 0.0 0.0 - 0.7 10e9/L    Absolute Basophils 0.0 0.0 - 0.2 10e9/L    RBC Morphology Normal    Basic metabolic panel   Result Value Ref Range    Sodium 136 133 - 144 mmol/L    Potassium 3.8 3.4 - 5.3 mmol/L    Chloride 103 94 - 109 mmol/L    Carbon Dioxide 29 20 - 32 mmol/L    Anion Gap 4 3 - 14 mmol/L    Glucose 113 (H) 70 - 99 mg/dL    Urea Nitrogen 16 7 - 30 mg/dL    Creatinine 0.53 0.52 - 1.04 mg/dL    GFR Estimate >90 >60 mL/min/[1.73_m2]    GFR Estimate If Black >90 >60 mL/min/[1.73_m2]    Calcium 8.2 (L) 8.5 - 10.1 mg/dL   Lactate Dehydrogenase   Result Value Ref Range    Lactate Dehydrogenase 191 81 - 234 U/L   Phosphorus   Result Value Ref Range    Phosphorus 2.9 2.5 - 4.5 mg/dL   Uric acid   Result Value Ref Range    Uric Acid 1.6 (L) 2.6 - 6.0 mg/dL   Herpes Simplex Virus 1 and 2 IgG   Result Value Ref Range    Herpes Simplex Virus Type 1 IgG 1.6 (H) 0.0 - 0.8 AI    Herpes Simplex Virus Type 2 IgG <0.2 0.0 - 0.8 AI   Magnesium   Result Value Ref Range    Magnesium 2.3 1.6 - 2.3 mg/dL     I have seen, interviewed, and examined the patient independently.  I have reviewed the vital signs and labs.  This note reflects my assessment and plan.    Feels very well, on NC since yesterday. Will get PET today.     Feeling well  Holding anastrazole  Having pain and anxiety with oral intake, I encouraged her to take it slowly    I see thrush in her mouth, will start nystatin      Maria Del Carmen Chaudhari MD/PhD

## 2020-02-28 NOTE — CONSULTS
[facility]   PM&R CONSULT    Consulting Provider:  Mayco Hurtado  Reason for Consult: Assessment of rehabilitation   Location of Patient:  5427  Date of Encounter: 2/28/2020   Date of Admission: 2/17/2020        ASSESSMENT/PLAN:    Ms. Chuyita Porter is a Right handed 69 year old yo female who presents with  Deconditioning/debility in the setting of a history of breast cancer, large right neck mass with pathology demonstrating diffuse B-cell lymphoma, now s/p attempted tracheal stent, tracheal mass biopsy, and PEG tube placement 2/17/2020.  She is very appropriate for the ARU setting, recommend close medical monitoring. good support in her .   ELOS 2 weeks   Therapy needs are PT/OT/SLP   Discharge disposition Home with      Adamaris Sandoval MD, [facility]   Department of Rehabilitation       HPI:    Chuyita Porter is a 69 year old female with sx of several months of increasing shortness of breath and activity intolerance, some sense of needing to be careful with swallowing, with a known past medical history of breast cancer, large right neck mass with pathology demonstrating diffuse B-cell lymphoma, now s/p attempted tracheal stent, tracheal mass biopsy, and PEG tube placement 2/17/2020.    The patient has a mediastinal mass with over 75% stenosis of the trachea.  An ET tube had been placed beyond the obstruction. 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 Airway vulnerable to collapse if not intubated. Oncology optimistic that disease will respond to treatment. Having evidence of airway improvement she was extubated without incident on 2/26/20    She is being seen by [facility].     Hx of breast cancer s/p radiation. She was scheduled for admission for planned chemo/radiation for new mediastinal mass / DLBCL with thyroid involvement. 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    Bronched at bedside, extubated  yesterday. On 2L NC. Has some odynophagia. Voice soft, but improved since extubation. No shortness of breath or increased work of breathing. Cleared by SLP for clear liquid diet with nectar thick liquids.      PREVIOUS LEVEL OF FUNCTION   She was independent with basic mobility and basic ADL's prior to admit.       CURRENT FUNCTION   PT  Pt is supine B LE exercises x 20 repetitions.   Supine > sit with SBA, sat EOB for 5 minutes, pt c/o mild lightheadedness. STS from EOB with CGA + FWW  Patient amb 15ft forward and backward in room. STS to and from toilet with CGA + FWW    SLP   Initiated on clear liquid diet nectar thick consistency given swallow strategies with supervision of signs/symptoms of aspiration and hold PO if occur.    LIVING SITUATION/SUPPORT  Ranjit  at bedside today, very supportive.   They live together in [place]. Both retired  In house with split level entry. 1 [address]ANIA   And 7 steps t the upper level where the living room and bedroom/bathroom is.         SOCIAL HISTORY  Social History     Socioeconomic History     Marital status:      Spouse name: None     Number of children: 2     Years of education: None     Highest education level: None   Occupational History     None   Social Needs     Financial resource strain: None     Food insecurity:     Worry: None     Inability: None     Transportation needs:     Medical: None     Non-medical: None   Tobacco Use     Smoking status: Never Smoker     Smokeless tobacco: Never Used   Substance and Sexual Activity     Alcohol use: Never     Drug use: Never     Sexual activity: Yes     Partners: Male     Birth control/protection: Post-menopausal   Lifestyle     Physical activity:     Days per week: None     Minutes per session: None     Stress: None   Relationships     Social connections:     Talks on phone: None     Gets together: None     Attends Yarsani service: None     Active member of club or organization: None     Attends meetings of clubs or  organizations: None     Relationship status: None     Intimate partner violence:     Fear of current or ex partner: None     Emotionally abused: None     Physically abused: None     Forced sexual activity: None   Other Topics Concern     None   Social History Narrative     None           Past Medical History:  Past Medical History:   Diagnosis Date     Breast cancer (H) 2005     Hx of radiation therapy     2005 +2015       Current Medications:  Current Facility-Administered Medications   Medication     albuterol (PROVENTIL) neb solution 2.5 mg     allopurinol (ZYLOPRIM) tablet 100 mg     artificial saliva (BIOTENE MT) solution 2 spray     bisacodyl (DULCOLAX) Suppository 10 mg     dextrose 10% infusion     dextrose 5 % flush PRE/POST medication     dextrose 5 % flush PRE/POST medication     glucose gel 15-30 g    Or     dextrose 50 % injection 25-50 mL    Or     glucagon injection 1 mg     enoxaparin ANTICOAGULANT (LOVENOX) injection 40 mg     guaiFENesin-codeine (ROBITUSSIN AC) 100-10 MG/5ML solution 5 mL     heparin lock flush 10 UNIT/ML injection 2-5 mL     insulin aspart (NovoLOG) injection (RAPID ACTING)     insulin aspart (NovoLOG) injection (RAPID ACTING)     insulin aspart (NovoLOG) injection (RAPID ACTING)     ipratropium - albuterol 0.5 mg/2.5 mg/3 mL (DUONEB) neb solution 3 mL     loratadine (CLARITIN) tablet 10 mg     LORazepam (ATIVAN) injection 0.5-1 mg     magnesium sulfate 4 g in 100 mL sterile water (premade)     MEDICATION INSTRUCTION     naloxone (NARCAN) injection 0.1-0.4 mg     ondansetron (ZOFRAN-ODT) ODT tab 8 mg    Or     ondansetron (ZOFRAN) injection 8 mg     oxyCODONE (ROXICODONE) solution 5 mg     potassium chloride (KLOR-CON) Packet 20-40 mEq     potassium chloride 10 mEq in 100 mL intermittent infusion with 10 mg lidocaine     potassium chloride 10 mEq in 100 mL sterile water intermittent infusion (premix)     potassium chloride 20 mEq in 50 mL intermittent infusion     potassium  chloride ER (KLOR-CON M) CR tablet 20-40 mEq     prochlorperazine (COMPAZINE) injection 5-10 mg     prochlorperazine (COMPAZINE) tablet 5-10 mg     sennosides (SENOKOT) tablet 8.6 mg     sodium chloride (PF) 0.9% PF flush 3 mL     sodium chloride (PF) 0.9% PF flush 3 mL     sodium chloride (PF) 0.9% PF flush 5-50 mL     sodium phosphate 15 mmol in D5W intermittent infusion     sodium phosphate 20 mmol in D5W intermittent infusion     sodium phosphate 25 mmol in D5W intermittent infusion         Review of Systems:  Total of ten systems reviewed, pertinent positives and negatives as follows  Instability with standing and walking.    Feels generally fatigued  Reports generalized weakness   Denies any tingling or numbness  No problems with bladder.   LBM today, loose   No chest pain. No cough or SOB.  No visual changes.   No headache or photophobia.   No nausea, or abdominal pain.  Slept poorly last night  No joint pain, muscle pain or swelling.  Anxiety   Peg tube with tube feeds  Mood is good, denies any symptoms of depression.   Remainder of the review of the systems was negative.          Labs   Lab Results   Component Value Date    WBC 0.8 (LL) 02/28/2020    HGB 9.7 (L) 02/28/2020    HCT 30.6 (L) 02/28/2020    MCV 95 02/28/2020     (L) 02/28/2020     Lab Results   Component Value Date     02/28/2020    POTASSIUM 3.8 02/28/2020    CHLORIDE 103 02/28/2020    CO2 29 02/28/2020     (H) 02/28/2020     Lab Results   Component Value Date    GFRESTIMATED >90 02/28/2020    GFRESTBLACK >90 02/28/2020     Lab Results   Component Value Date    AST 36 02/27/2020    ALT 69 (H) 02/27/2020    ALKPHOS 107 02/27/2020    BILITOTAL 0.5 02/27/2020     Lab Results   Component Value Date    INR 1.21 (H) 02/19/2020     Lab Results   Component Value Date    BUN 16 02/28/2020    CR 0.53 02/28/2020         ON EXAMINATION:  Vitals:    02/27/20 1612 02/27/20 2006 02/27/20 2300 02/28/20 0400   BP: 127/74 115/75 132/74 111/63  "  BP Location: Left arm Left arm Left arm Left arm   Pulse:       Resp: 16 16 18 16   Temp: 98.4  F (36.9  C) 97.8  F (36.6  C) 98.2  F (36.8  C) 98.1  F (36.7  C)   TempSrc: Axillary Axillary Axillary Axillary   SpO2: 98% 98% 97% 99%   Weight:       Height:           Physical Exam:  Blood pressure 111/63, pulse 72, temperature 98.1  F (36.7  C), temperature source Axillary, resp. rate 16, height 1.676 m (5' 6\"), weight 80.7 kg (178 lb), SpO2 99 %.    GEN: NAD, seated comfortably in bed  She is alert, appropriate, cooperative  Closed trach site   Speech is hoarse and hypophonic   Comprehension is intact, somewhat slow processing   MSK: full active and passive ROM at all major joints of the bilaterally upper and lower extremities  No muscle atrophy noted  ABD: soft, non tender, pos BS, PEG tube   NEURO:   CRANIAL NERVES: Discs flat. Pupils equal, round and reactive to light.  Extraocular movements full. Visual fields full. Face moves symmetrically.  Tongue midline. Hearing intact to finger rubbing.    Strength: proximal weakness symmetrical    Cognition: fund of knowledge and train of thought appropriate, slow processing   SKIN: no rashes or lesions noted.   EXT: no  edema bilaterally, chronic stasis changes, no ulcers.         Thank you for the consult. Please call for any questions. Pager number [phone]   Total of 70 min spent in this encounter more than 50% in counseling and coordination.   Adamaris Sandoval"

## 2020-02-29 ENCOUNTER — APPOINTMENT (OUTPATIENT)
Dept: PHYSICAL THERAPY | Facility: CLINIC | Age: 70
DRG: 840 | End: 2020-02-29
Attending: OTOLARYNGOLOGY
Payer: COMMERCIAL

## 2020-02-29 LAB
ALBUMIN UR-MCNC: NEGATIVE MG/DL
ANION GAP SERPL CALCULATED.3IONS-SCNC: 3 MMOL/L (ref 3–14)
APPEARANCE UR: CLEAR
BILIRUB UR QL STRIP: NEGATIVE
BUN SERPL-MCNC: 15 MG/DL (ref 7–30)
C PNEUM DNA SPEC QL NAA+PROBE: NOT DETECTED
CALCIUM SERPL-MCNC: 8.2 MG/DL (ref 8.5–10.1)
CHLORIDE SERPL-SCNC: 103 MMOL/L (ref 94–109)
CO2 SERPL-SCNC: 28 MMOL/L (ref 20–32)
COLOR UR AUTO: ABNORMAL
CREAT SERPL-MCNC: 0.52 MG/DL (ref 0.52–1.04)
DIFFERENTIAL METHOD BLD: ABNORMAL
ERYTHROCYTE [DISTWIDTH] IN BLOOD BY AUTOMATED COUNT: 13.9 % (ref 10–15)
FLUAV H1 2009 PAND RNA SPEC QL NAA+PROBE: NOT DETECTED
FLUAV H1 RNA SPEC QL NAA+PROBE: NOT DETECTED
FLUAV H3 RNA SPEC QL NAA+PROBE: NOT DETECTED
FLUAV RNA SPEC QL NAA+PROBE: NOT DETECTED
FLUBV RNA SPEC QL NAA+PROBE: NOT DETECTED
GFR SERPL CREATININE-BSD FRML MDRD: >90 ML/MIN/{1.73_M2}
GLUCOSE BLDC GLUCOMTR-MCNC: 108 MG/DL (ref 70–99)
GLUCOSE BLDC GLUCOMTR-MCNC: 119 MG/DL (ref 70–99)
GLUCOSE BLDC GLUCOMTR-MCNC: 124 MG/DL (ref 70–99)
GLUCOSE BLDC GLUCOMTR-MCNC: 96 MG/DL (ref 70–99)
GLUCOSE SERPL-MCNC: 109 MG/DL (ref 70–99)
GLUCOSE UR STRIP-MCNC: NEGATIVE MG/DL
HADV DNA SPEC QL NAA+PROBE: NOT DETECTED
HCOV PNL SPEC NAA+PROBE: NOT DETECTED
HCT VFR BLD AUTO: 29.3 % (ref 35–47)
HGB BLD-MCNC: 9.4 G/DL (ref 11.7–15.7)
HGB UR QL STRIP: NEGATIVE
HMPV RNA SPEC QL NAA+PROBE: NOT DETECTED
HPIV1 RNA SPEC QL NAA+PROBE: NOT DETECTED
HPIV2 RNA SPEC QL NAA+PROBE: NOT DETECTED
HPIV3 RNA SPEC QL NAA+PROBE: NOT DETECTED
HPIV4 RNA SPEC QL NAA+PROBE: NOT DETECTED
KETONES UR STRIP-MCNC: NEGATIVE MG/DL
LACTATE BLD-SCNC: 0.8 MMOL/L (ref 0.7–2)
LEUKOCYTE ESTERASE UR QL STRIP: NEGATIVE
M PNEUMO DNA SPEC QL NAA+PROBE: NOT DETECTED
MCH RBC QN AUTO: 30.1 PG (ref 26.5–33)
MCHC RBC AUTO-ENTMCNC: 32.1 G/DL (ref 31.5–36.5)
MCV RBC AUTO: 94 FL (ref 78–100)
MICROBIOLOGIST REVIEW: NORMAL
NITRATE UR QL: NEGATIVE
PH UR STRIP: 7.5 PH (ref 5–7)
PLATELET # BLD AUTO: 140 10E9/L (ref 150–450)
POTASSIUM SERPL-SCNC: 4 MMOL/L (ref 3.4–5.3)
RBC # BLD AUTO: 3.12 10E12/L (ref 3.8–5.2)
RSV RNA SPEC QL NAA+PROBE: NOT DETECTED
RSV RNA SPEC QL NAA+PROBE: NOT DETECTED
RV+EV RNA SPEC QL NAA+PROBE: NOT DETECTED
SODIUM SERPL-SCNC: 134 MMOL/L (ref 133–144)
SOURCE: ABNORMAL
SP GR UR STRIP: 1.01 (ref 1–1.03)
UROBILINOGEN UR STRIP-MCNC: NORMAL MG/DL (ref 0–2)
WBC # BLD AUTO: 0.3 10E9/L (ref 4–11)

## 2020-02-29 PROCEDURE — 81003 URINALYSIS AUTO W/O SCOPE: CPT | Performed by: NURSE PRACTITIONER

## 2020-02-29 PROCEDURE — 25000128 H RX IP 250 OP 636: Performed by: STUDENT IN AN ORGANIZED HEALTH CARE EDUCATION/TRAINING PROGRAM

## 2020-02-29 PROCEDURE — 87581 M.PNEUMON DNA AMP PROBE: CPT | Performed by: NURSE PRACTITIONER

## 2020-02-29 PROCEDURE — 25000128 H RX IP 250 OP 636: Performed by: INTERNAL MEDICINE

## 2020-02-29 PROCEDURE — 87040 BLOOD CULTURE FOR BACTERIA: CPT | Performed by: INTERNAL MEDICINE

## 2020-02-29 PROCEDURE — 83605 ASSAY OF LACTIC ACID: CPT | Performed by: INTERNAL MEDICINE

## 2020-02-29 PROCEDURE — 25000132 ZZH RX MED GY IP 250 OP 250 PS 637: Performed by: PHYSICIAN ASSISTANT

## 2020-02-29 PROCEDURE — 97116 GAIT TRAINING THERAPY: CPT | Mod: GP

## 2020-02-29 PROCEDURE — 36415 COLL VENOUS BLD VENIPUNCTURE: CPT | Performed by: INTERNAL MEDICINE

## 2020-02-29 PROCEDURE — 82962 GLUCOSE BLOOD TEST: CPT

## 2020-02-29 PROCEDURE — 25000128 H RX IP 250 OP 636: Performed by: PHYSICIAN ASSISTANT

## 2020-02-29 PROCEDURE — 99233 SBSQ HOSP IP/OBS HIGH 50: CPT | Performed by: INTERNAL MEDICINE

## 2020-02-29 PROCEDURE — 27210429 ZZH NUTRITION PRODUCT INTERMEDIATE LITER

## 2020-02-29 PROCEDURE — 80048 BASIC METABOLIC PNL TOTAL CA: CPT | Performed by: INTERNAL MEDICINE

## 2020-02-29 PROCEDURE — 94640 AIRWAY INHALATION TREATMENT: CPT | Mod: 76

## 2020-02-29 PROCEDURE — 94640 AIRWAY INHALATION TREATMENT: CPT

## 2020-02-29 PROCEDURE — 25800030 ZZH RX IP 258 OP 636: Performed by: INTERNAL MEDICINE

## 2020-02-29 PROCEDURE — 25000132 ZZH RX MED GY IP 250 OP 250 PS 637: Performed by: STUDENT IN AN ORGANIZED HEALTH CARE EDUCATION/TRAINING PROGRAM

## 2020-02-29 PROCEDURE — 25000125 ZZHC RX 250: Performed by: STUDENT IN AN ORGANIZED HEALTH CARE EDUCATION/TRAINING PROGRAM

## 2020-02-29 PROCEDURE — 25800030 ZZH RX IP 258 OP 636: Performed by: NURSE PRACTITIONER

## 2020-02-29 PROCEDURE — 40000275 ZZH STATISTIC RCP TIME EA 10 MIN

## 2020-02-29 PROCEDURE — 87633 RESP VIRUS 12-25 TARGETS: CPT | Performed by: NURSE PRACTITIONER

## 2020-02-29 PROCEDURE — 20600000 ZZH R&B BMT

## 2020-02-29 PROCEDURE — 87486 CHLMYD PNEUM DNA AMP PROBE: CPT | Performed by: NURSE PRACTITIONER

## 2020-02-29 PROCEDURE — 85027 COMPLETE CBC AUTOMATED: CPT

## 2020-02-29 RX ORDER — SODIUM CHLORIDE 9 MG/ML
INJECTION, SOLUTION INTRAVENOUS CONTINUOUS
Status: DISCONTINUED | OUTPATIENT
Start: 2020-02-29 | End: 2020-03-03

## 2020-02-29 RX ADMIN — VANCOMYCIN HYDROCHLORIDE 1750 MG: 10 INJECTION, POWDER, LYOPHILIZED, FOR SOLUTION INTRAVENOUS at 03:56

## 2020-02-29 RX ADMIN — CEFEPIME HYDROCHLORIDE 2 G: 2 INJECTION, POWDER, FOR SOLUTION INTRAVENOUS at 19:08

## 2020-02-29 RX ADMIN — LORAZEPAM 1 MG: 0.5 TABLET ORAL at 00:42

## 2020-02-29 RX ADMIN — CEFEPIME HYDROCHLORIDE 2 G: 2 INJECTION, POWDER, FOR SOLUTION INTRAVENOUS at 10:03

## 2020-02-29 RX ADMIN — LORAZEPAM 1 MG: 0.5 TABLET ORAL at 20:42

## 2020-02-29 RX ADMIN — SODIUM CHLORIDE: 9 INJECTION, SOLUTION INTRAVENOUS at 09:30

## 2020-02-29 RX ADMIN — IPRATROPIUM BROMIDE AND ALBUTEROL SULFATE 3 ML: .5; 3 SOLUTION RESPIRATORY (INHALATION) at 10:57

## 2020-02-29 RX ADMIN — NYSTATIN 500000 UNITS: 100000 SUSPENSION ORAL at 08:52

## 2020-02-29 RX ADMIN — GUAIFENESIN AND CODEINE PHOSPHATE 5 ML: 10; 100 LIQUID ORAL at 10:03

## 2020-02-29 RX ADMIN — FLUCONAZOLE 200 MG: 40 POWDER, FOR SUSPENSION ORAL at 08:52

## 2020-02-29 RX ADMIN — ACYCLOVIR 400 MG: 200 CAPSULE ORAL at 20:42

## 2020-02-29 RX ADMIN — ALLOPURINOL 300 MG: 300 TABLET ORAL at 08:50

## 2020-02-29 RX ADMIN — VANCOMYCIN HYDROCHLORIDE 1750 MG: 10 INJECTION, POWDER, LYOPHILIZED, FOR SOLUTION INTRAVENOUS at 16:27

## 2020-02-29 RX ADMIN — IPRATROPIUM BROMIDE AND ALBUTEROL SULFATE 3 ML: .5; 3 SOLUTION RESPIRATORY (INHALATION) at 17:30

## 2020-02-29 RX ADMIN — LORATADINE 10 MG: 10 TABLET ORAL at 08:50

## 2020-02-29 RX ADMIN — NYSTATIN 500000 UNITS: 100000 SUSPENSION ORAL at 12:26

## 2020-02-29 RX ADMIN — ACYCLOVIR 400 MG: 200 CAPSULE ORAL at 08:51

## 2020-02-29 RX ADMIN — NYSTATIN 500000 UNITS: 100000 SUSPENSION ORAL at 20:42

## 2020-02-29 RX ADMIN — GUAIFENESIN AND CODEINE PHOSPHATE 5 ML: 10; 100 LIQUID ORAL at 00:04

## 2020-02-29 RX ADMIN — GUAIFENESIN AND CODEINE PHOSPHATE 5 ML: 10; 100 LIQUID ORAL at 20:42

## 2020-02-29 RX ADMIN — FILGRASTIM 400 MCG: 480 INJECTION, SOLUTION INTRAVENOUS; SUBCUTANEOUS at 20:43

## 2020-02-29 RX ADMIN — ENOXAPARIN SODIUM 40 MG: 40 INJECTION SUBCUTANEOUS at 08:53

## 2020-02-29 RX ADMIN — CEFEPIME HYDROCHLORIDE 2 G: 2 INJECTION, POWDER, FOR SOLUTION INTRAVENOUS at 03:03

## 2020-02-29 ASSESSMENT — PAIN DESCRIPTION - DESCRIPTORS
DESCRIPTORS: SORE;DISCOMFORT

## 2020-02-29 ASSESSMENT — ACTIVITIES OF DAILY LIVING (ADL)
ADLS_ACUITY_SCORE: 16
ADLS_ACUITY_SCORE: 16
ADLS_ACUITY_SCORE: 15

## 2020-02-29 ASSESSMENT — MIFFLIN-ST. JEOR: SCORE: 1335.57

## 2020-02-29 NOTE — PROGRESS NOTES
[facility], [place]  Hematology / Oncology Transfer Accept Note    Date of Admission: 2/17/2020  Date of Service (when I saw the patient): 02/29/2020     Assessment & Plan   Chuyita Porter is a 69 year old female with history of breast cancer and newly diagnosed mediastinal mass, pathology of which is consistent with DLBCL. She was admitted for planned chemoradiation with elective ET tube for threatened airway and PEG tube placement for enteral access.  She was admitted to ICU post procedures on 2/17, underwent radiation x 2 fractions on 2/18 and 2/19, then initiated chemotherapy with RCHOP (D1=2/21/20). ET tube was removed on 2/26. She transferred to the [facility] service on 2/27. She has been stable since transfer. Having PET scan on 2/28, then will be awaiting ARU placement.     Plan today:  - Fever workup to include: blood, urine, stool, sputum cultures and RVP. Encourage incentive spirometer. Continue vanc and cefepime.   - IVF at 75ml/hr   - Continue neupogen for neutropenia   - SLP consult   - albuterol inhaler and cough medicine PRN      # Febrile neutropenia   Fever 101.2 on 2/28 PM. Felt feverish, difficult to sleep last night. Worsening cough and upper airway wheezing. No other localizing sx.   -Infectious w/u as listed above. F/u cx in process.  -Continue vanc and cefepime for now.   -Albuterol, cough medicine PRN for sx management.   -Reviewed PET for any e/o lung infection. Note bibasilar atelectasis and metabolic uptake that could be inflammation/infection/less likely malignancy.     # Extranodal Diffuse large B-cell lymphoma, non-germinal center type  Will follow with Dr. Sprague  On 2/14/20, CT chest showed 8.0 x 6.7 x 10.0 cm mass centered in the right anterior mediastinum with mass effect within the mediastinum, including compression of the SVC with associated collateral vessels seen in the neck. No mediastinal LN involvement. Planned endotracheal biopsies and  placement of ET tube, along with PEG tube placement, done on 2/17. No stent was placed due to technical complications per Interventional Pulmonology note. Biopsy confirmed DLBCL. She underwent two fractions of radiotherapy on 2/18 and 2/19. Initiated R-CHOP chemotherapy on 2/21. Repeat CT Chest imaging on 2/25 demonstrated decreased size of mass.   - 2/19 CT A/P done to help with staging (wanted PET/CT but unable to get with pt on ventilator). PET/CT completed 2/28, shows interval decrease upper mediastinal mass.   - have requested outpatient [facility] f/u with Dr. Sprague close to anticipated start of next cycle  - no e/o TLS, continue Allopurinol  - ANC <1.0, continue Neupogen subcutaneous daily. Would plan to continue until ANC >1.0 for 2 consecutive days.     # History of right breast cancer in 2005 and left breast cancer in 2015  - currently holding patient's home anastrozole for now due to increased VTE risk. She follows with Dr. Gallardo. As per last clinic note, Dr. Gallardo had recommended anastrozole as prophylaxis and to continue until 2020. Can likely discontinue this medication at discharge and have pt follow-up with Dr. Gallardo.     # ID PPx  - started ppx ACV  - start Fluconazole 200mg daily, Levaquin 250mg daily. Continue until ANC >1.0. HOLD levaquin while on b/s abx.      # Constipation, resolved. Now with loose stools likely related to bowel regimen and TFs. Monitor. Check stool for C.diff.     # Anxiety  # N/V  - encouraged to try ativan PRN  - additional PRN antiemetics available    # Nutrition  Had PEG tube placed by Thoracic surgery team on 2/17. Per outpatient ENT note, goal of PEG was empiric should she have dysphagia from originally planned stent placement or with planned therapy. NO stent was placed. PEG tube was utilized while pt was intubated and continues to be utilized for TFs as we slowly advance her diet as able.   - Nutrition consulted for tube feeds  - appreciate SLP  following. Continue to trial diet advancement per SLP ongoing recs. When better able to advance diet, can reassess need for TFs/PEG tube.      FEN  - NS at 75ml/hr   - lyte repletion per protocol  - nutrition as above    PPx  - VTE: Lovenox 40mg every day  - GI: s/p Protonix (now off steroids)  - PT/OT    Dispo: Current recommendation from therapy teams is ARU. PM&R consulted. Patient not medically ready for discharge yet due to new neutropenic fever.   - unit SW aware  - at TCU/ARU, can continue to work on diet advancement and determine need for ongoing TFs/PEG tube  - have requested f/u for ongoing [facility] f/u. Would not be able to go to clinic appts if in ARU, but should not need provider visit until week of 3/9 (next cycle would be due ~3/13). Could get BIW labs at ARU/TCU.   - per d/w SW today, Neupogen should NOT disqualify pt from ARU. Ok to place order for this on discharge med rec (rather than having to dispense here for pt to take over to ARU).      Yana Chaves DNP, APRN, NP-C  Hematology/Oncology  Pager: [phone]    Interval History   Febrile overnight and had difficulty sleeping. Has ongoing cough that is perhaps getting a bit worse. Also notes some wheezing. Having several loose stools per day. Throat pain with swallowing pills, mainly using PEG tube. Denies nausea, vomiting, cramping. No abdominal pain or SOB.     Physical Exam   Temp: 98.7  F (37.1  C) Temp src: Oral BP: 119/77 Pulse: 100 Heart Rate: 92 Resp: 18 SpO2: 100 % O2 Device: Nasal cannula Oxygen Delivery: 3 LPM  Vitals:    02/27/20 1245 02/28/20 0824 02/29/20 0800   Weight: 80.7 kg (178 lb) 79.4 kg (175 lb) 78.4 kg (172 lb 12.8 oz)     Vital Signs with Ranges  Temp:  [97.9  F (36.6  C)-101.2  F (38.4  C)] 98.7  F (37.1  C)  Pulse:  [] 100  Heart Rate:  [90-98] 92  Resp:  [18-20] 18  BP: (116-128)/(68-79) 119/77  SpO2:  [95 %-100 %] 100 %  I/O last 3 completed shifts:  In: 1415 [I.V.:600; NG/GT:540]  Out: 1450  [Urine:700; Other:750]    Constitutional: Pleasant woman seen sitting up in bed in NAD. Alert and interactive.  at bedside for support.   HEENT: NC/AT. Anicteric sclera. MMM, no lesions, +thrush.  Respiratory: Non labored breathing on room air this AM (was on 3L overnight). Breath sounds diminished b/l bases. Wheezing heard in upper airway anteriorly. No crackles. +Dry cough.   Cardiovascular: RRR. No murmur or rub.  GI: PEG site looks c/d/i, no surrounding erythema. Normal BS. Abd soft, non distended, non tender.   Skin: Pale, warm, dry.   Musculoskeletal: Moves all extremities independently. Grossly normal. Trace BLE edema.   Neurologic: Alert, oriented, speech normal. No focal deficits.   Neuropsychiatric: Soft voice. Calm, normal eye contact, affect congruent.       Medications     dextrose Stopped (02/26/20 1600)     - MEDICATION INSTRUCTIONS -       sodium chloride 75 mL/hr at 02/29/20 0930       acyclovir  400 mg Oral or Feeding Tube BID     allopurinol  300 mg Oral or Feeding Tube Daily     ceFEPIme (MAXIPIME) IV  2 g Intravenous Q8H     dextrose 5% water  10-20 mL Intracatheter Daily at 8 pm     dextrose 5% water  10-20 mL Intracatheter Daily at 8 pm     enoxaparin ANTICOAGULANT  40 mg Subcutaneous Q24H     filgrastim (NEUPOGEN/GRANIX) subcutaneous  5 mcg/kg (Dosing Weight) Subcutaneous Daily at 8 pm     fluconazole  200 mg Oral or Feeding Tube Daily     insulin aspart  1-4 Units Subcutaneous Q4H     loratadine  10 mg Oral or Feeding Tube Daily     nystatin  500,000 Units Oral 4x Daily     sodium chloride (PF)  3 mL Intracatheter Q8H     vancomycin (VANCOCIN) IV  1,750 mg Intravenous Q12H       Data   Results for orders placed or performed during the hospital encounter of 02/17/20 (from the past 24 hour(s))   CT Soft Tissue Neck w Contrast    Narrative    PET CT fusion examination  1. Neck CT with contrast  2. PET study of the neck  3. PET CT fusion study of the neck    History:  69-year-old female  with history of breast cancer and newly  diagnosed mediastinal mass pathology of which is consistent with  DLBCL. She was admitted for planned chemoradiation.     Comparison: 2/25/2020 dated neck CT. Technique: Please refer to the  accompanying whole body PET-CT for report of the dose and whole body  PET-CT findings.  Regarding the neck, axial images were obtained after nonionic  intravenous contrast administration, with sagittal and coronal  reconstructions performed. Neck CT images were reviewed in bone, soft  tissue, and lung windows, with review of the fused PET-CT images as  well in multiple planes.  Dose: 107ml [id number]    Findings:    Previously seen endotracheal tube has been removed. There is FDG  uptake along the vocal cords without worrisome findings on CT, uptake  is likely physiologic.     Please refer to whole body PET CT report for the anterior mediastinal  FDG avid mass. Thyroid gland predominantly on the right is on the  right infiltrated by the anterior mediastinal mass. Submandibular  glands are small without mass. Parotid glands are normal.     There is no FDG avid or enlarged cervical lymphadenopathy.     Vascular structures are patent. In the brain parenchyma there is  diffuse volume loss without abnormal activity or enhancing lesion.     Pharyngeal mucosal spaces are normal.      Impression    IMPRESSION:     No cervical lymphadenopathy.    Please refer to whole body PET CT report for the anterior mediastinal  FDG avid mass which partially involves the thyroid gland and for the  rest of the findings in the body.    JULIAN WHITESIDE MD   PET Oncology Whole Body    Narrative    Combined Report of:    PET and CT on  2/28/2020 3:20 PM :    1. PET of the neck, chest, abdomen, and pelvis.  2. PET CT Fusion for Attenuation Correction and Anatomical  Localization:    3. Diagnostic CT scan of the chest, abdomen, and pelvis with  intravenous contrast for interpretation.  3. CT of the chest, abdomen and  pelvis obtained for diagnostic  interpretation.  4. 3D MIP and PET-CT fused images were processed on an independent  workstation and archived to PACS and reviewed by a radiologist.    Technique:    1. PET: The patient received 10.21 mCi of F-18-FDG; the serum glucose  was 106 prior to administration, body weight was 79.4 kg. Images were  evaluated in the axial, sagittal, and coronal planes as well as the  rotational whole body MIP. Images were acquired from the Vertex to the  Feet.    UPTAKE WAS MEASURED AT 60 MINUTES.     BACKGROUND:  Liver SUV max= 4.13,   Aorta Blood SUV Max: 3.28.     2. CT: Volumetric acquisition for clinical interpretation of the  chest, abdomen, and pelvis acquired at 3 mm sections . The chest,  abdomen, and pelvis were evaluated at 5 mm sections in bone, soft  tissue, and lung windows.      The patient received 7 cc. Of Isovue 370 intravenously for the  examination.    --    3. 3D MIP and PET-CT fused images were processed on an independent  workstation and archived to PACS and reviewed by a radiologist.    INDICATION: extranodal B-cell lymphoma    ADDITIONAL INFORMATION OBTAINED FROM EMR: 69 year old female with  history of breast cancer and newly diagnosed mediastinal mass,  pathology of which is consistent with DLBCL. She was admitted for  planned chemoradiation. Underwent radiation x 2 fractions on 2/18 and  2/19, then initiated chemotherapy with RCHOP (D1=2/21/20).    COMPARISON: None.    FINDINGS:     HEAD/NECK:  No suspicious head and neck mucosal lesions. Salivary glands and  thyroid are within normal limits. Patent main neck vessels. Paranasal  sinuses and mastoid air cells are clear. No suspicious focal brain  uptake.    CHEST:  Upper mediastinum partially necrotic mass measuring 5.2 x 5.2 cm with  SUV max of 4.85, encasing the right lower lobe of the thyroid, and  without fat planes with the trachea and the brachiocephalic trunk and  mass effect on the right IJ, the lower trachea,  appears decreased in  size compared to 1/27/2020 it measured 7.0 x 5.9 cm. No suspicious  lung nodules. Bibasilar hypermetabolic atelectasis. Heart is within  normal limits.    ABDOMEN AND PELVIS:  Left liver hypodense lesion, unchanged since January 2020. Liver is  otherwise unremarkable. Gallbladder, kidneys, spleen, pancreas are  within normal limits. Calcified uterine fibroids. Urinary bladder is  within normal limits. Diffuse increased uptake of the colon and  stomach. Gastrostomy tube in place. Patent main abdominal vessels. No  hypermetabolic lymphadenopathy    LOWER EXTREMITIES:   No abnormal masses or hypermetabolic lesions.    BONES:   There are no suspicious lytic or blastic osseous lesions.  There is no  abnormal FDG uptake in the skeleton.        Impression    IMPRESSION: In this patient with history of extranodal B-cell  lymphoma:  1. Interval decrease of the upper mediastinal mass  encasing/originating from the thyroid compared to 1/27/2020; there is  residual FDG uptake therefore the scan is consistent with partial  response  2. New minimal basilar atelectasis with increased FDG uptake, likely  inflammatory/infective.  3. Diffuse increased uptake of the colon and stomach, could be  inflammatory, infective due to medications like (metformin) or less  likely could represent lymphomatous bowel involvement. Attention on  follow-up is recommended.    Lugano Criteria for Lymphoma response to therapy methodology  Reported as: ex.  Lugano Criteria: Complete Response    PET response  Used for FDG avid Lymphomas (Hodgkins & Diffuse Large B-Cell)  Complete                     <=Liver  (Deauville 1-3)  Partial                          > Liver & decreased from baseline   (Deauville 4-5)  Stable/No response     > Liver & no change from baseline  (Deauville  4-5)  Progressive                 > Liver & Increased or new FDG avid  lesion (Deauville 4-5)    CT response  Used for variable uptake Lymphomas (Follicular,  Marginal zone, CLL,  Mantle Cell)  Complete                     all nodes <1.5 cm  (longest diameter)  Partial                          Shrank > 50%        (SPD*)  Stable/No response      Shrank <50%         (SPD*)  Progressive disease      New or Increased size of lesions    *SPD = (sum of up to six lesions (longest x shortest diameter)    Source: Salas et al.  Imaging for Staging and Response Assessment in  Lymphoma.  Radiology August 2015.     Lactic acid level STAT   Result Value Ref Range    Lactate for Sepsis Protocol 0.8 0.7 - 2.0 mmol/L   Blood culture   Result Value Ref Range    Specimen Description Blood Right Arm     Culture Micro No growth after 6 hours    Blood culture   Result Value Ref Range    Specimen Description Blood Left Arm     Culture Micro No growth after 6 hours    CBC with platelets differential   Result Value Ref Range    WBC 0.3 (LL) 4.0 - 11.0 10e9/L    RBC Count 3.12 (L) 3.8 - 5.2 10e12/L    Hemoglobin 9.4 (L) 11.7 - 15.7 g/dL    Hematocrit 29.3 (L) 35.0 - 47.0 %    MCV 94 78 - 100 fl    MCH 30.1 26.5 - 33.0 pg    MCHC 32.1 31.5 - 36.5 g/dL    RDW 13.9 10.0 - 15.0 %    Platelet Count 140 (L) 150 - 450 10e9/L    Diff Method WBC <0.5, Diff not done    Basic metabolic panel   Result Value Ref Range    Sodium 134 133 - 144 mmol/L    Potassium 4.0 3.4 - 5.3 mmol/L    Chloride 103 94 - 109 mmol/L    Carbon Dioxide 28 20 - 32 mmol/L    Anion Gap 3 3 - 14 mmol/L    Glucose 109 (H) 70 - 99 mg/dL    Urea Nitrogen 15 7 - 30 mg/dL    Creatinine 0.52 0.52 - 1.04 mg/dL    GFR Estimate >90 >60 mL/min/[1.73_m2]    GFR Estimate If Black >90 >60 mL/min/[1.73_m2]    Calcium 8.2 (L) 8.5 - 10.1 mg/dL   UA reflex to Microscopic and Culture   Result Value Ref Range    Color Urine Light Yellow     Appearance Urine Clear     Glucose Urine Negative NEG^Negative mg/dL    Bilirubin Urine Negative NEG^Negative    Ketones Urine Negative NEG^Negative mg/dL    Specific Gravity Urine 1.015 1.003 - 1.035    Blood Urine  Negative NEG^Negative    pH Urine 7.5 (H) 5.0 - 7.0 pH    Protein Albumin Urine Negative NEG^Negative mg/dL    Urobilinogen mg/dL Normal 0.0 - 2.0 mg/dL    Nitrite Urine Negative NEG^Negative    Leukocyte Esterase Urine Negative NEG^Negative    Source Unspecified Urine      I have seen, interviewed, and examined the patient independently.  I have reviewed the vital signs and labs.  This note reflects my assessment and plan.    Neutropenic fever now, she felt sick with it. On neupogen, broad spetrum Abx (cefe/vanco), work up in progress, PET yesterday with ? B/basila PNA. Remains on 02.    Still afraid to drink but throat pain is less and thrush is improved, have asked her to increase po slowly but to keep working on it    Maria Del Carmen Chaudhari MD/PhD

## 2020-02-29 NOTE — PLAN OF CARE
Afebrile, VSS. On 3L O2 with sats in the upper 90s. On TF @ 55 mL/hr. Had PET scan today. Taking small sips of clear liquids. Episode this afternoon where coughing was noted after water intake. Holding fluids for now, speech therapy tomorrow. Will continue to monitor.     Problem: Adult Inpatient Plan of Care  Goal: Plan of Care Review  2/28/2020 1939 by Padmini Leyva RN  Outcome: No Change     Problem: Adult Inpatient Plan of Care  Goal: Patient-Specific Goal (Individualization)  Outcome: No Change     Problem: Pain Acute  Goal: Optimal Pain Control  Outcome: No Change

## 2020-02-29 NOTE — PHARMACY-VANCOMYCIN DOSING SERVICE
Pharmacy Vancomycin Initial Note  Date of Service 2020  Patient's  1950  69 year old, female    Indication: Febrile Neutropenia    Current estimated CrCl = Estimated Creatinine Clearance: 109.4 mL/min (based on SCr of 0.53 mg/dL).    Creatinine for last 3 days  2020:  4:17 AM Creatinine 0.50 mg/dL  2020:  3:55 AM Creatinine 0.54 mg/dL  2020:  4:23 AM Creatinine 0.53 mg/dL    Recent Vancomycin Level(s) for last 3 days  No results found for requested labs within last 72 hours.      Vancomycin IV Administrations (past 72 hours)      No vancomycin orders with administrations in past 72 hours.                Nephrotoxins and other renal medications (From now, onward)    Start     Dose/Rate Route Frequency Ordered Stop    20 0830  acyclovir (ZOVIRAX) capsule 400 mg      400 mg Oral or Feeding Tube 2 TIMES DAILY 20 0829            Contrast Orders - past 72 hours (72h ago, onward)    Start     Dose/Rate Route Frequency Ordered Stop    20 1330  fluorodeoxyglucose F-18 (FDG) radioisotope injection 10-18 mCi      10-18 mCi Intravenous ONCE 20 1324 20 1330    20 1330  iopamidol (ISOVUE-370) solution  mL       mL Intravenous ONCE 20 1324 20 1435                Plan:  1.  Start vancomycin  1750 mg IV q12h.   2.  Goal Trough Level: 15-20 mg/L   3.  Pharmacy will check trough levels as appropriate in 1-3 Days.    4. Serum creatinine levels will be ordered daily for the first week of therapy and at least twice weekly for subsequent weeks.    5. [place] method utilized to dose vancomycin therapy: Method 2    Ramiro Bang [facility]

## 2020-02-29 NOTE — PLAN OF CARE
5C PT -   Discharge Planner PT   Patient plan for discharge: ARU  Current status: patient with improved ambulatory endurance/tolerance with WC follow. Patient ambulated x125', 75', and 50' with FWW and CGA requiring seated rest breaks between bouts. Mod I for sit<>stand transfers.  Barriers to return to prior living situation: medical status, impaired functional mobility, decreased activity tolerance  Recommendations for discharge: ARU  Rationale for recommendations: Patient is well below functional baseline and demonstrating good participation in IP therapies. At this time PT recommends ARU to maximize independence for goal of safe return home with spouse support.   Entered by: Obie Singh 02/29/2020 5:09 PM

## 2020-02-29 NOTE — PLAN OF CARE
"/72 (BP Location: Left arm)   Pulse 100   Temp 99  F (37.2  C) (Oral)   Resp 18   Ht 1.676 m (5' 6\")   Wt 79.4 kg (175 lb)   SpO2 98%   BMI 28.25 kg/m     Tmax overnight was 101.2, lactic was drawn and resulted in 0.8, blood cultures were also drawn. MD started pt on scheduled cefepime and vanc. Pt c/o coughing overnight, robitussin was given. Pt also c/o of anxiety throughout the night and was given ativan twice. Pt still does not want to swallow and wants all meds through her g tube. No replacements needed.        Problem: Adult Inpatient Plan of Care  Goal: Plan of Care Review  2/29/2020 0658 by Chaparro Loera RN  Outcome: No Change  2/28/2020 1939 by Padmini Leyva RN  Outcome: No Change  Goal: Patient-Specific Goal (Individualization)  2/29/2020 0658 by Chaparro Loera RN  Outcome: No Change  2/28/2020 1939 by Padmini Leyva RN  Outcome: No Change  Goal: Absence of Hospital-Acquired Illness or Injury  Outcome: No Change  Goal: Readiness for Transition of Care  Outcome: No Change  Goal: Rounds/Family Conference  Outcome: No Change     Problem: Anemia (Chemotherapy Effects)  Goal: Anemia Symptom Improvement  Outcome: No Change     Problem: Urinary Bleeding Risk or Actual (Chemotherapy Effects)  Goal: Absence of Hematuria  Outcome: No Change     Problem: Nausea and Vomiting (Chemotherapy Effects)  Goal: Fluid and Electrolyte Balance  Outcome: No Change     Problem: Neurotoxicity (Chemotherapy Effects)  Goal: Neurotoxicity Symptom Control  Outcome: No Change     Problem: Oral Mucositis (Chemotherapy Effects)  Goal: Improved Oral Mucous Membrane Integrity  Outcome: No Change     Problem: Radiation Skin Reaction (Radiation, External Beam)  Goal: Skin Health and Integrity  Outcome: No Change     Problem: OT General Care Plan  Goal: Toilet Transfer/Toileting (OT)  Description  Toilet Transfer/Toileting (OT)  Outcome: No Change     "

## 2020-03-01 ENCOUNTER — APPOINTMENT (OUTPATIENT)
Dept: OCCUPATIONAL THERAPY | Facility: CLINIC | Age: 70
DRG: 840 | End: 2020-03-01
Attending: OTOLARYNGOLOGY
Payer: COMMERCIAL

## 2020-03-01 ENCOUNTER — APPOINTMENT (OUTPATIENT)
Dept: SPEECH THERAPY | Facility: CLINIC | Age: 70
DRG: 840 | End: 2020-03-01
Attending: OTOLARYNGOLOGY
Payer: COMMERCIAL

## 2020-03-01 LAB
ANION GAP SERPL CALCULATED.3IONS-SCNC: 4 MMOL/L (ref 3–14)
BUN SERPL-MCNC: 13 MG/DL (ref 7–30)
CALCIUM SERPL-MCNC: 8.1 MG/DL (ref 8.5–10.1)
CHLORIDE SERPL-SCNC: 106 MMOL/L (ref 94–109)
CO2 SERPL-SCNC: 27 MMOL/L (ref 20–32)
CREAT SERPL-MCNC: 0.44 MG/DL (ref 0.52–1.04)
DIFFERENTIAL METHOD BLD: ABNORMAL
ERYTHROCYTE [DISTWIDTH] IN BLOOD BY AUTOMATED COUNT: 13.7 % (ref 10–15)
GFR SERPL CREATININE-BSD FRML MDRD: >90 ML/MIN/{1.73_M2}
GLUCOSE BLDC GLUCOMTR-MCNC: 105 MG/DL (ref 70–99)
GLUCOSE BLDC GLUCOMTR-MCNC: 119 MG/DL (ref 70–99)
GLUCOSE BLDC GLUCOMTR-MCNC: 120 MG/DL (ref 70–99)
GLUCOSE BLDC GLUCOMTR-MCNC: 89 MG/DL (ref 70–99)
GLUCOSE BLDC GLUCOMTR-MCNC: 99 MG/DL (ref 70–99)
GLUCOSE SERPL-MCNC: 124 MG/DL (ref 70–99)
HCT VFR BLD AUTO: 26.6 % (ref 35–47)
HGB BLD-MCNC: 8.5 G/DL (ref 11.7–15.7)
LACTATE BLD-SCNC: 1.4 MMOL/L (ref 0.7–2)
LDH SERPL L TO P-CCNC: 155 U/L (ref 81–234)
MCH RBC QN AUTO: 30.6 PG (ref 26.5–33)
MCHC RBC AUTO-ENTMCNC: 32 G/DL (ref 31.5–36.5)
MCV RBC AUTO: 96 FL (ref 78–100)
PHOSPHATE SERPL-MCNC: 2.9 MG/DL (ref 2.5–4.5)
PLATELET # BLD AUTO: 128 10E9/L (ref 150–450)
POTASSIUM SERPL-SCNC: 4 MMOL/L (ref 3.4–5.3)
RBC # BLD AUTO: 2.78 10E12/L (ref 3.8–5.2)
SODIUM SERPL-SCNC: 137 MMOL/L (ref 133–144)
URATE SERPL-MCNC: 1.2 MG/DL (ref 2.6–6)
WBC # BLD AUTO: 0.2 10E9/L (ref 4–11)

## 2020-03-01 PROCEDURE — 00000146 ZZHCL STATISTIC GLUCOSE BY METER IP

## 2020-03-01 PROCEDURE — 84550 ASSAY OF BLOOD/URIC ACID: CPT | Performed by: INTERNAL MEDICINE

## 2020-03-01 PROCEDURE — 25000128 H RX IP 250 OP 636: Performed by: PHYSICIAN ASSISTANT

## 2020-03-01 PROCEDURE — 25800030 ZZH RX IP 258 OP 636: Performed by: INTERNAL MEDICINE

## 2020-03-01 PROCEDURE — 40000275 ZZH STATISTIC RCP TIME EA 10 MIN

## 2020-03-01 PROCEDURE — 36415 COLL VENOUS BLD VENIPUNCTURE: CPT | Performed by: INTERNAL MEDICINE

## 2020-03-01 PROCEDURE — 92526 ORAL FUNCTION THERAPY: CPT | Mod: GN

## 2020-03-01 PROCEDURE — 25800030 ZZH RX IP 258 OP 636: Performed by: NURSE PRACTITIONER

## 2020-03-01 PROCEDURE — 25000132 ZZH RX MED GY IP 250 OP 250 PS 637: Performed by: PHYSICIAN ASSISTANT

## 2020-03-01 PROCEDURE — 25000132 ZZH RX MED GY IP 250 OP 250 PS 637: Performed by: STUDENT IN AN ORGANIZED HEALTH CARE EDUCATION/TRAINING PROGRAM

## 2020-03-01 PROCEDURE — 25000132 ZZH RX MED GY IP 250 OP 250 PS 637: Performed by: NURSE PRACTITIONER

## 2020-03-01 PROCEDURE — 25000125 ZZHC RX 250: Performed by: STUDENT IN AN ORGANIZED HEALTH CARE EDUCATION/TRAINING PROGRAM

## 2020-03-01 PROCEDURE — 80048 BASIC METABOLIC PNL TOTAL CA: CPT | Performed by: INTERNAL MEDICINE

## 2020-03-01 PROCEDURE — 20600000 ZZH R&B BMT

## 2020-03-01 PROCEDURE — 83615 LACTATE (LD) (LDH) ENZYME: CPT | Performed by: INTERNAL MEDICINE

## 2020-03-01 PROCEDURE — 85027 COMPLETE CBC AUTOMATED: CPT

## 2020-03-01 PROCEDURE — 40000556 ZZH STATISTIC PERIPHERAL IV START W US GUIDANCE

## 2020-03-01 PROCEDURE — 25000128 H RX IP 250 OP 636: Performed by: STUDENT IN AN ORGANIZED HEALTH CARE EDUCATION/TRAINING PROGRAM

## 2020-03-01 PROCEDURE — 83605 ASSAY OF LACTIC ACID: CPT | Performed by: INTERNAL MEDICINE

## 2020-03-01 PROCEDURE — 27210429 ZZH NUTRITION PRODUCT INTERMEDIATE LITER

## 2020-03-01 PROCEDURE — 97535 SELF CARE MNGMENT TRAINING: CPT | Mod: GO | Performed by: OCCUPATIONAL THERAPIST

## 2020-03-01 PROCEDURE — 94640 AIRWAY INHALATION TREATMENT: CPT

## 2020-03-01 PROCEDURE — 84100 ASSAY OF PHOSPHORUS: CPT | Performed by: INTERNAL MEDICINE

## 2020-03-01 PROCEDURE — 25000128 H RX IP 250 OP 636: Performed by: INTERNAL MEDICINE

## 2020-03-01 PROCEDURE — 94640 AIRWAY INHALATION TREATMENT: CPT | Mod: 76

## 2020-03-01 PROCEDURE — 99233 SBSQ HOSP IP/OBS HIGH 50: CPT | Performed by: INTERNAL MEDICINE

## 2020-03-01 PROCEDURE — 97110 THERAPEUTIC EXERCISES: CPT | Mod: GO | Performed by: OCCUPATIONAL THERAPIST

## 2020-03-01 RX ORDER — BENZONATATE 100 MG/1
100-200 CAPSULE ORAL 3 TIMES DAILY
Status: DISCONTINUED | OUTPATIENT
Start: 2020-03-01 | End: 2020-03-03

## 2020-03-01 RX ADMIN — CEFEPIME HYDROCHLORIDE 2 G: 2 INJECTION, POWDER, FOR SOLUTION INTRAVENOUS at 09:56

## 2020-03-01 RX ADMIN — CEFEPIME HYDROCHLORIDE 2 G: 2 INJECTION, POWDER, FOR SOLUTION INTRAVENOUS at 20:41

## 2020-03-01 RX ADMIN — ENOXAPARIN SODIUM 40 MG: 40 INJECTION SUBCUTANEOUS at 08:52

## 2020-03-01 RX ADMIN — NYSTATIN 500000 UNITS: 100000 SUSPENSION ORAL at 16:15

## 2020-03-01 RX ADMIN — VANCOMYCIN HYDROCHLORIDE 1750 MG: 10 INJECTION, POWDER, LYOPHILIZED, FOR SOLUTION INTRAVENOUS at 03:34

## 2020-03-01 RX ADMIN — ALLOPURINOL 300 MG: 300 TABLET ORAL at 08:51

## 2020-03-01 RX ADMIN — BENZONATATE 100 MG: 100 CAPSULE ORAL at 20:21

## 2020-03-01 RX ADMIN — ACYCLOVIR 400 MG: 200 CAPSULE ORAL at 20:21

## 2020-03-01 RX ADMIN — FILGRASTIM 400 MCG: 480 INJECTION, SOLUTION INTRAVENOUS; SUBCUTANEOUS at 20:21

## 2020-03-01 RX ADMIN — CEFEPIME HYDROCHLORIDE 2 G: 2 INJECTION, POWDER, FOR SOLUTION INTRAVENOUS at 01:41

## 2020-03-01 RX ADMIN — VANCOMYCIN HYDROCHLORIDE 1750 MG: 10 INJECTION, POWDER, LYOPHILIZED, FOR SOLUTION INTRAVENOUS at 16:59

## 2020-03-01 RX ADMIN — SODIUM CHLORIDE: 9 INJECTION, SOLUTION INTRAVENOUS at 01:41

## 2020-03-01 RX ADMIN — GUAIFENESIN AND CODEINE PHOSPHATE 5 ML: 10; 100 LIQUID ORAL at 16:14

## 2020-03-01 RX ADMIN — FLUCONAZOLE 200 MG: 40 POWDER, FOR SUSPENSION ORAL at 08:49

## 2020-03-01 RX ADMIN — IPRATROPIUM BROMIDE AND ALBUTEROL SULFATE 3 ML: .5; 3 SOLUTION RESPIRATORY (INHALATION) at 17:23

## 2020-03-01 RX ADMIN — ACYCLOVIR 400 MG: 200 CAPSULE ORAL at 08:51

## 2020-03-01 RX ADMIN — GUAIFENESIN AND CODEINE PHOSPHATE 5 ML: 10; 100 LIQUID ORAL at 09:26

## 2020-03-01 RX ADMIN — NYSTATIN 500000 UNITS: 100000 SUSPENSION ORAL at 08:51

## 2020-03-01 RX ADMIN — ALBUTEROL SULFATE 2.5 MG: 2.5 SOLUTION RESPIRATORY (INHALATION) at 10:10

## 2020-03-01 RX ADMIN — LORAZEPAM 1 MG: 0.5 TABLET ORAL at 20:21

## 2020-03-01 RX ADMIN — NYSTATIN 500000 UNITS: 100000 SUSPENSION ORAL at 20:21

## 2020-03-01 RX ADMIN — LORATADINE 10 MG: 10 TABLET ORAL at 08:50

## 2020-03-01 RX ADMIN — GUAIFENESIN AND CODEINE PHOSPHATE 5 ML: 10; 100 LIQUID ORAL at 20:21

## 2020-03-01 ASSESSMENT — ACTIVITIES OF DAILY LIVING (ADL)
ADLS_ACUITY_SCORE: 16
ADLS_ACUITY_SCORE: 15

## 2020-03-01 ASSESSMENT — MIFFLIN-ST. JEOR: SCORE: 1337.38

## 2020-03-01 NOTE — PLAN OF CARE
"/71 (BP Location: Left arm)   Pulse 98   Temp 99  F (37.2  C) (Axillary)   Resp 16   Ht 1.676 m (5' 6\")   Wt 78.4 kg (172 lb 12.8 oz)   SpO2 96%   BMI 27.89 kg/m      Pt stable overnight, had t-max of 100.3. OVSS pt was tired after long day so we tried to cluster care as much as possible. Pt asked for ativan before bed. Decreased oxygen to 2L and pt seems to be tolerating it well. No replacements needed. Will continue plan of care.      Problem: Adult Inpatient Plan of Care  Goal: Plan of Care Review  3/1/2020 0433 by Chaparro Loera RN  Outcome: No Change  2/29/2020 1943 by Padmini Leyva RN  Outcome: No Change  2/29/2020 1833 by Padmini Leyva RN  Outcome: No Change  Goal: Patient-Specific Goal (Individualization)  3/1/2020 0433 by Chaparro Loera RN  Outcome: No Change  2/29/2020 1943 by Padmini Leyva RN  Outcome: No Change  2/29/2020 1833 by Padmini Leyva RN  Outcome: No Change  Goal: Absence of Hospital-Acquired Illness or Injury  Outcome: No Change  Goal: Readiness for Transition of Care  Outcome: No Change  Goal: Rounds/Family Conference  Outcome: No Change     Problem: Urinary Bleeding Risk or Actual (Chemotherapy Effects)  Goal: Absence of Hematuria  Outcome: No Change     Problem: Nausea and Vomiting (Chemotherapy Effects)  Goal: Fluid and Electrolyte Balance  3/1/2020 0433 by Chaparro Loera RN  Outcome: No Change  2/29/2020 1943 by Padmini Leyva RN  Outcome: No Change  2/29/2020 1833 by Padmini Leyva RN  Outcome: No Change     Problem: Neurotoxicity (Chemotherapy Effects)  Goal: Neurotoxicity Symptom Control  Outcome: No Change     Problem: Neutropenia (Chemotherapy Effects)  Goal: Absence of Infection  3/1/2020 0433 by Chaparro Loera, RN  Outcome: No Change  2/29/2020 1943 by Padmini Leyva RN  Outcome: Declining     Problem: Radiation Skin Reaction (Radiation, External Beam)  Goal: Skin Health and Integrity  3/1/2020 0433 by Swoope, Chaparro B, RN  Outcome: No Change  2/29/2020 1943 by " Padmini Leyva, RN  Outcome: No Change  2/29/2020 1833 by Padmini Leyva, RN  Outcome: No Change     Problem: OT General Care Plan  Goal: Toilet Transfer/Toileting (OT)  Description  Toilet Transfer/Toileting (OT)  Outcome: No Change

## 2020-03-01 NOTE — PLAN OF CARE
Tmax: 100.1, OVSS, on 2L O2 with sats in the upper 90s. C/O throat pain but declines intervention. Tight wheezy cough noted. Expiratory wheezes on auscultation. Albuterol neb x2 with good relief. Needs c. Diff, waiting for stool. UA negative. Will continue to monitor.     Problem: Adult Inpatient Plan of Care  Goal: Plan of Care Review  2/29/2020 1833 by Padmini Leyva, RN  Outcome: No Change     Problem: Adult Inpatient Plan of Care  Goal: Patient-Specific Goal (Individualization)  2/29/2020 1833 by Padmini Leyva, RN  Outcome: No Change     Problem: Pain Acute  Goal: Optimal Pain Control  2/29/2020 1833 by Padmini Leyva, RN  Outcome: No Change     Problem: Nausea and Vomiting (Chemotherapy Effects)  Goal: Fluid and Electrolyte Balance  2/29/2020 1833 by Padmini Leyva, RN  Outcome: No Change     Problem: Radiation Skin Reaction (Radiation, External Beam)  Goal: Skin Health and Integrity  2/29/2020 1833 by Padmini Leyva, RN  Outcome: No Change

## 2020-03-01 NOTE — PLAN OF CARE
Discharge Planner OT   Patient plan for discharge: ARU  Current status: Pt motivated with good participation during OT session. Completes bed mobility with SBA, sit<>Stand with SBA and fww. Pt ambulating to/from bathroom for toileting with CGA and fww and standing g/h at sink x6 mins with SBA and seated rest break due to fatigue. To progress strength, pt participating in BUE theraband exercises. Tolerated well on 2L via NC, AVSS.   Barriers to return to prior living situation: Medical, deconditioning, fatigue, activity tolerance, anxiety  Recommendations for discharge: ARU   Rationale for recommendations: Pt motivated to return to IND PLOF. Will benefit from intensive multidisciplinary therapy (PT, OT) to increase strength and endurance needed for ADLs and functional mobility. Anticipate pt able to participate in 3 hours therapy/day.        Entered by: Genesis Ware 03/01/2020 2:53 PM

## 2020-03-01 NOTE — PLAN OF CARE
"Discharge Planner SLP   Patient plan for discharge: Per pt \"I think Ieaving soon\"  Current status: Recommend clear liquid (nectar-thick consistency) by spoon only, with additional 1-2 swallows after each sip.  Ensure pt is fully alert and upright for all PO, given small single sips.  Pt will continue to require FT a primary source of nutrition/hydration/medication administration.  Caregivers to encourage independent completion of oropharyngeal strengthening exercises.  SLP to follow.  Barriers to return to prior living situation: Dysphagia, weakness, medical readiness  Recommendations for discharge: ARU  Rationale for recommendations: Below baseline function; pt will benefit from ongoing ST targeting swallowing.  Pt presents with multidisciplinary rehab needs and is able to tolerate 3 hours of therapy per day.       Entered by: Macie Ahn 03/01/2020 3:58 PM       "

## 2020-03-01 NOTE — PROGRESS NOTES
[facility], [place]  Hematology / Oncology Transfer Accept Note    Date of Admission: 2/17/2020  Date of Service (when I saw the patient): 03/01/2020     Assessment & Plan   Chuyita Porter is a 69 year old female with history of breast cancer and newly diagnosed mediastinal mass, pathology of which is consistent with DLBCL. She was admitted for planned chemoradiation with elective ET tube for threatened airway and PEG tube placement for enteral access.  She was admitted to ICU post procedures on 2/17, underwent radiation x 2 fractions on 2/18 and 2/19, then initiated chemotherapy with RCHOP (D1=2/21/20). ET tube was removed on 2/26. She transferred to the [facility] Mal[facility]ancy service on 2/27. She has been stable since transfer. Having PET scan on 2/28, then will be awaiting ARU placement.     Plan today:  - Encourage incentive spirometer. Continue vanc and cefepime. F/u cx in process.   - Tessalon TID. Albuterol nebs PRN.   - IVF at 75ml/hr   - Continue neupogen for neutropenia       # Febrile neutropenia, suspected HCAP   Fever 101.2 on 2/28 PM. Defervescing since then. 24-hour Tmax was 100.3. Ongoing throat pain, cough, wheezing. No other localizing sx. Suspect HCAP given recent mechanical ventilation and PET scan showing bibasilar atelectasis and metabolic uptake. Also possible aspiration given aspiration event a few days ago and now worsening cough and upper airway wheezing.   - Reviewed PET for any e/o lung infection. Note bibasilar atelectasis and metabolic uptake that could be inflammation/infection/less likely malignancy.   - Infectious w/u: UA wnl; BC neg to date; RVP PCR negative; sputum cx unable to obtain sample; stool cx unable to obtain sample.   - Continue vanc and cefepime for now. Likely will discontinue vancomycin tomorrow if remains afebrile with no positive cx.   - Albuterol, cough medicine PRN for sx management. Add tessalon TID.     # Extranodal Diffuse large B-cell  lymphoma, non-germinal center type  Will follow with Dr. Sprague  On 2/14/20, CT chest showed 8.0 x 6.7 x 10.0 cm mass centered in the right anterior mediastinum with mass effect within the mediastinum, including compression of the SVC with associated collateral vessels seen in the neck. No mediastinal LN involvement. Planned endotracheal biopsies and placement of ET tube, along with PEG tube placement, done on 2/17. No stent was placed due to technical complications per Interventional Pulmonology note. Biopsy confirmed DLBCL. She underwent two fractions of radiotherapy on 2/18 and 2/19. Initiated R-CHOP chemotherapy on 2/21. Repeat CT Chest imaging on 2/25 demonstrated decreased size of mass.   - 2/19 CT A/P done to help with staging (wanted PET/CT but unable to get with pt on ventilator). PET/CT completed 2/28, shows interval decrease upper mediastinal mass.   - have requested outpatient [facility] f/u with Dr. Sprague close to anticipated start of next cycle  - no e/o TLS, continue Allopurinol  - ANC <1.0, continue Neupogen subcutaneous daily. Would plan to continue until ANC >1.0 for 2 consecutive days.     # History of right breast cancer in 2005 and left breast cancer in 2015  - currently holding patient's home anastrozole for now due to increased VTE risk. She follows with Dr. Gallardo. As per last clinic note, Dr. Gallardo had recommended anastrozole as prophylaxis and to continue until 2020. Can likely discontinue this medication at discharge and have pt follow-up with Dr. Gallardo.     # ID PPx  - started ppx ACV  - start Fluconazole 200mg daily, Levaquin 250mg daily. Continue until ANC >1.0. HOLD levaquin while on b/s IV abx.     # Anxiety  # N/V  - encouraged to try ativan PRN  - additional PRN antiemetics available    # Nutrition  Had PEG tube placed by Thoracic surgery team on 2/17. Per outpatient ENT note, goal of PEG was empiric should she have dysphagia from originally planned stent  placement or with planned therapy. NO stent was placed. PEG tube was utilized while pt was intubated and continues to be utilized for TFs as we slowly advance her diet as able.   - Nutrition consulted for tube feeds  - appreciate SLP following. Continue to trial diet advancement per SLP ongoing recs. When better able to advance diet, can reassess need for TFs/PEG tube.      FEN  - NS at 75ml/hr   - lyte repletion per protocol  - nutrition as above    PPx  - VTE: Lovenox 40mg every day  - GI: s/p Protonix (now off steroids)  - PT/OT    Dispo: Current recommendation from therapy teams is ARU. PM&R consulted. Patient not medically ready for discharge yet due to recent neutropenic fevers. May be ready tomorrow.    - unit SW aware  - at TCU/ARU, can continue to work on diet advancement and determine need for ongoing TFs/PEG tube  - have requested f/u for ongoing [facility] f/u. Would not be able to go to clinic appts if in ARU, but should not need provider visit until week of 3/9 (next cycle would be due ~3/13). Could get BIW labs at ARU/TCU.   - per d/w SW today, Neupogen should NOT disqualify pt from ARU. Ok to place order for this on discharge med rec (rather than having to dispense here for pt to take over to ARU).      Yana Chaves DNP, APRN, NP-C  Hematology/Oncology  Pager: [phone]    Interval History   Afebrile overnight. Slept a little better. Feeling a bit better overall but still wheezy with deep, dry cough that sometimes takes breath away. Otherwise breathing normally with no significant or persistent dyspnea. No BM yesterday or today. Ongoing throat pain with swallowing, some improvement, mainly using PEG tube for intake. Denies nausea, vomiting, abdominal cramping, diarrhea or constipation. Denies swelling, numbness/tingling, chest pain.     Physical Exam   Temp: 98.7  F (37.1  C) Temp src: Axillary BP: 123/80 Pulse: 92 Heart Rate: 92 Resp: 16 SpO2: 99 % O2 Device: Nasal cannula Oxygen  Delivery: 2 LPM  Vitals:    02/28/20 0824 02/29/20 0800 03/01/20 0825   Weight: 79.4 kg (175 lb) 78.4 kg (172 lb 12.8 oz) 78.6 kg (173 lb 3.2 oz)     Vital Signs with Ranges  Temp:  [98.7  F (37.1  C)-100.3  F (37.9  C)] 98.7  F (37.1  C)  Pulse:  [89-98] 92  Heart Rate:  [87-96] 92  Resp:  [16] 16  BP: (116-130)/(7-81) 123/80  SpO2:  [96 %-100 %] 99 %  I/O last 3 completed shifts:  In: 4435 [P.O.:100; I.V.:2735; NG/GT:390]  Out: 3200 [Urine:2900; Other:300]    Constitutional: Pleasant woman seen sitting up in bed in NAD. Alert and interactive.   HEENT: NCAT. Anicteric sclera. MMM, no lesions, +thrush- improving.  Respiratory: Non labored breathing on 2L NC. Breath sounds diminished b/l bases. Diffuse insp/exp wheezing heard both anteriorly and posteriorly, especially in upper airway. No crackles or rhonchi. +Dry cough.    Cardiovascular: RRR. No murmur or rub.  GI: PEG site looks c/d/i, no surrounding erythema. Normal BS. Abd soft, non distended, non tender other than over PEG.    Skin: Pale, warm, dry.   Musculoskeletal: Moves all extremities independently. Grossly normal. Trace ankle edema.   Neurologic: Alert, oriented, speech normal. No focal deficits.   Neuropsychiatric: Soft voice. Calm, normal eye contact, affect congruent.       Medications     dextrose Stopped (02/26/20 1600)     - MEDICATION INSTRUCTIONS -       sodium chloride 75 mL/hr at 03/01/20 0141       acyclovir  400 mg Oral or Feeding Tube BID     allopurinol  300 mg Oral or Feeding Tube Daily     benzonatate  100-200 mg Oral TID     ceFEPIme (MAXIPIME) IV  2 g Intravenous Q8H     dextrose 5% water  10-20 mL Intracatheter Daily at 8 pm     dextrose 5% water  10-20 mL Intracatheter Daily at 8 pm     enoxaparin ANTICOAGULANT  40 mg Subcutaneous Q24H     filgrastim (NEUPOGEN/GRANIX) subcutaneous  5 mcg/kg (Dosing Weight) Subcutaneous Daily at 8 pm     fluconazole  200 mg Oral or Feeding Tube Daily     insulin aspart  1-4 Units Subcutaneous Q4H      loratadine  10 mg Oral or Feeding Tube Daily     nystatin  500,000 Units Oral 4x Daily     sodium chloride (PF)  3 mL Intracatheter Q8H     vancomycin (VANCOCIN) IV  1,750 mg Intravenous Q12H       Data   Results for orders placed or performed during the hospital encounter of 02/17/20 (from the past 24 hour(s))   CBC with platelets differential   Result Value Ref Range    WBC 0.2 (LL) 4.0 - 11.0 10e9/L    RBC Count 2.78 (L) 3.8 - 5.2 10e12/L    Hemoglobin 8.5 (L) 11.7 - 15.7 g/dL    Hematocrit 26.6 (L) 35.0 - 47.0 %    MCV 96 78 - 100 fl    MCH 30.6 26.5 - 33.0 pg    MCHC 32.0 31.5 - 36.5 g/dL    RDW 13.7 10.0 - 15.0 %    Platelet Count 128 (L) 150 - 450 10e9/L    Diff Method WBC <0.5, Diff not done    Basic metabolic panel   Result Value Ref Range    Sodium 137 133 - 144 mmol/L    Potassium 4.0 3.4 - 5.3 mmol/L    Chloride 106 94 - 109 mmol/L    Carbon Dioxide 27 20 - 32 mmol/L    Anion Gap 4 3 - 14 mmol/L    Glucose 124 (H) 70 - 99 mg/dL    Urea Nitrogen 13 7 - 30 mg/dL    Creatinine 0.44 (L) 0.52 - 1.04 mg/dL    GFR Estimate >90 >60 mL/min/[1.73_m2]    GFR Estimate If Black >90 >60 mL/min/[1.73_m2]    Calcium 8.1 (L) 8.5 - 10.1 mg/dL   Lactate Dehydrogenase   Result Value Ref Range    Lactate Dehydrogenase 155 81 - 234 U/L   Phosphorus   Result Value Ref Range    Phosphorus 2.9 2.5 - 4.5 mg/dL   Uric acid   Result Value Ref Range    Uric Acid 1.2 (L) 2.6 - 6.0 mg/dL     I have seen, interviewed, and examined the patient independently.  I have reviewed the vital signs and labs.  This note reflects my assessment and plan.    Feels well, but not advancing oral intake because she is worried. I encouraged her to try to increase intake very carefully and slowly    Wheezey this am, got albuterol and wheeses resolved      Maria Del Carmen Chaudhari MD/PhD

## 2020-03-02 ENCOUNTER — APPOINTMENT (OUTPATIENT)
Dept: GENERAL RADIOLOGY | Facility: CLINIC | Age: 70
DRG: 840 | End: 2020-03-02
Attending: PHYSICIAN ASSISTANT
Payer: COMMERCIAL

## 2020-03-02 ENCOUNTER — APPOINTMENT (OUTPATIENT)
Dept: SPEECH THERAPY | Facility: CLINIC | Age: 70
DRG: 840 | End: 2020-03-02
Attending: OTOLARYNGOLOGY
Payer: COMMERCIAL

## 2020-03-02 ENCOUNTER — APPOINTMENT (OUTPATIENT)
Dept: OCCUPATIONAL THERAPY | Facility: CLINIC | Age: 70
DRG: 840 | End: 2020-03-02
Attending: OTOLARYNGOLOGY
Payer: COMMERCIAL

## 2020-03-02 ENCOUNTER — APPOINTMENT (OUTPATIENT)
Dept: PHYSICAL THERAPY | Facility: CLINIC | Age: 70
DRG: 840 | End: 2020-03-02
Attending: OTOLARYNGOLOGY
Payer: COMMERCIAL

## 2020-03-02 LAB
ALBUMIN SERPL-MCNC: 2.2 G/DL (ref 3.4–5)
ALP SERPL-CCNC: 101 U/L (ref 40–150)
ALT SERPL W P-5'-P-CCNC: 37 U/L (ref 0–50)
ANION GAP SERPL CALCULATED.3IONS-SCNC: 3 MMOL/L (ref 3–14)
AST SERPL W P-5'-P-CCNC: 13 U/L (ref 0–45)
BILIRUB DIRECT SERPL-MCNC: <0.1 MG/DL (ref 0–0.2)
BILIRUB SERPL-MCNC: 0.3 MG/DL (ref 0.2–1.3)
BUN SERPL-MCNC: 11 MG/DL (ref 7–30)
CALCIUM SERPL-MCNC: 8.2 MG/DL (ref 8.5–10.1)
CHLORIDE SERPL-SCNC: 104 MMOL/L (ref 94–109)
CO2 SERPL-SCNC: 28 MMOL/L (ref 20–32)
CREAT SERPL-MCNC: 0.48 MG/DL (ref 0.52–1.04)
DIFFERENTIAL METHOD BLD: ABNORMAL
ERYTHROCYTE [DISTWIDTH] IN BLOOD BY AUTOMATED COUNT: 13.7 % (ref 10–15)
GFR SERPL CREATININE-BSD FRML MDRD: >90 ML/MIN/{1.73_M2}
GLUCOSE BLDC GLUCOMTR-MCNC: 102 MG/DL (ref 70–99)
GLUCOSE BLDC GLUCOMTR-MCNC: 105 MG/DL (ref 70–99)
GLUCOSE BLDC GLUCOMTR-MCNC: 122 MG/DL (ref 70–99)
GLUCOSE BLDC GLUCOMTR-MCNC: 91 MG/DL (ref 70–99)
GLUCOSE BLDC GLUCOMTR-MCNC: 99 MG/DL (ref 70–99)
GLUCOSE SERPL-MCNC: 115 MG/DL (ref 70–99)
HCT VFR BLD AUTO: 26.5 % (ref 35–47)
HGB BLD-MCNC: 8.4 G/DL (ref 11.7–15.7)
MCH RBC QN AUTO: 30.5 PG (ref 26.5–33)
MCHC RBC AUTO-ENTMCNC: 31.7 G/DL (ref 31.5–36.5)
MCV RBC AUTO: 96 FL (ref 78–100)
PLATELET # BLD AUTO: 131 10E9/L (ref 150–450)
POTASSIUM SERPL-SCNC: 4.1 MMOL/L (ref 3.4–5.3)
PROT SERPL-MCNC: 5.6 G/DL (ref 6.8–8.8)
RBC # BLD AUTO: 2.75 10E12/L (ref 3.8–5.2)
SODIUM SERPL-SCNC: 134 MMOL/L (ref 133–144)
WBC # BLD AUTO: 0.3 10E9/L (ref 4–11)

## 2020-03-02 PROCEDURE — 97110 THERAPEUTIC EXERCISES: CPT | Mod: GO

## 2020-03-02 PROCEDURE — 92526 ORAL FUNCTION THERAPY: CPT | Mod: GN | Performed by: SPEECH-LANGUAGE PATHOLOGIST

## 2020-03-02 PROCEDURE — 25000132 ZZH RX MED GY IP 250 OP 250 PS 637: Performed by: STUDENT IN AN ORGANIZED HEALTH CARE EDUCATION/TRAINING PROGRAM

## 2020-03-02 PROCEDURE — 25000132 ZZH RX MED GY IP 250 OP 250 PS 637: Performed by: PHYSICIAN ASSISTANT

## 2020-03-02 PROCEDURE — 25000128 H RX IP 250 OP 636: Performed by: PHYSICIAN ASSISTANT

## 2020-03-02 PROCEDURE — 12000012 ZZH R&B MS OVERFLOW UMMC

## 2020-03-02 PROCEDURE — 71046 X-RAY EXAM CHEST 2 VIEWS: CPT

## 2020-03-02 PROCEDURE — 25000128 H RX IP 250 OP 636: Performed by: INTERNAL MEDICINE

## 2020-03-02 PROCEDURE — 97116 GAIT TRAINING THERAPY: CPT | Mod: GP

## 2020-03-02 PROCEDURE — 00000146 ZZHCL STATISTIC GLUCOSE BY METER IP

## 2020-03-02 PROCEDURE — 97535 SELF CARE MNGMENT TRAINING: CPT | Mod: GO

## 2020-03-02 PROCEDURE — 25800030 ZZH RX IP 258 OP 636: Performed by: INTERNAL MEDICINE

## 2020-03-02 PROCEDURE — 36415 COLL VENOUS BLD VENIPUNCTURE: CPT | Performed by: INTERNAL MEDICINE

## 2020-03-02 PROCEDURE — 97530 THERAPEUTIC ACTIVITIES: CPT | Mod: GP

## 2020-03-02 PROCEDURE — 80048 BASIC METABOLIC PNL TOTAL CA: CPT | Performed by: INTERNAL MEDICINE

## 2020-03-02 PROCEDURE — 99233 SBSQ HOSP IP/OBS HIGH 50: CPT | Performed by: INTERNAL MEDICINE

## 2020-03-02 PROCEDURE — 27210429 ZZH NUTRITION PRODUCT INTERMEDIATE LITER

## 2020-03-02 PROCEDURE — 92526 ORAL FUNCTION THERAPY: CPT | Mod: GN

## 2020-03-02 PROCEDURE — 25800030 ZZH RX IP 258 OP 636: Performed by: NURSE PRACTITIONER

## 2020-03-02 PROCEDURE — 97110 THERAPEUTIC EXERCISES: CPT | Mod: GP

## 2020-03-02 PROCEDURE — 80076 HEPATIC FUNCTION PANEL: CPT | Performed by: INTERNAL MEDICINE

## 2020-03-02 PROCEDURE — 74230 X-RAY XM SWLNG FUNCJ C+: CPT

## 2020-03-02 PROCEDURE — 25000128 H RX IP 250 OP 636: Performed by: STUDENT IN AN ORGANIZED HEALTH CARE EDUCATION/TRAINING PROGRAM

## 2020-03-02 PROCEDURE — 85027 COMPLETE CBC AUTOMATED: CPT

## 2020-03-02 PROCEDURE — 92610 EVALUATE SWALLOWING FUNCTION: CPT | Mod: GN

## 2020-03-02 RX ORDER — PIPERACILLIN SODIUM, TAZOBACTAM SODIUM 4; .5 G/20ML; G/20ML
4.5 INJECTION, POWDER, LYOPHILIZED, FOR SOLUTION INTRAVENOUS EVERY 6 HOURS
Status: DISCONTINUED | OUTPATIENT
Start: 2020-03-02 | End: 2020-03-04

## 2020-03-02 RX ORDER — ACETAMINOPHEN 325 MG/1
650 TABLET ORAL EVERY 4 HOURS PRN
Status: DISCONTINUED | OUTPATIENT
Start: 2020-03-02 | End: 2020-03-06 | Stop reason: HOSPADM

## 2020-03-02 RX ADMIN — PIPERACILLIN AND TAZOBACTAM 4.5 G: 4; .5 INJECTION, POWDER, FOR SOLUTION INTRAVENOUS at 16:19

## 2020-03-02 RX ADMIN — LORATADINE 10 MG: 10 TABLET ORAL at 07:55

## 2020-03-02 RX ADMIN — VANCOMYCIN HYDROCHLORIDE 1750 MG: 10 INJECTION, POWDER, LYOPHILIZED, FOR SOLUTION INTRAVENOUS at 03:51

## 2020-03-02 RX ADMIN — PIPERACILLIN AND TAZOBACTAM 4.5 G: 4; .5 INJECTION, POWDER, FOR SOLUTION INTRAVENOUS at 12:36

## 2020-03-02 RX ADMIN — NYSTATIN 500000 UNITS: 100000 SUSPENSION ORAL at 08:03

## 2020-03-02 RX ADMIN — PIPERACILLIN AND TAZOBACTAM 4.5 G: 4; .5 INJECTION, POWDER, FOR SOLUTION INTRAVENOUS at 22:49

## 2020-03-02 RX ADMIN — FLUCONAZOLE 200 MG: 40 POWDER, FOR SUSPENSION ORAL at 07:55

## 2020-03-02 RX ADMIN — ALLOPURINOL 300 MG: 300 TABLET ORAL at 07:55

## 2020-03-02 RX ADMIN — NYSTATIN 500000 UNITS: 100000 SUSPENSION ORAL at 20:12

## 2020-03-02 RX ADMIN — ENOXAPARIN SODIUM 40 MG: 40 INJECTION SUBCUTANEOUS at 07:55

## 2020-03-02 RX ADMIN — FILGRASTIM 400 MCG: 480 INJECTION, SOLUTION INTRAVENOUS; SUBCUTANEOUS at 20:11

## 2020-03-02 RX ADMIN — NYSTATIN 500000 UNITS: 100000 SUSPENSION ORAL at 12:32

## 2020-03-02 RX ADMIN — SODIUM CHLORIDE: 9 INJECTION, SOLUTION INTRAVENOUS at 00:04

## 2020-03-02 RX ADMIN — CEFEPIME HYDROCHLORIDE 2 G: 2 INJECTION, POWDER, FOR SOLUTION INTRAVENOUS at 02:37

## 2020-03-02 RX ADMIN — ACYCLOVIR 400 MG: 200 CAPSULE ORAL at 07:55

## 2020-03-02 RX ADMIN — ACETAMINOPHEN 650 MG: 325 TABLET, FILM COATED ORAL at 08:24

## 2020-03-02 RX ADMIN — LORAZEPAM 1 MG: 2 INJECTION INTRAMUSCULAR; INTRAVENOUS at 02:37

## 2020-03-02 RX ADMIN — NYSTATIN 500000 UNITS: 100000 SUSPENSION ORAL at 16:18

## 2020-03-02 RX ADMIN — ACYCLOVIR 400 MG: 200 CAPSULE ORAL at 20:11

## 2020-03-02 RX ADMIN — SODIUM CHLORIDE: 9 INJECTION, SOLUTION INTRAVENOUS at 18:35

## 2020-03-02 ASSESSMENT — ACTIVITIES OF DAILY LIVING (ADL)
ADLS_ACUITY_SCORE: 15
ADLS_ACUITY_SCORE: 16
ADLS_ACUITY_SCORE: 15

## 2020-03-02 ASSESSMENT — MIFFLIN-ST. JEOR: SCORE: 1334.2

## 2020-03-02 NOTE — PLAN OF CARE
Discharge Planner PT   Patient plan for discharge: rehab  Current status: Pt close SBA with all activity throughout session, stands to FWW, toilet transfer, hallway ambulation with FWW 2x150'. Good tolerance with seated LE exercises. On RA throughout with HR 100s and O2 sats 98%.  Barriers to return to prior living situation: medical needs, impaired balance, LE weakness, reduced activity tolerance  Recommendations for discharge: ARU  Rationale for recommendations: pt well below IND baseline mobility. She demos needs for multidisciplinary rehab to progress toward PLOF and would best benefit from intense rehab setting given motivation, progress made thus far, and rehab prognosis. Pt has supportive family and would tolerate 3 hours per day.       Entered by: Clover Welch 03/02/2020 12:52 PM

## 2020-03-02 NOTE — PROGRESS NOTES
[facility], [place]  Hematology / Oncology Transfer Accept Note    Date of Admission: 2/17/2020  Date of Service (when I saw the patient): 03/02/2020     Assessment & Plan   Chuyita Porter is a 69 year old female with history of breast cancer and newly diagnosed mediastinal mass, pathology of which is consistent with DLBCL. She was admitted for planned chemoradiation with elective ET tube for threatened airway and PEG tube placement for enteral access.  She was admitted to ICU post procedures on 2/17, underwent radiation x 2 fractions on 2/18 and 2/19, then initiated chemotherapy with RCHOP (D1=2/21/20). ET tube was removed on 2/26. She transferred to the Heme Malignancy service on 2/27. She had a PET scan 2/28 showing interval decrease upper mediastinal mass. Complicated by neutropenic fevers of unknown origin, possible HCAP. Once she remains afebrile she will discharge to ARU.     Plan today:  - Vancomycin discontinued, Cefepime changed to Zosyn due to concern for possible aspiration  - CXR today- results in process  - VFSS 3/2 per SLP. OK for regular diet with thin liquid. When better able to assess adequate po intake, can reassess need for TFs/PEG tube.   - She requested Palliative Care consult for anxiety, ordered. OK to see tomorrow  - IVF at 75ml/hr. PO intake is poor  - Continue neupogen for neutropenia, ok to continue while in ARU  - Delay discharge to ARU until no further fevers and adequate PO intake    # Febrile neutropenia, suspected HCAP   Fever 101.2 on 2/28 PM. Defervescing since then. 24-hour Tmax was 100.3. Ongoing throat pain, cough, wheezing. No other localizing sx. Suspect HCAP given recent mechanical ventilation and PET scan showing bibasilar atelectasis and metabolic uptake. Also possible aspiration given aspiration event a few days ago and now worsening cough and upper airway wheezing.   - Reviewed PET for any e/o lung infection. Note bibasilar atelectasis and  metabolic uptake that could be inflammation/infection/less likely malignancy.   - Infectious w/u: UA wnl; BC neg to date; RVP PCR negative; sputum cx unable to obtain sample; stool cx unable to obtain sample.   - Vancomycin + Cefepime 2/29-3/2  - Tmax 101 overnight 3/1-3/2. Concern for aspiration event. Switched to Zosyn 3/2- continue until afebrile    - CXR in process  - Albuterol, cough medicine PRN for sx management. Add tessalon TID.     # Extranodal Diffuse large B-cell lymphoma, non-germinal center type  Will follow with Dr. Sprague  On 2/14/20, CT chest showed 8.0 x 6.7 x 10.0 cm mass centered in the right anterior mediastinum with mass effect within the mediastinum, including compression of the SVC with associated collateral vessels seen in the neck. No mediastinal LN involvement. Planned endotracheal biopsies and placement of ET tube, along with PEG tube placement, done on 2/17. No stent was placed due to technical complications per Interventional Pulmonology note. Biopsy confirmed DLBCL. She underwent two fractions of radiotherapy on 2/18 and 2/19. Initiated R-CHOP chemotherapy on 2/21. Repeat CT Chest imaging on 2/25 demonstrated decreased size of mass.   - 2/19 CT A/P done to help with staging (wanted PET/CT but unable to get with pt on ventilator). PET/CT completed 2/28, shows interval decrease upper mediastinal mass.   - have requested outpatient [facility] f/u with Dr. Sprague close to anticipated start of next cycle  - no e/o TLS, continue Allopurinol for 14 days (2/21-3/5)  - ANC <1.0, continue Neupogen subcutaneous daily. Would plan to continue until ANC >1.0 for 2 consecutive days.    # Chemotherapy induced pancytopenia  - Transfuse to maintain hgb >7 and plt >10K     # History of right breast cancer in 2005 and left breast cancer in 2015  - currently holding patient's home anastrozole for now due to increased VTE risk. She follows with Dr. Gallardo. As per last clinic note,   Sami had recommended anastrozole as prophylaxis and to continue until 2020. Can likely discontinue this medication at discharge and have pt follow-up with Dr. Gallardo.     # ID PPx  - started ppx ACV  - start Fluconazole 200mg daily, Levaquin 250mg daily. Continue until ANC >1.0. HOLD levaquin while on b/s IV abx.     # Anxiety  # N/V  - encouraged to try ativan PRN  - additional PRN antiemetics available    # Nutrition  Had PEG tube placed by Thoracic surgery team on 2/17. Per outpatient ENT note, goal of PEG was empiric should she have dysphagia from originally planned stent placement or with planned therapy. NO stent was placed. PEG tube was utilized while pt was intubated and continues to be utilized for TFs as we slowly advance her diet as able.   - Nutrition consulted for tube feeds  - appreciate SLP following. Continue to trial diet advancement per SLP ongoing recs.   - VFSS 3/2, OK for regular diet with thin liquid. When better able to assess adequate po intake, can reassess need for TFs/PEG tube.      FEN  - NS at 75ml/hr   - lyte repletion per protocol  - nutrition as above    PPx  - VTE: Lovenox 40mg every day  - GI: s/p Protonix (now off steroids)  - PT/OT    Dispo: Current recommendation from therapy teams is ARU. PM&R consulted. Patient not medically ready for discharge yet due to recent neutropenic fevers.   - unit SW aware  - at ARU, can continue to work on diet advancement and determine need for ongoing TFs/PEG tube  - have requested f/u for ongoing [facility] f/u. Would not be able to go to clinic appts if in ARU, but should not need provider visit until week of 3/9 (next cycle would be due ~3/13). Could get BIW labs at ARU.   - per d/w SW, Neupogen should NOT disqualify pt from ARU. Ok to place order for this on discharge med rec (rather than having to dispense here for pt to take over to ARU).      Lucy Pandey PA-C  Hematology/Oncology  Pager # [phone]  Phone # [phone]      Interval History   Tmax 101 at 2300 last night, she triggered the sepsis protocol. Lactic acid 1.4. This morning she just felt overall unwell, no specific symptoms. Her cough and breathing are unchanged over the past few days, but not improving. She is still audibly wheezing with deep, dry cough. She had a BM this morning, no diarrhea or straining. Ongoing throat pain with swallowing, some improvement, mainly using PEG tube for intake. Minimal thick nectar intake. Denies nausea, vomiting, abdominal cramping, rashes. Denies swelling, numbness/tingling, chest pain.     She doesn't feel confident enough in her ability to swallow to discharge home. Her daughter's 45th birthday is today and she is sad that she is missing it. Her  is here today.     Physical Exam   Temp: 100.6  F (38.1  C) Temp src: Oral BP: 137/82 Pulse: 106 Heart Rate: 97 Resp: 16 SpO2: 95 % O2 Device: None (Room air) Oxygen Delivery: 1 LPM  Vitals:    02/28/20 0824 02/29/20 0800 03/01/20 0825   Weight: 79.4 kg (175 lb) 78.4 kg (172 lb 12.8 oz) 78.6 kg (173 lb 3.2 oz)     Vital Signs with Ranges  Temp:  [96.9  F (36.1  C)-101  F (38.3  C)] 100.6  F (38.1  C)  Pulse:  [] 106  Heart Rate:  [] 97  Resp:  [16-18] 16  BP: (118-138)/(71-82) 137/82  SpO2:  [95 %-100 %] 95 %  I/O last 3 completed shifts:  In: 3875 [P.O.:50; I.V.:2950; NG/GT:270]  Out: 2200 [Urine:2200]    Constitutional: Pleasant woman seen sitting up in bed in NAD. Alert and interactive.   HEENT: NCAT. Anicteric sclera. MMM, no lesions, thrush looks resolved  Respiratory: Non labored breathing on room air. Breath sounds diminished b/l bases. Diffuse ronchi both anteriorly and posteriorly, especially in upper airway, occasional mild wheeze. +Dry cough.    Cardiovascular: RRR. No murmur or rub.  GI: PEG site looks c/d/i, no surrounding erythema. Normal BS. Abd soft, non distended, non tender.  Skin: Pale, warm, dry.   Musculoskeletal: Moves all extremities independently.  Grossly normal. Trace ankle edema.   Neurologic: Alert, oriented, speech normal. No focal deficits.   Neuropsychiatric: Soft voice. Calm, normal eye contact, affect congruent.       Medications     dextrose Stopped (02/26/20 1600)     - MEDICATION INSTRUCTIONS -       sodium chloride 75 mL/hr at 03/02/20 0004       acyclovir  400 mg Oral or Feeding Tube BID     allopurinol  300 mg Oral or Feeding Tube Daily     benzonatate  100-200 mg Oral TID     ceFEPIme (MAXIPIME) IV  2 g Intravenous Q8H     dextrose 5% water  10-20 mL Intracatheter Daily at 8 pm     dextrose 5% water  10-20 mL Intracatheter Daily at 8 pm     enoxaparin ANTICOAGULANT  40 mg Subcutaneous Q24H     filgrastim (NEUPOGEN/GRANIX) subcutaneous  5 mcg/kg (Dosing Weight) Subcutaneous Daily at 8 pm     fluconazole  200 mg Oral or Feeding Tube Daily     insulin aspart  1-4 Units Subcutaneous Q4H     loratadine  10 mg Oral or Feeding Tube Daily     nystatin  500,000 Units Oral 4x Daily     sodium chloride (PF)  3 mL Intracatheter Q8H     vancomycin (VANCOCIN) IV  1,750 mg Intravenous Q12H       Data   Results for orders placed or performed during the hospital encounter of 02/17/20 (from the past 24 hour(s))   Lactic acid level STAT   Result Value Ref Range    Lactate for Sepsis Protocol 1.4 0.7 - 2.0 mmol/L   CBC with platelets differential   Result Value Ref Range    WBC 0.3 (LL) 4.0 - 11.0 10e9/L    RBC Count 2.75 (L) 3.8 - 5.2 10e12/L    Hemoglobin 8.4 (L) 11.7 - 15.7 g/dL    Hematocrit 26.5 (L) 35.0 - 47.0 %    MCV 96 78 - 100 fl    MCH 30.5 26.5 - 33.0 pg    MCHC 31.7 31.5 - 36.5 g/dL    RDW 13.7 10.0 - 15.0 %    Platelet Count 131 (L) 150 - 450 10e9/L    Diff Method WBC <0.5, Diff not done    Basic metabolic panel   Result Value Ref Range    Sodium 134 133 - 144 mmol/L    Potassium 4.1 3.4 - 5.3 mmol/L    Chloride 104 94 - 109 mmol/L    Carbon Dioxide 28 20 - 32 mmol/L    Anion Gap 3 3 - 14 mmol/L    Glucose 115 (H) 70 - 99 mg/dL    Urea Nitrogen 11 7  - 30 mg/dL    Creatinine 0.48 (L) 0.52 - 1.04 mg/dL    GFR Estimate >90 >60 mL/min/[1.73_m2]    GFR Estimate If Black >90 >60 mL/min/[1.73_m2]    Calcium 8.2 (L) 8.5 - 10.1 mg/dL   Hepatic panel   Result Value Ref Range    Bilirubin Direct <0.1 0.0 - 0.2 mg/dL    Bilirubin Total 0.3 0.2 - 1.3 mg/dL    Albumin 2.2 (L) 3.4 - 5.0 g/dL    Protein Total 5.6 (L) 6.8 - 8.8 g/dL    Alkaline Phosphatase 101 40 - 150 U/L    ALT 37 0 - 50 U/L    AST 13 0 - 45 U/L     I have seen, interviewed, and examined the patient independently.  I have reviewed the vital signs and labs.  This note reflects my assessment and plan.    Feels well, but not advancing oral intake because she is worried. SLP recommend videa study, we are getting it    Still febrile, will get CXR to assess for aspiration event, discont iv vanco and change cefepime to zosyn to cover aspiration    Will ask pall care to see for anxiety    Maria Del Carmen Chaudhari MD/PhD

## 2020-03-02 NOTE — PROGRESS NOTES
03/02/20 1501   General Information   Onset Date 02/17/20   Start of Care Date 02/27/20   Referring Physician Lucy Pandey PA-C   Patient Profile Review/OT: Additional Occupational Profile Info See Profile for full history and prior level of function   Patient/Family Goals Statement Pt did not state; anxious about exam   Swallowing Evaluation Videofluoroscopic evaluation   Behaviorial Observations WFL (within functional limits)  (Pt pleasant/cooperative)   Mode of current nutrition Oral diet   Type of oral diet Nectar - thick liquid   Respiratory Status Room air   Comments Orders received for VFSS to more objectively assess swallow function. Pt  with history of breast cancer and newly diagnosed mediastinal mass, pathology of which is consistent with DLBCL. She was admitted for planned chemoradiation with elective ET tube for threatened airway and PEG tube placement for enteral access.  She was admitted to ICU post procedures on 2/17, underwent radiation x 2 fractions on 2/18 and 2/19, then initiated chemotherapy with RCHOP (D1=2/21/20). ET tube was removed on 2/26. She transferred to the Heme Malignancy service on 2/27. She has been stable since transfer. Having PET scan on 2/28, then will be awaiting ARU placement.    VFSS Evaluation   VFSS Additional Documentation Yes   VFSS Eval: Radiology   Radiologist resident   Views Taken left lateral   Physical Location of Procedure [facility] radiology dept room 5   VFSS Eval: Thin Liquid Texture Trial   Mode of Presentation, Thin Liquid cup;spoon;straw;self-fed;fed by clinician   Order of Presentation 1, 2, 3, 4, 5, 6, 9, 10   Preparatory Phase WFL   Oral Phase, Thin Liquid WFL   Pharyngeal Phase, Thin Liquid Residue in valleculae;Delayed swallow reflex  (impaired epiglottic inversion)   Rosenbek's Penetration Aspiration Scale: Thin Liquid Trial Results 5 - contrast contacts vocal cords, visible residue remains (penetration)   Response to Aspiration productive  volitional cough following clinician cue   Successful Strategies Trialed During Procedure, Thin Liquid chin tuck   Diagnostic Statement deep penetration to cords x1; no penetration or aspiration with use of chin tuck strategy. Wet congested cough noted intermittently during exam, but no airway compromise observed to correlate with this   VFSS Eval: Puree Solid Texture Trial   Mode of Presentation, Puree spoon;fed by clinician   Order of Presentation 7   Preparatory Phase WFL   Oral Phase, Puree WFL   Pharyngeal Phase, Puree Residue in valleculae  (rediced epiglottic inversion)   Rosenbek's Penetration Aspiration Scale: Puree Food Trial Results 1 - no aspiration, contrast does not enter airway   Diagnostic Statement swallow functional for puree textures   VFSS Eval: Solid Food Texture Trial   Mode of Presentation, Solid fed by clinician   Order of Presentation 8   Preparatory Phase WFL  (prolonged mastication, but adequate)   Oral Phase, Solid WFL   Pharyngeal Phase, Solid Residue in valleculae   Rosenbek's Penetration Aspiration Scale: Solid Food Trial Results 1 - no aspiration, contrast does not enter airway   Diagnostic Statement swallow grossly functional for solid textures   Swallow Compensations   Swallow Compensations Pacing;Reduce amounts;Chin tuck   Esophageal Phase of Swallow   Patient reports or presents with symptoms of esophageal dysphagia No   General Therapy Interventions   Planned Therapy Interventions Dysphagia Treatment   Dysphagia treatment Oropharyngeal exercise training;Instruction of safe swallow strategies   Swallow Eval: Clinical Impressions   Skilled Criteria for Therapy Intervention Skilled criteria met.  Treatment indicated.   Functional Assessment Scale (FAS) 5   Treatment Diagnosis mild dysphagia   Diet texture recommendations Regular diet;Thin liquids   Recommended Feeding/Eating Techniques alternate between small bites and sips of food/liquid;maintain upright posture during/after eating  for 30 mins;small sips/bites   Demonstrates Need for Referral to Another Service occupational therapy;physical therapy   Therapy Frequency 5x/week   Predicted Duration of Therapy Intervention (days/wks) 1 week   Anticipated Discharge Disposition inpatient rehabilitation facility   Risks and Benefits of Treatment have been explained. Yes   Patient, family and/or staff in agreement with Plan of Care Yes   Clinical Impression Comments Video swallow study completed per MD order. Pt presents with mild oral-pharyngeal dysphagia in the setting of weakness/deconditioning. Pt demonstrated mild pharyngeal swallow delay, and impaired epiglottic inversion, resulting in elevated aspiration risk. Deep penetration to the cords noted without use of strategies. Use of chin tuck effectively eliminated penetration/aspiration risk. Of note, Pt with intermittent wet/congested cough throughout exam, but no penetration/aspiration of barium was observed to correlate with this. From swallow standpoint, Pt ok for regular diet with thin liquids. Pt to sit upright for all PO intake, use chin tuck strategy, and take small bites/sips. Pt reports preference to continue to receive all medications via PEG. ST to follow for PO tolernace and strategy training   Total Evaluation Time   Total Evaluation Time (Minutes) 11

## 2020-03-02 NOTE — PLAN OF CARE
"/71 (BP Location: Left arm)   Pulse 99   Temp 99.7  F (37.6  C) (Oral)   Resp 16   Ht 1.676 m (5' 6\")   Wt 78.6 kg (173 lb 3.2 oz)   SpO2 96%   BMI 27.96 kg/m      Pt t max of 101.0 MD paged with no change of treatment. OVSS. Pt very anxious overnight, gave ativan twice. Pt and family feels uncomfortable with the idea that they could be discharged to TCU. Patient still does not feel confident in her ability to swallow and wants to stay at least one more night. No replacements were needed. Nursing will continue to monitor.            Problem: Adult Inpatient Plan of Care  Goal: Plan of Care Review  3/2/2020 0604 by Chaparro Loera RN  Outcome: No Change  3/1/2020 1933 by Padmini Leyva RN  Outcome: No Change  Goal: Patient-Specific Goal (Individualization)  3/2/2020 0604 by Chaparro Loera RN  Outcome: No Change  3/1/2020 1933 by Padmini Leyva RN  Outcome: No Change  Goal: Absence of Hospital-Acquired Illness or Injury  Outcome: No Change  Goal: Readiness for Transition of Care  Outcome: No Change  Goal: Rounds/Family Conference  Outcome: No Change     Problem: Pain Acute  Goal: Optimal Pain Control  Outcome: No Change     Problem: Anemia (Chemotherapy Effects)  Goal: Anemia Symptom Improvement  Outcome: No Change     Problem: Urinary Bleeding Risk or Actual (Chemotherapy Effects)  Goal: Absence of Hematuria  Outcome: No Change     Problem: Nausea and Vomiting (Chemotherapy Effects)  Goal: Fluid and Electrolyte Balance  Outcome: No Change     Problem: Neurotoxicity (Chemotherapy Effects)  Goal: Neurotoxicity Symptom Control  Outcome: No Change     Problem: Oral Mucositis (Chemotherapy Effects)  Goal: Improved Oral Mucous Membrane Integrity  Outcome: No Change     Problem: Radiation Skin Reaction (Radiation, External Beam)  Goal: Skin Health and Integrity  3/2/2020 0604 by Chaparro Loera RN  Outcome: No Change  3/1/2020 1933 by Padmini Leyva RN  Outcome: No Change     Problem: OT General Care " Plan  Goal: Toilet Transfer/Toileting (OT)  Description  Toilet Transfer/Toileting (OT)  Outcome: No Change

## 2020-03-02 NOTE — PLAN OF CARE
Discharge Planner SLP   Patient plan for discharge: not discussed today  Current status: Patient seen for dysphagia treatment. Note mild phlegmy vocal quality at baseline which cleared with spontaneous coughing. Note wet vocal quality after ice chip and repeated swallows with tiny sips of nectar thick liquid, sensation of pharyngeal coating and intermittent wet vocal quality today.     Given inconsistent signs of aspiration today, recommend proceeding with video swallow study to further assess oropharyngeal swallow function. Patient may continue limited sips of nectar thick liquid by spoon as tolerated pending results. Patient to continue swallow exercise program.    Recommendations for discharge: ARU  Rationale for recommendations: Below baseline function; pt will benefit from ongoing ST targeting swallowing.  Pt presents with multidisciplinary rehab needs and is able to tolerate 3 hours of therapy per day.       Entered by: Sunita Paula 03/02/2020 10:05 AM

## 2020-03-02 NOTE — PLAN OF CARE
Discharge Planner OT   Patient plan for discharge: Did not discuss this date  Current status: Pt able to perform sit<>stand with SBA and ambulated to bathroom with SBA and using FWW. Pt completed toilet transfer with SBA with grab bars and IND with juan antonio-cares. Pt completed face level g/h tasks while standing at sink with SBA, washed UB body with SBA while standing, and doffed/donned new gown with CGA. Pt completed chair transfer with SBA. Pt participated 10 min on ergometer, VSS on RA with SpO2 99% and -105 BPM.   Barriers to return to prior living situation: Medical, deconditioning, fatigue, activity tolerance, anxiety  Recommendations for discharge: ARU  Rationale for recommendations: Pt motivated to return to IND PLOF. Will benefit from intensive multidisciplinary therapy (PT, OT) to increase strength and endurance needed for ADLs and functional mobility. Anticipate pt able to participate in 3 hours therapy/day.        Entered by: Taryn Miller 03/02/2020 11:15 AM

## 2020-03-02 NOTE — PLAN OF CARE
Afebrile, VSS. Weaned off O2 with sats in the upper 90s. Denies pain/N/D. TF running @ 55mL/hr. On nectar thick liquids; can only use spoons (no sips or straws). Triggered sepsis protocol, lactic 1.4. Will continue to monitor.     Problem: Adult Inpatient Plan of Care  Goal: Plan of Care Review  Outcome: No Change     Problem: Adult Inpatient Plan of Care  Goal: Patient-Specific Goal (Individualization)  Outcome: No Change     Problem: Neutropenia (Chemotherapy Effects)  Goal: Absence of Infection  Outcome: No Change     Problem: Radiation Skin Reaction (Radiation, External Beam)  Goal: Skin Health and Integrity  Outcome: No Change

## 2020-03-02 NOTE — PLAN OF CARE
Discharge Planner SLP   Patient plan for discharge: unknown  Current status: Video swallow study completed per MD order. Pt presents with mild oral-pharyngeal dysphagia in the setting of weakness/deconditioning. Pt demonstrated mild pharyngeal swallow delay, and impaired epiglottic inversion, resulting in elevated aspiration risk. Deep penetration to the cords noted without use of strategies. Use of chin tuck effectively eliminated penetration/aspiration risk. Of note, Pt with intermittent wet/congested cough throughout exam, but no penetration/aspiration of barium was observed to correlate with this. From swallow standpoint, Pt ok for regular diet with thin liquids. Pt to sit upright for all PO intake, use chin tuck strategy, and take small bites/sips. Pt reports preference to continue to receive all medications via PEG. ST to follow for PO tolernace and strategy training.  Barriers to return to prior living situation: medical status; deconditioning  Recommendations for discharge: will defer to PT/OT  Rationale for recommendations: Anticipate Pt will meet ST goals prior to discharge.       Entered by: Shahla Gonzales 03/02/2020 3:12 PM

## 2020-03-03 ENCOUNTER — APPOINTMENT (OUTPATIENT)
Dept: PHYSICAL THERAPY | Facility: CLINIC | Age: 70
DRG: 840 | End: 2020-03-03
Attending: OTOLARYNGOLOGY
Payer: COMMERCIAL

## 2020-03-03 ENCOUNTER — RECORDS - HEALTHEAST (OUTPATIENT)
Dept: ADMINISTRATIVE | Facility: OTHER | Age: 70
End: 2020-03-03

## 2020-03-03 ENCOUNTER — APPOINTMENT (OUTPATIENT)
Dept: OCCUPATIONAL THERAPY | Facility: CLINIC | Age: 70
DRG: 840 | End: 2020-03-03
Attending: OTOLARYNGOLOGY
Payer: COMMERCIAL

## 2020-03-03 LAB
ANION GAP SERPL CALCULATED.3IONS-SCNC: 4 MMOL/L (ref 3–14)
BASOPHILS # BLD AUTO: 0 10E9/L (ref 0–0.2)
BASOPHILS NFR BLD AUTO: 1 %
BUN SERPL-MCNC: 10 MG/DL (ref 7–30)
CALCIUM SERPL-MCNC: 8.3 MG/DL (ref 8.5–10.1)
CHLORIDE SERPL-SCNC: 105 MMOL/L (ref 94–109)
CO2 SERPL-SCNC: 27 MMOL/L (ref 20–32)
CREAT SERPL-MCNC: 0.55 MG/DL (ref 0.52–1.04)
DIFFERENTIAL METHOD BLD: ABNORMAL
EOSINOPHIL # BLD AUTO: 0.1 10E9/L (ref 0–0.7)
EOSINOPHIL NFR BLD AUTO: 5.1 %
ERYTHROCYTE [DISTWIDTH] IN BLOOD BY AUTOMATED COUNT: 13.8 % (ref 10–15)
GFR SERPL CREATININE-BSD FRML MDRD: >90 ML/MIN/{1.73_M2}
GLUCOSE BLDC GLUCOMTR-MCNC: 103 MG/DL (ref 70–99)
GLUCOSE BLDC GLUCOMTR-MCNC: 111 MG/DL (ref 70–99)
GLUCOSE BLDC GLUCOMTR-MCNC: 114 MG/DL (ref 70–99)
GLUCOSE BLDC GLUCOMTR-MCNC: 122 MG/DL (ref 70–99)
GLUCOSE BLDC GLUCOMTR-MCNC: 93 MG/DL (ref 70–99)
GLUCOSE SERPL-MCNC: 127 MG/DL (ref 70–99)
HCT VFR BLD AUTO: 27.8 % (ref 35–47)
HGB BLD-MCNC: 8.8 G/DL (ref 11.7–15.7)
LACTATE BLD-SCNC: 1.2 MMOL/L (ref 0.7–2)
LDH SERPL L TO P-CCNC: 208 U/L (ref 81–234)
LYMPHOCYTES # BLD AUTO: 0.3 10E9/L (ref 0.8–5.3)
LYMPHOCYTES NFR BLD AUTO: 24.5 %
MCH RBC QN AUTO: 29.7 PG (ref 26.5–33)
MCHC RBC AUTO-ENTMCNC: 31.7 G/DL (ref 31.5–36.5)
MCV RBC AUTO: 94 FL (ref 78–100)
METAMYELOCYTES # BLD: 0 10E9/L
METAMYELOCYTES NFR BLD MANUAL: 1 %
MONOCYTES # BLD AUTO: 0.4 10E9/L (ref 0–1.3)
MONOCYTES NFR BLD AUTO: 26.5 %
MYELOCYTES # BLD: 0 10E9/L
MYELOCYTES NFR BLD MANUAL: 1 %
NEUTROPHILS # BLD AUTO: 0.6 10E9/L (ref 1.6–8.3)
NEUTROPHILS NFR BLD AUTO: 39.9 %
NRBC # BLD AUTO: 0 10*3/UL
NRBC BLD AUTO-RTO: 1 /100
PHOSPHATE SERPL-MCNC: 2.7 MG/DL (ref 2.5–4.5)
PLATELET # BLD AUTO: 144 10E9/L (ref 150–450)
PLATELET # BLD EST: ABNORMAL 10*3/UL
POTASSIUM SERPL-SCNC: 3.7 MMOL/L (ref 3.4–5.3)
PROMYELOCYTES # BLD MANUAL: 0 10E9/L
PROMYELOCYTES NFR BLD MANUAL: 1 %
RBC # BLD AUTO: 2.96 10E12/L (ref 3.8–5.2)
RBC MORPH BLD: NORMAL
SODIUM SERPL-SCNC: 137 MMOL/L (ref 133–144)
URATE SERPL-MCNC: 0.8 MG/DL (ref 2.6–6)
WBC # BLD AUTO: 1.4 10E9/L (ref 4–11)

## 2020-03-03 PROCEDURE — 25000132 ZZH RX MED GY IP 250 OP 250 PS 637: Performed by: PHYSICIAN ASSISTANT

## 2020-03-03 PROCEDURE — 27210429 ZZH NUTRITION PRODUCT INTERMEDIATE LITER

## 2020-03-03 PROCEDURE — 25000128 H RX IP 250 OP 636: Performed by: STUDENT IN AN ORGANIZED HEALTH CARE EDUCATION/TRAINING PROGRAM

## 2020-03-03 PROCEDURE — 80048 BASIC METABOLIC PNL TOTAL CA: CPT | Performed by: INTERNAL MEDICINE

## 2020-03-03 PROCEDURE — 84100 ASSAY OF PHOSPHORUS: CPT | Performed by: INTERNAL MEDICINE

## 2020-03-03 PROCEDURE — 87305 ASPERGILLUS AG IA: CPT | Performed by: PHYSICIAN ASSISTANT

## 2020-03-03 PROCEDURE — 84550 ASSAY OF BLOOD/URIC ACID: CPT | Performed by: INTERNAL MEDICINE

## 2020-03-03 PROCEDURE — 97530 THERAPEUTIC ACTIVITIES: CPT | Mod: GP | Performed by: REHABILITATION PRACTITIONER

## 2020-03-03 PROCEDURE — 87449 NOS EACH ORGANISM AG IA: CPT | Performed by: PHYSICIAN ASSISTANT

## 2020-03-03 PROCEDURE — 97535 SELF CARE MNGMENT TRAINING: CPT | Mod: GO

## 2020-03-03 PROCEDURE — 25000128 H RX IP 250 OP 636: Performed by: PHYSICIAN ASSISTANT

## 2020-03-03 PROCEDURE — 99233 SBSQ HOSP IP/OBS HIGH 50: CPT | Performed by: INTERNAL MEDICINE

## 2020-03-03 PROCEDURE — 25800030 ZZH RX IP 258 OP 636: Performed by: NURSE PRACTITIONER

## 2020-03-03 PROCEDURE — 00000146 ZZHCL STATISTIC GLUCOSE BY METER IP

## 2020-03-03 PROCEDURE — 25000132 ZZH RX MED GY IP 250 OP 250 PS 637: Performed by: STUDENT IN AN ORGANIZED HEALTH CARE EDUCATION/TRAINING PROGRAM

## 2020-03-03 PROCEDURE — 83605 ASSAY OF LACTIC ACID: CPT | Performed by: INTERNAL MEDICINE

## 2020-03-03 PROCEDURE — 83615 LACTATE (LD) (LDH) ENZYME: CPT | Performed by: INTERNAL MEDICINE

## 2020-03-03 PROCEDURE — 36415 COLL VENOUS BLD VENIPUNCTURE: CPT | Performed by: PHYSICIAN ASSISTANT

## 2020-03-03 PROCEDURE — 12000012 ZZH R&B MS OVERFLOW UMMC

## 2020-03-03 PROCEDURE — 36415 COLL VENOUS BLD VENIPUNCTURE: CPT | Performed by: INTERNAL MEDICINE

## 2020-03-03 PROCEDURE — 97110 THERAPEUTIC EXERCISES: CPT | Mod: GP | Performed by: REHABILITATION PRACTITIONER

## 2020-03-03 PROCEDURE — 85025 COMPLETE CBC W/AUTO DIFF WBC: CPT | Performed by: INTERNAL MEDICINE

## 2020-03-03 RX ORDER — BENZONATATE 100 MG/1
100-200 CAPSULE ORAL 3 TIMES DAILY PRN
Status: DISCONTINUED | OUTPATIENT
Start: 2020-03-03 | End: 2020-03-06 | Stop reason: HOSPADM

## 2020-03-03 RX ADMIN — FILGRASTIM 400 MCG: 480 INJECTION, SOLUTION INTRAVENOUS; SUBCUTANEOUS at 19:45

## 2020-03-03 RX ADMIN — GUAIFENESIN AND CODEINE PHOSPHATE 5 ML: 10; 100 LIQUID ORAL at 02:48

## 2020-03-03 RX ADMIN — PIPERACILLIN AND TAZOBACTAM 4.5 G: 4; .5 INJECTION, POWDER, FOR SOLUTION INTRAVENOUS at 22:04

## 2020-03-03 RX ADMIN — LORATADINE 10 MG: 10 TABLET ORAL at 08:25

## 2020-03-03 RX ADMIN — NYSTATIN 500000 UNITS: 100000 SUSPENSION ORAL at 09:35

## 2020-03-03 RX ADMIN — ACYCLOVIR 400 MG: 200 CAPSULE ORAL at 08:25

## 2020-03-03 RX ADMIN — ACYCLOVIR 400 MG: 200 CAPSULE ORAL at 19:45

## 2020-03-03 RX ADMIN — NYSTATIN 500000 UNITS: 100000 SUSPENSION ORAL at 12:13

## 2020-03-03 RX ADMIN — GUAIFENESIN AND CODEINE PHOSPHATE 5 ML: 10; 100 LIQUID ORAL at 08:25

## 2020-03-03 RX ADMIN — ENOXAPARIN SODIUM 40 MG: 40 INJECTION SUBCUTANEOUS at 09:35

## 2020-03-03 RX ADMIN — FLUCONAZOLE 200 MG: 40 POWDER, FOR SUSPENSION ORAL at 08:25

## 2020-03-03 RX ADMIN — GUAIFENESIN AND CODEINE PHOSPHATE 5 ML: 10; 100 LIQUID ORAL at 22:04

## 2020-03-03 RX ADMIN — PIPERACILLIN AND TAZOBACTAM 4.5 G: 4; .5 INJECTION, POWDER, FOR SOLUTION INTRAVENOUS at 04:05

## 2020-03-03 RX ADMIN — PIPERACILLIN AND TAZOBACTAM 4.5 G: 4; .5 INJECTION, POWDER, FOR SOLUTION INTRAVENOUS at 10:31

## 2020-03-03 RX ADMIN — PIPERACILLIN AND TAZOBACTAM 4.5 G: 4; .5 INJECTION, POWDER, FOR SOLUTION INTRAVENOUS at 16:10

## 2020-03-03 RX ADMIN — ACETAMINOPHEN 650 MG: 325 TABLET, FILM COATED ORAL at 17:00

## 2020-03-03 RX ADMIN — GUAIFENESIN AND CODEINE PHOSPHATE 5 ML: 10; 100 LIQUID ORAL at 16:10

## 2020-03-03 RX ADMIN — SODIUM CHLORIDE: 9 INJECTION, SOLUTION INTRAVENOUS at 11:17

## 2020-03-03 RX ADMIN — ALLOPURINOL 300 MG: 300 TABLET ORAL at 08:25

## 2020-03-03 RX ADMIN — GUAIFENESIN AND CODEINE PHOSPHATE 5 ML: 10; 100 LIQUID ORAL at 12:13

## 2020-03-03 ASSESSMENT — ACTIVITIES OF DAILY LIVING (ADL)
ADLS_ACUITY_SCORE: 15

## 2020-03-03 ASSESSMENT — MIFFLIN-ST. JEOR: SCORE: 1320.31

## 2020-03-03 NOTE — PLAN OF CARE
Afebrile, Tmax 100.1. Vital signs stable on room air. Blood sugars between 91 and 127 overnight, no insulin given per parameters. Triggered sepsis protocol, lactic 1.2. Pt reports still having difficulty swallowing. Tube feeding running at 55ml/hr and tolerating well. Continues having frequent productive cough, PRN robitussin given x1. Continue with plan of care.      Problem: Adult Inpatient Plan of Care  Goal: Plan of Care Review  3/3/2020 0612 by Alaina Nguyen RN  Outcome: No Change     Problem: Adult Inpatient Plan of Care  Goal: Patient-Specific Goal (Individualization)  Outcome: No Change     Problem: Adult Inpatient Plan of Care  Goal: Absence of Hospital-Acquired Illness or Injury  Outcome: No Change     Problem: Adult Inpatient Plan of Care  Goal: Readiness for Transition of Care  Outcome: No Change

## 2020-03-03 NOTE — PLAN OF CARE
T-max: 100.6, provider notified, tylenol administered through PEG tube. Pt denies chills, body aches. Tachy in the low 100s, OVSS on RA. Pt has JAMES and a frequent productive cough, robitussin given q 4 hrs. Pt denies pain, diarrhea, N/V. Tube feeds continuously infusing. Oral intake is poor, despite encouragement. Pt has anxiety around trying food/liquids due to difficulty and pain with swallowing. Pt prefers thickened liquids. Pt prefers all meds through her PEG tube. BG checks q 4 hrs, all have been WNL. Pt is up BSA with walker. Discharge to ARU in near future.     Problem: Adult Inpatient Plan of Care  Goal: Plan of Care Review  3/3/2020 1739 by Danette Lowe RN  Outcome: No Change     Problem: Adult Inpatient Plan of Care  Goal: Patient-Specific Goal (Individualization)  3/3/2020 1739 by Danette Lowe, RN  Outcome: No Change     Problem: Adult Inpatient Plan of Care  Goal: Readiness for Transition of Care  3/3/2020 1739 by Danette Lowe, RN  Outcome: No Change     Problem: Pain Acute  Goal: Optimal Pain Control  3/3/2020 1739 by Danette Lowe, RN  Outcome: No Change

## 2020-03-03 NOTE — PLAN OF CARE
Discharge Planner PT   Patient plan for discharge: rehab.   Current status: pt is progressing well, pt needing SBA to IND for all bed mobility and sit to stand to WW for standing support. Pt demo standing TE program x 8 with UE support. Pt demo standing balance drill with CGA to SBA. Pt limited today by weakness and fatigue.   Barriers to return to prior living situation: fatigue   Recommendations for discharge: PT - per plan established by the Physical Therapist, according to functional mobility the  discharge recommendation is ARU   Rationale for recommendations: pt is progressing well, progressing to all functional goals, pt able to tolerate 3Hr of therapies for ARU        Entered by: Landry Salmon 03/03/2020 11:32 AM

## 2020-03-03 NOTE — PROGRESS NOTES
[facility] [place]  Hematology / Oncology Transfer Accept Note    Date of Admission: 2/17/2020  Date of Service (when I saw the patient): 03/03/2020     Assessment & Plan   Chuyita Porter is a 69 year old female with history of breast cancer and newly diagnosed mediastinal mass, pathology of which is consistent with DLBCL. She was admitted for planned chemoradiation with elective ET tube for threatened airway and PEG tube placement for enteral access.  She was admitted to ICU post procedures on 2/17, underwent radiation x 2 fractions on 2/18 and 2/19, then initiated chemotherapy with RCHOP (D1=2/21/20). ET tube was removed on 2/26. She transferred to the Heme Malignancy service on 2/27. She had a PET scan 2/28 showing interval decrease upper mediastinal mass. Complicated by neutropenic fevers of unknown origin, possible HCAP. Once she remains afebrile she will discharge to ARU.      Plan today:  - continue Zosyn to target possible HCAP given neutropenic fever. If afebrile >48hrs by tomorrow, will plan to de-escalate Zosyn and complete course of PO antibiotics.   - appreciate SLP following. Continue reg diet with thin liquids. Pt still hesitant about oral intake and has throat pain with swallowing. As PO intake does advance, reassess need for TFs/PEG tube. Considered starting calorie counts but suspect intake will still be minimal currently and anticipate that pt could discharge before 72 hrs.  - stop IVFs in anticipation of eventual discharge to ARU. Her weight is downtrending to near baseline (was weight up) and she continues nutritional support with TFs. Now afebrile and other VS stable.   - awaiting Palliative Care consult  - Counts appear to be starting to recover. Continue neupogen today   - appreciate SW following for ARU placement. May be medically ready as early as tomorrow if remains afebrile over next 24hrs.       # Febrile neutropenia, suspected HCAP. Fever curve  downtrending.   Fever 101.2 on 2/28 PM. Defervescing since then. Last fever was 100.6 on 3/2 AM, afebrile since then. Ongoing throat pain, cough, wheezing. No other localizing sx. Suspect HCAP given recent mechanical ventilation and PET scan showing bibasilar atelectasis and metabolic uptake. Also possible aspiration. CXR (3/2) without acute airspace disease.   - Reviewed PET for any e/o lung infection. Note bibasilar atelectasis and metabolic uptake that could be inflammation/infection/less likely malignancy.   - Infectious w/u: UA wnl; BC neg to date; RVP PCR negative; sputum cx unable to obtain sample; stool cx unable to obtain sample (cancelled c.diff order).   - s/p Vancomycin + Cefepime (2/29-3/2)  - Switched to Zosyn (x 3/2), continue. If remains afebrile for next 24hrs, will plan to de-escalate on 3/3.   - Albuterol, cough medicine PRN for sx management. Added tessalon TID.     # Extranodal Diffuse large B-cell lymphoma, non-germinal center type  Will follow with Dr. Sprague  On 2/14/20, CT chest showed 8.0 x 6.7 x 10.0 cm mass centered in the right anterior mediastinum with mass effect within the mediastinum, including compression of the SVC with associated collateral vessels seen in the neck. No mediastinal LN involvement. Planned endotracheal biopsies and placement of ET tube, along with PEG tube placement, done on 2/17. No stent was placed due to technical complications per Interventional Pulmonology note. Biopsy confirmed DLBCL. She underwent two fractions of radiotherapy on 2/18 and 2/19. Initiated R-CHOP chemotherapy on 2/21. Repeat CT Chest imaging on 2/25 demonstrated decreased size of mass.   - 2/19 CT A/P done to help with staging (wanted PET/CT but unable to get with pt on ventilator). PET/CT completed 2/28, shows interval decrease upper mediastinal mass.   - have requested outpatient [facility] f/u with Dr. Sprague close to anticipated start of next cycle  - no e/o TLS, continue  Allopurinol for 14 days (2/21-3/5)  - ANC <1.0, continue Neupogen subcutaneous daily. Would plan to continue until ANC >1.0 for 2 consecutive days.    # Chemotherapy induced pancytopenia. WBC uptrended today.  - Transfuse to maintain hgb >7 and plt >10K     # History of right breast cancer in 2005 and left breast cancer in 2015  - currently holding patient's home anastrozole for now due to increased VTE risk. She follows with Dr. Gallardo. As per last clinic note, Dr. Gallardo had recommended anastrozole as prophylaxis and to continue until 2020. Can likely discontinue this medication at discharge and have pt follow-up with Dr. Gallardo.     # ID PPx  - ppx ACV  - started Fluconazole 200mg daily, Levaquin 250mg daily inpatient due to neutropenia. Levaquin on hold with b/s IV abx. Continue Fluconazole until ANC >1.0.     # Anxiety  # N/V  - encouraged to try ativan PRN  - additional PRN antiemetics available  - Palliative Care consulted, awaiting assessment     # Nutrition  Had PEG tube placed by Thoracic surgery team on 2/17. Per outpatient ENT note, goal of PEG was empiric should she have dysphagia from originally planned stent placement or with planned therapy. NO stent was placed. PEG tube was utilized while pt was intubated and continues to be utilized for TFs as we slowly advance her diet as able.   - Nutrition consulted for tube feeds  - appreciate SLP following. Continue to trial diet advancement per SLP ongoing recs.   - VFSS 3/2, OK for regular diet with thin liquid. When better able to assess adequate PO intake, can reassess need for TFs/PEG tube.      FEN  - discontinue mIVFs today in anticipation of discharge; bolus PRN  - lyte repletion per protocol  - nutrition as above    PPx  - VTE: Lovenox 40mg subcutaneous daily (hold if Plt count falls <50K)  - GI: s/p Protonix (now off steroids)  - PT/OT    Dispo: Current recommendation from therapy teams is ARU. PM&R consulted. Patient not medically ready for  "discharge, may be ready tomorrow at earliest if remains afebrile.   - unit SW aware  - at ARU, can continue to work on diet advancement and determine need for ongoing TFs/PEG tube  - f/u scheduled in [facility]: next appt prior to anticipated C2 RCHOP is scheduled for 3/13  - per d/w Reginald MOSS should NOT disqualify pt from ARU. Ok to place order for this on discharge med rec if still needed (rather than having to dispense here for pt to take over to ARU).      Genesis Mao PA-C  Hematology/Oncology  [phone]      Interval History   No acute events overnight, fever curve downtrended and has remained afebrile since last Tmax 100.6 on 3/2 AM. She had shower today so is feeling good after that. Continues with cough but is able to spit out sputum rather than having to suction. Still audibly wheezing but denies any stridor or difficulty breathing. Describes throat pain with swallowing and reports that she still has been utilizing the feeding tube for her medications. Denies nausea. Had a BM yesterday which she states was \"normal\". Thinks anxiety was a little better last night compared to the night prior. Awaiting Palliative Care visit.     We talked about trajectory of her blood counts and what we monitor/expect after chemotherapy.      Physical Exam   Temp: 97.4  F (36.3  C) Temp src: Axillary BP: 133/76 Pulse: 93 Heart Rate: 103 Resp: 18 SpO2: 95 % O2 Device: None (Room air)    Vitals:    03/01/20 0825 03/02/20 0949 03/03/20 0907   Weight: 78.6 kg (173 lb 3.2 oz) 78.2 kg (172 lb 8 oz) 76.9 kg (169 lb 7 oz)     Vital Signs with Ranges  Temp:  [97.4  F (36.3  C)-100.1  F (37.8  C)] 97.4  F (36.3  C)  Pulse:  [] 93  Heart Rate:  [] 103  Resp:  [16-18] 18  BP: (112-140)/(74-81) 133/76  SpO2:  [95 %-100 %] 95 %  I/O last 3 completed shifts:  In: 3305 [P.O.:120; I.V.:1770; NG/GT:150]  Out: 1450 [Urine:1450]    Constitutional: Pleasant woman seen sitting up in bed in NAD. Alert and interactive.   HEENT: NCAT. " Anicteric sclera. MMM.  Respiratory: Non labored breathing on room air. Breath sounds diminished b/l bases with crackles appreciated in R>L base. Audible wheezing in anterior upper airway, noted with dry, weak sounding cough.   Cardiovascular: Mildly tachycardic but regular rhythm. No murmur or rub.  GI: Normoactive BS. Abd soft, non distended, non tender.  Skin: Pale, warm, dry.   Musculoskeletal: Moves all extremities independently. Grossly normal. Trace to mild distal LE edema b/l.   Neurologic: Alert, oriented, speech normal. No focal deficits.   Neuropsychiatric: Soft voice. Calm, normal eye contact, affect congruent.       Medications     dextrose Stopped (02/26/20 1600)     - MEDICATION INSTRUCTIONS -       sodium chloride 75 mL/hr at 03/03/20 1117       acyclovir  400 mg Oral or Feeding Tube BID     allopurinol  300 mg Oral or Feeding Tube Daily     benzonatate  100-200 mg Oral TID     dextrose 5% water  10-20 mL Intracatheter Daily at 8 pm     dextrose 5% water  10-20 mL Intracatheter Daily at 8 pm     enoxaparin ANTICOAGULANT  40 mg Subcutaneous Q24H     filgrastim (NEUPOGEN/GRANIX) subcutaneous  5 mcg/kg (Dosing Weight) Subcutaneous Daily at 8 pm     fluconazole  200 mg Oral or Feeding Tube Daily     insulin aspart  1-4 Units Subcutaneous Q4H     loratadine  10 mg Oral or Feeding Tube Daily     nystatin  500,000 Units Oral 4x Daily     piperacillin-tazobactam  4.5 g Intravenous Q6H     sodium chloride (PF)  3 mL Intracatheter Q8H       Data   Results for orders placed or performed during the hospital encounter of 02/17/20 (from the past 24 hour(s))   XR Chest 2 Views    Narrative    EXAM: XR CHEST 2 VW  3/2/2020 2:13 PM     HISTORY:  Concern for aspiration pneumonia in 69year old with DLBCL       COMPARISON:  Chest x-ray 2/27/2020    FINDINGS:   PA and lateral views of the chest. The trachea is midline. Cardiac  silhouette is within normal limits. Improved aeration of the lungs  without focal airspace  opacity. Flattening of the hemidiaphragm seen  on the lateral view. No pleural effusion or pneumothorax. Scoliotic  curvature of the thoracic spine.      Impression    IMPRESSION:   Improved aeration of the lungs without evidence of acute airspace  disease.    I have personally reviewed the examination and initial interpretation  and I agree with the findings.    YULY MCKEON MD   XR Video Swallow with SLP or OT    Narrative    Examination:  Modified Barium Swallow Study with Speech Pathology,  3/2/2020    Comparison: None available    History: Diffuse large B-cell lymphoma status post chemoradiation with  concern for dysphagia.    Fluoroscopy time: 1.9 minute(s).    Findings: Under fluoroscopic guidance, the patient was given orally  administered barium of varying consistencies in the presence of the  speech pathology service.     Piecemeal swallows across consistencies. There is no delay in  swallowing. Deep penetration without aspiration with thin liquids,  with no penetration or aspiration during chin tuck maneuver. No  penetration or aspiration with pudding or combination pudding and  cookie consistencies. Mild vallecular residue with thicker  consistencies.      Impression    Impression: No aspiration with thin liquid, pudding, or combination  pudding and cookie consistencies.     Please see the speech pathologist report for further details.      I have personally reviewed the examination and initial interpretation  and I agree with the findings.    TEE AMARAL MD   Lactic acid level STAT   Result Value Ref Range    Lactate for Sepsis Protocol 1.2 0.7 - 2.0 mmol/L   CBC with platelets differential   Result Value Ref Range    WBC 1.4 (L) 4.0 - 11.0 10e9/L    RBC Count 2.96 (L) 3.8 - 5.2 10e12/L    Hemoglobin 8.8 (L) 11.7 - 15.7 g/dL    Hematocrit 27.8 (L) 35.0 - 47.0 %    MCV 94 78 - 100 fl    MCH 29.7 26.5 - 33.0 pg    MCHC 31.7 31.5 - 36.5 g/dL    RDW 13.8 10.0 - 15.0 %    Platelet Count 144 (L) 150 - 450  10e9/L    Diff Method Manual Differential     % Neutrophils 39.9 %    % Lymphocytes 24.5 %    % Monocytes 26.5 %    % Eosinophils 5.1 %    % Basophils 1.0 %    % Metamyelocytes 1.0 %    % Myelocytes 1.0 %    % Promyelocytes 1.0 %    Nucleated RBCs 1 (H) 0 /100    Absolute Neutrophil 0.6 (L) 1.6 - 8.3 10e9/L    Absolute Lymphocytes 0.3 (L) 0.8 - 5.3 10e9/L    Absolute Monocytes 0.4 0.0 - 1.3 10e9/L    Absolute Eosinophils 0.1 0.0 - 0.7 10e9/L    Absolute Basophils 0.0 0.0 - 0.2 10e9/L    Absolute Metamyelocytes 0.0 0 10e9/L    Absolute Myelocytes 0.0 0 10e9/L    Absolute Promyeloctyes 0.0 0 10e9/L    Absolute Nucleated RBC 0.0     RBC Morphology Normal     Platelet Estimate Confirming automated cell count    Basic metabolic panel   Result Value Ref Range    Sodium 137 133 - 144 mmol/L    Potassium 3.7 3.4 - 5.3 mmol/L    Chloride 105 94 - 109 mmol/L    Carbon Dioxide 27 20 - 32 mmol/L    Anion Gap 4 3 - 14 mmol/L    Glucose 127 (H) 70 - 99 mg/dL    Urea Nitrogen 10 7 - 30 mg/dL    Creatinine 0.55 0.52 - 1.04 mg/dL    GFR Estimate >90 >60 mL/min/[1.73_m2]    GFR Estimate If Black >90 >60 mL/min/[1.73_m2]    Calcium 8.3 (L) 8.5 - 10.1 mg/dL   Lactate Dehydrogenase   Result Value Ref Range    Lactate Dehydrogenase 208 81 - 234 U/L   Phosphorus   Result Value Ref Range    Phosphorus 2.7 2.5 - 4.5 mg/dL   Uric acid   Result Value Ref Range    Uric Acid 0.8 (L) 2.6 - 6.0 mg/dL     I have seen, interviewed, and examined the patient independently.  I have reviewed the vital signs and labs.  This note reflects my assessment and plan.    Feels well, but not advancing oral intake because she is worried. Video study done yesterday, no abnormalities, SLP approved for regular diet. I encouraged her to try to eat carefully to improve her swallowing    Afebrile now, CXR shows improvement (not evidence of aspiration)   Will ask pall care to see for anxiety    Maria Del Carmen Chaudhari MD/PhD

## 2020-03-03 NOTE — PLAN OF CARE
Discharge Planner OT   Patient plan for discharge: ARU  Current status: Pt performed bed mobility of rolling, supine<>sit, and sit<>stand with SBA and increased time to complete 2/2 fatigue. Completed toilet and shower chair transfer with SBA using FWW and grab bars. Pt required Patrick for juan antonio cares, able to don/doff gown and socks with mod-I using figure four technique while sitting. Pt required increased time to complete shower task with frequent rest breaks 2/2 fatigue, able to wash UB body IND'ly, mod-I to wash LB body. Pt ambulated to and from bathroom with FWW and SBA.   Barriers to return to prior living situation:  Medical, deconditioning, fatigue, activity tolerance, anxiety  Recommendations for discharge: ARU  Rationale for recommendations: Pt motivated to return to IND PLOF. Will benefit from intensive multidisciplinary therapy (PT, OT) to increase strength and endurance needed for ADLs and functional mobility. Anticipate pt able to participate in 3 hours therapy/day.        Entered by: Taryn Miller 03/03/2020 9:21 AM

## 2020-03-03 NOTE — PLAN OF CARE
Patient vital signs stable, no new complaints. No anxiety today, she denies pain. She is tolerating her tube feeds. She prefers the thickened liquids and did not want to attempt food this evening.  at bedside most of the day. Continue to monitor.

## 2020-03-04 ENCOUNTER — APPOINTMENT (OUTPATIENT)
Dept: SPEECH THERAPY | Facility: CLINIC | Age: 70
DRG: 840 | End: 2020-03-04
Attending: OTOLARYNGOLOGY
Payer: COMMERCIAL

## 2020-03-04 ENCOUNTER — APPOINTMENT (OUTPATIENT)
Dept: PHYSICAL THERAPY | Facility: CLINIC | Age: 70
DRG: 840 | End: 2020-03-04
Attending: OTOLARYNGOLOGY
Payer: COMMERCIAL

## 2020-03-04 ENCOUNTER — APPOINTMENT (OUTPATIENT)
Dept: OCCUPATIONAL THERAPY | Facility: CLINIC | Age: 70
DRG: 840 | End: 2020-03-04
Attending: OTOLARYNGOLOGY
Payer: COMMERCIAL

## 2020-03-04 LAB
1,3 BETA GLUCAN SER-MCNC: <31 PG/ML
ANION GAP SERPL CALCULATED.3IONS-SCNC: 4 MMOL/L (ref 3–14)
B-D GLUCAN INTERPRETATION (1,3): NEGATIVE
BASOPHILS # BLD AUTO: 0 10E9/L (ref 0–0.2)
BASOPHILS NFR BLD AUTO: 0 %
BUN SERPL-MCNC: 12 MG/DL (ref 7–30)
CALCIUM SERPL-MCNC: 8.5 MG/DL (ref 8.5–10.1)
CHLORIDE SERPL-SCNC: 105 MMOL/L (ref 94–109)
CO2 SERPL-SCNC: 28 MMOL/L (ref 20–32)
COPATH REPORT: NORMAL
CREAT SERPL-MCNC: 0.62 MG/DL (ref 0.52–1.04)
DIFFERENTIAL METHOD BLD: ABNORMAL
EOSINOPHIL # BLD AUTO: 0 10E9/L (ref 0–0.7)
EOSINOPHIL NFR BLD AUTO: 0 %
ERYTHROCYTE [DISTWIDTH] IN BLOOD BY AUTOMATED COUNT: 14.2 % (ref 10–15)
GALACTOMANNAN AG SERPL QL IA: NEGATIVE
GALACTOMANNAN AG SERPL-ACNC: 0.04
GFR SERPL CREATININE-BSD FRML MDRD: >90 ML/MIN/{1.73_M2}
GLUCOSE BLDC GLUCOMTR-MCNC: 118 MG/DL (ref 70–99)
GLUCOSE SERPL-MCNC: 119 MG/DL (ref 70–99)
HCT VFR BLD AUTO: 28.9 % (ref 35–47)
HGB BLD-MCNC: 9.2 G/DL (ref 11.7–15.7)
LYMPHOCYTES # BLD AUTO: 0.7 10E9/L (ref 0.8–5.3)
LYMPHOCYTES NFR BLD AUTO: 7.3 %
MAGNESIUM SERPL-MCNC: 2.2 MG/DL (ref 1.6–2.3)
MCH RBC QN AUTO: 30.5 PG (ref 26.5–33)
MCHC RBC AUTO-ENTMCNC: 31.8 G/DL (ref 31.5–36.5)
MCV RBC AUTO: 96 FL (ref 78–100)
METAMYELOCYTES # BLD: 0.6 10E9/L
METAMYELOCYTES NFR BLD MANUAL: 6.4 %
MONOCYTES # BLD AUTO: 0.9 10E9/L (ref 0–1.3)
MONOCYTES NFR BLD AUTO: 9.1 %
MYELOCYTES # BLD: 0.3 10E9/L
MYELOCYTES NFR BLD MANUAL: 3.6 %
NEUTROPHILS # BLD AUTO: 6.9 10E9/L (ref 1.6–8.3)
NEUTROPHILS NFR BLD AUTO: 73.6 %
NRBC # BLD AUTO: 0.2 10*3/UL
NRBC BLD AUTO-RTO: 2 /100
PHOSPHATE SERPL-MCNC: 3.1 MG/DL (ref 2.5–4.5)
PLATELET # BLD AUTO: 167 10E9/L (ref 150–450)
POTASSIUM SERPL-SCNC: 3.6 MMOL/L (ref 3.4–5.3)
RBC # BLD AUTO: 3.02 10E12/L (ref 3.8–5.2)
RBC MORPH BLD: NORMAL
SODIUM SERPL-SCNC: 136 MMOL/L (ref 133–144)
WBC # BLD AUTO: 9.4 10E9/L (ref 4–11)

## 2020-03-04 PROCEDURE — 00000146 ZZHCL STATISTIC GLUCOSE BY METER IP

## 2020-03-04 PROCEDURE — 25000132 ZZH RX MED GY IP 250 OP 250 PS 637: Performed by: STUDENT IN AN ORGANIZED HEALTH CARE EDUCATION/TRAINING PROGRAM

## 2020-03-04 PROCEDURE — 12000012 ZZH R&B MS OVERFLOW UMMC

## 2020-03-04 PROCEDURE — 27210429 ZZH NUTRITION PRODUCT INTERMEDIATE LITER

## 2020-03-04 PROCEDURE — 97110 THERAPEUTIC EXERCISES: CPT | Mod: GO

## 2020-03-04 PROCEDURE — 80048 BASIC METABOLIC PNL TOTAL CA: CPT | Performed by: INTERNAL MEDICINE

## 2020-03-04 PROCEDURE — 25000132 ZZH RX MED GY IP 250 OP 250 PS 637: Performed by: PHYSICIAN ASSISTANT

## 2020-03-04 PROCEDURE — 97535 SELF CARE MNGMENT TRAINING: CPT | Mod: GO

## 2020-03-04 PROCEDURE — 25000128 H RX IP 250 OP 636: Performed by: PHYSICIAN ASSISTANT

## 2020-03-04 PROCEDURE — 92526 ORAL FUNCTION THERAPY: CPT | Mod: GN

## 2020-03-04 PROCEDURE — 36415 COLL VENOUS BLD VENIPUNCTURE: CPT | Performed by: INTERNAL MEDICINE

## 2020-03-04 PROCEDURE — 97116 GAIT TRAINING THERAPY: CPT | Mod: GP

## 2020-03-04 PROCEDURE — 97530 THERAPEUTIC ACTIVITIES: CPT | Mod: GP

## 2020-03-04 PROCEDURE — 25000128 H RX IP 250 OP 636: Performed by: STUDENT IN AN ORGANIZED HEALTH CARE EDUCATION/TRAINING PROGRAM

## 2020-03-04 PROCEDURE — 85025 COMPLETE CBC W/AUTO DIFF WBC: CPT | Performed by: INTERNAL MEDICINE

## 2020-03-04 PROCEDURE — 84100 ASSAY OF PHOSPHORUS: CPT | Performed by: INTERNAL MEDICINE

## 2020-03-04 PROCEDURE — 83735 ASSAY OF MAGNESIUM: CPT | Performed by: INTERNAL MEDICINE

## 2020-03-04 PROCEDURE — 82962 GLUCOSE BLOOD TEST: CPT

## 2020-03-04 RX ORDER — LEVOFLOXACIN 750 MG/1
750 TABLET, FILM COATED ORAL DAILY
Status: DISCONTINUED | OUTPATIENT
Start: 2020-03-04 | End: 2020-03-06 | Stop reason: HOSPADM

## 2020-03-04 RX ADMIN — GUAIFENESIN AND CODEINE PHOSPHATE 5 ML: 10; 100 LIQUID ORAL at 02:27

## 2020-03-04 RX ADMIN — NYSTATIN 500000 UNITS: 100000 SUSPENSION ORAL at 20:03

## 2020-03-04 RX ADMIN — GUAIFENESIN AND CODEINE PHOSPHATE 5 ML: 10; 100 LIQUID ORAL at 20:41

## 2020-03-04 RX ADMIN — GUAIFENESIN AND CODEINE PHOSPHATE 5 ML: 10; 100 LIQUID ORAL at 07:55

## 2020-03-04 RX ADMIN — ALLOPURINOL 300 MG: 300 TABLET ORAL at 07:48

## 2020-03-04 RX ADMIN — ACYCLOVIR 400 MG: 200 CAPSULE ORAL at 07:48

## 2020-03-04 RX ADMIN — NYSTATIN 500000 UNITS: 100000 SUSPENSION ORAL at 12:32

## 2020-03-04 RX ADMIN — LEVOFLOXACIN 750 MG: 750 TABLET, FILM COATED ORAL at 12:36

## 2020-03-04 RX ADMIN — ENOXAPARIN SODIUM 40 MG: 40 INJECTION SUBCUTANEOUS at 07:48

## 2020-03-04 RX ADMIN — LORATADINE 10 MG: 10 TABLET ORAL at 07:48

## 2020-03-04 RX ADMIN — NYSTATIN 500000 UNITS: 100000 SUSPENSION ORAL at 07:48

## 2020-03-04 RX ADMIN — PIPERACILLIN AND TAZOBACTAM 4.5 G: 4; .5 INJECTION, POWDER, FOR SOLUTION INTRAVENOUS at 04:19

## 2020-03-04 RX ADMIN — PIPERACILLIN AND TAZOBACTAM 4.5 G: 4; .5 INJECTION, POWDER, FOR SOLUTION INTRAVENOUS at 11:12

## 2020-03-04 RX ADMIN — GUAIFENESIN AND CODEINE PHOSPHATE 5 ML: 10; 100 LIQUID ORAL at 12:36

## 2020-03-04 RX ADMIN — NYSTATIN 500000 UNITS: 100000 SUSPENSION ORAL at 16:16

## 2020-03-04 RX ADMIN — GUAIFENESIN AND CODEINE PHOSPHATE 5 ML: 10; 100 LIQUID ORAL at 16:16

## 2020-03-04 RX ADMIN — ACETAMINOPHEN 650 MG: 325 TABLET, FILM COATED ORAL at 14:44

## 2020-03-04 RX ADMIN — FLUCONAZOLE 200 MG: 40 POWDER, FOR SUSPENSION ORAL at 07:48

## 2020-03-04 ASSESSMENT — ACTIVITIES OF DAILY LIVING (ADL)
ADLS_ACUITY_SCORE: 15

## 2020-03-04 ASSESSMENT — MIFFLIN-ST. JEOR: SCORE: 1311.98

## 2020-03-04 NOTE — PROGRESS NOTES
[facility]  Palliative Care Social Work Note:    Patient Info:  Chuyita Porter is a 69-year-old female with a past medical history of breast cancer and a new diagnosis of mediastinal mass which is consistent with DLBCL.     Brief summary of visit:   Palliative Care Social Work met with patient and , Ranjit, at bedside for an extended supportive visit. Chuyita and her  Ranjit are receptive to PCSW presence and are easily engaged in conversation and reflection. Chuyita and Ranjit spend a great deal of the visit reflecting upon her recent diagnosis and treatment, the life they built together and the strength of the relationship, and how their lives have changed since Chuyita s diagnosis. They are mourning the loss of what life looked like prior to diagnosis.      This experience has been emotionally very difficult on Chuyita and Ranjit. Chuyita and Ranjit both noted that they are emotionally very close to one another and play a central role in one another s lives. Therefore, this hospitalization has been very difficult one both Chuyita and Ranjit since they are often not apart from one another very long. PCSW offered emotional support and validation as they described their fears and worries, and the emotional impact this has had. We talked about the different roles that Chuyita and Ranjit are now playing (roles of-  observer  and patient ) and the challenges and difficulties that accompany each role.  Chuyita is motivated to do all she can to this treatment by her desire to  show the granddaughters that there is hope.      Chuyita noted that she has experienced some anxiety throughout this hospitalization and reported that this is a new experience for her. She reports that most of her anxiety occurs at night. She attributes this to being away from Ranjit and having alone time to being with her thoughts. She noted that she uses guided imagery in those moments and that  it is occasionally helpful a helpful way to attend to the anxiety.  Ranjit noted he has also been having difficulty sleeping at night.  Provided psychoeducation on mind-body techniques like the use of guided imagery and the potential benefits. PCSW introduced and guided them through  the special place guided mediation  and provided pt and  with a written script that they can use together during moments of anxiety.     Date of Admission: 2/17/2020    Reason for consult: Patient and family support    Sources of information: Patient  Family member  (Ranjit)    Recommendations & Plan:  Palliative Care Clinical Social Work will continue to follow for patient and family support.     These recommendations have been discussed with patient, family, palliative care team.    Symptoms & Concerns Addressed Today:  Emotional  Family   Grief & loss   Caregiver     Strengths Identified:  Very strong emotional and practical support from family.     Relationships & Support:  Aspects of relationships and support assessed today:    Identified family members:  (Ranjit) 46 years of marriage. Two adult daughters and two granddaughters that live nearby and are supportive.    Professional supports: medical team.     Family coping:  appears to be coping appropriately.    Bereavement Risk concerns: Low to Moderate. However will continue to assess throughout this hospitalization.    Coping, Mental Health & Adjustment to Illness:   Other Adjustment to new diagnosis    Goals, Decision Making & Advance Care Planning:   Prognosis, Goals, and/or Advance Care Planning were assessed today: No  Preferred language: English  Patient's decision making preferences: with input from medical clinicians and loved ones  I have concerns about the patient/family's health literacy today: No  Code status per chart review: full code    Key Palliative Symptom Data:  We are not helping to manage these symptoms currently in this  patient.      Clinical Social Work Interventions:   Assessment of palliative specific issues    Introduction of Palliative clinical social work interventions   Adjustment to illness counseling  Behavioral interventions for symptom management  Facilitation of processing of thoughts/feelings  Family communication facilitated  Grief counseling  Life review facilitation  Psychoeducation  Re-framing    Norma Bowles MSW, LGAJAY  Clinical Social Work Fellow   Palliative Consult Service   [facility] Inpatient Team Consult pager [phone] (M-F 8-4:30)  After-hours Answering Service [phone]

## 2020-03-04 NOTE — INTERIM SUMMARY
[facility] Pre-Admission Screen    Referral Source: [facility] - 5C  Admit date to referring facility: 2/17/20    Rehab Diagnosis:    16 Debility (non-cardiac, non-pulmonary): Diffuse large B-cell lymphoma    Justification for Acute Inpatient Rehabilitation  Chuyita Porter is a 69 year old female with history of breast cancer and newly diagnosed mediastinal mass, pathology of which is consistent with DLBCL. She was admitted for planned chemoradiation with elective ET tube for threatened airway and PEG tube placement for enteral access.  She was admitted to ICU post procedures on 2/17, underwent radiation x 2 fractions on 2/18 and 2/19, then initiated chemotherapy with RCHOP (D1=2/21/20). ET tube was removed on 2/26. She had a PET scan 2/28 showing interval decrease upper mediastinal mass. Hospitalization complicated by neutropenic fevers of likely secondary to HCAP, chemotherapy induced pancytopenia, severe malnutrition, and deconditioning. Patient requires an intensive inpatient rehab program to address the following acute impairments: impaired strength, impaired activity tolerance, pain, impaired balance, deconditioning, decreased functional ADLs, and dysphagia.     Current Active Medical Management Needs/Risks for Clinical Complications  The patient requires the high level of rehabilitation physician supervision that accompanies the provision of intensive rehabilitation therapy.  The patient needs the services of the rehabilitation physician to assess the patient medically and functionally and to modify the course of treatment as needed to maximize the patient's capacity to benefit from the rehabilitation process. Manage respiratory status and infectious process in pt with suspected HCAP - Suspect HCAP given recent mechanical ventilation and PET scan showing bibasilar atelectasis and metabolic uptake. Also possible aspiration (on Levaquin). Manage Extranodal Diffuse large B-cell  lymphoma, non-germinal center type - underwent two fractions of radiotherapy on 2/18 and 2/19. Initiated R-CHOP chemotherapy on 2/21. Repeat CT Chest imaging on 2/25 demonstrated decreased size of mass in the right anterior mediastinum, f/u outpatient [facility] and C2 RCHOP following [facility] stay on 3/13 (this can be pushed back if needed - verified with heme/onc).  Manage labs in pt with chemotherapy induced pancytopenia, monitor WBC/hgb/plts. Transfuse to maintain hgb >7 and plt >10K.  History of right breast cancer in 2005 and left breast cancer in 2015 - pt to follow-up with Dr. Gallardo following [facility] stay. Manage nutritional status in pt w/ severe malnutrition - PEG (placed 2/17). Per outpatient ENT note, goal of PEG was empiric should she have dysphagia from originally planned stent placement or with planned therapy. NO stent was placed. PEG tube was utilized while pt was intubated and continues to be utilized for TFs as diet is slowly advanced - SLP upgraded diet to regular diet/thin liquids with use of chin tuck strategy. Pt at risk for falls w/ injury, VTE (Lovenox), GI prophylaxis (Protonix).     Past Medical/Surgical History  Surgery in the past 100 days: No  Additional relevant past medical history: Breast CA, breast lumpectomy, radiation therapy (2005, 2015), adenoidectomy, tonsillectomy.     Level of Functioning prior to Admission:  Home Environment  Lives with: spouse  Living arrangements: house  Home accessibility: stairs within home  Stairs to negotiate within home: (Multi-level/ split-level home )  Stair railings at home: 1 rail; split level home  Living environment comments:Pt's bedroom is on 2nd level. Bathrooms available on all levels. Pt recently had walk in shower installed.  Equipment currently used at home: none  Activity/exercise/self-care comment: Pt was independent with ADLs prior to admission. Pt walked daily with  (goal=4-6,000 steps).  available prn for support with  self cares.    Ambulation: 0-->independent  Transferrin-->independent  Toiletin-->independent  Bathin-->independent  Dressin-->independent   Eatin-->independent  Communication: 0-->understands/communicates without difficulty  Swallowin-->swallows foods/liquids without difficulty  Cognition: 0 - no cognition issues reported    Current Level of Function grid: GG scale  PT Most Recent Goals for Rehab   Bed Rolling 4 Supervision or touching assitance 6 Independent   Supine to Sit 4 Supervision or touching assitance 6 Independent   Sit to Stand 4 Supervision or touching assitance 6 Independent   Transfer 4 Supervision or touching assitance 6 Independent   Ambulation 4 Supervision or touching assitance 6 Independent   Stairs 4 Supervision or touching assitance 6 Independent     OT Most Recent Goals for Rehab   Feeding 5 Setup or clean-up assistance 6 Independent   Grooming 4 Supervision or touching assitance 6 Independent   Bathing 6 Independent 6 Independent   Upper Body Dressing 4 Supervision or touching assitance 6 Independent   Lower Body Dressing 4 Supervision or touching assitance 6 Independent   Toileting 2 Substantial/maximal assistance 6 Independent   Toilet Transfer 3 Partial/moderate assistance 6 Independent   Tub/Shower Transfer 4 Supervision or touching assitance 6 Independent   Cognitive Cognition has not formally been assessed, appears to be A&Ox4 To be able to safely complete IADLs IND     SLP Most Recent Goals for Rehab   Swallowing Dysphagia resolved/regular/thin liquid diet with chint tuck strategy Tolerates least restrictive diet.       Summary Statement:  Pt requires intensive therapies, medical management, and rehab nursing in order for pt to return to previous level of functioning in the home environment. She performs STS transfers w/ SBA, needs min A for functional tasks in standing. Pt ambulated 200 ft w/ WW, SBA limited by SOB/fatigue. Pt performed 3 stairs w/ B railings  w/ SBA. Pt completes toilet and shower chair transfer with SBA using FWW and grab bars. Pt requires max A for juan antonio cares, able to don/doff gown and socks with mod-I using figure four technique while sitting. Pt requires increased time to complete shower task with frequent rest breaks 2/2 fatigue.Pt diet advanced by SLP to regular diet/thin liquids while implementing chin tuck strategy - requires f/u assessment for diet tolerance by SLP. Pt reports she has been consuming nectar thick liquids because she is hesitant about aspirating.     Expected Therapies & Services required during Inpatient Rehab Admission  Intensity of Therapy: Patient requires intensive therapies not available in a lesser level of care. Patient is motivated, making gains, and can tolerate 3 hours of therapy a day.  Physical Therapy: 75 minutes per day, at least 5 days a week for 6 days  Occupational Therapy: 75 minutes per day, at least 5 days a week for 6 days  Speech and Language Therapy: 30 minutes per day, at least 5 days a week for 6 days  Rehabilitation Nursing Needs: Patient requires 24 hour Rehab Nursing to manage vitals, medication education, carryover of new rehab techniques, care coordination and provide safe environment for patient at risk for falls..    Precautions/restrictions/special needs:   Precautions: fall precautions   Restrictions/Special Needs: none    Expected Level of Improvement: Pt will achieve a level of IND for bed mobility and transfers, and mod IND w/ ambulation, stairs, ADLs.  Pt will also improve her swallowing so she can tolerate a least restrictive diet.   Expected Length of time to achieve: 6 days.    Anticipated Discharge Needs:  Anticipated Discharge Destination: Home  Anticipated Discharge Support: Family member  24/7 support available : Unknown  Identified caregiver(s):  Spouse  Anticipated Discharge Needs: home with outpatient therapy. Pt has f/u w/ oncology on 3/13 for C2 RCHOP, although this can be pushed  back if needed per heme/onc.     Identified challenges/barriers: none.    Liaison Signature:        Physician statement of review and agreement:  I have reviewed and am in agreement of the need for IRF stay to address above functional and medical needs. In addition to above statements address, Patient requires intensive active and ongoing therapeutic intervention and multiple therapies; Patient requires medical supervision; Expected to actively participate in the intensive rehab program; Sufficiently stable to actively participate; Expectation for measurable improvement in functional capacity or adaption to impairments.    PREADMISSION SCREEN COMPLETED BY:    Date:   Time:

## 2020-03-04 NOTE — PLAN OF CARE
Afebrile. Vital signs stable on room air. Denies pain and nausea. Continues to have productive cough, PRN robitussin given x2. Tube feeding at 55ml/hr. Q4h BG checks WNL. Up SBA with walker to bathroom. Continue with plan of care.     Problem: Adult Inpatient Plan of Care  Goal: Plan of Care Review  3/4/2020 0559 by Alaina Nguyen RN  Outcome: No Change     Problem: Adult Inpatient Plan of Care  Goal: Patient-Specific Goal (Individualization)  3/4/2020 0559 by Alaina Nguyen, RN  Outcome: No Change     Problem: Adult Inpatient Plan of Care  Goal: Absence of Hospital-Acquired Illness or Injury  Outcome: No Change     Problem: Adult Inpatient Plan of Care  Goal: Readiness for Transition of Care  3/4/2020 0559 by Alaina Nguyen, RN  Outcome: No Change     Problem: Adult Inpatient Plan of Care  Goal: Rounds/Family Conference  Outcome: No Change

## 2020-03-04 NOTE — PROGRESS NOTES
CLINICAL NUTRITION SERVICES - REASSESSMENT NOTE     Nutrition Prescription    RECOMMENDATIONS FOR MDs/PROVIDERS TO ORDER:  Transitioning to nocturnal cyclic TF to promote po intakes during day time hours. At this time, will plan to meet 75% of needs via TF and monitor po - can adjust TF As needed pending oral intake trends.    If anticipate pt will remain inpatient >72 hours start calorie counts to help trend po intakes and to adjust TF accordingly    Diet adjustments per SLP    Fluids per MD    Malnutrition Status:    Patient does not meet two of the established criteria necessary for diagnosing malnutrition but is at risk for malnutrition    Recommendations already ordered by Registered Dietitian (RD):  Via G-tube:  -OK to stop feeds during the day (3/4).  -Tonight (3/4) at 7PM, start feeds at 60 mL/hr and advance by 10 mL after 2 hours if tolerated then infuse as ordered at goal cyclic infusion  -Goal cyclic infusion: Nutren 1.5 @ 70 mL/hr x 12 hours (7PM-7AM) to provide 1260 kcal (20 kcal/kg), 57 g PRO (0.9 g/kg), 638 mL H2O, 148g CHO, and no fiber daily  -FWF: 60 mL FWF before and after cyclic feeds, additional 60 mL q4h FWF for tube patency    Magic cups PRN    Future/Additional Recommendations:  If calorie counts started - if pt able to eat at least 60% of baseline needs po then recommend to stop TF entirely (~950 kcal, ~45 g PRO)    Pending oral intakes, can increase vs decrease TF as indicated to meet needs until pt with more confidence in swallowing ability/ability to meet needs with enteral support  -adjustment in fluid flushes pending oral intakes  -if to meet 100% of fluid needs via FT, consider starting with 70 mL q2h FWF and trend hydration status/adjust as needed     EVALUATION OF THE PROGRESS TOWARD GOALS   Diet: Regular Diet, thin liquids (starting 3/2 afternoon after swallow study with SLP)    Nutrition Support: 2/18-3/4 on Nutren 1.5 @ 55 mL/hr  --Per I/O records, pt has taken in an average of  919 mL of formula per day over pst 7  Days which is 70% of goal infusion and provides 1379 kcal (22 kcal/kg) and 63 g PRO (1.0 g/kg)   --Per nursing narratives, pt has been tolerating feeds at goal rate. Suspect feeds may have been held for PET scans/swallow  Studies which may have related to decreased TF infusion    Intake: not eating much d/t fear of aspiration, tolerating TF fine         NEW FINDINGS   Visited with pt and significant other by bedside. Pt tolerating TF fine without issue. She is scared to eat and feels like she is having mucous/post-nasal drip type sensation along with cough and is scared to eat with this. They did feel comforted after working with SLP and seeing visually the effects of chin tuck on ability to swallow. Discussed can trial magic cups (dysphagia friendly supplement) however pt declined scheduled regimen at this time.   Weight: admit wt 75.4 kg (2/17) --> 76 kg (3/4) wt stable over admission  Labs: Lytes WNL, 3/2 - Lytes WNL, euglycemia  Meds: nystatin QID for oropharyngeal candidiasis  GI: stooling daily  SLP: (3/2) -- Pt ok for regular diet with thin liquids. Pt to sit upright for all PO intake, use chin tuck strategy, and take small bites/sips. Pt reports preference to continue to receive all medications via PEG.  Enteral Access: 20 Fr LETITIA G-tube (placed 2/17/20)    MALNUTRITION  % Intake: Decreased intake does not meet criteria - TF to meet needs, suspect pt may have had more TF infusions than 70% documented  % Weight Loss: None noted  Subcutaneous Fat Loss: None observed  Muscle Loss: Thoracic region (clavicle, acromium bone, deltoid, trapezius, pectoral):  mild  Fluid Accumulation/Edema: None noted  Malnutrition Diagnosis: Patient does not meet two of the established criteria necessary for diagnosing malnutrition but is at risk for malnutrition    Previous Goals   Total avg nutritional intake to meet a minimum of 25 kcal/kg and 1.2 g PRO/kg daily (per dosing wt  63 kg).  Evaluation: Not met - per I/O records pt with 70% of TF infusion which meets 22 kcal/kg and 1.0g/kg PRO    Previous Nutrition Diagnosis  Inadequate oral intake related to recent extubation and dysphagia as evidenced by SLP diet recommendations for nectar-thick, clear liquid diet and pt's continued reliance on EN to meet 100% minimum nutrition needs at this time.   Evaluation: adjusted per below, no longer appropriate    CURRENT NUTRITION DIAGNOSIS  Inadequate oral intake related to fear of eating, only eating bites at a time as evidenced by pt report and continued reliance on TF to meet needs      INTERVENTIONS  Implementation  Collaboration with other providers - discussion with PA  Enteral Nutrition - adjusted to cyclic feeds  Feeding tube flush - per above  Medical food supplement therapy - magic cups PRN    Goals  Total avg nutritional intake to meet a minimum of 25 kcal/kg and 1.2 g PRO/kg daily (per dosing wt 63 kg).    Monitoring/Evaluation  Progress toward goals will be monitored and evaluated per protocol.    Tova Martínez MS, RD, , LD.  5C/BMT Pager:[phone]

## 2020-03-04 NOTE — PROGRESS NOTES
[facility], [place]    Hematology / Oncology Progress Note     Assessment & Plan   Chuyita Porter is a 69 year old female with history of breast cancer and newly diagnosed mediastinal mass, pathology of which is consistent with DLBCL. She was admitted for planned chemoradiation with elective ET tube for threatened airway and PEG tube placement for enteral access.  She was admitted to ICU post procedures on 2/17, underwent radiation x 2 fractions on 2/18 and 2/19, then initiated chemotherapy with RCHOP (D1=2/21/20). ET tube was removed on 2/26. She transferred to the [facility] Malignancy service on 2/27. She had a PET scan 2/28 showing interval decrease upper mediastinal mass. Complicated by neutropenic fevers of unknown origin, possible HCAP. Once she remains afebrile she will discharge to ARU.      Plan today:  -will have RD see patient and switch to cycled TF, SLP continues to follow  -await palliative recs  -anticipate discharge to ARU when afebrile x 48 hours, unfortunately she had a fever yesterday (100.6)  -discontinue neupogen, ACV, fluconazole as ANC now 6.9  -switch zosyn to levaquin      # Febrile neutropenia, suspected HCAP. Fever curve downtrending.   Fever 101.2 on 2/28 PM. Defervescing since then. Last fever was 3/3 at 4:35 pm (100.6).  Ongoing throat pain, cough, wheezing. No other localizing sx. Suspect HCAP given recent mechanical ventilation and PET scan showing bibasilar atelectasis and metabolic uptake. Also possible aspiration. CXR (3/2) without acute airspace disease.   - Reviewed PET for any e/o lung infection. Note bibasilar atelectasis and metabolic uptake that could be inflammation/infection/less likely malignancy.   - Infectious w/u: UA wnl; BC neg to date; RVP PCR negative; sputum cx unable to obtain sample; stool cx unable to obtain sample (cancelled c.diff order).   - s/p Vancomycin + Cefepime (2/29-3/2)  - Switched to Zosyn (x 3/2), continue, de-escalated to  levaquin 750mg po on 3/4  - Albuterol, cough medicine PRN for sx management. Added tessalon TID.      # Extranodal Diffuse large B-cell lymphoma, non-germinal center type  Will follow with Dr. Sprague  On 2/14/20, CT chest showed 8.0 x 6.7 x 10.0 cm mass centered in the right anterior mediastinum with mass effect within the mediastinum, including compression of the SVC with associated collateral vessels seen in the neck. No mediastinal LN involvement. Planned endotracheal biopsies and placement of ET tube, along with PEG tube placement, done on 2/17. No stent was placed due to technical complications per Interventional Pulmonology note. Biopsy confirmed DLBCL. She underwent two fractions of radiotherapy on 2/18 and 2/19. Initiated R-CHOP chemotherapy on 2/21. Repeat CT Chest imaging on 2/25 demonstrated decreased size of mass.   - 2/19 CT A/P done to help with staging (wanted PET/CT but unable to get with pt on ventilator). PET/CT completed 2/28, shows interval decrease upper mediastinal mass.   - have requested outpatient [facility] f/u with Dr. Sprague close to anticipated start of next cycle, has appointments scheduled on 3/13 (SHANTELL and labs)  - no e/o TLS, continue Allopurinol for 14 days (2/21-3/5)  - ANC now >1.0, discontinue neupogen     # Chemotherapy induced pancytopenia. WBC uptrended today.  - Transfuse to maintain hgb >7 and plt >10K     # History of right breast cancer in 2005 and left breast cancer in 2015  - currently holding patient's home anastrozole for now due to increased VTE risk. She follows with Dr. Gallardo. As per last clinic note, Dr. Gallardo had recommended anastrozole as prophylaxis and to continue until 2020. Can discontinue this medication at discharge and have pt follow-up with Dr. Gallardo.      # ID PPx  -ANC now 6.9 discontinue ACV, fluconazole  - treatment dose levaquin per above     # Anxiety  # N/V  - encouraged to try ativan PRN  - additional PRN antiemetics  available  - Palliative Care consulted, awaiting assessment      # Nutrition  Had PEG tube placed by Thoracic surgery team on 2/17. Per outpatient ENT note, goal of PEG was empiric should she have dysphagia from originally planned stent placement or with planned therapy. NO stent was placed. PEG tube was utilized while pt was intubated and continues to be utilized for TFs as we slowly advance her diet as able.   - Nutrition consulted for tube feeds, will have them see her again on 3/4 and change to cycled TF to help stimulate appetite and provide freedom from continuous feeds  - appreciate SLP following. Continue to trial diet advancement per SLP ongoing recs.   - VFSS 3/2, OK for regular diet with thin liquid, patient afraid to do this, she continues with thickened apple juice. Continue to assess PO intake and need for TFs/PEG tube, calorie counts ordered.      FEN  - discontinue mIVFs today in anticipation of discharge; bolus PRN  - lyte repletion per protocol   - nutrition as above     PPx  - VTE: Lovenox 40mg subcutaneous daily (hold if Plt count falls <50K)  - GI: s/p Protonix (now off steroids)  - PT/OT     Dispo: Current recommendation from therapy teams is ARU. PM&R consulted. Patient not medically ready for discharge, had fever last night, will need to be afebrile x 48 hours, possible discharge tomorrow.  - unit SW aware  - at ARU, can continue to work on diet advancement and determine need for ongoing TFs/PEG tube  - f/u scheduled in [facility]: next appt prior to anticipated C2 RCHOP is scheduled for 3/13  - per d/w SW, Reginald should NOT disqualify pt from ARU. Ok to place order for this on discharge med rec if still needed (rather than having to dispense here for pt to take over to ARU).     The plan was staffed with Dr. Dukes.    Tanvi Cherry PA-C    Interval History   She continues to have a productive cough which seems better.  She had one episode of loose stool this am.  She denies any  new chest pain or shortness of breath.  She denies vomiting, abdominal pain, diarrhea, dysuria or frequency.  She continues with thickened liquids, she is afraid to try regular diet with thin liquids.  She is taking all meds via tube.      Physical Exam   Temp: 99.5  F (37.5  C) Temp src: Oral BP: 133/75 Pulse: 99 Heart Rate: 94 Resp: 18 SpO2: 95 % O2 Device: None (Room air)    Vitals:    03/02/20 0949 03/03/20 0907 03/04/20 0755   Weight: 78.2 kg (172 lb 8 oz) 76.9 kg (169 lb 7 oz) 76 kg (167 lb 9.6 oz)     Vital Signs with Ranges  Temp:  [97.2  F (36.2  C)-100.6  F (38.1  C)] 99.5  F (37.5  C)  Pulse:  [97-99] 99  Heart Rate:  [] 94  Resp:  [18] 18  BP: (126-136)/(75-84) 133/75  SpO2:  [95 %-97 %] 95 %  I/O last 3 completed shifts:  In: 2420 [P.O.:240; I.V.:735; NG/GT:180]  Out: 1050 [Urine:1050]    Constitutional: very pleasant, in NAD  ENT: mmm, neck is supple  Respiratory: coarse crackles bilateral bases, normal effort, no wheeze mild exp rhonchi  Cardiovascular: nl s1/s2 no MRGs  GI: abdomen is soft, NT, +BS  Skin: warm, dry  Musculoskeletal: mild pitting edema bilat lower extremities  Neurologic: awake/alert, speech is clear, answers questions appropriately        Medications     dextrose Stopped (02/26/20 1600)     - MEDICATION INSTRUCTIONS -         acyclovir  400 mg Oral or Feeding Tube BID     allopurinol  300 mg Oral or Feeding Tube Daily     enoxaparin ANTICOAGULANT  40 mg Subcutaneous Q24H     filgrastim (NEUPOGEN/GRANIX) subcutaneous  5 mcg/kg (Dosing Weight) Subcutaneous Daily at 8 pm     fluconazole  200 mg Oral or Feeding Tube Daily     loratadine  10 mg Oral or Feeding Tube Daily     nystatin  500,000 Units Oral 4x Daily     piperacillin-tazobactam  4.5 g Intravenous Q6H     sodium chloride (PF)  3 mL Intracatheter Q8H       Data   Results for orders placed or performed during the hospital encounter of 02/17/20 (from the past 24 hour(s))   CBC with platelets differential   Result Value Ref  Range    WBC 9.4 4.0 - 11.0 10e9/L    RBC Count 3.02 (L) 3.8 - 5.2 10e12/L    Hemoglobin 9.2 (L) 11.7 - 15.7 g/dL    Hematocrit 28.9 (L) 35.0 - 47.0 %    MCV 96 78 - 100 fl    MCH 30.5 26.5 - 33.0 pg    MCHC 31.8 31.5 - 36.5 g/dL    RDW 14.2 10.0 - 15.0 %    Platelet Count 167 150 - 450 10e9/L    Diff Method Manual Differential     % Neutrophils 73.6 %    % Lymphocytes 7.3 %    % Monocytes 9.1 %    % Eosinophils 0.0 %    % Basophils 0.0 %    % Metamyelocytes 6.4 %    % Myelocytes 3.6 %    Nucleated RBCs 2 (H) 0 /100    Absolute Neutrophil 6.9 1.6 - 8.3 10e9/L    Absolute Lymphocytes 0.7 (L) 0.8 - 5.3 10e9/L    Absolute Monocytes 0.9 0.0 - 1.3 10e9/L    Absolute Eosinophils 0.0 0.0 - 0.7 10e9/L    Absolute Basophils 0.0 0.0 - 0.2 10e9/L    Absolute Metamyelocytes 0.6 (H) 0 10e9/L    Absolute Myelocytes 0.3 (H) 0 10e9/L    Absolute Nucleated RBC 0.2     RBC Morphology Normal    Basic metabolic panel   Result Value Ref Range    Sodium 136 133 - 144 mmol/L    Potassium 3.6 3.4 - 5.3 mmol/L    Chloride 105 94 - 109 mmol/L    Carbon Dioxide 28 20 - 32 mmol/L    Anion Gap 4 3 - 14 mmol/L    Glucose 119 (H) 70 - 99 mg/dL    Urea Nitrogen 12 7 - 30 mg/dL    Creatinine 0.62 0.52 - 1.04 mg/dL    GFR Estimate >90 >60 mL/min/[1.73_m2]    GFR Estimate If Black >90 >60 mL/min/[1.73_m2]    Calcium 8.5 8.5 - 10.1 mg/dL   Phosphorus   Result Value Ref Range    Phosphorus 3.1 2.5 - 4.5 mg/dL   Magnesium   Result Value Ref Range    Magnesium 2.2 1.6 - 2.3 mg/dL     ATTENDING ADDENDUM:    I have independently seen and evaluated the patient on March 4, 2020 and reviewed clinical, laboratory, and radiographic findings. I have discussed the plan with the team and agree with the attached note with the following edits:    Chuyita Porter is a 69 year old year old female, with Hx breast ca on AI, new thyroid DLBCL s/p PEG and intubation, now extuibated and s/p RT and R-CHOP C1, counts back, feels better but not eating well. Low-grade fever  overnight.     Ph/E: Vitals reviewed. No distress. Oropharynx clear. Neck supple. Heart RRR. Lungs clear. Abdomen soft and G tube intact. No peripheral edema. No rash. Neuro nonfocal.     A&P: Stop G-CSF and prophy abx, switch to lvqn 750. Cycle TF. Follow BCx.        allopurinol  300 mg Oral or Feeding Tube Daily     enoxaparin ANTICOAGULANT  40 mg Subcutaneous Q24H     levofloxacin  750 mg Oral Daily     loratadine  10 mg Oral or Feeding Tube Daily     nystatin  500,000 Units Oral 4x Daily     sodium chloride (PF)  3 mL Intracatheter Q8H       Joseph Dukes MD

## 2020-03-04 NOTE — PLAN OF CARE
Discharge Planner PT   Patient plan for discharge: rehab  Current status: progressed STS to no AD with SBA and added standing functional tasks with Patrick, ambulates x200' with FWW and SBA slow but steady and limited by SOB/fatigue, stair training with ability to complete x3 with B rail and SBA.  Barriers to return to prior living situation: medical needs, LE weakness, significant deconditioning, impaired activity tolerance  Recommendations for discharge: ARU  Rationale for recommendations: pt well below active and IND baseline mobility. Has multidisciplinary PT/OT/SLP needs to address with best prognosis toward PLOF at intense rehab setting. Pt and  very motivated and supportive, would tolerate 3 hours daily therapy.       Entered by: Clover Welch 03/04/2020 4:30 PM

## 2020-03-04 NOTE — PLAN OF CARE
Discharge Planner SLP   Patient plan for discharge: None stated  Current status: Pt demonstrated good tolerance of puree textures and thin liquids at bedside with use of chin tuck strategy. Pt reported that she has been consuming nectar thick liquids because she is hesitant about liquids going towards her lungs. Educated pt on VFSS results. Recommend continue regular diet and thin liquids with use of chin tuck strategy. Caregivers to encourage pt to increase intake of solids and thin liquids. Pt to use safe swallow strategies such as alternating between consistencies, being fully upright for all PO intake, take small sips/bites, slow pacing, and use of chin tuck and multiple swallows with all liquids. ST will continue to follow.  Barriers to return to prior living situation: Weakness, general medical status  Recommendations for discharge: defer to PT and OT  Rationale for recommendations: Pt continues to benefit from ST to train use of safe swallow strategies and ensure diet tolerance. Anticipate pt will meet ST goals prior to d/c       Entered by: Princess Loredo 03/04/2020 9:43 AM

## 2020-03-05 LAB
ALBUMIN SERPL-MCNC: 2.5 G/DL (ref 3.4–5)
ALP SERPL-CCNC: 150 U/L (ref 40–150)
ALT SERPL W P-5'-P-CCNC: 29 U/L (ref 0–50)
ANION GAP SERPL CALCULATED.3IONS-SCNC: 4 MMOL/L (ref 3–14)
AST SERPL W P-5'-P-CCNC: 24 U/L (ref 0–45)
BASOPHILS # BLD AUTO: 0 10E9/L (ref 0–0.2)
BASOPHILS NFR BLD AUTO: 0 %
BILIRUB DIRECT SERPL-MCNC: <0.1 MG/DL (ref 0–0.2)
BILIRUB SERPL-MCNC: 0.2 MG/DL (ref 0.2–1.3)
BUN SERPL-MCNC: 14 MG/DL (ref 7–30)
CALCIUM SERPL-MCNC: 9 MG/DL (ref 8.5–10.1)
CHLORIDE SERPL-SCNC: 105 MMOL/L (ref 94–109)
CO2 SERPL-SCNC: 30 MMOL/L (ref 20–32)
CREAT SERPL-MCNC: 0.57 MG/DL (ref 0.52–1.04)
DIFFERENTIAL METHOD BLD: ABNORMAL
EOSINOPHIL # BLD AUTO: 0 10E9/L (ref 0–0.7)
EOSINOPHIL NFR BLD AUTO: 0 %
ERYTHROCYTE [DISTWIDTH] IN BLOOD BY AUTOMATED COUNT: 14.6 % (ref 10–15)
GFR SERPL CREATININE-BSD FRML MDRD: >90 ML/MIN/{1.73_M2}
GLUCOSE SERPL-MCNC: 126 MG/DL (ref 70–99)
HCT VFR BLD AUTO: 30.2 % (ref 35–47)
HGB BLD-MCNC: 9.7 G/DL (ref 11.7–15.7)
LACTATE BLD-SCNC: 1 MMOL/L (ref 0.7–2)
LYMPHOCYTES # BLD AUTO: 1.5 10E9/L (ref 0.8–5.3)
LYMPHOCYTES NFR BLD AUTO: 7.7 %
MCH RBC QN AUTO: 30.5 PG (ref 26.5–33)
MCHC RBC AUTO-ENTMCNC: 32.1 G/DL (ref 31.5–36.5)
MCV RBC AUTO: 95 FL (ref 78–100)
MONOCYTES # BLD AUTO: 1.5 10E9/L (ref 0–1.3)
MONOCYTES NFR BLD AUTO: 7.7 %
NEUTROPHILS # BLD AUTO: 16.2 10E9/L (ref 1.6–8.3)
NEUTROPHILS NFR BLD AUTO: 83.7 %
NRBC # BLD AUTO: 0.3 10*3/UL
NRBC BLD AUTO-RTO: 2 /100
PLATELET # BLD AUTO: 232 10E9/L (ref 150–450)
PLATELET # BLD EST: ABNORMAL 10*3/UL
POTASSIUM SERPL-SCNC: 4.1 MMOL/L (ref 3.4–5.3)
PROMYELOCYTES # BLD MANUAL: 0.2 10E9/L
PROMYELOCYTES NFR BLD MANUAL: 0.9 %
PROT SERPL-MCNC: 6.3 G/DL (ref 6.8–8.8)
RBC # BLD AUTO: 3.18 10E12/L (ref 3.8–5.2)
RBC MORPH BLD: NORMAL
SODIUM SERPL-SCNC: 138 MMOL/L (ref 133–144)
WBC # BLD AUTO: 19.4 10E9/L (ref 4–11)

## 2020-03-05 PROCEDURE — 80048 BASIC METABOLIC PNL TOTAL CA: CPT | Performed by: INTERNAL MEDICINE

## 2020-03-05 PROCEDURE — 85025 COMPLETE CBC W/AUTO DIFF WBC: CPT | Performed by: INTERNAL MEDICINE

## 2020-03-05 PROCEDURE — 25000128 H RX IP 250 OP 636: Performed by: STUDENT IN AN ORGANIZED HEALTH CARE EDUCATION/TRAINING PROGRAM

## 2020-03-05 PROCEDURE — 12000012 ZZH R&B MS OVERFLOW UMMC

## 2020-03-05 PROCEDURE — 27210429 ZZH NUTRITION PRODUCT INTERMEDIATE LITER

## 2020-03-05 PROCEDURE — 83605 ASSAY OF LACTIC ACID: CPT | Performed by: INTERNAL MEDICINE

## 2020-03-05 PROCEDURE — 36415 COLL VENOUS BLD VENIPUNCTURE: CPT | Performed by: INTERNAL MEDICINE

## 2020-03-05 PROCEDURE — 80076 HEPATIC FUNCTION PANEL: CPT | Performed by: INTERNAL MEDICINE

## 2020-03-05 PROCEDURE — 25000132 ZZH RX MED GY IP 250 OP 250 PS 637: Performed by: PHYSICIAN ASSISTANT

## 2020-03-05 PROCEDURE — 25000132 ZZH RX MED GY IP 250 OP 250 PS 637: Performed by: STUDENT IN AN ORGANIZED HEALTH CARE EDUCATION/TRAINING PROGRAM

## 2020-03-05 PROCEDURE — 40000141 ZZH STATISTIC PERIPHERAL IV START W/O US GUIDANCE

## 2020-03-05 RX ADMIN — GUAIFENESIN AND CODEINE PHOSPHATE 5 ML: 10; 100 LIQUID ORAL at 07:43

## 2020-03-05 RX ADMIN — ALLOPURINOL 300 MG: 300 TABLET ORAL at 07:43

## 2020-03-05 RX ADMIN — NYSTATIN 500000 UNITS: 100000 SUSPENSION ORAL at 07:43

## 2020-03-05 RX ADMIN — LEVOFLOXACIN 750 MG: 750 TABLET, FILM COATED ORAL at 07:43

## 2020-03-05 RX ADMIN — NYSTATIN 500000 UNITS: 100000 SUSPENSION ORAL at 19:57

## 2020-03-05 RX ADMIN — GUAIFENESIN AND CODEINE PHOSPHATE 5 ML: 10; 100 LIQUID ORAL at 19:58

## 2020-03-05 RX ADMIN — NYSTATIN 500000 UNITS: 100000 SUSPENSION ORAL at 12:15

## 2020-03-05 RX ADMIN — LORATADINE 10 MG: 10 TABLET ORAL at 07:43

## 2020-03-05 RX ADMIN — NYSTATIN 500000 UNITS: 100000 SUSPENSION ORAL at 16:22

## 2020-03-05 RX ADMIN — ENOXAPARIN SODIUM 40 MG: 40 INJECTION SUBCUTANEOUS at 07:43

## 2020-03-05 RX ADMIN — LORAZEPAM 1 MG: 0.5 TABLET ORAL at 01:15

## 2020-03-05 ASSESSMENT — MIFFLIN-ST. JEOR: SCORE: 1304.27

## 2020-03-05 ASSESSMENT — ACTIVITIES OF DAILY LIVING (ADL)
ADLS_ACUITY_SCORE: 15

## 2020-03-05 NOTE — PLAN OF CARE
Patient vital signs stable Patient still frightened to take anything oral, she is afraid it will get stuck in her throat. Patient did have a sore back on the left side Tylenol and hot pack  brought relief. She will start tonight on cycled feeding.  at bedside. Continue to monitor.  Problem: Adult Inpatient Plan of Care  Goal: Plan of Care Review  3/4/2020 1811 by Lew Varela, RN  Outcome: No Change

## 2020-03-05 NOTE — PROCEDURES
Radiotherapy Treatment Summary          Date of Report: 2020     PATIENT: MELODY PORTER  MEDICAL RECORD NO: [id number]  : 1950     DIAGNOSIS: C85.1 Unspecified B-cell lymphoma  INTENT OF RADIOTHERAPY: Cure  PATHOLOGY: Diffuse large B cell lymphoma                             STAGE: IIE  CONCURRENT SYSTEMIC THERAPY: None               Details of the treatments summarized below are found in records kept in the Department of Radiation Oncology at [facility].     Treatment Summary:  Treatment Site Dose  Modality From  To  Elapsed Days Fx.  Thyroid and neck    600 cGy 10 X   2/18/2020  2020   9   2          Dose per Fraction: 300 cGy        COMMENTS:  Ms. Porter is a 69 year old female with a history of bilateral breast cancer status post lumpectomy and adjuvant radiotherapy who presented urgently with progressive neck swelling and dyspnea. Work-up revealed a large mass extending from the inferior aspect of the thyroid into the superior mediastinum with encasement of the trachea and involving the right lateral aspect of the esophagus. She underwent biopsy and attempted tracheal stenting on 2020 with the stent unable to be deployed. She was left intubated with the ET tube placed beyond the area of obstruction and referred for urgent radiotherapy to prevent further airway compromise.      Ms. Fernandez received a total of 6 Gy in 2 daily fractions (of an originally planned 30 Gy / 10 fraction regimen) using 10 MV photons delivered via an AP/PA beam arrangement. Following completion of her second fraction, her pathology returned positive for a diffuse large B-cell lymphoma and the decision was made to discontinue radiotherapy in favor of initiation of systemic chemotherapy.                  ED visits/hospitalizations: She remained admitted and intubated for the duration of her treatment course     Missed treatments: None      Acute Toxicity Profile by CTCAE v5.0: None      PAIN MANAGEMENT:     Per  primary team                          FOLLOW UP PLAN:     Follow-up in radiation oncology clinic on an as-needed basis                      Resident Physician: Randell Ackerman M.D.   Staff Physician: Charly Ray M.D.  Physicist: Aidan Rojas, PhD     CC:   MD Kiersten Gee MD                                  Radiation Oncology:  [facility] [address] [place] [address]

## 2020-03-05 NOTE — PLAN OF CARE
Patient afebrile, vital signs table. She has had a good day, today is the most she has taken in orally. She is only taking in thickened liquids, she's afraid of choking. Hot pack for back pain which brought relief. Up in chair today,  at bedside. Patient voiding in toilet. Continue to monitor.

## 2020-03-05 NOTE — PLAN OF CARE
"Tmax 99.2. HR tachy into low 100s. OVSS. Pt very diaphoretic overnight. Per pt, this happens sometimes. Denies pain. She did endorse \"not feeling well\" - ativan given x 1. Cycled tube feedings going at 70ml/hr into PEG. Will shut off at 0700. Robitussin given x 1 for cough. Lactic triggered and resulted 1.0. Standby assist up to bathroom. No replacements needed this AM. Continue to monitor.    Problem: Adult Inpatient Plan of Care  Goal: Plan of Care Review  3/5/2020 0611 by Jesica Guerrero RN  Outcome: No Change     Problem: Adult Inpatient Plan of Care  Goal: Absence of Hospital-Acquired Illness or Injury  Outcome: No Change     Problem: Adult Inpatient Plan of Care  Goal: Readiness for Transition of Care  Outcome: No Change     Problem: Anemia (Chemotherapy Effects)  Goal: Anemia Symptom Improvement  Outcome: No Change     Problem: Nausea and Vomiting (Chemotherapy Effects)  Goal: Fluid and Electrolyte Balance  Outcome: No Change     Problem: Neutropenia (Chemotherapy Effects)  Goal: Absence of Infection  Outcome: No Change     Problem: Radiation Skin Reaction (Radiation, External Beam)  Goal: Skin Health and Integrity  Outcome: No Change     Problem: Pain Acute  Goal: Optimal Pain Control  Outcome: Improving     "

## 2020-03-05 NOTE — PROGRESS NOTES
[facility] [place]    Hematology / Oncology Progress Note     Assessment & Plan   Chuyita Porter is a 69 year old female with history of breast cancer and newly diagnosed mediastinal mass found to be DLBCL. She was admitted for planned chemoradiation with elective ET tube for threatened airway and PEG tube placement for enteral access. She was admitted to ICU post procedures on 2/17, underwent radiation x 2 fractions on 2/18 and 2/19, then initiated chemotherapy with RCHOP (D1=2/21/20). ET tube was removed on 2/26. Patient transferred to [facility] malignancy service on 2/27. She had a PET scan 2/28 showing interval decrease upper mediastinal mass. Course complicated by neutropenic fevers likely secondary to HCAP.      Plan today:  - New leukocytosis, but afebrile since 3/3. Will continue to monitor. May be secondary to neupogen.   - Possible discharge to ARU tomorrow if no further fevers.   - RD and SLP following     # Febrile neutropenia, suspected HCAP, improved  Fever 101.2 on 2/28. Last fever was 3/3 at 4:35 pm (100.6). Improving cough. Suspect HCAP given recent mechanical ventilation and PET scan showing bibasilar atelectasis and metabolic uptake. Also possible aspiration. CXR (3/2) without acute airspace disease. Infectious workup: UA wnl; BCx NGTD; RVP PCR negative. Reviewed PET for any e/o lung infection. Note bibasilar atelectasis and metabolic uptake that could be inflammation/infection/less likely malignancy.   - s/p Vancomycin + Cefepime (2/29-3/2)  - Switched to Zosyn (x 3/2), continue, de-escalated to levaquin 750mg po on 3/4  - Albuterol, cough medicine PRN for sx management. Added tessalon TID.     # Leukocytosis  WBC 9.4-->19.4 today. Afebrile since 3/3. Will work up if spikes, but this may be secondary to neupogen.     # Extranodal Diffuse large B-cell lymphoma, non-germinal center type  On 2/14/20, CT chest showed 8.0 x 6.7 x 10.0 cm mass centered in the right anterior  mediastinum with mass effect within the mediastinum, including compression of the SVC with associated collateral vessels seen in the neck. No mediastinal LN involvement. Planned endotracheal biopsies and placement of ET tube, along with PEG tube placement, done on 2/17. No stent was placed due to technical complications per Interventional Pulmonology note. Biopsy confirmed DLBCL. She underwent two fractions of radiotherapy on 2/18 and 2/19. Initiated R-CHOP chemotherapy on 2/21. Repeat CT Chest imaging on 2/25 demonstrated decreased size of mass.   - Outpatient [facility] f/u with Dr. Sprague close to anticipated start of next cycle, has appointments scheduled on 3/13 (SHANTELL and labs)  - No e/o TLS, continue Allopurinol for 14 days (2/21-3/5)  - ANC now >1.0, discontinue neupogen 3/3  - Will follow with Dr. Sprague     # Chemotherapy induced pancytopenia. Leukocytosis today as above.  - Transfuse to maintain hgb >7 and plt >10K     # History of right breast cancer in 2005 and left breast cancer in 2015  - Currently holding patient's home anastrozole for now due to increased VTE risk. She follows with Dr. Gallardo. As per last clinic note, Dr. Gallardo had recommended anastrozole as prophylaxis and to continue until 2020. Can discontinue this medication at discharge and have pt follow-up with Dr. Gallardo.      # ID PPx  No longer neutropenic- discontinued ACV, fluconazole  - Treatment dose levaquin per above      # Anxiety  # N/V  - Ativan PRN  - Additional PRN antiemetics available  - Palliative Care consulted     # Nutrition  PEG tube placed 2/17. Per outpatient ENT note, goal of PEG was empiric should she have dysphagia from originally planned stent placement or with planned therapy. No stent was placed.   - Nutrition consulted ,tolerated tube feeds overnight at 70ml/hr for 12 hours  - Appreciate SLP recs. VFSS 3/2, OK for regular diet with thin liquid. Continue to assess PO intake and need for TFs/PEG  tube, calorie counts ordered.      FEN  - Lyte repletion per protocol, nutrition as above     PPx  - VTE: Lovenox 40mg subcutaneous daily (hold if Plt count falls <50K)  - GI: s/p Protonix (now off steroids)  - PT/OT     Dispo: Current recommendation from therapy teams is ARU. PM&R consulted. Possible discharge tomorrow if no further fevers. Unit SW aware.  - At ARU, can continue to work on diet advancement and determine need for ongoing TFs/PEG tube  - F/u scheduled in [facility]: next appt prior to anticipated C2 RCHOP is scheduled for 3/13    The plan was staffed with Dr. Dukes.    Brandi Martinez MD  [facility]  Pager [phone]    Interval History    Chuyita reports her cough is a little better this morning. She had 2 loose stools overnight. She denies shortness of breath at rest, but some with exertion. No vomiting or nausea. She tolerated her tube feeds overnight. She does report waking up 3 times overnight with sweating. She is nervous about discharging.     Physical Exam   Temp: 99.1  F (37.3  C) Temp src: Oral BP: 120/70 Pulse: 103   Resp: 18 SpO2: 93 % O2 Device: None (Room air)    Vitals:    03/03/20 0907 03/04/20 0755 03/05/20 0758   Weight: 76.9 kg (169 lb 7 oz) 76 kg (167 lb 9.6 oz) 75.3 kg (165 lb 14.4 oz)     Vital Signs with Ranges  Temp:  [98.7  F (37.1  C)-99.3  F (37.4  C)] 99.1  F (37.3  C)  Pulse:  [103-111] 103  Resp:  [18] 18  BP: (119-135)/(70-81) 120/70  SpO2:  [93 %-97 %] 93 %  I/O last 3 completed shifts:  In: 2030 [P.O.:120; I.V.:120; NG/GT:360]  Out: 1100 [Urine:1100]    Constitutional: Alert, pleasant, sitting comfortably in bed.  HEENT: AT/NC. Anicteric sclera. Nares patent w/o congestion. MMM.  Respiratory: Coarse breath sounds at bilateral bases. No wheezing or crackles. Breathing comfortably on room air.   Cardiovascular: RRR. Normal S1/S2. No murmurs.  GI: Soft, non-tender, non-distended.   Skin: Warm and well perfused. Left PIV. Area of mild erythema on left forearm.    Musculoskeletal: Trace pedal edema.   Neurologic: Alert and oriented x3. CNII-XII grossly intact. Moving all extremities. Answering questions and following commands.     Medications     dextrose Stopped (02/26/20 1600)     - MEDICATION INSTRUCTIONS -         enoxaparin ANTICOAGULANT  40 mg Subcutaneous Q24H     levofloxacin  750 mg Oral Daily     loratadine  10 mg Oral or Feeding Tube Daily     nystatin  500,000 Units Oral 4x Daily     sodium chloride (PF)  3 mL Intracatheter Q8H       Data   Results for orders placed or performed during the hospital encounter of 02/17/20 (from the past 24 hour(s))   CBC with platelets differential   Result Value Ref Range    WBC 19.4 (H) 4.0 - 11.0 10e9/L    RBC Count 3.18 (L) 3.8 - 5.2 10e12/L    Hemoglobin 9.7 (L) 11.7 - 15.7 g/dL    Hematocrit 30.2 (L) 35.0 - 47.0 %    MCV 95 78 - 100 fl    MCH 30.5 26.5 - 33.0 pg    MCHC 32.1 31.5 - 36.5 g/dL    RDW 14.6 10.0 - 15.0 %    Platelet Count 232 150 - 450 10e9/L    Diff Method Manual Differential     % Neutrophils 83.7 %    % Lymphocytes 7.7 %    % Monocytes 7.7 %    % Eosinophils 0.0 %    % Basophils 0.0 %    % Promyelocytes 0.9 %    Nucleated RBCs 2 (H) 0 /100    Absolute Neutrophil 16.2 (H) 1.6 - 8.3 10e9/L    Absolute Lymphocytes 1.5 0.8 - 5.3 10e9/L    Absolute Monocytes 1.5 (H) 0.0 - 1.3 10e9/L    Absolute Eosinophils 0.0 0.0 - 0.7 10e9/L    Absolute Basophils 0.0 0.0 - 0.2 10e9/L    Absolute Promyeloctyes 0.2 (H) 0 10e9/L    Absolute Nucleated RBC 0.3     RBC Morphology Normal     Platelet Estimate Confirming automated cell count    Basic metabolic panel   Result Value Ref Range    Sodium 138 133 - 144 mmol/L    Potassium 4.1 3.4 - 5.3 mmol/L    Chloride 105 94 - 109 mmol/L    Carbon Dioxide 30 20 - 32 mmol/L    Anion Gap 4 3 - 14 mmol/L    Glucose 126 (H) 70 - 99 mg/dL    Urea Nitrogen 14 7 - 30 mg/dL    Creatinine 0.57 0.52 - 1.04 mg/dL    GFR Estimate >90 >60 mL/min/[1.73_m2]    GFR Estimate If Black >90 >60  mL/min/[1.73_m2]    Calcium 9.0 8.5 - 10.1 mg/dL   Hepatic panel   Result Value Ref Range    Bilirubin Direct <0.1 0.0 - 0.2 mg/dL    Bilirubin Total 0.2 0.2 - 1.3 mg/dL    Albumin 2.5 (L) 3.4 - 5.0 g/dL    Protein Total 6.3 (L) 6.8 - 8.8 g/dL    Alkaline Phosphatase 150 40 - 150 U/L    ALT 29 0 - 50 U/L    AST 24 0 - 45 U/L   Lactic acid level STAT   Result Value Ref Range    Lactate for Sepsis Protocol 1.0 0.7 - 2.0 mmol/L       enoxaparin ANTICOAGULANT  40 mg Subcutaneous Q24H     levofloxacin  750 mg Oral Daily     loratadine  10 mg Oral or Feeding Tube Daily     nystatin  500,000 Units Oral 4x Daily     sodium chloride (PF)  3 mL Intracatheter Q8H     ATTENDING ADDENDUM:    I have independently seen and evaluated the patient on March 5, 2020 and reviewed clinical, laboratory, and radiographic findings. I have discussed the plan with the team and agree with the attached note with the following edits:    Chuyita Porter is a 69 year old year old female, with Hx breast ca on AI, new thyroid DLBCL s/p PEG and intubation, now extuibated and s/p RT and R-CHOP C1, counts back, feels better but not eating well. Low-grade fever overnight, resolved. New leukocytosis.      Ph/E: Vitals reviewed. No distress. Oropharynx clear. Neck supple. Heart RRR. Lungs clear. Abdomen soft and G tube intact. No peripheral edema. No rash. Neuro nonfocal.      A&P: Lvqn 750. Cycle TF. Discharge tomorrow if leukocytosis better and no infection.        enoxaparin ANTICOAGULANT  40 mg Subcutaneous Q24H     levofloxacin  750 mg Oral Daily     loratadine  10 mg Oral or Feeding Tube Daily     nystatin  500,000 Units Oral 4x Daily     sodium chloride (PF)  3 mL Intracatheter Q8H       Joseph Dukes MD

## 2020-03-06 ENCOUNTER — ANESTHESIA EVENT (OUTPATIENT)
Dept: SURGERY | Facility: CLINIC | Age: 70
End: 2020-03-06

## 2020-03-06 ENCOUNTER — APPOINTMENT (OUTPATIENT)
Dept: OCCUPATIONAL THERAPY | Facility: CLINIC | Age: 70
DRG: 840 | End: 2020-03-06
Attending: OTOLARYNGOLOGY
Payer: COMMERCIAL

## 2020-03-06 ENCOUNTER — RECORDS - HEALTHEAST (OUTPATIENT)
Dept: ADMINISTRATIVE | Facility: OTHER | Age: 70
End: 2020-03-06

## 2020-03-06 ENCOUNTER — HOSPITAL ENCOUNTER (INPATIENT)
Facility: CLINIC | Age: 70
LOS: 6 days | Discharge: HOME HEALTH | DRG: 840 | End: 2020-03-12
Attending: PHYSICAL MEDICINE & REHABILITATION | Admitting: PHYSICAL MEDICINE & REHABILITATION
Payer: COMMERCIAL

## 2020-03-06 VITALS
SYSTOLIC BLOOD PRESSURE: 118 MMHG | TEMPERATURE: 99.7 F | WEIGHT: 167.7 LBS | BODY MASS INDEX: 26.95 KG/M2 | OXYGEN SATURATION: 99 % | HEART RATE: 98 BPM | RESPIRATION RATE: 16 BRPM | HEIGHT: 66 IN | DIASTOLIC BLOOD PRESSURE: 79 MMHG

## 2020-03-06 DIAGNOSIS — C83.30 DIFFUSE LARGE B-CELL LYMPHOMA, UNSPECIFIED BODY REGION (H): Primary | ICD-10-CM

## 2020-03-06 LAB
ALBUMIN UR-MCNC: 10 MG/DL
ANION GAP SERPL CALCULATED.3IONS-SCNC: 3 MMOL/L (ref 3–14)
ANISOCYTOSIS BLD QL SMEAR: SLIGHT
APPEARANCE UR: ABNORMAL
BACTERIA SPEC CULT: NO GROWTH
BACTERIA SPEC CULT: NO GROWTH
BASOPHILS # BLD AUTO: 0 10E9/L (ref 0–0.2)
BASOPHILS NFR BLD AUTO: 0 %
BILIRUB UR QL STRIP: NEGATIVE
BUN SERPL-MCNC: 17 MG/DL (ref 7–30)
CALCIUM SERPL-MCNC: 9.1 MG/DL (ref 8.5–10.1)
CHLORIDE SERPL-SCNC: 105 MMOL/L (ref 94–109)
CO2 SERPL-SCNC: 29 MMOL/L (ref 20–32)
COLOR UR AUTO: YELLOW
CREAT SERPL-MCNC: 0.64 MG/DL (ref 0.52–1.04)
DIFFERENTIAL METHOD BLD: ABNORMAL
EOSINOPHIL # BLD AUTO: 0 10E9/L (ref 0–0.7)
EOSINOPHIL NFR BLD AUTO: 0 %
ERYTHROCYTE [DISTWIDTH] IN BLOOD BY AUTOMATED COUNT: 15 % (ref 10–15)
GFR SERPL CREATININE-BSD FRML MDRD: >90 ML/MIN/{1.73_M2}
GLUCOSE SERPL-MCNC: 104 MG/DL (ref 70–99)
GLUCOSE UR STRIP-MCNC: NEGATIVE MG/DL
HCT VFR BLD AUTO: 30.4 % (ref 35–47)
HGB BLD-MCNC: 9.7 G/DL (ref 11.7–15.7)
HGB UR QL STRIP: NEGATIVE
KETONES UR STRIP-MCNC: NEGATIVE MG/DL
LACTATE BLD-SCNC: 0.8 MMOL/L (ref 0.7–2)
LACTATE BLD-SCNC: 1.2 MMOL/L (ref 0.7–2)
LEUKOCYTE ESTERASE UR QL STRIP: NEGATIVE
LYMPHOCYTES # BLD AUTO: 0.8 10E9/L (ref 0.8–5.3)
LYMPHOCYTES NFR BLD AUTO: 6.4 %
MAGNESIUM SERPL-MCNC: 2.5 MG/DL (ref 1.6–2.3)
MCH RBC QN AUTO: 30.4 PG (ref 26.5–33)
MCHC RBC AUTO-ENTMCNC: 31.9 G/DL (ref 31.5–36.5)
MCV RBC AUTO: 95 FL (ref 78–100)
METAMYELOCYTES # BLD: 0.2 10E9/L
METAMYELOCYTES NFR BLD MANUAL: 1.6 %
MONOCYTES # BLD AUTO: 2 10E9/L (ref 0–1.3)
MONOCYTES NFR BLD AUTO: 16.8 %
MUCOUS THREADS #/AREA URNS LPF: PRESENT /LPF
MYELOCYTES # BLD: 0.3 10E9/L
MYELOCYTES NFR BLD MANUAL: 2.4 %
NEUTROPHILS # BLD AUTO: 8.1 10E9/L (ref 1.6–8.3)
NEUTROPHILS NFR BLD AUTO: 66.4 %
NITRATE UR QL: NEGATIVE
NRBC # BLD AUTO: 0.1 10*3/UL
NRBC BLD AUTO-RTO: 1 /100
PH UR STRIP: 7.5 PH (ref 5–7)
PHOSPHATE SERPL-MCNC: 4.7 MG/DL (ref 2.5–4.5)
PLATELET # BLD AUTO: 249 10E9/L (ref 150–450)
PLATELET # BLD EST: ABNORMAL 10*3/UL
POTASSIUM SERPL-SCNC: 4.2 MMOL/L (ref 3.4–5.3)
PROMYELOCYTES # BLD MANUAL: 0.8 10E9/L
PROMYELOCYTES NFR BLD MANUAL: 6.4 %
RBC # BLD AUTO: 3.19 10E12/L (ref 3.8–5.2)
RBC #/AREA URNS AUTO: <1 /HPF (ref 0–2)
SODIUM SERPL-SCNC: 136 MMOL/L (ref 133–144)
SOURCE: ABNORMAL
SP GR UR STRIP: 1.02 (ref 1–1.03)
SPECIMEN SOURCE: NORMAL
SPECIMEN SOURCE: NORMAL
SQUAMOUS #/AREA URNS AUTO: 1 /HPF (ref 0–1)
UROBILINOGEN UR STRIP-MCNC: NORMAL MG/DL (ref 0–2)
WBC # BLD AUTO: 12.2 10E9/L (ref 4–11)
WBC #/AREA URNS AUTO: 3 /HPF (ref 0–5)

## 2020-03-06 PROCEDURE — 81001 URINALYSIS AUTO W/SCOPE: CPT | Performed by: PHYSICAL MEDICINE & REHABILITATION

## 2020-03-06 PROCEDURE — 84100 ASSAY OF PHOSPHORUS: CPT | Performed by: INTERNAL MEDICINE

## 2020-03-06 PROCEDURE — 12800006 ZZH R&B REHAB

## 2020-03-06 PROCEDURE — 25000132 ZZH RX MED GY IP 250 OP 250 PS 637: Performed by: PHYSICIAN ASSISTANT

## 2020-03-06 PROCEDURE — 36415 COLL VENOUS BLD VENIPUNCTURE: CPT | Performed by: INTERNAL MEDICINE

## 2020-03-06 PROCEDURE — 83605 ASSAY OF LACTIC ACID: CPT | Performed by: INTERNAL MEDICINE

## 2020-03-06 PROCEDURE — 83735 ASSAY OF MAGNESIUM: CPT | Performed by: INTERNAL MEDICINE

## 2020-03-06 PROCEDURE — 25000132 ZZH RX MED GY IP 250 OP 250 PS 637: Performed by: STUDENT IN AN ORGANIZED HEALTH CARE EDUCATION/TRAINING PROGRAM

## 2020-03-06 PROCEDURE — 25000128 H RX IP 250 OP 636: Performed by: STUDENT IN AN ORGANIZED HEALTH CARE EDUCATION/TRAINING PROGRAM

## 2020-03-06 PROCEDURE — 80048 BASIC METABOLIC PNL TOTAL CA: CPT | Performed by: INTERNAL MEDICINE

## 2020-03-06 PROCEDURE — 36415 COLL VENOUS BLD VENIPUNCTURE: CPT | Performed by: PHYSICAL MEDICINE & REHABILITATION

## 2020-03-06 PROCEDURE — 97110 THERAPEUTIC EXERCISES: CPT | Mod: GO | Performed by: OCCUPATIONAL THERAPIST

## 2020-03-06 PROCEDURE — 85025 COMPLETE CBC W/AUTO DIFF WBC: CPT | Performed by: INTERNAL MEDICINE

## 2020-03-06 PROCEDURE — 27210429 ZZH NUTRITION PRODUCT INTERMEDIATE LITER

## 2020-03-06 PROCEDURE — 83605 ASSAY OF LACTIC ACID: CPT | Performed by: PHYSICAL MEDICINE & REHABILITATION

## 2020-03-06 PROCEDURE — 97535 SELF CARE MNGMENT TRAINING: CPT | Mod: GO | Performed by: OCCUPATIONAL THERAPIST

## 2020-03-06 PROCEDURE — 25000132 ZZH RX MED GY IP 250 OP 250 PS 637: Performed by: PHYSICAL MEDICINE & REHABILITATION

## 2020-03-06 RX ORDER — LORATADINE 10 MG/1
10 TABLET ORAL DAILY
Status: DISCONTINUED | OUTPATIENT
Start: 2020-03-07 | End: 2020-03-12 | Stop reason: HOSPADM

## 2020-03-06 RX ORDER — LORAZEPAM 0.5 MG/1
.5-1 TABLET ORAL EVERY 6 HOURS PRN
Status: DISCONTINUED | OUTPATIENT
Start: 2020-03-06 | End: 2020-03-12 | Stop reason: HOSPADM

## 2020-03-06 RX ORDER — ACETAMINOPHEN 325 MG/1
650 TABLET ORAL EVERY 4 HOURS PRN
DISCHARGE
Start: 2020-03-06 | End: 2021-08-26

## 2020-03-06 RX ORDER — DOCUSATE SODIUM 100 MG/1
100 CAPSULE, LIQUID FILLED ORAL 2 TIMES DAILY PRN
Status: DISCONTINUED | OUTPATIENT
Start: 2020-03-06 | End: 2020-03-12 | Stop reason: HOSPADM

## 2020-03-06 RX ORDER — LIDOCAINE 40 MG/G
CREAM TOPICAL 3 TIMES DAILY PRN
Status: DISCONTINUED | OUTPATIENT
Start: 2020-03-06 | End: 2020-03-12 | Stop reason: HOSPADM

## 2020-03-06 RX ORDER — SENNOSIDES 8.6 MG
8.6 TABLET ORAL 2 TIMES DAILY PRN
Status: DISCONTINUED | OUTPATIENT
Start: 2020-03-06 | End: 2020-03-12 | Stop reason: HOSPADM

## 2020-03-06 RX ORDER — BISACODYL 10 MG
10 SUPPOSITORY, RECTAL RECTAL DAILY PRN
Status: DISCONTINUED | OUTPATIENT
Start: 2020-03-06 | End: 2020-03-12 | Stop reason: HOSPADM

## 2020-03-06 RX ORDER — ACETAMINOPHEN 325 MG/1
650 TABLET ORAL EVERY 4 HOURS PRN
Status: DISCONTINUED | OUTPATIENT
Start: 2020-03-06 | End: 2020-03-12 | Stop reason: HOSPADM

## 2020-03-06 RX ORDER — NYSTATIN 100000/ML
500000 SUSPENSION, ORAL (FINAL DOSE FORM) ORAL 4 TIMES DAILY
DISCHARGE
Start: 2020-03-06 | End: 2020-03-06

## 2020-03-06 RX ORDER — DEXTROSE MONOHYDRATE 100 MG/ML
INJECTION, SOLUTION INTRAVENOUS CONTINUOUS PRN
Status: DISCONTINUED | OUTPATIENT
Start: 2020-03-06 | End: 2020-03-12 | Stop reason: HOSPADM

## 2020-03-06 RX ORDER — POLYETHYLENE GLYCOL 3350 17 G/17G
17 POWDER, FOR SOLUTION ORAL DAILY PRN
Status: DISCONTINUED | OUTPATIENT
Start: 2020-03-06 | End: 2020-03-12 | Stop reason: HOSPADM

## 2020-03-06 RX ORDER — LANOLIN ALCOHOL/MO/W.PET/CERES
3 CREAM (GRAM) TOPICAL
Status: DISCONTINUED | OUTPATIENT
Start: 2020-03-06 | End: 2020-03-12 | Stop reason: HOSPADM

## 2020-03-06 RX ADMIN — LEVOFLOXACIN 750 MG: 750 TABLET, FILM COATED ORAL at 08:12

## 2020-03-06 RX ADMIN — ENOXAPARIN SODIUM 40 MG: 40 INJECTION SUBCUTANEOUS at 08:11

## 2020-03-06 RX ADMIN — NYSTATIN 500000 UNITS: 100000 SUSPENSION ORAL at 11:26

## 2020-03-06 RX ADMIN — NYSTATIN 500000 UNITS: 100000 SUSPENSION ORAL at 08:12

## 2020-03-06 RX ADMIN — LORATADINE 10 MG: 10 TABLET ORAL at 08:12

## 2020-03-06 RX ADMIN — GUAIFENESIN 10 ML: 200 SOLUTION ORAL at 22:12

## 2020-03-06 ASSESSMENT — ACTIVITIES OF DAILY LIVING (ADL)
DRESS: 0-->INDEPENDENT
RETIRED_COMMUNICATION: 0-->UNDERSTANDS/COMMUNICATES WITHOUT DIFFICULTY
AMBULATION: 1-->ASSISTIVE EQUIPMENT
ADLS_ACUITY_SCORE: 15
ADLS_ACUITY_SCORE: 15
TOILETING: 1-->ASSISTIVE EQUIPMENT
TRANSFERRING: 1-->ASSISTIVE EQUIPMENT
ADLS_ACUITY_SCORE: 15
ADLS_ACUITY_SCORE: 15
RETIRED_EATING: 0-->INDEPENDENT
COGNITION: 0 - NO COGNITION ISSUES REPORTED
FALL_HISTORY_WITHIN_LAST_SIX_MONTHS: NO
BATHING: 1-->ASSISTIVE EQUIPMENT
SWALLOWING: 0-->SWALLOWS FOODS/LIQUIDS WITHOUT DIFFICULTY

## 2020-03-06 ASSESSMENT — MIFFLIN-ST. JEOR
SCORE: 1274.79
SCORE: 1312.43

## 2020-03-06 ASSESSMENT — PAIN DESCRIPTION - DESCRIPTORS: DESCRIPTORS: ACHING

## 2020-03-06 NOTE — PLAN OF CARE
Tmax 99.2. VSS. Pt reports back pain usually brought on by ambulation. Hot packs applied with improvement in pain. Robitussin given x 1 for cough. Cycled tube feedings running at 70ml/hr with q4 hr 60ml free water flushes. PT standby assist with walker to bathroom. 2 small stools overnight. No replacements needed this AM. Possible discharge to ARU today. Continue to monitor.     Problem: Adult Inpatient Plan of Care  Goal: Plan of Care Review  3/6/2020 0513 by Jesica Guerrero RN  Outcome: Improving     Problem: Adult Inpatient Plan of Care  Goal: Absence of Hospital-Acquired Illness or Injury  Outcome: Improving     Problem: Adult Inpatient Plan of Care  Goal: Readiness for Transition of Care  Outcome: Improving     Problem: Neutropenia (Chemotherapy Effects)  Goal: Absence of Infection  Outcome: Improving     Problem: Pain Acute  Goal: Optimal Pain Control  Outcome: No Change     Problem: Anemia (Chemotherapy Effects)  Goal: Anemia Symptom Improvement  Outcome: No Change

## 2020-03-06 NOTE — H&P
[facility]   Acute Rehabilitation Unit  Admission History and Physical    CHIEF COMPLAINT   Debility secondary to Diffuse large B-cell lymphoma    HISTORY OF PRESENT ILLNESS  Chuyita Porter is a 69 year old female with PMH bilateral breast cancer s/p radiation (right breast 2005 & left breast 2015) previously on anastrozole for prophylaxis was admitted for medical management after intubation due to threatened airway during open neck biopsy, tracheal stent and PEG tube placement as part of planned treatment regimen on 2/17/20. 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 She tolerated the procedure and the intubation, and was extubated 2/26. She subsequently underwent two fractions of radiotherapy on 2/18 and 2/19. Care team initiated R-CHOP chemotherapy on 2/21 with planned for f/u with Dr Sprague and cycle 2 of R-CHOP on 3/13/2020.       During acute hospitalization the following procedures were performed:  - 2/21/2020 & 2/26/2020 - Bronchoscopy  - 2/18/2020-2/19/2020 - Radiotherapy  - 2/17/2020 - EGD + PEG placement  - 2/17/2020 - Flexible bronchoscopy with endobronchial forcep biopsy, which returned as DLBCL    Her hospital course was complicated by febrile neutropenia with suspected HCAP recent mechanical ventilation and PET scan showing bibasilar atelectasis and metabolic uptake with possible aspiration, she is s/p 7 day course abx with Levaquin ending 3/6, leukocytosis likely 2/2 Neupogen, Oropharyngeal candidiasis completed 7 day course nystatin PO, and severe malnutrition due to chronic illness. Her diet previously included TF and was subsequently advanced with normal VFSS on 3/2.    During her acute hospitalization, patient was seen and evaluated by PT, OT, and SLP service.  All specialties collectively recommended that patient would benefit from ongoing therapies in the acute inpatient rehabilitation setting whom noted the  following:    PT: Bed Rolling SBA, Supine to Sit SBA, Sit to Stand SBA, Pt ambulated 200 ft w/ WW, SBA limited by SOB/fatigue. Pt performed 3 stairs w/ B railings w/ SBA.    OT: Upper Body Dressing SBA, Lower Body Dressing SBA, Bathing Independent, Toileting Max A, Pt requires Patrick for juan antonio cares, able to don/doff gown and socks with mod-I using figure four technique while sitting. Pt requires increased time to complete shower task with frequent rest breaks 2/2 fatigue.    SLP: Dysphagia resolved. Pt diet advanced by SLP to regular diet/thin liquids while implementing chin tuck strategy - requires f/u assessment for diet tolerance by SLP. Pt reports she has been consuming nectar thick liquids because she is hesitant about aspirating.     Currently, the patient is medically appropriate and is assessed to have needs and will benefit from an inpatient acute rehabilitation comprehensive program working with PT, OT and SLP, and will also benefit from supervision and management of Rehab Nursing and Rehab MD.       PAST MEDICAL HISTORY  Past Medical History:   Diagnosis Date     Breast cancer (H) 2005     Hx of radiation therapy     2005 +2015       SURGICAL HISTORY  Past Surgical History:   Procedure Laterality Date     ADENOIDECTOMY       BRONCHOSCOPY (RIGID OR FLEXIBLE), DIAGNOSTIC N/A 2/17/2020    Procedure: DIAGNOSTIC BRONCHOSCOPY WITH BIOPSY,.;  Surgeon: Jered Montoya MD;  Location: [facility] OR     ENDOSCOPIC INSERTION TUBE GASTROSTOMY Left 2/17/2020    Procedure: Endoscopic insertion tube gastrostomy;  Surgeon: Jaswinder Parker MD;  Location: [facility] OR     LUMPECTOMY BREAST      RT lunmpectomy 2005 with radiation. Lt lumpectomy 2015 with radiation     TONSILLECTOMY       SOCIAL HISTORY  Marital Status:   Living situation: Lives in split level home in [place], [place]. Pt's bedroom is on 2nd level. Bathrooms available on all levels. Pt recently had walk in shower installed.  Family support:  available  and supportive  Vocational History: retired  Tobacco use: denies  Alcohol use: denies  Illicit drug use: denies    FAMILY HISTORY  Family History   Problem Relation Age of Onset     Breast Cancer Paternal Aunt         2 aunts who had breast cancer in their 70's     PRIOR FUNCTIONAL HISTORY   Pt was independent with all ADLs/IADLs, transfers, mobility and gait.      MEDICATIONS  Medications Prior to Admission   Medication Sig Dispense Refill Last Dose     calcium carbonate-vitamin D (CALCIUM HIGH POTENCY/VITAMIN D) 600-200 MG-UNIT TABS Take 1 tablet by mouth   Past Week at Unknown time     loratadine (CLARITIN) 10 MG tablet Take 10 mg by mouth   Past Week at Unknown time     Multiple Vitamins-Minerals (MULTIVITAMIN ADULT PO) Take 1 tablet by mouth daily   Past Week at Unknown time     [DISCONTINUED] anastrozole (ARIMIDEX) 1 MG tablet TAKE 1 TABLET EVERY DAY   2/16/2020 at 2100     [DISCONTINUED] predniSONE (DELTASONE) 20 MG tablet Take 2 tablets (40 mg) by mouth daily 6 tablet 0 2/16/2020 at 1230       ALLERGIES   No Known Allergies      REVIEW OF SYSTEMS  A 10 point ROS was performed and negative unless otherwise noted in HPI.     Constitutional: denies any fevers, chills.   Eyes: denies changes in visual acuity   Ears, Nose, Throat: denies any difficulty swallowing   Cardiovascular: denies any exertional chest pain or palpitation   Respiratory: endorses exertional dyspnea, none at rest   Gastrointestinal: Notes diarrhea x 1 earlier today. denies any nausea, vomiting, abdominal pain, or constipation   Genitourinary: denies any dysuria, hematuria, frequency or urgency   Musculoskeletal: denies any joint pain or neck pain    Neurologic: denies any headache, changes in motor or sensory function, no loss of balance or vertigo   Psychiatric: Endorses anxiety over new diagnosis, and intermittent sleep disturbance due to anxiety. Denies depression. Denies SI     PHYSICAL EXAM  VITAL SIGNS:  There were no vitals taken for  "this visit.  BMI:  Estimated body mass index is 27.07 kg/m  as calculated from the following:    Height as of 2/27/20: 1.676 m (5' 6\").    Weight as of an earlier encounter on 3/6/20: 76.1 kg (167 lb 11.2 oz).     General: NAD, pleasant and cooperative   HEENT: ATNC, oropharynx clear with MMM, EOMI, PERRL, no signs of oral candidiasis    Pulmonary: clear breath sounds b/l, no rales/wheezing    Cardiovascular: RRR, no murmur   Abdominal: soft, non-tender, non-distended, PEG in place LLQ clean and dry  Extremities: warm, well perfused, no edema in bilateral lower extremities, no tenderness in calves   MSK/neuro:   Mental Status:  alert and oriented x3    Cranial Nerves: grossly normal   1. 2nd CN: Pupils equal, round, reactive to light and accomodation. and visual fields intact to confrontation.   2. 3rd,4th,6th CN:  EOMI, appropriate pupillary responses  3. 5th CN: facial sensation intact   4. 7th CN: face symmetrical   5. 8th CN: functional hearing bilaterally  6. 9th, 10th CN: palate elevates symmetrically   7. 11th CN: sternocleidomastoids and trapezii strong   8. 12th CN: tongue midline and without fasciculations     Sensory: Normal to light touch in bilateral upper and lower extremities    Strength:    SF  EF  EE  WE  G  I  HF  KE  DF  EHL  PF   R  5 5 5 5 4 4 4 5 5 5 5  L  5 5 5 5 4 4 4 5 5 5 5    Reflexes: Present and symmetrical    Jaquez's test: negative bilaterally    Babinski reflex: downgoing bilaterally    Tone per modified Eric Scale: No spasticity   Abnormal movements: None    Coordination: No dysmetria on finger to nose b/l    Speech: Soft spoke otherwise normal, non dysarthric speech   Cognition: normal   Gait: deferred    Skin: no bruising or bleeding and normal skin color, texture, turgor    LABS    Lab Results   Component Value Date    WBC 12.2 (H) 03/06/2020    HGB 9.7 (L) 03/06/2020    HCT 30.4 (L) 03/06/2020    MCV 95 03/06/2020     03/06/2020     Lab Results   Component Value Date "     03/06/2020    POTASSIUM 4.2 03/06/2020    CHLORIDE 105 03/06/2020    CO2 29 03/06/2020     (H) 03/06/2020     Lab Results   Component Value Date    GFRESTIMATED >90 03/06/2020    GFRESTBLACK >90 03/06/2020     Lab Results   Component Value Date    AST 24 03/05/2020    ALT 29 03/05/2020    ALKPHOS 150 03/05/2020    BILITOTAL 0.2 03/05/2020     Lab Results   Component Value Date    INR 1.21 (H) 02/19/2020     Lab Results   Component Value Date    BUN 17 03/06/2020    CR 0.64 03/06/2020     IMAGING:  Modified Barium Swallow Study with Speech Pathology, 3/2/2020  Impression:   No aspiration with thin liquid, pudding, or combination pudding and cookie consistencies.     Please see the speech pathologist report for further details.    ASSESSMENT/PLAN:  Chuyita Porter is a 69 year old right hand dominant female PMH bilateral breast cancer s/p radiation (right breast 2005 & left breast 2015) previously on anastrozole for prophylaxis was admitted for medical management after intubation due to threatened airway during open neck biopsy, tracheal stent and PEG tube placement as part of planned treatment regimen on 2/17/20. 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 She presents with fatigue, decreased strength, decreased tolerance to activity, functional impairments in mobility, functional impairments in gait, functional impairments in ADLs/iADLs, and dysphagia. She is admitted to ARU on 3/6/2020 for continued interdisciplinary rehabilitation management      Impairment group code: 16 Debility (non-cardiac, non-pulmonary): Diffuse large B-cell lymphoma    1. PT, OT and SLP on a daily basis, in addition to rehab nursing and close management of physiatrist.      2. Impairment of ADL's: OT for 75 min daily to work on upper and lower body self care, dressing, toileting, bathing, energy conservation techniques with use of ADs as needed.     3. Impairment of mobility:  PT for  75 min daily to work on gait exercises, strengthening, endurance buildup, transfers with use of walker as needed.     4. Impairment of swallow:  SLP for 30 min daily for dysphagia evaluation and treatment strategies     5. Rehab RN to administer medication, patient education on medication taking, VS monitoring, bowel regimen, glucose monitoring and wound care/surgical wound dressing changes and monitoring.     1. Medical Conditions  # Extranodal Diffuse large B-cell lymphoma, non-germinal center type  On 2/14/20, CT chest showed 8.0 x 6.7 x 10.0 cm mass centered in the right anterior mediastinum with mass effect within the mediastinum, including compression of the SVC with associated collateral vessels seen in the neck. No mediastinal LN involvement. Planned endotracheal biopsies and placement of ET tube, along with PEG tube placement, done on 2/17. No stent was placed due to technical complications per Interventional Pulmonology note. Biopsy confirmed DLBCL. She underwent two fractions of radiotherapy on 2/18 and 2/19. Initiated R-CHOP chemotherapy on 2/21. Repeat CT Chest imaging on 2/25 demonstrated decreased size of mass. Completed 14 day course of Allopurinol.  -  f/u with Dr Sprague and cycle 2 of R-CHOP on 3/13/2020    # Febrile neutropenia, suspected HCAP, Improved  Fever 101.2F on 2/28. Last fever was 3/3 at 4:35 pm (100.6F). Resolved cough. Suspect HCAP given recent mechanical ventilation, and PET scan showing bibasilar atelectasis and metabolic uptake. Also possible aspiration. CXR (3/2) without acute airspace disease. Infectious workup performed acute hospital: [facility] wnl; BCx NGTD; RVP PCR negative. PET noted bibasilar atelectasis and metabolic uptake thought to be more consistent with inflammation/infection and less likely malignancy. Improved after 7 day course of abx.  - Monitor  - Consider neutropenic precautions as appropriate    # Leukocytosis  WBC up to 19.4 on 3/5 lowered to 12.2 on 3/6, likely  2/2 to Neupogen  - Monitor  - recheck 3/9/20    # Severe Malnutrition of Chronic Illness  PEG tube placed 2/17. Per outpatient ENT note, goal of PEG was empiric should she have dysphagia from originally planned stent placement or with planned therapy. No stent was placed.   - Previously on TF via PEG however VFSS noted no aspiration, and was progressed to regular diet with thin liquid.   - Continue to assess PO intake and need for TFs/PEG tube    #Anxiety, patient anxious about her new dignosis. No mental health history. Noted that ativan PRN was helpful. Discussed risks and benefits of benzodiazepines vs other anxiolytics. Pt amenable to psych consult  - Ativan 0.5mg PO q6H PRN for anxiety    #Sleep disturbance, due to anxiety.  - Melatonin 3mg PO at bedtime PRN    2. Adjustment to disability:  Clinical psychology to eval and treat  3. FEN: Regular diet with thin liquids  Bowel: LBM 3/6; Colace 100mg PO BID PRN, Dulcolax 10mg suppository daily PRN, Senokot 8.6 mg PO BID PRN, Miralax 17mg PO daily PRN for constipation  4. Bladder: Voids spontaneously  5. DVT Prophylaxis: Lovenox 40mg subcutaneous q24H, Ambulating 200ft on date of admission however will keep ppx for now given malignancy hypercoagulable state  6. GI Prophylaxis: None  7. Code: DNR/DNI  8. Disposition: Hopeful home with continued medical stability, improvement in functional impairments, and clearance by therapy teams  9. ELOS:  6 days.  10. Rehab prognosis:  Good  11. Follow up Appointments on Discharge:   - PM&R 8 weeks  - PCP 2 weeks  - Oncology TBD  - ENT (pt agreed to contact ENT nurse care coordinator's direct line if needed)    Patient seen and discussed with PM&R attending, Dr. Holguin, whom agrees with my assessment and plan    Timmy MARCANOA  PGY-2  Physical Medicine & Rehabilitation  3/6/2020 on 3:57 PM

## 2020-03-06 NOTE — PLAN OF CARE
Physical Therapy Discharge Summary    Reason for therapy discharge:    Discharged to acute rehabilitation facility.    Progress towards therapy goal(s). See goals on Care Plan in [facility] electronic health record for goal details.  Goals partially met.  Barriers to achieving goals:   discharge from facility.    Therapy recommendation(s):    Continued therapy is recommended.  Rationale/Recommendations:  Patient will benefit from skilled rehab in ARU setting to maximize functional recovery and mobility for goal of eventual safe return home.

## 2020-03-06 NOTE — PROGRESS NOTES
Social Work Services Discharge Note      Patient Name:  Chuyita Porter     Anticipated Discharge Date:  3/6/20    Discharge Disposition: ARU    [place] [id number] [place] [address]  [id number]  [phone]    Following MD:  Assigned per facility     Pre-Admission Screening (PAS) online form has been completed.  The Level of Care (LOC) is:  Determined Confirmation Code is:  Not needed for ARU placement  Patient/caregiver informed of referral to [facility] for Pre-Admission Screening for skilled nursing facility (SNF) placement and to expect a phone call post discharge from SNF.     Additional Services/Equipment Arranged:  [employer] ([phone]) wheelchair transport at 1430.     Patient / Family response to discharge plan:  In agreement     Persons notified of above discharge plan:  Patient; patient's spouse; medical team; charge nurse; bedside nurse; accepting facility    CTS Handoff completed:  YES    Medicare Notice of Rights provided to the patient/family:  YES    JACQUI Harp  7D Hematology/Oncology Social Worker  Phone: [phone]  Pager: [phone]  [email or web address]

## 2020-03-06 NOTE — PLAN OF CARE
Status stable, transfer to AR today, report was made, and left with WC and  follow to [facility]

## 2020-03-06 NOTE — PLAN OF CARE
FOCUS/GOAL  Bowel management, Bladder management, Pain management, Medical management, and Mobility    ASSESSMENT, INTERVENTIONS AND CONTINUING PLAN FOR GOAL:  Report received from 5C RN states that pt is A&O x4. SBA w/ walker. Continent of bowel and bladder. LBM 3/6. Has a G tube. Reg, thin diet but still likes apple juice thickened and pills through her G tube.     Patient arrived from  around 1445. Settled into room and orientation gone over. Questions answered.  at bedside. Continue w/ plan of care.

## 2020-03-06 NOTE — DISCHARGE SUMMARY
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     Patient first noticed symptoms of difficulty breathing and feeling of pressure on her neck/chest which she described as feeling like \"a albert cat curled up on my chest.\" FNA biopsy on 1/30/20 demonstrated B cell lymphoma. She was initially managed as an outpatient with oral steroids. Her case was completed at head/neck conference on morning of 2/17/20. She was taken to OR today for planned open neck biopsy, tracheal stent and PEG tube placement as part of planned treatment regimen. However, in OR, decision made to intubate patient with concern for threatened airway. And 8.5 armored ETT, ~1cm proximal to the mainstem ezekiel was placed and patient is to remain intubated for the next several days as radiation therapy is initiated.      Rad-Onc, Med-Onc, ENT, and Interventional Pulm are all participating in this patient's care.\" - per H&P on 2/17/2020 by Dr Crystal Chen    Hospital Course   Chuyita Porter was admitted on 2/17/2020.  The following problems were addressed during her hospitalization:     # Central airway obstruction, improved  Impending airway obstruction noted during workup for mediastinal mass shown to be B-cell lymphoma on previous FNA " (1/30/20). Patient intubated during biopsy and decision to maintain ETT for early stages of treatment. Plan to monitor size of mass to determine when ETT can be safely removed. On exam 2/23, deflated ETT with NO cuff leak heard.  Repeat CT Chest/Neck 2/25 showed significant reduction in tumor burden.  - Extubated on 2/26, transferred out of MICU on 2/27     # Febrile neutropenia, suspected HCAP, improved  Fever 101.2 on 2/28. Last fever was 3/3 at 4:35 pm (100.6). Resolved cough. Suspect HCAP given recent mechanical ventilation and PET scan showing bibasilar atelectasis and metabolic uptake. Also possible aspiration. CXR (3/2) without acute airspace disease. Infectious workup: UA wnl; BCx NGTD; RVP PCR negative. Reviewed PET for any e/o lung infection. Note bibasilar atelectasis and metabolic uptake that could be inflammation/infection/less likely malignancy.   - s/p Vancomycin + Cefepime (2/29-3/2)  - Switched to Zosyn (x 3/2), continue, de-escalated to levaquin 750mg po on 3/4-3/6      # Leukocytosis  WBC up to 19.4 on 3/5 lowered to 12.2 on 3/6, likely 2/2 to neupogen     # Extranodal Diffuse large B-cell lymphoma, non-germinal center type  On 2/14/20, CT chest showed 8.0 x 6.7 x 10.0 cm mass centered in the right anterior mediastinum with mass effect within the mediastinum, including compression of the SVC with associated collateral vessels seen in the neck. No mediastinal LN involvement. Planned endotracheal biopsies and placement of ET tube, along with PEG tube placement, done on 2/17. No stent was placed due to technical complications per Interventional Pulmonology note. Biopsy confirmed DLBCL. She underwent two fractions of radiotherapy on 2/18 and 2/19. Initiated R-CHOP chemotherapy on 2/21. Repeat CT Chest imaging on 2/25 demonstrated decreased size of mass.   - Outpatient [facility] f/u with Dr. Sprague close to anticipated start of next cycle, has appointments scheduled on 3/13 (SHANTELL and  labs)  - No e/o TLS, continue Allopurinol for 14 days (2/21-3/5)  - ANC now >1.0, discontinued neupogen 3/3  - Will follow with Dr. Sprague        # History of right breast cancer in 2005 and left breast cancer in 2015  - Currently holding patient's home anastrozole for now due to increased VTE risk. She follows with Dr. Gallardo. As per last clinic note, Dr. Gallardo had recommended anastrozole as prophylaxis and to continue until 2020. Discontinued this medication at discharge and pt will follow-up with Dr. Gallardo on 3/30/2020.       # Severe Malnutrition of Chronic Illness  PEG tube placed 2/17. Per outpatient ENT note, goal of PEG was empiric should she have dysphagia from originally planned stent placement or with planned therapy. No stent was placed.   - Nutrition consulted ,tolerated tube feeds overnight at 70ml/hr for 12 hours  - VFSS 3/2, OK for regular diet with thin liquid. Continue to assess PO intake and need for TFs/PEG tube  - On day of discharge was tolerating a regular diet with thin liquids, but had limited PO intake (some chicken noodle soup without noodles). Will need to work on this at acute rehab.    Patient was discussed with Dr Dukes on the day of discharge.    --  Indio Hinojosa MD PhD  Hematology, Oncology, and Bone Marrow Transplantation Service, [facility]  Please see sticky note for cross cover information    Significant Results and Procedures   2/21/2020 - Bronchoscopy    2/26/2020 - Bronchoscopy    2/18/2020-2/19/2020 - Radiotherapy    2/17/2020 -EGD + PEG placement    2/17/2020 - Flexible bronchoscopy with endobronchial forcep biopsy, which returned as DLBCL    Pending Results     Unresulted Labs Ordered in the Past 30 Days of this Admission     No orders found from 1/18/2020 to 2/18/2020.          Code Status   DNR    Primary Care Physician   Phyllis Juarez    Physical Exam   Temp: 99.3  F (37.4  C) Temp src: Oral BP: 117/80  Pulse: 98 Heart Rate: 103 Resp: 16 SpO2: 96 % O2 Device: None (Room air)    Vitals:    03/04/20 0755 03/05/20 0758 03/06/20 0820   Weight: 76 kg (167 lb 9.6 oz) 75.3 kg (165 lb 14.4 oz) 76.1 kg (167 lb 11.2 oz)     Vital Signs with Ranges  Temp:  [98.6  F (37  C)-99.3  F (37.4  C)] 99.3  F (37.4  C)  Pulse:  [] 98  Heart Rate:  [103] 103  Resp:  [16-18] 16  BP: (112-126)/(68-80) 117/80  SpO2:  [96 %-99 %] 96 %  I/O last 3 completed shifts:  In: 1350 [P.O.:360; NG/GT:150]  Out: 100 [Urine:100]    Constitutional: Well appearing in NAD. Ambulating w/ walker at bedside.  Eyes: No scleral icterus  ENT: No mucositis, no thrush  Respiratory: CTAB. Strong respiratory effort.  Cardiovascular: RRR no murmurs gallops rubs  GI: Soft, non-tender, non-distended. No r/g  Skin: No rashes appreciated  Musculoskeletal: No pretibial pitting edema  Neurologic: A/ox3, CN II-XII intact, moving all 4 extremities  Neuropsychiatric: Affect is appropriate    Time Spent on this Encounter   I, Indio Hinojosa MD, personally saw the patient today and spent greater than 30 minutes discharging this patient.    Discharge Disposition   Discharged to rehabilitation facility  Condition at discharge: Stable    Consultations This Hospital Stay   INTERVENTIONAL PULMONARY ADULT IP CONSULT  NUTRITION SERVICES ADULT IP CONSULT  RADIATION ONCOLOGY IP CONSULT  PHYSICAL THERAPY ADULT IP CONSULT  OCCUPATIONAL THERAPY ADULT IP CONSULT  ONCOLOGY ADULT IP CONSULT  HEMATOLOGY ADULT IP CONSULT  NUTRITION SERVICES ADULT IP CONSULT  PHARMACY IP CONSULT  VASCULAR ACCESS CARE ADULT IP CONSULT  VASCULAR ACCESS ADULT IP CONSULT  VASCULAR ACCESS ADULT IP CONSULT  INTERVENTIONAL RADIOLOGY ADULT/PEDS IP CONSULT  VASCULAR ACCESS CARE ADULT IP CONSULT  VASCULAR ACCESS CARE ADULT IP CONSULT  VASCULAR ACCESS CARE ADULT IP CONSULT  PALLIATIVE CARE ADULT IP CONSULT  SPEECH LANGUAGE PATH ADULT IP CONSULT  PHYSICAL MEDICINE & REHAB ASSESSMENT FOR REHAB PLACEMENT ADULT IP  CONSULT  PHARMACY TO DOSE VANC  VASCULAR ACCESS CARE ADULT IP CONSULT  PALLIATIVE CARE ADULT IP CONSULT  VASCULAR ACCESS CARE ADULT IP CONSULT    Discharge Orders      General info for SNF    Length of Stay Estimate: Short Term Care: Estimated # of Days <30  Condition at Discharge: Improving  Level of care:skilled   Rehabilitation Potential: Excellent  Admission H&P remains valid and up-to-date: Yes  Recent Chemotherapy: Date:  3/6/2020                     Use Nursing Home Standing Orders: Yes     Mantoux instructions    Give two-step Mantoux (PPD) Per Facility Policy Yes     Reason for your hospital stay    Mediastinal mass, lymphoma     Daily weights    Call Provider for weight gain of more than 2 pounds per day or 5 pounds per week.     Activity - Up ad sharla     Additional Discharge Instructions    [facility] cancer clinic triage line at [phone] for temp >100.4, uncontrolled nausea/vomiting/diarrhea/constipation, unrelieved pain, bleeding not relieved with pressure, dizziness, chest pain, shortness of breath, loss of consciousness, and any new or concerning symptoms.     Follow Up and recommended labs and tests    You will have cycle 2 of R-CHOP on 3/13/2020. These appointments have been scheduled for you.    You do not need to take any more antibiotics after discharge    You will need neutrophil growth factor after being discharged. Ideally this will be neulasta onpro given to you at your infusion of cycle 2 of R-CHOP. However, your insurance may not cover this. Alternatively you may need neupogen daily at acute rehab. We will sort this out and let the appropriate providers know the plan as soon as we have your insurance picture clarified.    You will need to follow up with Dr Sprague close to the start of your second cycle of R-CHOP and you need to follow up with Dr Gallardo regarding your breast cancer. We are holding your hormone therapy at this time.     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)         CONTINUE these medications which have NOT CHANGED    Details   calcium carbonate-vitamin D (CALCIUM HIGH POTENCY/VITAMIN D) 600-200 MG-UNIT TABS Take 1 tablet by mouth      loratadine (CLARITIN) 10 MG tablet Take 10 mg by mouth      Multiple Vitamins-Minerals (MULTIVITAMIN ADULT PO) Take 1 tablet by mouth daily         STOP taking these medications       anastrozole (ARIMIDEX) 1 MG tablet Comments:   Reason for Stopping:         predniSONE (DELTASONE) 20 MG tablet Comments:   Reason for Stopping:             Allergies   No Known Allergies  Data   Most Recent 3 CBC's:  Recent Labs   Lab Test 03/06/20  0430 03/05/20  0402 03/04/20  0320   WBC 12.2* 19.4* 9.4   HGB 9.7* 9.7* 9.2*   MCV 95 95 96    232 167      Most Recent 3 BMP's:  Recent Labs   Lab Test 03/06/20  0430 03/05/20  0402 03/04/20  0320    138 136   POTASSIUM 4.2 4.1 3.6   CHLORIDE 105 105 105   CO2 29 30 28   BUN 17 14 12   CR 0.64 0.57 0.62   ANIONGAP 3 4 4   GABI 9.1 9.0 8.5   * 126* 119*     Most Recent 2 LFT's:  Recent Labs   Lab Test 03/05/20  0402 03/02/20  0355   AST 24 13   ALT 29 37   ALKPHOS 150 101   BILITOTAL 0.2 0.3     Most Recent INR's and Anticoagulation Dosing History:  Anticoagulation Dose History     Recent Dosing and Labs Latest Ref Rng & Units 2/19/2020    INR 0.86 - 1.14 1.21(H)        Most Recent 3 Troponin's:No lab results found.  Most Recent Cholesterol Panel:No lab results found.  Most Recent 6 Bacteria Isolates From Any Culture (See EPIC Reports for Culture Details):  Recent Labs   Lab Test 02/29/20  0052  02/29/20  0046 02/22/20  1351   CULT No growth No growth No growth     Most Recent TSH, T4 and A1c Labs:  Recent Labs   Lab Test 02/17/20 2032   A1C 5.8*     Results for orders placed or performed during the hospital encounter of 02/17/20   XR Chest Port 1 View    Narrative    EXAM: XR CHEST PORT 1 VW  2/17/2020 6:31 PM     HISTORY:  Monitor ETT placement in OR.       COMPARISON:  12/14/2020    FINDINGS:   Single frontal radiograph of the chest.    The trachea slightly rightward deviated. Upper mediastinal opacity  represents known mediastinal mass. The endotracheal tube projects over  the mid/low thoracic trachea.No pneumothorax. Minimal basilar  atelectasis.    The included osseous structures, soft tissues and upper abdomen and  are within normal limits.        Impression    IMPRESSION: Endotracheal tube projects over the mid/low thoracic  trachea.     I have personally reviewed the examination and initial interpretation  and I agree with the findings.    NAVJOT CABALLERO MD   XR Chest Port 1 View    Narrative    EXAM: XR CHEST PORT 1 VW  2/18/2020 6:34 AM     HISTORY:  confirm ETT tube positioning       COMPARISON:  Chest x-ray 2/17/2020    FINDINGS:   Portable semiupright view of the chest. Endotracheal tube projects  over the mid to lower thoracic trachea. Cardiac silhouette is stable  in size. Persistent low lung volumes with interstitial pulmonary  opacities. No significant effusion. No pneumothorax.      Impression    IMPRESSION:   Endotracheal tube projects over the mid to low thoracic trachea.    I have personally reviewed the examination and initial interpretation  and I agree with the findings.    YULY MCKEON MD   XR Abdomen Port 1 View    Narrative    EXAMINATION: XR ABDOMEN PORT 1 VW, 2/18/2020 2:14 PM    COMPARISON: None    HISTORY: PEG placement    FINDINGS: Gastrostomy tube in the stomach. Calcified mass in the  pelvis likely representing fibroid. Unremarkable appearance of the  colon. No  air-filled dilated small bowel loops.      Impression    IMPRESSION: Gastrostomy tube overlying the stomach.     MARCO PONCE MD   XR Chest Port 1 View    Narrative    Exam: XR CHEST PORT 1 VW, 2/19/2020 7:28 AM    Indication: Confirm ETT tube positioning    Comparison: 2/19/2020, 2/18/2020, 2/17/2020    Findings:   A single AP view of the chest was obtained. The endotracheal tube tip  projects over the mid trachea approximately 4.0 cm from the ezekiel.  The cardiomediastinal silhouette is unchanged with continued  thickening of the right paratracheal stripe compatible with known  mediastinal mass. No pneumothorax or significant pleural effusion.  Unchanged streaky bibasilar opacities, likely atelectasis. The  visualized upper abdomen appears normal. Scoliotic curvature of the  thoracic spine.      Impression    Impression:   The endotracheal tube tip projects over the mid trachea approximately  4.0 cm from the ezekiel.    PAYAM LUZ MD   XR Chest Port 1 View    Narrative    Exam: XR CHEST PORT 1 VW, 2/19/2020 7:14 AM    Indication: Verify ETT placement after repositioning    Comparison: 2/19/2020, 2/18/2020, 2/14/2020    Findings:   A single semiupright AP view of the chest was obtained. The  endotracheal tube tip projects over the mid/low trachea, approximately  2.4 cm from the ezekiel. The cardiac silhouette is grossly stable.  Stable thickening of the right paratracheal stripe compatible with  known mediastinal mass. No pneumothorax or significant pleural  effusion. Stable streaky bibasilar opacities, likely atelectasis. The  visualized upper abdomen is unremarkable.      Impression    Impression:   1. The endotracheal tube tip projects over the mid/lower trachea  approximately 2.4 cm from the ezekiel.  2. Stable thickening of the right paratracheal stripe compatible with  known mediastinal mass.    PAYAM LUZ MD   XR Chest Port 1 View    Narrative    Exam: XR CHEST PORT 1 VW, 2/19/2020 9:58  AM    Indication: Confirm ETT positioning after movement    Comparison: 2/19/2019, 2/18/2019, 2/14/2019.    Findings:   A single AP view of the chest was obtained. The endotracheal tube tip  is in grossly stable position approximately 2.5 cm from the ezekiel.  The cardiomediastinal silhouette is unchanged. No pneumothorax or  significant pleural effusion. Stable streaky bibasilar opacities,  likely atelectasis. The visualized upper abdomen is unremarkable.      Impression    Impression:   The endotracheal tube tip is in grossly stable position, approximately  2.5 cm in the ezekiel.    PAYAM LUZ MD   CT Abdomen Pelvis w Contrast    Narrative    EXAMINATION: CT ABDOMEN PELVIS W CONTRAST, 2/19/2020 2:41 PM    TECHNIQUE:  Helical CT images from the lung bases through the  symphysis pubis were obtained with IV contrast. Contrast dose:  iopamidol (ISOVUE-370) solution 104 mL    COMPARISON: CT 2/14/2020    HISTORY: Cancer staging for known primary mediastinal lymphoma.    FINDINGS:    Abdomen and pelvis:     Prominent gastrohepatic lymph nodes measuring up to 8 mm in diameter  (series 5 image 145). A few prominent nonenlarged portacaval lymph  nodes measuring up to 6 mm in diameter (series 5, image 154).  Additional scattered, nonenlarged periaortic lymph nodes measuring up  to 8 mm in short axis dimension (series 5, image 212). No  pathologically enlarged abdominal, pelvic or inguinal lymph nodes. No  overtly suspicious mesenteric adenopathy.    Mildly heterogeneous appearance of the liver, likely due to contrast  bolus timing. No suspicious liver lesion. Mild periportal edema,  likely secondary to intravenous fluid. Mild enhancement of the  gallbladder wall, with trace adjacent pericholecystic fluid. No  significant pericholecystic inflammatory change. No intra or  extrahepatic biliary dilatation. Unremarkable pancreas, spleen, and  adrenal glands. Symmetric nephrographic enhancement of the kidneys,  without  hydronephrosis. The urinary bladder is distended, with Reid  catheter in place. Myomatous uterus, with multiple calcified and  noncalcified uterine fibroids.    Percutaneous gastrostomy tube. No abnormally dilated or thickened  loops of small or large bowel. No intraperitoneal free fluid or free  air. No pneumatosis or portal venous gas. Unremarkable appendix in the  right lower quadrant. Colonic diverticulosis without secondary  findings to suggest diverticulitis. Abdominal vasculature is patent,  without evidence of intrarenal abdominal aortic aneurysm.     Lung bases: Trace bilateral pleural effusions with adjacent  compressive atelectasis/consolidation of the lung bases. Stable  cardiac size from prior. Known mediastinal mass is not included within  the field-of-view of this examination. Unremarkable distal esophagus.    Bones and soft tissues: No acute or aggressive osseous lesion.  Degenerative changes of the lumbar spine, most pronounced at L4-5 and  L5-S1.      Impression    IMPRESSION:   1. Multiple prominent gastrohepatic, portacaval, and para-aortic lymph  nodes, which are not enlarged by pathologic criteria. No definite  evidence of lymphoma in the abdomen or pelvis. Attention on follow-up.  2. Mild enhancement of the gallbladder wall, with trace  pericholecystic fluid. No CT evidence of cholelithiasis. If there is  clinical concern for cholecystitis, consider further evaluation with  right upper quadrant ultrasound.    I have personally reviewed the examination and initial interpretation  and I agree with the findings.    TOMMIE WOODARD MD   XR Chest Port 1 View    Narrative    Exam: XR CHEST PORT 1 VW, 2/20/2020 5:35 AM    Indication: confirm ETT tube positioning    Comparison: 2/19/2020.    Findings:   Single portable AP view of the chest. ET tube tip mid thoracic trachea  2.5 cm from the ezekiel. Lung volumes are lower. Cardiac silhouette is  unchanged. Stable small bilateral pleural effusions with  overlying  basilar opacities. Mild pulmonary vascular congestion. No  pneumothorax. Visualized upper abdomen is unremarkable.  Dextroscoliotic curvature thoracic spine.      Impression    Impression:   1. Endotracheal tube in the low thoracic trachea 2.5 cm from the  ezekiel.  2. Stable small bilateral pleural effusions with overlying basilar  opacities.    I have personally reviewed the examination and initial interpretation  and I agree with the findings.    MARCO PONCE MD   XR Chest Port 1 View    Narrative    Exam: XR CHEST PORT 1 VW, 2/21/2020 6:00 AM    Indication: confirm ETT tube positioning    Comparison: 2/20/2020    Findings:   Single portable AP view the chest. Endotracheal tube 2.7 cm from the  ezekiel. Stable low lung volumes and enlarged cardiac silhouette.  Stable small bilateral pleural effusions and overlying bibasilar  opacities. Pulmonary vascular congestion is stable. No pneumothorax.  Visualized upper abdomen is unremarkable.      Impression    Impression:   1. Stable mild pulmonary vascular congestion, small bilateral pleural  effusions, and bibasilar atelectasis/consolidation.  2. Stable endotracheal tube positioning.    I have personally reviewed the examination and initial interpretation  and I agree with the findings.    TOMMIE WOODARD MD   XR Chest Port 1 View    Narrative    EXAM: XR CHEST PORT 1 VW  2/21/2020 5:24 PM     HISTORY:  Worsening PIP       COMPARISON:  2/21/2020 at 0551 hours    FINDINGS:   Single frontal radiograph of the chest. Endotracheal tube projects low  thoracic trachea.    The trachea is midline. The cardiomediastinal silhouette is  well-defined. The pulmonary vasculature are distinct. No acute  airspace opacity, pleural effusion or appreciable pneumothorax.  Improved opacities compared to prior.    The included osseous structures, soft tissues and upper abdomen and  are within normal limits.        Impression    IMPRESSION: Improved atelectasis/edema compared to  prior. No acute  airspace opacity.    I have personally reviewed the examination and initial interpretation  and I agree with the findings.    YULY MCKEON MD   XR Chest Port 1 View    Narrative    Exam: XR CHEST PORT 1 , 2/22/2020 6:30 AM    Indication: confirm ETT tube positioning    Comparison: Radiograph of the chest 2/21/2020    Findings:   45 degree AP view of of the chest. Endotracheal tube tip projects over  the lower thoracic trachea, 2.8 cm above the ezekiel.. The  cardiomediastinal silhouette is is within normal limits. The pulmonary  vasculature is distinct. Bibasilar opacities. Small bilateral pleural  effusions. No pneumothorax.. Gaseous distention of the colon in the  left upper quadrant. No acute osseous abnormality.      Impression    Impression:   1.  Endotracheal tube tip projects 2.8 cm above the ezekiel.  2.  Small bilateral pleural effusions with overlying  atelectasis/consolidations, slightly increased from the prior  radiograph.    I have personally reviewed the examination and initial interpretation  and I agree with the findings.    MARCO PONCE MD   XR Chest Port 1 View    Narrative    EXAMINATION: XR CHEST PORT 1 , 2/23/2020 9:10 AM    COMPARISON: 2/22/2020    HISTORY: confirm ETT tube positioning    FINDINGS: Endotracheal tube tip in the low thoracic trachea  approximately 2 cm above the ezekiel. Heart size is normal. Bibasilar  opacities and pleural fluid persist.      Impression    IMPRESSION:   1. Endotracheal tube tip remains in the low thoracic trachea.  2. Bilateral small pleural effusions and basilar atelectasis.     MARCO PONCE MD   XR Abdomen Port 2 Views    Narrative    Exam: XR ABDOMEN PORT 2 , 2/24/2020 1:38 AM    Indication: Last BM 2/16, concern for ileus vs. SBO, please obtain  supine and decubitus films    Comparison: CT 2/19/2020    Findings:   Single portable AP radiograph of the abdomen. Air distended colon. PEG  tube. No portal venous gas,  pneumoperitoneum, or pneumatosis. Lung  bases demonstrate small bilateral pleural effusions and overlying  atelectasis. Heart size is within normal limits. Soft tissues within  normal limits. No acute osseous abnormality. Calcified fibroids within  the pelvis.      Impression    Impression:   1. Air distention of large bowel without evidence of small bowel  obstruction.  2. Small bilateral pleural effusions with overlying atelectasis.  3. Minimal stool within the right colon.    I have personally reviewed the examination and initial interpretation  and I agree with the findings.    FIONA ALVARADO MD   XR Chest Port 1 View    Narrative    EXAM: XR CHEST PORT 1 VW  2/25/2020 5:50 AM     HISTORY:  Mediastinal lymphoma with ETT in place       COMPARISON:  Chest x-ray 2/23/2020    FINDINGS:   Portable semiupright view of the chest. Endotracheal tube tip projects  over the low thoracic trachea. Cardiac silhouette is stable in size.  Low lung volumes with ill-defined pulmonary vascular tear. Small  bilateral pleural effusions with overlying basilar opacities. No acute  osseous abnormality.      Impression    IMPRESSION:   Small bilateral pleural effusions with overlying  atelectasis/consolidation.    I have personally reviewed the examination and initial interpretation  and I agree with the findings.    YULY MCKEON MD   CT Soft Tissue Neck w Contrast    Narrative    CT SOFT TISSUE NECK W CONTRAST 2/25/2020 12:12 PM    History:  F/u imaging of medistinal lymphoma s/p R-CHOP x1      Comparison: CT 2/14/2020    Technique: Following intravenous administration of nonionic iodinated  contrast medium, thin section helical CT images were obtained from the  skull base down to the level of the aortic arch.  Axial, coronal and  sagittal reformations were performed with 2-3 mm slice thickness  reconstruction. Images were reviewed in soft tissue, lung and bone  windows.    Contrast: iopamidol (ISOVUE-370) solution 100  mL    Findings: Partially visualized lobulated heterogeneously enhancing  mass within the upper mediastinum extending superiorly to partially  encase the right thyroid gland and partially encases the right carotid  artery. While this mass is not completely visualized on this exam does  appear to be decreased in size when comparing to the examination from  2/14/2020.This is only partially visualized, but measures  approximately 5.5 x 4.5 x 6.1 cm within the upper mediastinum (series  2, image 74 and series 3, image 150). There is far less mass and mass  effect involving the great vessels within the upper mediastinum  including improved flow within the superior vena cava.    Evaluation of the mucosal space demonstrates no evident abnormality in  the nasopharynx, oropharynx, hypopharynx or the glottis. The tongue  base appears normal. The major salivary glands appear unremarkable.  The thyroid gland appears normal.    There is no evident cervical lymphadenopathy. The fascial spaces in  the neck are intact bilaterally. The major vascular structures in the  neck appear unremarkable.    Evaluation of the osseous structures demonstrate no worrisome lytic or  sclerotic lesion. No overt spinal canal or neuroforaminal stenosis.  Mild stepwise anterolisthesis of C4 on C5 and C5 on C6. The visualized  paranasal sinuses are clear. The mastoid air cells are clear.     The visualized lung apices are clear. Endotracheal tube is partially  visualized.      Impression    Impression:  Decreased size of the biopsy-proven mediastinal/lower neck lymphoma  from 2/14/2020 and noticeably less mass effect on the surrounding  vascular structures. This is only partially visualized on this  examination, please see dedicated chest CT for further details  regarding the mediastinal mass.    I have personally reviewed the examination and initial interpretation  and I agree with the findings.    RICCI CABRERA MD   CT Chest w Contrast     Value     Radiologist flags ETT in right mainstem bronchus (Urgent)    Narrative    Exam Type: CT CHEST W CONTRAST  Date and Time: 2/25/2020 12:11 PM  History: F/u imaging of mediastinal lymphoma s/p R-CHOP x1  Comparison: 2/25/2020    Procedure:   Helical  CT images were obtained with IV contrast.  Contrast dose: iopamidol (ISOVUE-370) solution 100 mL ml of contrast  was injected intravenously.  Oral contrast was not administered.     Radiation Dose (DLP): 740 mGy*cm    FINDINGS:    CHEST:  AIRWAYS: Endotracheal tube tip terminates in the proximal right mid  stem bronchus.  LUNG: Hazy patchy bilateral groundglass attenuation throughout.  Dependent lower lobe consolidations.  PLEURA: Small pleural effusions. No pneumothorax.  VESSELS: Main pulmonary artery measures up to 3.3 cm, similar to  comparison. Aorta is not dilated. Normal variant origin of the left  vertebral artery from the aortic arch.  HEART: Heart is not enlarged. Trace pericardial fluid.  MEDIASTINUM AND MONSERRAT: Edematous mediastinum. Bulky, necrotic,  heterogeneously hypoattenuating conglomerate mass within the anterior,  superior mediastinum measuring 5.5 x 3.2 x 7.3 cm, previously 8.0 x  6.7 x 10.0 cm. This extends superiorly into the low neck and envelops  the thyroid gland, abuts the medial margin of the right internal  carotid, right subclavian, posterior margin of the innominate artery.    BONES AND SOFT TISSUES:  No suspicious lytic or blastic osseous lesions.    UPPER ABDOMEN:  Limited evaluation of the upper abdomen demonstrates no acute  parenchymal abnormalities, nonobstructive bowel gas pattern, and no  free fluid or free air.       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  Decreased size of lobulated hypodense mediastinal mass, now  measuring up to 7.3 cm, previously 10.0 cm.  2.  Endotracheal tube tip terminates in the proximal right mainstem  bronchus, recommend  retracting 2 cm.  3.  Small pleural effusions with overlying compressive atelectasis  versus infectious consolidation.    [Urgent Result: ETT in right mainstem bronchus]    Finding was identified on 2/25/2020 1:51 PM.     Dr. Chen was contacted by Dr. Cabrera [place] at 2/25/2020 2:24 PM and  verbalized understanding of the urgent finding. Provider acknowledged  ET tube had already retracted 2 cm by time of conversation.     I have personally reviewed the examination and initial interpretation  and I agree with the findings.    YULY MCKEON MD   XR Abdomen Port 1 View    Narrative    XR ABDOMEN PORT 1 VW  2/25/2020 11:16 AM      HISTORY: constipation    COMPARISON: Abdominal radiograph 2/24/2020, CT abdomen posterior  2/19/2020, chest radiograph 2/23/2020    FINDINGS:   2 Supine views of the abdomen. The cardiac silhouette is similar in  size. Pulmonary vascular congestion. Unchanged bibasilar opacities and  pleural effusions. G-tube in place. No portal venous gas or  pneumatosis. Limited evaluation of pneumoperitoneum in this supine  view. Similar gaseous distention of the large bowel without evidence  of small bowel distention. Mildly increased gaseous distention of the  stomach. Minimal stool in the ascending colon. Calcified fibroids in  the pelvis. Mild scoliotic curvature. Degenerative changes of the  lumbosacral spine. No acute osseous abnormalities.      Impression    IMPRESSION:   1. Unchanged gaseous distention of the large bowel without evidence of  small bowel distention or obstruction. Continued minimal stool burden  in the ascending colon.    2. Mildly increased gaseous distention of the stomach.    3. Unchanged mild pleural effusions and associated bibasilar  opacities.    I have personally reviewed the examination and initial interpretation  and I agree with the findings.    SYLWIA IVERSON DO   XR Chest Port 1 View    Narrative    EXAMINATION: XR CHEST PORT 1 VW, 2/26/2020 12:50 AM    INDICATION:  ETT placement    COMPARISON: CT 2/25/2020. Plain film 2/25/2020.    FINDINGS: Single portable AP radiograph the chest. ET tube projects in  the mid thoracic trachea. Cardiomediastinal silhouette is stable in  size. Low lung volumes. Prominent pulmonary vasculature. Small  bilateral pleural effusions with overlying basilar opacities. No acute  osseous abnormality.      Impression    IMPRESSION:  1. ET tube projects over the mid thoracic trachea.  2. Small bilateral pleural effusions with overlying  atelectasis/consolidation, unchanged from 2/25/2020..    I have personally reviewed the examination and initial interpretation  and I agree with the findings.    FIONA ALVARADO MD   XR Chest Port 1 View    Narrative    EXAM: XR CHEST PORT 1 VW  2/27/2020 6:09 AM     HISTORY:  Mediastinal lymphoma extubated on 2/26       COMPARISON:  Chest x-ray 2/26/2020    FINDINGS:   Portable semiupright view of the chest. Removal of endotracheal tube.  Cardiac silhouette is within normal limits. Small bilateral pleural  effusions with overlying basilar opacities. Low lung volumes with  prominent pulmonary vasculature. No pneumothorax. Scoliotic curvature  of thoracic spine. Visualized upper abdomen is within normal limits.      Impression    IMPRESSION:   1. Removal of endotracheal tube.  2. Small bilateral pleural effusions with overlying  atelectasis/consolidation.    I have personally reviewed the examination and initial interpretation  and I agree with the findings.    YULY MCKEON MD   PET Oncology Whole Body    Narrative    Combined Report of:    PET and CT on  2/28/2020 3:20 PM :    1. PET of the neck, chest, abdomen, and pelvis.  2. PET CT Fusion for Attenuation Correction and Anatomical  Localization:    3. Diagnostic CT scan of the chest, abdomen, and pelvis with  intravenous contrast for interpretation.  3. CT of the chest, abdomen and pelvis obtained for diagnostic  interpretation.  4. 3D MIP and PET-CT fused images were  processed on an independent  workstation and archived to PACS and reviewed by a radiologist.    Technique:    1. PET: The patient received 10.21 mCi of F-18-FDG; the serum glucose  was 106 prior to administration, body weight was 79.4 kg. Images were  evaluated in the axial, sagittal, and coronal planes as well as the  rotational whole body MIP. Images were acquired from the Vertex to the  Feet.    UPTAKE WAS MEASURED AT 60 MINUTES.     BACKGROUND:  Liver SUV max= 4.13,   Aorta Blood SUV Max: 3.28.     2. CT: Volumetric acquisition for clinical interpretation of the  chest, abdomen, and pelvis acquired at 3 mm sections . The chest,  abdomen, and pelvis were evaluated at 5 mm sections in bone, soft  tissue, and lung windows.      The patient received 7 cc. Of Isovue 370 intravenously for the  examination.    --    3. 3D MIP and PET-CT fused images were processed on an independent  workstation and archived to PACS and reviewed by a radiologist.    INDICATION: extranodal B-cell lymphoma    ADDITIONAL INFORMATION OBTAINED FROM EMR: 69 year old female with  history of breast cancer and newly diagnosed mediastinal mass,  pathology of which is consistent with DLBCL. She was admitted for  planned chemoradiation. Underwent radiation x 2 fractions on 2/18 and  2/19, then initiated chemotherapy with RCHOP (D1=2/21/20).    COMPARISON: None.    FINDINGS:     HEAD/NECK:  No suspicious head and neck mucosal lesions. Salivary glands and  thyroid are within normal limits. Patent main neck vessels. Paranasal  sinuses and mastoid air cells are clear. No suspicious focal brain  uptake.    CHEST:  Upper mediastinum partially necrotic mass measuring 5.2 x 5.2 cm with  SUV max of 4.85, encasing the right lower lobe of the thyroid, and  without fat planes with the trachea and the brachiocephalic trunk and  mass effect on the right IJ, the lower trachea, appears decreased in  size compared to 1/27/2020 it measured 7.0 x 5.9 cm. No  suspicious  lung nodules. Bibasilar hypermetabolic atelectasis. Heart is within  normal limits.    ABDOMEN AND PELVIS:  Left liver hypodense lesion, unchanged since January 2020. Liver is  otherwise unremarkable. Gallbladder, kidneys, spleen, pancreas are  within normal limits. Calcified uterine fibroids. Urinary bladder is  within normal limits. Diffuse increased uptake of the colon and  stomach. Gastrostomy tube in place. Patent main abdominal vessels. No  hypermetabolic lymphadenopathy    LOWER EXTREMITIES:   No abnormal masses or hypermetabolic lesions.    BONES:   There are no suspicious lytic or blastic osseous lesions.  There is no  abnormal FDG uptake in the skeleton.        Impression    IMPRESSION: In this patient with history of extranodal B-cell  lymphoma:  1. Interval decrease of the upper mediastinal mass  encasing/originating from the thyroid compared to 1/27/2020; there is  residual FDG uptake therefore the scan is consistent with partial  response.  2. New minimal basilar atelectasis with increased FDG uptake, likely  inflammatory/infective.  3. Diffuse increased uptake of the colon and stomach, could be  inflammatory, infective due to medications like (metformin) or less  likely could represent lymphomatous bowel involvement. Attention on  follow-up is recommended.    Lugano Criteria for Lymphoma response to therapy methodology  Reported as: ex.  Lugano Criteria: Complete Response    PET response  Used for FDG avid Lymphomas (Hodgkin's & Diffuse Large B-Cell)  Complete                     <=Liver  (Deauville 1-3)  Partial                          > Liver & decreased from baseline   (Deauville 4-5)  Stable/No response     > Liver & no change from baseline  (Deauville  4-5)  Progressive                 > Liver & Increased or new FDG avid  lesion (Deauville 4-5)    CT response  Used for variable uptake Lymphomas (Follicular, Marginal zone, CLL,  Mantle Cell)  Complete                     all nodes  <1.5 cm  (longest diameter)  Partial                          Shrank > 50%        (SPD*)  Stable/No response      Shrank <50%         (SPD*)  Progressive disease      New or Increased size of lesions    *SPD = (sum of up to six lesions (longest x shortest diameter)    Source: Salas et al.  Imaging for Staging and Response Assessment in  Lymphoma.  Radiology August 2015.      I have personally reviewed the examination and initial interpretation  and I agree with the findings.    FIONA ALVARADO MD   CT Soft Tissue Neck w Contrast    Narrative    PET CT fusion examination  1. Neck CT with contrast  2. PET study of the neck  3. PET CT fusion study of the neck    History:  69-year-old female with history of breast cancer and newly  diagnosed mediastinal mass pathology of which is consistent with  DLBCL. She was admitted for planned chemoradiation.     Comparison: 2/25/2020 dated neck CT. Technique: Please refer to the  accompanying whole body PET-CT for report of the dose and whole body  PET-CT findings.  Regarding the neck, axial images were obtained after nonionic  intravenous contrast administration, with sagittal and coronal  reconstructions performed. Neck CT images were reviewed in bone, soft  tissue, and lung windows, with review of the fused PET-CT images as  well in multiple planes.  Dose: 107ml [id number]    Findings:    Previously seen endotracheal tube has been removed. There is FDG  uptake along the vocal cords without worrisome findings on CT, uptake  is likely physiologic.     Please refer to whole body PET CT report for the anterior mediastinal  FDG avid mass. Thyroid gland predominantly on the right is on the  right infiltrated by the anterior mediastinal mass. Submandibular  glands are small without mass. Parotid glands are normal.     There is no FDG avid or enlarged cervical lymphadenopathy.     Vascular structures are patent. In the brain parenchyma there is  diffuse volume loss without abnormal  activity or enhancing lesion.     Pharyngeal mucosal spaces are normal.      Impression    IMPRESSION:     No cervical lymphadenopathy.    Please refer to whole body PET CT report for the anterior mediastinal  FDG avid mass which partially involves the thyroid gland and for the  rest of the findings in the body.    JULIAN WHITESIDE MD   XR Chest 2 Views    Narrative    EXAM: XR CHEST 2 VW  3/2/2020 2:13 PM     HISTORY:  Concern for aspiration pneumonia in 69year old with DLBCL       COMPARISON:  Chest x-ray 2/27/2020    FINDINGS:   PA and lateral views of the chest. The trachea is midline. Cardiac  silhouette is within normal limits. Improved aeration of the lungs  without focal airspace opacity. Flattening of the hemidiaphragm seen  on the lateral view. No pleural effusion or pneumothorax. Scoliotic  curvature of the thoracic spine.      Impression    IMPRESSION:   Improved aeration of the lungs without evidence of acute airspace  disease.    I have personally reviewed the examination and initial interpretation  and I agree with the findings.    YULY MCKEON MD   XR Video Swallow with SLP or OT    Narrative    Examination:  Modified Barium Swallow Study with Speech Pathology,  3/2/2020    Comparison: None available    History: Diffuse large B-cell lymphoma status post chemoradiation with  concern for dysphagia.    Fluoroscopy time: 1.9 minute(s).    Findings: Under fluoroscopic guidance, the patient was given orally  administered barium of varying consistencies in the presence of the  speech pathology service.     Piecemeal swallows across consistencies. There is no delay in  swallowing. Deep penetration without aspiration with thin liquids,  with no penetration or aspiration during chin tuck maneuver. No  penetration or aspiration with pudding or combination pudding and  cookie consistencies. Mild vallecular residue with thicker  consistencies.      Impression    Impression: No aspiration with thin liquid, pudding,  or combination  pudding and cookie consistencies.     Please see the speech pathologist report for further details.      I have personally reviewed the examination and initial interpretation  and I agree with the findings.    TEE AMARAL MD   Echo Complete    Narrative    [id number]  [id number]  [id number]  [id number]^[email or web address]^ARPIT           [facility],[place]  Echocardiography [facility]  [address] [place] [address]     Name: MELODY ANDERSEN  MRN: [id number]  : 1950  Study Date: 2020 12:48 PM  Age: 69 yrs  Gender: Female  Patient Location: [facility]  Reason For Study: Chemo  Ordering Physician: ARPIT RODRIGUEZ  Performed By: Samantha Armstrong     BSA: 1.9 m2  Height: 66 in  Weight: 170 lb  HR: 70  BP: 96/63 mmHg  _____________________________________________________________________________  __        Procedure  Echocardiogram with two-dimensional, color and spectral Doppler performed.  Contrast Optison. Optison (NDC #6353-9471-86) given intravenously. Patient was  given 6 ml mixture of 3 ml Optison and 6 ml saline. 4 ml wasted.  _____________________________________________________________________________  __        Interpretation Summary  Global and regional left ventricular function is normal with an EF of 60-65%.  Right ventricular function, chamber size, wall motion, and thickness are  normal.  No pericardial effusion is present.  Previous study not available for comparison.  _____________________________________________________________________________  __        Left Ventricle  Global and regional left ventricular function is normal with an EF of 60-65%.  Left ventricular wall thickness is normal. Left ventricular size is normal.  Left ventricular diastolic function is normal. No regional wall motion  abnormalities are seen.     Right Ventricle  Right ventricular function, chamber size, wall motion, and thickness are  normal.     Atria  Both atria appear normal. The  atrial septum is intact as assessed by color  Doppler .     Mitral Valve  The mitral valve is normal.        Aortic Valve  Aortic valve is normal in structure and function.     Tricuspid Valve  The tricuspid valve is normal. Mild tricuspid insufficiency is present. The  right ventricular systolic pressure is approximated at 19.9 mmHg plus the  right atrial pressure. Pulmonary artery systolic pressure is normal.     Pulmonic Valve  The pulmonic valve is normal.     Vessels  The aorta root is normal. The thoracic aorta is normal. The pulmonary artery  is normal. The inferior vena cava is normal.     Pericardium  No pericardial effusion is present.        Compared to Previous Study  Previous study not available for comparison.  _____________________________________________________________________________  __  MMode/2D Measurements & Calculations     IVSd: 0.76 cm  LVIDd: 5.1 cm  LVIDs: 3.5 cm  LVPWd: 0.80 cm  FS: 31.6 %  LV mass(C)d: 138.0 grams  LV mass(C)dI: 73.9 grams/m2  asc Aorta Diam: 3.4 cm  LVOT diam: 2.0 cm  LVOT area: 3.1 cm2  LA Volume (BP): 41.3 ml  LA Volume Index (BP): 22.1 ml/m2  RWT: 0.31           Doppler Measurements & Calculations  MV E max angelica: 86.8 cm/sec  MV A max angelica: 61.6 cm/sec  MV E/A: 1.4  MV dec slope: 303.0 cm/sec2  TR max angelica: 223.0 cm/sec  TR max P.9 mmHg  E/E' av.8  Lateral E/e': 11.9  Medial E/e': 13.8     _____________________________________________________________________________  __           Report approved by: Mohini Parr 2020 03:06 PM        ATTENDING ADDENDUM:    I have independently seen and evaluated the patient on 2020 and reviewed clinical, laboratory, and radiographic findings. I have discussed the plan with the team and agree with the attached note with the following edits:    Chuyita Porter is a 69 year old year old female, with Hx breast ca on AI, new thyroid DLBCL s/p PEG and intubation, now extuibated and s/p RT and R-CHOP C1, counts  back, feels better. Afebrile.       Ph/E: Vitals reviewed. No distress. Oropharynx clear. Neck supple. Heart RRR. Lungs clear. Abdomen soft and G tube intact. No peripheral edema. No rash. Neuro nonfocal.      A&P: Discharge to rehab. Chemo hopefully on time next Thu. Needs neulasta on body injector that day. Finish abx today.       enoxaparin ANTICOAGULANT  40 mg Subcutaneous Q24H     levofloxacin  750 mg Oral Daily     loratadine  10 mg Oral or Feeding Tube Daily     nystatin  500,000 Units Oral 4x Daily     sodium chloride (PF)  3 mL Intracatheter Q8H       Joseph Dukes MD

## 2020-03-06 NOTE — PROGRESS NOTES
Visited with patient and her  today. She is feeling a bit anxious about the idea of discharging from hospital. Provided reassurance. I discussed with her that would anticipate swallow to improve - can have her see our outpatient SLP team but only after she is discharged from hospital and then rehab. Provided option of follow-up in our clinic vs letting us know if issues arise. She wishes to contact us and has my nurse care coordinator's direct line to contact us.     Kiersten Valdez MD    Department of Otolaryngology

## 2020-03-06 NOTE — PHARMACY-CONSULT NOTE
Pharmacy Tube Feeding Consult    Medication reviewed for administration by feeding tube and for potential food/drug interactions.    Recommendation: No changes are needed at this time.     Pharmacy will continue to follow as new medications are ordered.    Grace Hector Pharm.D.

## 2020-03-06 NOTE — PLAN OF CARE
5C OT    Discharge Planner OT   Patient plan for discharge: ARU, maybe today?  Current status: Pt reporting muscle pain in L side, limiting functional mobility and UE exercises. SBA to ambulate x10 ft with FWW, <> toilet with grab bars, clothing management. Max A for juan antonio cares while standing with FWW. Pt completed x10 reps of 4 BUE exercises, modified to accommodate back pain. Pt educated in square breathing technique to promote sense of calm.   Barriers to return to prior living situation: medical status, endurance, LE/UE weakness  Recommendations for discharge: ARU  Rationale for recommendations: Pt is below baseline for ADL independence, will benefit from continued OT/PT/SLP therapies to progress performance and safety. Pt with good family support for ultimate discharge home. Anticipate pt able to tolerate 3 hrs therapy/day.       Entered by: Maine Lau 03/06/2020 9:36 AM

## 2020-03-06 NOTE — PROGRESS NOTES
CLINICAL NUTRITION SERVICES - ASSESSMENT NOTE     Nutrition Prescription    RECOMMENDATIONS FOR MDs/PROVIDERS TO ORDER:  None today    Malnutrition Status:    Patient does not meet two of the established criteria necessary for diagnosing malnutrition but is at risk for malnutrition    Recommendations already ordered by Registered Dietitian (RD):   to assist with menu ordering  Pt to try any supplement TID at all meals     Resume G-TF orders as pt was on the in the hospital: Nutren 1.5 @ 70 mL/hr x 12 hours (7PM-7AM) to provide 1260 kcal (20 kcal/kg), 57 g PRO (0.9 g/kg), 638 mL H2O, 148g CHO, and no fiber daily  Water Flushes: 60 mL q 4 hrs and before and after cyclic feeds    Future/Additional Recommendations:  Follow up on need for calorie counts to assess wean from feeding on Monday(3/9)  Follow up on well tolerated supplements  Monitor tolerance to tube feeding   Monitor weight trends      REASON FOR ASSESSMENT  Chuyita Porter is a/an 69 year old female assessed by the dietitian for Provider Order - Patient with severe malnutrition d/t chronic illness (malignancy) please evaluate caloric intake and make recommendations for supplementation and need for TF supplementation as needed    NUTRITION/MEDICAL HISTORY  Per chart review: Pt admits to ARU for rehabilitation in the setting of debility and treatments of Diffuse large B-cell lymphoma. Past medical history additionally noted for breast cancer.    Nutrition history: G-tube placed on 2/17 in anticipation of inadequate oral intakes from cancer treatments/ventilation, with pt being started on TF with Nutren 1.5 continuously on 2/18, post extubation diet advanced by speech therapy, and TF was transitioned to cycled regimen on 3/4 with pt still having poor oral intakes at that time.    Per pt/family visit: RD introduced self, reviewed role, reviewed above history. Pt reports still not having great appetite, but  admits has been much better today  "compared to previous days. RD reviewed options for oral supplements, pt open to trying all available and agreeing to have food serving assist with menu ordering. Reviewed TF plan of care, pt/ agreeing to continue cycled TF as pt was on at hospital, agreeing to follow up on need for strict calorie counts and further plans for weaning TF on Monday.     CURRENT NUTRITION ORDERS  Diet: Regular  Intake/Tolerance: Minimal po thus far    Nutrition Support during hospitalization: Nutren 1.5 @ 70 mL/hr x 12 hours (7PM-7AM) to provide 1260 kcal (20 kcal/kg), 57 g PRO (0.9 g/kg), 638 mL H2O, 148g CHO, and no fiber daily  Water Flushes: 60 mL q 4 hrs and before and after cyclic feeds    LABS  Results for MELODY ADNERSEN (MRN [id number]) as of 3/6/2020 16:28   3/5/2020 04:02 3/6/2020 04:30   Sodium 138 136   Potassium 4.1 4.2   Urea Nitrogen 14 17   Creatinine 0.57 0.64   Calcium 9.0 9.1   Magnesium  2.5 (H)   Phosphorus  4.7 (H)   Albumin 2.5 (L)    Protein Total 6.3 (L)    Bilirubin Total 0.2    Alkaline Phosphatase 150    ALT 29    AST 24    Bilirubin Direct <0.1       3/4/2020 03:20 3/4/2020 03:53 3/5/2020 04:02 3/6/2020 04:30   Glucose 119 (H) 118 (H) 126 (H) 104 (H)     MEDICATIONS  Ativan PRN, PRN bowel meds    ANTHROPOMETRICS  Height: 165.1 cm (5' 5\")  Most Recent Weight: 73.9 kg (162 lb 14.4 oz)    IBW: 56.8 kg  BMI: Overweight BMI 25-29.9  Weight History:   Wt Readings from Last 10 Encounters:   03/06/20 73.9 kg (162 lb 14.4 oz)   03/06/20 76.1 kg (167 lb 11.2 oz)   02/14/20 75.3 kg (166 lb 1.6 oz)   02/14/20 75.3 kg (165 lb 14.4 oz)   01/31/20 76.9 kg (169 lb 8 oz)   UBW: Pt reports a UBW of 168 lbs  Non-significant 4.1% weight loss in 1 month    Dosing Weight: 73.9 kg    ASSESSED NUTRITION NEEDS  Estimated Energy Needs: 1538-6139 kcals/day (25 - 30 kcals/kg)  Justification: Maintenance  Estimated Protein Needs: 75-90 grams protein/day (1 - 1.2 grams of pro/kg)  Justification: Hypercatabolism with acute " illness  Estimated Fluid Needs: 6190-5764 mL/day (25 - 30 mL/kg)   Justification: Maintenance    PHYSICAL FINDINGS  See malnutrition section below.    MALNUTRITION  % Intake: No decreased intake as pt being supported by EN   % Weight Loss: Weight loss does not meet criteria  Subcutaneous Fat Loss: None observed  Muscle Loss: None observed  Fluid Accumulation/Edema: None noted  Malnutrition Diagnosis: Patient does not meet two of the established criteria necessary for diagnosing malnutrition but is at risk for malnutrition    NUTRITION DIAGNOSIS  Inadequate oral intake related to swallowing difficulty and treatment related side effects as evidenced by need for EN support       INTERVENTIONS  Implementation  Nutrition Education: Nutrition plan of care discussed with pt and  in the room    Enteral Nutrition - Continue cycled TF as previously on at hospital   Medical food supplement therapy     Goals  Total avg nutritional intake to meet a minimum of 25 kcal/kg and 1.0 g PRO/kg daily (per dosing wt 73.9 kg).     Monitoring/Evaluation  Progress toward goals will be monitored and evaluated per protocol.

## 2020-03-07 ENCOUNTER — APPOINTMENT (OUTPATIENT)
Dept: PHYSICAL THERAPY | Facility: CLINIC | Age: 70
End: 2020-03-07
Payer: MEDICARE

## 2020-03-07 ENCOUNTER — APPOINTMENT (OUTPATIENT)
Dept: SPEECH THERAPY | Facility: CLINIC | Age: 70
End: 2020-03-07
Payer: MEDICARE

## 2020-03-07 ENCOUNTER — APPOINTMENT (OUTPATIENT)
Dept: OCCUPATIONAL THERAPY | Facility: CLINIC | Age: 70
End: 2020-03-07
Payer: MEDICARE

## 2020-03-07 LAB
GLUCOSE BLDC GLUCOMTR-MCNC: 107 MG/DL (ref 70–99)
GLUCOSE BLDC GLUCOMTR-MCNC: 109 MG/DL (ref 70–99)

## 2020-03-07 PROCEDURE — 25000132 ZZH RX MED GY IP 250 OP 250 PS 637: Performed by: PHYSICAL MEDICINE & REHABILITATION

## 2020-03-07 PROCEDURE — 97162 PT EVAL MOD COMPLEX 30 MIN: CPT | Mod: GP | Performed by: PHYSICAL THERAPIST

## 2020-03-07 PROCEDURE — 25000128 H RX IP 250 OP 636: Performed by: PHYSICAL MEDICINE & REHABILITATION

## 2020-03-07 PROCEDURE — 27210429 ZZH NUTRITION PRODUCT INTERMEDIATE LITER

## 2020-03-07 PROCEDURE — 00000146 ZZHCL STATISTIC GLUCOSE BY METER IP

## 2020-03-07 PROCEDURE — 92610 EVALUATE SWALLOWING FUNCTION: CPT | Mod: GN | Performed by: SPEECH-LANGUAGE PATHOLOGIST

## 2020-03-07 PROCEDURE — 12800006 ZZH R&B REHAB

## 2020-03-07 PROCEDURE — 97530 THERAPEUTIC ACTIVITIES: CPT | Mod: GP | Performed by: PHYSICAL THERAPIST

## 2020-03-07 PROCEDURE — 97535 SELF CARE MNGMENT TRAINING: CPT | Mod: GO

## 2020-03-07 PROCEDURE — 97166 OT EVAL MOD COMPLEX 45 MIN: CPT | Mod: GO

## 2020-03-07 PROCEDURE — 97110 THERAPEUTIC EXERCISES: CPT | Mod: GP | Performed by: PHYSICAL THERAPIST

## 2020-03-07 RX ADMIN — PSYLLIUM HUSK 1 PACKET: 3.4 POWDER ORAL at 21:45

## 2020-03-07 RX ADMIN — ENOXAPARIN SODIUM 40 MG: 40 INJECTION SUBCUTANEOUS at 08:31

## 2020-03-07 RX ADMIN — LORATADINE 10 MG: 10 TABLET ORAL at 08:31

## 2020-03-07 ASSESSMENT — MIFFLIN-ST. JEOR: SCORE: 1269.35

## 2020-03-07 NOTE — PROGRESS NOTES
"[facility], [place]   Physical Medicine and Rehabilitation Daily Note           Assessment and Plan of Care:   Chuyita Porter is a 69 year old right hand dominant female PMH bilateral breast cancer s/p radiation (right breast 2005 & left breast 2015) previously on anastrozole for prophylaxis was admitted for medical management after intubation due to threatened airway during open neck biopsy, tracheal stent and PEG tube placement as part of planned treatment regimen on 2/17/20. 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 She presents with fatigue, decreased strength, decreased tolerance to activity, functional impairments in mobility, functional impairments in gait, functional impairments in ADLs/iADLs, and dysphagia. She is admitted to ARU on 3/6/2020 for continued interdisciplinary rehabilitation management     Endorses poor sleep from anxiety. Discussed PRN ativan, and plan for her to see rehab Psych.  is present during visit and notes concerns about \"what I need to do this coming week,\" and \"what the future holds.\" Encouraged patient and  to reflect on their values and goals for their care related to her cancer, and discuss them with each other and with Oncologist. Spent an extended period of time with the family to provide empathetic listening and noted expectations for function as it relates to active treatment with chemotherapy. All questions and concerns addressed.    --Vitals stable. No lab today.  --Continue ongoing medical management.  --Continue therapies and plan of care.  --Leukocytosis on labs this AM, likely d/t her cancer, will monitor closely. Afebrile. Repeat CBC in AM         Interval history:   Notes 2 bouts of diarrhea this AM. Denies fever, chills, CP, SOB, N/V, abdominal pain, new pain or weakness/numbness/tingling.          Physical Exam:     Vitals:    03/06/20 2009 03/07/20 0020 03/07/20 0621 03/07/20 0902 "   BP: 130/73 125/70  121/73   BP Location: Left arm Left arm  Left arm   Pulse: 97 99     Resp: 16 18  16   Temp: 98.7  F (37.1  C) 98.6  F (37  C)  98.2  F (36.8  C)   TempSrc: Oral Oral  Oral   SpO2: 97% 97%  96%   Weight:   73.3 kg (161 lb 11.2 oz)    Height:         Gen: NAD, resting in bed  Heart: RRR  Lungs: clear breath sounds b/l  Abd: soft and non-tender, PEG in place LLQ clean and dry  Ext: wwp, no edema in BLE, no tenderness in calves  MSK/neuro: alert and oriented. speech fluent. moves BUE and BLE volitionally.          Data:   Scheduled meds    enoxaparin ANTICOAGULANT  40 mg Subcutaneous Q24H     loratadine  10 mg Oral Daily     Lab Results   Component Value Date    WBC 12.2 (H) 03/06/2020    HGB 9.7 (L) 03/06/2020    HCT 30.4 (L) 03/06/2020     03/06/2020     03/06/2020    POTASSIUM 4.2 03/06/2020    CHLORIDE 105 03/06/2020    CO2 29 03/06/2020    BUN 17 03/06/2020    CR 0.64 03/06/2020     (H) 03/06/2020    AST 24 03/05/2020    ALT 29 03/05/2020    ALKPHOS 150 03/05/2020    BILITOTAL 0.2 03/05/2020    INR 1.21 (H) 02/19/2020     PRN meds:  acetaminophen, bisacodyl, dextrose, docusate sodium, guaiFENesin, lidocaine 4%, LORazepam, melatonin, menthol (Topical Analgesic) 2.5%, polyethylene glycol, sennosides    Patient seen and discussed with PM&R attending, Dr. Crews, whom agrees with my assessment and plan    Timmy De Los Santos DO, MBA  PGY-2  Physical Medicine & Rehabilitation  3/7/2020 on 3:42 PM

## 2020-03-07 NOTE — PROGRESS NOTES
"SPIRITUAL HEALTH SERVICES  SPIRITUAL ASSESSMENT Progress Note  [facility] ([facility]) UR ARC /5R     REFERRAL SOURCE: Spiritual Consult    During this visit Chuyita expressed, \"I am feeling better today trying to catch up with things.\"   She shared, \"I have stated to eat now.\"   I listened to Pt spiritual narrative and expression of hope for healing.   We together engaged a brief reflective conversation around healinhg, when her  came into the room and joined in. Pt  expressed, \"For the first time, I slept well last night.\"  I provided spiritual support and ended visit with shared prayer.    PLAN: [facility] remains available to Pt's spiritual needs request during this hospitalization    Kika Mayfield  Chaplain Resident  Pager [phone]  "

## 2020-03-07 NOTE — PROGRESS NOTES
03/07/20 1409   Quick Adds   Type of Visit Initial Occupational Therapy Evaluation   Living Environment   Lives With spouse   Living Arrangements house   Home Accessibility stairs to enter home;stairs within home  (one step to enter with no railings, )   Number of Stairs, Main Entrance 1   Stair Railings, Main Entrance railing on right side (ascending)   Number of Stairs, Within Home, Primary 7   Stair Railings, Within Home, Primary railing on right side (ascending)   Transportation Anticipated car, drives self;family or friend will provide   Living Environment Comment OT: pt has to do 7 steps to go up/down to bedrooom/bathroom. pt has a walk in shower with grab bar. kitchen is on the main floor.    Self-Care   Usual Activity Tolerance good   Current Activity Tolerance fair   Regular Exercise Yes   Activity/Exercise Type walking   Exercise Amount/Frequency 3-5 times/wk   Equipment Currently Used at Home none   Functional Level   Ambulation 0-->independent   Transferring 0-->independent   Toileting 0-->independent   Bathing 0-->independent   Dressing 0-->independent   Eating 0-->independent   Communication 0-->understands/communicates without difficulty   Swallowing 0-->swallows foods/liquids without difficulty   Cognition 0 - no cognition issues reported   Fall history within last six months no   Which of the above functional risks had a recent onset or change? ambulation;transferring;toileting;bathing;dressing;fall history   Prior Functional Level Comment OT: Pt is very active and I at [facility]. pt was bowling ~2 months ago.    General Information   Referring Physician Isiah Holguin MD   Patient/Family Goals Statement 2/17/2020   Additional Occupational Profile Info/Pertinent History of Current Problem Per chart, pt was admitted for medical management after intubation due to threatened airway during open neck biopsy, tracheal stent and PEG tube placement as part of planned treatment regimen on 2/17/20.  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, non-germinal center type, anxiety, malnutrition with PEG tube placed, HCAP, pancytopenia PMH bilateral breast cancer s/p radiation   Weight-Bearing Status - LUE weight-bearing as tolerated   Weight-Bearing Status - RUE weight-bearing as tolerated   Weight-Bearing Status - LLE weight-bearing as tolerated   Weight-Bearing Status - RLE weight-bearing as tolerated   Cognitive Status Examination   Orientation orientation to person, place and time   Level of Consciousness alert   Follows Commands (Cognition) WNL   Memory intact   Attention No deficits were identified   Organization/Problem Solving No deficits were identified   Executive Function No deficits were identified   Visual Perception   Visual Perception Wears glasses  (reading)   Sensory Examination   Sensory Quick Adds No deficits were identified   Integumentary/Edema   Integumentary/Edema no deficits were identifed   Posture   Posture not impaired   Range of Motion (ROM)   ROM Comment OT: WNL BUE   Strength   Strength Comments OT: 4+ BUE    Hand Strength   Left hand  (pounds) 35 pounds   Right hand  (pounds) 40 pounds   Coordination   Upper Extremity Coordination No deficits were identified   Left hand, nine hole peg test (seconds) 24   Right hand, nine hole peg test (seconds) 25   ARC Assessment Only   Acute Rehab Functional Assessment See IP Rehab Daily Documentation Flowsheet for Functional Mobility/ADL Assessment   Instrumental Activities of Daily Living (IADL)   Previous Responsibilities meal prep;housekeeping;laundry;shopping;yardwork;finances;driving;medication management   IADL Comments I with all IADLs   Activities of Daily Living Analysis   Impairments Contributing to Impaired Activities of Daily Living strength decreased;balance impaired   General Therapy Interventions   Planned Therapy Interventions ADL retraining;IADL retraining;balance training;groups;ROM;strengthening;transfer  training;progressive activity/exercise;risk factor education;home program guidelines   Clinical Impression   Criteria for Skilled Therapeutic Interventions Met yes, treatment indicated   OT Diagnosis declined level of I in I/ADLs   Influenced by the following impairments muscle strength, limited activity tolerance, anxiety, unsteadiness   Assessment of Occupational Performance 5 or more Performance Deficits   Identified Performance Deficits toileting, g/h, dressing, bathing, meal prep, cleaning, laundry, gardening,    Clinical Decision Making (Complexity) Moderate complexity   Therapy Frequency Daily   Predicted Duration of Therapy Intervention (days/wks) 5-7days   Anticipated Equipment Needs at Discharge shower chair   Anticipated Discharge Disposition Home   Risks and Benefits of Treatment have been explained. Yes   Patient, Family & other staff in agreement with plan of care Yes   Clinical Impression Comments Pt presenting with slightly declined level of I in I/ADLs due to limited activity tolerance and muscle strength. Pt very active and I with all I/ADLs at [facility]. pt now requires SBA for all functional mobility, transfer and tasks. pt will benefit from skilled OT service to reach max potential level of I in I/ADLs prior to returning home. pt will be seen daily for  minutes. ELOS is 5-7days   Total Evaluation Time   Total Evaluation Time (Minutes) 15

## 2020-03-07 NOTE — H&P
Post Admission Physician Evaluation:    I have compared Chuyita Porter's condition on admission to acute rehabilitation to that outlined in the preadmission screen. History and physical exam performed by Timmy De Los Santos, PGY-2, and myself.  No significant differences are identified and the patient remains appropriate for an inpatient rehabilitation facility level of care to manage medical issues and address functional impairments due to deconditioning.    Comorbid medical conditions being managed: B cell lymphoma, anxiety, malnutrition with PEG tube placed, HCAP, pancytopenia    Prior functional level:   Pt was independent with all ADLs/IADLs, transfers, mobility and gait.      Present function:   PT: Bed Rolling SBA, Supine to Sit SBA, Sit to Stand SBA, Pt ambulated 200 ft w/ WW, SBA limited by SOB/fatigue. Pt performed 3 stairs w/ B railings w/ SBA.     OT: Upper Body Dressing SBA, Lower Body Dressing SBA, Bathing Independent, Toileting Max A, Pt requires Patrick for juan antonio cares, able to don/doff gown and socks with mod-I using figure four technique while sitting. Pt requires increased time to complete shower task with frequent rest breaks 2/2 fatigue.     SLP: Dysphagia resolved. Pt diet advanced by SLP to regular diet/thin liquids while implementing chin tuck strategy - requires f/u assessment for diet tolerance by SLP. Pt reports she has been consuming nectar thick liquids because she is hesitant about aspirating.     Anticipated rehabilitation course:   Anticipate discharge home at a modified independent for ambulation, mobility, and ADLs, and ability to perform 1 flight of stairs    Will benefit from intensive rehabilitation includin minutes each of PT, OT and SLP  Rehabilitation nursing  Close management by physiatry    Prognosis: Good    Estimated length of stay: 7 days    Rashaun Holguin MD  Department of Rehabilitation Medicine  Pager: [phone]

## 2020-03-07 NOTE — PLAN OF CARE
FOCUS/GOAL  Nutrition/Feeding/Swallowing precautions, Psychosocial needs, and Safety management    ASSESSMENT, INTERVENTIONS AND CONTINUING PLAN FOR GOAL:  Pt alert and oriented x4. VSS. Denies pain. PEG tube patent, tube feeding stopped at 9 am. Regular/thin diet, pills crushed and given via PEG tube per pt preference. Pt's appetite fair this morning she ate 50% breakfast. Continent of bowel and bladder, LBM 3/7. Pt c/o having several loose BM. Sticky note left for team to add fiber. Pt calls appropriately for needs. Bed alarm on for safety. Continue with POC.

## 2020-03-07 NOTE — PLAN OF CARE
Discharge Planner OT   Patient plan for discharge: Home with   Current status: SBA for functional mobility, transfer and tasks in room using FWW. SBA for G/H standing at sink, SBA toileting, will assess bathing tomorrow in OT  Barriers to return to prior living situation: limited activity tolerance, anxiety,  and muscle strength, declined level of I in I/ADLs  Recommendations for discharge: A with heavy demand home management tasks, goal is to return to being I with all ADLs, meal prep, and light-moderate demand household tasks  Rationale for recommendations: level of I at PLOF, current functional status        Entered by: Jr Lozano 03/07/2020 4:20 PM

## 2020-03-07 NOTE — PLAN OF CARE
FOCUS/GOAL  Bowel management, Bladder management, Nutrition/Feeding/Swallowing precautions, and Pain management    ASSESSMENT, INTERVENTIONS AND CONTINUING PLAN FOR GOAL:  Pt slept well at HOB 30. VSS.   No c/o pain.   G tube feeding running well overnight without complains, and will be stopped at 0900 because it was started late at 2100.   Cont of B/B, uses toilet, had BM this shift.  A & ox4.  A x1 with walker and Gb to transfer.   Pt on R/T diet, but she likes apple juice thickened and crushed pills through her G tube.   Call light in reach, bed alarm on .

## 2020-03-07 NOTE — PLAN OF CARE
Completed clinical bedside swallow eval per MD orders. Pt presents with minimal to mild oral pharyngeal dysphagia. Pt recently had VFSS completed on 3/2/20-- had deeper penetration with thin liquids and mild valleculae residue. However there was no aspiration on any consistencies and the deeper laryngeal penetration with thin liquids was controlled when pt used a chin tuck strategy. Pt was able to demonstrate during this eval - consistent use of chin tuck strategy with trials of nectar thick and thin liquids. Pt was cleared to be on a regular diet with thin liquids by hospital SLP but pt had been choosing to remain on nectar thick liquids due to fear of aspiration. With trials of both thinliquids by cup rim and nectar thick liquids- there were no s/s of aspiration. Further there were no s/s of aspiration with regular solids. Thre also was no sensation of pharyngeal retention.  Pt did have a cough intermittently during meal but has a baseline cough and this did not appear to be related to aspiration. Recommend continue with current regular diet and thin liquids. Pt needs to be upright for all po, no straws, consistently use chin tuck with thin liquids, small bites/sips, alternate solids and liquids, pace self. SLP to follow-up likely just 1-2 more times for diet tolerance.

## 2020-03-07 NOTE — PLAN OF CARE
Occupational Therapy Discharge Summary    Reason for therapy discharge:    Discharged to acute rehabilitation facility.    Progress towards therapy goal(s). See goals on Care Plan in [facility] electronic health record for goal details.  Goals partially met.  Barriers to achieving goals:   discharge from facility.    Therapy recommendation(s):    Continued therapy is recommended.  Rationale/Recommendations:  to increase ADL/IADL IND prior to return home.

## 2020-03-07 NOTE — PLAN OF CARE
PT eval complete, generally supervision level for mobility w/ walker; apprehensive about being strong enough to go home on the 12th.

## 2020-03-07 NOTE — PHARMACY-MEDICATION REGIMEN REVIEW
Pharmacy Medication Regimen Review  Chuyita Porter is a 69 year old female who is currently in the Acute Rehab Unit.    Assessment: All medications have an appropriate indications, durations and no unnecessary use was found    Plan:   Continue current regimen  Attending provider will be sent this note for review.  If there are any emergent issues noted above, pharmacist will contact provider directly by phone.      Pharmacy will periodically review the resident's medication regimen for any PRN medications not administered in > 72 hours and discontinue them. The pharmacist will discuss gradual dose reductions of psychopharmacologic medications with interdisciplinary team on a regular basis.    Please contact pharmacy if the above does not answer specific medication questions/concerns.    Background:  A pharmacist has reviewed all medications and pertinent medical history today.  Medications were reviewed for appropriate use and any irregularities found are listed with recommendations.      Current Facility-Administered Medications:      acetaminophen (TYLENOL) tablet 650 mg, 650 mg, Oral, Q4H PRN, Isiah Holguin MD     bisacodyl (DULCOLAX) Suppository 10 mg, 10 mg, Rectal, Daily PRN, Isiah Holguin MD     dextrose 10% infusion, , Intravenous, Continuous PRN, Isiah Holguin MD     docusate sodium (COLACE) capsule 100 mg, 100 mg, Oral, BID PRN, Isiah Holguin MD     enoxaparin ANTICOAGULANT (LOVENOX) injection 40 mg, 40 mg, Subcutaneous, Q24H, Isiah Holguin MD, 40 mg at 03/07/20 0831     guaiFENesin (ROBITUSSIN) 20 mg/mL solution 10 mL, 10 mL, Oral, Q4H PRN, Isiah Holguin MD, 10 mL at 03/06/20 2212     lidocaine (LMX4) cream, , Topical, TID PRN, Isiah Holguin MD     loratadine (CLARITIN) tablet 10 mg, 10 mg, Oral, Daily, Isiah Holguin MD, 10 mg at 03/07/20 0831     LORazepam (ATIVAN) tablet 0.5-1 mg, 0.5-1 mg, Oral, Q6H PRN, Isiah Holguin MD      melatonin tablet 3 mg, 3 mg, Oral, At Bedtime PRN, Isiah Holguin MD     menthol (Topical Analgesic) 2.5% (BENGAY VANISHIN SCENT) 2.5 % topical gel, , Topical, Q6H PRN, Isiah Holguin MD     polyethylene glycol (MIRALAX) Packet 17 g, 17 g, Oral, Daily PRN, Isiah Holguin MD     sennosides (SENOKOT) tablet 8.6 mg, 8.6 mg, Oral, BID PRN, Isiah Holguin MD  No current outpatient prescriptions on file.   PMH:   Breast cancer s/p radiation (2005, 2015)    Antonio Arevalo, PharmD, BCPS

## 2020-03-07 NOTE — PLAN OF CARE
FOCUS/GOAL  Nutrition/Feeding/Swallowing precautions and Mobility    ASSESSMENT, INTERVENTIONS AND CONTINUING PLAN FOR GOAL:  Alert and oriented x4, continent of B/B, 1 person assist with transfers. LBM 3/6/2020, pt reports loose stools. PEG tube flushed with 60 ml of water; TF 1900-700; started at 2100, currently running. Pt also reg/thin liquids but feels uncomfortable with swallowing and prefers nectar thick apple juice. Pt ate 50% of dinner this shift and is consciously making effort to eat and drink. Pt triggered sepsis today, lactic 0.8. UA collected (see results). Skin check completed (see charting). Pt declined shower. Pt has some anxiety regarding recent medical changes.  Ranjit serves as her support system through treatment. Call light within reach, alarms on and audible.

## 2020-03-08 ENCOUNTER — APPOINTMENT (OUTPATIENT)
Dept: PHYSICAL THERAPY | Facility: CLINIC | Age: 70
End: 2020-03-08
Payer: MEDICARE

## 2020-03-08 ENCOUNTER — APPOINTMENT (OUTPATIENT)
Dept: OCCUPATIONAL THERAPY | Facility: CLINIC | Age: 70
End: 2020-03-08
Payer: MEDICARE

## 2020-03-08 ENCOUNTER — APPOINTMENT (OUTPATIENT)
Dept: SPEECH THERAPY | Facility: CLINIC | Age: 70
End: 2020-03-08
Payer: MEDICARE

## 2020-03-08 LAB — GLUCOSE BLDC GLUCOMTR-MCNC: 89 MG/DL (ref 70–99)

## 2020-03-08 PROCEDURE — 27210429 ZZH NUTRITION PRODUCT INTERMEDIATE LITER

## 2020-03-08 PROCEDURE — 97530 THERAPEUTIC ACTIVITIES: CPT | Mod: GP | Performed by: PHYSICAL THERAPIST

## 2020-03-08 PROCEDURE — 97535 SELF CARE MNGMENT TRAINING: CPT | Mod: GO

## 2020-03-08 PROCEDURE — 97110 THERAPEUTIC EXERCISES: CPT | Mod: GP | Performed by: PHYSICAL THERAPIST

## 2020-03-08 PROCEDURE — 92526 ORAL FUNCTION THERAPY: CPT | Mod: GN | Performed by: SPEECH-LANGUAGE PATHOLOGIST

## 2020-03-08 PROCEDURE — 12800006 ZZH R&B REHAB

## 2020-03-08 PROCEDURE — 25000128 H RX IP 250 OP 636: Performed by: PHYSICAL MEDICINE & REHABILITATION

## 2020-03-08 PROCEDURE — 00000146 ZZHCL STATISTIC GLUCOSE BY METER IP

## 2020-03-08 PROCEDURE — 25000132 ZZH RX MED GY IP 250 OP 250 PS 637: Performed by: PHYSICAL MEDICINE & REHABILITATION

## 2020-03-08 RX ADMIN — ENOXAPARIN SODIUM 40 MG: 40 INJECTION SUBCUTANEOUS at 08:28

## 2020-03-08 RX ADMIN — LORATADINE 10 MG: 10 TABLET ORAL at 08:27

## 2020-03-08 ASSESSMENT — MIFFLIN-ST. JEOR: SCORE: 1268.89

## 2020-03-08 NOTE — PLAN OF CARE
Pt AxO, makes needs known. Denies SOB, CP, N/T, nausea. Slept well this shift. TF running overnight. Continent of bladder this shift. LBM 3/7. Moves with SBA, walker and gait belt to bathroom. No new concerns. Continue POC.

## 2020-03-08 NOTE — PROGRESS NOTES
03/08/20 0700   General Information   Onset Date 02/17/20   Start of Care Date 03/07/20   Referring Physician Dr Chelsie MD   Patient/Family Goals Statement to keep eating a regular diet and tolerate thin liquids   Swallowing Evaluation Bedside swallow evaluation   Behaviorial Observations WFL (within functional limits)   Mode of current nutrition Oral diet;PEG   Type of oral diet Regular;Thin liquid   Respiratory Status Room air   Comments 68 y/o F who presented to [facility] on 2/17 for bronchoscopy with biopsy, possible tracheal stent and PEG tube placement for mediastinal mass (B-cell lymphoma) with tracheal compression and airway compromise.  Stent was not able to be placed, but PEG tube was done.  During hospitalization she received radiation therapy and 1 cycle of chemo (R-CHOP).   Hospital course complicated by HCAP, febrile neutropenia, and pancytopenia.  A VFSS was completed which showed OK for regular diet and thin liquids, but she is still receiving nocturnal tube feeds for nutrition.    Clinical Swallow Evaluation   Oral Musculature generally intact   Structural Abnormalities none present   Dentition present and adequate   Mucosal Quality good   Mandibular Strength and Mobility intact   Oral Labial Strength and Mobility WFL   Lingual Strength and Mobility WFL   Velar Elevation intact   Buccal Strength and Mobility intact   Laryngeal Function Cough;Throat clear;Swallow;Voicing initiated;Dry swallow palpated   Additional Documentation Yes   Additional evaluation(s) completed today No   Swallow Eval   Feeding Assistance no assistance needed   Clinical Swallow Eval: Thin Liquid Texture Trial   Mode of Presentation, Thin Liquids cup   Volume of Liquid or Food Presented 4 oz   Oral Phase of Swallow WFL   Pharyngeal Phase of Swallow intact   Successful Strategies Trialed During Procedure chin tuck   Diagnostic Statement No s/s of aspiration with chin tuck strategy- pt consistently uses this strategy based on VFSS  just completed on 3/2/20   Clinical Swallow Eval: Nectar Thick Liquid Texture Trial   Mode of Presentation, Nectar cup   Volume of Nectar Presented small single sips   Oral Phase, Nectar WFL   Pharyngeal Phase, Nectar intact   Successful Strategies Trialed During Procedure, Nectar chin tuck   Diagnostic Statement No s/s of aspiration with a chin tuck or without a chin tuck   Clinical Swallow Eval: Solid Food Texture Trial   Mode of Presentation, Solid spoon;self-fed   Volume of Solid Food Presented small single bites of regular solids   Oral Phase, Solid WFL   Pharyngeal Phase, Solid intact   Successful Strategies Trialed During Procedure, Solid hard swallow;other (see comments)  (alternate solids and liquids)   Diagnostic Statement No s/s of aspiration; swallow timely;no oral residue; no sensation of pharyngeal retention; pt uses stategy of alternating with sips of liquids as recent VFSS did show some mild vallecular residue   VFSS Evaluation   VFSS Additional Documentation No   FEES Evaluation   Additional Documentation No   Swallow Compensations   Swallow Compensations Alternate viscosity of consistencies;Chin tuck;Effortful swallow;Pacing;Reduce amounts;Multiple swallow   Results   (hx of penetration with thin without chin tuck)   Esophageal Phase of Swallow   Patient reports or presents with symptoms of esophageal dysphagia No   Esophageal comments pt has mediastinal mass   General Therapy Interventions   Planned Therapy Interventions Dysphagia Treatment   Swallow Eval: Clinical Impressions   Skilled Criteria for Therapy Intervention Skilled criteria met.  Treatment indicated.   Functional Assessment Scale (FAS) 6   Dysphagia Outcome Severity Scale (LELE) Level 7 - LELE   Treatment Diagnosis Minimal oral pharyngeal dysphagia   Diet texture recommendations Regular diet;Thin liquids   Recommended Feeding/Eating Techniques alternate between small bites and sips of food/liquid;hard swallow w/ each bite or sip;no  straws;small sips/bites;tuck chin during every swallow   Demonstrates Need for Referral to Another Service occupational therapy;physical therapy   Therapy Frequency 2x/day   Anticipated Discharge Disposition home w/ assist   Risks and Benefits of Treatment have been explained. Yes   Patient, family and/or staff in agreement with Plan of Care Yes   Clinical Impression Comments Completed clinical bedside swallow eval per MD orders. Pt presents with minimal to mild oral pharyngeal dysphagia. Pt recently had VFSS completed on 3/2/20-- had deeper penetration with thin liquids and mild valleculae residue. However there was no aspiration on any consistencies and the deeper laryngeal penetration with thin liquids was controlled when pt used a chin tuck strategy. Pt was able to demonstrate during this eval - consistent use of chin tuck strategy with trials of nectar thick and thin liquids. Pt was cleared to be on a regular diet with thin liquids by hospital SLP but pt had been choosing to remain on nectar thick liquids due to fear of aspiration. With trials of both thinliquids by cup rim and nectar thick liquids- there were no s/s of aspiration. Further there were no s/s of aspiration with regular solids. Thre also was no sensation of pharyngeal retention.  Pt did have a cough intermittently during meal but has a baseline cough and this did not appear to be related to aspiration. Recommend continue with current regular diet and thin liquids. Pt needs to be upright for all po, no straws, consistently use chin tuck with thin liquids, small bites/sips, alternate solids and liquids, pace self. SLP to follow-up likely just 1-2 more times for diet tolerance.    Total Evaluation Time   Total Evaluation Time (Minutes) 40

## 2020-03-08 NOTE — PROGRESS NOTES
03/07/20 1438   Quick Adds   Type of Visit Initial PT Evaluation   Living Environment   Lives With spouse   Living Arrangements house   Home Accessibility stairs to enter home;stairs within home   Number of Stairs, Main Entrance 1   Stair Railings, Main Entrance railings on both sides of stairs;railing on right side (ascending)   Number of Stairs, Within Home, Primary 7   Stair Railings, Within Home, Primary railings on both sides of stairs   Transportation Anticipated family or friend will provide   Self-Care   Usual Activity Tolerance good   Current Activity Tolerance fair   Regular Exercise Yes   Activity/Exercise Type walking   Exercise Amount/Frequency daily   Equipment Currently Used at Home none   Functional Level Prior   Ambulation 0-->independent   Transferring 0-->independent   Toileting 0-->independent   Bathing 0-->independent   Communication 0-->understands/communicates without difficulty   Swallowing 0-->swallows foods/liquids without difficulty   Cognition 0 - no cognition issues reported   Fall history within last six months no   Which of the above functional risks had a recent onset or change? ambulation;transferring;toileting;bathing;dressing   General Information   Onset of Illness/Injury or Date of Surgery - Date 02/17/20   Referring Physician Chelsie Da Silva   Patient/Family Goals Statement return home, complete planned chemo therapy   Pertinent History of Current Problem (include personal factors and/or comorbidities that impact the POC) chest mass, new lymphoma   Precautions/Limitations fall precautions   Cognitive Status Examination   Orientation orientation to person, place and time   Level of Consciousness alert   Follows Commands and Answers Questions 100% of the time;able to follow multistep instructions   Personal Safety and Judgment intact   Memory intact   Pain Assessment   Patient Currently in Pain No   Integumentary/Edema   Integumentary/Edema no deficits were identifed   Posture     Posture Not impaired   Range of Motion (ROM)   ROM Quick Adds No deficits were identified   MMT: Hip, Rehab Eval   Hip Flexion - Left Side (4-/5) good minus, left   Hip Extension - Left Side (3/5) fair, left   Hip Flexion - Right Side (4-/5) good minus, right   Hip Extension - Right Side (3/5) fair, right   MMT: Ankle, Rehab Eval   Ankle Dorsiflexion - Left Side (4/5) good, left   Ankle Plantarflexion - Left Side (4-/5) good minus, left   Ankle Dorsiflexion - Right Side (4/5) good, left   Ankle Plantarflexion - Right Side (4-/5) good minus, left   ARC Assessment Only   Acute Rehab Functional Assessment See IP Rehab Daily Documentation Flowsheet for Functional Mobility/ADL Assessment   Balance   Balance Comments no central deficits   Sensory Examination   Sensory Perception no deficits were identified   Sensory Perception Comments intact vibration, great toe, light touch (B)   Coordination   Coordination no deficits were identified   Muscle Tone   Muscle Tone no deficits were identified   General Therapy Interventions   Planned Therapy Interventions groups;strengthening;progressive activity/exercise   Clinical Impression   Criteria for Skilled Therapeutic Intervention yes, treatment indicated   PT Diagnosis impaired force production   Influenced by the following impairments decreased strength proximal>distal   Functional limitations due to impairments needs use of walker, slow pace   Clinical Presentation Stable/Uncomplicated   Clinical Decision Making (Complexity) Low complexity   Therapy Frequency 2x/day   Predicted Duration of Therapy Intervention (days/wks) 5 days   Anticipated Equipment Needs at Discharge walker   Anticipated Discharge Disposition Home with Assist;Home with Home Therapy   Risk & Benefits of therapy have been explained Yes   Patient, Family & other staff in agreement with plan of care Yes   Clinical Impression Comments 70 y/o female s/p first course of CHOP for chest mass lymphoma, will  benefit from education around energy conservation and exercise training for strength during course of chemo.  Anticipate d/c home w/ assist from .    Total Evaluation Time   Total Evaluation Time (Minutes) 15

## 2020-03-08 NOTE — PLAN OF CARE
Discharge Planner OT   Patient plan for discharge: Home with  A, home OT  Current status: set up A and SBA for ADLs, A for light-mod demand IADLs  Barriers to return to prior living situation: limited activity tolerance, anxiety, limited understanding of EC/WS tech  Recommendations for discharge: home mod I with ADLs, A with heavy home management tasks  Rationale for recommendations: current functional status       Entered by: Jr Lozano 03/08/2020 3:04 PM

## 2020-03-08 NOTE — PLAN OF CARE
Pt seen for swallow tx- at a meal on a regular diet with thin liquids- pt observed to consistently use chin down with thin liquids- no aspiration s/s - pt is independent with use of this strategy. No aspiration s/s with regular solids. Pt has baseline cough but not with po intake. As pt is tolerating regular diet and is using safe swallow strategies independently: small bites/sips, chin tuck with liquids, no straw, alternate solids and liquids. As swallow goals met- no further sptx indicated for swallowing- will discharge from sptx    Speech Language Therapy Discharge Summary    Reason for therapy discharge:    All goals and outcomes met, no further needs identified.    Progress towards therapy goal(s). See goals on Care Plan in [facility] electronic health record for goal details.  Goals met    Therapy recommendation(s):    No further therapy is recommended.See above

## 2020-03-08 NOTE — PROGRESS NOTES
20 1750   Living Arrangements   Lives With spouse   Living Arrangements house   Home Safety   Patient Feels Safe Living in Home? yes   Discharge Needs Assessment   Transportation Anticipated family or friend will provide   Values Beliefs and Spiritual Care   F: Mariya: Do you have a connection with a mariya community, Amish, or Buddhism group? yes   The name of the mariya community, Amish, or Buddhism group is:   (Jew)   Final Resources   Equipment Currently Used at Home none     Social Work: Initial Assessment with Discharge Plan    Patient Name: Chuyita Porter  : 1950  Age: 69 year old  MRN: [id number]  Completed assessment with: patient  Admitted to ARU: 3/6/20    Presenting Information   Date of SW assessment: 3/8/20  Health Care Directive: none  Primary Health Care Agent: next-of-kin () would be surrogate decision-maker if necessary  Secondary Health Care Agent: n/a  Living Situation: lives with  in house with 7 steps  Previous Functional Status: independent  DME available: none  Patient and family understanding of hospitalization: cancer  Cultural/Language/Spiritual Considerations: speaks English, Jew (ok with  visits)    Physical Health  Reason for admission: diffuse large B-cell lymphoma    Provider Information   Primary Care Physician: Phyllis Juarez    Mental Health/Chemical Dependency:   Diagnosis: some anxiety-ativan  Alcohol/Tobacco/Narcotics: no problems  Support/Services in Place: palliative sw-Norma Bowles (hospital visit, can see if needed)  Services Needed/Recommended: health psychologist available if interested during stay  Sexuality/Intimacy: no concerns mentioned    Support System  Marital Status: -Ranjit (together 46+ years)  Family support: daughter-Kayla ([place]), dtr-Malou ([facility]) and 2 granddaughters  Other support available: relatives, close friends    Community Resources  Current in home services:  none  Previous services: none    Financial/Employment/Education  Employment Status: retired  Income Source:  penitentiary  Education: college  Financial Concerns: none  Insurance: [employer]    Discharge Plan   Patient and family discharge goal: home with  and recommended outpatient therapy services, chemotherapy starts 3/13 (unless needs to be postponed few days to wk)  Provided education on discharge plan: outpatient therapy discussed  Patient agreeable to discharge plan: tbd  Provided education and attained signature for Medicare IM and IRF Patient Rights and Privacy Information provided to patient : yes  Provided patient with [facility] [place] Resources: n/a  Barriers to discharge: none identified    Discharge Recommendations   Disposition: home with  and recommended outpatient therapy services, chemotherapy starts 3/13 (unless needs to be postponed few days/wk)  Transportation Needs:   Name of Transportation Company and Phone: n/a    Additional comments   D:  See H&P for full medical background.  I:  Met with patient to complete assessment and social work consult. Requested any resources for hair cutting; told her [facility] is only resource known except if own beautician able to visit on-site.  Patient said she has a relative who cuts hair but doesn't want to ask her.  Since she'll be going home later next week, she will ask  to coordinate this for her at home.  A:  Patient is doing okay.  Supportive family who is involved.    P:  SW will follow and assist as needed.    Please invite to Care Conference:  Ranjit ()    YUDITH Padilla  Weekend   Phone: [phone]  Pager: [phone]

## 2020-03-08 NOTE — PLAN OF CARE
Discharge Planner PT   Patient plan for discharge: home w/  assist, 3/12, home care f/u  Current status: sba w/ ww  Barriers to return to prior living situation: split level home, 7 steps, fatigue  Recommendations for discharge: reinforce energy conservation, gentle exercise  Rationale for recommendations: individualization POC.        Entered by: Shira Owen 03/08/2020 12:52 PM

## 2020-03-08 NOTE — PLAN OF CARE
FOCUS/GOAL  Bladder management, Nutrition/Feeding/Swallowing precautions, and Medication management    ASSESSMENT, INTERVENTIONS AND CONTINUING PLAN FOR GOAL:  Pt is alert and oriented x4, VSS, denies pain, SOB or nausea. Pt's TF started at 1930, had some difficulty getting TF line to stay connected, writer used tape to hold in place. Pt stated she ate very little of dinner. Cont of bladder using toilet. Started metamucil tonight to aid in bulking of stools.

## 2020-03-08 NOTE — PROGRESS NOTES
[facility], [place]   Physical Medicine and Rehabilitation Daily Note           Assessment and Plan of Care:   Chuyita Porter is a 69 year old right hand dominant female PMH bilateral breast cancer s/p radiation (right breast 2005 & left breast 2015) previously on anastrozole for prophylaxis was admitted for medical management after intubation due to threatened airway during open neck biopsy, tracheal stent and PEG tube placement as part of planned treatment regimen on 2/17/20. 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 She presents with fatigue, decreased strength, decreased tolerance to activity, functional impairments in mobility, functional impairments in gait, functional impairments in ADLs/iADLs, and dysphagia. She is admitted to ARU on 3/6/2020 for continued interdisciplinary rehabilitation management      Noted improvement with loose stools. She had a good night' rest and was without acute events overnight. Participating in therapies. Otherwise without complaints. Reviewed PEG cares with patient and spouse. All questions and concerns addressed. No new changes to treatment plan today.    --Vitals stable. No lab today.  --Continue ongoing medical management.  --Continue therapies and plan of care.         Interval history:   Denies fever, chills, CP, SOB, N/V, abdominal pain, new pain or weakness/numbness/tingling.             Physical Exam:     Vitals:    03/07/20 0902 03/07/20 2034 03/08/20 0600 03/08/20 0849   BP: 121/73 127/68  125/67   BP Location: Left arm Left arm  Left arm   Pulse:  93  102   Resp: 16 16  16   Temp: 98.2  F (36.8  C) 98.6  F (37  C)  97.7  F (36.5  C)   TempSrc: Oral Oral  Oral   SpO2: 96% 94%  97%   Weight:   73.3 kg (161 lb 9.6 oz)    Height:         Gen: NAD, resting in bed  Heart: RRR  Lungs: clear breath sounds b/l  Abd: soft and non-tender, PEG in place LLQ clean and dry  Ext: wwp, no edema in BLE, no  tenderness in calves  MSK/neuro: alert and oriented. speech fluent. moves BUE and BLE volitionally.          Data:   Scheduled meds    enoxaparin ANTICOAGULANT  40 mg Subcutaneous Q24H     loratadine  10 mg Oral Daily       PRN meds:  acetaminophen, bisacodyl, dextrose, docusate sodium, guaiFENesin, lidocaine 4%, LORazepam, melatonin, menthol (Topical Analgesic) 2.5%, polyethylene glycol, psyllium, sennosides      Patient seen and discussed with PM&R attending, Dr. Crews, whom agrees with my assessment and plan    Timmy De Los Santos DO, MBA  PGY-2  Physical Medicine & Rehabilitation  3/8/2020 on 3:47 PM

## 2020-03-08 NOTE — PLAN OF CARE
FOCUS/GOAL  Bowel management, Nutrition/Feeding/Swallowing precautions, Medication management, and Psychosocial needs    ASSESSMENT, INTERVENTIONS AND CONTINUING PLAN FOR GOAL:  Pt VSS. Denies pain or any discomfort. BG x1 86. Tube feeding stopped at 7.30 am. Pt ate 25 % breakfast. Swallowed pills whole with apple sauce without difficulty. Continent of bladder and bowel, LBM 3/7 no BM today. She declined her metamucil this morning. Orders changed to PRN. Pt had a shower this AM with OT. SBA/walker for transfers. Calls appropriately for assistance. Ok to continue with POC.

## 2020-03-09 ENCOUNTER — APPOINTMENT (OUTPATIENT)
Dept: PHYSICAL THERAPY | Facility: CLINIC | Age: 70
End: 2020-03-09
Payer: MEDICARE

## 2020-03-09 ENCOUNTER — APPOINTMENT (OUTPATIENT)
Dept: OCCUPATIONAL THERAPY | Facility: CLINIC | Age: 70
End: 2020-03-09
Payer: MEDICARE

## 2020-03-09 LAB
ANION GAP SERPL CALCULATED.3IONS-SCNC: 5 MMOL/L (ref 3–14)
ANISOCYTOSIS BLD QL SMEAR: SLIGHT
BASOPHILS # BLD AUTO: 0 10E9/L (ref 0–0.2)
BASOPHILS NFR BLD AUTO: 0 %
BUN SERPL-MCNC: 19 MG/DL (ref 7–30)
CALCIUM SERPL-MCNC: 8.9 MG/DL (ref 8.5–10.1)
CHLORIDE SERPL-SCNC: 107 MMOL/L (ref 94–109)
CO2 SERPL-SCNC: 28 MMOL/L (ref 20–32)
CREAT SERPL-MCNC: 0.57 MG/DL (ref 0.52–1.04)
DIFFERENTIAL METHOD BLD: ABNORMAL
EOSINOPHIL # BLD AUTO: 0.1 10E9/L (ref 0–0.7)
EOSINOPHIL NFR BLD AUTO: 1.7 %
ERYTHROCYTE [DISTWIDTH] IN BLOOD BY AUTOMATED COUNT: 15.7 % (ref 10–15)
GFR SERPL CREATININE-BSD FRML MDRD: >90 ML/MIN/{1.73_M2}
GLUCOSE BLDC GLUCOMTR-MCNC: 101 MG/DL (ref 70–99)
GLUCOSE BLDC GLUCOMTR-MCNC: 106 MG/DL (ref 70–99)
GLUCOSE SERPL-MCNC: 106 MG/DL (ref 70–99)
HCT VFR BLD AUTO: 31.8 % (ref 35–47)
HGB BLD-MCNC: 10.4 G/DL (ref 11.7–15.7)
LYMPHOCYTES # BLD AUTO: 0.8 10E9/L (ref 0.8–5.3)
LYMPHOCYTES NFR BLD AUTO: 11.3 %
MCH RBC QN AUTO: 30.8 PG (ref 26.5–33)
MCHC RBC AUTO-ENTMCNC: 32.7 G/DL (ref 31.5–36.5)
MCV RBC AUTO: 94 FL (ref 78–100)
METAMYELOCYTES # BLD: 0.1 10E9/L
METAMYELOCYTES NFR BLD MANUAL: 0.9 %
MONOCYTES # BLD AUTO: 1.6 10E9/L (ref 0–1.3)
MONOCYTES NFR BLD AUTO: 22.6 %
MYELOCYTES # BLD: 0.2 10E9/L
MYELOCYTES NFR BLD MANUAL: 2.6 %
NEUTROPHILS # BLD AUTO: 4.4 10E9/L (ref 1.6–8.3)
NEUTROPHILS NFR BLD AUTO: 60.9 %
PLATELET # BLD AUTO: 339 10E9/L (ref 150–450)
PLATELET # BLD EST: NORMAL 10*3/UL
POIKILOCYTOSIS BLD QL SMEAR: SLIGHT
POTASSIUM SERPL-SCNC: 4.3 MMOL/L (ref 3.4–5.3)
RBC # BLD AUTO: 3.38 10E12/L (ref 3.8–5.2)
SODIUM SERPL-SCNC: 140 MMOL/L (ref 133–144)
WBC # BLD AUTO: 7.2 10E9/L (ref 4–11)

## 2020-03-09 PROCEDURE — 12800006 ZZH R&B REHAB

## 2020-03-09 PROCEDURE — 25000132 ZZH RX MED GY IP 250 OP 250 PS 637: Performed by: PHYSICAL MEDICINE & REHABILITATION

## 2020-03-09 PROCEDURE — 97110 THERAPEUTIC EXERCISES: CPT | Mod: GO

## 2020-03-09 PROCEDURE — 27210429 ZZH NUTRITION PRODUCT INTERMEDIATE LITER

## 2020-03-09 PROCEDURE — 97535 SELF CARE MNGMENT TRAINING: CPT | Mod: GO

## 2020-03-09 PROCEDURE — 97530 THERAPEUTIC ACTIVITIES: CPT | Mod: GP

## 2020-03-09 PROCEDURE — 00000146 ZZHCL STATISTIC GLUCOSE BY METER IP

## 2020-03-09 PROCEDURE — 97116 GAIT TRAINING THERAPY: CPT | Mod: GP

## 2020-03-09 PROCEDURE — 85025 COMPLETE CBC W/AUTO DIFF WBC: CPT | Performed by: PHYSICAL MEDICINE & REHABILITATION

## 2020-03-09 PROCEDURE — 25000128 H RX IP 250 OP 636: Performed by: PHYSICAL MEDICINE & REHABILITATION

## 2020-03-09 PROCEDURE — 97110 THERAPEUTIC EXERCISES: CPT | Mod: GP

## 2020-03-09 PROCEDURE — 80048 BASIC METABOLIC PNL TOTAL CA: CPT | Performed by: PHYSICAL MEDICINE & REHABILITATION

## 2020-03-09 PROCEDURE — 36415 COLL VENOUS BLD VENIPUNCTURE: CPT | Performed by: PHYSICAL MEDICINE & REHABILITATION

## 2020-03-09 RX ADMIN — LORATADINE 10 MG: 10 TABLET ORAL at 07:22

## 2020-03-09 RX ADMIN — ENOXAPARIN SODIUM 40 MG: 40 INJECTION SUBCUTANEOUS at 07:22

## 2020-03-09 ASSESSMENT — MIFFLIN-ST. JEOR: SCORE: 1263.45

## 2020-03-09 NOTE — PROGRESS NOTES
ARU Care Coordinator Progress Note    Admission date: 3/6/2020    Data: Chuyita Porter is a 70 yo female on ARU for rehab and tube feeding following hospitalization for diffuse large B-cell lymphoma, and complications.    Intervention: Met with patient and  Ranjit to introduce the role of Care Coordinator and to begin discussion of anticipated discharge planning needs. Updated ARU charge nurse on need to enter PLC referral for enteral teaching. Initiated home care SN/PT/OT referral with [facility], patient had no agency preference. Initiated enteral referral with [facility], as [facility] reported they are out of network with [facility] for enteral services.    Assessment: Patient will have above home care, enteral, and teaching needs. She hopes to be able to wean from tube feeding but if so, would still go home with GT and need teaching on GT cares and flushing. She lives with supportive  Ranjit, who is able to assist at home as needed and will be primary caregiver. Patient scheduled for next chemotherapy treatment on 3/13/2020. She is currently on Lovenox, would need teaching if she is to go home on injections.    Plan: Will continue to follow discharge planning needs throughout ARU stay. Current target discharge date if 3/12/2020 if she meets ARU therapy goals and remains medically stable.    Isidoro Jesus RN, BSN, Patient Care Management Coordinator  [facility]  [address], [address] [place] [address]  [email or web address]  [email or web address]   Desk: [phone] TCU Main [phone] Fax [phone] Pager [phone]

## 2020-03-09 NOTE — PLAN OF CARE
FOCUS/GOAL  Bladder management, Nutrition/Feeding/Swallowing precautions, and Mobility    ASSESSMENT, INTERVENTIONS AND CONTINUING PLAN FOR GOAL:  Pt alert and oriented x4, VSS, denies pain or SOB. Ambulated with SBA and walker. Cont of bladder using toilet. Ate well at dinner 75%, TF started at 1900.

## 2020-03-09 NOTE — PLAN OF CARE
OT: Patient safely demonstrates Mod I with donning shoes, toileting, grooming/hygiene standing sink side, and bed mobility. Education on Mod I with use of the walker and still calling for assistance as needed. If patient is hooked up to feedings or has lines she will require a staff member to assist. Patient and  verbalize understanding. Spoke with Dr. Burk as well regarding the abovementioned.

## 2020-03-09 NOTE — PLAN OF CARE
PT: Introduced sitting and standing strengthening exercises to develop HEP. Gait training with 4WW. Discussion of energy conservation techniques and aerobic exercise progression.

## 2020-03-09 NOTE — PROGRESS NOTES
"  [facility]   Acute Rehabilitation Unit  Daily progress note    INTERVAL HISTORY  Chuyita Porter was seen during her OT session. Doing well. Denied any pain or discomfort. Reported improvement of loose BMs and her energy level over the past 2 days.      Mod I in her room now when off TF.   Discussed with dietitian; was started on calorie counts. Plan for discharge to home on Thursday 3/12, most likely with the tube feeding.       MEDICATIONS  Scheduled meds    enoxaparin ANTICOAGULANT  40 mg Subcutaneous Q24H     loratadine  10 mg Oral Daily       PRN meds:  acetaminophen, bisacodyl, dextrose, docusate sodium, guaiFENesin, lidocaine 4%, LORazepam, melatonin, menthol (Topical Analgesic) 2.5%, polyethylene glycol, psyllium, sennosides      PHYSICAL EXAM  /75 (BP Location: Right arm)   Pulse 101   Temp 97.4  F (36.3  C) (Oral)   Resp 16   Ht 1.651 m (5' 5\")   Wt 72.8 kg (160 lb 6.4 oz)   SpO2 98%   BMI 26.69 kg/m    Gen: NAD, pleasant   Pulm: non-labored in room air   Abd: soft   Ext: trace edema in bilateral lower extremities    Neuro/MSK: was seen ambulating with FWW    LABS  BMP wnl  Stable anemia   BGs in good no       ASSESSMENT AND PLAN  Chuyita Porter is a 69 year old right hand dominant female PMH bilateral breast cancer s/p radiation (right breast 2005 & left breast 2015) previously on anastrozole for prophylaxis was admitted for medical management after intubation due to threatened airway during open neck biopsy, tracheal stent and PEG tube placement as part of planned treatment regimen on 2/17/20. 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 She presents with fatigue, decreased strength, decreased tolerance to activity, functional impairments in mobility, functional impairments in gait, functional impairments in ADLs/iADLs, and dysphagia. She is admitted to ARU on 3/6/2020 for continued interdisciplinary " rehabilitation management      Rehabilitation - continue comprehensive acute inpatient rehabilitation program with multidisciplinary approach including therapies, rehab nursing, and physiatry following. See interval history for updates.       Medical Conditions  # Extranodal Diffuse large B-cell lymphoma, non-germinal center type  On 2/14/20, CT chest showed 8.0 x 6.7 x 10.0 cm mass centered in the right anterior mediastinum with mass effect within the mediastinum, including compression of the SVC with associated collateral vessels seen in the neck. No mediastinal LN involvement. Planned endotracheal biopsies and placement of ET tube, along with PEG tube placement, done on 2/17. No stent was placed due to technical complications per Interventional Pulmonology note. Biopsy confirmed DLBCL. She underwent two fractions of radiotherapy on 2/18 and 2/19. Initiated R-CHOP chemotherapy on 2/21. Repeat CT Chest imaging on 2/25 demonstrated decreased size of mass. Completed 14 day course of Allopurinol.  -  f/u with Dr Sprague and cycle 2 of R-CHOP on 3/13/2020     # Febrile neutropenia, suspected HCAP, Improved  Fever 101.2F on 2/28. Last fever was 3/3 at 4:35 pm (100.6F). Resolved cough. Suspect HCAP given recent mechanical ventilation, and PET scan showing bibasilar atelectasis and metabolic uptake. Also possible aspiration. CXR (3/2) without acute airspace disease. Infectious workup performed acute hospital: UA wnl; BCx NGTD; RVP PCR negative. PET noted bibasilar atelectasis and metabolic uptake thought to be more consistent with inflammation/infection and less likely malignancy. Improved after 7 day course of abx.  - Monitor  - Consider neutropenic precautions as appropriate     # Leukocytosis - resolved   WBC up to 19.4 on 3/5 lowered to 12.2 on 3/6, likely 2/2 to Neupogen  - Monitor  - recheck 3/9/20, WBC wnl     # Severe Malnutrition of Chronic Illness  PEG tube placed 2/17. Per outpatient ENT note, goal of PEG was  empiric should she have dysphagia from originally planned stent placement or with planned therapy. No stent was placed.   - Previously on TF via PEG however VFSS noted no aspiration, and was progressed to regular diet with thin liquid.   - Continue to assess PO intake and need for TFs/PEG tube     #Anxiety, patient anxious about her new dignosis. No mental health history. Noted that ativan PRN was helpful. Discussed risks and benefits of benzodiazepines vs other anxiolytics. Pt amenable to psych consult  - Ativan 0.5mg PO q6H PRN for anxiety     #Sleep disturbance, due to anxiety.  - Melatonin 3mg PO at bedtime PRN     Adjustment to disability:  Clinical psychology to eval and treat as needed  FEN: Regular diet with thin liquids  Bowel: LBM 3/6; Colace 100mg PO BID PRN, Dulcolax 10mg suppository daily PRN, Senokot 8.6 mg PO BID PRN, Miralax 17mg PO daily PRN for constipation  Bladder: Voids spontaneously; PVRs 0x2.  DVT Prophylaxis: Lovenox 40mg subcutaneous q24H, Ambulating 200ft on date of admission however will keep ppx for now given malignancy hypercoagulable state  GI Prophylaxis: None  Code: DNR/DNI  Disposition: Hopeful home with continued medical stability, improvement in functional impairments, and clearance by therapy teams  ELOS:  6 days.  Rehab prognosis:  Good  Follow up Appointments on Discharge:   - PM&R 8 weeks  - PCP 2 weeks  - Oncology TBD  - ENT (pt agreed to contact ENT nurse care coordinator's direct line if needed)    Bhargavi Da Silva MD  Physical Medicine & Rehabilitation    I spent a total of 30 minutes face to face and coordinating care of Chuyita Porter.  Over 50% of my time on the unit was spent counseling the patient and /or coordinating care.

## 2020-03-09 NOTE — PLAN OF CARE
FOCUS/GOAL  Bowel management, Bladder management, Nutrition/Feeding/Swallowing precautions, and Pain management    ASSESSMENT, INTERVENTIONS AND CONTINUING PLAN FOR GOAL:  A & O x4. VSS.   MOD I in the room when no TF connected started from this shift.    Cont of B/B, uses toilet, LBM 3/9.   Denied pain.   PEG tube flange turned quarter and dressing CDI. G tube clapped this shift. Residual checked Q 4hrs.   Denied nausea , SOB, chest pain.   Calm and cooperative.  visited and supportive.  Pt takes pills as whole with apple sauce.   R/T diet, calorie count started from this shift. 510 calorie for lunch.

## 2020-03-10 ENCOUNTER — APPOINTMENT (OUTPATIENT)
Dept: PHYSICAL THERAPY | Facility: CLINIC | Age: 70
End: 2020-03-10
Payer: MEDICARE

## 2020-03-10 ENCOUNTER — HOME CARE/HOSPICE - HEALTHEAST (OUTPATIENT)
Dept: HOME HEALTH SERVICES | Facility: HOME HEALTH | Age: 70
End: 2020-03-10

## 2020-03-10 ENCOUNTER — APPOINTMENT (OUTPATIENT)
Dept: OCCUPATIONAL THERAPY | Facility: CLINIC | Age: 70
End: 2020-03-10
Payer: MEDICARE

## 2020-03-10 ENCOUNTER — PATIENT OUTREACH (OUTPATIENT)
Dept: ONCOLOGY | Facility: CLINIC | Age: 70
End: 2020-03-10

## 2020-03-10 DIAGNOSIS — C83.398 DIFFUSE LARGE B-CELL LYMPHOMA OF EXTRANODAL SITE: Primary | ICD-10-CM

## 2020-03-10 LAB — GLUCOSE BLDC GLUCOMTR-MCNC: 96 MG/DL (ref 70–99)

## 2020-03-10 PROCEDURE — 97110 THERAPEUTIC EXERCISES: CPT | Mod: GP

## 2020-03-10 PROCEDURE — 25000132 ZZH RX MED GY IP 250 OP 250 PS 637: Performed by: PHYSICAL MEDICINE & REHABILITATION

## 2020-03-10 PROCEDURE — 97535 SELF CARE MNGMENT TRAINING: CPT | Mod: GO

## 2020-03-10 PROCEDURE — 12800006 ZZH R&B REHAB

## 2020-03-10 PROCEDURE — 27210429 ZZH NUTRITION PRODUCT INTERMEDIATE LITER

## 2020-03-10 PROCEDURE — 97110 THERAPEUTIC EXERCISES: CPT | Mod: GP | Performed by: PHYSICAL THERAPIST

## 2020-03-10 PROCEDURE — 25000128 H RX IP 250 OP 636: Performed by: PHYSICAL MEDICINE & REHABILITATION

## 2020-03-10 PROCEDURE — 00000146 ZZHCL STATISTIC GLUCOSE BY METER IP

## 2020-03-10 RX ORDER — PALONOSETRON 0.05 MG/ML
0.25 INJECTION, SOLUTION INTRAVENOUS ONCE
Status: CANCELLED | OUTPATIENT
Start: 2020-03-13

## 2020-03-10 RX ORDER — SODIUM CHLORIDE 9 MG/ML
1000 INJECTION, SOLUTION INTRAVENOUS CONTINUOUS PRN
Status: CANCELLED
Start: 2020-03-13

## 2020-03-10 RX ORDER — DOXORUBICIN HYDROCHLORIDE 2 MG/ML
50 INJECTION, SOLUTION INTRAVENOUS ONCE
Status: CANCELLED | OUTPATIENT
Start: 2020-03-13

## 2020-03-10 RX ORDER — DIPHENHYDRAMINE HCL 25 MG
50 CAPSULE ORAL ONCE
Status: CANCELLED
Start: 2020-03-13

## 2020-03-10 RX ORDER — LORAZEPAM 2 MG/ML
0.5 INJECTION INTRAMUSCULAR EVERY 4 HOURS PRN
Status: CANCELLED | OUTPATIENT
Start: 2020-03-13

## 2020-03-10 RX ORDER — HEPARIN SODIUM (PORCINE) LOCK FLUSH IV SOLN 100 UNIT/ML 100 UNIT/ML
5 SOLUTION INTRAVENOUS
Status: CANCELLED | OUTPATIENT
Start: 2020-03-13

## 2020-03-10 RX ORDER — ACETAMINOPHEN 325 MG/1
650 TABLET ORAL ONCE
Status: CANCELLED | OUTPATIENT
Start: 2020-03-13

## 2020-03-10 RX ORDER — MEPERIDINE HYDROCHLORIDE 25 MG/ML
25 INJECTION INTRAMUSCULAR; INTRAVENOUS; SUBCUTANEOUS
Status: CANCELLED
Start: 2020-03-13

## 2020-03-10 RX ORDER — DIPHENHYDRAMINE HYDROCHLORIDE 50 MG/ML
50 INJECTION INTRAMUSCULAR; INTRAVENOUS
Status: CANCELLED
Start: 2020-03-13

## 2020-03-10 RX ORDER — METHYLPREDNISOLONE SODIUM SUCCINATE 125 MG/2ML
125 INJECTION, POWDER, LYOPHILIZED, FOR SOLUTION INTRAMUSCULAR; INTRAVENOUS
Status: CANCELLED
Start: 2020-03-13

## 2020-03-10 RX ORDER — ALBUTEROL SULFATE 0.83 MG/ML
2.5 SOLUTION RESPIRATORY (INHALATION)
Status: CANCELLED | OUTPATIENT
Start: 2020-03-13

## 2020-03-10 RX ORDER — HEPARIN SODIUM,PORCINE 10 UNIT/ML
5 VIAL (ML) INTRAVENOUS
Status: CANCELLED | OUTPATIENT
Start: 2020-03-13

## 2020-03-10 RX ORDER — MEPERIDINE HYDROCHLORIDE 25 MG/ML
25 INJECTION INTRAMUSCULAR; INTRAVENOUS; SUBCUTANEOUS EVERY 30 MIN PRN
Status: CANCELLED | OUTPATIENT
Start: 2020-03-13

## 2020-03-10 RX ORDER — EPINEPHRINE 0.3 MG/.3ML
0.3 INJECTION SUBCUTANEOUS EVERY 5 MIN PRN
Status: CANCELLED | OUTPATIENT
Start: 2020-03-13

## 2020-03-10 RX ORDER — EPINEPHRINE 1 MG/ML
0.3 INJECTION, SOLUTION INTRAMUSCULAR; SUBCUTANEOUS EVERY 5 MIN PRN
Status: CANCELLED | OUTPATIENT
Start: 2020-03-13

## 2020-03-10 RX ORDER — ALBUTEROL SULFATE 90 UG/1
1-2 AEROSOL, METERED RESPIRATORY (INHALATION)
Status: CANCELLED
Start: 2020-03-13

## 2020-03-10 RX ORDER — NALOXONE HYDROCHLORIDE 0.4 MG/ML
.1-.4 INJECTION, SOLUTION INTRAMUSCULAR; INTRAVENOUS; SUBCUTANEOUS
Status: CANCELLED | OUTPATIENT
Start: 2020-03-13

## 2020-03-10 RX ADMIN — LORATADINE 10 MG: 10 TABLET ORAL at 10:09

## 2020-03-10 RX ADMIN — ENOXAPARIN SODIUM 40 MG: 40 INJECTION SUBCUTANEOUS at 10:09

## 2020-03-10 ASSESSMENT — MIFFLIN-ST. JEOR: SCORE: 1273.43

## 2020-03-10 NOTE — PLAN OF CARE
Patient has been Mod I in room, and tolerated that well.  Spouse has been in room with her for much of the day, and she has been in good spirits.  Poor intake at breakfast noted, but she says that she has never been a big eater at breakfast.  She did better at lunch, and continues on calorie count.  She is able to direct her cares, and make her needs known.

## 2020-03-10 NOTE — PLAN OF CARE
FOCUS/GOAL  Safety management    ASSESSMENT, INTERVENTIONS AND CONTINUING PLAN FOR GOAL:    No report of pain. Slept well through the night. No reports of SOB, chest pain and new numbness and tingling.

## 2020-03-10 NOTE — PROGRESS NOTES
"  [facility]   Acute Rehabilitation Unit  Daily progress note    INTERVAL HISTORY  Chuyita Porter was seen and examined at bedside with spouse present. Pt stated she is ready to the next level/step. Reported feeling hot and feeling heart racing, which stated PTA to ARU, last echo and EKG were 2/19 ( ramin)  With Echo result unremarkable, EF 60-65 %. Plan to monitor, might be anxiety related.  Functionally, Pt had Gait training with 4WW and mod I in room. Pt is progressing and plan to discharge on 3/12.    MEDICATIONS  Scheduled meds    enoxaparin ANTICOAGULANT  40 mg Subcutaneous Q24H     loratadine  10 mg Oral Daily       PRN meds:  acetaminophen, bisacodyl, dextrose, docusate sodium, guaiFENesin, lidocaine 4%, LORazepam, melatonin, menthol (Topical Analgesic) 2.5%, polyethylene glycol, psyllium, sennosides      PHYSICAL EXAM  /72   Pulse 91   Temp 97.4  F (36.3  C) (Oral)   Resp 16   Ht 1.651 m (5' 5\")   Wt 73.8 kg (162 lb 9.6 oz)   SpO2 98%   BMI 27.06 kg/m    Gen: NAD, cooperative  HEENT: NC/AT  Cardio: RRR  Pulm: room air, non-labored  Abd: soft, BS x4  Ext: no edema BLE  Neuro/MSK: Alert, and using a FWW with ambulation    LABS  Recent Labs   Lab 03/10/20  0715 03/09/20  1201 03/09/20  0809 03/09/20  0642 03/08/20  1211 03/07/20  2155 03/07/20  1845 03/06/20  0430 03/05/20  0402  03/04/20  0320   GLC  --   --   --  106*  --   --   --  104* 126*  --  119*   BGM 96 106* 101*  --  89 109* 107*  --   --    < >  --     < > = values in this interval not displayed.         ASSESSMENT AND PLAN    Chuyita Porter is a 69 year old right hand dominant female PMH bilateral breast cancer s/p radiation (right breast 2005 & left breast 2015) previously on anastrozole for prophylaxis was admitted for medical management after intubation due to threatened airway during open neck biopsy, tracheal stent and PEG tube placement as part of planned treatment regimen on " 2/17/20. 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 She presents with fatigue, decreased strength, decreased tolerance to activity, functional impairments in mobility, functional impairments in gait, functional impairments in ADLs/iADLs, and dysphagia. She is admitted to ARU on 3/6/2020 for continued interdisciplinary rehabilitation management      Rehabilitation - continue comprehensive acute inpatient rehabilitation program with multidisciplinary approach including therapies, rehab nursing, and physiatry following. See interval history for updates.         Medical Conditions  # Extranodal Diffuse large B-cell lymphoma, non-germinal center type  On 2/14/20, CT chest showed 8.0 x 6.7 x 10.0 cm mass centered in the right anterior mediastinum with mass effect within the mediastinum, including compression of the SVC with associated collateral vessels seen in the neck. No mediastinal LN involvement. Planned endotracheal biopsies and placement of ET tube, along with PEG tube placement, done on 2/17. No stent was placed due to technical complications per Interventional Pulmonology note. Biopsy confirmed DLBCL. She underwent two fractions of radiotherapy on 2/18 and 2/19. Initiated R-CHOP chemotherapy on 2/21. Repeat CT Chest imaging on 2/25 demonstrated decreased size of mass. Completed 14 day course of Allopurinol.  -  f/u with Dr Sprague and cycle 2 of R-CHOP on 3/13/2020     # Febrile neutropenia, suspected HCAP, Improved  Fever 101.2F on 2/28. Last fever was 3/3 at 4:35 pm (100.6F). Resolved cough. Suspect HCAP given recent mechanical ventilation, and PET scan showing bibasilar atelectasis and metabolic uptake. Also possible aspiration. CXR (3/2) without acute airspace disease. Infectious workup performed acute hospital: UA wnl; BCx NGTD; RVP PCR negative. PET noted bibasilar atelectasis and metabolic uptake thought to be more consistent with inflammation/infection and less  likely malignancy. Improved after 7 day course of abx.  - Monitor  - Consider neutropenic precautions as appropriate     # Leukocytosis - resolved   WBC up to 19.4 on 3/5 lowered to 12.2 on 3/6, likely 2/2 to Neupogen  - Monitor  - recheck 3/9/20, WBC wnl     # Severe Malnutrition of Chronic Illness  PEG tube placed 2/17. Per outpatient ENT note, goal of PEG was empiric should she have dysphagia from originally planned stent placement or with planned therapy. No stent was placed.   - Previously on TF via PEG however VFSS noted no aspiration, and was progressed to regular diet with thin liquid.   - Continue to assess PO intake and need for TFs/PEG tube     #Anxiety, patient anxious about her new dignosis. No mental health history. Noted that ativan PRN was helpful. Discussed risks and benefits of benzodiazepines vs other anxiolytics. Pt amenable to psych consult  - Ativan 0.5mg PO q6H PRN for anxiety  -3/10 Pt reported feeling hot and feeling heart is racing, which started PTA to ARU, last echo EF 60-65 % and EKG with DRU faustin were 2/19. Plan to monitor, might be anxiety related.     #Sleep disturbance, due to anxiety.  - Melatonin 3mg PO at bedtime PRN     Adjustment to disability:  Clinical psychology to eval and treat as needed  FEN: Regular diet with thin liquids  Bowel: LBM 3/6; Colace 100mg PO BID PRN, Dulcolax 10mg suppository daily PRN, Senokot 8.6 mg PO BID PRN, Miralax 17mg PO daily PRN for constipation  Bladder: Voids spontaneously; PVRs 0x2.  DVT Prophylaxis: Lovenox 40mg subcutaneous q24H, Ambulating 200ft on date of admission however will keep ppx for now given malignancy hypercoagulable state  GI Prophylaxis: None  Code: DNR/DNI  Disposition: Hopeful home with continued medical stability, improvement in functional impairments, and clearance by therapy teams  ELOS: 3/12  Rehab prognosis:  Good  Follow up Appointments on Discharge:   - PM&R 8 weeks  - PCP 2 weeks  - Oncology TBD  - ENT (pt agreed to  contact ENT nurse care coordinator's direct line if needed)         Deepthi Naidu, ALMA ROSA  Physical Medicine & Rehabilitation      I, Bhargavi Da Silva MD, saw and evaluated this patient as part of a shared visit. I have reviewed and discussed with the advanced practice provider their history, physical and plan. I personally reviewed the chart (vitals signs, medications, labs and imaging).     My key findings and monterroso manegement decisions made by me:   Chuyita is doing well. She is mod I and on track for discharge to home on Thursday. F/up with oncology team on Friday.   Will review calorie counts tomorrow at rounds with nutrition team; will most likely go home on TF. Training at [facility] tomorrow afternoon.     I spent a total of 25 minutes face-to-face and managing the care of Chuyita Porter. Over 50% of my time on the unit was spent counseling the patient and coordinating care. See note for details.

## 2020-03-10 NOTE — PROGRESS NOTES
Nutrition Services Progress Note    Calorie Count  (3/9)  857 kcals and 46 g protein, 3 meals (pt reported intakes at breakfast and lunch).    Per pt did not like the orange Breeze, but ordered Magic Cups  with lunch and dinner today.  Per pt and  not typically a big breakfast eater at home and feels full from TF.  Offered to adjust time of TF cycle, but pt declined at this time.      Provided with protein list of Room Service items and reviewed assessed calorie and protein needs.    Isabelle Licona RD, LD

## 2020-03-10 NOTE — PLAN OF CARE
PT: Focus on energy conservation, LE strengthening, and HEP instruction and demonstration this date, pt appears on track for discharge goals.

## 2020-03-10 NOTE — PLAN OF CARE
A&O x4, able to make needs known. Denies pain. Continent of bowel and bladder using bathroom. Mod I in room w/ walker. SBA while TF is running. Tube feeding started @1900, tolerating well. Using call light appropriately. Continue with plan of care.

## 2020-03-10 NOTE — PROGRESS NOTES
RN Care Coordination Note    POST HOSPITAL DISCHARGE CALL    Date of Admission:  2/17/2020  Date of Discharge:  3/6/2020  Pt currently admitted to ARU with discharge planned for 3/12, as cycle #2 R-CHOP is due on 3/13 and scheduled after lab/SHANTELL appt.    Call to pt to introduce self and role of oncology nurse coordinator. Ranjit, her spouse, was with pt at Rehab as we talked. We reviewed what to expect on Friday in our building and infusion room, as this will be their first visit to clinic as dx/tx was all inpt at [facility]. We discussed that pt does not have a line (no port, no PICC) and if peripheral IV access is not solid on Friday we may have to schedule port placement. Pt's spouse feels she has good arm veins and they both agree that proceeding with peripheral IV on Friday is preferred, may consider port at later date.  Clarifying questions answered to pt and his wife's stated satisfaction.  Pt voiced understanding of above instructions and information and denied further questions    Brandi Hood, RN, BSN, OCN  Care Coordinator  [facility]      .

## 2020-03-10 NOTE — PROGRESS NOTES
OT: Focused on full shower with ADLs to follow. Pt is I with UBD and mod I with LBD; pt is mod I with g/h using FWW standing at EOS. Pt is mod I with toilet transfer/hygiene per pt report using FWW. Pt is currently mod I for functional transfers and using 4WW in room. Pt will be returning home with 4WW, shower chair, and bed wedge. Discussed discharge home with pt and spouse today and they do not have any concerns thus far for OT. Spouse reporting he will be available for assist for what pt needs with IADLs.

## 2020-03-11 ENCOUNTER — APPOINTMENT (OUTPATIENT)
Dept: PHYSICAL THERAPY | Facility: CLINIC | Age: 70
End: 2020-03-11
Payer: MEDICARE

## 2020-03-11 ENCOUNTER — RECORDS - HEALTHEAST (OUTPATIENT)
Dept: ADMINISTRATIVE | Facility: OTHER | Age: 70
End: 2020-03-11

## 2020-03-11 ENCOUNTER — APPOINTMENT (OUTPATIENT)
Dept: OCCUPATIONAL THERAPY | Facility: CLINIC | Age: 70
End: 2020-03-11
Payer: MEDICARE

## 2020-03-11 ENCOUNTER — PATIENT OUTREACH (OUTPATIENT)
Dept: ONCOLOGY | Facility: CLINIC | Age: 70
End: 2020-03-11

## 2020-03-11 ENCOUNTER — APPOINTMENT (OUTPATIENT)
Dept: EDUCATION SERVICES | Facility: CLINIC | Age: 70
End: 2020-03-11
Attending: PHYSICAL MEDICINE & REHABILITATION
Payer: MEDICARE

## 2020-03-11 PROCEDURE — 97116 GAIT TRAINING THERAPY: CPT | Mod: GP

## 2020-03-11 PROCEDURE — 97110 THERAPEUTIC EXERCISES: CPT | Mod: GO

## 2020-03-11 PROCEDURE — 27210429 ZZH NUTRITION PRODUCT INTERMEDIATE LITER

## 2020-03-11 PROCEDURE — 25000132 ZZH RX MED GY IP 250 OP 250 PS 637: Performed by: PHYSICAL MEDICINE & REHABILITATION

## 2020-03-11 PROCEDURE — 25000128 H RX IP 250 OP 636: Performed by: PHYSICAL MEDICINE & REHABILITATION

## 2020-03-11 PROCEDURE — 12800006 ZZH R&B REHAB

## 2020-03-11 PROCEDURE — 40000187 ZZH STATISTIC PATIENT MED CONFERENCE < 30 MIN: Performed by: OCCUPATIONAL THERAPIST

## 2020-03-11 PROCEDURE — 97535 SELF CARE MNGMENT TRAINING: CPT | Mod: GO

## 2020-03-11 PROCEDURE — 97110 THERAPEUTIC EXERCISES: CPT | Mod: GP

## 2020-03-11 PROCEDURE — 40000183 ZZH STATISTIC PT MED CONFERENCE < 30 MIN: Performed by: PHYSICAL THERAPIST

## 2020-03-11 PROCEDURE — 97530 THERAPEUTIC ACTIVITIES: CPT | Mod: GP

## 2020-03-11 RX ORDER — LORATADINE 10 MG/1
10 TABLET ORAL DAILY
Qty: 30 TABLET | Refills: 0 | Status: SHIPPED | OUTPATIENT
Start: 2020-03-11 | End: 2020-04-02

## 2020-03-11 RX ORDER — ONDANSETRON 4 MG/1
4 TABLET, FILM COATED ORAL DAILY PRN
Qty: 60 TABLET | Refills: 0 | Status: SHIPPED | OUTPATIENT
Start: 2020-03-11 | End: 2021-08-26

## 2020-03-11 RX ADMIN — LORATADINE 10 MG: 10 TABLET ORAL at 09:24

## 2020-03-11 RX ADMIN — ENOXAPARIN SODIUM 40 MG: 40 INJECTION SUBCUTANEOUS at 09:24

## 2020-03-11 NOTE — PLAN OF CARE
Physical Therapy Discharge Summary    Reason for therapy discharge:    Discharged to home with home therapy.    Progress towards therapy goal(s). See goals on Care Plan in [facility] electronic health record for goal details.  Goals met    Therapy recommendation(s):    Continued therapy is recommended.  Rationale/Recommendations:  Patient requires additional PT to address weakness, endurance and impaired balance.  Patient is safe to discharge to home.

## 2020-03-11 NOTE — PROGRESS NOTES
"Nutrition Services Progress Note    Calorie Count  (3/10)  767 kcals and 36 g protein, 3 meals (pt reported intake at dinner).      Reports only able to take a few bites of Magic Cup (too thick).  Also did not like Breeze.  Agrees to try Boost Plus with lunch today.  Per pt appetite decreased.  She states stools have been soft, but not loose.      Dosing Weight: 61 kg (adjusted using driest wt of 72.8 kg on 3/9 and IBW:  57.9 kg-per pt report today height is ~between 5'5\" and 5'6\")      ASSESSED NUTRITION NEEDS (revised)  Estimated Energy Needs: 1254-1213 kcals/day (25 - 30 kcals/kg)   Justification: Maintenance   Estimated Protein Needs: 73-92 grams protein/day (1.2-1.5 grams of pro/kg)   Justification: Hypercatabolism with acute illness   Estimated Fluid Needs: 1 mL per kcal or per MD   Justification: Maintenance     Two day average:  812 kcals and 41 g protein to meet 53% assessed lower end calorie needs and 56% protein needs.     Per discussion with pt and MD, adjusted TF to Nutren 1.5@75 mL/hr x 10 hours (8 pm to 6 am), 750 mLs or 3 cans daily to provide:  1125 kcals, 51 g protein, 570 mLs free water and 0 gms fiber.    Per chart review, pt to discharge home in am 3/12.  Met with Katherin BLACKBURN and updated on nutrition plan of care.      Isabelle Licona RD, LD  "

## 2020-03-11 NOTE — PLAN OF CARE
"FOCUS/GOAL  Discharge planning and Mobility    ASSESSMENT, INTERVENTIONS AND CONTINUING PLAN FOR GOAL:  Mod I in room without difficulty reported. Continent of both B/B today using toilet in bathroom, independently managing pericare. Pt reported this morning that her neck feels \"thick\". Reported that she's experienced this discomfort in the past, denied shortness of breath. Dr Da Silva notified. Pt has TF PLC scheduled for 3pm with her , planning for discharge tomorrow 3/12. Formal team rounds done this morning with pt's  present, see rounds notes. Will continue with POC.  "

## 2020-03-11 NOTE — PROGRESS NOTES
Walking team rounds this morning. Discussed discharge plans. Plan to discharge home tomorrow, Thursday 03/12 with [facility]. RN CC consulted and has coordinated due to home nutrition. No immediate SW needs.     Met with pt and pt  at bedside at the end of walking rounds. Pt given and signed IMM. Pt  expressed that the quality of pt care has been exceptional since the beginning of the process but that he is frustrated RE: staff overlapping and multiple providers and not being able to keep track. SWer provided validation and support. Suggested that pt and pt  [facility] so that they can see names and contact information for providers and additional information. Pt and  denied additional needs at this time.    Mine Romero, JACQUI, [facility] Acute Rehab Unit   Phone: [phone]  I   Pager: [phone]

## 2020-03-11 NOTE — PLAN OF CARE
Acute Rehab Care Conference/Team Rounds      Type: Team Rounds    Present: Dr Bhargavi Da Silva, Alivia Emanuel RN, Shira Owen PT, Rossana Fields OT, Mine Romero LICSW, Mercy Ibrahim SLP, Ansley Diaz Mgr, Ana Licona Dietician, Андрей-O Fransico Goncalves, Chuyita Porter Patient      Discharge Barriers/Treatment/Education    Rehab Diagnosis: deconditioning    Active Medical Co-morbidities/Prognosis: B cell lymphoma, anxiety, malnutrition with PEG tube placed, HCAP, pancytopenia    Safety: No concerns regarding safety.    Pain: Pt does not complain of pain during the night. Pain seems to be well controlled.    Medications, Skin, Tubes/Lines: Pt has PEG tube for feeding. No other lines present. Skin is intact. No concerns regarding medication.    Swallowing/Nutrition: on regular diet     Bowel/Bladder: No concerns regarding bowel and bladder.    Psychosocial: . Lives with  in a home with THANIA. Two adult dtrs who are local and two grandchildren. Supportive family. Independent PTA. Hx of anxiety. No financial concerns reported.     ADLs/IADLs: Pt has made good progress. Pt is Mod IND functional transfers and mobility using 4ww. Pt is Mod IND TB dressing, standing grooming, toileting, and bathing. Performance limited by fatigue, weakness, and medical complexity. Spouse very supportive and plans to assist with IADLs as needed. Pt ready to discharge home tomorrow. DME/AE: shower chair, 4ww, and bed wedge. Recommend HC OT/HHA at time of discharge.     Mobility: Up in room Mod I w/ walker.  Walker to be delivered here prior to discharge. Plan for home care f/u.  No barriers to discharge home tomorrow.     Cognition/Language: at baseline     Community Re-Entry: ambulatory for short distances, will rely on community w/c availability for appointments.     Transportation:  Not a  due to deconditioning, car transfer not a barrier    Decision maker: self    Plan of Care and goals reviewed and  updated.    Discharge Plan/Recommendations    Fall Precautions: modify    Overall plan for the patient: She is doing very well and is ready for discharge to home tomorrow. Will have home care and home infusion for TF. Currently mod I with walker when TF is not running and SBA when TF is going.      Utilization Review and Continued Stay Justification    Medical Necessity Criteria:    For any criteria that is not met, please document reason and plan for discharge, transfer, or modification of plan of care to address.    Requires intensive rehabilitation program to treat functional deficits?: Yes    Requires 3x per week or greater involvement of rehabilitation physician to oversee rehabilitation program?: Yes    Requires rehabilitation nursing interventions?: Yes    Patient is making functional progress?: Yes    There is a potential for additional functional progress? Yes    Patient is participating in therapy 3 hours per day a minimum of 5 days per week or 15 hours per week in 7 day period?:Yes    Has discharge needs that require coordinated discharge planning approach?:Yes        Final Physician Sign off    Statement of Approval: I agree with all the recommendations detailed in this document.      Patient Goals  Social Work Goals:Discharge home tomorrow, 3/12. RN CC following and coordinating discharge plans. Home with [facility] services and home nutrition.     OT goal: hygiene/grooming: independent, while standing  OT goal: upper body dressing: Independent  OT goal: lower body dressing: Independent, including set-up/clothing retrieval  OT goal: upper body bathing: Independent  OT goal: lower body bathing: Modified independent  OT goal: bed mobility: Independent  OT Goal: transfer: Modified independent  OT goal: toilet transfer/toileting: Independent  OT goal: meal preparation: Independent, with moderately complex multi-step meal preparation, ambulatory level  OT goal: home management: Independent, ambulatory level, with  moderate demand household tasks     PT Frequency: daily x 75 minutes  PT goal: transfers: Modified independent  MET  PT goal: gait: Modified independent, Rolling walker, 150 feet  MET  PT goal: stairs: Modified independent, 7 stairs, Rail on both sides  MET  PT goal 1: ind w/ HEP for limb and CV strengthening  PT Goal 2: car transfer ind  MET       RN Goals:  Goal: Nutrition/Feeding/Swallowing Precautions: pt will demonstrate adequate oral intake determined by no longer needing tube feeding by 3/20/2020        Goal: Safety Management: pt will demonstrate management of safety by no longer needing staff assistance for transfers by 3/20/2020        Goal: Psychosocial Needs: pt will demonstrate adequate management of anxiety by requesting interventions by 3/20/2020

## 2020-03-11 NOTE — DISCHARGE SUMMARY
Occupational Therapy Discharge Summary    Reason for therapy discharge:    Discharged to home with home therapy.    Progress towards therapy goal(s). See goals on Care Plan in [facility] electronic health record for goal details.  Goals met    Therapy recommendation(s):    Continued therapy is recommended.  Rationale/Recommendations:  Pt will benefit from continued OT services to increase IND with ADLs and IADLs. .  Continue home exercise program.     Pt is currently mod I for functional transfers, g/h tasks, oral cares, and LB dressing using 4WW. Pt is I with UB dressing. Pt is SBA/supervision for showering/bathing and SBA for shower transfer using 4WW. Pt will be returning home with 4WW, shower chair, and bed wedge. Recommending pt has assistance from spouse with laundry, driving, finances, and medication management. Recommending pt has supervision/SBA using 4WW for first initial showers.

## 2020-03-11 NOTE — PLAN OF CARE
FOCUS/GOAL  Nutrition/Feeding/Swallowing precautions, Psychosocial needs, and Safety management    ASSESSMENT, INTERVENTIONS AND CONTINUING PLAN FOR GOAL:  Pt vitals signs stable. Denies pain, SOB, dizziness and nausea. Intake poor, she ate about 25% of her meal. Tube feeding started at 7.20 am, pt tolerating well. Continent of bowel and bladder, LBM 3/10. Mod I in room. Pt calls appropriately for needs. Continue to monitor as per POC.

## 2020-03-11 NOTE — PROGRESS NOTES
Pt's spouse Ranjit called with additional questions re: logistics of Friday's appts (availability of wheelchair, arriving at our building, what to expect in infusion, antinausea medications). We reviewed that Chuyita will be given IV antiemetics prior to the R-CHOP tx starting and that she will be given oral medication scripts to use at home PRN nausea, and that she will be counseled by pharmacy staff re: the new meds in clinic. We also discussed that Chuyita has lost her hair as expected post cycle #1 R-CHOP and that I am making a referral to Ana [facility] Navigator who will come meet with pt/spouse in infusion on Friday to discuss head covering resources and others as needed.   We also discussed that Chuyita has had her voice return this week and she is doing a calorie count at at rehab to assess her po intake, as she is currently receiving Gtube feedings overnight and just started eating solids 5 days ago. He is being taught how to manage feedings as part of discharge planning and says they anticipate discharge from Rehab on Thursday 3/12 at noon.  Questions answered to Ranjit's stated satisfaction, and Pt/Spouse understand that I will come meet with them in person during chemotherapy infusion on Friday and we will further discuss plans going forward to support her during her lymphoma treatment.  Brandi Hood, RN  RN Care Coordinator  [facility] [address] [address]  [phone]

## 2020-03-11 NOTE — CONSULTS
Met with Chuyita and her  Ranjit for Enteral TF education. Chuyita and Ranjit were very nervous about having to do this at home. Both were attentive and asked appropriate questions. I had Ranjit practice flushing Chuyita's G-tube. Chuyita did not want to practice at this time but did watch and teach back education given. Ranjit practice setting up the TF via infinity pump and did very well with it. They both feel a little more comfortable after the class but are still a little nervous. I encouraged Ranjit to help the bedside RN set up the feeding for tonight.     Literature given: Handwashing and Skin Care, Caring for Your Tube at Home and Using the Enteralite Infinity Pump for Tube Feeding

## 2020-03-11 NOTE — CONSULTS
Health Psychology                  Clinic    Department of Medicine  Melissa Porter, Ph.D., L.P. [phone]                          Clinics and Surgery Center  [facility] Veronica Hammonds, Ph.D.,  L.P. [phone]                 3rd Floor  [place] Mail Code 747   Shayna Hoskins, Ph.D., L.P. [phone]    [address] Monique Osborne, Ph.D., L.P. [phone]            [place] [address]          Sathya Perez, Ph.D., A.B.ROXANA.ROXANA., L.P. [phone]      Patricia Sanders, Ph.D., L.P. [phone]      Inpatient Health Psychology Consultation*    DOS:  03/11/2020      REFERRAL SOURCE:  Physical Medicine Rehabilitation team, Acute Rehabilitation Center, [facility].      REASON FOR REFERRAL:  Chuyita Porter is a 69-year-old woman currently on the Acute Rehabilitation Center following a long hospitalization at [facility] that was initially to be for a bronchoscopy with biopsy of mediastinal mass secondary to B-cell lymphoma.  Following this procedure Ms. Porter was admitted to the ICU to protect her airway.  Ms. Porter has been expressing anxiety related to upcoming discharge and plan for second round of chemotherapy this Friday.  This evaluation was requested to assess her current emotional status and to make treatment recommendations.      HISTORY OF PRESENT ILLNESS:  Ms. Porter and her , Ranjit, were in her room when I entered, and both participated actively in this evaluation.  Ms. Porter reports no previous history of any mental health concerns.  She describes her typical mood as very upbeat and positive, noting that she would always be the person to say that the glass is half full.  She describes her current level of anxiety as very atypical for her.  She and her  describe that the anxiety stems from the fact that she received her first round of  "chemotherapy to treat the lymphoma while she was hospitalized on the ICU and intubated.  They describe that because she was \"so loaded up with medications\" that she fears she may not have recognized negative side effects of the chemotherapy.  She is now left wondering if in the different context in which she will receive chemotherapy on Friday, 03/12, in the [facility], she will experience significant side effects that she fears.  She is quite concerned about this, and notes that she does remember everything, she believes, from the time that she was on the ICU and intubated.      Ms. Porter does not report symptoms of depression in relation to her current situation.  She has not had any difficulties with her rehabilitation course and has been very motivated and working hard, making very positive gains relatively quickly.  She does not express anxiety about any other issues aside from the concerns about the upcoming chemotherapy.  She and her  notes that they understand why she cannot be hospitalized during the time that she is receiving the chemotherapy as an outpatient, but note their awareness that they will be going from an environment where there are medical providers and nursing staff available constantly to being at home immediately following the chemotherapy; they expressed fears that side effects may ensue and they will not know who they can call for input on how to respond.      Ms. Porter and her  were very receptive to hearing information about the possibility that she would be able to receive a premedication of an antiemetic medication.  I actually encouraged Mr. Porter to call the clinic and the specific person for whom he has contact information on her Oncology treatment team to discuss this and ask if it can be available for her as soon as she arrives at the [facility] on Friday.  I also encouraged them to ask that treatment team for their input on responding to any " "concerns and to whom they might be able to reach out.  Mr. Porter indicated his intention to make these calls and asked for those specific pieces of information.      Ms. Porter reports that her appetite is much decreased from her usual level, and that she is struggling with increasing her oral intake in order to allow the tube feeding to be decreased.  We discussed options that are more calorie dense that she might be able to work with to try and increase her p.o. intake.  She is sleeping fairly well and has no concerns in that regard.      SOCIAL HISTORY:  Ms. Porter and her , [place], have been  for 46 years.  They have 2 daughters and 2 granddaughters.  Ms. Porter has a very deep renita, and finds her primary strength and support in her renita, outside of her  and daughters.  She and her  have a very close and devoted relationship that was observable within this conversation.  She does express some concern about how her medical issues and extended hospitalization are affecting him, but states that she is not feeling guilty, just wanting to know that he is taking care of himself.      MEDICAL HISTORY:  Ms. Porter's medical record indicates history of bilateral breast cancer for which she received treatment in 2005 and 2015.  She and her  told me in some detail about their reactions to her 10-day intubation that preceded transfer to the [facility].  She notes her belief that she remembers everything that happened during that time, and tells me that she \"just needed to get through it.\"  Please see Ms. Porter's [facility], electronic medical record for more complete information on her medical history, current admission and status, and all medications.      PSYCHIATRIC HISTORY:  As above in HPI.  Ms. Porter has no history of depression, anxiety or other emotional concerns that have significantly affected her functioning.  She notes that her current level of anxiety is atypical for her.  " "At the same time, she does not feel that it is affecting her ability to sleep well or participate in her rehabilitation program.  No other significant psychiatric history was available at the time of this evaluation.      BEHAVIORAL OBSERVATIONS:  Ms. Potrer presented for this evaluation sitting up in bed in her room on the [facility] between scheduled rehabilitation therapies.  She was accompanied by her , Ranjit, and he was \"reporting in\"  on his to to-do list.  She reported her mood as more anxious than usual, and exhibited a congruent affect that was somewhat tense and restricted.  Speech was soft and they noted that she is still recovering from the impact on her throat and speech from the recent 10-day intubation.  Content of speech was clear, coherent and goal oriented.  Insight and judgment appeared to be very good.  She exhibited no evidence of distortions of thought or perception.      IMPRESSIONS AND PLAN:  Chuyita Porter is a 69-year-old woman currently on the AR[facility] following surgery to address a mediastinal mass secondary to B-cell lymphoma that was compromising her airway.  She had a complicated recovery following the surgery that included a 10-day intubation during which she received her first round of chemotherapy and radiotherapy.  She has done extremely well in her rehabilitation course, and is scheduled to discharge tomorrow with second round of chemotherapy scheduled in the [facility] for Friday 03/12.  She is experiencing anxiety about the unknowns of that chemotherapy.  She appears to be very good at expressing her concerns and asking for input to assist in managing her emotional reactions.  She and her  were very receptive to encouragement to call the person on their Oncology treatment team that they have contact information for, and with whom Ranjit spoke yesterday, to ask about premedication to be proactive in averting potential nausea related to the chemo.  I also " encouraged them to ask about instructions for after this round of chemotherapy that they are likely to receive on Friday at the [facility] in order to put them at ease prior to that time.  Ms. Porter took notes about the specifics that I was encouraging, and they indicated their intention to follow through with these recommendations.      DIAGNOSES:  Adjustment disorder with anxiety (F43.22).         ELAINE HSIEH, PHD, LP       *In accordance with the Rules of the [place] [employer] of [employer], it is noted that psychological descriptions and scientific procedures underlying psychological evaluations have limitations.  Absolute predictions cannot be made based on information in this report.       D: 2020   T: 2020   MT: SUJATHA      Name:     MELODY PORTER   MRN:      [id number]        Account:       [id number]   :      1950           Consult Date:  2020      Document: [id number]

## 2020-03-11 NOTE — PROGRESS NOTES
Met with patient and  to update the discharge planning discussion. She will discharge to home tomorrow 3/12 by 10am,  to provide transportation. PLC enteral class is this afternoon. [facility] to provide enteral formula, pump, and supplies. [facility] will meet with them this afternoon around 4pm. [facility] to provide SN/PT/OT services, Liaison to meet with them today or tomorrow morning.

## 2020-03-11 NOTE — PLAN OF CARE
FOCUS/GOAL  Safety management    ASSESSMENT, INTERVENTIONS AND CONTINUING PLAN FOR GOAL:    Pt slept well through the night. No complaints of pain. Ambulated to bathroom during the night. No BM. Pt remains Mod I with walker in room. No alarms. Calorie count. No reports of SOB, chest pain and new numbness and tingling.

## 2020-03-12 ENCOUNTER — RECORDS - HEALTHEAST (OUTPATIENT)
Dept: ADMINISTRATIVE | Facility: OTHER | Age: 70
End: 2020-03-12

## 2020-03-12 VITALS
RESPIRATION RATE: 16 BRPM | SYSTOLIC BLOOD PRESSURE: 133 MMHG | OXYGEN SATURATION: 97 % | TEMPERATURE: 97.6 F | HEIGHT: 65 IN | WEIGHT: 162.7 LBS | HEART RATE: 100 BPM | BODY MASS INDEX: 27.11 KG/M2 | DIASTOLIC BLOOD PRESSURE: 71 MMHG

## 2020-03-12 PROBLEM — T45.1X5A ADVERSE EFFECT OF ANTINEOPLASTIC AND IMMUNOSUPPRESSIVE DRUGS, INITIAL ENCOUNTER: Status: ACTIVE | Noted: 2020-03-12

## 2020-03-12 PROBLEM — T45.1X5A ANTINEOPLASTIC ANTIBIOTICS CAUSING ADVERSE EFFECT IN THERAPEUTIC USE: Status: ACTIVE | Noted: 2020-03-12

## 2020-03-12 PROBLEM — T45.1X5D ADVERSE EFFECT OF ANTINEOPLASTIC AND IMMUNOSUPPRESSIVE DRUGS, SUBSEQUENT ENCOUNTER: Status: ACTIVE | Noted: 2020-03-12

## 2020-03-12 PROCEDURE — 25000128 H RX IP 250 OP 636: Performed by: PHYSICAL MEDICINE & REHABILITATION

## 2020-03-12 PROCEDURE — 25000132 ZZH RX MED GY IP 250 OP 250 PS 637: Performed by: PHYSICAL MEDICINE & REHABILITATION

## 2020-03-12 RX ORDER — PROCHLORPERAZINE MALEATE 10 MG
5 TABLET ORAL EVERY 6 HOURS PRN
Qty: 30 TABLET | Refills: 7 | Status: SHIPPED | OUTPATIENT
Start: 2020-03-12 | End: 2021-08-26

## 2020-03-12 RX ORDER — ALLOPURINOL 300 MG/1
300 TABLET ORAL DAILY
Qty: 14 TABLET | Refills: 0 | Status: SHIPPED | OUTPATIENT
Start: 2020-03-12 | End: 2020-04-23

## 2020-03-12 RX ORDER — LORAZEPAM 0.5 MG/1
0.5 TABLET ORAL EVERY 4 HOURS PRN
Qty: 30 TABLET | Refills: 5 | Status: SHIPPED | OUTPATIENT
Start: 2020-03-12 | End: 2021-08-26

## 2020-03-12 RX ADMIN — ENOXAPARIN SODIUM 40 MG: 40 INJECTION SUBCUTANEOUS at 07:55

## 2020-03-12 RX ADMIN — LORATADINE 10 MG: 10 TABLET ORAL at 07:55

## 2020-03-12 ASSESSMENT — MIFFLIN-ST. JEOR: SCORE: 1273.88

## 2020-03-12 NOTE — PROGRESS NOTES
Hematology-Oncology Visit  Mar 13, 2020    Reason for Visit: follow-up DLBCL     HPI: Chuyita Porter is a 69 year old female with past medical history of breast cancer (right breast cancer in 2005 and left breast cancer in 2015 followed by Dr. Gallardo) with diffuse large B cell lymphoma. She presented with SOB and pressure on her neck/chest. She was found to have mediastinal mass and FNA biopsy on 1/30/20 showed DLBCL. She was initially managed with oral steroids and then was taken to the OR on 2/17 for open neck biopsy, tracheal stent, and PEG tube placement however in the OR decision was made to intubate patient due to concern of threatened airway. She was then admitted and started two fractions of radiation on 2/18 and 2/19 and then started R-CHOP on 2/21. CT chest on 2/25 showed decrease size of mass. Complications of neutropenic fever due to HCAP treated with vanco + cefepime --> zosyn and completed treatment with Levaquin. She was extubated on 2/26.     She was discharged to rehab due to debility and severe malnutrition. She was discharged on TFs. Will benefit from continued PT/OT. Discharged on 3/12.      Interval History: Chuyita is feeling well today. She is happy to be home. Infused TF (3 cartons) over 10 hours last night which went well. She has been eating 3 small meals per day. Hydrating but she does fill up quickly. Breathing has been better. She has gotten stronger and has been able to climb stairs and walk further distances. No CP. No cough. Has puffy supraclavicular spaces at baseline. Her  notes the L side may look a little more puffy. She has no pain or swelling in L arm.     No fevers/chills/sweats. No nausea. She has been having regular, semi-formed stools daily. Urinating without difficulty. She has some anxiety about starting treatment today since this will be the first cycle outpatient, and she does not remember much of cycle 1.     Current Outpatient Medications   Medication      "acetaminophen (TYLENOL) 325 MG tablet     allopurinol (ZYLOPRIM) 300 MG tablet     loratadine (CLARITIN) 10 MG tablet     LORazepam (ATIVAN) 0.5 MG tablet     ondansetron (ZOFRAN) 4 MG tablet     prochlorperazine (COMPAZINE) 10 MG tablet     No current facility-administered medications for this visit.          PHYSICAL EXAM:  /74   Pulse 83   Temp 98.8  F (37.1  C) (Oral)   Resp 12   Ht 1.651 m (5' 5\")   Wt 74.5 kg (164 lb 4.8 oz)   SpO2 97%   BMI 27.34 kg/m    General: Alert, oriented, pleasant, NAD  Skin: No rash, bruising, or petechiae on exposed skin   HEENT: Normocephalic, atraumatic, PERRLA, EOMI. Moist mucus membranes, no lesions or thrush  Neck: No cervical or supraclavicular LAD. Puffy supraclavicular spaces without pain. No erythema. L>R  Axillary: No LAD  Lungs: CTA bilaterally, normal work of breathing  Cardiac: RRR, S1, S2, no murmurs  Abdomen: Soft, nontender, nondistended. Normoactive bowel sounds.  Neuro: CNII-XII grossly intact  Extremities: No pedal edema    Labs:    3/13/2020 10:08   Sodium 138   Potassium 4.7   Chloride 104   Carbon Dioxide 29   Urea Nitrogen 16   Creatinine 0.63   GFR Estimate >90   GFR Estimate If Black >90   Calcium 9.4   Anion Gap 4   Albumin 3.1 (L)   Protein Total 7.2   Bilirubin Total 0.3   Alkaline Phosphatase 151 (H)   ALT 27   AST 22   Glucose 106 (H)   WBC 9.9   Hemoglobin 10.9 (L)   Hematocrit 33.7 (L)   Platelet Count 469 (H)   RBC Count 3.52 (L)   MCV 96   MCH 31.0   MCHC 32.3   RDW 15.7 (H)   Diff Method Automated Method   % Neutrophils 69.9   % Lymphocytes 6.9   % Monocytes 18.0   % Eosinophils 0.1   % Basophils 2.2   % Immature Granulocytes 2.9   Nucleated RBCs 1 (H)   Absolute Neutrophil 6.9   Absolute Lymphocytes 0.7 (L)   Absolute Monocytes 1.8 (H)   Absolute Eosinophils 0.0   Absolute Basophils 0.2   Abs Immature Granulocytes 0.3   Absolute Nucleated RBC 0.1       Assessment & Plan:     1. DLBCL with mediastinal mass compressing SVC with " associated collateral vessels. No disease in a/p, marrow not done. S/p 1 cycle of R-CHOP inpatient and 2 fractions of XRT. Had PET/CT on 2/28 showing [place]. Lab work is adequate, and she is feeling well to proceed with cycle 2. I reviewed the typical adverse effects including myelosuppression, fatigue, constipation, neuropathy, n/v, mucositis, anorexia. Discussed importance of hydration, aggressive management of constipation and nausea, and infectious precautions. Start allopurinol for ppx for 2 weeks. Follow-up in one week with labs.     2. Hx severe malnutrition: PEG was placed with intubation. She is eating better. Will keep TFs the same for now. If appetite continues to , will start to wean next week.     3. Deconditioning: She will start PT and OT at home.     Greater than 40 minutes was spent with this patient with greater than 20 minutes spent in counseling and coordination of care.      Magali Bruner PA-C    [facility]  [address] [place] [id number] [phone]

## 2020-03-12 NOTE — DISCHARGE INSTRUCTIONS
Follow Up Appointments    -- Primary Care in 1-2 weeks (regarding hospitalization):  You are scheduled to see Dr. Phyllis Juarez on Friday, March 20 at 9:00 AM. Please arrive for check in 8:45    Address         [facility]                        [address] [place] [id number]  Phone            [phone]    -- Oncology:  You are scheduled for Lab Draw and Oncology on Friday, March 13th at 9:45 AM.    Address       [facility] [address] [address]  Phone          [phone]    -- ENT team as needed in the future.  If you wish to schedule an appointment, please call [phone].    ---------------------------------    [facility] will provide your tube feeding formula, pump and supplies: [phone]    [facility] will provide your home care RN/PT/OT services: [phone]

## 2020-03-12 NOTE — PLAN OF CARE
Patient is alert and oriented, denies pain this shift. Mod I with walker when TF is not running and SBA when TF is going. Continent of bladder, no BM this shift. Tolerating diet well with good appetite, ate 75% of meal. TF education done this afternoon and  was able to flush tube properly and attach it to G-tube. TF is running (7p-5a per patient request) without difficulty. Call light is within reach, ok to continue with care plan.

## 2020-03-12 NOTE — PLAN OF CARE
FOCUS/GOAL  Medical management    ASSESSMENT, INTERVENTIONS AND CONTINUING PLAN FOR GOAL:  Pt is alert and oriented. No complaints of pain. SBA with TF running. Continent of bowel and bladder. BM in early AM. Patient verbalized apprehension with going home with g-tube. Encouraged pt to ask questions, will pass on to next nurse.

## 2020-03-12 NOTE — PROGRESS NOTES
[place]   Referral received via Care Transitions team for home care services. Patient's service address is in our [facility] service area. Patient will receive services from [facility]. All patient demographics and orders will be sent to [facility]. I met with patient and  prior to discharge on 03 12 20 and reviewed home care. Confirmed they requested home care start of care for Saturday 03 14 20 as she has appointments on Friday 03 13 20. Notified [facility] team of the above.  is the primary contact to set up appointments. [facility] for home care visits. [place] for enteral supplies only. Care team and patient notified. For any questions or concerns regarding home care services please call [phone].

## 2020-03-12 NOTE — PROGRESS NOTES
"  [facility]   Acute Rehabilitation Unit  Daily progress note    INTERVAL HISTORY  Chuyita Porter was seen during rounds. She is doing very well and is ready for discharge to home tomorrow. Will have home care and home infusion for TF. Currently mod I with walker when TF is not running and SBA when TF is going.    Saw her with her  again later in the afternoon. Reviewed meds and f/ups. PEG tube site remains sensitive with no clear s/s of infection. Encouraged daily cleaning.     MEDICATIONS  Scheduled meds    enoxaparin ANTICOAGULANT  40 mg Subcutaneous Q24H     loratadine  10 mg Oral Daily       PRN meds:  acetaminophen, bisacodyl, dextrose, docusate sodium, guaiFENesin, lidocaine 4%, LORazepam, melatonin, menthol (Topical Analgesic) 2.5%, polyethylene glycol, psyllium, sennosides      PHYSICAL EXAM  /75 (BP Location: Left arm)   Pulse 93   Temp 98.8  F (37.1  C) (Oral)   Resp 18   Ht 1.651 m (5' 5\")   Wt 73.8 kg (162 lb 9.6 oz)   SpO2 98%   BMI 27.06 kg/m      Gen: NAD, sitting in bed  Pulm: non-labored in room air   Abd: soft, non-tender - PEG tube site with some drainage and tenderness to palpation but no clear s/s of infection   Ext: no edema BLE  Neuro/MSK: was seen ambulating with FWW      LABS  Recent Labs   Lab 03/10/20  0715 03/09/20  1201 03/09/20  0809 03/09/20  0642 03/08/20  1211 03/07/20  2155 03/07/20  1845 03/06/20  0430 03/05/20  0402   GLC  --   --   --  106*  --   --   --  104* 126*   BGM 96 106* 101*  --  89 109* 107*  --   --          ASSESSMENT AND PLAN  Chuyita Porter is a 69 year old right hand dominant female PMH bilateral breast cancer s/p radiation (right breast 2005 & left breast 2015) previously on anastrozole for prophylaxis was admitted for medical management after intubation due to threatened airway during open neck biopsy, tracheal stent and PEG tube placement as part of planned treatment regimen on 2/17/20. Biopsy " confirmed Extranodal Diffuse large B-cell lymphoma, non-germinal center type. She presents with fatigue, decreased strength, decreased tolerance to activity, functional impairments in mobility, functional impairments in gait, functional impairments in ADLs/iADLs, and dysphagia. She is admitted to ARU on 3/6/2020 for continued interdisciplinary rehabilitation management      Rehabilitation - continue comprehensive acute inpatient rehabilitation program with multidisciplinary approach including therapies, rehab nursing, and physiatry following. See interval history for updates.         Medical Conditions  # Extranodal Diffuse large B-cell lymphoma, non-germinal center type  On 2/14/20, CT chest showed 8.0 x 6.7 x 10.0 cm mass centered in the right anterior mediastinum with mass effect within the mediastinum, including compression of the SVC with associated collateral vessels seen in the neck. No mediastinal LN involvement. Planned endotracheal biopsies and placement of ET tube, along with PEG tube placement, done on 2/17. No stent was placed due to technical complications per Interventional Pulmonology note. Biopsy confirmed DLBCL. She underwent two fractions of radiotherapy on 2/18 and 2/19. Initiated R-CHOP chemotherapy on 2/21. Repeat CT Chest imaging on 2/25 demonstrated decreased size of mass. Completed 14 day course of Allopurinol.  -  f/u with Dr Sprague and cycle 2 of R-CHOP on 3/13/2020     # Febrile neutropenia, suspected HCAP, Improved  Fever 101.2F on 2/28. Last fever was 3/3 at 4:35 pm (100.6F). Resolved cough. Suspect HCAP given recent mechanical ventilation, and PET scan showing bibasilar atelectasis and metabolic uptake. Also possible aspiration. CXR (3/2) without acute airspace disease. Infectious workup performed acute hospital: UA wnl; BCx NGTD; RVP PCR negative. PET noted bibasilar atelectasis and metabolic uptake thought to be more consistent with inflammation/infection and less likely  malignancy. Improved after 7 day course of abx.  - Monitor  - Consider neutropenic precautions as appropriate     # Leukocytosis - resolved   WBC up to 19.4 on 3/5 lowered to 12.2 on 3/6, likely 2/2 to Neupogen  - Monitor  - recheck 3/9/20, WBC wnl     # Severe Malnutrition of Chronic Illness  PEG tube placed 2/17. Per outpatient ENT note, goal of PEG was empiric should she have dysphagia from originally planned stent placement or with planned therapy. No stent was placed.   - Previously on TF via PEG however VFSS noted no aspiration, and was progressed to regular diet with thin liquid.   - Continue to assess PO intake and need for TFs/PEG tube. 03/11/20 discussed calorie counts and the plan to continue TF at home for now. Home dietitian and nurse will be available as well.      #Anxiety, patient anxious about her new dignosis. No mental health history. Noted that ativan PRN was helpful. Discussed risks and benefits of benzodiazepines vs other anxiolytics. Pt amenable to psych consult  - Ativan 0.5mg PO q6H PRN for anxiety  -3/10 Pt reported feeling hot and feeling heart is racing, which started PTA to ARU, last echo EF 60-65 % and EKG with DRU faustin were 2/19. Plan to monitor, might be anxiety related.     #Sleep disturbance, due to anxiety.  - Melatonin 3mg PO at bedtime PRN     Adjustment to disability:  Clinical psychology to eval and treat as needed  FEN: Regular diet with thin liquids  Bowel: LBM 3/6; Colace 100mg PO BID PRN, Dulcolax 10mg suppository daily PRN, Senokot 8.6 mg PO BID PRN, Miralax 17mg PO daily PRN for constipation  Bladder: Voids spontaneously; PVRs 0x2.  DVT Prophylaxis: Lovenox 40mg subcutaneous q24H, Ambulating 200ft on date of admission however will keep ppx for now given malignancy hypercoagulable state. 03/11/20 discontinued  GI Prophylaxis: None  Code: DNR/DNI  Disposition: Hopeful home with continued medical stability, improvement in functional impairments, and clearance by therapy  teams  ELOS: 3/12  Rehab prognosis:  Good  Follow up Appointments on Discharge:   - PM&R 8 weeks  - PCP 2 weeks  - Oncology TBD  - ENT (pt agreed to contact ENT nurse care coordinator's direct line if needed)         Bhargavi Da Silva MD  Physical Medicine & Rehabilitation    Time Spent on this Encounter   I spent a total of 30 minutes face to face and coordinating care of Chuyita Porter.  Over 50% of my time on the unit was spent counseling the patient and /or coordinating care; see note for details.

## 2020-03-12 NOTE — PLAN OF CARE
FOCUS/GOAL  Bowel management, Bladder management, and Discharge planning    ASSESSMENT, INTERVENTIONS AND CONTINUING PLAN FOR GOAL:  Patient A&O x4. Mod I w/ walker. Continent of bowel and bladder. Able to have a BM in AM. No complaints of pain. G tube patent. Discharge instructions gone over and questions answered. Discharge medications given to patient. Pt discharged safely off the unit around 1055 via car with his .

## 2020-03-12 NOTE — DISCHARGE SUMMARY
[facility]   Acute Rehabilitation Unit  Discharge summary     Date of Admission: 3/6/2020  Date of Discharge: 03/12/20   Disposition: home   Primary Care Physician: Phyllis Juarez  Attending physician: Bhargavi Da Silva MD         discharge diagnosis  # Extranodal Diffuse large B-cell lymphoma, non-germinal center type  # Febrile neutropenia, suspected HCAP, Improved  # Leukocytosis - resolved   # Severe Malnutrition of Chronic Illness  #Anxiety  #Sleep disturbance       brief summary  Chuyita Porter is a 69 year old right hand dominant female PMH bilateral breast cancer s/p radiation (right breast 2005 & left breast 2015) previously on anastrozole for prophylaxis was admitted for medical management after intubation due to threatened airway during open neck biopsy, tracheal stent and PEG tube placement as part of planned treatment regimen on 2/17/20. 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 She presents with fatigue, decreased strength, decreased tolerance to activity, functional impairments in mobility, functional impairments in gait, functional impairments in ADLs/iADLs, and dysphagia. She is admitted to ARU on 3/6/2020 for continued interdisciplinary rehabilitation management       rehabilitaiton course  PT: Continued therapy is recommended.  Rationale/Recommendations:  Patient requires additional PT to address weakness, endurance and impaired balance.  Patient is safe to discharge to home.       OT: Continued therapy is recommended.  Rationale/Recommendations:  Pt will benefit from continued OT services to increase IND with ADLs and IADLs. .  Continue home exercise program.      Pt is currently mod I for functional transfers, g/h tasks, oral cares, and LB dressing using 4WW. Pt is I with UB dressing. Pt is SBA/supervision for showering/bathing and SBA for shower transfer using 4WW. Pt will be returning home with 4WW, shower  chair, and bed wedge. Recommending pt has assistance from spouse with laundry, driving, finances, and medication management. Recommending pt has supervision/SBA using 4WW for first initial showers.       mEDICAL COURSE  # Extranodal Diffuse large B-cell lymphoma, non-germinal center type  On 2/14/20, CT chest showed 8.0 x 6.7 x 10.0 cm mass centered in the right anterior mediastinum with mass effect within the mediastinum, including compression of the SVC with associated collateral vessels seen in the neck. No mediastinal LN involvement. Planned endotracheal biopsies and placement of ET tube, along with PEG tube placement, done on 2/17. No stent was placed due to technical complications per Interventional Pulmonology note. Biopsy confirmed DLBCL. She underwent two fractions of radiotherapy on 2/18 and 2/19. Initiated R-CHOP chemotherapy on 2/21. Repeat CT Chest imaging on 2/25 demonstrated decreased size of mass. Completed 14 day course of Allopurinol.  -  f/u with Dr Sprague and cycle 2 of R-CHOP on 3/13/2020     # Febrile neutropenia, suspected HCAP, Improved  Fever 101.2F on 2/28. Last fever was 3/3 at 4:35 pm (100.6F). Resolved cough. Suspect HCAP given recent mechanical ventilation, and PET scan showing bibasilar atelectasis and metabolic uptake. Also possible aspiration. CXR (3/2) without acute airspace disease. Infectious workup performed acute hospital: UA wnl; BCx NGTD; RVP PCR negative. PET noted bibasilar atelectasis and metabolic uptake thought to be more consistent with inflammation/infection and less likely malignancy. Improved after 7 day course of abx.  - Monitor  - Consider neutropenic precautions as appropriate     # Leukocytosis - resolved   WBC up to 19.4 on 3/5 lowered to 12.2 on 3/6, likely 2/2 to Neupogen  - Monitor  - recheck 3/9/20, WBC wnl     # Severe Malnutrition of Chronic Illness  PEG tube placed 2/17. Per outpatient ENT note, goal of PEG was empiric should she have dysphagia from  originally planned stent placement or with planned therapy. No stent was placed.   - Previously on TF via PEG however VFSS noted no aspiration, and was progressed to regular diet with thin liquid.   - Continue to assess PO intake and need for TFs/PEG tube. 03/11/20 discussed calorie counts and the plan to continue TF at home for now. Home dietitian and nurse will be available as well.      #Anxiety, patient anxious about her new dignosis. No mental health history. Noted that ativan PRN was helpful. Discussed risks and benefits of benzodiazepines vs other anxiolytics. Pt amenable to psych consult  - Ativan 0.5mg PO q6H PRN for anxiety  -3/10 Pt reported feeling hot and feeling heart is racing, which started PTA to ARU, last echo EF 60-65 % and EKG with DRU faustin were 2/19. Plan to monitor, might be anxiety related.     #Sleep disturbance, due to anxiety.  - Melatonin 3mg PO at bedtime PRN     Adjustment to disability:  Clinical psychology to eval and treat as needed  FEN: Regular diet with thin liquids  Bowel: LBM 3/6; Colace 100mg PO BID PRN, Dulcolax 10mg suppository daily PRN, Senokot 8.6 mg PO BID PRN, Miralax 17mg PO daily PRN for constipation  Bladder: Voids spontaneously; PVRs 0x2.  DVT Prophylaxis: Lovenox 40mg subcutaneous q24H, Ambulating 200ft on date of admission however will keep ppx for now given malignancy hypercoagulable state. 03/11/20 discontinued  GI Prophylaxis: None  Code: DNR/DNI           dISCHARGE MEDICATIONS  Discharge Medication List as of 3/12/2020  9:47 AM      START taking these medications    Details   ondansetron (ZOFRAN) 4 MG tablet Take 1 tablet (4 mg) by mouth daily as needed for nausea, Disp-60 tablet,R-0, E-Prescribe         CONTINUE these medications which have CHANGED    Details   loratadine (CLARITIN) 10 MG tablet Take 1 tablet (10 mg) by mouth daily, Disp-30 tablet,R-0, E-Prescribe         CONTINUE these medications which have NOT CHANGED    Details   acetaminophen (TYLENOL) 325  MG tablet Take 2 tablets (650 mg) by mouth every 4 hours as needed for mild pain or fever, Transitional         STOP taking these medications       calcium carbonate-vitamin D (CALCIUM HIGH POTENCY/VITAMIN D) 600-200 MG-UNIT TABS Comments:   Reason for Stopping:         Multiple Vitamins-Minerals (MULTIVITAMIN ADULT PO) Comments:   Reason for Stopping:                 DISCHARGE INSTRUCTIONS AND FOLLOW UP  Discharge Procedure Orders   Home care nursing referral   Referral Priority: Routine Referral Type: Home Health Therapies & Aides   Number of Visits Requested: 1     Home Care PT Referral for Hospital Discharge   Referral Priority: Routine Referral Type: Home Health Therapies & Aides   Number of Visits Requested: 1     Home Care OT Referral for Hospital Discharge   Referral Priority: Routine   Number of Visits Requested: 1     Home infusion referral   Referral Priority: Routine   Number of Visits Requested: 1     Reason for your hospital stay   Order Comments: B cell lymphoma     Adult [facility] Follow-up and recommended labs and tests   Order Comments: --PCP in 1-2 weeks regarding hospitalization   --Oncology team as scheduled  --ENT team as needed in the future     Appointments on [place] and/or [facility] (with [facility] or [facility] provider or service). Call [phone] if you haven't heard regarding these appointments within 7 days of discharge.     Activity   Order Comments: Your activity upon discharge: use your walker at all times     Order Specific Question Answer Comments   Is discharge order? Yes      MD face to face encounter   Order Comments: Documentation of Face to Face and Certification for Home Health Services    I certify that patient: Chuyita Porter is under my care and that I, or a nurse practitioner or physician's assistant working with me, had a face-to-face encounter that meets the physician face-to-face encounter requirements with this patient on: 3/11/2020.    This encounter with the  patient was in whole, or in part, for the following medical condition, which is the primary reason for home health care: B cell lymphoma, severe deconditioning .    I certify that, based on my findings, the following services are medically necessary home health services: Nursing, Occupational Therapy and Physical Therapy.    My clinical findings support the need for the above services because: Nurse is needed: To provide caregiver training to assist with: TF and PEG care. and To teach and train about the disease and treatments for malnutrition and cancer.  Occupational Therapy Services are needed to assess and treat cognitive ability and address ADL safety due to impairment in endurance, safety and ADLs.   Physical Therapy Services are needed to assess and treat the following functional impairments: gait, endurance, balance and safety.    Further, I certify that my clinical findings support that this patient is homebound (i.e. absences from home require considerable and taxing effort and are for medical reasons or Mu-ism services or infrequently or of short duration when for other reasons) because: Requires assistance of another person or specialized equipment to access medical services because patient: Is unable to exit home safely on own due to: severe debility and h/o cancer., Is unable to walk greater than 100 feet without rest. and Requires supervision of another for safe transfer...    Based on the above findings. I certify that this patient is confined to the home and needs intermittent skilled nursing care, physical therapy and/or speech therapy.  The patient is under my care, and I have initiated the establishment of the plan of care.  This patient will be followed by a physician who will periodically review the plan of care.  Physician/Provider to provide follow up care: Phyllis Juarez    Attending hospital physician (the Medicare certified [place] provider): Bhargavi Da Silva MD  Physician Signature:  See electronic signature associated with these discharge orders.  Date: 3/11/2020     DNR/DNI     Order Specific Question Answer Comments   Code status determined by: Discussion with patient/legal decision maker      Diet   Order Comments: Follow this diet upon discharge: Regular Diet Adult        Adult Formula Drip Feeding: Continuous Nutren 1.5; Gastrostomy; Goal Rate: 75 ml/hr x 10 hrs; mL/hr; From: 8:00 PM; 6:00 AM; Medication - Feeding Tube Flush Frequency: At least 15-30 mL water before and after medication administration and with tube clogging     Order Specific Question Answer Comments   Is discharge order? Yes             Discharge summary was forwarded to Phyllis Juarez (PCP) at the time of discharge, so as to bridge from hospital to outpatient care.     It was our pleasure to care for Chuyita Porter during this hospitalization. Please do not hesitate to contact me should there be questions regarding the hospital course or discharge plan.        Bhargavi Da Silva MD  Physical Medicine & Rehabilitation      I, Bhargavi Da Silva MD, saw and evaluated this patient prior to discharge. 40 minutes spent in discharge, including >50% in counseling and coordination of care, medication review and plan of care recommended on follow up.

## 2020-03-13 ENCOUNTER — ONCOLOGY VISIT (OUTPATIENT)
Dept: ONCOLOGY | Facility: CLINIC | Age: 70
End: 2020-03-13
Attending: PHYSICIAN ASSISTANT
Payer: COMMERCIAL

## 2020-03-13 ENCOUNTER — APPOINTMENT (OUTPATIENT)
Dept: LAB | Facility: CLINIC | Age: 70
End: 2020-03-13
Attending: PHYSICIAN ASSISTANT
Payer: COMMERCIAL

## 2020-03-13 ENCOUNTER — PATIENT OUTREACH (OUTPATIENT)
Dept: ONCOLOGY | Facility: CLINIC | Age: 70
End: 2020-03-13

## 2020-03-13 ENCOUNTER — TELEPHONE (OUTPATIENT)
Dept: ONCOLOGY | Facility: CLINIC | Age: 70
End: 2020-03-13

## 2020-03-13 VITALS
RESPIRATION RATE: 12 BRPM | HEART RATE: 83 BPM | BODY MASS INDEX: 27.38 KG/M2 | DIASTOLIC BLOOD PRESSURE: 74 MMHG | WEIGHT: 164.3 LBS | SYSTOLIC BLOOD PRESSURE: 120 MMHG | TEMPERATURE: 98.8 F | HEIGHT: 65 IN | OXYGEN SATURATION: 97 %

## 2020-03-13 VITALS
RESPIRATION RATE: 18 BRPM | DIASTOLIC BLOOD PRESSURE: 67 MMHG | SYSTOLIC BLOOD PRESSURE: 107 MMHG | OXYGEN SATURATION: 97 % | HEART RATE: 91 BPM

## 2020-03-13 DIAGNOSIS — C83.398 DIFFUSE LARGE B-CELL LYMPHOMA OF EXTRANODAL SITE: Primary | ICD-10-CM

## 2020-03-13 DIAGNOSIS — T45.1X5D ADVERSE EFFECT OF ANTINEOPLASTIC AND IMMUNOSUPPRESSIVE DRUGS, SUBSEQUENT ENCOUNTER: ICD-10-CM

## 2020-03-13 DIAGNOSIS — T45.1X5A ADVERSE EFFECT OF ANTINEOPLASTIC AND IMMUNOSUPPRESSIVE DRUGS, INITIAL ENCOUNTER: ICD-10-CM

## 2020-03-13 LAB
ALBUMIN SERPL-MCNC: 3.1 G/DL (ref 3.4–5)
ALP SERPL-CCNC: 151 U/L (ref 40–150)
ALT SERPL W P-5'-P-CCNC: 27 U/L (ref 0–50)
ANION GAP SERPL CALCULATED.3IONS-SCNC: 4 MMOL/L (ref 3–14)
AST SERPL W P-5'-P-CCNC: 22 U/L (ref 0–45)
BASOPHILS # BLD AUTO: 0.2 10E9/L (ref 0–0.2)
BASOPHILS NFR BLD AUTO: 2.2 %
BILIRUB SERPL-MCNC: 0.3 MG/DL (ref 0.2–1.3)
BUN SERPL-MCNC: 16 MG/DL (ref 7–30)
CALCIUM SERPL-MCNC: 9.4 MG/DL (ref 8.5–10.1)
CHLORIDE SERPL-SCNC: 104 MMOL/L (ref 94–109)
CO2 SERPL-SCNC: 29 MMOL/L (ref 20–32)
CREAT SERPL-MCNC: 0.63 MG/DL (ref 0.52–1.04)
DIFFERENTIAL METHOD BLD: ABNORMAL
EOSINOPHIL # BLD AUTO: 0 10E9/L (ref 0–0.7)
EOSINOPHIL NFR BLD AUTO: 0.1 %
ERYTHROCYTE [DISTWIDTH] IN BLOOD BY AUTOMATED COUNT: 15.7 % (ref 10–15)
GFR SERPL CREATININE-BSD FRML MDRD: >90 ML/MIN/{1.73_M2}
GLUCOSE SERPL-MCNC: 106 MG/DL (ref 70–99)
HCT VFR BLD AUTO: 33.7 % (ref 35–47)
HGB BLD-MCNC: 10.9 G/DL (ref 11.7–15.7)
IMM GRANULOCYTES # BLD: 0.3 10E9/L (ref 0–0.4)
IMM GRANULOCYTES NFR BLD: 2.9 %
LYMPHOCYTES # BLD AUTO: 0.7 10E9/L (ref 0.8–5.3)
LYMPHOCYTES NFR BLD AUTO: 6.9 %
MCH RBC QN AUTO: 31 PG (ref 26.5–33)
MCHC RBC AUTO-ENTMCNC: 32.3 G/DL (ref 31.5–36.5)
MCV RBC AUTO: 96 FL (ref 78–100)
MONOCYTES # BLD AUTO: 1.8 10E9/L (ref 0–1.3)
MONOCYTES NFR BLD AUTO: 18 %
NEUTROPHILS # BLD AUTO: 6.9 10E9/L (ref 1.6–8.3)
NEUTROPHILS NFR BLD AUTO: 69.9 %
NRBC # BLD AUTO: 0.1 10*3/UL
NRBC BLD AUTO-RTO: 1 /100
PLATELET # BLD AUTO: 469 10E9/L (ref 150–450)
POTASSIUM SERPL-SCNC: 4.7 MMOL/L (ref 3.4–5.3)
PROT SERPL-MCNC: 7.2 G/DL (ref 6.8–8.8)
RBC # BLD AUTO: 3.52 10E12/L (ref 3.8–5.2)
SODIUM SERPL-SCNC: 138 MMOL/L (ref 133–144)
WBC # BLD AUTO: 9.9 10E9/L (ref 4–11)

## 2020-03-13 PROCEDURE — 96367 TX/PROPH/DG ADDL SEQ IV INF: CPT

## 2020-03-13 PROCEDURE — 25000132 ZZH RX MED GY IP 250 OP 250 PS 637: Mod: ZF | Performed by: PHYSICIAN ASSISTANT

## 2020-03-13 PROCEDURE — 99215 OFFICE O/P EST HI 40 MIN: CPT | Mod: ZP | Performed by: PHYSICIAN ASSISTANT

## 2020-03-13 PROCEDURE — 36415 COLL VENOUS BLD VENIPUNCTURE: CPT

## 2020-03-13 PROCEDURE — 80053 COMPREHEN METABOLIC PANEL: CPT | Performed by: PHYSICIAN ASSISTANT

## 2020-03-13 PROCEDURE — 85025 COMPLETE CBC W/AUTO DIFF WBC: CPT | Performed by: PHYSICIAN ASSISTANT

## 2020-03-13 PROCEDURE — G0463 HOSPITAL OUTPT CLINIC VISIT: HCPCS | Mod: 25

## 2020-03-13 PROCEDURE — 96375 TX/PRO/DX INJ NEW DRUG ADDON: CPT

## 2020-03-13 PROCEDURE — 96411 CHEMO IV PUSH ADDL DRUG: CPT

## 2020-03-13 PROCEDURE — 25000125 ZZHC RX 250: Mod: ZF | Performed by: PHYSICIAN ASSISTANT

## 2020-03-13 PROCEDURE — 25000128 H RX IP 250 OP 636: Mod: ZF | Performed by: PHYSICIAN ASSISTANT

## 2020-03-13 PROCEDURE — 96377 APPLICATON ON-BODY INJECTOR: CPT | Mod: 59

## 2020-03-13 PROCEDURE — 25800030 ZZH RX IP 258 OP 636: Mod: ZF | Performed by: PHYSICIAN ASSISTANT

## 2020-03-13 PROCEDURE — 40000556 ZZH STATISTIC PERIPHERAL IV START W US GUIDANCE: Mod: ZF

## 2020-03-13 PROCEDURE — 96413 CHEMO IV INFUSION 1 HR: CPT

## 2020-03-13 PROCEDURE — 96417 CHEMO IV INFUS EACH ADDL SEQ: CPT

## 2020-03-13 PROCEDURE — 96415 CHEMO IV INFUSION ADDL HR: CPT

## 2020-03-13 RX ORDER — PREDNISONE 50 MG/1
100 TABLET ORAL DAILY
Qty: 10 TABLET | Refills: 0 | Status: SHIPPED | OUTPATIENT
Start: 2020-03-13 | End: 2020-04-23

## 2020-03-13 RX ORDER — DIPHENHYDRAMINE HCL 25 MG
50 CAPSULE ORAL ONCE
Status: COMPLETED | OUTPATIENT
Start: 2020-03-13 | End: 2020-03-13

## 2020-03-13 RX ORDER — DOXORUBICIN HYDROCHLORIDE 2 MG/ML
50 INJECTION, SOLUTION INTRAVENOUS ONCE
Status: COMPLETED | OUTPATIENT
Start: 2020-03-13 | End: 2020-03-13

## 2020-03-13 RX ORDER — PALONOSETRON 0.05 MG/ML
0.25 INJECTION, SOLUTION INTRAVENOUS ONCE
Status: COMPLETED | OUTPATIENT
Start: 2020-03-13 | End: 2020-03-13

## 2020-03-13 RX ORDER — ACETAMINOPHEN 325 MG/1
650 TABLET ORAL ONCE
Status: COMPLETED | OUTPATIENT
Start: 2020-03-13 | End: 2020-03-13

## 2020-03-13 RX ADMIN — FAMOTIDINE 20 MG: 10 INJECTION INTRAVENOUS at 16:18

## 2020-03-13 RX ADMIN — SODIUM CHLORIDE 250 ML: 9 INJECTION, SOLUTION INTRAVENOUS at 11:58

## 2020-03-13 RX ADMIN — FOSAPREPITANT 150 MG: 150 INJECTION, POWDER, LYOPHILIZED, FOR SOLUTION INTRAVENOUS at 12:06

## 2020-03-13 RX ADMIN — VINCRISTINE SULFATE 2 MG: 1 INJECTION, SOLUTION INTRAVENOUS at 12:57

## 2020-03-13 RX ADMIN — RITUXIMAB 700 MG: 10 INJECTION, SOLUTION INTRAVENOUS at 13:55

## 2020-03-13 RX ADMIN — PEGFILGRASTIM 6 MG: KIT SUBCUTANEOUS at 15:58

## 2020-03-13 RX ADMIN — ACETAMINOPHEN 650 MG: 325 TABLET ORAL at 11:55

## 2020-03-13 RX ADMIN — CYCLOPHOSPHAMIDE 1500 MG: 1 INJECTION, POWDER, FOR SOLUTION INTRAVENOUS; ORAL at 13:05

## 2020-03-13 RX ADMIN — DIPHENHYDRAMINE HYDROCHLORIDE 50 MG: 25 CAPSULE ORAL at 11:55

## 2020-03-13 RX ADMIN — PALONOSETRON HYDROCHLORIDE 0.25 MG: 0.25 INJECTION, SOLUTION INTRAVENOUS at 11:58

## 2020-03-13 RX ADMIN — DOXORUBICIN HYDROCHLORIDE 90 MG: 2 INJECTION, SOLUTION INTRAVENOUS at 12:37

## 2020-03-13 ASSESSMENT — MIFFLIN-ST. JEOR: SCORE: 1281.14

## 2020-03-13 ASSESSMENT — PAIN SCALES - GENERAL: PAINLEVEL: NO PAIN (0)

## 2020-03-13 NOTE — NURSING NOTE
Vitals done, labs drawn by piv venipuncture.  See doc flow sheets for details.  Kayleen Fernandez, DANO March 13, 2020

## 2020-03-13 NOTE — PROGRESS NOTES
Infusion Nursing Note:  Chuyita Porter presents today for C2D1 Rituxan/Adriamycin/Cyclophosphamide/Vincristine.    Patient seen by provider today: Yes: Magali TALBOT   present during visit today: Not Applicable.    Note: First time getting chemotherapy today.Chemotherapy teaching, side effects, and schedule reviewed with patient. Pt instructed to call triage (or MD on call if after hours/weekends) with chills/temp >=100.5. Pt stated understanding of plan.  Thermometer given.    5 minutes into completion of IV Rituxan, patient complaint of feeling squeezy in her abdomen. Denies nausea, shortness of breath nor chest discomfort. IV Pepcid given. Symptom subsided. No new concerns made.       20 minutes into completion on IV Rituxan. Patient complaints of heart palpitations. Patient states that this happen last time when she took her PO Prednisolone. VS stable. Regular pulse. Denies shortness of breath and chest discomfort. Instruction given to patient as per provider. Verbalized understading.     Appointment is not made yet by the time patient left the facility. Instructed patient if unable to see next few days to call . Verbalized understanding.    Intravenous Access:  Peripheral IV placed.    Treatment Conditions:  Lab Results   Component Value Date    HGB 10.9 03/13/2020     Lab Results   Component Value Date    WBC 9.9 03/13/2020      Lab Results   Component Value Date    ANEU 6.9 03/13/2020     Lab Results   Component Value Date     03/13/2020      Lab Results   Component Value Date     03/13/2020                   Lab Results   Component Value Date    POTASSIUM 4.7 03/13/2020           Lab Results   Component Value Date    MAG 2.5 03/06/2020            Lab Results   Component Value Date    CR 0.63 03/13/2020                   Lab Results   Component Value Date    GABI 9.4 03/13/2020                Lab Results   Component Value Date    BILITOTAL 0.3 03/13/2020           Lab  Results   Component Value Date    ALBUMIN 3.1 03/13/2020                    Lab Results   Component Value Date    ALT 27 03/13/2020           Lab Results   Component Value Date    AST 22 03/13/2020       Results reviewed, labs MET treatment parameters, ok to proceed with treatment.  ECHO/MUGA completed 2/19/20  EF 60-65%.    Neulasta Onpro On-Body injector applied to left upper arm at 3/13/20 1600 H with light facing elbow.  Writer discussed Neulasta injection would start on 3/14/20 at 1900h, approximately 27 hours after application applied today.  Written and Verbal instruction reviewed with patient.  Pt instructed when the dose delivery starts, it will take about 45 minutes to complete.  Pt aware Neulasta Onpro On-Body should have green flashing light and to call triage or on-call MD if injector flashes red or appears to be leaking. Pt aware to keep Onpro On-Body Neulasta 4 inches away from electrical equipment and to avoid showering 4 hours prior to injection.   Neulasta Onpro Lot number: See MAR.    TORB: 3/13/20/Magali TALBOT/Odalis Ansari RN/ Symptom noted. No intervention needed. Please let patient monitor.     Post Infusion Assessment:  Patient tolerated infusion without incident.  Patient tolerated injection without incident.  Blood return noted pre and post infusion.  Blood return noted during administration every 2 cc.  Site patent and intact, free from redness, edema or discomfort.  No evidence of extravasations.  Access discontinued per protocol.       Discharge Plan:   Prescription refills given for Ativan, Allopurinol, Compazine and Prednisolone.  Discharge instructions reviewed with: Patient and Family.  Patient and/or family verbalized understanding of discharge instructions and all questions answered.  Copy of AVS reviewed with patient and/or family.  Patient will return 4/2/20 for next appointment.  Patient discharged in stable condition accompanied by: self and .  Departure Mode:  Wheelchair.  Face to Face time: 10.    KENJI GARCIA RN      Doxorubicin stopped time: 1247H

## 2020-03-13 NOTE — PATIENT INSTRUCTIONS
Contact Numbers  [facility]: [phone]    After Hours:  [phone]  Triage: [phone]    Please call the [facility] Triage line if you experience a temperature greater than or equal to 100.5, shaking chills, have uncontrolled nausea, vomiting and/or diarrhea, dizziness, shortness of breath, chest pain, bleeding, unexplained bruising, or if you have any other new/concerning symptoms, questions or concerns.     If it is after hours, weekends, or holidays, please call the main hospital  at  [phone] and ask to speak to the Oncology doctor on call.     If you are having any concerning symptoms or wish to speak to a provider before your next infusion visit, please call your care coordinator or triage to notify them so we can adequately serve you.     If you need a refill on a narcotic prescription or other medication, please call triage before your infusion appointment.         March 2020 Sunday Monday Tuesday Wednesday Thursday Friday Saturday   1    IP TREATMENT   6:00 AM   (30 min.)   Macie Ahn, SLP   [facility], [place], Speech Therapy    IP TREATMENT   6:00 AM   (30 min.)   Genesis Ware, CHELSEA   [facility], [place], Occupational Therapy 2    IP TREATMENT   6:00 AM   (30 min.)   Sunita Paula, SLP   [facility], Speech Therapy    IP TREATMENT   6:00 AM   (30 min.)   Taryn Miller OTR   [facility], Occupational Therapy    IP TREATMENT   6:00 AM   (30 min.)   Clover Welch, PT   [facility], Physical Therapy    XR CHEST 2 VIEWS   1:30 PM   (15 min.)   [id number]   [facility],  Radiology    XR VIDEO SWALLOW W SLP OR OT   1:35 PM   (60 min.)   UUXR5   [facility],  Radiology    IP VIDEO SWALLOW STUDY   2:00 PM   (60 min.)   Shahla Gonzales, SLP   [facility], [place], Speech Therapy 3    IP TREATMENT   6:00 AM   (30 min.)   Landry Salmon, CYN   [facility], [place], Physical Therapy    IP TREATMENT   6:00 AM   (30 min.)   Taryn Miller, JEANE   [facility], [place], Occupational  Therapy 4    IP TREATMENT   6:00 AM   (30 min.)   Clover Welch, PT   [facility], Physical Therapy    IP TREATMENT   6:00 AM   (30 min.)   Genesis Duron, SLP   [facility], Speech Therapy    IP TREATMENT   6:00 AM   (30 min.)   Taryn Miller, OTR   [facility], Occupational Therapy 5     6    IP TREATMENT   6:00 AM   (30 min.)   Maine Lau, OTR   [facility], Occupational Therapy    Admission   2:45 PM   Isiah Holguin MD   [facility]   (Discharge: 3/12/2020) 7    IP EVALUATION   9:00 AM   (75 min.)   Shira Owen, PT   [facility], Acute Rehab PT    IP EVALUATION   1:15 PM   (45 min.)   Patricia Harrington, SLP   [facility], Acute Rehab SLP    IP EVALUATION   2:00 PM   (60 min.)   Jr Lozano, OT   [facility], Acute Rehab OT   8    IP TREATMENT   8:15 AM   (30 min.)   Patricia Harrington, SLP   [facility], Acute Rehab SLP    IP TREATMENT   9:15 AM   (60 min.)   Jr Lozano, OT   [facility], Acute Rehab OT    IP TREATMENT   1:15 PM   (60 min.)   Shira Owen, PT   [facility], Acute Rehab PT 9    IP TREATMENT   8:15 AM   (60 min.)   Susanna Elizaebth, PT   [facility], Acute Rehab PT    IP TREATMENT  11:00 AM   (60 min.)   Leila Hemphill, OT   [facility], Acute Rehab OT    IP TREATMENT   1:15 PM   (45 min.)   Susanna Elizabeth, PT   [facility], Acute Rehab PT    IP TREATMENT   2:15 PM   (30 min.)   Leila Hemphill, OT   [facility], Acute Rehab OT 10    IP TREATMENT   7:00 AM   (45 min.)   Fritsch, Megan R, OT   [facility], Acute Rehab OT    IP TREATMENT  10:15 AM   (45 min.)   Shira Owen, PT   [facility], Acute Rehab PT    IP TREATMENT   2:15 PM   (45 min.)   Fritsch, Megan R, OT   [facility], Acute Rehab OT    IP TREATMENT   5:00 PM   (60 min.)   Baylee Grissom, PT   [facility], Acute Rehab PT 11    IP TREATMENT   8:15 AM   (60 min.)   Fritsch, Megan R, OT   [facility], Acute Rehab OT    IP TREATMENT  10:15  AM   (15 min.)   Sherice Fields, OT   [facility], Acute Rehab OT    IP TREATMENT  10:15 AM   (15 min.)   Sihra Owen, PT   [facility], Acute Rehab PT    IP TREATMENT  11:15 AM   (45 min.)   Susanna Elizabeth, PT   [facility], Acute Rehab PT    IP TREATMENT   1:15 PM   (45 min.)   Priscilla Jones, PT   [facility], Acute Rehab PT    IP TREATMENT   2:15 PM   (45 min.)   Sunita Dillon, OT   [facility], Acute Rehab OT    UR ENTERAL TUBE FEEDING   3:00 PM   (60 min.)   Micha Joyner, RN   [facility], [facility] 12     13    [facility] LAB DRAW   9:45 AM   (15 min.)    [facility] LAB DRAW   [facility] Lab Draw    [facility] RETURN  10:05 AM   (50 min.)   Magali Bruner PA-C   [facility] ONC INFUSION 360  12:00 PM   (360 min.)    ONCOLOGY INFUSION   [facility] 14       15     16     17     18     19     20     21       22     23     24     25     26     27     28       29     30     31 April 2020 Sunday Monday Tuesday Wednesday Thursday Friday Saturday                  1     2    PET ONCOLOGY WHOLE BODY   7:30 AM   (45 min.)   [id number]   [facility] PET CT    [facility] ONC RETURN   1:00 PM   (30 min.)   Rosy Sprague MD   [facility] Blood and Marrow Transplant 3     4       5     6     7     8     9     10     11       12     13     14     15     16     17     18       19     20     21     22     23     24     25       26     27     28     29     30                               Lab Results:  Recent Results (from the past 12 hour(s))   CBC with platelets differential    Collection Time: 03/13/20 10:08 AM   Result Value Ref Range    WBC 9.9 4.0 - 11.0 10e9/L    RBC Count 3.52 (L) 3.8 - 5.2 10e12/L    Hemoglobin 10.9 (L) 11.7 - 15.7 g/dL    Hematocrit 33.7 (L) 35.0 - 47.0 %    MCV 96 78 - 100 fl    MCH 31.0 26.5 - 33.0 pg    MCHC 32.3 31.5 - 36.5 g/dL    RDW 15.7 (H) 10.0 - 15.0 %     Platelet Count 469 (H) 150 - 450 10e9/L    Diff Method Automated Method     % Neutrophils 69.9 %    % Lymphocytes 6.9 %    % Monocytes 18.0 %    % Eosinophils 0.1 %    % Basophils 2.2 %    % Immature Granulocytes 2.9 %    Nucleated RBCs 1 (H) 0 /100    Absolute Neutrophil 6.9 1.6 - 8.3 10e9/L    Absolute Lymphocytes 0.7 (L) 0.8 - 5.3 10e9/L    Absolute Monocytes 1.8 (H) 0.0 - 1.3 10e9/L    Absolute Eosinophils 0.0 0.0 - 0.7 10e9/L    Absolute Basophils 0.2 0.0 - 0.2 10e9/L    Abs Immature Granulocytes 0.3 0 - 0.4 10e9/L    Absolute Nucleated RBC 0.1    Comprehensive metabolic panel    Collection Time: 03/13/20 10:08 AM   Result Value Ref Range    Sodium 138 133 - 144 mmol/L    Potassium 4.7 3.4 - 5.3 mmol/L    Chloride 104 94 - 109 mmol/L    Carbon Dioxide 29 20 - 32 mmol/L    Anion Gap 4 3 - 14 mmol/L    Glucose 106 (H) 70 - 99 mg/dL    Urea Nitrogen 16 7 - 30 mg/dL    Creatinine 0.63 0.52 - 1.04 mg/dL    GFR Estimate >90 >60 mL/min/[1.73_m2]    GFR Estimate If Black >90 >60 mL/min/[1.73_m2]    Calcium 9.4 8.5 - 10.1 mg/dL    Bilirubin Total 0.3 0.2 - 1.3 mg/dL    Albumin 3.1 (L) 3.4 - 5.0 g/dL    Protein Total 7.2 6.8 - 8.8 g/dL    Alkaline Phosphatase 151 (H) 40 - 150 U/L    ALT 27 0 - 50 U/L    AST 22 0 - 45 U/L

## 2020-03-13 NOTE — TELEPHONE ENCOUNTER
Prior Authorization Approval    Authorization Effective Date: 3/13/2020  Authorization Expiration Date: 12/31/2020  Medication: Lorazepam PA Approved  Approved Dose/Quantity: 0.5mg 30/5  Reference #:     Insurance Company: [employer] - Phone [phone] Fax [phone]  Expected CoPay:       CoPay Card Available:      Foundation Assistance Needed:    Which Pharmacy is filling the prescription (Not needed for infusion/clinic administered): [facility] [address] 0-813  Pharmacy Notified:    Patient Notified:                Dax Shipley  [facility]  Oncology Pharmacy Liaison   [email or web address]  [phone] (phone  [phone] (fax

## 2020-03-13 NOTE — PROGRESS NOTES
"RN Care Coordination Note  Writer net with Chuyita and her spouse Ranjit in infusion room after clinic  appt with DAHIANA David.  Reiterated the role of RN Care Coordinator at [facility].     Provided my contact information, [facility] phone number (which has options to talk with a Nurse available 24/7 - triage and RNCC via this option during business hours).   Reviewed appropriate use of MyChart, not to be used for symptom reporting.   Reviewed [facility] care team members including midlevel providers in oncology dept and Dr. Sprague's usual clinic hours.  Provided overview of supportive services at [facility] including SW, oncology dietitian, oncology behavioural health providers (psychology, psychiatry, counseling).  Reviewed sections of [facility] Cancer Care Guidebook, including  \"When to Contact Your Provider.\"  Auth to Discuss PHI form completed by pt and original sent to [facility] admin staff to enter into chart and send to HIM for scanning.   We reviewed sx to report, how to reach scheduling, triage 24/7 and that scheduling team is working on setting up appts as per Magali's reqeust today with lab/SHANTELL visit late next week as well as next cycle of R-CHOP (#3) 21 days from now.  Clarifying questions answered to pt's/ spouse's states satisfaction.  Chuyita and Ranjit voiced understanding and appreciation of above information and denies any further questions, and they understands that I will follow and provide coordination as needed.  Brandi Hood, RN, BSN, OCN  Care Coordinator  [facility]        "

## 2020-03-13 NOTE — LETTER
3/13/2020      RE: Chuyita Porter  [address]  [place] [address]       Hematology-Oncology Visit  Mar 13, 2020    Reason for Visit: follow-up DLBCL     HPI: Chuyita Porter is a 69 year old female with past medical history of breast cancer (right breast cancer in 2005 and left breast cancer in 2015 followed by Dr. Gallardo) with diffuse large B cell lymphoma. She presented with SOB and pressure on her neck/chest. She was found to have mediastinal mass and FNA biopsy on 1/30/20 showed DLBCL. She was initially managed with oral steroids and then was taken to the OR on 2/17 for open neck biopsy, tracheal stent, and PEG tube placement however in the OR decision was made to intubate patient due to concern of threatened airway. She was then admitted and started two fractions of radiation on 2/18 and 2/19 and then started R-CHOP on 2/21. CT chest on 2/25 showed decrease size of mass. Complications of neutropenic fever due to HCAP treated with vanco + cefepime --> zosyn and completed treatment with Levaquin. She was extubated on 2/26.     She was discharged to rehab due to debility and severe malnutrition. She was discharged on TFs. Will benefit from continued PT/OT. Discharged on 3/12.      Interval History: Chuyita is feeling well today. She is happy to be home. Infused TF (3 cartons) over 10 hours last night which went well. She has been eating 3 small meals per day. Hydrating but she does fill up quickly. Breathing has been better. She has gotten stronger and has been able to climb stairs and walk further distances. No CP. No cough. Has puffy supraclavicular spaces at baseline. Her  notes the L side may look a little more puffy. She has no pain or swelling in L arm.     No fevers/chills/sweats. No nausea. She has been having regular, semi-formed stools daily. Urinating without difficulty. She has some anxiety about starting treatment today since this will be the first cycle outpatient, and she does not  "remember much of cycle 1.     Current Outpatient Medications   Medication     acetaminophen (TYLENOL) 325 MG tablet     allopurinol (ZYLOPRIM) 300 MG tablet     loratadine (CLARITIN) 10 MG tablet     LORazepam (ATIVAN) 0.5 MG tablet     ondansetron (ZOFRAN) 4 MG tablet     prochlorperazine (COMPAZINE) 10 MG tablet     No current facility-administered medications for this visit.          PHYSICAL EXAM:  /74   Pulse 83   Temp 98.8  F (37.1  C) (Oral)   Resp 12   Ht 1.651 m (5' 5\")   Wt 74.5 kg (164 lb 4.8 oz)   SpO2 97%   BMI 27.34 kg/m    General: Alert, oriented, pleasant, NAD  Skin: No rash, bruising, or petechiae on exposed skin   HEENT: Normocephalic, atraumatic, PERRLA, EOMI. Moist mucus membranes, no lesions or thrush  Neck: No cervical or supraclavicular LAD. Puffy supraclavicular spaces without pain. No erythema. L>R  Axillary: No LAD  Lungs: CTA bilaterally, normal work of breathing  Cardiac: RRR, S1, S2, no murmurs  Abdomen: Soft, nontender, nondistended. Normoactive bowel sounds.  Neuro: CNII-XII grossly intact  Extremities: No pedal edema    Labs:    3/13/2020 10:08   Sodium 138   Potassium 4.7   Chloride 104   Carbon Dioxide 29   Urea Nitrogen 16   Creatinine 0.63   GFR Estimate >90   GFR Estimate If Black >90   Calcium 9.4   Anion Gap 4   Albumin 3.1 (L)   Protein Total 7.2   Bilirubin Total 0.3   Alkaline Phosphatase 151 (H)   ALT 27   AST 22   Glucose 106 (H)   WBC 9.9   Hemoglobin 10.9 (L)   Hematocrit 33.7 (L)   Platelet Count 469 (H)   RBC Count 3.52 (L)   MCV 96   MCH 31.0   MCHC 32.3   RDW 15.7 (H)   Diff Method Automated Method   % Neutrophils 69.9   % Lymphocytes 6.9   % Monocytes 18.0   % Eosinophils 0.1   % Basophils 2.2   % Immature Granulocytes 2.9   Nucleated RBCs 1 (H)   Absolute Neutrophil 6.9   Absolute Lymphocytes 0.7 (L)   Absolute Monocytes 1.8 (H)   Absolute Eosinophils 0.0   Absolute Basophils 0.2   Abs Immature Granulocytes 0.3   Absolute Nucleated RBC 0.1 "       Assessment & Plan:     1. DLBCL with mediastinal mass compressing SVC with associated collateral vessels. No disease in a/p, marrow not done. S/p 1 cycle of R-CHOP inpatient and 2 fractions of XRT. Had PET/CT on 2/28 showing LA. Lab work is adequate, and she is feeling well to proceed with cycle 2. I reviewed the typical adverse effects including myelosuppression, fatigue, constipation, neuropathy, n/v, mucositis, anorexia. Discussed importance of hydration, aggressive management of constipation and nausea, and infectious precautions. Start allopurinol for ppx for 2 weeks. Follow-up in one week with labs.     2. Hx severe malnutrition: PEG was placed with intubation. She is eating better. Will keep TFs the same for now. If appetite continues to , will start to wean next week.     3. Deconditioning: She will start PT and OT at home.     Greater than 40 minutes was spent with this patient with greater than 20 minutes spent in counseling and coordination of care.      Magali Bruner PA-C    [facility]  [address] [place] [id number]  [phone]                              Magali Bruner PA-C

## 2020-03-13 NOTE — NURSING NOTE
"Oncology Rooming Note    March 13, 2020 10:25 AM   Chuyita Porter is a 69 year old female who presents for:    Chief Complaint   Patient presents with     Blood Draw     piv  blood draw and vitals     Oncology Clinic Visit     Return visit; B-cell lymphoma     Initial Vitals: /74   Pulse 83   Temp 98.8  F (37.1  C) (Oral)   Resp 12   Ht 1.651 m (5' 5\")   Wt 74.5 kg (164 lb 4.8 oz)   SpO2 97%   BMI 27.34 kg/m   Estimated body mass index is 27.34 kg/m  as calculated from the following:    Height as of this encounter: 1.651 m (5' 5\").    Weight as of this encounter: 74.5 kg (164 lb 4.8 oz). Body surface area is 1.85 meters squared.  No Pain (0) Comment: Data Unavailable   No LMP recorded.  Allergies reviewed: Yes  Medications reviewed: Yes    Medications: Medication refills not needed today.  Pharmacy name entered into [employer]: [facility] [id number] [place] [address]    Clinical concerns: No new concerns today. Magali Bruner was notified.      THIAGO KATZ CMA            "

## 2020-03-14 ENCOUNTER — HOME CARE/HOSPICE - HEALTHEAST (OUTPATIENT)
Dept: HOME HEALTH SERVICES | Facility: HOME HEALTH | Age: 70
End: 2020-03-14

## 2020-03-15 ENCOUNTER — HOME CARE/HOSPICE - HEALTHEAST (OUTPATIENT)
Dept: HOME HEALTH SERVICES | Facility: HOME HEALTH | Age: 70
End: 2020-03-15

## 2020-03-16 ENCOUNTER — HOME CARE/HOSPICE - HEALTHEAST (OUTPATIENT)
Dept: HOME HEALTH SERVICES | Facility: HOME HEALTH | Age: 70
End: 2020-03-16

## 2020-03-16 ENCOUNTER — AMBULATORY - HEALTHEAST (OUTPATIENT)
Dept: OTHER | Facility: CLINIC | Age: 70
End: 2020-03-16

## 2020-03-16 ENCOUNTER — PATIENT OUTREACH (OUTPATIENT)
Dept: ONCOLOGY | Facility: CLINIC | Age: 70
End: 2020-03-16

## 2020-03-16 ENCOUNTER — DOCUMENTATION ONLY (OUTPATIENT)
Dept: OTHER | Facility: CLINIC | Age: 70
End: 2020-03-16

## 2020-03-16 ENCOUNTER — COMMUNICATION - HEALTHEAST (OUTPATIENT)
Dept: HOME HEALTH SERVICES | Facility: HOME HEALTH | Age: 70
End: 2020-03-16

## 2020-03-16 NOTE — PROGRESS NOTES
INCOMING CALL: Ranjit called and [facility] requesting call-back at [phone] re: tube feeding tube problems during the night. Tubing for the TF tubing tip keeps popping out of her Gtube connecting piece. Has [place] for home care supplies / syringes. Got about 1/2 of her overnight feeding last night, frustrated that he called [facility] and was on hold x 30 minutes and never got a call back.  Best day for eating was during infusion on Friday. Worried about TF tonight, currently supposed to be doing 10 hrs overnight.  Does not have a scale at home. Sleeping sitting up with bed wedge and pillows.  Keeping a detailed log of po intake which continues to improve  [place] great on Saturday, up and has showered today no nausea or GI probs. hasn't needed antiemetics. We briefly discussed that most pts transition from Overnight feeds to daytime bolus feeds before TF dced , under guidance of dietitian monitoring weight and po intake log / kcal count  [facility] contact info from pt's spouse: [phone]   CM Latosha Church understands that I will attempt to reach Home Care today to find out if they plan to come see her as promised today and to update Magali re: above issues so we can plan ahead for toingiht and later this week. Ranjit voiced understanding of above instructions and information and denied further questions    Writer confirmed with David TALBOT that she feels moving toward bolus feeds will be ok - we will want dietitian to advise re: transition, will try for home labs on 3/19 with telemedicine visit with ALICIA TALBOT that day as well.    OUTGOING CALL:  - writer called [facility] - staff confirmed that they have a nurse going out to see pt today at 4 pm    - writer called pt's spouse: Home Care nurse saw pt and they reviewed how to connect the TF, will troubleshoot by phone if needed. I explained that our plan for this week will include:  Referral to our oncology dietitian to make  recs for adjusting TFs, goal to move to bolus in daytime  Home lab draw on Thursday AM  Telemedicine visit with DAHIANA David instead of an in-person visit due to the current pandemic    Pt voiced understanding of above instructions and information and denied further questions  And we will talk on the phone more tomorrow

## 2020-03-17 ENCOUNTER — PATIENT OUTREACH (OUTPATIENT)
Dept: ONCOLOGY | Facility: CLINIC | Age: 70
End: 2020-03-17

## 2020-03-17 ENCOUNTER — HOME CARE/HOSPICE - HEALTHEAST (OUTPATIENT)
Dept: HOME HEALTH SERVICES | Facility: HOME HEALTH | Age: 70
End: 2020-03-17

## 2020-03-17 NOTE — PROGRESS NOTES
OUTGOING CALL:  - writer called [facility] - to request Home lab draw on Thursday AM (CBC with plts/diff, CMP, LDH, Uric acid, Phos) to be resulted prior to oncology telemedicine visit with David TALBOT on 3/19 if possible. VErbal orders for above labs given to staff and they will coordinate appt with pt for home lab draw on 3/19 am, fax results to us, not sure where it will be run, likely [facility].    Future Appointments   Date Time Provider Department Center   3/19/2020  2:30 PM Magali Bruner PA-C [facility]   4/2/2020  7:30 AM [address] [place] [address]   4/2/2020  1:00 PM Rosy Sprague MD [facility]       - writer called pt's spouse: - no answer , left detailed message re: televisit time above confirmed now and that I have connected with home care staff re: lab draw on Thursday am as well as our oncology dietitian and she has confirmed with [facility] that she will be following and be a point of contact and they are playing phone tag with Lashel[place]olores.     Candy Velazquez RD from [facility]   Current Formula: Nutren 1.5   Volume: 3 cans/day   MOA: pump feeds - 75ml/hr x 10 hours.     I asked that Ranjit call me back with any further questions/concers.  Brandi Hood, RN  RN Care Coordinator  [facility] [address] [address]  [phone]

## 2020-03-18 ENCOUNTER — COMMUNICATION - HEALTHEAST (OUTPATIENT)
Dept: PHYSICAL THERAPY | Age: 70
End: 2020-03-18

## 2020-03-18 ENCOUNTER — DOCUMENTATION ONLY (OUTPATIENT)
Dept: OTHER | Facility: CLINIC | Age: 70
End: 2020-03-18

## 2020-03-18 ENCOUNTER — AMBULATORY - HEALTHEAST (OUTPATIENT)
Dept: OTHER | Facility: CLINIC | Age: 70
End: 2020-03-18

## 2020-03-18 ENCOUNTER — HOME CARE/HOSPICE - HEALTHEAST (OUTPATIENT)
Dept: HOME HEALTH SERVICES | Facility: HOME HEALTH | Age: 70
End: 2020-03-18

## 2020-03-19 ENCOUNTER — AMBULATORY - HEALTHEAST (OUTPATIENT)
Dept: LAB | Facility: CLINIC | Age: 70
End: 2020-03-19

## 2020-03-19 ENCOUNTER — EXTERNAL ORDER RESULTS (OUTPATIENT)
Dept: ONCOLOGY | Facility: CLINIC | Age: 70
End: 2020-03-19

## 2020-03-19 ENCOUNTER — COMMUNICATION - HEALTHEAST (OUTPATIENT)
Dept: FAMILY MEDICINE | Facility: CLINIC | Age: 70
End: 2020-03-19

## 2020-03-19 ENCOUNTER — PATIENT OUTREACH (OUTPATIENT)
Dept: ONCOLOGY | Facility: CLINIC | Age: 70
End: 2020-03-19

## 2020-03-19 ENCOUNTER — VIRTUAL VISIT (OUTPATIENT)
Dept: ONCOLOGY | Facility: CLINIC | Age: 70
End: 2020-03-19
Attending: PHYSICIAN ASSISTANT
Payer: COMMERCIAL

## 2020-03-19 ENCOUNTER — COMMUNICATION - HEALTHEAST (OUTPATIENT)
Dept: SCHEDULING | Facility: CLINIC | Age: 70
End: 2020-03-19

## 2020-03-19 ENCOUNTER — HOME CARE/HOSPICE - HEALTHEAST (OUTPATIENT)
Dept: HOME HEALTH SERVICES | Facility: HOME HEALTH | Age: 70
End: 2020-03-19

## 2020-03-19 DIAGNOSIS — C83.398 DIFFUSE LARGE B-CELL LYMPHOMA OF EXTRANODAL SITE: Primary | ICD-10-CM

## 2020-03-19 DIAGNOSIS — C50.512 MALIGNANT NEOPLASM OF LOWER-OUTER QUADRANT OF LEFT BREAST OF FEMALE, ESTROGEN RECEPTOR POSITIVE (H): ICD-10-CM

## 2020-03-19 DIAGNOSIS — Z17.0 MALIGNANT NEOPLASM OF LOWER-OUTER QUADRANT OF LEFT BREAST OF FEMALE, ESTROGEN RECEPTOR POSITIVE (H): ICD-10-CM

## 2020-03-19 DIAGNOSIS — K29.40 ATROPHIC GASTRITIS WITHOUT HEMORRHAGE: ICD-10-CM

## 2020-03-19 DIAGNOSIS — C83.398 DIFFUSE LARGE B-CELL LYMPHOMA OF EXTRANODAL SITE: ICD-10-CM

## 2020-03-19 LAB
ALBUMIN SERPL-MCNC: 3.3 G/DL (ref 3.5–5)
ALP SERPL-CCNC: 152 U/L (ref 45–120)
ALT SERPL W P-5'-P-CCNC: 22 U/L (ref 0–45)
ANION GAP SERPL CALCULATED.3IONS-SCNC: 9 MMOL/L (ref 5–18)
AST SERPL W P-5'-P-CCNC: 16 U/L (ref 0–40)
BASOPHILS # BLD AUTO: 0 THOU/UL (ref 0–0.2)
BASOPHILS NFR BLD AUTO: 6 % (ref 0–2)
BILIRUB SERPL-MCNC: 0.5 MG/DL (ref 0–1)
BUN SERPL-MCNC: 14 MG/DL (ref 8–22)
CALCIUM SERPL-MCNC: 9.3 MG/DL (ref 8.5–10.5)
CHLORIDE BLD-SCNC: 99 MMOL/L (ref 98–107)
CO2 SERPL-SCNC: 26 MMOL/L (ref 22–31)
CREAT SERPL-MCNC: 0.57 MG/DL (ref 0.6–1.1)
EOSINOPHIL COUNT (ABSOLUTE): 0.1 THOU/UL (ref 0–0.4)
EOSINOPHIL NFR BLD AUTO: 8 % (ref 0–6)
ERYTHROCYTE [DISTWIDTH] IN BLOOD BY AUTOMATED COUNT: 15.6 % (ref 11–14.5)
GFR SERPL CREATININE-BSD FRML MDRD: >60 ML/MIN/1.73M2
GLUCOSE BLD-MCNC: 99 MG/DL (ref 70–125)
HCT VFR BLD AUTO: 31.3 % (ref 35–47)
HGB BLD-MCNC: 10.1 G/DL (ref 12–16)
LDH SERPL L TO P-CCNC: 217 U/L (ref 125–220)
LYMPHOCYTES # BLD AUTO: 0.3 THOU/UL (ref 0.8–4.4)
LYMPHOCYTES NFR BLD AUTO: 48 % (ref 20–40)
MCH RBC QN AUTO: 30.8 PG (ref 27–34)
MCHC RBC AUTO-ENTMCNC: 32.3 G/DL (ref 32–36)
MCV RBC AUTO: 95 FL (ref 80–100)
MONOCYTES # BLD AUTO: 0.1 THOU/UL (ref 0–0.9)
MONOCYTES NFR BLD AUTO: 12 % (ref 2–10)
OVALOCYTES: ABNORMAL
PHOSPHATE SERPL-MCNC: 3.4 MG/DL (ref 2.5–4.5)
PLAT MORPH BLD: NORMAL
PLATELET # BLD AUTO: 280 THOU/UL (ref 140–440)
PMV BLD AUTO: 11 FL (ref 8.5–12.5)
POTASSIUM BLD-SCNC: 4.5 MMOL/L (ref 3.5–5)
PROT SERPL-MCNC: 6.3 G/DL (ref 6–8)
RBC # BLD AUTO: 3.28 MILL/UL (ref 3.8–5.4)
SODIUM SERPL-SCNC: 134 MMOL/L (ref 136–145)
TOTAL NEUTROPHILS-ABS(DIFF): 0.2 THOU/UL (ref 2–7.7)
TOTAL NEUTROPHILS-REL(DIFF): 26 % (ref 50–70)
URATE SERPL-MCNC: <2 MG/DL (ref 2–7.5)
WBC: 0.7 THOU/UL (ref 4–11)

## 2020-03-19 PROCEDURE — 99213 OFFICE O/P EST LOW 20 MIN: CPT | Mod: 95 | Performed by: PHYSICIAN ASSISTANT

## 2020-03-19 PROCEDURE — 40000114 ZZH STATISTIC NO CHARGE CLINIC VISIT

## 2020-03-19 NOTE — LETTER
"3/19/2020       RE: Chuyita Porter  [address]  [facility] [address]     Dear Colleague,    Thank you for referring your patient, Chuyita Porter, to the [facility]. Please see a copy of my visit note below.    Chuyita Porter is a 69 year old female who is being evaluated via a billable telephone visit.      The patient has been notified of following:     \"This telephone visit will be conducted via a call between you and your physician/provider. We have found that certain health care needs can be provided without the need for a physical exam.  This service lets us provide the care you need with a short phone conversation.  If a prescription is necessary we can send it directly to your pharmacy.  If lab work is needed we can place an order for that and you can then stop by our lab to have the test done at a later time.    If during the course of the call the physician/provider feels a telephone visit is not appropriate, you will not be charged for this service.\"     Chuyita Porter complains of    Chief Complaint   Patient presents with     Telephone     Diffuse large B-cell lymphoma        I have reviewed and updated the patient's Past Medical History, Social History, Family History and Medication List.    ALLERGIES  Patient has no known allergies.      Additional provider notes:     HPI: Chuyita Porter is a 69 year old female with past medical history of breast cancer (right breast cancer in 2005 and left breast cancer in 2015 followed by Dr. Gallardo) with diffuse large B cell lymphoma. She presented with SOB and pressure on her neck/chest. She was found to have mediastinal mass and FNA biopsy on 1/30/20 showed DLBCL. She was initially managed with oral steroids and then was taken to the OR on 2/17 for open neck biopsy, tracheal stent, and PEG tube placement however in the OR decision was made to intubate patient due to concern of threatened airway. She was then admitted and started two " fractions of radiation on 2/18 and 2/19 and then started R-CHOP on 2/21. CT chest on 2/25 showed decrease size of mass. Complications of neutropenic fever due to HCAP treated with vanco + cefepime --> zosyn and completed treatment with Levaquin. She was extubated on 2/26.      She was discharged to rehab due to debility and severe malnutrition. She was discharged on TFs. Will benefit from continued PT/OT. Discharged on 3/12.  Received cycle 2 of R-CHOP outpatient 3/13 with Neulasta support.     Interval History: Chuyita is feeling well today. She tolerated chemotherapy extremely well last week. She has has had no issues with constipation or diarrhea. She has not needed any bowel meds. She has not had any nausea. No mucositis, neuropathy, or rash. She has been eating well. On Tuesday had 1/2 scrambled egg, 1 piece of Yakut toast, 1/2 yogurt container for breakfast. 1/2 yogurt, 1/2 tuna sandwich, sprite for lunch. Dinner she had 1/2 tuna sandwich, green beans, and baked potato for dinner. She also had a Boost plus and has been drinking well. Every day she has been eating a similar amount. She continues on 3 cartons of TFs per day in addition. She does not have a scale at home so has not been able to weigh herself.    No fevers/chills, new or different cough (clears her throat infrequently), SOB, CP, edema, difficulties with urination. No change in supraclavicular spaces per . She has a tender spot along her L forearm at prior IV site which is hard to touch. No erythema, swelling, or bruising in the area.     Current Outpatient Medications   Medication     allopurinol (ZYLOPRIM) 300 MG tablet     loratadine (CLARITIN) 10 MG tablet     LORazepam (ATIVAN) 0.5 MG tablet     ondansetron (ZOFRAN) 4 MG tablet     prochlorperazine (COMPAZINE) 10 MG tablet     acetaminophen (TYLENOL) 325 MG tablet     No current facility-administered medications for this visit.      Labs: To be done locally later today      Assessment/Plan:  1. DLBCL with mediastinal mass compressing SVC with associated collateral vessels. No disease in a/p, marrow not done. S/p 2 cycle of R-CHOPs (1 cycle inpatient, 1 cycle outpatient and 2 fractions of XRT). Had PET/CT on 2/28 showing [place]. She tolerated cycle 2 of R-CHOP extremely well with no symptoms apart from fatigue. She will have labs later today. Has one more week of allopurinol to complete. Okay to follow-up in 2 weeks with PET/CT and cycle 3 as long as labs today look okay.      2. Hx severe malnutrition: PEG was placed with intubation. She is eating well. Will have dietician at [facility] start to wean her. Encouraged her to increase oral hydration.      3. Deconditioning: Improving. Continue PT and OT at home.     4. Presumed superficial phlebitis: Discussed warm packs to area.     Phone call duration:  22 minutes     Magali Bruner PA-C     Again, thank you for allowing me to participate in the care of your patient.      Sincerely,    Magali Bruner PA-C

## 2020-03-19 NOTE — PROGRESS NOTES
-Writer received update from ALICIA TALBOT that home care nurse was unable to get the home lab draw as planned today. Magali would like pt to have labs drawn locally in a clinic if possible.    - notified pt/spouse of above recommendation. They have a [facility] local clinic less than five minutes from her home.    -call placed to: Benita, scheduling  [facility]  [address], [place] [address]  [place], [place]  Phone [phone]  Fax [phone]    [facility] lab is closed to appts , but priority message is being sent by scheduling team now to see if they can get her in. Awaiting call-back. Standing order for CBC with diff/plts, CMP, Uric acid, Phos faxed to above fax number with this information on the cover sheet:    Standing lab orders for Chuyita Porter  : 52    * Please keep these orders on file at your clinic *  We have a message to your clinic hoping she can get a lab appointment today    Brandi Hood, RN  [phone]  [facility]    INCOMING CALL 1136: Alaina at [facility] requests orders be faxed to a different [phone] fax number   [phone]. Done and Fax transmission confirmed via RightFax. She will call pt to schedule the lab appt  OUTGOING CALL: 1300: Notified Ranjit that we want him to have Chuyita have the lab draw asap today. He will take her to the [facility] and Magali will call for telephone visit today.

## 2020-03-19 NOTE — PROGRESS NOTES
"Chuyita Porter is a 69 year old female who is being evaluated via a billable telephone visit.      The patient has been notified of following:     \"This telephone visit will be conducted via a call between you and your physician/provider. We have found that certain health care needs can be provided without the need for a physical exam.  This service lets us provide the care you need with a short phone conversation.  If a prescription is necessary we can send it directly to your pharmacy.  If lab work is needed we can place an order for that and you can then stop by our lab to have the test done at a later time.    If during the course of the call the physician/provider feels a telephone visit is not appropriate, you will not be charged for this service.\"     Chuyita Porter complains of    Chief Complaint   Patient presents with     Telephone     Diffuse large B-cell lymphoma        I have reviewed and updated the patient's Past Medical History, Social History, Family History and Medication List.    ALLERGIES  Patient has no known allergies.      Additional provider notes:     HPI: Chuyita Porter is a 69 year old female with past medical history of breast cancer (right breast cancer in 2005 and left breast cancer in 2015 followed by Dr. Gallardo) with diffuse large B cell lymphoma. She presented with SOB and pressure on her neck/chest. She was found to have mediastinal mass and FNA biopsy on 1/30/20 showed DLBCL. She was initially managed with oral steroids and then was taken to the OR on 2/17 for open neck biopsy, tracheal stent, and PEG tube placement however in the OR decision was made to intubate patient due to concern of threatened airway. She was then admitted and started two fractions of radiation on 2/18 and 2/19 and then started R-CHOP on 2/21. CT chest on 2/25 showed decrease size of mass. Complications of neutropenic fever due to HCAP treated with vanco + cefepime --> zosyn and completed treatment with " Levaquin. She was extubated on 2/26.      She was discharged to rehab due to debility and severe malnutrition. She was discharged on TFs. Will benefit from continued PT/OT. Discharged on 3/12.  Received cycle 2 of R-CHOP outpatient 3/13 with Neulasta support.     Interval History: Chuyita is feeling well today. She tolerated chemotherapy extremely well last week. She has has had no issues with constipation or diarrhea. She has not needed any bowel meds. She has not had any nausea. No mucositis, neuropathy, or rash. She has been eating well. On Tuesday had 1/2 scrambled egg, 1 piece of Romanian toast, 1/2 yogurt container for breakfast. 1/2 yogurt, 1/2 tuna sandwich, sprite for lunch. Dinner she had 1/2 tuna sandwich, green beans, and baked potato for dinner. She also had a Boost plus and has been drinking well. Every day she has been eating a similar amount. She continues on 3 cartons of TFs per day in addition. She does not have a scale at home so has not been able to weigh herself.    No fevers/chills, new or different cough (clears her throat infrequently), SOB, CP, edema, difficulties with urination. No change in supraclavicular spaces per . She has a tender spot along her L forearm at prior IV site which is hard to touch. No erythema, swelling, or bruising in the area.     Current Outpatient Medications   Medication     allopurinol (ZYLOPRIM) 300 MG tablet     loratadine (CLARITIN) 10 MG tablet     LORazepam (ATIVAN) 0.5 MG tablet     ondansetron (ZOFRAN) 4 MG tablet     prochlorperazine (COMPAZINE) 10 MG tablet     acetaminophen (TYLENOL) 325 MG tablet     No current facility-administered medications for this visit.      Labs: To be done locally later today     Assessment/Plan:  1. DLBCL with mediastinal mass compressing SVC with associated collateral vessels. No disease in a/p, marrow not done. S/p 2 cycle of R-CHOPs (1 cycle inpatient, 1 cycle outpatient and 2 fractions of XRT). Had PET/CT on 2/28  showing [place]. She tolerated cycle 2 of R-CHOP extremely well with no symptoms apart from fatigue. She will have labs later today. Has one more week of allopurinol to complete. Okay to follow-up in 2 weeks with PET/CT and cycle 3 as long as labs today look okay.      2. Hx severe malnutrition: PEG was placed with intubation. She is eating well. Will have dietician at [facility] start to wean her. Encouraged her to increase oral hydration.      3. Deconditioning: Improving. Continue PT and OT at home.     4. Presumed superficial phlebitis: Discussed warm packs to area.     Phone call duration:  22 minutes     Magali Bruner PA-C

## 2020-03-19 NOTE — PROGRESS NOTES
Alaina at the [facility] called with a preliminary result for WBC = 0.8. Her machine only measures at 2 or above, so this is not an official result. CBC and diff will be run at outside lab with chemistry and reported later today. She will have their lab system fax the results when they come in . We can also review in [facility]. DAHIANA Woods aware, will wait for final results to make any additional recs.

## 2020-03-20 ENCOUNTER — OFFICE VISIT - HEALTHEAST (OUTPATIENT)
Dept: FAMILY MEDICINE | Facility: CLINIC | Age: 70
End: 2020-03-20

## 2020-03-20 ENCOUNTER — HOME CARE/HOSPICE - HEALTHEAST (OUTPATIENT)
Dept: HOME HEALTH SERVICES | Facility: HOME HEALTH | Age: 70
End: 2020-03-20

## 2020-03-20 ENCOUNTER — PATIENT OUTREACH (OUTPATIENT)
Dept: ONCOLOGY | Facility: CLINIC | Age: 70
End: 2020-03-20

## 2020-03-20 DIAGNOSIS — C83.398 DIFFUSE LARGE B-CELL LYMPHOMA OF EXTRANODAL SITE: ICD-10-CM

## 2020-03-20 DIAGNOSIS — J98.59 MEDIASTINAL MASS: ICD-10-CM

## 2020-03-20 DIAGNOSIS — C83.30 DIFFUSE LARGE B-CELL LYMPHOMA, UNSPECIFIED BODY REGION (H): ICD-10-CM

## 2020-03-20 DIAGNOSIS — E07.9 THYROID MASS: ICD-10-CM

## 2020-03-20 DIAGNOSIS — Z09 HOSPITAL DISCHARGE FOLLOW-UP: ICD-10-CM

## 2020-03-20 NOTE — PROGRESS NOTES
Writer received update from DAHIANA Woods re: yesterday's local lab results:  Please call Ranjit and let him know labs look good besides expected neutropenia. This will likely last until early next week. Continue to practice neutropenic/infectious precautions and monitor temp.   No evidence of TLS either, so she can complete the few days of allopurinol she has and then stop.     Reviewed above instructions with Ranjit.  We acknowledged that during the COVID-19 pandemic we may need to adjust her upcoming 4/2 PET and MD return visit but communications will come to them between now and then if/when needed.   He voiced understanding of above instructions and information and denied further questions.  Brandi Hood, RN  RN Care Coordinator  [facility] [address] [address] [phone]

## 2020-03-23 ENCOUNTER — HOME CARE/HOSPICE - HEALTHEAST (OUTPATIENT)
Dept: HOME HEALTH SERVICES | Facility: HOME HEALTH | Age: 70
End: 2020-03-23

## 2020-03-24 ENCOUNTER — COMMUNICATION - HEALTHEAST (OUTPATIENT)
Dept: HOME HEALTH SERVICES | Facility: HOME HEALTH | Age: 70
End: 2020-03-24

## 2020-03-24 ENCOUNTER — HOME CARE/HOSPICE - HEALTHEAST (OUTPATIENT)
Dept: HOME HEALTH SERVICES | Facility: HOME HEALTH | Age: 70
End: 2020-03-24

## 2020-03-25 ENCOUNTER — HOME CARE/HOSPICE - HEALTHEAST (OUTPATIENT)
Dept: HOME HEALTH SERVICES | Facility: HOME HEALTH | Age: 70
End: 2020-03-25

## 2020-03-26 DIAGNOSIS — C83.398 DIFFUSE LARGE B-CELL LYMPHOMA OF EXTRANODAL SITE: Primary | ICD-10-CM

## 2020-03-26 DIAGNOSIS — T45.1X5A ADVERSE EFFECT OF ANTINEOPLASTIC AND IMMUNOSUPPRESSIVE DRUGS, INITIAL ENCOUNTER: ICD-10-CM

## 2020-03-26 RX ORDER — METHYLPREDNISOLONE SODIUM SUCCINATE 125 MG/2ML
125 INJECTION, POWDER, LYOPHILIZED, FOR SOLUTION INTRAMUSCULAR; INTRAVENOUS
Status: CANCELLED
Start: 2020-04-03

## 2020-03-26 RX ORDER — HEPARIN SODIUM,PORCINE 10 UNIT/ML
5 VIAL (ML) INTRAVENOUS
Status: CANCELLED | OUTPATIENT
Start: 2020-04-03

## 2020-03-26 RX ORDER — EPINEPHRINE 0.3 MG/.3ML
0.3 INJECTION SUBCUTANEOUS EVERY 5 MIN PRN
Status: CANCELLED | OUTPATIENT
Start: 2020-04-03

## 2020-03-26 RX ORDER — SODIUM CHLORIDE 9 MG/ML
1000 INJECTION, SOLUTION INTRAVENOUS CONTINUOUS PRN
Status: CANCELLED
Start: 2020-04-03

## 2020-03-26 RX ORDER — DIPHENHYDRAMINE HYDROCHLORIDE 50 MG/ML
50 INJECTION INTRAMUSCULAR; INTRAVENOUS
Status: CANCELLED
Start: 2020-04-03

## 2020-03-26 RX ORDER — DOXORUBICIN HYDROCHLORIDE 2 MG/ML
50 INJECTION, SOLUTION INTRAVENOUS ONCE
Status: CANCELLED | OUTPATIENT
Start: 2020-04-03

## 2020-03-26 RX ORDER — PALONOSETRON 0.05 MG/ML
0.25 INJECTION, SOLUTION INTRAVENOUS ONCE
Status: CANCELLED | OUTPATIENT
Start: 2020-04-03

## 2020-03-26 RX ORDER — MEPERIDINE HYDROCHLORIDE 25 MG/ML
25 INJECTION INTRAMUSCULAR; INTRAVENOUS; SUBCUTANEOUS EVERY 30 MIN PRN
Status: CANCELLED | OUTPATIENT
Start: 2020-04-03

## 2020-03-26 RX ORDER — MEPERIDINE HYDROCHLORIDE 25 MG/ML
25 INJECTION INTRAMUSCULAR; INTRAVENOUS; SUBCUTANEOUS
Status: CANCELLED
Start: 2020-04-03

## 2020-03-26 RX ORDER — DIPHENHYDRAMINE HCL 25 MG
50 CAPSULE ORAL ONCE
Status: CANCELLED
Start: 2020-04-03

## 2020-03-26 RX ORDER — ALBUTEROL SULFATE 90 UG/1
1-2 AEROSOL, METERED RESPIRATORY (INHALATION)
Status: CANCELLED
Start: 2020-04-03

## 2020-03-26 RX ORDER — ACETAMINOPHEN 325 MG/1
650 TABLET ORAL ONCE
Status: CANCELLED | OUTPATIENT
Start: 2020-04-03

## 2020-03-26 RX ORDER — EPINEPHRINE 1 MG/ML
0.3 INJECTION, SOLUTION INTRAMUSCULAR; SUBCUTANEOUS EVERY 5 MIN PRN
Status: CANCELLED | OUTPATIENT
Start: 2020-04-03

## 2020-03-26 RX ORDER — HEPARIN SODIUM (PORCINE) LOCK FLUSH IV SOLN 100 UNIT/ML 100 UNIT/ML
5 SOLUTION INTRAVENOUS
Status: CANCELLED | OUTPATIENT
Start: 2020-04-03

## 2020-03-26 RX ORDER — LORAZEPAM 2 MG/ML
0.5 INJECTION INTRAMUSCULAR EVERY 4 HOURS PRN
Status: CANCELLED | OUTPATIENT
Start: 2020-04-03

## 2020-03-26 RX ORDER — ALBUTEROL SULFATE 0.83 MG/ML
2.5 SOLUTION RESPIRATORY (INHALATION)
Status: CANCELLED | OUTPATIENT
Start: 2020-04-03

## 2020-03-26 RX ORDER — NALOXONE HYDROCHLORIDE 0.4 MG/ML
.1-.4 INJECTION, SOLUTION INTRAMUSCULAR; INTRAVENOUS; SUBCUTANEOUS
Status: CANCELLED | OUTPATIENT
Start: 2020-04-03

## 2020-03-27 ENCOUNTER — HOME CARE/HOSPICE - HEALTHEAST (OUTPATIENT)
Dept: HOME HEALTH SERVICES | Facility: HOME HEALTH | Age: 70
End: 2020-03-27

## 2020-03-30 ENCOUNTER — HOME CARE/HOSPICE - HEALTHEAST (OUTPATIENT)
Dept: HOME HEALTH SERVICES | Facility: HOME HEALTH | Age: 70
End: 2020-03-30

## 2020-03-30 ENCOUNTER — OFFICE VISIT - HEALTHEAST (OUTPATIENT)
Dept: ONCOLOGY | Facility: HOSPITAL | Age: 70
End: 2020-03-30

## 2020-03-30 DIAGNOSIS — C50.512 MALIGNANT NEOPLASM OF LOWER-OUTER QUADRANT OF LEFT BREAST OF FEMALE, ESTROGEN RECEPTOR POSITIVE (H): ICD-10-CM

## 2020-03-30 DIAGNOSIS — C83.398 DIFFUSE LARGE B-CELL LYMPHOMA OF EXTRANODAL SITE: ICD-10-CM

## 2020-03-30 DIAGNOSIS — Z17.0 MALIGNANT NEOPLASM OF LOWER-OUTER QUADRANT OF LEFT BREAST OF FEMALE, ESTROGEN RECEPTOR POSITIVE (H): ICD-10-CM

## 2020-03-31 ENCOUNTER — PATIENT OUTREACH (OUTPATIENT)
Dept: ONCOLOGY | Facility: CLINIC | Age: 70
End: 2020-03-31

## 2020-03-31 ENCOUNTER — HOME CARE/HOSPICE - HEALTHEAST (OUTPATIENT)
Dept: HOME HEALTH SERVICES | Facility: HOME HEALTH | Age: 70
End: 2020-03-31

## 2020-03-31 NOTE — PROGRESS NOTES
INCOMING CALL: Pt's spouse Ranjit called to check in about the appointments on 4/2 for PET and MD visit. Also chemotherapy is not yet scheduled    OUTGOING CALL: Writer spoke with Ranjit and Chuyita about the PET on 4/2 (will go to appt alone, advised she use a WC).    Candy Velazquez RD from [facility] was in touch with Ranjit a few weeks ago and then   Bryan -  Dietitian - reached out about formulas has not been in touch with them about TF recs, though  [phone]     Still doing the 10 hour infusion of tube feeds overnight. Eating 3 meals a days and hydrating well. Wondering if it's time to eliminate the overnight. Had a home care RN visit today, no weights have been measured since discharge home.    [facility] dietitian contacted regarding above    Brandi Hood, RN  RN Care Coordinator  [facility]  [address] [place] [address]  [phone]      Addendum April 1, 2020  Writer confirmed with [facility] scheduling team that we should keep PET tomorrow 4/2 am, convert the afternoon MD visit to telephone visit, schedule lab/R-CHOP 3 on 4/3.  confirmed that she will call pt's spouse Ranjit with the appt information.

## 2020-04-01 ENCOUNTER — HOME CARE/HOSPICE - HEALTHEAST (OUTPATIENT)
Dept: HOME HEALTH SERVICES | Facility: HOME HEALTH | Age: 70
End: 2020-04-01

## 2020-04-02 ENCOUNTER — PATIENT OUTREACH (OUTPATIENT)
Dept: ONCOLOGY | Facility: CLINIC | Age: 70
End: 2020-04-02

## 2020-04-02 ENCOUNTER — VIRTUAL VISIT (OUTPATIENT)
Dept: TRANSPLANT | Facility: CLINIC | Age: 70
End: 2020-04-02
Payer: COMMERCIAL

## 2020-04-02 ENCOUNTER — HOSPITAL ENCOUNTER (OUTPATIENT)
Dept: PET IMAGING | Facility: CLINIC | Age: 70
End: 2020-04-02
Payer: COMMERCIAL

## 2020-04-02 ENCOUNTER — HOME CARE/HOSPICE - HEALTHEAST (OUTPATIENT)
Dept: HOME HEALTH SERVICES | Facility: HOME HEALTH | Age: 70
End: 2020-04-02

## 2020-04-02 DIAGNOSIS — C83.398 DIFFUSE LARGE B-CELL LYMPHOMA OF EXTRANODAL SITE: ICD-10-CM

## 2020-04-02 DIAGNOSIS — C83.30 DIFFUSE LARGE B-CELL LYMPHOMA, UNSPECIFIED BODY REGION (H): ICD-10-CM

## 2020-04-02 DIAGNOSIS — T45.1X5A ADVERSE EFFECT OF ANTINEOPLASTIC AND IMMUNOSUPPRESSIVE DRUGS, INITIAL ENCOUNTER: Primary | ICD-10-CM

## 2020-04-02 DIAGNOSIS — T45.1X5D ADVERSE EFFECT OF ANTINEOPLASTIC AND IMMUNOSUPPRESSIVE DRUGS, SUBSEQUENT ENCOUNTER: ICD-10-CM

## 2020-04-02 DIAGNOSIS — C83.398 DIFFUSE LARGE B-CELL LYMPHOMA OF EXTRANODAL SITE: Primary | ICD-10-CM

## 2020-04-02 PROCEDURE — 78816 PET IMAGE W/CT FULL BODY: CPT | Mod: PS

## 2020-04-02 PROCEDURE — A9552 F18 FDG: HCPCS

## 2020-04-02 PROCEDURE — 34300033 ZZH RX 343

## 2020-04-02 RX ORDER — MEPERIDINE HYDROCHLORIDE 25 MG/ML
25 INJECTION INTRAMUSCULAR; INTRAVENOUS; SUBCUTANEOUS EVERY 30 MIN PRN
Status: CANCELLED | OUTPATIENT
Start: 2020-04-24

## 2020-04-02 RX ORDER — LORATADINE 10 MG/1
10 TABLET ORAL DAILY
Qty: 30 TABLET | Refills: 3 | Status: SHIPPED | OUTPATIENT
Start: 2020-04-02

## 2020-04-02 RX ORDER — DOXORUBICIN HYDROCHLORIDE 2 MG/ML
50 INJECTION, SOLUTION INTRAVENOUS ONCE
Status: CANCELLED | OUTPATIENT
Start: 2020-04-24

## 2020-04-02 RX ORDER — AZITHROMYCIN 1 G/1
1 POWDER, FOR SUSPENSION ORAL ONCE
Qty: 1 EACH | Refills: 0 | Status: SHIPPED | OUTPATIENT
Start: 2020-04-02 | End: 2020-04-23

## 2020-04-02 RX ORDER — MEPERIDINE HYDROCHLORIDE 25 MG/ML
25 INJECTION INTRAMUSCULAR; INTRAVENOUS; SUBCUTANEOUS
Status: CANCELLED
Start: 2020-04-24

## 2020-04-02 RX ORDER — METHYLPREDNISOLONE SODIUM SUCCINATE 125 MG/2ML
125 INJECTION, POWDER, LYOPHILIZED, FOR SOLUTION INTRAMUSCULAR; INTRAVENOUS
Status: CANCELLED
Start: 2020-04-24

## 2020-04-02 RX ORDER — LORAZEPAM 2 MG/ML
0.5 INJECTION INTRAMUSCULAR EVERY 4 HOURS PRN
Status: CANCELLED | OUTPATIENT
Start: 2020-04-24

## 2020-04-02 RX ORDER — NALOXONE HYDROCHLORIDE 0.4 MG/ML
.1-.4 INJECTION, SOLUTION INTRAMUSCULAR; INTRAVENOUS; SUBCUTANEOUS
Status: CANCELLED | OUTPATIENT
Start: 2020-04-24

## 2020-04-02 RX ORDER — ACETAMINOPHEN 325 MG/1
650 TABLET ORAL ONCE
Status: CANCELLED | OUTPATIENT
Start: 2020-04-24

## 2020-04-02 RX ORDER — EPINEPHRINE 1 MG/ML
0.3 INJECTION, SOLUTION INTRAMUSCULAR; SUBCUTANEOUS EVERY 5 MIN PRN
Status: CANCELLED | OUTPATIENT
Start: 2020-04-24

## 2020-04-02 RX ORDER — PALONOSETRON 0.05 MG/ML
0.25 INJECTION, SOLUTION INTRAVENOUS ONCE
Status: CANCELLED | OUTPATIENT
Start: 2020-04-24

## 2020-04-02 RX ORDER — EPINEPHRINE 0.3 MG/.3ML
0.3 INJECTION SUBCUTANEOUS EVERY 5 MIN PRN
Status: CANCELLED | OUTPATIENT
Start: 2020-04-24

## 2020-04-02 RX ORDER — SODIUM CHLORIDE 9 MG/ML
1000 INJECTION, SOLUTION INTRAVENOUS CONTINUOUS PRN
Status: CANCELLED
Start: 2020-04-24

## 2020-04-02 RX ORDER — ALBUTEROL SULFATE 90 UG/1
1-2 AEROSOL, METERED RESPIRATORY (INHALATION)
Status: CANCELLED
Start: 2020-04-24

## 2020-04-02 RX ORDER — DIPHENHYDRAMINE HYDROCHLORIDE 50 MG/ML
50 INJECTION INTRAMUSCULAR; INTRAVENOUS
Status: CANCELLED
Start: 2020-04-24

## 2020-04-02 RX ORDER — HEPARIN SODIUM (PORCINE) LOCK FLUSH IV SOLN 100 UNIT/ML 100 UNIT/ML
5 SOLUTION INTRAVENOUS
Status: CANCELLED | OUTPATIENT
Start: 2020-04-24

## 2020-04-02 RX ORDER — HEPARIN SODIUM,PORCINE 10 UNIT/ML
5 VIAL (ML) INTRAVENOUS
Status: CANCELLED | OUTPATIENT
Start: 2020-04-24

## 2020-04-02 RX ORDER — DIPHENHYDRAMINE HCL 25 MG
50 CAPSULE ORAL ONCE
Status: CANCELLED
Start: 2020-04-24

## 2020-04-02 RX ORDER — ALBUTEROL SULFATE 0.83 MG/ML
2.5 SOLUTION RESPIRATORY (INHALATION)
Status: CANCELLED | OUTPATIENT
Start: 2020-04-24

## 2020-04-02 RX ADMIN — FLUDEOXYGLUCOSE F-18 10.15 MCI.: 500 INJECTION, SOLUTION INTRAVENOUS at 07:48

## 2020-04-02 NOTE — PROGRESS NOTES
"Chuyita Porter is a 69 year old female who is being evaluated via a billable telephone visit.      The patient has been notified of following:     \"This telephone visit will be conducted via a call between you and your physician/provider. We have found that certain health care needs can be provided without the need for a physical exam.  This service lets us provide the care you need with a short phone conversation.  If a prescription is necessary we can send it directly to your pharmacy.  If lab work is needed we can place an order for that and you can then stop by our lab to have the test done at a later time.    If during the course of the call the physician/provider feels a telephone visit is not appropriate, you will not be charged for this service.\"     Patient has given verbal consent for Telephone visit?  Yes    Chuyita Porter complains of    No chief complaint on file.        ALLERGIES  Patient has no known allergies.     Cathi Bridges MA    Additional provider notes:   Chuyita has DLBCL of thyroid gland - now s/p 2 cycles of R-CHOP. Had PET-2 today.  She is feeling better, has not been using tube feeds since Tue, eating normal food now, has more cough last 2 days. No fever or SOB, no chest pain,  Overall feeling much better    She report no fevers, HR in upper 90s. BP stable.  AP:  DLBCL s/p 2 cycles of R-CHOP.  PET with CR of DLBCL - resolution of thyroid mass and chest mass - this is a great response and we reviewed it with the patient. She was delighted to hear it. CT also showed RLL infiltrate - c/w atelectasis or pneumonia.    Given her new cough - we will treat with Z-pack. She is afebrile. We will proceed with 3rd cycle of R-CHOP tomorrow.  F/u with midlevel via phone visit in 1 week and plan cycles 4,5,6 followed by PET/CT.    For possible pneumonia - we will repeat Chest CT without IV contrast in 1 month.      Phone call duration: 30 minutes    Rosy Sprague MD   of " Medicine  [phone]

## 2020-04-02 NOTE — PROGRESS NOTES
"Chuyita Porter is a 69 year old female who is being evaluated via a billable telephone visit.      The patient has been notified of following:     \"This telephone visit will be conducted via a call between you and your physician/provider. We have found that certain health care needs can be provided without the need for a physical exam.  This service lets us provide the care you need with a short phone conversation.  If a prescription is necessary we can send it directly to your pharmacy.  If lab work is needed we can place an order for that and you can then stop by our lab to have the test done at a later time.    If during the course of the call the physician/provider feels a telephone visit is not appropriate, you will not be charged for this service.\"     Patient has given verbal consent for Telephone visit?  Yes    Chuyita Porter complains of    Chief Complaint   Patient presents with     Telephone     Return: DLBCL (diffuse large B cell lymphoma)- Refill Claritin          ALLERGIES  Patient has no known allergies.     Cathi Bridges MA    Additional provider notes:   Chuyita has DLBCL of thyroid gland - now s/p 2 cycles of R-CHOP. Had PET-2 today.  She is feeling better, has not been using tube feeds since Tue, eating normal food now, has more cough last 2 days. No fever or SOB, no chest pain,  Overall feeling much better    She report no fevers, HR in upper 90s. BP stable.  AP:  DLBCL s/p 2 cycles of R-CHOP.  PET with CR of DLBCL - resolution of thyroid mass and chest mass - this is a great response and we reviewed it with the patient. She was delighted to hear it. CT also showed RLL infiltrate - c/w atelectasis or pneumonia.    Given her new cough - we will treat with Z-pack. She is afebrile. We will proceed with 3rd cycle of R-CHOP tomorrow.  F/u with midlevel via phone visit in 1 week and plan cycles 4,5,6 followed by PET/CT.    For possible pneumonia - we will repeat Chest CT without IV contrast in 1 " month.      Phone call duration: 30 minutes    Rosy Sprague MD  Associate Professor of Medicine  [phone]

## 2020-04-02 NOTE — PROGRESS NOTES
Writer called and spoke with [facility] Service Liaison - Nutrition Specialist [phone]. Manages enteral feeding and set up / follow up.    She will touch base with pt's dietitian Bryan today. He did a brief call with pt on 3/25 after I requested it and documented that pt's spouse did not want to change TF yet. I reiterated that we need RD recommendations and oversight based on pt's intake and weight, that reason for pt's TF was due to large mass and inability to eat and that has largely resolved,  and they will fax over some recommendations for TF tapering with goal to get pt off feeds. They will send it to my attention so that I can update Dr Sprague.    We discussed my request for closer dietitian oversight as pt could have GTube removed as soon as tapered in my opinion. Does not have a scale at home and [facility] is seeing pt regularly    Will be seeing Dr. Sprague today for PET results, next cycle R-CHOP tomorrow as scheduled.    Brandi Hood, RN  RN Care Coordinator  [facility] [address] [address] [phone]

## 2020-04-03 ENCOUNTER — INFUSION THERAPY VISIT (OUTPATIENT)
Dept: ONCOLOGY | Facility: CLINIC | Age: 70
End: 2020-04-03
Payer: COMMERCIAL

## 2020-04-03 ENCOUNTER — PATIENT OUTREACH (OUTPATIENT)
Dept: ONCOLOGY | Facility: CLINIC | Age: 70
End: 2020-04-03

## 2020-04-03 ENCOUNTER — APPOINTMENT (OUTPATIENT)
Dept: LAB | Facility: CLINIC | Age: 70
End: 2020-04-03
Payer: COMMERCIAL

## 2020-04-03 VITALS
TEMPERATURE: 98.7 F | SYSTOLIC BLOOD PRESSURE: 115 MMHG | OXYGEN SATURATION: 100 % | RESPIRATION RATE: 16 BRPM | WEIGHT: 164.6 LBS | HEART RATE: 89 BPM | DIASTOLIC BLOOD PRESSURE: 67 MMHG | BODY MASS INDEX: 27.39 KG/M2

## 2020-04-03 DIAGNOSIS — C83.398 DIFFUSE LARGE B-CELL LYMPHOMA OF EXTRANODAL SITE: Primary | ICD-10-CM

## 2020-04-03 DIAGNOSIS — T45.1X5A ADVERSE EFFECT OF ANTINEOPLASTIC AND IMMUNOSUPPRESSIVE DRUGS, INITIAL ENCOUNTER: ICD-10-CM

## 2020-04-03 LAB
ALBUMIN SERPL-MCNC: 3.4 G/DL (ref 3.4–5)
ALP SERPL-CCNC: 142 U/L (ref 40–150)
ALT SERPL W P-5'-P-CCNC: 24 U/L (ref 0–50)
ALT SERPL W/O P-5'-P-CCNC: 24 U/L (ref 0–50)
ANION GAP SERPL CALCULATED.3IONS-SCNC: 2 MMOL/L (ref 3–14)
AST SERPL W P-5'-P-CCNC: 22 U/L (ref 0–45)
AST SERPL-CCNC: 22 U/L (ref 0–45)
BASOPHILS # BLD AUTO: 0.1 10E9/L (ref 0–0.2)
BASOPHILS NFR BLD AUTO: 2.1 %
BILIRUB SERPL-MCNC: 0.4 MG/DL (ref 0.2–1.3)
BUN SERPL-MCNC: 9 MG/DL (ref 7–30)
CALCIUM SERPL-MCNC: 9.2 MG/DL (ref 8.5–10.1)
CHLORIDE SERPL-SCNC: 106 MMOL/L (ref 94–109)
CO2 SERPL-SCNC: 30 MMOL/L (ref 20–32)
CREAT SERPL-MCNC: 0.63 MG/DL (ref 0.52–1.04)
CREAT SERPL-MCNC: 0.63 MG/DL (ref 0.52–1.04)
DIFFERENTIAL METHOD BLD: ABNORMAL
EOSINOPHIL # BLD AUTO: 0 10E9/L (ref 0–0.7)
EOSINOPHIL NFR BLD AUTO: 0.2 %
ERYTHROCYTE [DISTWIDTH] IN BLOOD BY AUTOMATED COUNT: 17.2 % (ref 10–15)
GFR ESTIMATE EXT - HISTORICAL: >90 ML/MIN/1.73M2
GFR ESTIMATE, IF BLACK EXT - HISTORICAL: >90 ML/MIN/1.73M2
GFR SERPL CREATININE-BSD FRML MDRD: >90 ML/MIN/{1.73_M2}
GLUCOSE SERPL-MCNC: 106 MG/DL (ref 70–99)
HCT VFR BLD AUTO: 33.8 % (ref 35–47)
HGB BLD-MCNC: 11 G/DL (ref 11.7–15.7)
IMM GRANULOCYTES # BLD: 0 10E9/L (ref 0–0.4)
IMM GRANULOCYTES NFR BLD: 0.6 %
LDH SERPL L TO P-CCNC: 245 U/L (ref 81–234)
LYMPHOCYTES # BLD AUTO: 0.5 10E9/L (ref 0.8–5.3)
LYMPHOCYTES NFR BLD AUTO: 7.8 %
MCH RBC QN AUTO: 31.4 PG (ref 26.5–33)
MCHC RBC AUTO-ENTMCNC: 32.5 G/DL (ref 31.5–36.5)
MCV RBC AUTO: 97 FL (ref 78–100)
MONOCYTES # BLD AUTO: 1.2 10E9/L (ref 0–1.3)
MONOCYTES NFR BLD AUTO: 18.9 %
NEUTROPHILS # BLD AUTO: 4.4 10E9/L (ref 1.6–8.3)
NEUTROPHILS NFR BLD AUTO: 70.4 %
NRBC # BLD AUTO: 0 10*3/UL
NRBC BLD AUTO-RTO: 0 /100
PHOSPHATE SERPL-MCNC: 3.3 MG/DL (ref 2.5–4.5)
PLATELET # BLD AUTO: 639 10E9/L (ref 150–450)
POTASSIUM SERPL-SCNC: 3.4 MMOL/L (ref 3.4–5.3)
PROT SERPL-MCNC: 7.4 G/DL (ref 6.8–8.8)
RBC # BLD AUTO: 3.5 10E12/L (ref 3.8–5.2)
SODIUM SERPL-SCNC: 139 MMOL/L (ref 133–144)
URATE SERPL-MCNC: 4.5 MG/DL (ref 2.6–6)
WBC # BLD AUTO: 6.3 10E9/L (ref 4–11)

## 2020-04-03 PROCEDURE — 96411 CHEMO IV PUSH ADDL DRUG: CPT

## 2020-04-03 PROCEDURE — 84100 ASSAY OF PHOSPHORUS: CPT

## 2020-04-03 PROCEDURE — 96367 TX/PROPH/DG ADDL SEQ IV INF: CPT

## 2020-04-03 PROCEDURE — 85025 COMPLETE CBC W/AUTO DIFF WBC: CPT

## 2020-04-03 PROCEDURE — 96413 CHEMO IV INFUSION 1 HR: CPT

## 2020-04-03 PROCEDURE — 25000128 H RX IP 250 OP 636: Mod: ZF

## 2020-04-03 PROCEDURE — 83615 LACTATE (LD) (LDH) ENZYME: CPT

## 2020-04-03 PROCEDURE — 84550 ASSAY OF BLOOD/URIC ACID: CPT

## 2020-04-03 PROCEDURE — 25800030 ZZH RX IP 258 OP 636: Mod: ZF

## 2020-04-03 PROCEDURE — 80053 COMPREHEN METABOLIC PANEL: CPT

## 2020-04-03 PROCEDURE — 25000132 ZZH RX MED GY IP 250 OP 250 PS 637: Mod: ZF

## 2020-04-03 PROCEDURE — 96417 CHEMO IV INFUS EACH ADDL SEQ: CPT

## 2020-04-03 PROCEDURE — 96415 CHEMO IV INFUSION ADDL HR: CPT

## 2020-04-03 PROCEDURE — 96377 APPLICATON ON-BODY INJECTOR: CPT | Mod: 59

## 2020-04-03 PROCEDURE — 96375 TX/PRO/DX INJ NEW DRUG ADDON: CPT

## 2020-04-03 RX ORDER — ACETAMINOPHEN 325 MG/1
650 TABLET ORAL ONCE
Status: COMPLETED | OUTPATIENT
Start: 2020-04-03 | End: 2020-04-03

## 2020-04-03 RX ORDER — DOXORUBICIN HYDROCHLORIDE 2 MG/ML
50 INJECTION, SOLUTION INTRAVENOUS ONCE
Status: COMPLETED | OUTPATIENT
Start: 2020-04-03 | End: 2020-04-03

## 2020-04-03 RX ORDER — PREDNISONE 50 MG/1
100 TABLET ORAL DAILY
Qty: 10 TABLET | Refills: 0 | Status: SHIPPED | OUTPATIENT
Start: 2020-04-03 | End: 2020-04-23

## 2020-04-03 RX ORDER — PALONOSETRON 0.05 MG/ML
0.25 INJECTION, SOLUTION INTRAVENOUS ONCE
Status: COMPLETED | OUTPATIENT
Start: 2020-04-03 | End: 2020-04-03

## 2020-04-03 RX ORDER — DIPHENHYDRAMINE HCL 25 MG
50 CAPSULE ORAL ONCE
Status: COMPLETED | OUTPATIENT
Start: 2020-04-03 | End: 2020-04-03

## 2020-04-03 RX ADMIN — PALONOSETRON 0.25 MG: 0.05 INJECTION, SOLUTION INTRAVENOUS at 09:37

## 2020-04-03 RX ADMIN — PEGFILGRASTIM 6 MG: KIT SUBCUTANEOUS at 14:15

## 2020-04-03 RX ADMIN — ACETAMINOPHEN 650 MG: 325 TABLET ORAL at 10:43

## 2020-04-03 RX ADMIN — VINCRISTINE SULFATE 2 MG: 1 INJECTION, SOLUTION INTRAVENOUS at 10:30

## 2020-04-03 RX ADMIN — DIPHENHYDRAMINE HYDROCHLORIDE 50 MG: 25 CAPSULE ORAL at 10:44

## 2020-04-03 RX ADMIN — DOXORUBICIN HYDROCHLORIDE 90 MG: 2 INJECTION, SOLUTION INTRAVENOUS at 10:10

## 2020-04-03 RX ADMIN — SODIUM CHLORIDE 250 ML: 9 INJECTION, SOLUTION INTRAVENOUS at 09:36

## 2020-04-03 RX ADMIN — RITUXIMAB 700 MG: 10 INJECTION, SOLUTION INTRAVENOUS at 11:44

## 2020-04-03 RX ADMIN — FOSAPREPITANT 150 MG: 150 INJECTION, POWDER, LYOPHILIZED, FOR SOLUTION INTRAVENOUS at 09:41

## 2020-04-03 RX ADMIN — CYCLOPHOSPHAMIDE 1500 MG: 1 INJECTION, POWDER, FOR SOLUTION INTRAVENOUS; ORAL at 10:47

## 2020-04-03 ASSESSMENT — PAIN SCALES - GENERAL: PAINLEVEL: NO PAIN (0)

## 2020-04-03 NOTE — NURSING NOTE
Chief Complaint   Patient presents with     Blood Draw     Labs drawn via PIV placed by vascular access. VS taken. Patient checked in for next appt.     Labs drawn from PIV placed by vascular access RN. Line flushed with saline. Vitals taken. Pt checked in for appointment.    Flavia Ogden RN

## 2020-04-03 NOTE — PROGRESS NOTES
OUTGOING CALL   - writer called Clover BLACKBURN at [facility] indicated I understand she has updates for us from their team re: weight and TFs and info below    Writer also received update from Dr. Sprague today that she advises PEG tube removal. Message out to Dr Montoya's team re: same, will update pt when updates available    INCOMING CALL:   Clover with [facility] indicates RD notes have been faxed, wt down to 150# with Home Health RN yesterday. Pt has scale at home now. Bryan BLACKBURN is the RD following and making TF recs.     Writer will update MD today and we will make plan for Gtube removal with oral supplement support and close weight monitoring if she agrees.    Above reviewed with Dr. Sprague who agrees that proceeding with close dietitian follow up with Gtube removal is still the rec, do oral supplements PRN  Brandi Hood, RN  RN Care Coordinator  [facility]  [address] [place] [address]    Addendum April 6, 2020  Message sent to Dr Montoya and Kiera Barroso RN re: Dr Sprague's request to remove G-tube. Awaiting reply re: scheuling of this.   [phone]

## 2020-04-03 NOTE — PROGRESS NOTES
Infusion Nursing Note:  Chuyita Porter presents today for C3D1 Rituxan/Adriamycin/Vincristine/Cytoxan/Prednisione(oral).    Patient seen by provider today: No   present during visit today: Not Applicable.    Note: Patient reported to clinic today with no new complaints or concerns. Patient talked with Dr Sprague yesterday.    Intravenous Access:  Labs drawn without difficulty.  Peripheral IV placed.    Treatment Conditions:  Lab Results   Component Value Date    HGB 11.0 04/03/2020     Lab Results   Component Value Date    WBC 6.3 04/03/2020      Lab Results   Component Value Date    ANEU 4.4 04/03/2020     Lab Results   Component Value Date     04/03/2020      Lab Results   Component Value Date     04/03/2020                   Lab Results   Component Value Date    POTASSIUM 3.4 04/03/2020           Lab Results   Component Value Date    MAG 2.5 03/06/2020            Lab Results   Component Value Date    CR 0.63 04/03/2020                   Lab Results   Component Value Date    GABI 9.2 04/03/2020                Lab Results   Component Value Date    BILITOTAL 0.4 04/03/2020           Lab Results   Component Value Date    ALBUMIN 3.4 04/03/2020                    Lab Results   Component Value Date    ALT 24 04/03/2020           Lab Results   Component Value Date    AST 22 04/03/2020       Results reviewed, labs MET treatment parameters, ok to proceed with treatment.  ECHO/MUGA completed 2/17/2020 EF of 60-65%%.      Post Infusion Assessment:  Patient tolerated infusion without incident.  Blood return noted pre and post infusion.  Blood return noted during administration every 2 cc. Adriamycin and Vincristine  Site patent and intact, free from redness, edema or discomfort.  No evidence of extravasations.  Access discontinued per protocol.     Adriamycin stop time 1013     Discharge Plan:   Prescription refills given for Prednisone.  Discharge instructions reviewed with: Patient.  Patient  and/or family verbalized understanding of discharge instructions and all questions answered.  Copy of AVS reviewed with patient and/or family.  Patient aware to call if scheduling has not called for next appointment.  Patient discharged in stable condition accompanied by: self.  Departure Mode: Ambulatory.  Face to Face time: 0 minutes.    Neulasta Onpro On-Body injector applied to back of left arm at 1415 with light facing down.  Writer discussed Neulasta injection would start tomorrow 4/4/2020 at 1815, approximately 27 hours after application applied today.  Written and Verbal instruction reviewed with patient.  Pt instructed when the dose delivery starts, it will take about 45 minutes to complete.  Pt aware Neulasta Onpro On-Body should have green flashing light and to call triage or on-call MD if injector flashes red or appears to be leaking. Pt aware to keep Onpro On-Body Neulasta 4 inches away from electrical equipment and to avoid showering 4 hours prior to injection.   Neulasta Onpro Lot number: [id number]    Alfredo Araujo RN

## 2020-04-03 NOTE — PATIENT INSTRUCTIONS
[facility] Main Line: [phone]    Call triage nurse with chills and/or temperature greater than or equal to 100.4, uncontrolled nausea/vomiting, diarrhea, constipation, dizziness, shortness of breath, chest pain, bleeding, unexplained bruising, or any new/concerning symptoms, questions/concerns.   If you are having any concerning symptoms or wish to speak to a provider before your next infusion visit, please call your care coordinator or triage to notify them so we can adequately serve you.   Nurse Triage line:  [phone]    If after hours, weekends, or holidays, call main hospital  and ask for Oncology doctor on call @ [phone]      April 2020 Sunday Monday Tuesday Wednesday Thursday Friday Saturday                  1     2    PET ONCOLOGY WHOLE BODY   7:30 AM   (45 min.)   [id number]   [facility] [place] PET CT    TELEPHONE VISIT   1:00 PM   (30 min.)   Rosy Sprague MD   [facility] Blood and Marrow Transplant 3    [facility] LAB DRAW   8:30 AM   (15 min.)    [facility] ONC INFUSION 360   9:00 AM   (360 min.)    ONCOLOGY INFUSION   [facility] 4       5     6     7     8     9     10     11       12     13     14     15     16     17     18       19     20     21     22     23     24     25       26     27     28     29     30                           May 2020      Levi Monday Tuesday Wednesday Thursday Friday Saturday                            1     2       3     4     5     6     7     8     9       10     11     12     13     14  Happy Birthday!     15     16       17     18     19     20     21     22     23       24     25     26     27     28     29     30       31                                                   Lab Results:  Recent Results (from the past 12 hour(s))   CBC with platelets differential    Collection Time: 04/03/20  8:44 AM   Result Value Ref Range    WBC 6.3 4.0 - 11.0 10e9/L    RBC Count  3.50 (L) 3.8 - 5.2 10e12/L    Hemoglobin 11.0 (L) 11.7 - 15.7 g/dL    Hematocrit 33.8 (L) 35.0 - 47.0 %    MCV 97 78 - 100 fl    MCH 31.4 26.5 - 33.0 pg    MCHC 32.5 31.5 - 36.5 g/dL    RDW 17.2 (H) 10.0 - 15.0 %    Platelet Count 639 (H) 150 - 450 10e9/L    Diff Method Automated Method     % Neutrophils 70.4 %    % Lymphocytes 7.8 %    % Monocytes 18.9 %    % Eosinophils 0.2 %    % Basophils 2.1 %    % Immature Granulocytes 0.6 %    Nucleated RBCs 0 0 /100    Absolute Neutrophil 4.4 1.6 - 8.3 10e9/L    Absolute Lymphocytes 0.5 (L) 0.8 - 5.3 10e9/L    Absolute Monocytes 1.2 0.0 - 1.3 10e9/L    Absolute Eosinophils 0.0 0.0 - 0.7 10e9/L    Absolute Basophils 0.1 0.0 - 0.2 10e9/L    Abs Immature Granulocytes 0.0 0 - 0.4 10e9/L    Absolute Nucleated RBC 0.0    Comprehensive metabolic panel    Collection Time: 04/03/20  8:44 AM   Result Value Ref Range    Sodium 139 133 - 144 mmol/L    Potassium 3.4 3.4 - 5.3 mmol/L    Chloride 106 94 - 109 mmol/L    Carbon Dioxide 30 20 - 32 mmol/L    Anion Gap 2 (L) 3 - 14 mmol/L    Glucose 106 (H) 70 - 99 mg/dL    Urea Nitrogen 9 7 - 30 mg/dL    Creatinine 0.63 0.52 - 1.04 mg/dL    GFR Estimate >90 >60 mL/min/[1.73_m2]    GFR Estimate If Black >90 >60 mL/min/[1.73_m2]    Calcium 9.2 8.5 - 10.1 mg/dL    Bilirubin Total 0.4 0.2 - 1.3 mg/dL    Albumin 3.4 3.4 - 5.0 g/dL    Protein Total 7.4 6.8 - 8.8 g/dL    Alkaline Phosphatase 142 40 - 150 U/L    ALT 24 0 - 50 U/L    AST 22 0 - 45 U/L   Phosphorus    Collection Time: 04/03/20  8:44 AM   Result Value Ref Range    Phosphorus 3.3 2.5 - 4.5 mg/dL   Uric acid    Collection Time: 04/03/20  8:44 AM   Result Value Ref Range    Uric Acid 4.5 2.6 - 6.0 mg/dL   Lactate Dehydrogenase    Collection Time: 04/03/20  8:44 AM   Result Value Ref Range    Lactate Dehydrogenase 245 (H) 81 - 234 U/L

## 2020-04-06 ENCOUNTER — HOME CARE/HOSPICE - HEALTHEAST (OUTPATIENT)
Dept: HOME HEALTH SERVICES | Facility: HOME HEALTH | Age: 70
End: 2020-04-06

## 2020-04-06 ENCOUNTER — TRANSFERRED RECORDS (OUTPATIENT)
Dept: HEALTH INFORMATION MANAGEMENT | Facility: CLINIC | Age: 70
End: 2020-04-06

## 2020-04-08 ENCOUNTER — VIRTUAL VISIT (OUTPATIENT)
Dept: ONCOLOGY | Facility: CLINIC | Age: 70
End: 2020-04-08
Attending: PHYSICIAN ASSISTANT
Payer: COMMERCIAL

## 2020-04-08 ENCOUNTER — RECORDS - HEALTHEAST (OUTPATIENT)
Dept: ADMINISTRATIVE | Facility: OTHER | Age: 70
End: 2020-04-08

## 2020-04-08 DIAGNOSIS — C83.30 DIFFUSE LARGE B-CELL LYMPHOMA, UNSPECIFIED BODY REGION (H): Primary | ICD-10-CM

## 2020-04-08 PROCEDURE — 40000114 ZZH STATISTIC NO CHARGE CLINIC VISIT

## 2020-04-08 PROCEDURE — 99213 OFFICE O/P EST LOW 20 MIN: CPT | Mod: 95 | Performed by: PHYSICIAN ASSISTANT

## 2020-04-08 NOTE — LETTER
"4/8/2020      RE: Chuyita Porter  [address] [place] [address]       Chuyita Porter is a 69 year old female who is being evaluated via a billable telephone visit.      The patient has been notified of following:     \"This telephone visit will be conducted via a call between you and your physician/provider. We have found that certain health care needs can be provided without the need for a physical exam.  This service lets us provide the care you need with a short phone conversation.  If a prescription is necessary we can send it directly to your pharmacy.  If lab work is needed we can place an order for that and you can then stop by our lab to have the test done at a later time.    Telephone visits are billed at different rates depending on your insurance coverage. During this emergency period, for some insurers they may be billed the same as an in-person visit.  Please reach out to your insurance provider with any questions.    If during the course of the call the physician/provider feels a telephone visit is not appropriate, you will not be charged for this service.\"    Patient has given verbal consent for Telephone visit?  Yes    Chuyita Porter complains of    Chief Complaint   Patient presents with     Telephone     DLBCL        I have reviewed and updated the patient's  Medication List.    ALLERGIES  Patient has no known allergies.     ALINA Elias      Hematology-Oncology Visit  Apr 8, 2020    Reason for Visit: follow-up DLBCL     HPI: Chuyita Porter is a 69 year old female with past medical history of breast cancer (right breast cancer in 2005 and left breast cancer in 2015 followed by Dr. Gallardo) with diffuse large B cell lymphoma. She presented with SOB and pressure on her neck/chest. She was found to have mediastinal mass and FNA biopsy on 1/30/20 showed DLBCL. She was initially managed with oral steroids and then was taken to the OR on 2/17 for open neck biopsy, tracheal stent, and PEG " tube placement however in the OR decision was made to intubate patient due to concern of threatened airway. She was then admitted and started two fractions of radiation on 2/18 and 2/19 and then started R-CHOP on 2/21. CT chest on 2/25 showed decrease size of mass. Complications of neutropenic fever due to HCAP treated with vanco + cefepime --> zosyn and completed treatment with Levaquin. She was extubated on 2/26. She was discharged to rehab due to debility and severe malnutrition. She was discharged on TFs. She started cycle 2 of R-CHOP on 3/13/20. Imaging after 2 cycles showed CR. She began cycle 3 on 4/3/20. She is here for routine follow-up today.     Interval History:   Patient reports the following:  -Has been feeling okay since last infusion.  -Lost voice again as with last chemo. No sore throat.  -Eating and drinking well. Has stopped the tube feeds, last used on 3/31. Appetite has been okay.   -Has not needed any antiemetics.   -Has been checking BP and T every evening with no concerns.  -Has been resting a couple of times per day, usually about 30 minutes at a time.  -Has noticed vision changes since starting treatment, slightly more blurry. Last eye exam was about a year ago.   -Had diarrhea with antibiotic last week, now improved.   -Previous IV site a little sore, no swelling.  -Feels she is getting stronger and using walker less.     Review of Systems:  Patient denies any of the following except if noted above: fevers, chills, difficulty with energy, vision or hearing changes, chest pain, dyspnea, abdominal pain, nausea, vomiting, constipation, urinary concerns, headaches, numbness, tingling, issues with sleep or mood. She also denies lumps, bumps, rashes, or bleeding issues.    Current Outpatient Medications   Medication     loratadine (CLARITIN) 10 MG tablet     acetaminophen (TYLENOL) 325 MG tablet     LORazepam (ATIVAN) 0.5 MG tablet     ondansetron (ZOFRAN) 4 MG tablet     predniSONE (DELTASONE)  50 MG tablet     prochlorperazine (COMPAZINE) 10 MG tablet     No current facility-administered medications for this visit.      Objective:  There were no vitals taken for this visit.  General: alert and no distress. Mildly hoarse voice.   Psych: Alert and oriented times; coherent speech, normal rate and volume, able to articulate logical thoughts, able to abstract reason, no tangential thoughts, no hallucinations or delusions  Patient's affect is appropriate.   Pulm: Speaking in full sentences, unlabored, no audible wheezes or cough.    Labs:   No new labs. Last labs from 4/3/20 reviewed.    Assessment & Plan:   1. DLBCL with mediastinal mass compressing SVC with associated collateral vessels. No disease in a/p, marrow not done. S/p 3 cycles of R-CHOP with first cycle inpatient and 2 fractions of XRT. Had PET/CT on 2/28 showing [place] and CR on 4/2/20 PET/CT. She is doing well today. She will follow-up in 2 weeks for a video visit prior to cycle 4. Will get local labs at [facility] the day before her video visit. Plan for 6 cycles of R-CHOP and then will repeat a PET/CT.     2. Hx severe malnutrition: PEG was placed with intubation. Now no longer using tube feeds. Will check weight with her next infusion and if stable, could have her PEG removed at that time.     3. Deconditioning: Has started home PT and OT. OT was just one visit. Nursing visits have been weekly. PT has been twice weekly and has been going well.     4. Previous IV site discomfort. Discussed using warm packs or heating pad to previous IV site. Call if discomfort worsens or develops swelling or redness.     5. Possible pneumonia. No concerns today. Chest CT to follow-up schedule for mid-May.    Phone call duration: 13 minutes    Priscilla Bundy PA-C  [facility]  [address] [place] [address] [phone]                            Priscilla Bundy PA-C

## 2020-04-08 NOTE — PROGRESS NOTES
"Chuyita Porter is a 69 year old female who is being evaluated via a billable telephone visit.      The patient has been notified of following:     \"This telephone visit will be conducted via a call between you and your physician/provider. We have found that certain health care needs can be provided without the need for a physical exam.  This service lets us provide the care you need with a short phone conversation.  If a prescription is necessary we can send it directly to your pharmacy.  If lab work is needed we can place an order for that and you can then stop by our lab to have the test done at a later time.    Telephone visits are billed at different rates depending on your insurance coverage. During this emergency period, for some insurers they may be billed the same as an in-person visit.  Please reach out to your insurance provider with any questions.    If during the course of the call the physician/provider feels a telephone visit is not appropriate, you will not be charged for this service.\"    Patient has given verbal consent for Telephone visit?  Yes    Chuyita Porter complains of    Chief Complaint   Patient presents with     Telephone     DLBCL        I have reviewed and updated the patient's  Medication List.    ALLERGIES  Patient has no known allergies.     ALINA Elias      Hematology-Oncology Visit  Apr 8, 2020    Reason for Visit: follow-up DLBCL     HPI: Chuyita Porter is a 69 year old female with past medical history of breast cancer (right breast cancer in 2005 and left breast cancer in 2015 followed by Dr. Gallardo) with diffuse large B cell lymphoma. She presented with SOB and pressure on her neck/chest. She was found to have mediastinal mass and FNA biopsy on 1/30/20 showed DLBCL. She was initially managed with oral steroids and then was taken to the OR on 2/17 for open neck biopsy, tracheal stent, and PEG tube placement however in the OR decision was made to intubate patient due to " concern of threatened airway. She was then admitted and started two fractions of radiation on 2/18 and 2/19 and then started R-CHOP on 2/21. CT chest on 2/25 showed decrease size of mass. Complications of neutropenic fever due to HCAP treated with vanco + cefepime --> zosyn and completed treatment with Levaquin. She was extubated on 2/26. She was discharged to rehab due to debility and severe malnutrition. She was discharged on TFs. She started cycle 2 of R-CHOP on 3/13/20. Imaging after 2 cycles showed CR. She began cycle 3 on 4/3/20. She is here for routine follow-up today.     Interval History:   Patient reports the following:  -Has been feeling okay since last infusion.  -Lost voice again as with last chemo. No sore throat.  -Eating and drinking well. Has stopped the tube feeds, last used on 3/31. Appetite has been okay.   -Has not needed any antiemetics.   -Has been checking BP and T every evening with no concerns.  -Has been resting a couple of times per day, usually about 30 minutes at a time.  -Has noticed vision changes since starting treatment, slightly more blurry. Last eye exam was about a year ago.   -Had diarrhea with antibiotic last week, now improved.   -Previous IV site a little sore, no swelling.  -Feels she is getting stronger and using walker less.     Review of Systems:  Patient denies any of the following except if noted above: fevers, chills, difficulty with energy, vision or hearing changes, chest pain, dyspnea, abdominal pain, nausea, vomiting, constipation, urinary concerns, headaches, numbness, tingling, issues with sleep or mood. She also denies lumps, bumps, rashes, or bleeding issues.    Current Outpatient Medications   Medication     loratadine (CLARITIN) 10 MG tablet     acetaminophen (TYLENOL) 325 MG tablet     LORazepam (ATIVAN) 0.5 MG tablet     ondansetron (ZOFRAN) 4 MG tablet     predniSONE (DELTASONE) 50 MG tablet     prochlorperazine (COMPAZINE) 10 MG tablet     No current  facility-administered medications for this visit.      Objective:  There were no vitals taken for this visit.  General: alert and no distress. Mildly hoarse voice.   Psych: Alert and oriented times; coherent speech, normal rate and volume, able to articulate logical thoughts, able to abstract reason, no tangential thoughts, no hallucinations or delusions  Patient's affect is appropriate.   Pulm: Speaking in full sentences, unlabored, no audible wheezes or cough.    Labs:   No new labs. Last labs from 4/3/20 reviewed.    Assessment & Plan:   1. DLBCL with mediastinal mass compressing SVC with associated collateral vessels. No disease in a/p, marrow not done. S/p 3 cycles of R-CHOP with first cycle inpatient and 2 fractions of XRT. Had PET/CT on 2/28 showing [place] and CR on 4/2/20 PET/CT. She is doing well today. She will follow-up in 2 weeks for a video visit prior to cycle 4. Will get local labs at [facility] the day before her video visit. Plan for 6 cycles of R-CHOP and then will repeat a PET/CT.     2. Hx severe malnutrition: PEG was placed with intubation. Now no longer using tube feeds. Will check weight with her next infusion and if stable, could have her PEG removed at that time.     3. Deconditioning: Has started home PT and OT. OT was just one visit. Nursing visits have been weekly. PT has been twice weekly and has been going well.     4. Previous IV site discomfort. Discussed using warm packs or heating pad to previous IV site. Call if discomfort worsens or develops swelling or redness.     5. Possible pneumonia. No concerns today. Chest CT to follow-up schedule for mid-May.    Phone call duration: 13 minutes    Priscilla Bundy PA-C  [facility]  [address], [place] [address] [phone]

## 2020-04-09 ENCOUNTER — HOME CARE/HOSPICE - HEALTHEAST (OUTPATIENT)
Dept: HOME HEALTH SERVICES | Facility: HOME HEALTH | Age: 70
End: 2020-04-09

## 2020-04-10 ENCOUNTER — PATIENT OUTREACH (OUTPATIENT)
Dept: ONCOLOGY | Facility: CLINIC | Age: 70
End: 2020-04-10

## 2020-04-10 NOTE — PROGRESS NOTES
OUTGOING CALL:  WRiter Left detailed message for Ranjit that I have an update from the team that would do the PEG removal. This is considered elective and we will need to revisit at the end of the month, to continue with home cares for cleaning and flushing per home care instructions  Also asked that Ranjit schedule pt on 4/22 for lab draw at her preferred local clinic where there are standing orders (faxed by writer last month) - provided phone number: [phone] and asked that he call me back with further questions/concerns or if he runs into issues scheduling the lab appt 4/22 am  Brandi Hood, RN  RN Care Coordinator  [facility] [address] [address] [phone]

## 2020-04-13 ENCOUNTER — COMMUNICATION - HEALTHEAST (OUTPATIENT)
Dept: SCHEDULING | Facility: CLINIC | Age: 70
End: 2020-04-13

## 2020-04-14 ENCOUNTER — PATIENT OUTREACH (OUTPATIENT)
Dept: ONCOLOGY | Facility: CLINIC | Age: 70
End: 2020-04-14

## 2020-04-14 DIAGNOSIS — C83.30 DIFFUSE LARGE B-CELL LYMPHOMA, UNSPECIFIED BODY REGION (H): Primary | ICD-10-CM

## 2020-04-14 NOTE — PROGRESS NOTES
Pt's local lab needs new lab orders faxed to:   [facility]    [phone] fax number  [phone] phone number  Routing to admin staff to pls fax the CBC, CMP orders placed for the upcoming visit. Labs to be drawn on 4/22 am.  Brandi Hood, RN  RN Care Coordinator  [facility] [address] [address] [phone]

## 2020-04-14 NOTE — PROGRESS NOTES
INCOMING CALL: Chuyita SANTIZO for writer indicating she has an appt with [facility] on 4/23 at 10:15. She asks that I call Ranjit back.    OUTGOING CALL: LVM for Ranjit (no answer at his mobile number) indicating that the request is for her to do local lab draw on 4/22 am, not 4/23 Reviewed the times for 4/23 and  4/24 appts below as I am under the impression that pt/spouse are not reviweing appts in [facility] though status is active. And I cannot be sure that the scheduling team has been in touch with pt/spouse re: the appt dates/times. Requested that they move the self-scheduled local lab draw to 4/22 am as originally advised by DAHIANA Christensen so that Magali can review the results during the telemedicine visit on 4/23 at 0830. Requested that they let me know if they have additional questions.       Date Time Provider Department Center   4/23/2020  8:30 AM Magali Bruner PA-C [facility]   4/24/2020  8:30 AM [facility] ONCOLOGY INFUSION [facility]     Brandi Hood, RN  RN Care Coordinator  [facility]  [address] [place] [id number]  [phone]      Addendum April 15, 2020  Chuyita called back and SUKHDEV that she does have the appt for local lab on am of 4/22 now. [facility].  MARIKA Lemon

## 2020-04-14 NOTE — PROGRESS NOTES
Lab orders faxed to [facility]    [phone].    Dianne Haskins CMA ([facility])

## 2020-04-22 ENCOUNTER — AMBULATORY - HEALTHEAST (OUTPATIENT)
Dept: LAB | Facility: CLINIC | Age: 70
End: 2020-04-22

## 2020-04-22 DIAGNOSIS — C83.398 DIFFUSE LARGE B-CELL LYMPHOMA OF EXTRANODAL SITE: ICD-10-CM

## 2020-04-22 LAB
ALBUMIN SERPL-MCNC: 3.4 G/DL (ref 3.5–5)
ALP SERPL-CCNC: 115 U/L (ref 45–120)
ALT SERPL W P-5'-P-CCNC: 10 U/L (ref 0–45)
ANION GAP SERPL CALCULATED.3IONS-SCNC: 11 MMOL/L (ref 5–18)
AST SERPL W P-5'-P-CCNC: 16 U/L (ref 0–40)
BASOPHILS # BLD AUTO: 0.1 THOU/UL (ref 0–0.2)
BASOPHILS NFR BLD AUTO: 1 % (ref 0–2)
BILIRUB SERPL-MCNC: 0.2 MG/DL (ref 0–1)
BUN SERPL-MCNC: 10 MG/DL (ref 8–22)
CALCIUM SERPL-MCNC: 9 MG/DL (ref 8.5–10.5)
CHLORIDE BLD-SCNC: 105 MMOL/L (ref 98–107)
CO2 SERPL-SCNC: 24 MMOL/L (ref 22–31)
CREAT SERPL-MCNC: 0.69 MG/DL (ref 0.6–1.1)
EOSINOPHIL COUNT (ABSOLUTE): 0 THOU/UL (ref 0–0.4)
EOSINOPHIL NFR BLD AUTO: 0 % (ref 0–6)
ERYTHROCYTE [DISTWIDTH] IN BLOOD BY AUTOMATED COUNT: 16.9 % (ref 11–14.5)
GFR SERPL CREATININE-BSD FRML MDRD: >60 ML/MIN/1.73M2
GLUCOSE BLD-MCNC: 85 MG/DL (ref 70–125)
HCT VFR BLD AUTO: 33.6 % (ref 35–47)
HGB BLD-MCNC: 10.5 G/DL (ref 12–16)
LDH SERPL L TO P-CCNC: 209 U/L (ref 125–220)
LYMPHOCYTES # BLD AUTO: 0.3 THOU/UL (ref 0.8–4.4)
LYMPHOCYTES NFR BLD AUTO: 5 % (ref 20–40)
MCH RBC QN AUTO: 31.6 PG (ref 27–34)
MCHC RBC AUTO-ENTMCNC: 31.3 G/DL (ref 32–36)
MCV RBC AUTO: 101 FL (ref 80–100)
MONOCYTES # BLD AUTO: 1.1 THOU/UL (ref 0–0.9)
MONOCYTES NFR BLD AUTO: 22 % (ref 2–10)
OVALOCYTES: ABNORMAL
PHOSPHATE SERPL-MCNC: 4 MG/DL (ref 2.5–4.5)
PLAT MORPH BLD: NORMAL
PLATELET # BLD AUTO: 386 THOU/UL (ref 140–440)
PMV BLD AUTO: 10.4 FL (ref 8.5–12.5)
POLYCHROMASIA BLD QL SMEAR: ABNORMAL
POTASSIUM BLD-SCNC: 4.3 MMOL/L (ref 3.5–5)
PROT SERPL-MCNC: 6.4 G/DL (ref 6–8)
RBC # BLD AUTO: 3.32 MILL/UL (ref 3.8–5.4)
SODIUM SERPL-SCNC: 140 MMOL/L (ref 136–145)
TOTAL NEUTROPHILS-ABS(DIFF): 3.7 THOU/UL (ref 2–7.7)
TOTAL NEUTROPHILS-REL(DIFF): 72 % (ref 50–70)
URATE SERPL-MCNC: 4.6 MG/DL (ref 2–7.5)
WBC: 5.1 THOU/UL (ref 4–11)

## 2020-04-23 ENCOUNTER — RECORDS - HEALTHEAST (OUTPATIENT)
Dept: ADMINISTRATIVE | Facility: OTHER | Age: 70
End: 2020-04-23

## 2020-04-23 ENCOUNTER — VIRTUAL VISIT (OUTPATIENT)
Dept: ONCOLOGY | Facility: CLINIC | Age: 70
End: 2020-04-23
Attending: PHYSICIAN ASSISTANT
Payer: COMMERCIAL

## 2020-04-23 ENCOUNTER — TELEPHONE (OUTPATIENT)
Dept: ONCOLOGY | Facility: CLINIC | Age: 70
End: 2020-04-23

## 2020-04-23 DIAGNOSIS — C83.30 DIFFUSE LARGE B-CELL LYMPHOMA, UNSPECIFIED BODY REGION (H): Primary | ICD-10-CM

## 2020-04-23 PROBLEM — K29.40 ATROPHIC GASTRITIS WITHOUT HEMORRHAGE: Status: ACTIVE | Noted: 2019-01-17

## 2020-04-23 PROBLEM — K31.7 POLYP OF DUODENUM: Status: ACTIVE | Noted: 2019-01-07

## 2020-04-23 PROCEDURE — 99214 OFFICE O/P EST MOD 30 MIN: CPT | Mod: 95 | Performed by: PHYSICIAN ASSISTANT

## 2020-04-23 PROCEDURE — 40000114 ZZH STATISTIC NO CHARGE CLINIC VISIT

## 2020-04-23 NOTE — LETTER
"4/23/2020       RE: Chuyita Porter  [address]  [facility] [address]     Dear Colleague,    Thank you for referring your patient, Chuyita Porter, to the [facility]. Please see a copy of my visit note below.    Chuyita Porter is a 69 year old female who is being evaluated via a billable telephone visit.      The patient has been notified of following:     \"This telephone visit will be conducted via a call between you and your physician/provider. We have found that certain health care needs can be provided without the need for a physical exam.  This service lets us provide the care you need with a short phone conversation.  If a prescription is necessary we can send it directly to your pharmacy.  If lab work is needed we can place an order for that and you can then stop by our lab to have the test done at a later time.    Telephone visits are billed at different rates depending on your insurance coverage. During this emergency period, for some insurers they may be billed the same as an in-person visit.  Please reach out to your insurance provider with any questions.    If during the course of the call the physician/provider feels a telephone visit is not appropriate, you will not be charged for this service.\"    Patient has given verbal consent for Telephone visit?  Yes    How would you like to obtain your AVS? Mail a copy     Please call [phone]    Janee Forbes CMA    Additional Provider Notes:     HPI: Chuyita Porter is a 69 year old female with past medical history of breast cancer (right breast cancer in 2005 and left breast cancer in 2015 followed by Dr. Gallardo) with diffuse large B cell lymphoma. She presented with SOB and pressure on her neck/chest. She was found to have mediastinal mass and FNA biopsy on 1/30/20 showed DLBCL. She was initially managed with oral steroids and then was taken to the OR on 2/17 for open neck biopsy, tracheal stent, and PEG tube placement " however in the OR decision was made to intubate patient due to concern of threatened airway. She was then admitted and started two fractions of radiation on 2/18 and 2/19 and then started R-CHOP on 2/21. CT chest on 2/25 showed decrease size of mass. Complications of neutropenic fever due to HCAP treated with vanco + cefepime --> zosyn and completed treatment with Levaquin. She was extubated on 2/26. She was discharged to rehab due to debility and severe malnutrition. She was discharged on TFs. She started cycle 2 of R-CHOP on 3/13/20. Imaging after 2 cycles showed CR. She began cycle 3 on 4/3/20. She has a phone visit today for routine follow-up prior to cycle 4 tomorrow.      Interval History:  Chuyita feels great today. Her voice is much stronger and back to normal. She notices it gets hoarse the week following chemo and then recovers the week prior to chemo again. She has been off TFs for several weeks now and her intake continues to be okay. She is drinking more fluids. Drinking about 24 oz of water, 8 oz boost, and 2-3 cups of ginger ale and apple juice. She is urinating regularly, urine is clear. She had constipation following the last cycle and used a stool softener regularly that first week and then a few times the second week with good effect. Her vision is stable, still a little blurry. No diplopia or headaches. No mucositis. She is using ppx s/s rinses. No neuropathy or rash. Her strength is much better. She has not needed her walker in several weeks. She is walking around the house well and using stairs. She denies any pain. Her energy level is great this week. No fevers/chills, SOB, CP, or edema. She does have an intermittent dry cough from a dry throat. Resolves with drinking fluids or using a cough drop.          Current Outpatient Medications   Medication     loratadine (CLARITIN) 10 MG tablet     acetaminophen (TYLENOL) 325 MG tablet     LORazepam (ATIVAN) 0.5 MG tablet     ondansetron (ZOFRAN) 4  MG tablet     predniSONE (DELTASONE) 50 MG tablet     prochlorperazine (COMPAZINE) 10 MG tablet      No current facility-administered medications for this visit.      Objective:  There were no vitals taken for this visit.  General: alert and no distress. Voice is strong   Psych: Alert and oriented, coherent speech     Labs:   Had labs done at [facility] on 4/22. Can be seen in Care Everywhere.  Hgb 10.5, WBC 5.1/ANC 3.6, platelets 386. Cr 0.69, K 4.3, albumin 3.4, protein 6.4, alk phos 115, AST 16, ALT 10. , uric acid 4.6, phos 4.0      Assessment & Plan:   1. DLBCL with mediastinal mass compressing SVC with associated collateral vessels. No disease in a/p, marrow not done. S/p 3 cycles of R-CHOP with first cycle inpatient and 2 fractions of XRT. Had PET/CT on 2/28 showing [place] and CR on 4/2/20 PET/CT. She is feeling well and has made great gains since starting chemotherapy. Will proceed with cycle 4 tomorrow. Only needs follow-up every 3 weeks now. Will check local labs on 5/14 at [facility] again prior to chemo on 5/15. Plan for 6 cycles of R-CHOP and then will repeat a PET/CT.      2. Hx severe malnutrition: PEG was placed with intubation. Now no longer using tube feeds. She is routinely flushing her PEG tube. Will check weight tomorrow and if stable, could have her PEG removed at that time.      3. Deconditioning: Has started home PT and OT. OT was just one visit. Nursing visits have been weekly. PT has been twice weekly and has been going well. Much improved.      4. Constipation secondary to vincristine: Continue daily Senna the first week and then use PRN.      5. Possible pneumonia with RLL infiltrate on most recent PET/CT. No concerns today--dry cough seems very much related to dry throat. Chest CT to follow-up schedule for mid-May.        Phone call duration: 19 minutes    Magali Bruner PA-C            Again, thank you for allowing me to participate in the care of your patient.       Sincerely,    Magali Bruner PA-C

## 2020-04-23 NOTE — TELEPHONE ENCOUNTER
Orders faxed to [facility] at [phone].     Successful transmission verified by RightFax.     Clover Camara, CMA

## 2020-04-23 NOTE — TELEPHONE ENCOUNTER
----- Message from Brandi Hood RN sent at 4/23/2020  9:18 AM CDT -----  Regarding: faxing request  [facility]  Our back of house staff are doing the faxing - routing to that team.  Thanks!  Brandi    Admin staff - pls fax the 5/14 lab orders to the same fax number as last time.   Fax number in Specialty Comments and Dianne's last note re: same  Thanks!  Brandi  ----- Message -----  From: Magali Bruner PA-C  Sent: 4/23/2020   9:00 AM CDT  To: MARIKA Louis,     Can you please fax orders to [facility] again for Chuyita to be done on 5/14? Chemo labs prior to cycle 5.     Thanks,  Magali

## 2020-04-23 NOTE — PROGRESS NOTES
"Chuyita Porter is a 69 year old female who is being evaluated via a billable telephone visit.      The patient has been notified of following:     \"This telephone visit will be conducted via a call between you and your physician/provider. We have found that certain health care needs can be provided without the need for a physical exam.  This service lets us provide the care you need with a short phone conversation.  If a prescription is necessary we can send it directly to your pharmacy.  If lab work is needed we can place an order for that and you can then stop by our lab to have the test done at a later time.    Telephone visits are billed at different rates depending on your insurance coverage. During this emergency period, for some insurers they may be billed the same as an in-person visit.  Please reach out to your insurance provider with any questions.    If during the course of the call the physician/provider feels a telephone visit is not appropriate, you will not be charged for this service.\"    Patient has given verbal consent for Telephone visit?  Yes    How would you like to obtain your AVS? Mail a copy     Please call [phone]    Janee Forbes CMA    Additional Provider Notes:     HPI: Chuyita Porter is a 69 year old female with past medical history of breast cancer (right breast cancer in 2005 and left breast cancer in 2015 followed by Dr. Gallardo) with diffuse large B cell lymphoma. She presented with SOB and pressure on her neck/chest. She was found to have mediastinal mass and FNA biopsy on 1/30/20 showed DLBCL. She was initially managed with oral steroids and then was taken to the OR on 2/17 for open neck biopsy, tracheal stent, and PEG tube placement however in the OR decision was made to intubate patient due to concern of threatened airway. She was then admitted and started two fractions of radiation on 2/18 and 2/19 and then started R-CHOP on 2/21. CT chest on 2/25 showed decrease size " of mass. Complications of neutropenic fever due to HCAP treated with vanco + cefepime --> zosyn and completed treatment with Levaquin. She was extubated on 2/26. She was discharged to rehab due to debility and severe malnutrition. She was discharged on TFs. She started cycle 2 of R-CHOP on 3/13/20. Imaging after 2 cycles showed CR. She began cycle 3 on 4/3/20. She has a phone visit today for routine follow-up prior to cycle 4 tomorrow.      Interval History:  Chuyita feels great today. Her voice is much stronger and back to normal. She notices it gets hoarse the week following chemo and then recovers the week prior to chemo again. She has been off TFs for several weeks now and her intake continues to be okay. She is drinking more fluids. Drinking about 24 oz of water, 8 oz boost, and 2-3 cups of ginger ale and apple juice. She is urinating regularly, urine is clear. She had constipation following the last cycle and used a stool softener regularly that first week and then a few times the second week with good effect. Her vision is stable, still a little blurry. No diplopia or headaches. No mucositis. She is using ppx s/s rinses. No neuropathy or rash. Her strength is much better. She has not needed her walker in several weeks. She is walking around the house well and using stairs. She denies any pain. Her energy level is great this week. No fevers/chills, SOB, CP, or edema. She does have an intermittent dry cough from a dry throat. Resolves with drinking fluids or using a cough drop.          Current Outpatient Medications   Medication     loratadine (CLARITIN) 10 MG tablet     acetaminophen (TYLENOL) 325 MG tablet     LORazepam (ATIVAN) 0.5 MG tablet     ondansetron (ZOFRAN) 4 MG tablet     predniSONE (DELTASONE) 50 MG tablet     prochlorperazine (COMPAZINE) 10 MG tablet      No current facility-administered medications for this visit.      Objective:  There were no vitals taken for this visit.  General: alert and  no distress. Voice is strong   Psych: Alert and oriented, coherent speech     Labs:   Had labs done at [facility] on 4/22. Can be seen in Care Everywhere.  Hgb 10.5, WBC 5.1/ANC 3.6, platelets 386. Cr 0.69, K 4.3, albumin 3.4, protein 6.4, alk phos 115, AST 16, ALT 10. , uric acid 4.6, phos 4.0      Assessment & Plan:   1. DLBCL with mediastinal mass compressing SVC with associated collateral vessels. No disease in a/p, marrow not done. S/p 3 cycles of R-CHOP with first cycle inpatient and 2 fractions of XRT. Had PET/CT on 2/28 showing ND and CR on 4/2/20 PET/CT. She is feeling well and has made great gains since starting chemotherapy. Will proceed with cycle 4 tomorrow. Only needs follow-up every 3 weeks now. Will check local labs on 5/14 at [facility] again prior to chemo on 5/15. Plan for 6 cycles of R-CHOP and then will repeat a PET/CT.      2. Hx severe malnutrition: PEG was placed with intubation. Now no longer using tube feeds. She is routinely flushing her PEG tube. Will check weight tomorrow and if stable, could have her PEG removed at that time.      3. Deconditioning: Has started home PT and OT. OT was just one visit. Nursing visits have been weekly. PT has been twice weekly and has been going well. Much improved.      4. Constipation secondary to vincristine: Continue daily Senna the first week and then use PRN.      5. Possible pneumonia with RLL infiltrate on most recent PET/CT. No concerns today--dry cough seems very much related to dry throat. Chest CT to follow-up schedule for mid-May.        Phone call duration: 19 minutes    Magali Bruner PA-C

## 2020-04-24 ENCOUNTER — INFUSION THERAPY VISIT (OUTPATIENT)
Dept: ONCOLOGY | Facility: CLINIC | Age: 70
End: 2020-04-24
Payer: COMMERCIAL

## 2020-04-24 VITALS
HEART RATE: 86 BPM | WEIGHT: 167.2 LBS | BODY MASS INDEX: 27.82 KG/M2 | RESPIRATION RATE: 18 BRPM | TEMPERATURE: 97.9 F | DIASTOLIC BLOOD PRESSURE: 83 MMHG | OXYGEN SATURATION: 100 % | SYSTOLIC BLOOD PRESSURE: 123 MMHG

## 2020-04-24 DIAGNOSIS — T45.1X5A ADVERSE EFFECT OF ANTINEOPLASTIC AND IMMUNOSUPPRESSIVE DRUGS, INITIAL ENCOUNTER: ICD-10-CM

## 2020-04-24 DIAGNOSIS — T45.1X5D ADVERSE EFFECT OF ANTINEOPLASTIC AND IMMUNOSUPPRESSIVE DRUGS, SUBSEQUENT ENCOUNTER: ICD-10-CM

## 2020-04-24 DIAGNOSIS — C83.398 DIFFUSE LARGE B-CELL LYMPHOMA OF EXTRANODAL SITE: Primary | ICD-10-CM

## 2020-04-24 PROCEDURE — 96417 CHEMO IV INFUS EACH ADDL SEQ: CPT

## 2020-04-24 PROCEDURE — 96415 CHEMO IV INFUSION ADDL HR: CPT

## 2020-04-24 PROCEDURE — 25000128 H RX IP 250 OP 636: Mod: ZF

## 2020-04-24 PROCEDURE — 96413 CHEMO IV INFUSION 1 HR: CPT

## 2020-04-24 PROCEDURE — 25800030 ZZH RX IP 258 OP 636: Mod: ZF

## 2020-04-24 PROCEDURE — 96375 TX/PRO/DX INJ NEW DRUG ADDON: CPT

## 2020-04-24 PROCEDURE — 25000132 ZZH RX MED GY IP 250 OP 250 PS 637: Mod: ZF

## 2020-04-24 PROCEDURE — 96367 TX/PROPH/DG ADDL SEQ IV INF: CPT

## 2020-04-24 PROCEDURE — 96377 APPLICATON ON-BODY INJECTOR: CPT | Mod: 59

## 2020-04-24 PROCEDURE — 40000556 ZZH STATISTIC PERIPHERAL IV START W US GUIDANCE: Mod: ZF

## 2020-04-24 RX ORDER — PREDNISONE 50 MG/1
100 TABLET ORAL DAILY
Qty: 10 TABLET | Refills: 0 | Status: SHIPPED | OUTPATIENT
Start: 2020-04-24 | End: 2020-04-29

## 2020-04-24 RX ORDER — DIPHENHYDRAMINE HCL 25 MG
50 CAPSULE ORAL ONCE
Status: COMPLETED | OUTPATIENT
Start: 2020-04-24 | End: 2020-04-24

## 2020-04-24 RX ORDER — PALONOSETRON 0.05 MG/ML
0.25 INJECTION, SOLUTION INTRAVENOUS ONCE
Status: COMPLETED | OUTPATIENT
Start: 2020-04-24 | End: 2020-04-24

## 2020-04-24 RX ORDER — DOXORUBICIN HYDROCHLORIDE 2 MG/ML
50 INJECTION, SOLUTION INTRAVENOUS ONCE
Status: COMPLETED | OUTPATIENT
Start: 2020-04-24 | End: 2020-04-24

## 2020-04-24 RX ORDER — ACETAMINOPHEN 325 MG/1
650 TABLET ORAL ONCE
Status: COMPLETED | OUTPATIENT
Start: 2020-04-24 | End: 2020-04-24

## 2020-04-24 RX ADMIN — RITUXIMAB 700 MG: 10 INJECTION, SOLUTION INTRAVENOUS at 10:32

## 2020-04-24 RX ADMIN — PEGFILGRASTIM 6 MG: KIT SUBCUTANEOUS at 12:08

## 2020-04-24 RX ADMIN — SODIUM CHLORIDE 250 ML: 9 INJECTION, SOLUTION INTRAVENOUS at 08:42

## 2020-04-24 RX ADMIN — CYCLOPHOSPHAMIDE 1500 MG: 1 INJECTION, POWDER, FOR SOLUTION INTRAVENOUS; ORAL at 09:32

## 2020-04-24 RX ADMIN — VINCRISTINE SULFATE 2 MG: 1 INJECTION, SOLUTION INTRAVENOUS at 09:21

## 2020-04-24 RX ADMIN — FOSAPREPITANT 150 MG: 150 INJECTION, POWDER, LYOPHILIZED, FOR SOLUTION INTRAVENOUS at 08:45

## 2020-04-24 RX ADMIN — ACETAMINOPHEN 650 MG: 325 TABLET ORAL at 09:37

## 2020-04-24 RX ADMIN — DIPHENHYDRAMINE HYDROCHLORIDE 50 MG: 25 CAPSULE ORAL at 09:37

## 2020-04-24 RX ADMIN — DOXORUBICIN HYDROCHLORIDE 90 MG: 2 INJECTION, SOLUTION INTRAVENOUS at 09:12

## 2020-04-24 RX ADMIN — PALONOSETRON HYDROCHLORIDE 0.25 MG: 0.25 INJECTION, SOLUTION INTRAVENOUS at 08:42

## 2020-04-24 ASSESSMENT — PAIN SCALES - GENERAL: PAINLEVEL: NO PAIN (0)

## 2020-04-24 NOTE — PROGRESS NOTES
Infusion Nursing Note:  Chuyita Porter presents today for Cycle 4 Day 1 Adriamycin IV push, Vincristine, Cytoxan, Rituxan, Neulasta OnPro.    Patient seen by provider today: Yes: DAHIANA David phone visit on 4/23   present during visit today: Not Applicable.    Note: Patient reports she is feeling well, she denies any new complaints following her visit with Magali yesterday.    Intravenous Access:  Peripheral IV placed.    Treatment Conditions:  Lab Results   Component Value Date    HGB 10.5 04/24/2020     Lab Results   Component Value Date    WBC 5.1 04/24/2020      Lab Results   Component Value Date    ANEU 3.7 04/24/2020     Lab Results   Component Value Date     04/24/2020      Results from 4/24/2020 reviewed in Care Everywhere, labs MET treatment parameters, ok to proceed with treatment.  ECHO/MUGA completed 2/17/2020  EF 60-65%.      Post Infusion Assessment:  Patient tolerated infusion without incident.  Blood return noted pre and post infusion.  Blood return noted during administration every 2-3 ml during Adriamycin IV push and Vincristine gravity hang.  Site patent and intact, free from redness, edema or discomfort.  No evidence of extravasations.  Access discontinued per protocol.     Neulasta Onpro On-Body injector applied to left arm at 1208 with light facing down.  Writer discussed Neulasta injection would start tomorrow 4/25/2020 at 3:00 PM, approximately 27 hours after application applied today.  Written and Verbal instruction reviewed with patient.  Pt instructed when the dose delivery starts, it will take about 45 minutes to complete.  Pt aware Neulasta Onpro On-Body should have green flashing light and to call triage or on-call MD if injector flashes red or appears to be leaking. Pt aware to keep Onpro On-Body Neulasta 4 inches away from electrical equipment and to avoid showering 4 hours prior to injection.   Neulasta Onpro Lot number: [id number]    Discharge Plan:    Prescription refills given for prednisone.  Discharge instructions reviewed with: Patient.  Patient and/or family verbalized understanding of discharge instructions and all questions answered.  AVS to patient via [email or web address].  Patient will return 5/13 for next provider visit then 5/15 for next infusion appointment.   Patient discharged in stable condition accompanied by: self.  Departure Mode: Ambulatory.    Jeannette Pittman RN

## 2020-05-13 ENCOUNTER — OFFICE VISIT (OUTPATIENT)
Dept: SURGERY | Facility: CLINIC | Age: 70
End: 2020-05-13
Attending: CLINICAL NURSE SPECIALIST
Payer: COMMERCIAL

## 2020-05-13 ENCOUNTER — RECORDS - HEALTHEAST (OUTPATIENT)
Dept: ADMINISTRATIVE | Facility: OTHER | Age: 70
End: 2020-05-13

## 2020-05-13 ENCOUNTER — ANCILLARY PROCEDURE (OUTPATIENT)
Dept: CT IMAGING | Facility: CLINIC | Age: 70
End: 2020-05-13
Payer: COMMERCIAL

## 2020-05-13 ENCOUNTER — PATIENT OUTREACH (OUTPATIENT)
Dept: ONCOLOGY | Facility: CLINIC | Age: 70
End: 2020-05-13

## 2020-05-13 VITALS
WEIGHT: 166.5 LBS | DIASTOLIC BLOOD PRESSURE: 67 MMHG | HEART RATE: 77 BPM | SYSTOLIC BLOOD PRESSURE: 107 MMHG | OXYGEN SATURATION: 98 % | BODY MASS INDEX: 27.71 KG/M2 | TEMPERATURE: 99 F

## 2020-05-13 DIAGNOSIS — C83.398 DIFFUSE LARGE B-CELL LYMPHOMA OF EXTRANODAL SITE: ICD-10-CM

## 2020-05-13 DIAGNOSIS — C76.0 HEAD AND NECK CANCER (H): Primary | ICD-10-CM

## 2020-05-13 PROCEDURE — G0463 HOSPITAL OUTPT CLINIC VISIT: HCPCS | Mod: ZF

## 2020-05-13 ASSESSMENT — PAIN SCALES - GENERAL: PAINLEVEL: NO PAIN (0)

## 2020-05-13 NOTE — PROGRESS NOTES
"REASON FOR VISIT:  PEG removal    PROCEDURES PERFORMED:  EGD and PEG tube placement.     DATE ABOVE PROCEDURES PERFORMED:  2/17/2020    SURGEON:  Jaswinder Smith MD    History of Present Illness:  Patient has a history of mediastinal mass.    She has not used the PEG for any enteral feedings since mid March.    She is anxious and happy to be \"getting rid of it\" today.    Assessment:  After explaining the removal and after care process for PEG removal, it was taken out without complications.   Gauze dressing applied and secured.    Site appeared clean with no over-granulation tissue.   Moisture-barrier cream applied to site.       Pt experienced feeling a \"little light-headedness\" following the removal.   After resting with a cold compress to her face for 10\", she felt better and ready to go home.   Her  is waiting outside with the care.    Plan:   Return PRN    Total time:  30 minutes    BRITNEY Ring, CNS  Answers for HPI/ROS submitted by the patient on 5/12/2020   General Symptoms: No  Skin Symptoms: No  HENT Symptoms: No  EYE SYMPTOMS: No  HEART SYMPTOMS: No  LUNG SYMPTOMS: No  INTESTINAL SYMPTOMS: No  URINARY SYMPTOMS: No  GYNECOLOGIC SYMPTOMS: No  BREAST SYMPTOMS: No  SKELETAL SYMPTOMS: No  BLOOD SYMPTOMS: No  NERVOUS SYSTEM SYMPTOMS: No  MENTAL HEALTH SYMPTOMS: No    "

## 2020-05-13 NOTE — PROGRESS NOTES
INCOMING CALL: Chuyita SANTIZO for writer asking re: the fact that she is scheduled for a lab draw at her local clinic tomorrow 5/14 but is also scheduled in our clinic on 5/15. Requesting clarification via CB at [phone] today     OUTGOING CALL: Writer confirmed with pt that she does not need an additional lab draw on 5/15 as currently scheduled and we will cancel that lab appt and will look for the 5/14 [facility] results. Pt states she is relieved to have the PEG tube out now and voiced understanding of above instructions and information and denied further questions.    Brandi Hood, RN  RN Care Coordinator  [facility] [address] [address] [phone]

## 2020-05-13 NOTE — NURSING NOTE
"Oncology Rooming Note    May 13, 2020 11:13 AM   Chuyita Porter is a 69 year old female who presents for:    Chief Complaint   Patient presents with     Oncology Clinic Visit     DLBCL      Initial Vitals: /67   Pulse 77   Temp 99  F (37.2  C) (Oral)   Wt 75.5 kg (166 lb 8 oz)   SpO2 98%   BMI 27.71 kg/m   Estimated body mass index is 27.71 kg/m  as calculated from the following:    Height as of 3/13/20: 1.651 m (5' 5\").    Weight as of this encounter: 75.5 kg (166 lb 8 oz). Body surface area is 1.86 meters squared.  No Pain (0) Comment: Data Unavailable   No LMP recorded.  Allergies reviewed: Yes  Medications reviewed: Yes    Medications: Medication refills not needed today.  Pharmacy name entered into UUSEE: [facility] [id number] [place] [address]    Clinical concerns: None       Radha Louise CMA              "

## 2020-05-13 NOTE — LETTER
"5/13/2020       RE: Chuyita Porter  [address]  [facility] [address]     Dear Colleague,    Thank you for referring your patient, Chuyita Porter, to the [facility]. Please see a copy of my visit note below.    REASON FOR VISIT:  PEG removal    PROCEDURES PERFORMED:  EGD and PEG tube placement.     DATE ABOVE PROCEDURES PERFORMED:  2/17/2020    SURGEON:  Jaswinder Smith MD    History of Present Illness:  Patient has a history of head and neck cancer.    She has not used the PEG for any enteral feedings since mid March.    She is anxious and happy to be \"getting rid of it\" today.    Assessment:  After explaining the removal and after care process for PEG removal, it was taken out without complications.   Gauze dressing applied and secured.    Site appeared clean with no over-granulation tissue.   Moisture-barrier cream applied to site.       Pt experienced feeling a \"little light-headedness\" following the removal.   After resting with a cold compress to her face for 10\", she felt better and ready to go home.   Her  is waiting outside with the care.    Plan:   Return PRN    Total time:  30 minutes    BRITNEY Ring CNS  Answers for HPI/ROS submitted by the patient on 5/12/2020   General Symptoms: No  Skin Symptoms: No  HENT Symptoms: No  EYE SYMPTOMS: No  HEART SYMPTOMS: No  LUNG SYMPTOMS: No  INTESTINAL SYMPTOMS: No  URINARY SYMPTOMS: No  GYNECOLOGIC SYMPTOMS: No  BREAST SYMPTOMS: No  SKELETAL SYMPTOMS: No  BLOOD SYMPTOMS: No  NERVOUS SYSTEM SYMPTOMS: No  MENTAL HEALTH SYMPTOMS: No      Again, thank you for allowing me to participate in the care of your patient.      Sincerely,    BRITNEY Levy      "

## 2020-05-13 NOTE — PATIENT INSTRUCTIONS
Change dressing daily and as needed.  Do not immerse in water until site has fully healed (usually 10-14 days).  No dietary restrictions, OK to eat or drink anything.    OK to shower;  Remove dressing, take shower and pat site dry, then recover with gauze dressing    Call or return as needed for any PEG site issues.

## 2020-05-14 ENCOUNTER — AMBULATORY - HEALTHEAST (OUTPATIENT)
Dept: LAB | Facility: CLINIC | Age: 70
End: 2020-05-14

## 2020-05-14 DIAGNOSIS — C83.398 DIFFUSE LARGE B-CELL LYMPHOMA OF EXTRANODAL SITE: ICD-10-CM

## 2020-05-14 LAB
ALBUMIN SERPL-MCNC: 3.5 G/DL (ref 3.5–5)
ALP SERPL-CCNC: 108 U/L (ref 45–120)
ALT SERPL W P-5'-P-CCNC: 10 U/L (ref 0–45)
ANION GAP SERPL CALCULATED.3IONS-SCNC: 12 MMOL/L (ref 5–18)
AST SERPL W P-5'-P-CCNC: 19 U/L (ref 0–40)
BASOPHILS # BLD AUTO: 0.1 THOU/UL (ref 0–0.2)
BASOPHILS NFR BLD AUTO: 2 % (ref 0–2)
BILIRUB SERPL-MCNC: 0.3 MG/DL (ref 0–1)
BUN SERPL-MCNC: 9 MG/DL (ref 8–22)
CALCIUM SERPL-MCNC: 9.6 MG/DL (ref 8.5–10.5)
CHLORIDE BLD-SCNC: 106 MMOL/L (ref 98–107)
CO2 SERPL-SCNC: 21 MMOL/L (ref 22–31)
CREAT SERPL-MCNC: 0.71 MG/DL (ref 0.6–1.1)
EOSINOPHIL COUNT (ABSOLUTE): 0 THOU/UL (ref 0–0.4)
EOSINOPHIL NFR BLD AUTO: 1 % (ref 0–6)
ERYTHROCYTE [DISTWIDTH] IN BLOOD BY AUTOMATED COUNT: 15.9 % (ref 11–14.5)
GFR SERPL CREATININE-BSD FRML MDRD: >60 ML/MIN/1.73M2
GLUCOSE BLD-MCNC: 93 MG/DL (ref 70–125)
HCT VFR BLD AUTO: 33.4 % (ref 35–47)
HGB BLD-MCNC: 10.5 G/DL (ref 12–16)
LDH SERPL L TO P-CCNC: 206 U/L (ref 125–220)
LYMPHOCYTES # BLD AUTO: 0.4 THOU/UL (ref 0.8–4.4)
LYMPHOCYTES NFR BLD AUTO: 9 % (ref 20–40)
MCH RBC QN AUTO: 32.6 PG (ref 27–34)
MCHC RBC AUTO-ENTMCNC: 31.4 G/DL (ref 32–36)
MCV RBC AUTO: 104 FL (ref 80–100)
MONOCYTES # BLD AUTO: 1 THOU/UL (ref 0–0.9)
MONOCYTES NFR BLD AUTO: 21 % (ref 2–10)
OVALOCYTES: ABNORMAL
PHOSPHATE SERPL-MCNC: 3.7 MG/DL (ref 2.5–4.5)
PLAT MORPH BLD: ABNORMAL
PLATELET # BLD AUTO: 463 THOU/UL (ref 140–440)
PMV BLD AUTO: 10.1 FL (ref 8.5–12.5)
POTASSIUM BLD-SCNC: 3.8 MMOL/L (ref 3.5–5)
PROT SERPL-MCNC: 7.1 G/DL (ref 6–8)
RBC # BLD AUTO: 3.22 MILL/UL (ref 3.8–5.4)
SODIUM SERPL-SCNC: 139 MMOL/L (ref 136–145)
TOTAL NEUTROPHILS-ABS(DIFF): 3.5 THOU/UL (ref 2–7.7)
TOTAL NEUTROPHILS-REL(DIFF): 69 % (ref 50–70)
URATE SERPL-MCNC: 4.4 MG/DL (ref 2–7.5)
WBC: 5 THOU/UL (ref 4–11)

## 2020-05-15 ENCOUNTER — VIRTUAL VISIT (OUTPATIENT)
Dept: ONCOLOGY | Facility: CLINIC | Age: 70
End: 2020-05-15
Attending: NURSE PRACTITIONER
Payer: COMMERCIAL

## 2020-05-15 ENCOUNTER — INFUSION THERAPY VISIT (OUTPATIENT)
Dept: ONCOLOGY | Facility: CLINIC | Age: 70
End: 2020-05-15
Payer: COMMERCIAL

## 2020-05-15 ENCOUNTER — DOCUMENTATION ONLY (OUTPATIENT)
Dept: ONCOLOGY | Facility: CLINIC | Age: 70
End: 2020-05-15

## 2020-05-15 VITALS
RESPIRATION RATE: 16 BRPM | OXYGEN SATURATION: 99 % | HEART RATE: 83 BPM | SYSTOLIC BLOOD PRESSURE: 117 MMHG | TEMPERATURE: 98 F | DIASTOLIC BLOOD PRESSURE: 79 MMHG

## 2020-05-15 DIAGNOSIS — T45.1X5A ADVERSE EFFECT OF ANTINEOPLASTIC AND IMMUNOSUPPRESSIVE DRUGS, INITIAL ENCOUNTER: ICD-10-CM

## 2020-05-15 DIAGNOSIS — T45.1X5D ADVERSE EFFECT OF ANTINEOPLASTIC AND IMMUNOSUPPRESSIVE DRUGS, SUBSEQUENT ENCOUNTER: ICD-10-CM

## 2020-05-15 DIAGNOSIS — C83.398 DIFFUSE LARGE B-CELL LYMPHOMA OF EXTRANODAL SITE: ICD-10-CM

## 2020-05-15 DIAGNOSIS — T45.1X5A ADVERSE EFFECT OF ANTINEOPLASTIC AND IMMUNOSUPPRESSIVE DRUGS, INITIAL ENCOUNTER: Primary | ICD-10-CM

## 2020-05-15 DIAGNOSIS — C83.398 DIFFUSE LARGE B-CELL LYMPHOMA OF EXTRANODAL SITE: Primary | ICD-10-CM

## 2020-05-15 PROCEDURE — 96367 TX/PROPH/DG ADDL SEQ IV INF: CPT

## 2020-05-15 PROCEDURE — 25800030 ZZH RX IP 258 OP 636: Mod: ZF | Performed by: NURSE PRACTITIONER

## 2020-05-15 PROCEDURE — 96377 APPLICATON ON-BODY INJECTOR: CPT | Mod: 59

## 2020-05-15 PROCEDURE — 96413 CHEMO IV INFUSION 1 HR: CPT

## 2020-05-15 PROCEDURE — 96417 CHEMO IV INFUS EACH ADDL SEQ: CPT

## 2020-05-15 PROCEDURE — 25000128 H RX IP 250 OP 636: Mod: ZF | Performed by: NURSE PRACTITIONER

## 2020-05-15 PROCEDURE — 96415 CHEMO IV INFUSION ADDL HR: CPT

## 2020-05-15 PROCEDURE — 99214 OFFICE O/P EST MOD 30 MIN: CPT | Mod: 95 | Performed by: NURSE PRACTITIONER

## 2020-05-15 PROCEDURE — 25000132 ZZH RX MED GY IP 250 OP 250 PS 637: Mod: ZF | Performed by: NURSE PRACTITIONER

## 2020-05-15 PROCEDURE — 96411 CHEMO IV PUSH ADDL DRUG: CPT

## 2020-05-15 PROCEDURE — 40000556 ZZH STATISTIC PERIPHERAL IV START W US GUIDANCE: Mod: ZF

## 2020-05-15 RX ORDER — MEPERIDINE HYDROCHLORIDE 25 MG/ML
25 INJECTION INTRAMUSCULAR; INTRAVENOUS; SUBCUTANEOUS
Status: CANCELLED
Start: 2020-05-15

## 2020-05-15 RX ORDER — LORAZEPAM 2 MG/ML
0.5 INJECTION INTRAMUSCULAR EVERY 4 HOURS PRN
Status: CANCELLED | OUTPATIENT
Start: 2020-05-15

## 2020-05-15 RX ORDER — EPINEPHRINE 1 MG/ML
0.3 INJECTION, SOLUTION INTRAMUSCULAR; SUBCUTANEOUS EVERY 5 MIN PRN
Status: CANCELLED | OUTPATIENT
Start: 2020-05-15

## 2020-05-15 RX ORDER — NALOXONE HYDROCHLORIDE 0.4 MG/ML
.1-.4 INJECTION, SOLUTION INTRAMUSCULAR; INTRAVENOUS; SUBCUTANEOUS
Status: CANCELLED | OUTPATIENT
Start: 2020-05-15

## 2020-05-15 RX ORDER — EPINEPHRINE 0.3 MG/.3ML
0.3 INJECTION SUBCUTANEOUS EVERY 5 MIN PRN
Status: CANCELLED | OUTPATIENT
Start: 2020-05-15

## 2020-05-15 RX ORDER — DIPHENHYDRAMINE HYDROCHLORIDE 50 MG/ML
50 INJECTION INTRAMUSCULAR; INTRAVENOUS
Status: CANCELLED
Start: 2020-05-15

## 2020-05-15 RX ORDER — DOXORUBICIN HYDROCHLORIDE 2 MG/ML
50 INJECTION, SOLUTION INTRAVENOUS ONCE
Status: COMPLETED | OUTPATIENT
Start: 2020-05-15 | End: 2020-05-15

## 2020-05-15 RX ORDER — HEPARIN SODIUM,PORCINE 10 UNIT/ML
5 VIAL (ML) INTRAVENOUS
Status: CANCELLED | OUTPATIENT
Start: 2020-05-15

## 2020-05-15 RX ORDER — MEPERIDINE HYDROCHLORIDE 25 MG/ML
25 INJECTION INTRAMUSCULAR; INTRAVENOUS; SUBCUTANEOUS EVERY 30 MIN PRN
Status: CANCELLED | OUTPATIENT
Start: 2020-05-15

## 2020-05-15 RX ORDER — ACETAMINOPHEN 325 MG/1
650 TABLET ORAL ONCE
Status: COMPLETED | OUTPATIENT
Start: 2020-05-15 | End: 2020-05-15

## 2020-05-15 RX ORDER — SODIUM CHLORIDE 9 MG/ML
1000 INJECTION, SOLUTION INTRAVENOUS CONTINUOUS PRN
Status: CANCELLED
Start: 2020-05-15

## 2020-05-15 RX ORDER — PALONOSETRON 0.05 MG/ML
0.25 INJECTION, SOLUTION INTRAVENOUS ONCE
Status: COMPLETED | OUTPATIENT
Start: 2020-05-15 | End: 2020-05-15

## 2020-05-15 RX ORDER — DIPHENHYDRAMINE HCL 25 MG
50 CAPSULE ORAL ONCE
Status: CANCELLED
Start: 2020-05-15

## 2020-05-15 RX ORDER — PREDNISONE 50 MG/1
100 TABLET ORAL DAILY
Qty: 10 TABLET | Refills: 0 | Status: SHIPPED | OUTPATIENT
Start: 2020-05-15 | End: 2020-05-20

## 2020-05-15 RX ORDER — METHYLPREDNISOLONE SODIUM SUCCINATE 125 MG/2ML
125 INJECTION, POWDER, LYOPHILIZED, FOR SOLUTION INTRAMUSCULAR; INTRAVENOUS
Status: CANCELLED
Start: 2020-05-15

## 2020-05-15 RX ORDER — DIPHENHYDRAMINE HCL 25 MG
50 CAPSULE ORAL ONCE
Status: COMPLETED | OUTPATIENT
Start: 2020-05-15 | End: 2020-05-15

## 2020-05-15 RX ORDER — HEPARIN SODIUM (PORCINE) LOCK FLUSH IV SOLN 100 UNIT/ML 100 UNIT/ML
5 SOLUTION INTRAVENOUS
Status: CANCELLED | OUTPATIENT
Start: 2020-05-15

## 2020-05-15 RX ORDER — DOXORUBICIN HYDROCHLORIDE 2 MG/ML
50 INJECTION, SOLUTION INTRAVENOUS ONCE
Status: CANCELLED | OUTPATIENT
Start: 2020-05-15

## 2020-05-15 RX ORDER — ALBUTEROL SULFATE 90 UG/1
1-2 AEROSOL, METERED RESPIRATORY (INHALATION)
Status: CANCELLED
Start: 2020-05-15

## 2020-05-15 RX ORDER — PALONOSETRON 0.05 MG/ML
0.25 INJECTION, SOLUTION INTRAVENOUS ONCE
Status: CANCELLED | OUTPATIENT
Start: 2020-05-15

## 2020-05-15 RX ORDER — ALBUTEROL SULFATE 0.83 MG/ML
2.5 SOLUTION RESPIRATORY (INHALATION)
Status: CANCELLED | OUTPATIENT
Start: 2020-05-15

## 2020-05-15 RX ORDER — ACETAMINOPHEN 325 MG/1
650 TABLET ORAL ONCE
Status: CANCELLED | OUTPATIENT
Start: 2020-05-15

## 2020-05-15 RX ADMIN — CYCLOPHOSPHAMIDE 1500 MG: 2 INJECTION, POWDER, FOR SOLUTION INTRAVENOUS; ORAL at 10:48

## 2020-05-15 RX ADMIN — PALONOSETRON HYDROCHLORIDE 0.25 MG: 0.25 INJECTION INTRAVENOUS at 09:13

## 2020-05-15 RX ADMIN — ACETAMINOPHEN 650 MG: 325 TABLET ORAL at 11:02

## 2020-05-15 RX ADMIN — DOXORUBICIN HYDROCHLORIDE 90 MG: 2 INJECTION, SOLUTION INTRAVENOUS at 10:02

## 2020-05-15 RX ADMIN — SODIUM CHLORIDE 250 ML: 9 INJECTION, SOLUTION INTRAVENOUS at 09:13

## 2020-05-15 RX ADMIN — RITUXIMAB 700 MG: 10 INJECTION, SOLUTION INTRAVENOUS at 11:54

## 2020-05-15 RX ADMIN — DIPHENHYDRAMINE HYDROCHLORIDE 50 MG: 25 CAPSULE ORAL at 11:02

## 2020-05-15 RX ADMIN — SODIUM CHLORIDE 150 MG: 9 INJECTION, SOLUTION INTRAVENOUS at 09:13

## 2020-05-15 RX ADMIN — PEGFILGRASTIM 6 MG: KIT SUBCUTANEOUS at 13:29

## 2020-05-15 RX ADMIN — VINCRISTINE SULFATE 2 MG: 1 INJECTION, SOLUTION INTRAVENOUS at 10:40

## 2020-05-15 NOTE — PATIENT INSTRUCTIONS
[facility] Triage and after hours / weekends / holidays:  [phone]    Please call the triage or after hours line if you experience a temperature greater than or equal to 100.5, shaking chills, have uncontrolled nausea, vomiting and/or diarrhea, dizziness, shortness of breath, chest pain, bleeding, unexplained bruising, or if you have any other new/concerning symptoms, questions or concerns.      If you are having any concerning symptoms or wish to speak to a provider before your next infusion visit, please call your care coordinator or triage to notify them so we can adequately serve you.     If you need a refill on a narcotic prescription or other medication, please call before your infusion appointment.                 May 2020      Levi Monday Tuesday Wednesday Thursday Friday Saturday                            1     2       3     4     5     6     7     8     9       10     11     12     13    CT CHEST WO  11:00 AM   (20 min.)   UCCT1   [facility] CT    [facility] RETURN  11:05 AM   (40 min.)   Pennie Blake APRN [facility] ED 14  Happy Birthday!     15    TELEPHONE VISIT RETURN   7:50 AM   (50 min.)   Karen Yu APRN CNP   [facility] ONC INFUSION 360   9:00 AM   (360 min.)   [facility] ONCOLOGY INFUSION   [facility] 16       17     18     19     20     21     22     23       24     25     26     27     28     29     30       31                                               June 2020 Sunday Monday Tuesday Wednesday Thursday Friday Saturday        1     2     3     4     5    [facility] RETURN   7:35 AM   (50 min.)   Karen Yu APRN CNP   [facility] ONC INFUSION 360  10:00 AM   (360 min.)    ONCOLOGY INFUSION   [facility] 6       7     8     9     10     11     12     13       14     15     16     17     18     19     20       21     22     23     24     25     26      27       28     29     30

## 2020-05-15 NOTE — PROGRESS NOTES
Infusion Nursing Note:  Chuyita Porter presents today for Day 1 Cycle 5 Rituxan, adriamycin, Vincristine, Cytoxan, and PO prednisone   Patient seen by provider today: Yes: Karen TALBOT   present during visit today: Not Applicable.    Note: Patient presents to infusion feeling well. Patient denies pain and states no acute complaints or concerns needing to be addressed today. Specifically, patient denies s/s of infection such as fever, shortness of breath, cough, or chest pain. Patient took PO prednisone in the presence of this Infusion nurse immediately after obtaining medication from pharmacy.    TORB. 0816. 5/15/2020. Karen TALBOT. Norm Kaba RN. Obtain a rest and walking O2 sat during infusion.    Rest Oxygen sat: 99%  After 15 seconds walking Oxygen sat: 98%  After 30 seconds walking Oxygen sat: 99%  After 45 seconds walking Oxygen sat: 98%    Patient denied shortness of breath during Oxygen saturation assessment    Intravenous Access:  Peripheral IV placed via Vascular Access x2. During Adriamycin infusion with 30mls left to give, patient stated intermittent mild discomfort specifically when medication syringe was pushed to give medication. Blood return easily obtained and discomfort resolved when syringe was not pushed. IV removed. Site without redness, visible swelling, or warmth. Per pharmacy, cold therapy (no antidote recommended) recommended for comfort, therefore cold pack given. Site monitored throughout infusion, and at the time of discharge, mild discomfort remained to IV site, however no redness, swelling, warmth, blanching, or limited movement noted. Patient education given to notify provider if IV site develops redness, swelling, increased discomfort, open sores, etc.        Treatment Conditions: labs from 5/14  Lab Results   Component Value Date    HGB 10.5      Lab Results   Component Value Date    WBC 5.0       Lab Results   Component Value Date    ANEU 3.5      Lab Results    Component Value Date           Lab Results   Component Value Date                        Lab Results   Component Value Date    POTASSIUM 3.8            Lab Results   Component Value Date                  Lab Results   Component Value Date    CR 0.71                    Lab Results   Component Value Date    GABI 9.6                 Lab Results   Component Value Date    BILITOTAL 0.3            Lab Results   Component Value Date    ALBUMIN 3.5                     Lab Results   Component Value Date    ALT 19            Lab Results   Component Value Date    AST 10        Results reviewed via Care Everywhere from 5/14, labs MET treatment parameters, ok to proceed with treatment.  ECHO/MUGA completed 2/19/2020  EF 60-65%.      Post Infusion Assessment:  Patient tolerated infusion without incident.  Blood return noted pre and post infusion.  Blood return noted during Adriamycin and Vincristine administration every 2 cc.  Site patent and intact, free from redness, edema or discomfort.  No evidence of extravasations.  Access discontinued per protocol.     Neulasta Onpro On-Body injector applied to back of left arm at 1329 with light facing down.  Writer discussed Neulasta injection would start on 5/16/2020 at 1629, approximately 27 hours after application applied today.  Written and Verbal instruction reviewed with patient.  Pt instructed when the dose delivery starts, it will take about 45 minutes to complete.  Pt aware Neulasta Onpro On-Body should have green flashing light and to call triage or on-call MD if injector flashes red or appears to be leaking. Pt aware to keep Onpro On-Body Neualsta 4 inches away from electrical equipment and to avoid showering 4 hours prior to injection.   Neulasta Onpro Lot number: [id number]      Discharge Plan:   Prescription refills given for Prednisone.  Discharge instructions reviewed with: Patient.  Patient and/or family verbalized understanding of discharge instructions and  all questions answered.  Copy of AVS reviewed with patient and/or family.  Patient will return 6/5 for next appointment.  Patient discharged in stable condition accompanied by: self.  Departure Mode: wheel chair: do to long distance.  Face to Face time: 0 minutes.    Norm Kaba RN

## 2020-05-15 NOTE — PROGRESS NOTES
Lab orders printed and faxed to [facility], fax # [phone] per request of [facility].  Successful fax transmission was verified via rightfax.    Clover Stanley CMA

## 2020-05-15 NOTE — PROGRESS NOTES
"Chuyita Porter is a 70 year old female who is being evaluated via a billable telephone visit.      The patient has been notified of following:     \"This telephone visit will be conducted via a call between you and your physician/provider. We have found that certain health care needs can be provided without the need for a physical exam.  This service lets us provide the care you need with a short phone conversation.  If a prescription is necessary we can send it directly to your pharmacy.  If lab work is needed we can place an order for that and you can then stop by our lab to have the test done at a later time.    Telephone visits are billed at different rates depending on your insurance coverage. During this emergency period, for some insurers they may be billed the same as an in-person visit.  Please reach out to your insurance provider with any questions.    If during the course of the call the physician/provider feels a telephone visit is not appropriate, you will not be charged for this service.\"    Patient has given verbal consent for Telephone visit?  Yes    What phone number would you like to be contacted at? [phone]    How would you like to obtain your AVS? Mail a copy     I have reviewed and updated the patient's allergies and medication list.    Concerns: None    Refills:          Landry Fields, LAURA      Reason for Visit: f/u DLBCL    Oncology HPI: Chuyita Porter is a 69 year old female with past medical history of breast cancer (right breast cancer in 2005 and left breast cancer in 2015 followed by Dr. Gallardo) with diffuse large B cell lymphoma. She presented with SOB and pressure on her neck/chest. She was found to have mediastinal mass and FNA biopsy on 1/30/20 showed DLBCL. She was initially managed with oral steroids and then was taken to the OR on 2/17 for open neck biopsy, tracheal stent, and PEG tube placement however in the OR decision was made to intubate patient due to concern of " "threatened airway. She was then admitted and started two fractions of radiation on 2/18 and 2/19 and then started R-CHOP on 2/21. CT chest on 2/25 showed decrease size of mass. Complications of neutropenic fever due to HCAP treated with vanco + cefepime --> zosyn and completed treatment with Levaquin. She was extubated on 2/26. She was discharged to rehab due to debility and severe malnutrition. She was discharged on TFs. She started cycle 2 of R-CHOP on 3/13/20. Imaging after 2 cycles showed CR. She began cycle 3 on 4/3/20, cycle 4 on 4/24/20.  She has a phone visit today for routine follow-up prior to cycle 5.     Interval history: Chuyita is feeling great. Continues to feel stronger. \"graduated\" from home PT/OT. Is working on exercises on her own now. Feels fatigued for a week or so after treatment, then improves. No sob, cough, fevers/chills, wheezing. No nausea/vomigin. Bowels are regular with use of miralax. Urination is frequent, urine is clear. No dysuria. G tube site is healing, minimal drainage. Eating/drinking well. Taking in boost/ensure a couple per day. No bleeding, bruising, rash. No neuropathy.     Current Outpatient Medications   Medication Sig Dispense Refill     acetaminophen (TYLENOL) 325 MG tablet Take 2 tablets (650 mg) by mouth every 4 hours as needed for mild pain or fever       loratadine (CLARITIN) 10 MG tablet Take 1 tablet (10 mg) by mouth daily 30 tablet 3     LORazepam (ATIVAN) 0.5 MG tablet Take 1 tablet (0.5 mg) by mouth every 4 hours as needed (Anxiety, Nausea/Vomiting or Sleep) 30 tablet 5     ondansetron (ZOFRAN) 4 MG tablet Take 1 tablet (4 mg) by mouth daily as needed for nausea 60 tablet 0     prochlorperazine (COMPAZINE) 10 MG tablet Take 0.5 tablets (5 mg) by mouth every 6 hours as needed (Nausea/Vomiting) 30 tablet 7        No Known Allergies      Objective   There were no vitals taken for this visit.  Wt Readings from Last 4 Encounters:   05/13/20 75.5 kg (166 lb 8 oz) "   04/24/20 75.8 kg (167 lb 3.2 oz)   04/03/20 74.7 kg (164 lb 9.6 oz)   03/13/20 74.5 kg (164 lb 4.8 oz)     Speech is clear. Alert, oriented, in no apparent distress    Labs: Results for MELODY ANDERSEN (MRN [id number]) as of 5/15/2020 15:47   Ref. Range 4/3/2020 08:44   Sodium Latest Ref Range: 133 - 144 mmol/L 139   Potassium Latest Ref Range: 3.4 - 5.3 mmol/L 3.4   Chloride Latest Ref Range: 94 - 109 mmol/L 106   Carbon Dioxide Latest Ref Range: 20 - 32 mmol/L 30   Urea Nitrogen Latest Ref Range: 7 - 30 mg/dL 9   Creatinine Latest Ref Range: 0.52 - 1.04 mg/dL 0.63   GFR Estimate Latest Ref Range: >60 mL/min/1.73_m2 >90   GFR Estimate If Black Latest Ref Range: >60 mL/min/1.73_m2 >90   Calcium Latest Ref Range: 8.5 - 10.1 mg/dL 9.2   Anion Gap Latest Ref Range: 3 - 14 mmol/L 2 (L)   Phosphorus Latest Ref Range: 2.5 - 4.5 mg/dL 3.3   Albumin Latest Ref Range: 3.4 - 5.0 g/dL 3.4   Protein Total Latest Ref Range: 6.8 - 8.8 g/dL 7.4   Bilirubin Total Latest Ref Range: 0.2 - 1.3 mg/dL 0.4   Alkaline Phosphatase Latest Ref Range: 40 - 150 U/L 142   ALT Latest Ref Range: 0 - 50 U/L 24   AST Latest Ref Range: 0 - 45 U/L 22   Lactate Dehydrogenase Latest Ref Range: 81 - 234 U/L 245 (H)   Uric Acid Latest Ref Range: 2.6 - 6.0 mg/dL 4.5   Glucose Latest Ref Range: 70 - 99 mg/dL 106 (H)   WBC Latest Ref Range: 4.0 - 11.0 10e9/L 6.3   Hemoglobin Latest Ref Range: 11.7 - 15.7 g/dL 11.0 (L)   Hematocrit Latest Ref Range: 35.0 - 47.0 % 33.8 (L)   Platelet Count Latest Ref Range: 150 - 450 10e9/L 639 (H)   RBC Count Latest Ref Range: 3.8 - 5.2 10e12/L 3.50 (L)   MCV Latest Ref Range: 78 - 100 fl 97   MCH Latest Ref Range: 26.5 - 33.0 pg 31.4   MCHC Latest Ref Range: 31.5 - 36.5 g/dL 32.5   RDW Latest Ref Range: 10.0 - 15.0 % 17.2 (H)   Diff Method Unknown Automated Method   % Neutrophils Latest Units: % 70.4   % Lymphocytes Latest Units: % 7.8   % Monocytes Latest Units: % 18.9   % Eosinophils Latest Units: % 0.2   % Basophils  Latest Units: % 2.1   % Immature Granulocytes Latest Units: % 0.6   Nucleated RBCs Latest Ref Range: 0 /100 0   Absolute Neutrophil Latest Ref Range: 1.6 - 8.3 10e9/L 4.4   Absolute Lymphocytes Latest Ref Range: 0.8 - 5.3 10e9/L 0.5 (L)   Absolute Monocytes Latest Ref Range: 0.0 - 1.3 10e9/L 1.2   Absolute Eosinophils Latest Ref Range: 0.0 - 0.7 10e9/L 0.0   Absolute Basophils Latest Ref Range: 0.0 - 0.2 10e9/L 0.1   Abs Immature Granulocytes Latest Ref Range: 0 - 0.4 10e9/L 0.0   Absolute Nucleated RBC Unknown 0.0       Imaging: CT CHEST W/O CONTRAST 5/13/2020 10:45 AM     CLINICAL HISTORY: Pneumonia, unresolved or complicated; Diffuse large  B-cell lymphoma of extranodal site.     COMPARISON: 4/2/2020, 2/25/2020, 2/14/2020, 10/14/2016.     PROCEDURE COMMENTS: CT of the chest was performed without intravenous  contrast. Axial MIP, coronal and sagittal reformatted images were  obtained.     FINDINGS:      Chest:  Significantly decreased size of a lobulated homogeneous hypodense mass  encasing the right lateral aspect of the upper trachea and abutting  the inferior right thyroid lobe, measuring 3.3 x 0.8 cm compared to  5.8 x 2.4 cm on 3/2/2020 and 4.1 x 1.6 cm on 4/2/2020 (series 2, image  13). No new masses in the chest, and no thoracic adenopathy.     The thyroid, heart size, pericardium, and esophagus appear normal.  Aortic origin of the left vertebral artery. 5 focal calcification in  the proximal left anterior descending coronary artery; otherwise no  significant coronary artery calcifications. The ascending aorta and  main pulmonary artery are not dilated.     The central tracheobronchial tree is patent. No pneumothorax or  pleural effusion. Mild central bronchial wall thickening. Bibasilar  atelectasis, improved since 2/25/2020. Mild biapical fibrosis. There  is patchy nodular groundglass opacification in the upper lungs, which  is new compared to 2/25/2020. For example a 12 mm focus of  perivascular  groundglass in the medial right upper lobe (series 4,  image 109). A peripheral 9 mm focus of groundglass in the lateral left  upper lobe (series 4, image 99) is not significantly changed since  10/14/2016. A 3 mm nodule in the posterior left lower lobe (series 4,  image 200) is unchanged since 2/14/2020.     Upper Abdomen:  Unchanged 9 mm hypodensity in hepatic segment 2, stable since  2/14/2020. The remainder of the upper abdomen is otherwise  unremarkable.     Bones and soft tissues:  Mild multilevel degenerative changes in the thoracic spine. Rightward  curvature of the midthoracic spine. No acute or worrisome osseous  lesions.                                                                         IMPRESSION:    1. Significantly decreased size of the right paratracheal mass.  2. New mild patchy groundglass opacities in the central right upper  lobe likely represent a mild infectious/inflammatory process. The  previously seen right lower lobe consolidation has resolved with mild  residual atelectasis.   3. Stable small nodule in the left lower lobe. No new or enlarging  solid pulmonary nodules.  4. Resolution of the previously seen small pleural effusions  bilaterally.  5. Unchanged indeterminate subcentimeter hypodensity in the left  hepatic lobe.              PAYAM LUZ MD    Impression/plan:   DLBCL with mediastinal mass compressing SVC with associated collateral vessels, no disease in the abd/pelvis, marrow not done. S/P 4 cycles R-CHOP. First cycle done inpatient and 2 fractions of XRT. Had PET/CT on 2/28/20 showing [place]. PET/CT on 4/2/20 showed a CR.  -tolerating treatment well. Reviewed her CT scan as below. No clinical symptoms suggesting infection today. Will proceed with treatment.   -I will see her prior to the next cycle, then set up a f/u PET/CT and visit with Dr. Sprague  -plan for 6 cycles of R-CHOP, then repeat a PET/CT    Hx of malnutrition, had a PEG placed when intubated  -was not needing  PEG and had it removed on 5/13/20  -eating/drinking well. Taking Ensure/Boost 1-2/day    Constipation s/t vincristine  -managing well with miralax prn    Deconditioning  -improving. Done with home PT/PT. Working on exercises on her own.    Radiology finding of possible pneumonia with RLL infiltrate on PET/CT from April  -no clinical concerns for infection  -Chest CT was reviewed. The prior RLL consolidation is resolved, but she has a new RUL GGO. No clinical symptoms of infection. Advised to monitor her symptoms closely. If she develops cough, fever, sob, asked her to call for evaluation and treatment        Phone call duration: 15 minutes    BRITNEY Page CNP

## 2020-06-02 ENCOUNTER — PATIENT OUTREACH (OUTPATIENT)
Dept: ONCOLOGY | Facility: CLINIC | Age: 70
End: 2020-06-02

## 2020-06-02 NOTE — PROGRESS NOTES
INCOMING CALL:  Chuyita SANTIZO for writer re:  1) temp over the past week is consistently 99.1 - 99.3.   2) wanting to make sure local lab orders are sent to her local  clinic in [facility]  [phone]    OUTGOING CALL  Triaged pt's sx  Chills? NO  Cough? NO  Sore throat? NO  Eating and drinking ok? YES, pushing fluids  Using fever-reducing med? Occasional Tylenol at bedtime  Other sx of infection? NO.   Other: small Red area on right forearm from prior IV site is improving. No streaks, swelling or drainage.  Gtube site sl pink, no streaks or drainage    RN Recommendations:   -continue to monitor temp bid and report temp 100.4 or greater or any new sx  -Avoid Tylenol as it masks fever / temp elevations  -Warm compress to forearm bid x 10-15 mins  -We will have our admin staff fax lab orders to her local clinic tomorrow.    Pt also has a question about loratadine refill and will request RF from [facility] (4/2 rx for #30 with 3 RFs in chart)    Pt voiced understanding of above instructions and information and denied further questions    Brandi Hood, RN  RN Care Coordinator  [facility] [address] [address] [phone]

## 2020-06-03 ENCOUNTER — TELEPHONE (OUTPATIENT)
Dept: ONCOLOGY | Facility: CLINIC | Age: 70
End: 2020-06-03

## 2020-06-03 NOTE — TELEPHONE ENCOUNTER
----- Message -----  From: Brandi Hood RN  Sent: 6/2/2020   5:53 PM CDT  To: [facility]  Subject: fayusefg lab orders                                Pls fax Karen's lab orders to   [facility]  [phone] fax #  Due 6/4     Please let me know if you are not able to get to this.  Thanks so very much for the help!  Brandi

## 2020-06-03 NOTE — TELEPHONE ENCOUNTER
Requested lab orders from message below faxed to [facility] at [phone].     Successful transmission verified by [employer]ax.     Clover Camara, CMA

## 2020-06-04 ENCOUNTER — AMBULATORY - HEALTHEAST (OUTPATIENT)
Dept: LAB | Facility: CLINIC | Age: 70
End: 2020-06-04

## 2020-06-04 DIAGNOSIS — C83.398 DIFFUSE LARGE B-CELL LYMPHOMA OF EXTRANODAL SITE: ICD-10-CM

## 2020-06-04 LAB
ALBUMIN SERPL-MCNC: 3.5 G/DL (ref 3.5–5)
ALP SERPL-CCNC: 130 U/L (ref 45–120)
ALT SERPL W P-5'-P-CCNC: 13 U/L (ref 0–45)
ANION GAP SERPL CALCULATED.3IONS-SCNC: 11 MMOL/L (ref 5–18)
AST SERPL W P-5'-P-CCNC: 21 U/L (ref 0–40)
BASOPHILS # BLD AUTO: 0.2 THOU/UL (ref 0–0.2)
BASOPHILS NFR BLD AUTO: 3 % (ref 0–2)
BILIRUB SERPL-MCNC: 0.3 MG/DL (ref 0–1)
BUN SERPL-MCNC: 13 MG/DL (ref 8–22)
CALCIUM SERPL-MCNC: 9.4 MG/DL (ref 8.5–10.5)
CHLORIDE BLD-SCNC: 102 MMOL/L (ref 98–107)
CO2 SERPL-SCNC: 25 MMOL/L (ref 22–31)
CREAT SERPL-MCNC: 0.72 MG/DL (ref 0.6–1.1)
EOSINOPHIL COUNT (ABSOLUTE): 0.1 THOU/UL (ref 0–0.4)
EOSINOPHIL NFR BLD AUTO: 1 % (ref 0–6)
ERYTHROCYTE [DISTWIDTH] IN BLOOD BY AUTOMATED COUNT: 14.6 % (ref 11–14.5)
GFR SERPL CREATININE-BSD FRML MDRD: >60 ML/MIN/1.73M2
GLUCOSE BLD-MCNC: 89 MG/DL (ref 70–125)
HCT VFR BLD AUTO: 31.5 % (ref 35–47)
HGB BLD-MCNC: 10.2 G/DL (ref 12–16)
LDH SERPL L TO P-CCNC: 222 U/L (ref 125–220)
LYMPHOCYTES # BLD AUTO: 0.3 THOU/UL (ref 0.8–4.4)
LYMPHOCYTES NFR BLD AUTO: 5 % (ref 20–40)
MANUAL NRBC PER 100 CELLS: 1
MCH RBC QN AUTO: 32.8 PG (ref 27–34)
MCHC RBC AUTO-ENTMCNC: 32.4 G/DL (ref 32–36)
MCV RBC AUTO: 101 FL (ref 80–100)
MONOCYTES # BLD AUTO: 1.5 THOU/UL (ref 0–0.9)
MONOCYTES NFR BLD AUTO: 25 % (ref 2–10)
PHOSPHATE SERPL-MCNC: 4.1 MG/DL (ref 2.5–4.5)
PLAT MORPH BLD: ABNORMAL
PLATELET # BLD AUTO: 555 THOU/UL (ref 140–440)
PMV BLD AUTO: 9.7 FL (ref 8.5–12.5)
POTASSIUM BLD-SCNC: 4.8 MMOL/L (ref 3.5–5)
PROT SERPL-MCNC: 6.7 G/DL (ref 6–8)
RBC # BLD AUTO: 3.11 MILL/UL (ref 3.8–5.4)
SODIUM SERPL-SCNC: 138 MMOL/L (ref 136–145)
TOTAL NEUTROPHILS-ABS(DIFF): 4 THOU/UL (ref 2–7.7)
TOTAL NEUTROPHILS-REL(DIFF): 66 % (ref 50–70)
URATE SERPL-MCNC: 4.7 MG/DL (ref 2–7.5)
WBC: 6.1 THOU/UL (ref 4–11)

## 2020-06-05 ENCOUNTER — VIRTUAL VISIT (OUTPATIENT)
Dept: ONCOLOGY | Facility: CLINIC | Age: 70
End: 2020-06-05
Attending: NURSE PRACTITIONER
Payer: COMMERCIAL

## 2020-06-05 ENCOUNTER — RECORDS - HEALTHEAST (OUTPATIENT)
Dept: ADMINISTRATIVE | Facility: OTHER | Age: 70
End: 2020-06-05

## 2020-06-05 ENCOUNTER — INFUSION THERAPY VISIT (OUTPATIENT)
Dept: ONCOLOGY | Facility: CLINIC | Age: 70
End: 2020-06-05
Payer: COMMERCIAL

## 2020-06-05 VITALS — OXYGEN SATURATION: 100 % | TEMPERATURE: 98.3 F

## 2020-06-05 VITALS
DIASTOLIC BLOOD PRESSURE: 70 MMHG | TEMPERATURE: 98.1 F | RESPIRATION RATE: 16 BRPM | HEART RATE: 84 BPM | OXYGEN SATURATION: 95 % | SYSTOLIC BLOOD PRESSURE: 110 MMHG

## 2020-06-05 DIAGNOSIS — T45.1X5D ADVERSE EFFECT OF ANTINEOPLASTIC AND IMMUNOSUPPRESSIVE DRUGS, SUBSEQUENT ENCOUNTER: ICD-10-CM

## 2020-06-05 DIAGNOSIS — C83.398 DIFFUSE LARGE B-CELL LYMPHOMA OF EXTRANODAL SITE: ICD-10-CM

## 2020-06-05 DIAGNOSIS — C83.398 DIFFUSE LARGE B-CELL LYMPHOMA OF EXTRANODAL SITE: Primary | ICD-10-CM

## 2020-06-05 DIAGNOSIS — T45.1X5A ADVERSE EFFECT OF ANTINEOPLASTIC AND IMMUNOSUPPRESSIVE DRUGS, INITIAL ENCOUNTER: ICD-10-CM

## 2020-06-05 DIAGNOSIS — T45.1X5A ADVERSE EFFECT OF ANTINEOPLASTIC AND IMMUNOSUPPRESSIVE DRUGS, INITIAL ENCOUNTER: Primary | ICD-10-CM

## 2020-06-05 PROCEDURE — 96417 CHEMO IV INFUS EACH ADDL SEQ: CPT

## 2020-06-05 PROCEDURE — 99214 OFFICE O/P EST MOD 30 MIN: CPT | Mod: ZP | Performed by: NURSE PRACTITIONER

## 2020-06-05 PROCEDURE — 96367 TX/PROPH/DG ADDL SEQ IV INF: CPT

## 2020-06-05 PROCEDURE — 96377 APPLICATON ON-BODY INJECTOR: CPT | Mod: 59

## 2020-06-05 PROCEDURE — 40000556 ZZH STATISTIC PERIPHERAL IV START W US GUIDANCE: Mod: ZF

## 2020-06-05 PROCEDURE — 25800030 ZZH RX IP 258 OP 636: Mod: ZF | Performed by: NURSE PRACTITIONER

## 2020-06-05 PROCEDURE — 25000128 H RX IP 250 OP 636: Mod: ZF | Performed by: NURSE PRACTITIONER

## 2020-06-05 PROCEDURE — 25000132 ZZH RX MED GY IP 250 OP 250 PS 637: Mod: ZF | Performed by: NURSE PRACTITIONER

## 2020-06-05 PROCEDURE — 96375 TX/PRO/DX INJ NEW DRUG ADDON: CPT

## 2020-06-05 PROCEDURE — 96415 CHEMO IV INFUSION ADDL HR: CPT

## 2020-06-05 PROCEDURE — 96411 CHEMO IV PUSH ADDL DRUG: CPT

## 2020-06-05 PROCEDURE — 85025 COMPLETE CBC W/AUTO DIFF WBC: CPT | Performed by: NURSE PRACTITIONER

## 2020-06-05 PROCEDURE — 96413 CHEMO IV INFUSION 1 HR: CPT

## 2020-06-05 RX ORDER — ALBUTEROL SULFATE 90 UG/1
1-2 AEROSOL, METERED RESPIRATORY (INHALATION)
Status: CANCELLED
Start: 2020-06-05

## 2020-06-05 RX ORDER — LORAZEPAM 2 MG/ML
0.5 INJECTION INTRAMUSCULAR EVERY 4 HOURS PRN
Status: CANCELLED | OUTPATIENT
Start: 2020-06-05

## 2020-06-05 RX ORDER — HEPARIN SODIUM (PORCINE) LOCK FLUSH IV SOLN 100 UNIT/ML 100 UNIT/ML
5 SOLUTION INTRAVENOUS
Status: CANCELLED | OUTPATIENT
Start: 2020-06-05

## 2020-06-05 RX ORDER — EPINEPHRINE 0.3 MG/.3ML
0.3 INJECTION SUBCUTANEOUS EVERY 5 MIN PRN
Status: CANCELLED | OUTPATIENT
Start: 2020-06-05

## 2020-06-05 RX ORDER — ALBUTEROL SULFATE 0.83 MG/ML
2.5 SOLUTION RESPIRATORY (INHALATION)
Status: CANCELLED | OUTPATIENT
Start: 2020-06-05

## 2020-06-05 RX ORDER — EPINEPHRINE 1 MG/ML
0.3 INJECTION, SOLUTION INTRAMUSCULAR; SUBCUTANEOUS EVERY 5 MIN PRN
Status: CANCELLED | OUTPATIENT
Start: 2020-06-05

## 2020-06-05 RX ORDER — HEPARIN SODIUM,PORCINE 10 UNIT/ML
5 VIAL (ML) INTRAVENOUS
Status: CANCELLED | OUTPATIENT
Start: 2020-06-05

## 2020-06-05 RX ORDER — DIPHENHYDRAMINE HYDROCHLORIDE 50 MG/ML
50 INJECTION INTRAMUSCULAR; INTRAVENOUS
Status: CANCELLED
Start: 2020-06-05

## 2020-06-05 RX ORDER — MEPERIDINE HYDROCHLORIDE 25 MG/ML
25 INJECTION INTRAMUSCULAR; INTRAVENOUS; SUBCUTANEOUS
Status: CANCELLED
Start: 2020-06-05

## 2020-06-05 RX ORDER — ACETAMINOPHEN 325 MG/1
650 TABLET ORAL ONCE
Status: CANCELLED | OUTPATIENT
Start: 2020-06-05

## 2020-06-05 RX ORDER — PALONOSETRON 0.05 MG/ML
0.25 INJECTION, SOLUTION INTRAVENOUS ONCE
Status: CANCELLED | OUTPATIENT
Start: 2020-06-05

## 2020-06-05 RX ORDER — DOXORUBICIN HYDROCHLORIDE 2 MG/ML
50 INJECTION, SOLUTION INTRAVENOUS ONCE
Status: COMPLETED | OUTPATIENT
Start: 2020-06-05 | End: 2020-06-05

## 2020-06-05 RX ORDER — DOXORUBICIN HYDROCHLORIDE 2 MG/ML
50 INJECTION, SOLUTION INTRAVENOUS ONCE
Status: CANCELLED | OUTPATIENT
Start: 2020-06-05

## 2020-06-05 RX ORDER — MEPERIDINE HYDROCHLORIDE 25 MG/ML
25 INJECTION INTRAMUSCULAR; INTRAVENOUS; SUBCUTANEOUS EVERY 30 MIN PRN
Status: CANCELLED | OUTPATIENT
Start: 2020-06-05

## 2020-06-05 RX ORDER — METHYLPREDNISOLONE SODIUM SUCCINATE 125 MG/2ML
125 INJECTION, POWDER, LYOPHILIZED, FOR SOLUTION INTRAMUSCULAR; INTRAVENOUS
Status: CANCELLED
Start: 2020-06-05

## 2020-06-05 RX ORDER — PALONOSETRON 0.05 MG/ML
0.25 INJECTION, SOLUTION INTRAVENOUS ONCE
Status: COMPLETED | OUTPATIENT
Start: 2020-06-05 | End: 2020-06-05

## 2020-06-05 RX ORDER — NALOXONE HYDROCHLORIDE 0.4 MG/ML
.1-.4 INJECTION, SOLUTION INTRAMUSCULAR; INTRAVENOUS; SUBCUTANEOUS
Status: CANCELLED | OUTPATIENT
Start: 2020-06-05

## 2020-06-05 RX ORDER — DIPHENHYDRAMINE HCL 25 MG
50 CAPSULE ORAL ONCE
Status: COMPLETED | OUTPATIENT
Start: 2020-06-05 | End: 2020-06-05

## 2020-06-05 RX ORDER — SODIUM CHLORIDE 9 MG/ML
1000 INJECTION, SOLUTION INTRAVENOUS CONTINUOUS PRN
Status: CANCELLED
Start: 2020-06-05

## 2020-06-05 RX ORDER — ACETAMINOPHEN 325 MG/1
650 TABLET ORAL ONCE
Status: COMPLETED | OUTPATIENT
Start: 2020-06-05 | End: 2020-06-05

## 2020-06-05 RX ORDER — PREDNISONE 50 MG/1
100 TABLET ORAL DAILY
Qty: 10 TABLET | Refills: 0 | Status: SHIPPED | OUTPATIENT
Start: 2020-06-05 | End: 2020-06-10

## 2020-06-05 RX ORDER — DIPHENHYDRAMINE HCL 25 MG
50 CAPSULE ORAL ONCE
Status: CANCELLED
Start: 2020-06-05

## 2020-06-05 RX ADMIN — DIPHENHYDRAMINE HYDROCHLORIDE 50 MG: 25 CAPSULE ORAL at 11:02

## 2020-06-05 RX ADMIN — CYCLOPHOSPHAMIDE 1500 MG: 1 INJECTION, POWDER, FOR SOLUTION INTRAVENOUS; ORAL at 12:15

## 2020-06-05 RX ADMIN — RITUXIMAB 700 MG: 10 INJECTION, SOLUTION INTRAVENOUS at 13:15

## 2020-06-05 RX ADMIN — ACETAMINOPHEN 650 MG: 325 TABLET ORAL at 11:02

## 2020-06-05 RX ADMIN — DOXORUBICIN HYDROCHLORIDE 90 MG: 2 INJECTION, SOLUTION INTRAVENOUS at 11:47

## 2020-06-05 RX ADMIN — SODIUM CHLORIDE 150 MG: 9 INJECTION, SOLUTION INTRAVENOUS at 11:15

## 2020-06-05 RX ADMIN — SODIUM CHLORIDE 250 ML: 9 INJECTION, SOLUTION INTRAVENOUS at 11:03

## 2020-06-05 RX ADMIN — VINCRISTINE SULFATE 2 MG: 1 INJECTION, SOLUTION INTRAVENOUS at 12:05

## 2020-06-05 RX ADMIN — PALONOSETRON 0.25 MG: 0.05 INJECTION, SOLUTION INTRAVENOUS at 11:06

## 2020-06-05 RX ADMIN — PEGFILGRASTIM 6 MG: KIT SUBCUTANEOUS at 13:17

## 2020-06-05 ASSESSMENT — PAIN SCALES - GENERAL: PAINLEVEL: NO PAIN (0)

## 2020-06-05 NOTE — LETTER
"    6/5/2020         RE: Chuyita Porter  [address]  [place] [address]        Dear Colleague,    Thank you for referring your patient, Chuyita Porter, to the [facility]. Please see a copy of my visit note below.    Chuyita Porter is a 70 year old female who is being evaluated via a billable telephone visit.      The patient has been notified of following:     \"This telephone visit will be conducted via a call between you and your physician/provider. We have found that certain health care needs can be provided without the need for a physical exam.  This service lets us provide the care you need with a short phone conversation.  If a prescription is necessary we can send it directly to your pharmacy.  If lab work is needed we can place an order for that and you can then stop by our lab to have the test done at a later time.    Telephone visits are billed at different rates depending on your insurance coverage. During this emergency period, for some insurers they may be billed the same as an in-person visit.  Please reach out to your insurance provider with any questions.    If during the course of the call the physician/provider feels a telephone visit is not appropriate, you will not be charged for this service.\"    Patient has given verbal consent for Telephone visit?  Yes    What phone number would you like to be contacted at? [phone]    How would you like to obtain your AVS? Travisharroyer     I have reviewed and updated the patient's allergies and medication list.    Concerns: Patient has been experiencing a temp of 99.3, which has persisted for the entire week.  Currently 99.4 F      Refills: Loratadine        Landry Fields, EMT            I spent 15 minutes with the patient on the phone, but then converted this to an in person visit    BRITNEY Page CNP      Reason for Visit: f/u DLBCL    Oncology HPI:    Chuyita Porter is a 69 year old female with past medical history of " breast cancer (right breast cancer in 2005 and left breast cancer in 2015 followed by Dr. Gallardo) with diffuse large B cell lymphoma. She presented with SOB and pressure on her neck/chest. She was found to have mediastinal mass and FNA biopsy on 1/30/20 showed DLBCL. She was initially managed with oral steroids and then was taken to the OR on 2/17 for open neck biopsy, tracheal stent, and PEG tube placement however in the OR decision was made to intubate patient due to concern of threatened airway. She was then admitted and started two fractions of radiation on 2/18 and 2/19 and then started R-CHOP on 2/21. CT chest on 2/25 showed decrease size of mass. Complications of neutropenic fever due to HCAP treated with vanco + cefepime --> zosyn and completed treatment with Levaquin. She was extubated on 2/26. She was discharged to rehab due to debility and severe malnutrition. She was discharged on TFs. She started cycle 2 of R-CHOP on 3/13/20. Imaging after 2 cycles showed CR. She began cycle 3 on 4/3/20, cycle 4 on 4/24/20, cycle 5 on 5/15/20. She is being evaluated prior to cycle 6 today.    Interval history:   Chuyita notes that she has had a low grade fever, on and off, for about a week. Temp max has been in the 99 range. Not feeling ill. No cough, sob, cp, palpitation, sore throat, congestion. Bowels a regulated well with the use of miralax in the first week after chemotherapy. Urination wnl. No hematuria, frequency, urgency, dysuria. No headaches, vision changes. No mouth sores. No neuropathy. Denies N/V. Energy is good. Appetite is good. Feels ready to proceed with treatment today.    Current Outpatient Medications   Medication Sig Dispense Refill     acetaminophen (TYLENOL) 325 MG tablet Take 2 tablets (650 mg) by mouth every 4 hours as needed for mild pain or fever       loratadine (CLARITIN) 10 MG tablet Take 1 tablet (10 mg) by mouth daily 30 tablet 3     LORazepam (ATIVAN) 0.5 MG tablet Take 1 tablet (0.5  mg) by mouth every 4 hours as needed (Anxiety, Nausea/Vomiting or Sleep) 30 tablet 5     ondansetron (ZOFRAN) 4 MG tablet Take 1 tablet (4 mg) by mouth daily as needed for nausea 60 tablet 0     prochlorperazine (COMPAZINE) 10 MG tablet Take 0.5 tablets (5 mg) by mouth every 6 hours as needed (Nausea/Vomiting) 30 tablet 7        No Known Allergies      Exam: alert, appears well. T 98.3, O2 sat 100% on RA  There were no vitals taken for this visit.  Wt Readings from Last 4 Encounters:   05/13/20 75.5 kg (166 lb 8 oz)   04/24/20 75.8 kg (167 lb 3.2 oz)   04/03/20 74.7 kg (164 lb 9.6 oz)   03/13/20 74.5 kg (164 lb 4.8 oz)   Oropharynx is moist and without lesion. Neck supple and without adenopathy. Scalp shows alopecia.  Lungs:CTA. Heart: RRR, no murmur or rub. Abdomen: soft, nontender, BS active, no masses or organomegaly. G tube site is well healed, nontender.  Extremities: warm, no edema. Speech is clear. CN wnl. Gait/station wnl. Skin: no rash or bruising noted on exposed skin.  Nodes: no palpable neck, supraclavicular or axillae nodes palpable.      Labs: Reviewed her labs from Care Everywhere. Chemistries are wnl. LFTs normal, except slightly elevated ALK phos, Hemogram shows WBC 6.1, ANC 4.0, hgb 10.2, platelet 555    Imaging: n/a    Impression/plan:   DLBCL with mediastinal mass compressing the SVC with associated collateral vessels, no disease in the abdomen/pelvis, marrow not done. She initiated treatment while inpatient and had 2 fractions of radiation. A PET/CT after 2 cycles showed a ND. PET/CT on 4/2/20 showed a CR.  -she is afebrile today. Has no clinical findings suggestive of infection. Counts are stable. She tolerates treatment well.  OK to proceed with cycle 6.   -will request f/u with Dr. Sprague in a month with another PET/CT to assess her response and discuss a plan for surveillance     Constipation s/t vincristine  -managing well with use of miralax on the week after her infusion    Pulm: was  noted to have a RLL infiltrate on PET/CT from April, repeat Chest CT in May showed the RLL consolidation had resolved, but had a new RUL GGO.   -I don't think we need to move up imaging at this time. Even though she had low grade fevers this week, she is afebrile today and has no new clinical findings or symptoms to suggest a worsening process  - Plan to f/u with PET/CT that is being scheduled           Again, thank you for allowing me to participate in the care of your patient.        Sincerely,        BRITNEY Page CNP

## 2020-06-05 NOTE — PROGRESS NOTES
Infusion Nursing Note:  Chuyita Porter presents today for C6 D1 Rituxan (rapid), Adriamycin, Vincristine, Cytoxan, Prednisone + Neulasta On-Pro.    Patient seen by provider today: Yes: Karen Yu NP   present during visit today: Not Applicable.    Note: Per MARTHA Yu NP/Kayla Erickson, RN 6/5/20, SHANTELL heard patient cough once during telephone visit and would like staff RN to call SHANTELL over to infusion to assess lung sounds once pt arrives. She has been having low-grade fevers all week in the 99's. Pt denies chills or SOB.    Per BINH Yu NP/Sara Pittman RN @[facility] 6/5/20:  - Patient looks good and lung sounds clear; temp normal  - Okay to proceed with chemo today  - Unclear what low-grade fevers from so we should continue to treat her as a PUI today    Pt updated with plan. Informed patient to call triage if temperature reaches 100.4 or higher or with any other infectious S/S. Pt verbalized understanding.    Intravenous Access:  Peripheral IV placed by vascular access.    Treatment Conditions:  Lab Results   Component Value Date    HGB 11.0 04/03/2020     Lab Results   Component Value Date    WBC 6.3 04/03/2020      Lab Results   Component Value Date    ANEU 4.4 04/03/2020     Lab Results   Component Value Date     04/03/2020      Lab Results   Component Value Date     04/03/2020                   Lab Results   Component Value Date    POTASSIUM 3.4 04/03/2020           Lab Results   Component Value Date    MAG 2.5 03/06/2020            Lab Results   Component Value Date    CR 0.63 04/03/2020                   Lab Results   Component Value Date    GABI 9.2 04/03/2020                Lab Results   Component Value Date    BILITOTAL 0.4 04/03/2020           Lab Results   Component Value Date    ALBUMIN 3.4 04/03/2020                    Lab Results   Component Value Date    ALT 24 04/03/2020           Lab Results   Component Value Date    AST 22 04/03/2020       Results reviewed,  labs MET treatment parameters, ok to proceed with treatment.  ECHO/MUGA completed 2/19/20  EF 60-65%.      Post Infusion Assessment:  Patient tolerated infusion without incident.  Blood return noted pre and post infusion.  Blood return noted during Adriamycin/Vincristine administration every 2-3 cc.  Site patent and intact, free from redness, edema or discomfort.  No evidence of extravasations.  Access discontinued per protocol.     Neulasta Onpro On-Body injector applied to LUE at 1320 with light facing up.  Writer discussed Neulasta injection would start tomorrow 6/6/20 at 1620, approximately 27 hours after application applied today.  Written and Verbal instruction reviewed with patient.  Pt instructed when the dose delivery starts, it will take about 45 minutes to complete.  Pt aware Neulasta Onpro On-Body should have green flashing light and to call triage or on-call MD if injector flashes red or appears to be leaking. Pt aware to keep Onpro On-Body Neulasta 4 inches away from electrical equipment and to avoid showering 4 hours prior to injection.   Neulasta Onpro Lot number: [id number]      Discharge Plan:   Prescription refills given for Prednisone.  Discharge instructions reviewed with: Patient.  Patient and/or family verbalized understanding of discharge instructions and all questions answered.  AVS to patient via [id number].  PET/CT and follow-up being scheduled per Karen Yu NP's note from today.  Patient discharged in stable condition accompanied by: self/.  Departure Mode: Wheelchair.    Sara Pittman RN

## 2020-06-05 NOTE — PROGRESS NOTES
"Chuyita Porter is a 70 year old female who is being evaluated via a billable telephone visit.      The patient has been notified of following:     \"This telephone visit will be conducted via a call between you and your physician/provider. We have found that certain health care needs can be provided without the need for a physical exam.  This service lets us provide the care you need with a short phone conversation.  If a prescription is necessary we can send it directly to your pharmacy.  If lab work is needed we can place an order for that and you can then stop by our lab to have the test done at a later time.    Telephone visits are billed at different rates depending on your insurance coverage. During this emergency period, for some insurers they may be billed the same as an in-person visit.  Please reach out to your insurance provider with any questions.    If during the course of the call the physician/provider feels a telephone visit is not appropriate, you will not be charged for this service.\"    Patient has given verbal consent for Telephone visit?  Yes    What phone number would you like to be contacted at? [phone]    How would you like to obtain your AVS? MyChart     I have reviewed and updated the patient's allergies and medication list.    Concerns: Patient has been experiencing a temp of 99.3, which has persisted for the entire week.  Currently 99.4 F      Refills: Loratadine        Landry Fields, EMT            I spent 15 minutes with the patient on the phone, but then converted this to an in person visit    BRITNEY Page CNP      Reason for Visit: f/u DLBCL    Oncology HPI:    Chuyita Porter is a 69 year old female with past medical history of breast cancer (right breast cancer in 2005 and left breast cancer in 2015 followed by Dr. Gallardo) with diffuse large B cell lymphoma. She presented with SOB and pressure on her neck/chest. She was found to have mediastinal mass and FNA biopsy on " 1/30/20 showed DLBCL. She was initially managed with oral steroids and then was taken to the OR on 2/17 for open neck biopsy, tracheal stent, and PEG tube placement however in the OR decision was made to intubate patient due to concern of threatened airway. She was then admitted and started two fractions of radiation on 2/18 and 2/19 and then started R-CHOP on 2/21. CT chest on 2/25 showed decrease size of mass. Complications of neutropenic fever due to HCAP treated with vanco + cefepime --> zosyn and completed treatment with Levaquin. She was extubated on 2/26. She was discharged to rehab due to debility and severe malnutrition. She was discharged on TFs. She started cycle 2 of R-CHOP on 3/13/20. Imaging after 2 cycles showed CR. She began cycle 3 on 4/3/20, cycle 4 on 4/24/20, cycle 5 on 5/15/20. She is being evaluated prior to cycle 6 today.    Interval history:   Chuyita notes that she has had a low grade fever, on and off, for about a week. Temp max has been in the 99 range. Not feeling ill. No cough, sob, cp, palpitation, sore throat, congestion. Bowels a regulated well with the use of miralax in the first week after chemotherapy. Urination wnl. No hematuria, frequency, urgency, dysuria. No headaches, vision changes. No mouth sores. No neuropathy. Denies N/V. Energy is good. Appetite is good. Feels ready to proceed with treatment today.    Current Outpatient Medications   Medication Sig Dispense Refill     acetaminophen (TYLENOL) 325 MG tablet Take 2 tablets (650 mg) by mouth every 4 hours as needed for mild pain or fever       loratadine (CLARITIN) 10 MG tablet Take 1 tablet (10 mg) by mouth daily 30 tablet 3     LORazepam (ATIVAN) 0.5 MG tablet Take 1 tablet (0.5 mg) by mouth every 4 hours as needed (Anxiety, Nausea/Vomiting or Sleep) 30 tablet 5     ondansetron (ZOFRAN) 4 MG tablet Take 1 tablet (4 mg) by mouth daily as needed for nausea 60 tablet 0     prochlorperazine (COMPAZINE) 10 MG tablet Take 0.5  tablets (5 mg) by mouth every 6 hours as needed (Nausea/Vomiting) 30 tablet 7        No Known Allergies      Exam: alert, appears well. T 98.3, O2 sat 100% on RA  There were no vitals taken for this visit.  Wt Readings from Last 4 Encounters:   05/13/20 75.5 kg (166 lb 8 oz)   04/24/20 75.8 kg (167 lb 3.2 oz)   04/03/20 74.7 kg (164 lb 9.6 oz)   03/13/20 74.5 kg (164 lb 4.8 oz)   Oropharynx is moist and without lesion. Neck supple and without adenopathy. Scalp shows alopecia.  Lungs:CTA. Heart: RRR, no murmur or rub. Abdomen: soft, nontender, BS active, no masses or organomegaly. G tube site is well healed, nontender.  Extremities: warm, no edema. Speech is clear. CN wnl. Gait/station wnl. Skin: no rash or bruising noted on exposed skin.  Nodes: no palpable neck, supraclavicular or axillae nodes palpable.      Labs: Reviewed her labs from Care Everywhere. Chemistries are wnl. LFTs normal, except slightly elevated ALK phos, Hemogram shows WBC 6.1, ANC 4.0, hgb 10.2, platelet 555    Imaging: n/a    Impression/plan:   DLBCL with mediastinal mass compressing the SVC with associated collateral vessels, no disease in the abdomen/pelvis, marrow not done. She initiated treatment while inpatient and had 2 fractions of radiation. A PET/CT after 2 cycles showed a MN. PET/CT on 4/2/20 showed a CR.  -she is afebrile today. Has no clinical findings suggestive of infection. Counts are stable. She tolerates treatment well.  OK to proceed with cycle 6.   -will request f/u with Dr. Sprague in a month with another PET/CT to assess her response and discuss a plan for surveillance     Constipation s/t vincristine  -managing well with use of miralax on the week after her infusion    Pulm: was noted to have a RLL infiltrate on PET/CT from April, repeat Chest CT in May showed the RLL consolidation had resolved, but had a new RUL GGO.   -I don't think we need to move up imaging at this time. Even though she had low grade fevers this week,  she is afebrile today and has no new clinical findings or symptoms to suggest a worsening process  - Plan to f/u with PET/CT that is being scheduled

## 2020-06-07 DIAGNOSIS — C83.30 DIFFUSE LARGE B-CELL LYMPHOMA, UNSPECIFIED BODY REGION (H): ICD-10-CM

## 2020-06-08 RX ORDER — LORATADINE 10 MG/1
TABLET ORAL
Qty: 90 TABLET | Refills: 1 | OUTPATIENT
Start: 2020-06-08

## 2020-06-22 ENCOUNTER — OFFICE VISIT - HEALTHEAST (OUTPATIENT)
Dept: FAMILY MEDICINE | Facility: CLINIC | Age: 70
End: 2020-06-22

## 2020-06-22 ENCOUNTER — TELEPHONE (OUTPATIENT)
Dept: ONCOLOGY | Facility: CLINIC | Age: 70
End: 2020-06-22

## 2020-06-22 DIAGNOSIS — R05.9 COUGH: ICD-10-CM

## 2020-06-22 DIAGNOSIS — R00.0 TACHYCARDIA: ICD-10-CM

## 2020-06-22 DIAGNOSIS — R50.9 FEVER, UNSPECIFIED FEVER CAUSE: ICD-10-CM

## 2020-06-22 DIAGNOSIS — C83.398 DIFFUSE LARGE B-CELL LYMPHOMA OF EXTRANODAL SITE: ICD-10-CM

## 2020-06-22 LAB
BASOPHILS # BLD AUTO: 0.2 THOU/UL (ref 0–0.2)
BASOPHILS NFR BLD AUTO: 3 % (ref 0–2)
EOSINOPHIL COUNT (ABSOLUTE): 0.1 THOU/UL (ref 0–0.4)
EOSINOPHIL NFR BLD AUTO: 1 % (ref 0–6)
ERYTHROCYTE [DISTWIDTH] IN BLOOD BY AUTOMATED COUNT: 14 % (ref 11–14.5)
HCT VFR BLD AUTO: 30.7 % (ref 35–47)
HGB BLD-MCNC: 10 G/DL (ref 12–16)
LYMPHOCYTES # BLD AUTO: 0.4 THOU/UL (ref 0.8–4.4)
LYMPHOCYTES NFR BLD AUTO: 7 % (ref 20–40)
MCH RBC QN AUTO: 32.2 PG (ref 27–34)
MCHC RBC AUTO-ENTMCNC: 32.6 G/DL (ref 32–36)
MCV RBC AUTO: 99 FL (ref 80–100)
METAMYELOCYTES (ABSOLUTE): 0 THOU/UL
METAMYELOCYTES NFR BLD MANUAL: 1 %
MONOCYTES # BLD AUTO: 1.4 THOU/UL (ref 0–0.9)
MONOCYTES NFR BLD AUTO: 23 % (ref 2–10)
PLAT MORPH BLD: NORMAL
PLATELET # BLD AUTO: 479 THOU/UL (ref 140–440)
PMV BLD AUTO: 9.5 FL (ref 8.5–12.5)
RBC # BLD AUTO: 3.11 MILL/UL (ref 3.8–5.4)
TOTAL NEUTROPHILS-ABS(DIFF): 4.2 THOU/UL (ref 2–7.7)
TOTAL NEUTROPHILS-REL(DIFF): 67 % (ref 50–70)
TOXIC GRANULATION: ABNORMAL
WBC: 6.3 THOU/UL (ref 4–11)

## 2020-06-22 NOTE — TELEPHONE ENCOUNTER
"Pt calling stating that she has been running temps in the 99s stating 6/11, notes this is typical for her post chemo. Pt received C6 D1 Rituxan (rapid), Adriamycin, Vincristine, Cytoxan, Prednisone + Neulasta On-Pro on 6/5.    Pt CO developing a productive cough At bedtime on Saturday 6/20. On Sunday Pt used cough drops and cough medicine (mucinex congtesion and cough). Pt states cough \"comes and goes\" and will have coughing fits about q 30 minutes. Wondering if this is a concern and how to precede.  Spoke With Priscilla Bundy. Given Hx of pneumonia Pt should be seen and evaluated today in ED, no clinic availability. Called and relayed information to Pt, who verbalized understanding. Pt prefers to attempt to see a walk-in clinic in [facility]. I emphasized that she should be seen today, and to call back if I can assist with routing to a ED.  "

## 2020-06-23 ENCOUNTER — AMBULATORY - HEALTHEAST (OUTPATIENT)
Dept: FAMILY MEDICINE | Facility: CLINIC | Age: 70
End: 2020-06-23

## 2020-06-23 DIAGNOSIS — R50.9 FEVER, UNSPECIFIED FEVER CAUSE: ICD-10-CM

## 2020-06-23 DIAGNOSIS — R05.9 COUGH: ICD-10-CM

## 2020-06-23 DIAGNOSIS — C83.398 DIFFUSE LARGE B-CELL LYMPHOMA OF EXTRANODAL SITE: ICD-10-CM

## 2020-06-25 ENCOUNTER — COMMUNICATION - HEALTHEAST (OUTPATIENT)
Dept: FAMILY MEDICINE | Facility: CLINIC | Age: 70
End: 2020-06-25

## 2020-06-26 ENCOUNTER — PATIENT OUTREACH (OUTPATIENT)
Dept: ONCOLOGY | Facility: CLINIC | Age: 70
End: 2020-06-26

## 2020-06-26 ENCOUNTER — TELEPHONE (OUTPATIENT)
Dept: ONCOLOGY | Facility: CLINIC | Age: 70
End: 2020-06-26

## 2020-06-26 DIAGNOSIS — R50.9 FEVER AND CHILLS: ICD-10-CM

## 2020-06-26 DIAGNOSIS — C83.398 DIFFUSE LARGE B-CELL LYMPHOMA OF EXTRANODAL SITE: Primary | ICD-10-CM

## 2020-06-26 NOTE — PROGRESS NOTES
INCOMING CALL: Chuyita SANTIZO for writer requesting callback about her ongoing elevated HR (90s) and cough and temps in the 99 range. She says she was seen in the [facility] urgent care as advised and had covid test and CXR, Labs. HR was 110 in the urgent care clinic.  Has not heard back about the covid result and is wanting to know if we have any additional recommendations for her for these symptoms. [phone]    Routing to [facility] Triage RN

## 2020-06-26 NOTE — TELEPHONE ENCOUNTER
Spoke with patient re sx she is having. Reported them initially on Monday. Was instructed to be seen in ED. Did go to  and had labs, CXR and Covid test done. Labs look consistent with labs from 6/4/20. CXR and Covid test negative. Pt states she feels fine but is still having elevated temp and is tachycardic.  Below are pulse and temp for the past several evenings  6/25 P 94 T 99.4  6/24 P 91 T 99.1  6/23 P 102 T 99.7  6/22 P 100 T 99.6  6/21 P 103 T 100    Her records show that this is similar to what she experienced with last round of chemo as well.     Routed to Magali Bruner PA-C    2:08 PM  Per Magali, to get CT chest. Mon/tues is ok. Monitor sx this weekend. If feels ill or temp >/= 100.4 to ED for evaluation. CT order was placed. Msg sent to scheduling    LM to call back for above information on [place] phone. Called to pt and relayed information. Pt states understanding.

## 2020-06-29 ENCOUNTER — ANCILLARY PROCEDURE (OUTPATIENT)
Dept: CT IMAGING | Facility: CLINIC | Age: 70
End: 2020-06-29
Attending: PHYSICIAN ASSISTANT
Payer: COMMERCIAL

## 2020-06-29 DIAGNOSIS — C83.398 DIFFUSE LARGE B-CELL LYMPHOMA OF EXTRANODAL SITE: ICD-10-CM

## 2020-06-29 DIAGNOSIS — R50.9 FEVER AND CHILLS: ICD-10-CM

## 2020-07-01 ENCOUNTER — PATIENT OUTREACH (OUTPATIENT)
Dept: ONCOLOGY | Facility: CLINIC | Age: 70
End: 2020-07-01

## 2020-07-01 DIAGNOSIS — J18.9 ATYPICAL PNEUMONIA: Primary | ICD-10-CM

## 2020-07-01 NOTE — PROGRESS NOTES
OUTGOING CALL: to Chuyita to notify her that Magali has reviewed the CT chest report and that there are persistent groundglass opacities which could mean infection or inflammation. I explained that Magali has made a referral to pulmonary for consult and also asked Dr Sprague to weigh in with any additional recommendations. I explained that she will be contacted by a  for the pulm consult and that she is to let us know if any of her sx worsen in the interim. She states she is currently stilll having the same sx (temps around 99 and cough) but also is feeling stronger every day.  Pt voiced understanding of above instructions and information and denied further questions  IB to [facility] to help facilitate the pulm appt    Brandi Hood, RN  RN Care Coordinator  [facility] [address] [address] [phone]

## 2020-07-02 ENCOUNTER — PATIENT OUTREACH (OUTPATIENT)
Dept: ONCOLOGY | Facility: CLINIC | Age: 70
End: 2020-07-02

## 2020-07-02 DIAGNOSIS — T45.1X5A ANTINEOPLASTIC ANTIBIOTICS CAUSING ADVERSE EFFECT IN THERAPEUTIC USE: ICD-10-CM

## 2020-07-02 DIAGNOSIS — C83.30 DIFFUSE LARGE B-CELL LYMPHOMA, UNSPECIFIED BODY REGION (H): ICD-10-CM

## 2020-07-02 DIAGNOSIS — R05.9 COUGH: Primary | ICD-10-CM

## 2020-07-02 DIAGNOSIS — R91.8 GROUND GLASS OPACITY PRESENT ON IMAGING OF LUNG: ICD-10-CM

## 2020-07-02 NOTE — TELEPHONE ENCOUNTER
RECORDS RECEIVED FROM: Internal   DATE RECEIVED: 7.9.2020   NOTES STATUS DETAILS   OFFICE NOTE from referring provider Internal 7.1.20 LU Bruner [facility]    OFFICE NOTE from other specialist Care Everywhere 6.22.20 LIN Michelle  1.22.20 TrLIN harrell  1.10.20 LIN Liang  More in Care everywhere   DISCHARGE SUMMARY from hospital N/A    DISCHARGE REPORT from the ER N/A    MEDICATION LIST Internal    IMAGING  (NEED IMAGES AND REPORTS)     CT SCAN Internal 6.29.20 5.13.20 2.17.20  2.14.20   CHEST XRAY (CXR) In process 2.17.20 6.22.20 HE-requested  12.31.19 HE-requested  12.14.18 HE-requested   TESTS     PULMONARY FUNCTION TESTING (PFT) N/A       Action MJ 7.2.20   Action Taken Requested images from HE CXR 6.22.20 and 12.31.19, 12.14.18     Action MJ 7.9.20 MJ   Action Taken Called HE, spoke with Shayna Ortiz- she is pushing images over.  UPDATE: images pulled

## 2020-07-02 NOTE — PROGRESS NOTES
Received: Yesterday   Message Contents   Rosy Sprague MD Akkerman, Magali Pradhan PA-C; Brandi Hood, MARIKA               Agree Magali   Have her see pulmonary and ID both please.     Let's check IgG level, CMV PCR.     Thanks,   Rosy      OUTGOING CALL to pt: Notified pt and Ranjit of the above recommendations.     We will fax lab orders to Chuyita's local lab  [facility]    [phone] fax number   and she will self-schedule the lab appt.     We will ask ID scheduling to contact her with consult date/time. Pt voiced understanding of above instructions and information and denied further questions    Brandi Hood, RN  RN Care Coordinator  [facility] [address] [address] [phone]

## 2020-07-03 ENCOUNTER — AMBULATORY - HEALTHEAST (OUTPATIENT)
Dept: LAB | Facility: CLINIC | Age: 70
End: 2020-07-03

## 2020-07-03 DIAGNOSIS — C83.398 DIFFUSE LARGE B-CELL LYMPHOMA OF EXTRANODAL SITE: ICD-10-CM

## 2020-07-03 LAB
ALBUMIN SERPL-MCNC: 3.5 G/DL (ref 3.5–5)
ALP SERPL-CCNC: 119 U/L (ref 45–120)
ALT SERPL W P-5'-P-CCNC: 14 U/L (ref 0–45)
ANION GAP SERPL CALCULATED.3IONS-SCNC: 12 MMOL/L (ref 5–18)
AST SERPL W P-5'-P-CCNC: 24 U/L (ref 0–40)
BASOPHILS # BLD AUTO: 0 THOU/UL (ref 0–0.2)
BASOPHILS NFR BLD AUTO: 0 % (ref 0–2)
BILIRUB SERPL-MCNC: 0.2 MG/DL (ref 0–1)
BUN SERPL-MCNC: 10 MG/DL (ref 8–22)
CALCIUM SERPL-MCNC: 9.5 MG/DL (ref 8.5–10.5)
CHLORIDE BLD-SCNC: 101 MMOL/L (ref 98–107)
CO2 SERPL-SCNC: 26 MMOL/L (ref 22–31)
CREAT SERPL-MCNC: 0.7 MG/DL (ref 0.6–1.1)
EOSINOPHIL COUNT (ABSOLUTE): 0.4 THOU/UL (ref 0–0.4)
EOSINOPHIL NFR BLD AUTO: 5 % (ref 0–6)
ERYTHROCYTE [DISTWIDTH] IN BLOOD BY AUTOMATED COUNT: 13.7 % (ref 11–14.5)
GFR SERPL CREATININE-BSD FRML MDRD: >60 ML/MIN/1.73M2
GLUCOSE BLD-MCNC: 84 MG/DL (ref 70–125)
HCT VFR BLD AUTO: 32.6 % (ref 35–47)
HGB BLD-MCNC: 10.3 G/DL (ref 12–16)
LDH SERPL L TO P-CCNC: 248 U/L (ref 125–220)
LYMPHOCYTES # BLD AUTO: 0.4 THOU/UL (ref 0.8–4.4)
LYMPHOCYTES NFR BLD AUTO: 6 % (ref 20–40)
MCH RBC QN AUTO: 31.8 PG (ref 27–34)
MCHC RBC AUTO-ENTMCNC: 31.6 G/DL (ref 32–36)
MCV RBC AUTO: 101 FL (ref 80–100)
MONOCYTES # BLD AUTO: 1.8 THOU/UL (ref 0–0.9)
MONOCYTES NFR BLD AUTO: 26 % (ref 2–10)
PHOSPHATE SERPL-MCNC: 4.4 MG/DL (ref 2.5–4.5)
PLAT MORPH BLD: ABNORMAL
PLATELET # BLD AUTO: 418 THOU/UL (ref 140–440)
PMV BLD AUTO: 9.9 FL (ref 8.5–12.5)
POTASSIUM BLD-SCNC: 3.9 MMOL/L (ref 3.5–5)
PROT SERPL-MCNC: 6.5 G/DL (ref 6–8)
RBC # BLD AUTO: 3.24 MILL/UL (ref 3.8–5.4)
SODIUM SERPL-SCNC: 139 MMOL/L (ref 136–145)
TOTAL NEUTROPHILS-ABS(DIFF): 4.5 THOU/UL (ref 2–7.7)
TOTAL NEUTROPHILS-REL(DIFF): 63 % (ref 50–70)
URATE SERPL-MCNC: 4.3 MG/DL (ref 2–7.5)
WBC: 7.1 THOU/UL (ref 4–11)

## 2020-07-03 NOTE — TELEPHONE ENCOUNTER
RECORDS RECEIVED FROM: Internal - Cough [R05] Diffuse large B-cell lymphoma, unspecified body region (H) [C83.30] Antineoplastic antibiotics causing adverse effect in therapeutic use [T45.1X5A] Ground glass opacity present on imaging of lung [R91.8]   DATE RECEIVED: 07.07.2020   NOTES (Gather within 2 years) STATUS DETAILS   OFFICE NOTE from referring provider   Internal 07.02.2020 Rosy Sprague MD [facility]   OFFICE NOTE from other specialist Care Everywhere    Internal 06.22.2020 Padmini Michelle, [facility]    06.05.2020 Karen Yu, BRITNEY CNP [facility]    04.23.2020 Magali Bruner PA-C [facility]    More in Epic   DISCHARGE SUMMARY from hospital Internal 03.06.2020   DISCHARGE REPORT from the ER N/A    LABS (any labs) Internal / CE    MEDICATION LIST Internal    IMAGING  (NEED IMAGES AND REPORTS)     Osteomyelitis: Foot imaging  N/A    Liver Abscess: Abdominal imaging N/A    Other (anything related to diagnoses Internal

## 2020-07-07 ENCOUNTER — PRE VISIT (OUTPATIENT)
Dept: INFECTIOUS DISEASES | Facility: CLINIC | Age: 70
End: 2020-07-07

## 2020-07-07 ENCOUNTER — RECORDS - HEALTHEAST (OUTPATIENT)
Dept: ADMINISTRATIVE | Facility: OTHER | Age: 70
End: 2020-07-07

## 2020-07-07 ENCOUNTER — VIRTUAL VISIT (OUTPATIENT)
Dept: INFECTIOUS DISEASES | Facility: CLINIC | Age: 70
End: 2020-07-07
Attending: INTERNAL MEDICINE
Payer: MEDICARE

## 2020-07-07 DIAGNOSIS — C83.30 DIFFUSE LARGE B-CELL LYMPHOMA, UNSPECIFIED BODY REGION (H): Primary | ICD-10-CM

## 2020-07-07 DIAGNOSIS — R91.8 GROUND GLASS OPACITY PRESENT ON IMAGING OF LUNG: ICD-10-CM

## 2020-07-07 DIAGNOSIS — R05.9 COUGH: ICD-10-CM

## 2020-07-07 NOTE — LETTER
"7/7/2020       RE: Chuyita Porter  [address]  [place] [facility] [address]     Dear Colleague,    Thank you for referring your patient, Chuyita Porter, to the [facility] AND INFECTIOUS DISEASES at [facility]. Please see a copy of my visit note below.    Chuyita Porter is a 70 year old female who is being evaluated via a billable telephone visit.      The patient has been notified of following:     \"This telephone visit will be conducted via a call between you and your physician/provider. We have found that certain health care needs can be provided without the need for a physical exam.  This service lets us provide the care you need with a short phone conversation.  If a prescription is necessary we can send it directly to your pharmacy.  If lab work is needed we can place an order for that and you can then stop by our lab to have the test done at a later time.    Telephone visits are billed at different rates depending on your insurance coverage. During this emergency period, for some insurers they may be billed the same as an in-person visit.  Please reach out to your insurance provider with any questions.    If during the course of the call the physician/provider feels a telephone visit is not appropriate, you will not be charged for this service.\"    Patient has given verbal consent for Telephone visit?  Yes    What phone number would you like to be contacted at? home    How would you like to obtain your AVS? Mail a copy    Phone call duration: 20 minutes    The patient is a 70 year old female with a hx of diffuse large B cell lymphoma who was referred for evaluation of a cough and ground glass opacity on her chest CT. She was diagnosed with lymphoma in Feb and has had Rituxan, vincristine, adriamycin, prednisone as well as XRT. She has had a cough and elevated temps since mid June. She says her cough is mostly in the morning with some clear sputum. She had " some elevated temps into the 99s. She says she feels generally well. No nausea or vomiting. She denies sob.     The patient was born in and grew up in [place]. She has not had international travel and has not left [place] since before [place]. She has been social isolating except for there doctor's appointments. She has been to [place] 5 years ago. No hx of TB or exposure.     Speaks in complete sentences. No gasping for breath or coughing.     Lab Results   Component Value Date    WBC 6.3 04/03/2020     Lab Results   Component Value Date    RBC 3.50 04/03/2020     Lab Results   Component Value Date    HGB 11.0 04/03/2020     Lab Results   Component Value Date    HCT 33.8 04/03/2020     No components found for: MCT  Lab Results   Component Value Date    MCV 97 04/03/2020     Lab Results   Component Value Date    MCH 31.4 04/03/2020     Lab Results   Component Value Date    MCHC 32.5 04/03/2020     Lab Results   Component Value Date    RDW 17.2 04/03/2020     Lab Results   Component Value Date     04/03/2020     Last ALC was 500.     IMPRESSION:  1. Persistent faint groundglass opacities of the right upper lobe,  likely infectious versus inflammatory, atypical infection is a  consideration.  2. Slightly reduced size of the right paratracheal soft tissue mass.  3. Unchanged subcentimeter left hepatic lobe hypodensity.  4. No interval change in nodular opacities in the lungs.    Patient is a 70 year old with Lymphoma who I am evaluating for cough and GGO in her RUL.    It sounds sub-acute to chronic. I would like to get some sputum for culture. I would also like to check fungal labs for histoplasma/blastomyces/coccidioides.    -sputum culture and gram stain  -histoplama urine antigen  -blastomyces, coccidiodes, histoplasma blood antigens.    Follow up in 3-4 weeks.        Priscilla Pretty MD

## 2020-07-08 ENCOUNTER — PATIENT OUTREACH (OUTPATIENT)
Dept: ONCOLOGY | Facility: CLINIC | Age: 70
End: 2020-07-08

## 2020-07-08 NOTE — PROGRESS NOTES
Writer maeve RIGGINS from pt asking for me to help with lab orders from ID (had consult with Dr Pretty yesterday). States she has lab appt on Friday at [facility]. MyChart message to pt to pls contact ID office to check status of orders. No orders or consult note in chart yet.

## 2020-07-09 ENCOUNTER — RECORDS - HEALTHEAST (OUTPATIENT)
Dept: ADMINISTRATIVE | Facility: OTHER | Age: 70
End: 2020-07-09

## 2020-07-09 ENCOUNTER — PRE VISIT (OUTPATIENT)
Dept: PULMONOLOGY | Facility: CLINIC | Age: 70
End: 2020-07-09

## 2020-07-09 ENCOUNTER — VIRTUAL VISIT (OUTPATIENT)
Dept: PULMONOLOGY | Facility: CLINIC | Age: 70
End: 2020-07-09
Attending: INTERNAL MEDICINE
Payer: COMMERCIAL

## 2020-07-09 DIAGNOSIS — J18.9 ATYPICAL PNEUMONIA: ICD-10-CM

## 2020-07-09 DIAGNOSIS — J30.0 VASOMOTOR RHINITIS: Primary | ICD-10-CM

## 2020-07-09 RX ORDER — FLUTICASONE PROPIONATE 50 MCG
1 SPRAY, SUSPENSION (ML) NASAL DAILY
Qty: 30 ML | Refills: 3 | Status: SHIPPED | OUTPATIENT
Start: 2020-07-09 | End: 2020-09-09

## 2020-07-09 NOTE — LETTER
"7/9/2020      RE: Chuyita Porter  [address]  [place] [address]       Chuyita Porter is a 70 year old female who is being evaluated via a billable telephone visit.      The patient has been notified of following:     \"This telephone visit will be conducted via a call between you and your physician/provider. We have found that certain health care needs can be provided without the need for a physical exam.  This service lets us provide the care you need with a short phone conversation.  If a prescription is necessary we can send it directly to your pharmacy.  If lab work is needed we can place an order for that and you can then stop by our lab to have the test done at a later time.    Telephone visits are billed at different rates depending on your insurance coverage. During this emergency period, for some insurers they may be billed the same as an in-person visit.  Please reach out to your insurance provider with any questions.    If during the course of the call the physician/provider feels a telephone visit is not appropriate, you will not be charged for this service.\"    Patient has given verbal consent for Telephone visit?  Yes    What phone number would you like to be contacted at?     How would you like to obtain your AVS?     71 YO female with history of DLBCL is referred to the Pulmonary clinic for evaluation of an abnormal chest CT and fever with cough.  Diagnosed in 2-20 and underwent bronchoscopy requiring intubation during time of biopsy.  Subsequently has been treated with Rituxan, Vincristine,  Adriamycin and prednisone.  Treated with XRT x 2.  CT scans have shown bilateral upper lobe, now just RUL, GGO.  Has tested COVID negative, no circulating eosinophils.  Fever started on 6-11 with temps in the 99 range, now fever has resolved since the first part of July.  Cough started 6-20, mostly in am when it is productive of clear, thin to thick secretions; takes 3-4 coughs to clear.  After that " episode does not have frequent cough during the day. Will start to cough if laughs.  No clear triggers for cough; no SOB or JAMES.  Has chronic issue with allergies- is on zyrtec, does not know etiology for allergies, no clear seasonal variation.  Cough is worse when lays down in bed. During day denies increased nasal drainage, but is chronically present, but denies post-nasal drip.  History of pneumonia in 2017, not hospitalized.      FH: Reviewed with patient.  Breast cancer- 2 aunts      Assessment and Plan:  69 YO with DLBCL with recent fevers and new cough.  Her cough seems most consistent with sinus disease/rhinitis without evidence for sinus infection.  Findings of GGO in RUL on CT is not acute in nature- findings may represent an infectious process (viral, PJP), focal radiation pneumonitis, eosinophilic lung disease related to lymphoma or drug toxicity.  With lack of symptoms of SOB/JAMES, only intermittent cough mostly in the morning, and lack of progression on CT I do not suspect that an infection or serious pathology is present. Will add Flonase to regimen. Will follow-up PET-CT in one month- If CT is worse would proceed with bronchoscopy with BAL after checking full respiratory viral panel.    1. Start Flonase and continue zyrtec  2. Will review CT-PET on 7-20    RTC in one month.  Advised to contact the clinic with any questions or an increase in symptoms       Phone call duration: 22 minutes          Rasheed Mirza MD

## 2020-07-09 NOTE — PROGRESS NOTES
"Chuyita Porter is a 70 year old female who is being evaluated via a billable telephone visit.      The patient has been notified of following:     \"This telephone visit will be conducted via a call between you and your physician/provider. We have found that certain health care needs can be provided without the need for a physical exam.  This service lets us provide the care you need with a short phone conversation.  If a prescription is necessary we can send it directly to your pharmacy.  If lab work is needed we can place an order for that and you can then stop by our lab to have the test done at a later time.    Telephone visits are billed at different rates depending on your insurance coverage. During this emergency period, for some insurers they may be billed the same as an in-person visit.  Please reach out to your insurance provider with any questions.    If during the course of the call the physician/provider feels a telephone visit is not appropriate, you will not be charged for this service.\"    Patient has given verbal consent for Telephone visit?  Yes    What phone number would you like to be contacted at?     How would you like to obtain your AVS?     69 YO female with history of DLBCL is referred to the Pulmonary clinic for evaluation of an abnormal chest CT and fever with cough.  Diagnosed in 2-20 and underwent bronchoscopy requiring intubation during time of biopsy.  Subsequently has been treated with Rituxan, Vincristine,  Adriamycin and prednisone.  Treated with XRT x 2.  CT scans have shown bilateral upper lobe, now just RUL, GGO.  Has tested COVID negative, no circulating eosinophils.  Fever started on 6-11 with temps in the 99 range, now fever has resolved since the first part of July.  Cough started 6-20, mostly in am when it is productive of clear, thin to thick secretions; takes 3-4 coughs to clear.  After that episode does not have frequent cough during the day. Will start to cough if " laughs.  No clear triggers for cough; no SOB or JAMES.  Has chronic issue with allergies- is on zyrtec, does not know etiology for allergies, no clear seasonal variation.  Cough is worse when lays down in bed. During day denies increased nasal drainage, but is chronically present, but denies post-nasal drip.  History of pneumonia in 2017, not hospitalized.      FH: Reviewed with patient.  Breast cancer- 2 aunts      Assessment and Plan:  71 YO with DLBCL with recent fevers and new cough.  Her cough seems most consistent with sinus disease/rhinitis without evidence for sinus infection.  Findings of GGO in RUL on CT is not acute in nature- findings may represent an infectious process (viral, PJP), focal radiation pneumonitis, eosinophilic lung disease related to lymphoma or drug toxicity.  With lack of symptoms of SOB/JAMES, only intermittent cough mostly in the morning, and lack of progression on CT I do not suspect that an infection or serious pathology is present. Will add Flonase to regimen. Will follow-up PET-CT in one month- If CT is worse would proceed with bronchoscopy with BAL after checking full respiratory viral panel.    1. Start Flonase and continue zyrtec  2. Will review CT-PET on 7-20    RTC in one month.  Advised to contact the clinic with any questions or an increase in symptoms       Phone call duration: 22 minutes

## 2020-07-09 NOTE — LETTER
"    7/9/2020         RE: Chuyita Porter  [address]  [facility] [address]        Dear Colleague,    Thank you for referring your patient, Chuyita Porter, to the [facility]. Please see a copy of my visit note below.    Chuyita Porter is a 70 year old female who is being evaluated via a billable telephone visit.      The patient has been notified of following:     \"This telephone visit will be conducted via a call between you and your physician/provider. We have found that certain health care needs can be provided without the need for a physical exam.  This service lets us provide the care you need with a short phone conversation.  If a prescription is necessary we can send it directly to your pharmacy.  If lab work is needed we can place an order for that and you can then stop by our lab to have the test done at a later time.    Telephone visits are billed at different rates depending on your insurance coverage. During this emergency period, for some insurers they may be billed the same as an in-person visit.  Please reach out to your insurance provider with any questions.    If during the course of the call the physician/provider feels a telephone visit is not appropriate, you will not be charged for this service.\"    Patient has given verbal consent for Telephone visit?  Yes    What phone number would you like to be contacted at?     How would you like to obtain your AVS?     69 YO female with history of DLBCL is referred to the Pulmonary clinic for evaluation of an abnormal chest CT and fever with cough.  Diagnosed in 2-20 and underwent bronchoscopy requiring intubation during time of biopsy.  Subsequently has been treated with Rituxan, Vincristine,  Adriamycin and prednisone.  Treated with XRT x 2.  CT scans have shown bilateral upper lobe, now just RUL, GGO.  Has tested COVID negative, no circulating eosinophils.  Fever started on 6-11 with temps in the 99 range, now " fever has resolved since the first part of July.  Cough started 6-20, mostly in am when it is productive of clear, thin to thick secretions; takes 3-4 coughs to clear.  After that episode does not have frequent cough during the day. Will start to cough if laughs.  No clear triggers for cough; no SOB or JAMES.  Has chronic issue with allergies- is on zyrtec, does not know etiology for allergies, no clear seasonal variation.  Cough is worse when lays down in bed. During day denies increased nasal drainage, but is chronically present, but denies post-nasal drip.  History of pneumonia in 2017, not hospitalized.      FH: Reviewed with patient.  Breast cancer- 2 aunts      Assessment and Plan:  71 YO with DLBCL with recent fevers and new cough.  Her cough seems most consistent with sinus disease/rhinitis without evidence for sinus infection.  Findings of GGO in RUL on CT is not acute in nature- findings may represent an infectious process (viral, PJP), focal radiation pneumonitis, eosinophilic lung disease related to lymphoma or drug toxicity.  With lack of symptoms of SOB/JAMES, only intermittent cough mostly in the morning, and lack of progression on CT I do not suspect that an infection or serious pathology is present. Will add Flonase to regimen. Will follow-up PET-CT in one month- If CT is worse would proceed with bronchoscopy with BAL after checking full respiratory viral panel.    1. Start Flonase and continue zyrtec  2. Will review CT-PET on 7-20    RTC in one month.  Advised to contact the clinic with any questions or an increase in symptoms       Phone call duration: 22 minutes          Again, thank you for allowing me to participate in the care of your patient.        Sincerely,        Rasheed Mirza MD

## 2020-07-10 ENCOUNTER — AMBULATORY - HEALTHEAST (OUTPATIENT)
Dept: LAB | Facility: CLINIC | Age: 70
End: 2020-07-10

## 2020-07-10 ENCOUNTER — TELEPHONE (OUTPATIENT)
Dept: INFECTIOUS DISEASES | Facility: CLINIC | Age: 70
End: 2020-07-10

## 2020-07-10 ENCOUNTER — RECORDS - HEALTHEAST (OUTPATIENT)
Dept: ADMINISTRATIVE | Facility: OTHER | Age: 70
End: 2020-07-10

## 2020-07-10 DIAGNOSIS — C34.92 MALIGNANT NEOPLASM OF LEFT LUNG, UNSPECIFIED PART OF LUNG (H): ICD-10-CM

## 2020-07-10 DIAGNOSIS — J18.9 PNEUMONIA OF RIGHT LUNG DUE TO INFECTIOUS ORGANISM, UNSPECIFIED PART OF LUNG: Primary | ICD-10-CM

## 2020-07-10 DIAGNOSIS — C83.398 DIFFUSE LARGE B-CELL LYMPHOMA OF EXTRANODAL SITE: ICD-10-CM

## 2020-07-10 DIAGNOSIS — R91.8 GROUND GLASS OPACITY PRESENT ON IMAGING OF LUNG: Primary | ICD-10-CM

## 2020-07-10 NOTE — TELEPHONE ENCOUNTER
----- Message from Priscilla Pretty MD sent at 7/9/2020  7:28 PM CDT -----  Regarding: RE: Labs  I would like a sputum culture and gram stain. Also sent for fungal and afb cultures. Also PCR probe for PCP (if you don't know how to order I can call the lab to add on).     I would also like a histoplasma urine antigen   Histoplasma, blastomycoses, and coccidiomycoses blood antibody tests     Priscilla  ----- Message -----  From: Monique Perkins RN  Sent: 7/8/2020   5:30 PM CDT  To: Brandi Hood RN, Priscilla Pretty MD, #  Subject: RE: Labs                                         Dr. Pretty, Let me know what orders you want and I'll fax them over. Not sure if they'd be able to see in Epic or not d/t the merge of [employer] and us. Thanks.  Monique   ----- Message -----  From: Brandi Hood RN  Sent: 7/8/2020   5:08 PM CDT  To: Priscilla Pretty MD, #  Subject: FW: Labs                                         Dr Pretty (and ID staff)  Pt called me today to ask me to help ensure your lab orders are faxed to her local lab. I redirected her to your dept/staff. See my note.  I see that your telephone consult yesterday is not arrived or completed and didn't want this to get missed as pt has scheduled her appt for this Friday at the lab below.  Thank you,  Brandi Hood RN  RN Care Coordinator  [facility] [address] [address]  [phone]  ----- Message -----  From: Brandi Hood RN  Sent: 7/7/2020   3:45 PM CDT  To: Priscilla Pretty MD  Subject: RE: Labs                                         Remy Pretty,    You will want to place the orders and have your support staff fax them to the [facility] lab:     Copied from my 7/2 note:   [facility]    [phone] fax number    Thanks,  Brandi  ----- Message -----  From: Priscilla Pretty MD  Sent: 7/7/2020   3:01 PM CDT  To: Brandi Hood RN  Subject: Labs                                              Hello,    I saw your patient today in clinic. I want to run some more tests but she says she likes to have them done at [facility] and you know how to have them ordered.    I would like a sputum culture and gram stain. Also sent for fungal and afb cultures. Also PCR probe for PCP (if you don't know how to order I can call the lab to add on).    I would also like a histoplasma urine antigen  Histoplasma, blastomycoses, and coccidiomycoses blood antibody tests    If you need more help from me, let me know what to do.    Thanks,    Priscilla Pretty MD  Infectious Diseases

## 2020-07-10 NOTE — TELEPHONE ENCOUNTER
Faxed lab orders to # below and requested that all results be faxed to Dr. Pretty.  Monique Perkins RN

## 2020-07-13 ENCOUNTER — AMBULATORY - HEALTHEAST (OUTPATIENT)
Dept: LAB | Facility: CLINIC | Age: 70
End: 2020-07-13

## 2020-07-13 DIAGNOSIS — C34.92 MALIGNANT NEOPLASM OF LEFT LUNG, UNSPECIFIED PART OF LUNG (H): ICD-10-CM

## 2020-07-13 DIAGNOSIS — R91.8 GROUND GLASS OPACITY PRESENT ON IMAGING OF LUNG: ICD-10-CM

## 2020-07-13 LAB
ALBUMIN SERPL-MCNC: 3.5 G/DL (ref 3.5–5)
ALP SERPL-CCNC: 108 U/L (ref 45–120)
ALT SERPL W P-5'-P-CCNC: 16 U/L (ref 0–45)
ANION GAP SERPL CALCULATED.3IONS-SCNC: 8 MMOL/L (ref 5–18)
AST SERPL W P-5'-P-CCNC: 28 U/L (ref 0–40)
BASOPHILS # BLD AUTO: 0.1 THOU/UL (ref 0–0.2)
BASOPHILS NFR BLD AUTO: 1 % (ref 0–2)
BILIRUB SERPL-MCNC: 0.3 MG/DL (ref 0–1)
BUN SERPL-MCNC: 11 MG/DL (ref 8–22)
CALCIUM SERPL-MCNC: 9.5 MG/DL (ref 8.5–10.5)
CHLORIDE BLD-SCNC: 104 MMOL/L (ref 98–107)
CO2 SERPL-SCNC: 27 MMOL/L (ref 22–31)
CREAT SERPL-MCNC: 0.7 MG/DL (ref 0.6–1.1)
EOSINOPHIL # BLD AUTO: 0.9 THOU/UL (ref 0–0.4)
EOSINOPHIL NFR BLD AUTO: 12 % (ref 0–6)
ERYTHROCYTE [DISTWIDTH] IN BLOOD BY AUTOMATED COUNT: 13.5 % (ref 11–14.5)
GFR SERPL CREATININE-BSD FRML MDRD: >60 ML/MIN/1.73M2
GLUCOSE BLD-MCNC: 91 MG/DL (ref 70–125)
HCT VFR BLD AUTO: 32.9 % (ref 35–47)
HGB BLD-MCNC: 10.7 G/DL (ref 12–16)
LDH SERPL L TO P-CCNC: 271 U/L (ref 125–220)
LYMPHOCYTES # BLD AUTO: 0.7 THOU/UL (ref 0.8–4.4)
LYMPHOCYTES NFR BLD AUTO: 9 % (ref 20–40)
MCH RBC QN AUTO: 32.4 PG (ref 27–34)
MCHC RBC AUTO-ENTMCNC: 32.5 G/DL (ref 32–36)
MCV RBC AUTO: 100 FL (ref 80–100)
MONOCYTES # BLD AUTO: 1.4 THOU/UL (ref 0–0.9)
MONOCYTES NFR BLD AUTO: 19 % (ref 2–10)
NEUTROPHILS # BLD AUTO: 4.1 THOU/UL (ref 2–7.7)
NEUTROPHILS NFR BLD AUTO: 58 % (ref 50–70)
PHOSPHATE SERPL-MCNC: 3.7 MG/DL (ref 2.5–4.5)
PLATELET # BLD AUTO: 259 THOU/UL (ref 140–440)
PMV BLD AUTO: 9.6 FL (ref 8.5–12.5)
POTASSIUM BLD-SCNC: 4.4 MMOL/L (ref 3.5–5)
PROT SERPL-MCNC: 7.1 G/DL (ref 6–8)
RBC # BLD AUTO: 3.3 MILL/UL (ref 3.8–5.4)
SODIUM SERPL-SCNC: 139 MMOL/L (ref 136–145)
URATE SERPL-MCNC: 4.6 MG/DL (ref 2–7.5)
WBC: 7.1 THOU/UL (ref 4–11)

## 2020-07-13 PROCEDURE — 87449 NOS EACH ORGANISM AG IA: CPT | Performed by: INTERNAL MEDICINE

## 2020-07-13 PROCEDURE — 87385 HISTOPLASMA CAPSUL AG IA: CPT | Mod: 59 | Performed by: INTERNAL MEDICINE

## 2020-07-14 ENCOUNTER — AMBULATORY - HEALTHEAST (OUTPATIENT)
Dept: LAB | Facility: CLINIC | Age: 70
End: 2020-07-14

## 2020-07-14 DIAGNOSIS — R91.8 GROUND GLASS OPACITY PRESENT ON IMAGING OF LUNG: ICD-10-CM

## 2020-07-15 DIAGNOSIS — C83.30 DIFFUSE LARGE B-CELL LYMPHOMA, UNSPECIFIED BODY REGION (H): Primary | ICD-10-CM

## 2020-07-16 DIAGNOSIS — R91.8 GROUND GLASS OPACITY PRESENT ON IMAGING OF LUNG: ICD-10-CM

## 2020-07-16 LAB
LAB SCANNED RESULT: NORMAL

## 2020-07-17 ENCOUNTER — PATIENT OUTREACH (OUTPATIENT)
Dept: ONCOLOGY | Facility: CLINIC | Age: 70
End: 2020-07-17

## 2020-07-17 NOTE — PROGRESS NOTES
-Writer aware that PET/CT not covered by pt's insurance. MD aware and recommended CT CAP.   Call to MD to clarify order , awaiting return call  I will update pt asap, as she has left me 2 VMs after speaking with business office about the insurance not covering PET/CT and is currently scheduled for it on Monday 7/20 in addition to CT CAP on 7/22    -call to MD to clarify order  TORB:  Rosy Sprague MD / Brandi Hood RN:    Change order to PET only (no diagnostic CTs)    -PET/CT order edited to PET only    -IB to scheduling to notify pt of above / adjust appts accordingly

## 2020-07-17 NOTE — PROGRESS NOTES
"Chuyita Porter is a 70 year old female who is being evaluated via a billable telephone visit.      The patient has been notified of following:     \"This telephone visit will be conducted via a call between you and your physician/provider. We have found that certain health care needs can be provided without the need for a physical exam.  This service lets us provide the care you need with a short phone conversation.  If a prescription is necessary we can send it directly to your pharmacy.  If lab work is needed we can place an order for that and you can then stop by our lab to have the test done at a later time.    Telephone visits are billed at different rates depending on your insurance coverage. During this emergency period, for some insurers they may be billed the same as an in-person visit.  Please reach out to your insurance provider with any questions.    If during the course of the call the physician/provider feels a telephone visit is not appropriate, you will not be charged for this service.\"    Patient has given verbal consent for Telephone visit?  Yes    What phone number would you like to be contacted at? home    How would you like to obtain your AVS? Mail a copy    Phone call duration: 20 minutes    The patient is a 70 year old female with a hx of diffuse large B cell lymphoma who was referred for evaluation of a cough and ground glass opacity on her chest CT. She was diagnosed with lymphoma in Feb and has had Rituxan, vincristine, adriamycin, prednisone as well as XRT. She has had a cough and elevated temps since mid June. She says her cough is mostly in the morning with some clear sputum. She had some elevated temps into the 99s. She says she feels generally well. No nausea or vomiting. She denies sob.     The patient was born in and grew up in [place]. She has not had international travel and has not left [place] since before Christmas. She has been social isolating except for there doctor's appointments. She " has been to [place] 5 years ago. No hx of TB or exposure.     Speaks in complete sentences. No gasping for breath or coughing.     Lab Results   Component Value Date    WBC 6.3 04/03/2020     Lab Results   Component Value Date    RBC 3.50 04/03/2020     Lab Results   Component Value Date    HGB 11.0 04/03/2020     Lab Results   Component Value Date    HCT 33.8 04/03/2020     No components found for: MCT  Lab Results   Component Value Date    MCV 97 04/03/2020     Lab Results   Component Value Date    MCH 31.4 04/03/2020     Lab Results   Component Value Date    MCHC 32.5 04/03/2020     Lab Results   Component Value Date    RDW 17.2 04/03/2020     Lab Results   Component Value Date     04/03/2020     Last ALC was 500.     IMPRESSION:  1. Persistent faint groundglass opacities of the right upper lobe,  likely infectious versus inflammatory, atypical infection is a  consideration.  2. Slightly reduced size of the right paratracheal soft tissue mass.  3. Unchanged subcentimeter left hepatic lobe hypodensity.  4. No interval change in nodular opacities in the lungs.    Patient is a 70 year old with Lymphoma who I am evaluating for cough and GGO in her RUL.    It sounds sub-acute to chronic. I would like to get some sputum for culture. I would also like to check fungal labs for histoplasma/blastomyces/coccidioides.    -sputum culture and gram stain  -histoplama urine antigen  -blastomyces, coccidiodes, histoplasma blood antigens.    Follow up in 3-4 weeks.        Priscilla Pretty MD

## 2020-07-20 ENCOUNTER — HOSPITAL ENCOUNTER (OUTPATIENT)
Dept: PET IMAGING | Facility: CLINIC | Age: 70
End: 2020-07-20
Payer: COMMERCIAL

## 2020-07-20 DIAGNOSIS — C83.398 DIFFUSE LARGE B-CELL LYMPHOMA OF EXTRANODAL SITE: ICD-10-CM

## 2020-07-20 PROCEDURE — 34300033 ZZH RX 343

## 2020-07-20 PROCEDURE — 78816 PET IMAGE W/CT FULL BODY: CPT | Mod: PS

## 2020-07-20 PROCEDURE — A9552 F18 FDG: HCPCS

## 2020-07-20 RX ADMIN — FLUDEOXYGLUCOSE F-18 10.21 MCI.: 500 INJECTION, SOLUTION INTRAVENOUS at 09:57

## 2020-07-23 ENCOUNTER — VIRTUAL VISIT (OUTPATIENT)
Dept: TRANSPLANT | Facility: CLINIC | Age: 70
End: 2020-07-23
Payer: COMMERCIAL

## 2020-07-23 ENCOUNTER — RECORDS - HEALTHEAST (OUTPATIENT)
Dept: ADMINISTRATIVE | Facility: OTHER | Age: 70
End: 2020-07-23

## 2020-07-23 DIAGNOSIS — C83.32 DIFFUSE LARGE B-CELL LYMPHOMA OF INTRATHORACIC LYMPH NODES (H): ICD-10-CM

## 2020-07-23 PROCEDURE — 40001009 ZZH VIDEO/TELEPHONE VISIT; NO CHARGE

## 2020-07-23 PROCEDURE — G0463 HOSPITAL OUTPT CLINIC VISIT: HCPCS

## 2020-07-23 NOTE — PROGRESS NOTES
"Chuyita Porter is a 70 year old female who is being evaluated via a billable telephone visit.      The patient has been notified of following:     \"This telephone visit will be conducted via a call between you and your physician/provider. We have found that certain health care needs can be provided without the need for a physical exam.  This service lets us provide the care you need with a short phone conversation.  If a prescription is necessary we can send it directly to your pharmacy.  If lab work is needed we can place an order for that and you can then stop by our lab to have the test done at a later time.    Telephone visits are billed at different rates depending on your insurance coverage. During this emergency period, for some insurers they may be billed the same as an in-person visit.  Please reach out to your insurance provider with any questions.    If during the course of the call the physician/provider feels a telephone visit is not appropriate, you will not be charged for this service.\"    Patient has given verbal consent for Telephone visit?  Yes    What phone number would you like to be contacted at? [phone].    How would you like to obtain your AVS? Cesar    I have reviewed and updated the patient's allergies and medication list.    Concerns: Patient reports she has some moles on her back and would like to know if she should have them looked at.   Refills: None needed.     Vitals - Patient Reported  Systolic (Patient Reported): 114  Diastolic (Patient Reported): 78  Weight (Patient Reported): 75.5 kg (166 lb 7.2 oz)  Height (Patient Reported): 165.1 cm (5' 5\")  BMI (Based on Pt Reported Ht/Wt): 27.7  Temperature (Patient Reported): 98.4  F (36.9  C)  Pulse (Patient Reported): 88  Pain Score: No Pain (0)      Clover Stanley CMA      Phone call duration: 15 minutes      Reason for Visit: f/u DLBCL    Disease:  DLBCL nonGCB with mediastinal mass and thyroid gland involvement   - 4-6 BCL2 " signals (81%)    - No rearrangement of BCL6, MYC, or BCL2   - No IGH-MYC fusion  Bulky disease, Stage IIB, IPI  3 ()  S/p 6 cycles R-CHOP    INTERIM HISTORY: Anneliese is feeling great, has no new complains, her chest and breathing feel normal, no fevers or sweats, energy is great. She is eating normally, weight is stable.      Oncology HPI:    Chuyita Porter is a 69 year old female with past medical history of breast cancer (right breast cancer in 2005 and left breast cancer in 2015 followed by Dr. Gallardo) with diffuse large B cell lymphoma.     She presented with SOB and pressure on her neck/chest. She was found to have mediastinal mass and FNA biopsy on 1/30/20 showed DLBCL. She was initially managed with oral steroids and then was taken to the OR on 2/17 for open neck biopsy, tracheal stent, and PEG tube placement however in the OR decision was made to intubate patient due to concern of threatened airway. She was then admitted and started two fractions of radiation on 2/18 and 2/19 and then started R-CHOP on 2/21. CT chest on 2/25 showed decrease size of mass. Complications of neutropenic fever due to HCAP treated with vanco + cefepime --> zosyn and completed treatment with Levaquin. She was extubated on 2/26. She was discharged to rehab due to debility and severe malnutrition. She was discharged on TFs. She started cycle 2 of R-CHOP on 3/13/20. Imaging after 2 cycles showed CR. She began cycle 3 on 4/3/20, cycle 4 on 4/24/20, cycle 5 on 5/15/20. She is being evaluated prior to cycle 6 today.       Current Outpatient Medications   Medication     loratadine (CLARITIN) 10 MG tablet     acetaminophen (TYLENOL) 325 MG tablet     fluticasone (FLONASE) 50 MCG/ACT nasal spray     LORazepam (ATIVAN) 0.5 MG tablet     ondansetron (ZOFRAN) 4 MG tablet     prochlorperazine (COMPAZINE) 10 MG tablet     No current facility-administered medications for this visit.          No Known Allergies        Exam: alert, appears  well.  Talking efortlessly, in good spirits.         Labs:   Lab Results   Component Value Date    WBC 6.3 04/03/2020    ANEU 4.4 04/03/2020    HGB 11.0 (L) 04/03/2020    HCT 33.8 (L) 04/03/2020     (H) 04/03/2020     04/03/2020    POTASSIUM 3.4 04/03/2020    CHLORIDE 106 04/03/2020    CO2 30 04/03/2020     (H) 04/03/2020    BUN 9 04/03/2020    CR 0.63 04/03/2020    MAG 2.5 (H) 03/06/2020    INR 1.21 (H) 02/19/2020    AST 22 04/03/2020    ALT 24 04/03/2020     Reviewed recent ones -   WBC 7.1  Hgb 10.7  Diff: increased eosinophils at 900  plts 259  CMP normal  LDH still elevated at 271             Imaging:   PET/CT 7/20  IMPRESSION:   In this patient with history of diffuse large B-cell lymphoma who is  status post 2 fractions radiation and 6 cycles of R-CHOP:     1. Further decreased size non FDG avid superior mediastinal residual soft tissue. Findings suggest continued complete response.  2. Near resolution of focal consolidation with adjacent tree in bud nodularity in the right lower lobe, likely infectious etiology.     Impression/plan:   DLBCL with mediastinal mass compressing the SVC with associated collateral vessels, no disease in the abdomen/pelvis, marrow not done.   S/p 6 cycles of R-CHOP and fes doses of XRT  ME after 2 cycles  EOT PET with complete metabolic remission.    Now in ongoing remission.     We discuss a plan for surveillance and repeat CT CAP in 6 months.          Pulm: was noted to have a RLL infiltrate on PET/CT from April, repeat Chest CT in May showed the RLL consolidation had resolved, but had a new RUL GGO. Now resolved on PET.  F/u with .    ID no active infection.  OVerall, Chuyita did great, she is happy to hear the news and look forward to continue to full recovery. She knows to reach us if she develops new problem.       Rosy Sprague MD  Professor of Medicine  [phone]

## 2020-07-23 NOTE — LETTER
"    7/23/2020         RE: Chuyita Porter  [address]  [facility] [address]        Dear Colleague,    Thank you for referring your patient, Chuyita Porter, to the [facility]. Please see a copy of my visit note below.    Chuyita Porter is a 70 year old female who is being evaluated via a billable telephone visit.            I have reviewed and updated the patient's allergies and medication list.    Concerns: Patient reports she has some moles on her back and would like to know if she should have them looked at.   Refills: None needed.     Vitals - Patient Reported  Systolic (Patient Reported): 114  Diastolic (Patient Reported): 78  Weight (Patient Reported): 75.5 kg (166 lb 7.2 oz)  Height (Patient Reported): 165.1 cm (5' 5\")  BMI (Based on Pt Reported Ht/Wt): 27.7  Temperature (Patient Reported): 98.4  F (36.9  C)  Pulse (Patient Reported): 88  Pain Score: No Pain (0)      Clover Stanley CMA      Phone call duration: 15 minutes      Reason for Visit: f/u DLBCL    Disease:  DLBCL nonGCB with mediastinal mass and thyroid gland involvement   - 4-6 BCL2 signals (81%)    - No rearrangement of BCL6, MYC, or BCL2   - No IGH-MYC fusion  Bulky disease, Stage IIB, IPI  3 ()  S/p 6 cycles R-CHOP    INTERIM HISTORY: Anneliese is feeling great, has no new complains, her chest and breathing feel normal, no fevers or sweats, energy is great. She is eating normally, weight is stable.      Oncology HPI:    Chuyita Porter is a 69 year old female with past medical history of breast cancer (right breast cancer in 2005 and left breast cancer in 2015 followed by Dr. Gallardo) with diffuse large B cell lymphoma.     She presented with SOB and pressure on her neck/chest. She was found to have mediastinal mass and FNA biopsy on 1/30/20 showed DLBCL. She was initially managed with oral steroids and then was taken to the OR on 2/17 for open neck biopsy, tracheal stent, and PEG tube placement however in " the OR decision was made to intubate patient due to concern of threatened airway. She was then admitted and started two fractions of radiation on 2/18 and 2/19 and then started R-CHOP on 2/21. CT chest on 2/25 showed decrease size of mass. Complications of neutropenic fever due to HCAP treated with vanco + cefepime --> zosyn and completed treatment with Levaquin. She was extubated on 2/26. She was discharged to rehab due to debility and severe malnutrition. She was discharged on TFs. She started cycle 2 of R-CHOP on 3/13/20. Imaging after 2 cycles showed CR. She began cycle 3 on 4/3/20, cycle 4 on 4/24/20, cycle 5 on 5/15/20. She is being evaluated prior to cycle 6 today.       Current Outpatient Medications   Medication     loratadine (CLARITIN) 10 MG tablet     acetaminophen (TYLENOL) 325 MG tablet     fluticasone (FLONASE) 50 MCG/ACT nasal spray     LORazepam (ATIVAN) 0.5 MG tablet     ondansetron (ZOFRAN) 4 MG tablet     prochlorperazine (COMPAZINE) 10 MG tablet     No current facility-administered medications for this visit.          No Known Allergies        Exam: alert, appears well.  Talking efortlessly, in good spirits.         Labs:   Lab Results   Component Value Date    WBC 6.3 04/03/2020    ANEU 4.4 04/03/2020    HGB 11.0 (L) 04/03/2020    HCT 33.8 (L) 04/03/2020     (H) 04/03/2020     04/03/2020    POTASSIUM 3.4 04/03/2020    CHLORIDE 106 04/03/2020    CO2 30 04/03/2020     (H) 04/03/2020    BUN 9 04/03/2020    CR 0.63 04/03/2020    MAG 2.5 (H) 03/06/2020    INR 1.21 (H) 02/19/2020    AST 22 04/03/2020    ALT 24 04/03/2020     Reviewed recent ones -   WBC 7.1  Hgb 10.7  Diff: increased eosinophils at 900  plts 259  CMP normal  LDH still elevated at 271             Imaging:   PET/CT 7/20  IMPRESSION:   In this patient with history of diffuse large B-cell lymphoma who is  status post 2 fractions radiation and 6 cycles of R-CHOP:     1. Further decreased size non FDG avid superior  mediastinal residual soft tissue. Findings suggest continued complete response.  2. Near resolution of focal consolidation with adjacent tree in bud nodularity in the right lower lobe, likely infectious etiology.     Impression/plan:   DLBCL with mediastinal mass compressing the SVC with associated collateral vessels, no disease in the abdomen/pelvis, marrow not done.   S/p 6 cycles of R-CHOP and fes doses of XRT  VA after 2 cycles  EOT PET with complete metabolic remission.    Now in ongoing remission.     We discuss a plan for surveillance and repeat CT CAP in 6 months.          Pulm: was noted to have a RLL infiltrate on PET/CT from April, repeat Chest CT in May showed the RLL consolidation had resolved, but had a new RUL GGO. Now resolved on PET.  F/u with .    ID no active infection.  OVerall, Chuyita did great, she is happy to hear the news and look forward to continue to full recovery. She knows to reach us if she develops new problem.       Rosy Sprague MD  Professor of Medicine  [phone]

## 2020-07-24 LAB
MISCELLANEOUS TEST DEPT. - HE HISTORICAL: NORMAL
PERFORMING LAB: NORMAL
SPECIMEN STATUS: NORMAL
TEST NAME: NORMAL

## 2020-08-04 ENCOUNTER — RECORDS - HEALTHEAST (OUTPATIENT)
Dept: HEALTH INFORMATION MANAGEMENT | Facility: CLINIC | Age: 70
End: 2020-08-04

## 2020-08-04 ENCOUNTER — VIRTUAL VISIT (OUTPATIENT)
Dept: INFECTIOUS DISEASES | Facility: CLINIC | Age: 70
End: 2020-08-04
Attending: INTERNAL MEDICINE
Payer: COMMERCIAL

## 2020-08-04 ENCOUNTER — RECORDS - HEALTHEAST (OUTPATIENT)
Dept: ADMINISTRATIVE | Facility: OTHER | Age: 70
End: 2020-08-04

## 2020-08-04 DIAGNOSIS — R91.8 GROUND GLASS OPACITY PRESENT ON IMAGING OF LUNG: Primary | ICD-10-CM

## 2020-08-04 ASSESSMENT — PAIN SCALES - GENERAL: PAINLEVEL: NO PAIN (0)

## 2020-08-04 NOTE — PROGRESS NOTES
"Chuyita Porter is a 70 year old female who is being evaluated via a billable telephone visit.      The patient has been notified of following:     \"This telephone visit will be conducted via a call between you and your physician/provider. We have found that certain health care needs can be provided without the need for a physical exam.  This service lets us provide the care you need with a short phone conversation.  If a prescription is necessary we can send it directly to your pharmacy.  If lab work is needed we can place an order for that and you can then stop by our lab to have the test done at a later time.    Telephone visits are billed at different rates depending on your insurance coverage. During this emergency period, for some insurers they may be billed the same as an in-person visit.  Please reach out to your insurance provider with any questions.    If during the course of the call the physician/provider feels a telephone visit is not appropriate, you will not be charged for this service.\"    Patient has given verbal consent for Telephone visit?  Yes    What phone number would you like to be contacted at? [phone]    How would you like to obtain your AVS? Cesar     The patient is a 70 year old with a dx of diffuse large B cell lymphoma and ground glass opacity on her chest CT. The lymphoma was diagnosed in Feb and she has had rituxan, vincristine, adriamycin, predniesone, and XRT. She was referred to me because she has had a cough and temps to 99s. Last time we ordered fungal antigens and serologies. She has also had a PET CT which showed near resolution of her lung consolidation with adjacent tree in bud infiltrate. She says she feels well. Her temps have improved and are no longer up to 99s. She does not have a cough. She says breathing is back at baseline.     Histoplasma ag is negative, Histoplasma, blastomycoses, and coccidiomycoses serologies are all negative. I had wanted a culture of her " sputum but this was not able to be collected.    I discussed the situation with the patient and since she feels well it seems like her imaging is lagging and there is not more workup to do. If she has new fevers or new cough/shortness of breath, I am happy to do more workup with her. However at this time I think she can follow up with primary care and oncology.    Priscilla Pretty MD    No need for follow up unless new issues arise.     Phone call duration: 10 minutes

## 2020-08-04 NOTE — LETTER
"8/4/2020       RE: Chuyita Porter  [address]  [facility] [address]     Dear Colleague,    Thank you for referring your patient, Chuyita Porter, to the [facility] AND INFECTIOUS DISEASES at [facility]. Please see a copy of my visit note below.    Chuyita Porter is a 70 year old female who is being evaluated via a billable telephone visit.      The patient has been notified of following:     \"This telephone visit will be conducted via a call between you and your physician/provider. We have found that certain health care needs can be provided without the need for a physical exam.  This service lets us provide the care you need with a short phone conversation.  If a prescription is necessary we can send it directly to your pharmacy.  If lab work is needed we can place an order for that and you can then stop by our lab to have the test done at a later time.    Telephone visits are billed at different rates depending on your insurance coverage. During this emergency period, for some insurers they may be billed the same as an in-person visit.  Please reach out to your insurance provider with any questions.    If during the course of the call the physician/provider feels a telephone visit is not appropriate, you will not be charged for this service.\"    Patient has given verbal consent for Telephone visit?  Yes    What phone number would you like to be contacted at? [phone]    How would you like to obtain your AVS? MyCharroyer     The patient is a 70 year old with a dx of diffuse large B cell lymphoma and ground glass opacity on her chest CT. The lymphoma was diagnosed in Feb and she has had rituxan, vincristine, adriamycin, predniesone, and XRT. She was referred to me because she has had a cough and temps to 99s. Last time we ordered fungal antigens and serologies. She has also had a PET CT which showed near resolution of her lung consolidation with adjacent " tree in bud infiltrate. She says she feels well. Her temps have improved and are no longer up to 99s. She does not have a cough. She says breathing is back at baseline.     Histoplasma ag is negative, Histoplasma, blastomycoses, and coccidiomycoses serologies are all negative. I had wanted a culture of her sputum but this was not able to be collected.    I discussed the situation with the patient and since she feels well it seems like her imaging is lagging and there is not more workup to do. If she has new fevers or new cough/shortness of breath, I am happy to do more workup with her. However at this time I think she can follow up with primary care and oncology.    Priscilla Pretty MD    No need for follow up unless new issues arise.     Phone call duration: 10 minutes

## 2020-08-05 ENCOUNTER — EXTERNAL ORDER RESULTS (OUTPATIENT)
Dept: INFECTIOUS DISEASES | Facility: CLINIC | Age: 70
End: 2020-08-05

## 2020-08-05 DIAGNOSIS — R91.8 GROUND GLASS OPACITY PRESENT ON IMAGING OF LUNG: ICD-10-CM

## 2020-08-05 LAB
BACTERIA SPEC CULT: ABNORMAL
CULTURE: NORMAL

## 2020-08-26 ENCOUNTER — EXTERNAL ORDER RESULTS (OUTPATIENT)
Dept: INFECTIOUS DISEASES | Facility: CLINIC | Age: 70
End: 2020-08-26

## 2020-08-26 DIAGNOSIS — R91.8 GROUND GLASS OPACITY PRESENT ON IMAGING OF LUNG: ICD-10-CM

## 2020-08-26 LAB
ACID FAST STN SPEC QL: NORMAL
AFB STAIN: NORMAL
BACTERIA SPEC CULT: NORMAL
CULTURE: NORMAL

## 2020-08-31 DIAGNOSIS — J30.0 VASOMOTOR RHINITIS: ICD-10-CM

## 2020-08-31 RX ORDER — FLUTICASONE PROPIONATE 50 MCG
SPRAY, SUSPENSION (ML) NASAL
Qty: 16 ML | Refills: 7 | OUTPATIENT
Start: 2020-08-31

## 2020-09-09 DIAGNOSIS — J30.0 VASOMOTOR RHINITIS: ICD-10-CM

## 2020-09-09 RX ORDER — FLUTICASONE PROPIONATE 50 MCG
1 SPRAY, SUSPENSION (ML) NASAL DAILY
Qty: 30 ML | Refills: 3 | Status: SHIPPED | OUTPATIENT
Start: 2020-09-09 | End: 2021-08-26

## 2020-10-05 ENCOUNTER — HOSPITAL ENCOUNTER (OUTPATIENT)
Dept: MAMMOGRAPHY | Facility: CLINIC | Age: 70
Discharge: HOME | End: 2020-10-05
Attending: FAMILY MEDICINE

## 2020-10-05 ENCOUNTER — TRANSFERRED RECORDS (OUTPATIENT)
Dept: HEALTH INFORMATION MANAGEMENT | Facility: CLINIC | Age: 70
End: 2020-10-05

## 2020-10-05 DIAGNOSIS — Z12.31 SCREENING MAMMOGRAM, ENCOUNTER FOR: ICD-10-CM

## 2020-10-06 ENCOUNTER — AMBULATORY - HEALTHEAST (OUTPATIENT)
Dept: LAB | Facility: CLINIC | Age: 70
End: 2020-10-06

## 2020-10-09 ENCOUNTER — AMBULATORY - HEALTHEAST (OUTPATIENT)
Dept: LAB | Facility: CLINIC | Age: 70
End: 2020-10-09

## 2020-10-09 ENCOUNTER — TELEPHONE (OUTPATIENT)
Dept: ONCOLOGY | Facility: CLINIC | Age: 70
End: 2020-10-09

## 2020-10-09 DIAGNOSIS — T45.1X5A ANTINEOPLASTIC ANTIBIOTICS CAUSING ADVERSE EFFECT IN THERAPEUTIC USE: ICD-10-CM

## 2020-10-09 DIAGNOSIS — C83.398 DIFFUSE LARGE B-CELL LYMPHOMA OF EXTRANODAL SITE: ICD-10-CM

## 2020-10-09 DIAGNOSIS — R91.8 GROUND GLASS OPACITY PRESENT ON IMAGING OF LUNG: ICD-10-CM

## 2020-10-09 DIAGNOSIS — R05.9 COUGH: ICD-10-CM

## 2020-10-09 NOTE — TELEPHONE ENCOUNTER
Lab orders faxed as requested.     Gosia Baxter CMA ([facility])        ----- Message from Zuri Lopes sent at 10/9/2020  9:32 AM CDT -----  Regarding: lab orders fax  Hi     Can you please fax lab orders from Dr. Sprague to the [facility] at [phone].     Thanks  Zuri Lopes  BMT Clinic Coordinator

## 2020-10-12 ENCOUNTER — AMBULATORY - HEALTHEAST (OUTPATIENT)
Dept: LAB | Facility: CLINIC | Age: 70
End: 2020-10-12

## 2020-10-12 DIAGNOSIS — C83.398 DIFFUSE LARGE B-CELL LYMPHOMA OF EXTRANODAL SITE: ICD-10-CM

## 2020-10-12 LAB
ALBUMIN SERPL-MCNC: 3.8 G/DL (ref 3.5–5)
ALP SERPL-CCNC: 144 U/L (ref 45–120)
ALT SERPL W P-5'-P-CCNC: 18 U/L (ref 0–45)
ANION GAP SERPL CALCULATED.3IONS-SCNC: 13 MMOL/L (ref 5–18)
AST SERPL W P-5'-P-CCNC: 27 U/L (ref 0–40)
BASOPHILS # BLD AUTO: 0 THOU/UL (ref 0–0.2)
BASOPHILS NFR BLD AUTO: 1 % (ref 0–2)
BILIRUB SERPL-MCNC: 0.5 MG/DL (ref 0–1)
BUN SERPL-MCNC: 17 MG/DL (ref 8–22)
CALCIUM SERPL-MCNC: 9.3 MG/DL (ref 8.5–10.5)
CHLORIDE BLD-SCNC: 105 MMOL/L (ref 98–107)
CO2 SERPL-SCNC: 20 MMOL/L (ref 22–31)
CREAT SERPL-MCNC: 0.78 MG/DL (ref 0.6–1.1)
EOSINOPHIL # BLD AUTO: 0.3 THOU/UL (ref 0–0.4)
EOSINOPHIL NFR BLD AUTO: 5 % (ref 0–6)
ERYTHROCYTE [DISTWIDTH] IN BLOOD BY AUTOMATED COUNT: 11.4 % (ref 11–14.5)
GFR SERPL CREATININE-BSD FRML MDRD: >60 ML/MIN/1.73M2
GLUCOSE BLD-MCNC: 111 MG/DL (ref 70–125)
HCT VFR BLD AUTO: 36.8 % (ref 35–47)
HGB BLD-MCNC: 12.4 G/DL (ref 12–16)
LDH SERPL L TO P-CCNC: 223 U/L (ref 125–220)
LYMPHOCYTES # BLD AUTO: 0.7 THOU/UL (ref 0.8–4.4)
LYMPHOCYTES NFR BLD AUTO: 13 % (ref 20–40)
MCH RBC QN AUTO: 31.1 PG (ref 27–34)
MCHC RBC AUTO-ENTMCNC: 33.7 G/DL (ref 32–36)
MCV RBC AUTO: 92 FL (ref 80–100)
MONOCYTES # BLD AUTO: 0.8 THOU/UL (ref 0–0.9)
MONOCYTES NFR BLD AUTO: 15 % (ref 2–10)
NEUTROPHILS # BLD AUTO: 3.5 THOU/UL (ref 2–7.7)
NEUTROPHILS NFR BLD AUTO: 66 % (ref 50–70)
PHOSPHATE SERPL-MCNC: 3.9 MG/DL (ref 2.5–4.5)
PLATELET # BLD AUTO: 304 THOU/UL (ref 140–440)
PMV BLD AUTO: 7.3 FL (ref 7–10)
POTASSIUM BLD-SCNC: 4.2 MMOL/L (ref 3.5–5)
PROT SERPL-MCNC: 7 G/DL (ref 6–8)
RBC # BLD AUTO: 3.98 MILL/UL (ref 3.8–5.4)
SODIUM SERPL-SCNC: 138 MMOL/L (ref 136–145)
URATE SERPL-MCNC: 5 MG/DL (ref 2–7.5)
WBC: 5.3 THOU/UL (ref 4–11)

## 2020-10-15 ENCOUNTER — RECORDS - HEALTHEAST (OUTPATIENT)
Dept: ADMINISTRATIVE | Facility: OTHER | Age: 70
End: 2020-10-15

## 2020-10-15 ENCOUNTER — VIRTUAL VISIT (OUTPATIENT)
Dept: TRANSPLANT | Facility: CLINIC | Age: 70
End: 2020-10-15
Payer: COMMERCIAL

## 2020-10-15 DIAGNOSIS — C83.32 DIFFUSE LARGE B-CELL LYMPHOMA OF INTRATHORACIC LYMPH NODES (H): Primary | ICD-10-CM

## 2020-10-15 PROCEDURE — 999N001193 HC VIDEO/TELEPHONE VISIT; NO CHARGE

## 2020-10-15 PROCEDURE — 99214 OFFICE O/P EST MOD 30 MIN: CPT | Mod: 95

## 2020-10-15 NOTE — PROGRESS NOTES
"Chuyita Porter is a 70 year old female who is being evaluated via a billable telephone visit.      The patient has been notified of following:     \"This telephone visit will be conducted via a call between you and your physician/provider. We have found that certain health care needs can be provided without the need for a physical exam.  This service lets us provide the care you need with a short phone conversation.  If a prescription is necessary we can send it directly to your pharmacy.  If lab work is needed we can place an order for that and you can then stop by our lab to have the test done at a later time.    Telephone visits are billed at different rates depending on your insurance coverage. During this emergency period, for some insurers they may be billed the same as an in-person visit.  Please reach out to your insurance provider with any questions.    If during the course of the call the physician/provider feels a telephone visit is not appropriate, you will not be charged for this service.\"    Patient has given verbal consent for Telephone visit?  Yes    What phone number would you like to be contacted at? [phone]    How would you like to obtain your AVS? MyChart    Vitals - Patient Reported  Weight (Patient Reported): 77.6 kg (171 lb)  Height (Patient Reported): 165.1 cm (5' 5\")  BMI (Based on Pt Reported Ht/Wt): 28.46  Pain Score: No Pain (0)    Alaina Miller CMA October 15, 2020  9:59 AM     Phone call duration: 15 minutes        Oncology HPI:    Chuyita Porter is a 69 year old female with past medical history of breast cancer (right breast cancer in 2005 and left breast cancer in 2015 followed by Dr. Gallardo) with diffuse large B cell lymphoma.    Disease:  DLBCL nonGCB with mediastinal mass and thyroid gland involvement   - 4-6 BCL2 signals (81%)    - No rearrangement of BCL6, MYC, or BCL2   - No IGH-MYC fusion  Bulky disease, Stage IIB, IPI  3 ()  S/p 6 cycles R-CHOP    INTERIM " HISTORY: Anneliese is feeling great, has no new complains, her chest and breathing feel normal, no fevers or sweats, energy is great. She is eating normally, weight is stable.      HPI:   She presented with respiratory failure due to large mediastinal mass.    Was intubated and in OR on 2/17/2020 underwent for open neck biopsy, tracheal stent, and PEG tube placement.  S/p two fractions of radiation on 2/18 and 2/19 and then started R-CHOP on 2/21.  Complications: neutropenic fever due to HCAP   debility and severe malnutrition.Completed 6 cycles of R-CHOP in June 2020.     Current Outpatient Medications   Medication     loratadine (CLARITIN) 10 MG tablet     acetaminophen (TYLENOL) 325 MG tablet     fluticasone (FLONASE) 50 MCG/ACT nasal spray     LORazepam (ATIVAN) 0.5 MG tablet     ondansetron (ZOFRAN) 4 MG tablet     prochlorperazine (COMPAZINE) 10 MG tablet     No current facility-administered medications for this visit.          No Known Allergies        Exam: alert, appears well.  Talking efortlessly, in good spirits.         Labs:   Reviewed in care everywhere - looks excellent !       Imaging:   PET/CT 7/20/2020  IMPRESSION:   In this patient with history of diffuse large B-cell lymphoma who is  status post 2 fractions radiation and 6 cycles of R-CHOP:     1. Further decreased size non FDG avid superior mediastinal residual soft tissue. Findings suggest continued complete response.  2. Near resolution of focal consolidation with adjacent tree in bud nodularity in the right lower lobe, likely infectious etiology.     Impression/plan:   DLBCL with mediastinal mass compressing the SVC with associated collateral vessels, no disease in the abdomen/pelvis, marrow not done.   S/p 6 cycles of R-CHOP and fes doses of XRT  ME after 2 cycles  EOT PET with complete metabolic remission.    Now in ongoing remission.     We discuss a plan for surveillance and repeat CT CAP in 6 months.          Pulm: was noted to have a RLL  infiltrate on PET/CT from April, now resolved on PET.    ID no active infection.  OVerall, Chuyita did great, she is happy to hear the news and look forward to continue to full recovery. She knows to reach us if she develops new problem.   [facility] to get influenza vaccine       Rosy Sprague MD  Professor of Medicine  [phone]

## 2020-10-15 NOTE — LETTER
"    10/15/2020         RE: Chuyita Porter  [address]  [facility] [address]        Dear Colleague,    Thank you for referring your patient, Chuyita Porter, to the [facility] BLOOD AND MARROW TRANSPLANT PROGRAM [place]. Please see a copy of my visit note below.    Chuyita Porter is a 70 year old female who is being evaluated via a billable telephone visit.      The patient has been notified of following:     \"This telephone visit will be conducted via a call between you and your physician/provider. We have found that certain health care needs can be provided without the need for a physical exam.  This service lets us provide the care you need with a short phone conversation.  If a prescription is necessary we can send it directly to your pharmacy.  If lab work is needed we can place an order for that and you can then stop by our lab to have the test done at a later time.    Telephone visits are billed at different rates depending on your insurance coverage. During this emergency period, for some insurers they may be billed the same as an in-person visit.  Please reach out to your insurance provider with any questions.    If during the course of the call the physician/provider feels a telephone visit is not appropriate, you will not be charged for this service.\"    Patient has given verbal consent for Telephone visit?  Yes    What phone number would you like to be contacted at? [phone]    How would you like to obtain your AVS? MyChart    Vitals - Patient Reported  Weight (Patient Reported): 77.6 kg (171 lb)  Height (Patient Reported): 165.1 cm (5' 5\")  BMI (Based on Pt Reported Ht/Wt): 28.46  Pain Score: No Pain (0)    Alaina Miller CMA October 15, 2020  9:59 AM     Phone call duration: 15 minutes        Oncology HPI:    Chuyita Porter is a 69 year old female with past medical history of breast cancer (right breast cancer in 2005 and left breast cancer in 2015 followed by Dr. Gallardo) " with diffuse large B cell lymphoma.    Disease:  DLBCL nonGCB with mediastinal mass and thyroid gland involvement   - 4-6 BCL2 signals (81%)    - No rearrangement of BCL6, MYC, or BCL2   - No IGH-MYC fusion  Bulky disease, Stage IIB, IPI  3 ()  S/p 6 cycles R-CHOP    INTERIM HISTORY: Anneliese is feeling great, has no new complains, her chest and breathing feel normal, no fevers or sweats, energy is great. She is eating normally, weight is stable.      HPI:   She presented with respiratory failure due to large mediastinal mass.    Was intubated and in OR on 2/17/2020 underwent for open neck biopsy, tracheal stent, and PEG tube placement.  S/p two fractions of radiation on 2/18 and 2/19 and then started R-CHOP on 2/21.  Complications: neutropenic fever due to HCAP   debility and severe malnutrition.Completed 6 cycles of R-CHOP in June 2020.     Current Outpatient Medications   Medication     loratadine (CLARITIN) 10 MG tablet     acetaminophen (TYLENOL) 325 MG tablet     fluticasone (FLONASE) 50 MCG/ACT nasal spray     LORazepam (ATIVAN) 0.5 MG tablet     ondansetron (ZOFRAN) 4 MG tablet     prochlorperazine (COMPAZINE) 10 MG tablet     No current facility-administered medications for this visit.          No Known Allergies        Exam: alert, appears well.  Talking efortlessly, in good spirits.         Labs:   Reviewed in care everywhere - looks excellent !       Imaging:   PET/CT 7/20/2020  IMPRESSION:   In this patient with history of diffuse large B-cell lymphoma who is  status post 2 fractions radiation and 6 cycles of R-CHOP:     1. Further decreased size non FDG avid superior mediastinal residual soft tissue. Findings suggest continued complete response.  2. Near resolution of focal consolidation with adjacent tree in bud nodularity in the right lower lobe, likely infectious etiology.     Impression/plan:   DLBCL with mediastinal mass compressing the SVC with associated collateral vessels, no disease in  the abdomen/pelvis, marrow not done.   S/p 6 cycles of R-CHOP and fes doses of XRT  VT after 2 cycles  EOT PET with complete metabolic remission.    Now in ongoing remission.     We discuss a plan for surveillance and repeat CT CAP in 6 months.          Pulm: was noted to have a RLL infiltrate on PET/CT from April, now resolved on PET.    ID no active infection.  OVerall, Chuyita did great, she is happy to hear the news and look forward to continue to full recovery. She knows to reach us if she develops new problem.   [facility] to get influenza vaccine       Rosy Sprague MD  Professor of Medicine  [phone]

## 2020-10-30 ENCOUNTER — OFFICE VISIT - HEALTHEAST (OUTPATIENT)
Dept: FAMILY MEDICINE | Facility: CLINIC | Age: 70
End: 2020-10-30

## 2020-10-30 DIAGNOSIS — R21 RASH: ICD-10-CM

## 2020-11-17 ENCOUNTER — TRANSFERRED RECORDS (OUTPATIENT)
Dept: HEALTH INFORMATION MANAGEMENT | Facility: CLINIC | Age: 70
End: 2020-11-17

## 2020-11-17 ENCOUNTER — OFFICE VISIT - HEALTHEAST (OUTPATIENT)
Dept: FAMILY MEDICINE | Facility: CLINIC | Age: 70
End: 2020-11-17

## 2020-11-17 DIAGNOSIS — Z13.220 LIPID SCREENING: ICD-10-CM

## 2020-11-17 DIAGNOSIS — C50.512 MALIGNANT NEOPLASM OF LOWER-OUTER QUADRANT OF LEFT BREAST OF FEMALE, ESTROGEN RECEPTOR POSITIVE (H): ICD-10-CM

## 2020-11-17 DIAGNOSIS — Z00.00 ENCOUNTER FOR ANNUAL WELLNESS EXAM IN MEDICARE PATIENT: ICD-10-CM

## 2020-11-17 DIAGNOSIS — C83.398 DIFFUSE LARGE B-CELL LYMPHOMA OF EXTRANODAL SITE: ICD-10-CM

## 2020-11-17 DIAGNOSIS — Z17.0 MALIGNANT NEOPLASM OF LOWER-OUTER QUADRANT OF LEFT BREAST OF FEMALE, ESTROGEN RECEPTOR POSITIVE (H): ICD-10-CM

## 2020-11-17 DIAGNOSIS — K29.40 ATROPHIC GASTRITIS WITHOUT HEMORRHAGE: ICD-10-CM

## 2020-11-17 DIAGNOSIS — R23.8 OTHER SKIN CHANGES (CODE): ICD-10-CM

## 2020-11-17 DIAGNOSIS — R21 RASH AND NONSPECIFIC SKIN ERUPTION: ICD-10-CM

## 2020-11-17 DIAGNOSIS — N88.9 ABNORMAL CERVIX FINDING: ICD-10-CM

## 2020-11-17 LAB
ALBUMIN SERPL-MCNC: 4.3 G/DL (ref 3.5–5)
ALP SERPL-CCNC: 141 U/L (ref 45–120)
ALT SERPL W P-5'-P-CCNC: 17 U/L (ref 0–45)
ANION GAP SERPL CALCULATED.3IONS-SCNC: 13 MMOL/L (ref 5–18)
AST SERPL W P-5'-P-CCNC: 26 U/L (ref 0–40)
BASOPHILS # BLD AUTO: 0 THOU/UL (ref 0–0.2)
BASOPHILS NFR BLD AUTO: 0 % (ref 0–2)
BILIRUB SERPL-MCNC: 0.8 MG/DL (ref 0–1)
BUN SERPL-MCNC: 17 MG/DL (ref 8–22)
CALCIUM SERPL-MCNC: 9.8 MG/DL (ref 8.5–10.5)
CHLORIDE BLD-SCNC: 105 MMOL/L (ref 98–107)
CHOLEST SERPL-MCNC: 189 MG/DL
CO2 SERPL-SCNC: 22 MMOL/L (ref 22–31)
CREAT SERPL-MCNC: 0.81 MG/DL (ref 0.6–1.1)
EOSINOPHIL # BLD AUTO: 0.1 THOU/UL (ref 0–0.4)
EOSINOPHIL NFR BLD AUTO: 2 % (ref 0–6)
ERYTHROCYTE [DISTWIDTH] IN BLOOD BY AUTOMATED COUNT: 13 % (ref 11–14.5)
FASTING STATUS PATIENT QL REPORTED: YES
GFR SERPL CREATININE-BSD FRML MDRD: >60 ML/MIN/1.73M2
GLUCOSE BLD-MCNC: 90 MG/DL (ref 70–125)
HCT VFR BLD AUTO: 40.2 % (ref 35–47)
HDLC SERPL-MCNC: 61 MG/DL
HGB BLD-MCNC: 13.3 G/DL (ref 12–16)
LDLC SERPL CALC-MCNC: 108 MG/DL
LYMPHOCYTES # BLD AUTO: 0.7 THOU/UL (ref 0.8–4.4)
LYMPHOCYTES NFR BLD AUTO: 11 % (ref 20–40)
MAGNESIUM SERPL-MCNC: 2 MG/DL (ref 1.8–2.6)
MCH RBC QN AUTO: 30.1 PG (ref 27–34)
MCHC RBC AUTO-ENTMCNC: 33 G/DL (ref 32–36)
MCV RBC AUTO: 91 FL (ref 80–100)
MONOCYTES # BLD AUTO: 0.6 THOU/UL (ref 0–0.9)
MONOCYTES NFR BLD AUTO: 11 % (ref 2–10)
NEUTROPHILS # BLD AUTO: 4.6 THOU/UL (ref 2–7.7)
NEUTROPHILS NFR BLD AUTO: 76 % (ref 50–70)
PLATELET # BLD AUTO: 277 THOU/UL (ref 140–440)
PMV BLD AUTO: 7.6 FL (ref 7–10)
POTASSIUM BLD-SCNC: 4.1 MMOL/L (ref 3.5–5)
PROT SERPL-MCNC: 7.5 G/DL (ref 6–8)
RBC # BLD AUTO: 4.41 MILL/UL (ref 3.8–5.4)
SODIUM SERPL-SCNC: 140 MMOL/L (ref 136–145)
TRIGL SERPL-MCNC: 102 MG/DL
TSH SERPL DL<=0.005 MIU/L-ACNC: 3.16 UIU/ML (ref 0.3–5)
VIT B12 SERPL-MCNC: 1388 PG/ML (ref 213–816)
WBC: 6.1 THOU/UL (ref 4–11)

## 2020-11-17 ASSESSMENT — MIFFLIN-ST. JEOR: SCORE: 1292.46

## 2020-11-19 ENCOUNTER — COMMUNICATION - HEALTHEAST (OUTPATIENT)
Dept: FAMILY MEDICINE | Facility: CLINIC | Age: 70
End: 2020-11-19

## 2020-11-22 ENCOUNTER — AMBULATORY - HEALTHEAST (OUTPATIENT)
Dept: FAMILY MEDICINE | Facility: CLINIC | Age: 70
End: 2020-11-22

## 2020-11-22 DIAGNOSIS — N84.1 CERVICAL POLYP: ICD-10-CM

## 2020-12-10 ENCOUNTER — RECORDS - HEALTHEAST (OUTPATIENT)
Dept: ADMINISTRATIVE | Facility: OTHER | Age: 70
End: 2020-12-10

## 2020-12-24 DIAGNOSIS — C83.30 DIFFUSE LARGE B-CELL LYMPHOMA, UNSPECIFIED BODY REGION (H): Primary | ICD-10-CM

## 2021-01-04 ENCOUNTER — HEALTH MAINTENANCE LETTER (OUTPATIENT)
Age: 71
End: 2021-01-04

## 2021-01-05 PROBLEM — C83.30 DLBCL (DIFFUSE LARGE B CELL LYMPHOMA) (H): Status: ACTIVE | Noted: 2020-01-30

## 2021-01-27 ENCOUNTER — ANCILLARY PROCEDURE (OUTPATIENT)
Dept: CT IMAGING | Facility: CLINIC | Age: 71
End: 2021-01-27
Payer: COMMERCIAL

## 2021-01-27 DIAGNOSIS — C83.30 DIFFUSE LARGE B-CELL LYMPHOMA, UNSPECIFIED BODY REGION (H): ICD-10-CM

## 2021-01-27 DIAGNOSIS — R05.9 COUGH: ICD-10-CM

## 2021-01-27 DIAGNOSIS — T45.1X5A ANTINEOPLASTIC ANTIBIOTICS CAUSING ADVERSE EFFECT IN THERAPEUTIC USE: ICD-10-CM

## 2021-01-27 DIAGNOSIS — R91.8 GROUND GLASS OPACITY PRESENT ON IMAGING OF LUNG: ICD-10-CM

## 2021-01-27 LAB
ALBUMIN SERPL-MCNC: 3.4 G/DL (ref 3.4–5)
ALP SERPL-CCNC: 123 U/L (ref 40–150)
ALT SERPL W P-5'-P-CCNC: 21 U/L (ref 0–50)
ANION GAP SERPL CALCULATED.3IONS-SCNC: 4 MMOL/L (ref 3–14)
AST SERPL W P-5'-P-CCNC: 18 U/L (ref 0–45)
BASOPHILS # BLD AUTO: 0.1 10E9/L (ref 0–0.2)
BASOPHILS NFR BLD AUTO: 1 %
BILIRUB SERPL-MCNC: 0.6 MG/DL (ref 0.2–1.3)
BUN SERPL-MCNC: 18 MG/DL (ref 7–30)
CALCIUM SERPL-MCNC: 8.9 MG/DL (ref 8.5–10.1)
CHLORIDE SERPL-SCNC: 106 MMOL/L (ref 94–109)
CO2 SERPL-SCNC: 27 MMOL/L (ref 20–32)
CREAT SERPL-MCNC: 0.75 MG/DL (ref 0.52–1.04)
DIFFERENTIAL METHOD BLD: NORMAL
EOSINOPHIL # BLD AUTO: 0.1 10E9/L (ref 0–0.7)
EOSINOPHIL NFR BLD AUTO: 1.5 %
ERYTHROCYTE [DISTWIDTH] IN BLOOD BY AUTOMATED COUNT: 13.9 % (ref 10–15)
GFR SERPL CREATININE-BSD FRML MDRD: 81 ML/MIN/{1.73_M2}
GLUCOSE SERPL-MCNC: 80 MG/DL (ref 70–99)
HCT VFR BLD AUTO: 36.6 % (ref 35–47)
HGB BLD-MCNC: 11.9 G/DL (ref 11.7–15.7)
IMM GRANULOCYTES # BLD: 0 10E9/L (ref 0–0.4)
IMM GRANULOCYTES NFR BLD: 0.3 %
LDH SERPL L TO P-CCNC: 203 U/L (ref 81–234)
LYMPHOCYTES # BLD AUTO: 1.1 10E9/L (ref 0.8–5.3)
LYMPHOCYTES NFR BLD AUTO: 18.7 %
MCH RBC QN AUTO: 30.1 PG (ref 26.5–33)
MCHC RBC AUTO-ENTMCNC: 32.5 G/DL (ref 31.5–36.5)
MCV RBC AUTO: 92 FL (ref 78–100)
MONOCYTES # BLD AUTO: 0.7 10E9/L (ref 0–1.3)
MONOCYTES NFR BLD AUTO: 11.8 %
NEUTROPHILS # BLD AUTO: 4 10E9/L (ref 1.6–8.3)
NEUTROPHILS NFR BLD AUTO: 66.7 %
NRBC # BLD AUTO: 0 10*3/UL
NRBC BLD AUTO-RTO: 0 /100
PLATELET # BLD AUTO: 239 10E9/L (ref 150–450)
POTASSIUM SERPL-SCNC: 3.9 MMOL/L (ref 3.4–5.3)
PROT SERPL-MCNC: 7.4 G/DL (ref 6.8–8.8)
RBC # BLD AUTO: 3.96 10E12/L (ref 3.8–5.2)
SODIUM SERPL-SCNC: 137 MMOL/L (ref 133–144)
WBC # BLD AUTO: 6 10E9/L (ref 4–11)

## 2021-01-27 PROCEDURE — 74177 CT ABD & PELVIS W/CONTRAST: CPT | Mod: GC | Performed by: RADIOLOGY

## 2021-01-27 PROCEDURE — 82784 ASSAY IGA/IGD/IGG/IGM EACH: CPT | Performed by: PATHOLOGY

## 2021-01-27 PROCEDURE — 85025 COMPLETE CBC W/AUTO DIFF WBC: CPT | Performed by: PATHOLOGY

## 2021-01-27 PROCEDURE — 80053 COMPREHEN METABOLIC PANEL: CPT | Performed by: PATHOLOGY

## 2021-01-27 PROCEDURE — 36415 COLL VENOUS BLD VENIPUNCTURE: CPT | Performed by: PATHOLOGY

## 2021-01-27 PROCEDURE — 83615 LACTATE (LD) (LDH) ENZYME: CPT | Performed by: PATHOLOGY

## 2021-01-27 PROCEDURE — 71260 CT THORAX DX C+: CPT | Mod: GC | Performed by: RADIOLOGY

## 2021-01-27 RX ORDER — IOPAMIDOL 755 MG/ML
101 INJECTION, SOLUTION INTRAVASCULAR ONCE
Status: COMPLETED | OUTPATIENT
Start: 2021-01-27 | End: 2021-01-27

## 2021-01-27 RX ADMIN — IOPAMIDOL 101 ML: 755 INJECTION, SOLUTION INTRAVASCULAR at 11:45

## 2021-01-28 LAB
CMV DNA SPEC NAA+PROBE-ACNC: NORMAL [IU]/ML
CMV DNA SPEC NAA+PROBE-LOG#: NORMAL {LOG_IU}/ML
IGG SERPL-MCNC: 1264 MG/DL (ref 610–1616)
SPECIMEN SOURCE: NORMAL

## 2021-02-03 ENCOUNTER — RECORDS - HEALTHEAST (OUTPATIENT)
Dept: ADMINISTRATIVE | Facility: OTHER | Age: 71
End: 2021-02-03

## 2021-02-03 ENCOUNTER — VIRTUAL VISIT (OUTPATIENT)
Dept: TRANSPLANT | Facility: CLINIC | Age: 71
End: 2021-02-03
Payer: COMMERCIAL

## 2021-02-03 DIAGNOSIS — C83.32 DIFFUSE LARGE B-CELL LYMPHOMA OF INTRATHORACIC LYMPH NODES (H): Primary | ICD-10-CM

## 2021-02-03 PROCEDURE — 99442 PR PHYSICIAN TELEPHONE EVALUATION 11-20 MIN: CPT

## 2021-02-03 PROCEDURE — 999N001193 HC VIDEO/TELEPHONE VISIT; NO CHARGE

## 2021-02-03 NOTE — PROGRESS NOTES
"Chuyita is a 70 year old who is being evaluated via a billable telephone visit.      What phone number would you like to be contacted at? [phone]  How would you like to obtain your AVS? MyChart     Vitals - Patient Reported  Weight (Patient Reported): 78.9 kg (174 lb)  Height (Patient Reported): 165.1 cm (5' 5\")  BMI (Based on Pt Reported Ht/Wt): 28.95  Pain Score: No Pain (0)    Phone call duration: 11 minutes    Brandi Marquez MA      Oncology HPI:    Chuyita Porter is a 70 year old female with past medical history of breast cancer (right breast cancer in 2005 and left breast cancer in 2015 followed by Dr. Gallardo) with diffuse large B cell lymphoma.    Disease:  DLBCL nonGCB with mediastinal mass and thyroid gland involvement   - 4-6 BCL2 signals (81%)    - No rearrangement of BCL6, MYC, or BCL2   - No IGH-MYC fusion  Bulky disease, Stage IIB, IPI  3 ()  S/p 6 cycles R-CHOP    INTERIM HISTORY: Anneliese is feeling great, has no new complains, her chest and breathing feel normal, no fevers or sweats, energy is great. She is eating normally, weight is stable.      HPI:   She presented with respiratory failure due to large mediastinal mass.    Was intubated and in OR on 2/17/2020 underwent for open neck biopsy, tracheal stent, and PEG tube placement.  S/p two fractions of radiation on 2/18 and 2/19 and then started R-CHOP on 2/21.  Complications: neutropenic fever due to HCAP   debility and severe malnutrition.Completed 6 cycles of R-CHOP in June 2020.     Current Outpatient Medications   Medication     loratadine (CLARITIN) 10 MG tablet     acetaminophen (TYLENOL) 325 MG tablet     fluticasone (FLONASE) 50 MCG/ACT nasal spray     LORazepam (ATIVAN) 0.5 MG tablet     ondansetron (ZOFRAN) 4 MG tablet     prochlorperazine (COMPAZINE) 10 MG tablet     No current facility-administered medications for this visit.          No Known Allergies        Exam: alert, appears well.  Talking efortlessly, in good " chika.         Labs:   Lab Results   Component Value Date    WBC 6.0 01/27/2021    ANEU 4.0 01/27/2021    HGB 11.9 01/27/2021    HCT 36.6 01/27/2021     01/27/2021     01/27/2021    POTASSIUM 3.9 01/27/2021    CHLORIDE 106 01/27/2021    CO2 27 01/27/2021    GLC 80 01/27/2021    BUN 18 01/27/2021    CR 0.75 01/27/2021    MAG 2.5 (H) 03/06/2020    INR 1.21 (H) 02/19/2020    AST 18 01/27/2021    ALT 21 01/27/2021         Chest ct 1/27/2021  IMPRESSION:   In this patient with history of diffuse large B-cell lymphoma and  bilateral breast cancer, there is no evidence of disease progression:  1. Mildly decreased size of right superior mediastinal soft tissue  mass. No suspicious adenopathy or evidence of disease in the remaining  chest, abdomen, or pelvis.  2. Unchanged few small solid pulmonary nodules.   3. Mildly decreased subcentimeter left upper lobe groundglass opacity  with unchanged mild biapical predominant centrilobular nodularity,  nonspecific, though can be seen in hypersensitivity pneumonitis.  4. Mildly prominent appendix up to 9 mm without secondary findings to  suggest acute appendicitis. Internal dense material is present along  with foci of air, further suggesting against acute appendicitis.  Attention on follow-up.  5. Additional incidental/chronic findings as noted above, not  significantly changed since 7/20/2020.          Impression/plan:   DLBCL with mediastinal mass compressing the SVC with associated collateral vessels, no disease in the abdomen/pelvis, marrow not done.   S/p 6 cycles of R-CHOP and fes doses of XRT  AR after 2 cycles  EOT PET with complete metabolic remission.    Now in ongoing remission.     We discuss a plan for surveillance and repeat CT CAP in 12 months.          Pulm: was noted to have a RLL infiltrate on PET/CT from April, now resolved on PET.    ID no active infection.  OVerall, Chuyita did great, she is happy to hear the news and look forward to continue to  full recovery. She knows to reach us if she develops new problem.   Recc to get covid vaccine   rtC IN 6 months with CBC, comp. CT at 12 months.       Rosy Sprague MD  Professor of Medicine  [phone]

## 2021-02-03 NOTE — LETTER
"    2/3/2021         RE: Chuyita Porter  [address]  [facility] [address]        Dear Colleague,    Thank you for referring your patient, Chuyita Porter, to the [facility] BLOOD AND MARROW TRANSPLANT PROGRAM [place]. Please see a copy of my visit note below.    Chuyita is a 70 year old who is being evaluated via a billable telephone visit.      What phone number would you like to be contacted at? [phone]  How would you like to obtain your AVS? MyChart     Vitals - Patient Reported  Weight (Patient Reported): 78.9 kg (174 lb)  Height (Patient Reported): 165.1 cm (5' 5\")  BMI (Based on Pt Reported Ht/Wt): 28.95  Pain Score: No Pain (0)    Phone call duration: 11 minutes    Brandi Marquez MA      Oncology HPI:    Chuyita Porter is a 70 year old female with past medical history of breast cancer (right breast cancer in 2005 and left breast cancer in 2015 followed by Dr. Gallardo) with diffuse large B cell lymphoma.    Disease:  DLBCL nonGCB with mediastinal mass and thyroid gland involvement   - 4-6 BCL2 signals (81%)    - No rearrangement of BCL6, MYC, or BCL2   - No IGH-MYC fusion  Bulky disease, Stage IIB, IPI  3 ()  S/p 6 cycles R-CHOP    INTERIM HISTORY: Anneliese is feeling great, has no new complains, her chest and breathing feel normal, no fevers or sweats, energy is great. She is eating normally, weight is stable.      HPI:   She presented with respiratory failure due to large mediastinal mass.    Was intubated and in OR on 2/17/2020 underwent for open neck biopsy, tracheal stent, and PEG tube placement.  S/p two fractions of radiation on 2/18 and 2/19 and then started R-CHOP on 2/21.  Complications: neutropenic fever due to HCAP   debility and severe malnutrition.Completed 6 cycles of R-CHOP in June 2020.     Current Outpatient Medications   Medication     loratadine (CLARITIN) 10 MG tablet     acetaminophen (TYLENOL) 325 MG tablet     fluticasone (FLONASE) 50 MCG/ACT nasal spray "     LORazepam (ATIVAN) 0.5 MG tablet     ondansetron (ZOFRAN) 4 MG tablet     prochlorperazine (COMPAZINE) 10 MG tablet     No current facility-administered medications for this visit.          No Known Allergies        Exam: alert, appears well.  Talking efortlessly, in good spirits.         Labs:   Lab Results   Component Value Date    WBC 6.0 01/27/2021    ANEU 4.0 01/27/2021    HGB 11.9 01/27/2021    HCT 36.6 01/27/2021     01/27/2021     01/27/2021    POTASSIUM 3.9 01/27/2021    CHLORIDE 106 01/27/2021    CO2 27 01/27/2021    GLC 80 01/27/2021    BUN 18 01/27/2021    CR 0.75 01/27/2021    MAG 2.5 (H) 03/06/2020    INR 1.21 (H) 02/19/2020    AST 18 01/27/2021    ALT 21 01/27/2021         Chest ct 1/27/2021  IMPRESSION:   In this patient with history of diffuse large B-cell lymphoma and  bilateral breast cancer, there is no evidence of disease progression:  1. Mildly decreased size of right superior mediastinal soft tissue  mass. No suspicious adenopathy or evidence of disease in the remaining  chest, abdomen, or pelvis.  2. Unchanged few small solid pulmonary nodules.   3. Mildly decreased subcentimeter left upper lobe groundglass opacity  with unchanged mild biapical predominant centrilobular nodularity,  nonspecific, though can be seen in hypersensitivity pneumonitis.  4. Mildly prominent appendix up to 9 mm without secondary findings to  suggest acute appendicitis. Internal dense material is present along  with foci of air, further suggesting against acute appendicitis.  Attention on follow-up.  5. Additional incidental/chronic findings as noted above, not  significantly changed since 7/20/2020.          Impression/plan:   DLBCL with mediastinal mass compressing the SVC with associated collateral vessels, no disease in the abdomen/pelvis, marrow not done.   S/p 6 cycles of R-CHOP and fes doses of XRT  [place] after 2 cycles  EOT PET with complete metabolic remission.    Now in ongoing remission.      We discuss a plan for surveillance and repeat CT CAP in 12 months.          Pulm: was noted to have a RLL infiltrate on PET/CT from April, now resolved on PET.    ID no active infection.  OVerall, Chuyita did great, she is happy to hear the news and look forward to continue to full recovery. She knows to reach us if she develops new problem.   Recc to get covid vaccine   rtC IN 6 months with CBC, comp. CT at 12 months.       Rosy Sprague MD  Professor of Medicine  [phone]

## 2021-02-08 PROBLEM — C83.32 DIFFUSE LARGE B-CELL LYMPHOMA OF INTRATHORACIC LYMPH NODES (H): Status: ACTIVE | Noted: 2020-02-20

## 2021-05-25 ENCOUNTER — RECORDS - HEALTHEAST (OUTPATIENT)
Dept: ADMINISTRATIVE | Facility: CLINIC | Age: 71
End: 2021-05-25

## 2021-05-26 ENCOUNTER — RECORDS - HEALTHEAST (OUTPATIENT)
Dept: ADMINISTRATIVE | Facility: CLINIC | Age: 71
End: 2021-05-26

## 2021-05-26 VITALS
RESPIRATION RATE: 16 BRPM | HEART RATE: 76 BPM | SYSTOLIC BLOOD PRESSURE: 118 MMHG | TEMPERATURE: 98.2 F | DIASTOLIC BLOOD PRESSURE: 72 MMHG | OXYGEN SATURATION: 96 %

## 2021-05-26 VITALS
SYSTOLIC BLOOD PRESSURE: 124 MMHG | OXYGEN SATURATION: 98 % | TEMPERATURE: 97.7 F | HEART RATE: 96 BPM | RESPIRATION RATE: 16 BRPM | DIASTOLIC BLOOD PRESSURE: 80 MMHG

## 2021-05-26 VITALS
SYSTOLIC BLOOD PRESSURE: 130 MMHG | HEART RATE: 92 BPM | DIASTOLIC BLOOD PRESSURE: 80 MMHG | TEMPERATURE: 98 F | RESPIRATION RATE: 16 BRPM | OXYGEN SATURATION: 97 %

## 2021-05-26 VITALS — DIASTOLIC BLOOD PRESSURE: 73 MMHG | SYSTOLIC BLOOD PRESSURE: 122 MMHG | OXYGEN SATURATION: 98 % | HEART RATE: 82 BPM

## 2021-05-27 ENCOUNTER — RECORDS - HEALTHEAST (OUTPATIENT)
Dept: ADMINISTRATIVE | Facility: CLINIC | Age: 71
End: 2021-05-27

## 2021-05-27 VITALS
TEMPERATURE: 98.3 F | OXYGEN SATURATION: 98 % | HEART RATE: 80 BPM | DIASTOLIC BLOOD PRESSURE: 78 MMHG | SYSTOLIC BLOOD PRESSURE: 122 MMHG

## 2021-05-27 VITALS
OXYGEN SATURATION: 99 % | SYSTOLIC BLOOD PRESSURE: 116 MMHG | DIASTOLIC BLOOD PRESSURE: 80 MMHG | HEART RATE: 87 BPM | RESPIRATION RATE: 18 BRPM | TEMPERATURE: 98.6 F

## 2021-05-27 VITALS
HEART RATE: 105 BPM | RESPIRATION RATE: 16 BRPM | SYSTOLIC BLOOD PRESSURE: 114 MMHG | DIASTOLIC BLOOD PRESSURE: 70 MMHG | TEMPERATURE: 98.7 F | OXYGEN SATURATION: 100 %

## 2021-05-27 VITALS
HEART RATE: 98 BPM | TEMPERATURE: 98.5 F | RESPIRATION RATE: 18 BRPM | SYSTOLIC BLOOD PRESSURE: 104 MMHG | OXYGEN SATURATION: 98 % | DIASTOLIC BLOOD PRESSURE: 68 MMHG

## 2021-05-27 VITALS — OXYGEN SATURATION: 99 % | DIASTOLIC BLOOD PRESSURE: 67 MMHG | SYSTOLIC BLOOD PRESSURE: 97 MMHG | HEART RATE: 109 BPM

## 2021-05-27 VITALS
DIASTOLIC BLOOD PRESSURE: 77 MMHG | RESPIRATION RATE: 16 BRPM | TEMPERATURE: 99.2 F | SYSTOLIC BLOOD PRESSURE: 115 MMHG | OXYGEN SATURATION: 99 % | HEART RATE: 104 BPM

## 2021-05-28 ENCOUNTER — RECORDS - HEALTHEAST (OUTPATIENT)
Dept: ADMINISTRATIVE | Facility: CLINIC | Age: 71
End: 2021-05-28

## 2021-05-29 ENCOUNTER — RECORDS - HEALTHEAST (OUTPATIENT)
Dept: ADMINISTRATIVE | Facility: CLINIC | Age: 71
End: 2021-05-29

## 2021-05-30 VITALS — BODY MASS INDEX: 26 KG/M2 | WEIGHT: 161.1 LBS

## 2021-05-30 NOTE — PROGRESS NOTES
ASSESSMENT:  1. Knee injury, left, initial encounter  XR Knee Left Plus Sunrise VW   2. Fall, initial encounter         PLAN:  No fracture seen on initial x-ray, continue to monitor for improvement and let us know if not improving as expected.  No problem-specific Assessment & Plan notes found for this encounter.      There are no Patient Instructions on file for this visit.    Orders Placed This Encounter   Procedures     XR Knee Left Plus Sunrise VW     Standing Status:   Future     Number of Occurrences:   1     Standing Expiration Date:   6/26/2020     Order Specific Question:   Can the procedure be changed per Radiologist protocol?     Answer:   Yes     There are no discontinued medications.    No follow-ups on file.    CHIEF COMPLAINT:  Chief Complaint   Patient presents with     Fall     pt fell last week. left knee is a little sore        HISTORY OF PRESENT ILLNESS:  Chuyita is a 69 y.o. female here today after a fall a week and a half ago.  Patient fell while walking into the [employer], misjudged the curb and tripped slightly with her right foot landing on both hands and knees.  Her left knee took a little more force on the fall and has been bothering her since then.  Right knee was a little bit painful but is better now.  Left knee is swollen, painful when she goes up or down the steps.  Otherwise she has been babying it and it feels slightly tender but not too bad when she is not doing certain movements such as climbing the steps.  No known previous history of knee injury or osteoarthritis although she suspects some osteoarthritis in both knees.  There is a little bit of bruising but nothing exquisite.  She has been icing the knees in the evening otherwise no treatments at home tried except elevation and decreasing activity level.    REVIEW OF SYSTEMS:        All other systems are negative  PFSH:  Reviewed, no changes      TOBACCO USE:  Social History     Tobacco Use   Smoking Status Never Smoker    Smokeless Tobacco Never Used       VITALS:  Vitals:    06/26/19 1336   BP: 123/80   Patient Site: Right Arm   Patient Position: Sitting   Cuff Size: Adult Regular   Pulse: 71   Resp: 16   Weight: 164 lb 6 oz (74.6 kg)     Wt Readings from Last 3 Encounters:   06/26/19 164 lb 6 oz (74.6 kg)   03/07/19 163 lb 3.2 oz (74 kg)   12/31/18 169 lb 6.4 oz (76.8 kg)       PHYSICAL EXAM:   /80 (Patient Site: Right Arm, Patient Position: Sitting, Cuff Size: Adult Regular)   Pulse 71   Resp 16   Wt 164 lb 6 oz (74.6 kg)   BMI 26.53 kg/m    General appearance: alert, appears stated age and cooperative  Extremities: extremities normal, atraumatic, no cyanosis or edema and Right knee exam benign.  Left knee medial and lateral joint pain to palpation, anterior swelling medial and superior to the patella, patellar bursitis, slightly tender to palpation.bruising over the left patellar area, yellowish-greenish in color  Pulses: 2+ and symmetric  Skin: Skin color, texture, turgor normal. No rashes or lesions  Neurologic: Grossly normal    DATA REVIEWED:  Additional History from Old Records Summarized (2): 0  Decision to Obtain Records (1): 0  Radiology Tests Summarized or Ordered (1): Left knee x-ray  Labs Reviewed or Ordered (1): 0  Medicine Test Summarized or Ordered (1): 0  Independent Review of EKG or X-RAY(2 each): No fracture    The visit lasted a total of 25 minutes face to face with the patient. Over 50% of the time was spent counseling and educating the patient about plan of care.    MEDICATIONS:  Current Outpatient Medications   Medication Sig Dispense Refill     anastrozole (ARIMIDEX) 1 mg tablet TAKE 1 TABLET EVERY DAY 90 tablet 3     calcium carbonate-vitamin D3 (CALCIUM 600 + D,3,) 600 mg(1,500mg) -200 unit per tablet Take 1 tablet by mouth daily. As directed       loratadine (CLARITIN) 10 mg tablet Take 1 tablet (10 mg total) by mouth daily. (Patient taking differently: Take 10 mg by mouth as needed (seasonal  usually in the Fall). ) 30 tablet 0     MULTIVITAMIN ORAL Take 1 tablet by mouth daily.       No current facility-administered medications for this visit.        This note has been dictated using voice recognition software. Any grammatical or context distortions are unintentional and inherent to the software

## 2021-05-30 NOTE — TELEPHONE ENCOUNTER
Patient Returning Call  Reason for call:  Patient calling back  Information relayed to patient:  Relayed below message  Patient has additional questions:  No  If YES, what are your questions/concerns:  none  Okay to leave a detailed message?: No call back needed

## 2021-05-30 NOTE — TELEPHONE ENCOUNTER
----- Message from LIEN Jim sent at 6/27/2019  7:55 AM CDT -----  No fracture-small area of swelling in the joint. Continue to monitor and treat as discussed. Pain should improve with time.

## 2021-05-31 ENCOUNTER — RECORDS - HEALTHEAST (OUTPATIENT)
Dept: ADMINISTRATIVE | Facility: CLINIC | Age: 71
End: 2021-05-31

## 2021-05-31 VITALS — WEIGHT: 161 LBS | BODY MASS INDEX: 25.99 KG/M2

## 2021-05-31 VITALS — BODY MASS INDEX: 25.58 KG/M2 | WEIGHT: 158.5 LBS

## 2021-05-31 VITALS — WEIGHT: 155.9 LBS | BODY MASS INDEX: 25.16 KG/M2

## 2021-05-31 VITALS — WEIGHT: 160 LBS | BODY MASS INDEX: 25.82 KG/M2

## 2021-05-31 VITALS — WEIGHT: 156.56 LBS | BODY MASS INDEX: 25.27 KG/M2

## 2021-05-31 VITALS — BODY MASS INDEX: 27.5 KG/M2 | WEIGHT: 170.4 LBS

## 2021-05-31 VITALS — WEIGHT: 158.5 LBS | BODY MASS INDEX: 25.58 KG/M2

## 2021-06-01 VITALS — WEIGHT: 174.6 LBS | BODY MASS INDEX: 28.18 KG/M2

## 2021-06-01 VITALS — BODY MASS INDEX: 27.92 KG/M2 | WEIGHT: 173 LBS

## 2021-06-01 NOTE — PROGRESS NOTES
Assessment and Plan:        Chuyita was seen today for annual wellness visit.    Diagnoses and all orders for this visit:    Encounter for Medicare annual wellness exam-No underlying concerns at this time.  Routine screening fasting labs were obtained including hepatitis C screening for once a lifetime evaluation.  Also has had a plethora of labs done in January with gastroenterology that I reviewed.  Screening for autoimmune issues once a year may be appropriate given the potential association with autoimmune metaplastic atrophic gastritis.  Patient does have endocervical polyp which could be secondary to her chemotherapy agent.  We will continue to monitor.  Pap smear last year was normal.  No postmenopausal spotting.  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  She suspects they will contact her for this.    Encounter for hepatitis C screening test for low risk patient  -     Hepatitis C Antibody (Anti-HCV)    Malignant neoplasm of lower-outer quadrant of left breast of female, estrogen receptor positive (H)-following with Dr. Gallardo.  Currently is on Arimidex.  She is not sure how long she will be continued on this medication as she is concerned that when she stopped it she did have recurrence of breast cancer.  I encouraged her to ask him if she does stop it if she should have a different type of surveillance including alternating MRI of the breast    Elevated alkaline phosphatase level-given the issues with the metaplastic gastritis I will add on a GGT.  It is only minimally elevated and she is not symptomatic from a gallbladder perspective  -     GGT (Gamma GT)    Other orders  -     Influenza High Dose, Seasonal 65+ yrs      Follow-up in 1 year    The patient's current medical problems were reviewed.    I have  had an Advance Directives discussion with the patient.  The following high BMI interventions were performed this visit: encouragement to exercise and lifestyle education regarding diet  The following health maintenance schedule was reviewed with the patient and provided in printed form in the after visit summary:   Health Maintenance   Topic Date Due     DXA SCAN  02/14/2020     MAMMOGRAM  08/13/2020     MEDICARE ANNUAL WELLNESS VISIT  09/10/2020     FALL RISK ASSESSMENT  09/10/2020     ADVANCE CARE PLANNING  09/10/2024     TD 18+ HE  10/19/2024     COLONOSCOPY  07/27/2025     HEPATITIS C SCREENING  Completed     PNEUMOCOCCAL POLYSACCHARIDE VACCINE AGE 65 AND OVER  Completed     INFLUENZA VACCINE RULE BASED  Completed     PNEUMOCOCCAL CONJUGATE VACCINE FOR ADULTS (PCV13 OR PREVNAR)  Completed     ZOSTER VACCINES  Completed        Subjective:   Chief Complaint: Chuyita Porter is an 69 y.o. female here for an Annual Wellness visit along with knee injury, epigastric abdominal pain, and health maintenance.   HPI:    Patient has no new concerns.     Knee Injury: Patient had a knee injury when she tripped at the end of June. The knee is now fine and has no knee pain when going up and down the stairs.     Epigastric abdominal pain: Patient with a Hx of breast cancer .  Was having some issues with abdominal pain at the end part of 2018.  Some my partner was referred to [place] [facility] who noted her to have autoimmune metaplastic atrophic gastritis.  Was recommended that she stop omeprazole.  She has been asymptomatic.  Is going to do a repeat mapping biopsy of her stomach in 1 year from evaluation to ensure no changes with future endoscopies.  There was no finding for the trigger of the gastritis. Some polyps of the stomach gotten removed. She denies stomach pain and issues. Her gastrin levels were normal on 1/30/19. She is not taking omeprazole it was recommended if she has recurrence  that she be put on  Carafate 4 times daily.  she exercises by doing 45 minute walks around the neighborhood daily.   Last year we did do a Pap smear because there were some changes seen on cervical exam.  She would like this reevaluated.  Pap smear was negative  Health Maintenance: In 2017, the DXA scan was normal. She would need to repeat it next year.      Review of Systems:   Positive with hoarse voice. She takes 1 tablet of Claritin at night and is still waking up with allergy symptoms but it is not bothersome to her.     She denies chest pain, shortness of breath, exacerbation with movement, constipation, diarrhea, difficulty swallowing, post menopausal bleeding, abdominal pain, knee pain, and problems with intercourse.  Please see above.  The rest of the review of systems are negative for all systems.    Patient Care Team:  Phyllis Juarez DO as PCP - General (Family Medicine)  Bam Gallardo MD as Physician (Hematology and Oncology)  Zuleima Hammond MD as Physician (Radiation Oncology)  Candy Ch RN as Registered Nurse (Hematology and Oncology)  Thomas Christianson MD as Assigned PCP     Patient Active Problem List   Diagnosis     Malignant neoplasm of lower-outer quadrant of left breast of female, estrogen receptor positive (H)     Abnormal cervix finding     Autoimmune metaplastic  Atrophic gastritis EGD 2019, repeat mapping in 1 yr      Past Medical History:   Diagnosis Date     Breast cancer (H)     right     Breast cancer (H)     left     Gastritis     autoimmune atrophic gastritis      Hx of radiation therapy       Past Surgical History:   Procedure Laterality Date     BREAST BIOPSY       BREAST LUMPECTOMY Right     + radiation     BREAST LUMPECTOMY Left 2015     TONSILLECTOMY        Family History   Problem Relation Age of Onset     Breast cancer Paternal Aunt 70     Stroke Mother          75     Prostate cancer Father 70     Osteoporosis Father      Breast cancer  Paternal Aunt 70     Breast cancer Paternal Grandmother 30     Ovarian cancer Cousin 60     Stomach cancer Maternal Aunt 90     Prostate cancer Maternal Uncle 70     Prostate cancer Maternal Uncle 70     Prostate cancer Cousin 60     Lung cancer Cousin      Kidney cancer Cousin 60     No Medical Problems Daughter      No Medical Problems Daughter       Social History     Socioeconomic History     Marital status:      Spouse name: Not on file     Number of children: Not on file     Years of education: Not on file     Highest education level: Not on file   Occupational History     Occupation: administrative support     Social Needs     Financial resource strain: Not on file     Food insecurity:     Worry: Not on file     Inability: Not on file     Transportation needs:     Medical: Not on file     Non-medical: Not on file   Tobacco Use     Smoking status: Never Smoker     Smokeless tobacco: Never Used   Substance and Sexual Activity     Alcohol use: No     Drug use: No     Sexual activity: Never   Lifestyle     Physical activity:     Days per week: Not on file     Minutes per session: Not on file     Stress: Not on file   Relationships     Social connections:     Talks on phone: Not on file     Gets together: Not on file     Attends Evangelical service: Not on file     Active member of club or organization: Not on file     Attends meetings of clubs or organizations: Not on file     Relationship status: Not on file     Intimate partner violence:     Fear of current or ex partner: Not on file     Emotionally abused: Not on file     Physically abused: Not on file     Forced sexual activity: Not on file   Other Topics Concern     Not on file   Social History Narrative    She is  to Ranjit, with whom she lives. 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 As directed       loratadine (CLARITIN) 10 mg tablet Take 1 tablet (10 mg total) by mouth daily. (Patient taking differently: Take 10 mg by mouth as needed (seasonal usually in the Fall). ) 30 tablet 0     MULTIVITAMIN ORAL Take 1 tablet by mouth daily.       No current facility-administered medications for this visit.       Objective:   Vital Signs:   Visit Vitals  /77   Pulse 67   Temp (!) 96  F (35.6  C) (Oral)   Resp 16   Ht 5' 5.25\" (1.657 m)   Wt 166 lb 9.6 oz (75.6 kg)   SpO2 96%   BMI 27.51 kg/m         VisionScreening:  No exam data present     PHYSICAL EXAM  General Appearance: Alert, cooperative, no distress, appears stated age  Head: Normocephalic, without obvious abnormality, atraumatic  Eyes: PERRL, conjunctiva/corneas clear, EOM's intact  Ears: Normal TM's and external ear canals, both ears  Nose: Nares normal, septum midline,mucosa normal, no drainage  Throat: Lips, mucosa, and tongue normal; teeth and gums normal  Neck: Supple, symmetrical, trachea midline, no adenopathy;  thyroid: not enlarged, symmetric, no tenderness/mass/nodules; no carotid bruit or JVD  Back: Symmetric, no curvature, ROM normal, no CVA tenderness  Lungs: Clear to auscultation bilaterally, respirations unlabored  Breasts: No breast masses, tenderness, asymmetry, or nipple discharge.  Slight density overlying previous lumpectomy  Heart: Regular rate and rhythm, S1 and S2 normal, no murmur, rub, or gallop,   Abdomen: Soft, non-tender, bowel sounds active all four quadrants,  no masses, no organomegaly  Extremities: Extremities normal, atraumatic, no cyanosis or edema  Skin: Numerous seborrheic keratosis.  No concerning skin findings  Lymph nodes: Cervical, supraclavicular, and axillary nodes normal  Neurologic: Normal    endocervical polyp.  Nonbleeding      Assessment Results 9/10/2019   Activities of Daily Living " No help needed   Instrumental Activities of Daily Living No help needed   Mini Cog Total Score 5   Some recent data might be hidden     A Mini-Cog score of 0-2 suggests the possibility of dementia, score of 3-5 suggests no dementia    Identified Health Risks:     The patient was counseled and encouraged to consider modifying their diet and eating habits. She was provided with information on recommended healthy diet options.  She is at risk for falling and has been provided with information to reduce the risk of falling at home.      ADDITIONAL HISTORY SUMMARIZED (2): Reviewed [facility], 1/30/19.   DECISION TO OBTAIN EXTRA INFORMATION (1): None.   RADIOLOGY TESTS (1): None.  LABS (1): Ordered Comprehensive Metabolic Panel and Hepatitis C labs Today.   MEDICINE TESTS (1): Reviewed bone density scan  INDEPENDENT REVIEW (2 each): None.     The visit lasted a total of 24 minutes face to face with the patient. Over 50% of the time was spent counseling and educating the patient about knee injury, epigastric abdominal pain, and health maintenance.    IVelia, am scribing for and in the presence of, Dr. Juarez.    IDr. Juarez, personally performed the services described in this documentation, as scribed by Velia Pham in my presence, and it is both accurate and complete.    Total data points: 3

## 2021-06-02 VITALS — WEIGHT: 178 LBS | BODY MASS INDEX: 28.73 KG/M2

## 2021-06-02 VITALS — BODY MASS INDEX: 26.34 KG/M2 | WEIGHT: 163.2 LBS

## 2021-06-02 VITALS — WEIGHT: 169.4 LBS | BODY MASS INDEX: 27.34 KG/M2

## 2021-06-03 VITALS
HEART RATE: 74 BPM | RESPIRATION RATE: 16 BRPM | OXYGEN SATURATION: 97 % | SYSTOLIC BLOOD PRESSURE: 130 MMHG | DIASTOLIC BLOOD PRESSURE: 82 MMHG | TEMPERATURE: 98.7 F | BODY MASS INDEX: 28.4 KG/M2 | WEIGHT: 172 LBS

## 2021-06-03 VITALS
SYSTOLIC BLOOD PRESSURE: 110 MMHG | HEART RATE: 76 BPM | DIASTOLIC BLOOD PRESSURE: 60 MMHG | WEIGHT: 171 LBS | BODY MASS INDEX: 28.24 KG/M2

## 2021-06-03 VITALS
DIASTOLIC BLOOD PRESSURE: 79 MMHG | HEART RATE: 74 BPM | OXYGEN SATURATION: 98 % | WEIGHT: 169.8 LBS | TEMPERATURE: 99.3 F | BODY MASS INDEX: 28.04 KG/M2 | SYSTOLIC BLOOD PRESSURE: 123 MMHG | RESPIRATION RATE: 12 BRPM

## 2021-06-03 VITALS
SYSTOLIC BLOOD PRESSURE: 128 MMHG | TEMPERATURE: 96 F | BODY MASS INDEX: 27.76 KG/M2 | HEIGHT: 65 IN | DIASTOLIC BLOOD PRESSURE: 77 MMHG | OXYGEN SATURATION: 96 % | WEIGHT: 166.6 LBS | RESPIRATION RATE: 16 BRPM | HEART RATE: 67 BPM

## 2021-06-03 VITALS — BODY MASS INDEX: 26.53 KG/M2 | WEIGHT: 164.38 LBS

## 2021-06-04 VITALS
OXYGEN SATURATION: 98 % | WEIGHT: 174.1 LBS | BODY MASS INDEX: 28.97 KG/M2 | HEART RATE: 83 BPM | SYSTOLIC BLOOD PRESSURE: 124 MMHG | DIASTOLIC BLOOD PRESSURE: 76 MMHG

## 2021-06-04 VITALS
WEIGHT: 169 LBS | RESPIRATION RATE: 16 BRPM | HEART RATE: 80 BPM | DIASTOLIC BLOOD PRESSURE: 79 MMHG | BODY MASS INDEX: 28.16 KG/M2 | TEMPERATURE: 97.6 F | HEIGHT: 65 IN | OXYGEN SATURATION: 98 % | SYSTOLIC BLOOD PRESSURE: 127 MMHG

## 2021-06-04 VITALS
BODY MASS INDEX: 27.61 KG/M2 | OXYGEN SATURATION: 99 % | DIASTOLIC BLOOD PRESSURE: 80 MMHG | SYSTOLIC BLOOD PRESSURE: 120 MMHG | WEIGHT: 167.19 LBS | HEART RATE: 94 BPM

## 2021-06-04 VITALS
DIASTOLIC BLOOD PRESSURE: 80 MMHG | SYSTOLIC BLOOD PRESSURE: 130 MMHG | WEIGHT: 168.5 LBS | HEART RATE: 95 BPM | BODY MASS INDEX: 27.83 KG/M2 | OXYGEN SATURATION: 98 %

## 2021-06-04 NOTE — PROGRESS NOTES
Assessment/Plan:    1. Bronchitis  Suspect bronchitis as cause of symptoms.  Given duration and upcoming holiday will treat with azithromycin as below.  Also discussed management with the following: Adequate hydration, adequate rest, start Mucinex, warm water with honey for cough or cough syrup/medication, Tylenol/ibuprofen.  Return to clinic as needed.  - azithromycin (ZITHROMAX Z-MEET) 250 MG tablet; Take 2 tablets (500 mg) on  Day 1,  followed by 1 tablet (250 mg) once daily on Days 2 through 5.  Dispense: 6 tablet; Refill: 0      Follow up: as needed for persistent or worsening symptoms    Sofy Liang MD  [facility]    Subjective:    Patient ID: Chuyita Porter is a 69 y.o. female is here today for cough    Cough   -started 3-4 weeks ago  -states has a chronic cough but worsened over past 3-4 weeks  -feels like stuck in back of head/throat, feeling neck swelling  -increased mucus  -sometimes can hear wheezing  -morning is the worst  -states typically has nasal congestion but not so much this time  -no fevers  -sometimes bringing mucus up, white color  -daughter was ill around thanksgiving   -has tried robitussin, delsym cough  -no ear or eye symptoms    Patient Active Problem List   Diagnosis     Malignant neoplasm of lower-outer quadrant of left breast of female, estrogen receptor positive (H)     Abnormal cervix finding     Autoimmune metaplastic  Atrophic gastritis EGD 1/2019, repeat mapping in 1 yr      Past Medical History:   Diagnosis Date     Breast cancer (H) 2005    right     Breast cancer (H) 2015    left     Gastritis     autoimmune atrophic gastritis      Hx of radiation therapy 2005     Past Surgical History:   Procedure Laterality Date     BREAST BIOPSY       BREAST LUMPECTOMY Right 2005    + radiation     BREAST LUMPECTOMY Left 09/03/2015     TONSILLECTOMY       Current Outpatient Medications on File Prior to Visit   Medication Sig Dispense Refill     anastrozole (ARIMIDEX) 1  mg tablet TAKE 1 TABLET EVERY DAY 90 tablet 3     calcium carbonate-vitamin D3 (CALCIUM 600 + D,3,) 600 mg(1,500mg) -200 unit per tablet Take 1 tablet by mouth daily. As directed       loratadine (CLARITIN) 10 mg tablet Take 1 tablet (10 mg total) by mouth daily. (Patient taking differently: Take 10 mg by mouth as needed (seasonal usually in the Fall). ) 30 tablet 0     MULTIVITAMIN ORAL Take 1 tablet by mouth daily.       No current facility-administered medications on file prior to visit.      No Known Allergies  Social History     Socioeconomic History     Marital status:      Spouse name: Not on file     Number of children: Not on file     Years of education: Not on file     Highest education level: Not on file   Occupational History     Occupation: administrative support     Social Needs     Financial resource strain: Not on file     Food insecurity:     Worry: Not on file     Inability: Not on file     Transportation needs:     Medical: Not on file     Non-medical: Not on file   Tobacco Use     Smoking status: Never Smoker     Smokeless tobacco: Never Used   Substance and Sexual Activity     Alcohol use: No     Drug use: No     Sexual activity: Never   Lifestyle     Physical activity:     Days per week: Not on file     Minutes per session: Not on file     Stress: Not on file   Relationships     Social connections:     Talks on phone: Not on file     Gets together: Not on file     Attends Confucianism service: Not on file     Active member of club or organization: Not on file     Attends meetings of clubs or organizations: Not on file     Relationship status: Not on file     Intimate partner violence:     Fear of current or ex partner: Not on file     Emotionally abused: Not on file     Physically abused: Not on file     Forced sexual activity: Not on file   Other Topics Concern     Not on file   Social History Narrative    She is  to Ranjit, with whom she lives. 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     Family History   Problem Relation Age of Onset     Breast cancer Paternal Aunt 70     Stroke Mother          75     Prostate cancer Father 70     Osteoporosis Father      Breast cancer Paternal Aunt 70     Breast cancer Paternal Grandmother 30     Ovarian cancer Cousin 60     Stomach cancer Maternal Aunt 90     Prostate cancer Maternal Uncle 70     Prostate cancer Maternal Uncle 70     Prostate cancer Cousin 60     Lung cancer Cousin      Kidney cancer Cousin 60     No Medical Problems Daughter      No Medical Problems Daughter      Review of systems is as stated in HPI, and the remainder of system review is otherwise negative.    Objective:      /60   Pulse 76   Wt 171 lb (77.6 kg)   BMI 28.24 kg/m      General appearance: awake, NAD  HEENT: atraumatic, normocephalic, PERRL, no scleral icterus or injection, TMs normal bilaterally without erythema or effusion, nose grossly normal, no significant erythema of posterior oropharynx, moist mucous membranes  Neck: supple, no lymphadenopathy, normal ROM  CV: RRR, no murmurs/rubs/gallops, normal S1 and S2  Lungs: CTAB, no wheezes or crackles, breathing comfortably on room air, no cough observed  Extremities: moving all extremities  Skin: no rashes or lesions  Neuro: alert, oriented x3, CNs grossly intact, no focal deficits appreciated  Psych: normal mood/affect/behavior, answering questions appropriately, linear thought process

## 2021-06-04 NOTE — PATIENT INSTRUCTIONS - HE
Likely bronchitis  -start azithromycin antibiotic for 5 days  -lots of water  -for cough studies have shown that warm water with honey - otherwise ok to use robitussin or other cough syrup/drops  -start over the counter mucinex  -adequate rest  -ok to use tylenol/ibuprofen as needed

## 2021-06-04 NOTE — PATIENT INSTRUCTIONS - HE
-continue the loratadine  -add montelukast in the morning or night- 1 tablet daily  -if you wish you can add fluticasone nasal spray 1 spray each nostril twice daily or 2 sprays each nostril once daily  -call me and let me know if symptoms not improving within a week and I would call in prednisone 5 day course to see if that would take care of it

## 2021-06-04 NOTE — PROGRESS NOTES
ASSESSMENT & PLAN:  Chuyita was seen today for cough.    Diagnoses and all orders for this visit:    Persistent cough-unclear etiology of her persistent cough but chest x-ray is normal which I personally reviewed.  Symptoms have not improved since azithromycin.  Symptoms seem to be upper respiratory in nature with postnasal drip affecting this persistent cough.  She does not like nasal sprays and so we have never trialed that before but she is willing to trial it now.  I recommended continuing with antihistamine since that has been helpful for her in the past when she has had flareups but will try to add in Singulair.  We did attempt an albuterol nebulizer and that a difference in her overall symptom.  Lungs are clear to begin with.  We discussed trialing prednisone if symptoms continue to not improve.  No indication for doing influenza testing at this time.  She is afebrile and symptoms have been ongoing for 3 to 4 weeks.  No medications that could be inducing this cough and no dysphasia.  Does have issues with atrophic gastritis as noted below.  Was advised to stop PPI but this does not seem to be consistent with acid reflux  -     XR Chest 2 Views; Future  -     albuterol nebulizer solution 3 mL (PROVENTIL)  -     montelukast (SINGULAIR) 10 mg tablet; Take 1 tablet (10 mg total) by mouth daily. For allergies  -     fluticasone propionate (FLONASE) 50 mcg/actuation nasal spray; Instill 1 spray in each nostril bid    Autoimmune metaplastic  Atrophic gastritis EGD 1/2019, repeat mapping in 1 yr     -Plans to follow-up with [facility] for mapping this coming year      Patient Instructions   -continue the loratadine  -add montelukast in the morning or night- 1 tablet daily  -if you wish you can add fluticasone nasal spray 1 spray each nostril twice daily or 2 sprays each nostril once daily  -call me and let me know if symptoms not improving within a week and I would call in prednisone 5 day course to see if that  would take care of it         Orders Placed This Encounter   Procedures     XR Chest 2 Views     Standing Status:   Future     Number of Occurrences:   1     Standing Expiration Date:   12/31/2020     Order Specific Question:   Reason for Exam (Describe Symptoms):     Answer:   persistent cough and temperature     Order Specific Question:   Can the procedure be changed per Radiologist protocol?     Answer:   Yes     There are no discontinued medications.  Administrations This Visit     albuterol nebulizer solution 3 mL (PROVENTIL)     Admin Date  12/31/2019 Action  Given Dose  3 mL Route  Nebulization Administered By  Latosha Ahn CMA              Return in about 6 months (around 6/30/2020) for Annual physical.     CHIEF COMPLAINT:  Chief Complaint   Patient presents with     Cough     Cough since [facility].  Has been seen twice.  The last she was given z oliver.  Thought it was starting to break up, but then went back to where she was.          HISTORY OF PRESENT ILLNESS:  Chuyita is a 69 y.o. female presenting to the clinic today for cough.     Cough: Patient with a history of heital hernia had a congested cough onset 4 weeks at [facility]. She visited  Dr. Liang at [facility] clinic. She was given a z oliver and finished it 5 days ago. She feels it behind her nose and throat but cannot blow it out. She can feel it in chest and throat at different times. This has occurred before and it was resolved by   loratadine. However, now she has been taking the Claritin every night. She says her  describes her purring like a cat. She notes she has to on right side, because swallowing blocks her air passage. She uses pillows to elevated more. She can breath through her nose now. She previously declined the Flonase because she did not like it up her nose but she is now open to it. She never used an inhaler before  Even though it was discussed that could be the next step.  No history of asthma or COPD.   Nebulizer was given in clinic today with mild improvement.        Health Maintenance: She is up to date with her influenza vaccine.     REVIEW OF SYSTEMS:   Positive for difficulty swallowing and congested cough.       she denies any fever, ncreased swelling in legs, weight gain, night sweats, headaches, fever, and chills. All other systems are negative.     PFSH:  Past Surgical History:   Procedure Laterality Date     BREAST BIOPSY       BREAST LUMPECTOMY Right 2005    + radiation     BREAST LUMPECTOMY Left 09/03/2015     TONSILLECTOMY       Her family history includes Breast cancer (age of onset: 30) in her paternal grandmother; Breast cancer (age of onset: 70) in her paternal aunt and paternal aunt; Kidney cancer (age of onset: 60) in her cousin; Lung cancer in her cousin; No Medical Problems in her daughter and daughter; Osteoporosis in her father; Ovarian cancer (age of onset: 60) in her cousin; Prostate cancer (age of onset: 60) in her cousin; Prostate cancer (age of onset: 70) in her father, maternal uncle, and maternal uncle; Stomach cancer (age of onset: 90) in her maternal aunt; Stroke in her mother.  She  reports that she has never smoked. She has never used smokeless tobacco. She reports that she does not drink alcohol or use drugs.    TOBACCO USE:  Social History     Tobacco Use   Smoking Status Never Smoker   Smokeless Tobacco Never Used        VITALS:  Vitals:    12/31/19 1117   BP: 130/82   Pulse: 74   Resp: 16   Temp: 98.7  F (37.1  C)   TempSrc: Oral   SpO2: 97%   Weight: 172 lb (78 kg)     Wt Readings from Last 3 Encounters:   12/31/19 172 lb (78 kg)   12/19/19 171 lb (77.6 kg)   09/23/19 169 lb 12.8 oz (77 kg)     Body mass index is 28.4 kg/m .  No LMP recorded. Patient is postmenopausal.       MEDICATIONS:  Current Outpatient Medications   Medication Sig Dispense Refill     anastrozole (ARIMIDEX) 1 mg tablet TAKE 1 TABLET EVERY DAY 90 tablet 3     calcium carbonate-vitamin D3 (CALCIUM 600 +  D,3,) 600 mg(1,500mg) -200 unit per tablet Take 1 tablet by mouth daily. As directed       loratadine (CLARITIN) 10 mg tablet Take 1 tablet (10 mg total) by mouth daily. (Patient taking differently: Take 10 mg by mouth as needed (seasonal usually in the Fall). ) 30 tablet 0     MULTIVITAMIN ORAL Take 1 tablet by mouth daily.       fluticasone propionate (FLONASE) 50 mcg/actuation nasal spray Instill 1 spray in each nostril bid 17 g 0     montelukast (SINGULAIR) 10 mg tablet Take 1 tablet (10 mg total) by mouth daily. For allergies 30 tablet 2     No current facility-administered medications for this visit.        PHYSICAL EXAM:  GENERAL:  Reveals an alert 69 y.o. female in NAD.  Vitals:  Per nursing notes.   EYES: PERRLA. Extraocular movements intact. Normal conjunctiva and lids.   ENT:  Hearing grossly normal.  Normal appearance to ears and nose.  Bilateral TM s, external canals, oropharynx normal. Normal lips, gums and teeth.  Normal nasal mucosa, septum and turbinates.  Patient does have gurgling with talking and intermittent coughing.  Oropharynx is otherwise clear  NECK:  Supple, thyroid not palpable.  CARDIAC:  Regular rate and rhythm without murmurs, rubs, or gallops. Legs without edema. Carotids without bruits.   LUNGS: Clear.  Respiratory effort normal.  No wheezes rhonchi or rales.  O2 saturation is normal x-ray personally reviewed myself and shows no concern for consolidation.  ABDOMEN:  Soft, non-tender, without hepatosplenomegaly, masses, or hernias.   SKIN:   Without rash, bruise, or palpable lesions.  NEURO:  Cranial nerves II-XII intact.     PSYCH:  Mood appropriate, memory intact.      DATA REVIEWED:  ADDITIONAL HISTORY SUMMARIZED (2): Reviewed bronchitis note for cough, 12/19/19.   DECISION TO OBTAIN EXTRA INFORMATION (1): None.   RADIOLOGY TESTS (1): Ordered XR Chest for cough today.   LABS (1): None.  MEDICINE TESTS (1): None.  INDEPENDENT REVIEW (2 each): Personally reviewed x-ray that did not  show any consolidation    The visit lasted a total of 17 minutes face to face with the patient. Over 50% of the time was spent counseling and educating the patient about cough.     I, Velia Pham, am scribing for and in the presence of Dr. Juarez.  I, Dr. Juarez, personally performed the services described in this documentation, as scribed by Velia Pham in my presence, and it is both accurate and complete.          Total data points:5

## 2021-06-05 NOTE — TELEPHONE ENCOUNTER
Question following Office Visit  When did you see your provider: Yesterday  What is your question: She has more questions about her imaging report.  She would like to speak to Dr. Sanders directly.  Okay to leave a detailed message: No

## 2021-06-05 NOTE — TELEPHONE ENCOUNTER
----- Message from Rach Hdz CMA sent at 1/14/2020 10:09 AM CST -----    ----- Message -----  From: Sofy Liang MD  Sent: 1/13/2020   4:01 PM CST  To: Sofy Liang Care Team Pool    Please call pt to let her know that she tested negative for pertussis and that her blood counts are normal.     Thanks  Dr Liang

## 2021-06-05 NOTE — PATIENT INSTRUCTIONS - HE
Continue flonase and claritin in the morning  Take 1/2-1 tablet of benadryl before bed (12.5-25mg typically)  Ok to stop singulair  Consider nasal saline wash/netipot - do 1-2 times per day to get nasal congestion/mucus out of nose/sinuses  Start prednisone taper    ENT appointment is next step

## 2021-06-05 NOTE — PATIENT INSTRUCTIONS - HE
Continue flonase nasal spray, singulair, claritin  Continue other normal medications  Start prednisone for 5 days  Blood testing and pertussis swab today - will call with results    If not improving after taking prednisone (1 week from now), consider coming back or calling with update  -Dr Liang thinking that ENT (ear nose throat) specialist may be next step

## 2021-06-05 NOTE — TELEPHONE ENCOUNTER
Patient Returning Call  Reason for call:  lmtcb  Information relayed to patient:  Patient informed of negative findings and verbalize an understanding.  Patient has additional questions:  No  If YES, what are your questions/concerns:  AMBER  Okay to leave a detailed message?: No call back needed

## 2021-06-05 NOTE — PROGRESS NOTES
HISTORY OF PRESENT ILLNESS  Asked to see by Dr. Liang for evaluation of cough. Patient reports seasonal cough. For a couple of years, she feels that her neck is thick and she has congestion. She had GI endoscopy in the past. She has another scope scheduled for next week. The cough is dry and non-productive. She feels a sensation of secretions stuck in the back of the throat. She has a harder time breathing when lying on the left side. I reviewed Dr. Liang's note and there is concern for sinus disease. Patient feels that there is drainage that is stuck in the back of the nose and throat. In addition the patient mentions that her neck feels more swollen and puffy. She reports that it is normal for the sides of the neck to feel puffy. However she has felt that it is more swollen in the midline.     REVIEW OF SYSTEMS  Review of Systems: a 10-system review was performed. Pertinent positives are noted in the HPI and on a separate scanned document in the chart.    PMH, PSH, FH and SH has documented in the EHR.      EXAM    CONSTITUTIONAL  General Appearance:  Normal, well developed, well nourished, no obvious distress  Ability to Communicate:  communicates appropriately.    HEAD AND FACE  Appearance and Symmetry:  Normal, no scalp or facial scarring or suspicious lesions.  Paranasal sinuses tenderness:  Normal, Paranasal sinuses non tender    EARS  Clinical speech reception threshold:  Normal, able to hear normal speech.  Auricle:  Normal, Auricles without scars, lesions, masses.  External auditory canal:  Normal, External auditory canal normal.  Tympanic membrane:  Normal, Tympanic membranes normal without swelling or erythema.  Tympanic membrane mobility:  Normal, Normal tympanic membrane mobility.    NASAL ENDOSCOPY  The risks and benefits were reviewed with the patient.  The nose was sprayed with lidocaine and phenylephrine.  The following areas were examined:  floor, roof, middle and inferior turbinates, middle and  inferior meatus, sphenoethmoidal recess, nasopharynx and septum.  The nasal scope passed without difficulty with the findings noted in the exam.    NOSE (speculum or scope)  Architecture:  Normal, Grossly normal external nasal architecture with no masses or lesions.  Mucosa:  Normal mucosa, No polyps or masses.  Septum:  Normal, Septum non-obstructing.  Turbinates:  Normal, No turbinate abnormalities    ORAL CAVITY AND OROPHARYNX  Lips:  Normal.  Dental and gingiva:  Normal, No obvious dental or gingival disease.  Mucosa:  Normal, Moist mucous membranes.  Tongue:  Normal, Tongue mobile with no mucosal abnormalities  Hard and soft palate:  Normal, Hard and soft palate without cleft or mucosal lesions.  Oral pharynx:  Normal, Posterior pharynx without lesions or remarkable asymmetry.  Saliva:  Normal, Clear saliva.  Masses:  Normal, No palpable masses or pathologically enlarged lymph nodes.    NECK  Masses/lymph nodes: Enlarged right jugulodigastric nodes vs submandibular gland. .  Salivary glands:  Normal, Parotid and submandibular glands.  Trachea and larynx position:  Normal, Trachea and larynx midline.  Thyroid:  Normal, No thyroid abnormality.  Tenderness:  Normal, No cervical tenderness.  Suppleness:  Normal, Neck supple    NEUROLOGICAL  Speech pattern:  Normal, Proasaic    RESPIRATORY  Symmetry and Respiratory effort:  Normal, Symmetric chest movement and expansion with no increased intercostal retractions or use of accessory muscles.     IMPRESSION  1. Chronic cough. Possible sinus cause.   2. Neck swelling    RECOMMENDATION  1. CT sinus.  2. CT soft tissue neck.    Josiah Sanders MD

## 2021-06-05 NOTE — PROGRESS NOTES
Assessment/Plan:    1. Persistent cough  Unclear etiology at this time.  Patient had chest x-ray on 12/31 that was normal, normal lung exam today therefore will not repeat chest x-ray at this time.  No wheezing on exam and no history asthma/COPD.  Recommend next steps with: CBC, pertussis swab, prednisone burst.  Recommend continuing Flonase, Singulair and Claritin as well.  Discussed if not improving after prednisone then I would consider ENT referral for cough/sinus concerns.  - HM1(CBC and Differential)  - B pertussis/parapertussis PCR Nasopharyngeal(BPDNA)  - predniSONE (DELTASONE) 20 MG tablet; Take 40 mg by mouth daily for 5 days.  Dispense: 10 tablet; Refill: 0  - HM1 (CBC with Diff)      Follow up: as needed    Sofy Liang MD  [facility]    Subjective:    Patient ID: Chuyita Porter is a 69 y.o. female is here today for f/u cough    F/u cough  -Patient initially seen by me on 12/19, given azithromycin for treatment  -Patient with follow-up on 12/31, cough not fully resolved with azithromycin; management with: Flonase, Singulair, Claritin - tried albuterol neb in office but didn't help  -reports chronic puffiness in lateral neck/supraclavicular region bilaterally but now more in the center  -overall thinks things have improved but then leveled off  -now 6+ weeks of illness  -has mapping coming up with [facility] Feb 4 2020 - had been using PPI but then stopped 1 year ago or so - not feeling much heartburn/stomach issues  -reports chronic sinus issues  -no fevers or shortness of breath   -no ear sx       Patient Active Problem List   Diagnosis     Malignant neoplasm of lower-outer quadrant of left breast of female, estrogen receptor positive (H)     Abnormal cervix finding     Autoimmune metaplastic  Atrophic gastritis EGD 1/2019, repeat mapping in 1 yr      Past Medical History:   Diagnosis Date     Breast cancer (H) 2005    right     Breast cancer (H) 2015    left     Gastritis     autoimmune  atrophic gastritis      Hx of radiation therapy 2005     Past Surgical History:   Procedure Laterality Date     BREAST BIOPSY       BREAST LUMPECTOMY Right 2005    + radiation     BREAST LUMPECTOMY Left 09/03/2015     TONSILLECTOMY       Current Outpatient Medications on File Prior to Visit   Medication Sig Dispense Refill     anastrozole (ARIMIDEX) 1 mg tablet TAKE 1 TABLET EVERY DAY 90 tablet 3     calcium carbonate-vitamin D3 (CALCIUM 600 + D,3,) 600 mg(1,500mg) -200 unit per tablet Take 1 tablet by mouth daily. As directed       fluticasone propionate (FLONASE) 50 mcg/actuation nasal spray Instill 1 spray in each nostril bid 17 g 0     loratadine (CLARITIN) 10 mg tablet Take 1 tablet (10 mg total) by mouth daily. (Patient taking differently: Take 10 mg by mouth as needed (seasonal usually in the Fall). ) 30 tablet 0     montelukast (SINGULAIR) 10 mg tablet Take 1 tablet (10 mg total) by mouth daily. For allergies 30 tablet 2     MULTIVITAMIN ORAL Take 1 tablet by mouth daily.       No current facility-administered medications on file prior to visit.      No Known Allergies  Social History     Socioeconomic History     Marital status:      Spouse name: Not on file     Number of children: Not on file     Years of education: Not on file     Highest education level: Not on file   Occupational History     Occupation: administrative support     Social Needs     Financial resource strain: Not on file     Food insecurity:     Worry: Not on file     Inability: Not on file     Transportation needs:     Medical: Not on file     Non-medical: Not on file   Tobacco Use     Smoking status: Never Smoker     Smokeless tobacco: Never Used   Substance and Sexual Activity     Alcohol use: No     Drug use: No     Sexual activity: Never   Lifestyle     Physical activity:     Days per week: Not on file     Minutes per session: Not on file     Stress: Not on file   Relationships     Social connections:     Talks on phone: Not on  "file     Gets together: Not on file     Attends Confucianist service: Not on file     Active member of club or organization: Not on file     Attends meetings of clubs or organizations: Not on file     Relationship status: Not on file     Intimate partner violence:     Fear of current or ex partner: Not on file     Emotionally abused: Not on file     Physically abused: Not on file     Forced sexual activity: Not on file   Other Topics Concern     Not on file   Social History Narrative    She is  to Ranjit, with whom she lives. She is retired from office work. 2018 [employer]     Family History   Problem Relation Age of Onset     Breast cancer Paternal Aunt 70     Stroke Mother          75     Prostate cancer Father 70     Osteoporosis Father      Breast cancer Paternal Aunt 70     Breast cancer Paternal Grandmother 30     Ovarian cancer Cousin 60     Stomach cancer Maternal Aunt 90     Prostate cancer Maternal Uncle 70     Prostate cancer Maternal Uncle 70     Prostate cancer Cousin 60     Lung cancer Cousin      Kidney cancer Cousin 60     No Medical Problems Daughter      No Medical Problems Daughter      Review of systems is as stated in HPI, and the remainder of system review is otherwise negative.    Objective:      /80   Pulse 94   Wt 167 lb 3 oz (75.8 kg)   SpO2 99%   BMI 27.61 kg/m      General appearance: awake, NAD  HEENT: atraumatic, normocephalic, PERRL, no scleral icterus or injection, TMs normal bilaterally without erythema or effusion, nose grossly normal, no significant erythema of posterior oropharynx, moist mucous membranes  Neck: supple, no lymphadenopathy, normal ROM; some subcutaneous swelling/\"puffiness\" in supraclavicular region bilaterally without appreciable mass  CV: RRR, no murmurs/rubs/gallops, normal S1 and S2  Lungs: CTAB, no wheezes or crackles, breathing comfortably on room air, occasional cough observed  Extremities: moving all extremities  Skin: no rashes or " lesions  Neuro: alert, oriented x3, CNs grossly intact, no focal deficits appreciated  Psych: normal mood/affect/behavior, answering questions appropriately

## 2021-06-05 NOTE — TELEPHONE ENCOUNTER
FYI - Status Update  Who is Calling: Patient  Update: caller is wanting to let Phyllis Juarez, DO know that her pharmacy has been sending over refills requests when she is not needing any refills yet. Caller also wanted to let provider know that she has been seeing Dr. Liang at the [facility] and she also saw a ENT specialist. Caller is also requesting that I send update information to Dr. Liang as well.   Okay to leave a detailed message?:  Yes  [phone]

## 2021-06-05 NOTE — PROGRESS NOTES
Assessment/Plan:    1. Persistent cough  2. Chronic sinusitis, unspecified location  Strong suspicion that cough is related to sinus issues.  Patient reports improvement with steroid but then immediate return of symptoms after stopping.  Next steps in treatment: Continue Flonase and Claritin in the morning, take 1/2 to 1 tablet of Benadryl before bed, okay to stop Singulair as not helpful, start nasal saline wash/Lesia pot 1-2 times per day, start prednisone taper, referral to ENT placed.  - predniSONE (DELTASONE) 10 mg tablet; Take 4 tabs daily for 5 days, then 3 tabs daily for 5 days, then 2 tabs daily for 5 days, then 1 tab daily for 5 days, then stop..  Dispense: 50 tablet; Refill: 0      Follow up: 1-2 weeks as needed    Sofy Liang MD  [facility]    Subjective:    Patient ID: Chuyita Porter is a 69 y.o. female is here today for cough, SOB    Cough, SOB  -Patient initially seen by me on 12/19, given azithromycin for treatment  -Patient with follow-up on 12/31, cough not fully resolved with azithromycin; management with: Flonase, Singulair, Claritin - tried albuterol neb in office but didn't help  -seen by me again on 1/10/20 - CBC nml, pertussis negative, prednisone burst given - discussed may need ENT referral given cough and sinus issues  -this morning coughing a lot, wheezing, shortness of breath   -pt states the steroid was helpful but then after stopping it then it returned   -no new symptoms  -taking flonase in morning, claritin and singulair at night (reports due for singulair refill but doesn't think it is helpful)  -pt reports significant mucus in sinuses - unable to blow out      Patient Active Problem List   Diagnosis     Malignant neoplasm of lower-outer quadrant of left breast of female, estrogen receptor positive (H)     Abnormal cervix finding     Autoimmune metaplastic  Atrophic gastritis EGD 1/2019, repeat mapping in 1 yr      Past Medical History:   Diagnosis Date      Breast cancer (H) 2005    right     Breast cancer (H) 2015    left     Gastritis     autoimmune atrophic gastritis      Hx of radiation therapy 2005     Past Surgical History:   Procedure Laterality Date     BREAST BIOPSY       BREAST LUMPECTOMY Right 2005    + radiation     BREAST LUMPECTOMY Left 09/03/2015     TONSILLECTOMY       Current Outpatient Medications on File Prior to Visit   Medication Sig Dispense Refill     anastrozole (ARIMIDEX) 1 mg tablet TAKE 1 TABLET EVERY DAY 90 tablet 3     calcium carbonate-vitamin D3 (CALCIUM 600 + D,3,) 600 mg(1,500mg) -200 unit per tablet Take 1 tablet by mouth daily. As directed       fluticasone propionate (FLONASE) 50 mcg/actuation nasal spray Instill 1 spray in each nostril bid 17 g 0     loratadine (CLARITIN) 10 mg tablet Take 1 tablet (10 mg total) by mouth daily. (Patient taking differently: Take 10 mg by mouth as needed (seasonal usually in the Fall). ) 30 tablet 0     montelukast (SINGULAIR) 10 mg tablet Take 1 tablet (10 mg total) by mouth daily. For allergies 30 tablet 2     MULTIVITAMIN ORAL Take 1 tablet by mouth daily.       No current facility-administered medications on file prior to visit.      No Known Allergies  Social History     Socioeconomic History     Marital status:      Spouse name: Not on file     Number of children: Not on file     Years of education: Not on file     Highest education level: Not on file   Occupational History     Occupation: administrative support     Social Needs     Financial resource strain: Not on file     Food insecurity:     Worry: Not on file     Inability: Not on file     Transportation needs:     Medical: Not on file     Non-medical: Not on file   Tobacco Use     Smoking status: Never Smoker     Smokeless tobacco: Never Used   Substance and Sexual Activity     Alcohol use: No     Drug use: No     Sexual activity: Never   Lifestyle     Physical activity:     Days per week: Not on file     Minutes per session: Not on  file     Stress: Not on file   Relationships     Social connections:     Talks on phone: Not on file     Gets together: Not on file     Attends Jainism service: Not on file     Active member of club or organization: Not on file     Attends meetings of clubs or organizations: Not on file     Relationship status: Not on file     Intimate partner violence:     Fear of current or ex partner: Not on file     Emotionally abused: Not on file     Physically abused: Not on file     Forced sexual activity: Not on file   Other Topics Concern     Not on file   Social History Narrative    She is  to Ranjit, with whom she lives. She is retired from office work. 2018 [employer]     Family History   Problem Relation Age of Onset     Breast cancer Paternal Aunt 70     Stroke Mother          75     Prostate cancer Father 70     Osteoporosis Father      Breast cancer Paternal Aunt 70     Breast cancer Paternal Grandmother 30     Ovarian cancer Cousin 60     Stomach cancer Maternal Aunt 90     Prostate cancer Maternal Uncle 70     Prostate cancer Maternal Uncle 70     Prostate cancer Cousin 60     Lung cancer Cousin      Kidney cancer Cousin 60     No Medical Problems Daughter      No Medical Problems Daughter      Review of systems is as stated in HPI, and the remainder of system review is otherwise negative.    Objective:      /80   Pulse 95   Wt 168 lb 8 oz (76.4 kg)   SpO2 98%   BMI 27.83 kg/m      General appearance: awake, NAD  HEENT: atraumatic, normocephalic, no scleral icterus or injection, moist mucous membranes  CV: RRR, no murmurs/rubs/gallops, normal S1 and S2  Lungs: CTAB, no wheezes or crackles, breathing comfortably on room air - upper airway congestion noted  Extremities: moving all extremities  Skin: no rashes or lesions  Neuro: alert, oriented x3, CNs grossly intact, no focal deficits appreciated  Psych: normal mood/affect/behavior, answering questions appropriately, linear thought process

## 2021-06-05 NOTE — TELEPHONE ENCOUNTER
Cinthia from [facility] imaging called stating radiologist would like to speak with Dr. Sanders about CT scan findings. Message sent to Dr. Sanders to return call to Dr. Chaudhary.    Benita Durán RN  [facility]  [phone]

## 2021-06-05 NOTE — TELEPHONE ENCOUNTER
Left message to call back for: test result within this encounter. Please give Dr Mak notes below

## 2021-06-05 NOTE — TELEPHONE ENCOUNTER
Patient calling says she spoke with Dr. Juan Jose Sanders ENT regarding scan results today.  She says he was going to make some calls and then call her back tonight and let her know if she was supposed to go into the hospital.  She has not heard back.    8:24pm attempted to page on-call ENT through answering service at [phone] but was told that Dr. Sanders is not with ENT specialty care anymore and they could not page.    8:32pm called patient back.  She says she just received a call from Dr. Sanders.  Advised patient to call me back if needed.    Gail Pino, MARIKA  Triage Nurse Advisor

## 2021-06-05 NOTE — TELEPHONE ENCOUNTER
Refill Approved    Rx renewed per Medication Renewal Policy. Medication was last renewed on 12/31/19.    Janee Sam, [facility] Triage/Med Refill 1/23/2020     Requested Prescriptions   Pending Prescriptions Disp Refills     montelukast (SINGULAIR) 10 mg tablet [Pharmacy Med Name: MONTELUKAST SOD 10 MG TABLET] 30 tablet 2     Sig: TAKE 1 TABLET (10 MG TOTAL) BY MOUTH DAILY. FOR ALLERGIES       Asthma Medications Refill Protocol Passed - 1/22/2020 11:39 AM        Passed - PCP or prescribing provider visit in last year     Last office visit with prescriber/PCP: 12/31/2019 Phyllis Juarez DO OR same dept: 12/31/2019 Phyllis Juarez DO OR same specialty: 1/10/2020 Sofy Liang MD  Last physical: 9/10/2019 Last MTM visit: Visit date not found    Next appt within 3 mo: Visit date not found Next physical within 3 mo: Visit date not found  Prescriber OR PCP: Phyllis Juarez DO  Last diagnosis associated with med order: 1. Persistent cough  - montelukast (SINGULAIR) 10 mg tablet [Pharmacy Med Name: MONTELUKAST SOD 10 MG TABLET]; Take 1 tablet (10 mg total) by mouth daily. For allergies  Dispense: 30 tablet; Refill: 2  - fluticasone propionate (FLONASE) 50 mcg/actuation nasal spray [Pharmacy Med Name: FLUTICASONE PROP 50 MCG SPRAY]; INSTILL 1 SPRAY IN EACH NOSTRIL TWICE DAILY  Dispense: 16 g; Refill: 0    If protocol passes may refill for 6 months if within 3 months of last provider visit (or a total of 9 months).          fluticasone propionate (FLONASE) 50 mcg/actuation nasal spray [Pharmacy Med Name: FLUTICASONE PROP 50 MCG SPRAY] 16 g 0     Sig: INSTILL 1 SPRAY IN EACH NOSTRIL TWICE DAILY       Nasal Steroid Refill Protocol Passed - 1/22/2020 11:39 AM        Passed - Patient has had office visit/physical in last 2 years     Last office visit with prescriber/PCP: 12/31/2019 OR same dept: 12/31/2019 Phyllis Juarez DO OR same specialty: 1/10/2020 Sofy Liang MD Last physical: 9/10/2019  Last MTM visit: Visit date not found    Next appt within 3 mo: Visit date not found  Next physical within 3 mo: Visit date not found  Prescriber OR PCP: Phyllis Juarez DO  Last diagnosis associated with med order: 1. Persistent cough  - montelukast (SINGULAIR) 10 mg tablet [Pharmacy Med Name: MONTELUKAST SOD 10 MG TABLET]; Take 1 tablet (10 mg total) by mouth daily. For allergies  Dispense: 30 tablet; Refill: 2  - fluticasone propionate (FLONASE) 50 mcg/actuation nasal spray [Pharmacy Med Name: FLUTICASONE PROP 50 MCG SPRAY]; INSTILL 1 SPRAY IN EACH NOSTRIL TWICE DAILY  Dispense: 16 g; Refill: 0     If protocol passes may refill for 12 months if within 3 months of last provider visit (or a total of 15 months).

## 2021-06-05 NOTE — TELEPHONE ENCOUNTER
I spoke with patient and told her the [facility] will be calling her and getting her an appointment by Friday. Per Dr. Sanders, patient can go ahead and have the biopsy completed tomorrow and Edilia's. She agreed.    Benita Durán RN  [facility]  [phone]

## 2021-06-05 NOTE — TELEPHONE ENCOUNTER
I called nurse care coordinator line at the [facility] head [facility] neck [facility] to let them know Dr. Sanders would like patient seen by a head and neck surgeon asap due to a tumor of the neck and may cause her airway issues. Dr. Sanders also spoke with coordinator and will email images to expedite appointment for patient. Referral placed for [facility]. Will follow up later today on this.    Benita Durán RN  [facility]  [phone]

## 2021-06-06 NOTE — TELEPHONE ENCOUNTER
PT [facility] is asking for verbal ok for PT: 1w1,2w3 to work on strengthening, gait/transfer training, and balance training.  You can respond to this inbasket with the ok or call and leave a message at [phone].  Thank you

## 2021-06-06 NOTE — TELEPHONE ENCOUNTER
Patient started with homecare 3/15    Requesting orders    SN 1wk1, 2wk3 for nutrition hydration, pain mgmt, med teaching, g-tube care, g-tube teaching    3 prns for gtube malfunction    PT eval and treat for gait, balance, weakness    OT for adl's, equipement

## 2021-06-07 ENCOUNTER — COMMUNICATION - HEALTHEAST (OUTPATIENT)
Dept: ONCOLOGY | Facility: HOSPITAL | Age: 71
End: 2021-06-07

## 2021-06-07 NOTE — PROGRESS NOTES
[facility] Progress Note    Patient: Chuyita Porter  MRN: [id number]  Date of Service: 3/30/2020        Reason for visit      1. Malignant neoplasm of lower-outer quadrant of left breast of female, estrogen receptor positive (H)    2. 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 Left breast cancer. 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  She has finished 5 years of anastrozole therapy.  2. Prior h/o right sided breast cancer, stage I, ER/LA positive, and Her2 -ve, treated with lumpectomy and XRT. She took Anastrozole for 5 years.  3. Mild Osteopenia.  DEXA in February 2018 showed normal density.  4.  Currently dealing with diffuse large B-cell lymphoma of the thyroid/upper mediastinum which was causing some respiratory tract compromise.  She needed tracheostomy in emergent radiation followed by chemotherapy.  She is currently on R-CHOP chemotherapy.  5.  Slightly deconditioned from chemotherapy.      Plan     1.  As for breast cancer is concerned she just needs yearly mammogram and exams.  2.  We will see her back in a years time.  3.  Continue with annual mammogram..  4.  Calcium and Vit D supplementation.  5.  Follow-up annually.    6.  Discussed with her the management of fatigue during chemotherapy.  She is currently on R-CHOP chemotherapy..    Clinical stage      Breast cancer of lower-outer quadrant of left female breast    Primary site: Breast    Staging method: AJCC 7th Edition    Clinical: Stage IA (T1a, N0, cM0) - Signed by Bam Gallardo MD on 9/24/2015    Summary: Stage IA (T1a, N0, cM0)      History     Chuyita Porter is a very pleasant 69 y.o. old female with a history of invasive ductal ca on the left side, measuring few mm in size diagnosed in August 2015 when she presented with abnormal mammogram. She had no symptoms, but the mammogram showed microcalcification. She had biopsy on August 2015 and that showed invasive ductal ca. Then she  had lumpectomy on 9/4/2015 which also showed DCIS. SLN was negative. She is recovering well from her surgery.  She has prior h/o right sided breast cancer, 0.6 cm in size, ER/UT positive, and her2 negative. She had XRT and and had 5 years of Anastrozole.  This diagnosis was in 2005.    She was treated with radiation therapy by Dr. Hammond.  Started back on anastrozole in November 2015.  She finished taking it in January 2020.    He has recently been diagnosed to have diffuse large B-cell lymphoma of the upper mediastinum neck causing some respiratory compromise.  She needed to be hospitalized and got emergent radiation therapy.  After that she also was started on R-CHOP chemotherapy.  Her breathing tube was removed.  She still has a feeding tube.  She is scheduled for her third R-CHOP chemotherapy next week.    She is recovering from the side effects of treatment in her illness.  Still quite deconditioned.    Past Medical History     Past Medical History:   Diagnosis Date     Breast cancer (H) 2005    right     Breast cancer (H) 2015    left     Gastritis     autoimmune atrophic gastritis      Hx of radiation therapy 2005       Review of Systems     Constitutional     Neurosensory     Eye      Ear     Cardiovascular     Pulmonary     Gastrointestinal     Genitourinary     Lymphatic     Musculoskeletal and Connective Tissue     Integumentary     Patient Coping  Patient Coping: Accepting  Accompanied by     Oral Chemo Adherence         Constitutional  Constitutional (WDL): Exceptions to WDL  Fatigue: Fatigue not relieved by rest - Limiting instrumental ADL  Neurosensory  Neurosensory (WDL): Exceptions to WDL  Ataxia: Asymptomatic, clinical or diagnostic observations only, intervention not indicated(uses a walker)  Cardiovascular  Cardiovascular (WDL): All cardiovascular elements are within defined limits  Pulmonary  Respiratory (WDL): Exceptions to WDL  Cough: Mild symptoms, nonprescription intervention indicated(dry/  throat clearing/ wraspy voice)  Gastrointestinal  Gastrointestinal (WDL): Exceptions to WDL  Anorexia: Associated with significant weight loss or malnutrition (e.g., inadequate oral caloric and/or fluid intake), tube feeding or TPN indicated(feeding tube TPN at night)  Genitourinary  Genitourinary (WDL): All genitourinary elements are within defined limits  Integumentary  Integumentary (WDL): All integumentary elements are within defined limits  Patient Coping  Patient Coping: Accepting  Accompanied by       ECOG performance status and Distress Assessment      ECOG Performance:    ECOG Performance Status: 2    Distress Assessment  Distress Assessment Score: No distress:     Pain Status  Currently in Pain: No/denies        Vital Signs     There were no vitals filed for this visit.    Physical Exam     GENERAL: No acute distress. Cooperative in conversation.   HEENT: Pupils are equal, round and reactive. Oral mucosa is clean and intact. No ulcerations or mucositis noted. No bleeding noted.  RESP:Chest symmetric lungs are clear bilaterally per auscultation. Regular respiratory rate. No wheezes or rhonchi.  CV: Normal S1 S2 Regular, rate and rhythm. No murmurs.  ABD: Nondistended, soft, nontender. Positive bowel sounds. No organomegaly.   EXTREMITIES: No lower extremity edema.   NEURO: Non- focal. Alert and oriented x3.  Cranial nerves appear intact.  PSYCH: Within normal limits. No depression or anxiety.  SKIN: Warm dry intact.    LYMPH NODES: Bilateral cervical, supraclavicular, axillary lymph node examination was done.  Negative for any palpable adenopathy.  No exam was done.  This is carried forward from previous note.    Lab Results     Results for orders placed or performed in visit on 03/19/20   Comprehensive Metabolic Panel   Result Value Ref Range    Sodium 134 (L) 136 - 145 mmol/L    Potassium 4.5 3.5 - 5.0 mmol/L    Chloride 99 98 - 107 mmol/L    CO2 26 22 - 31 mmol/L    Anion Gap, Calculation 9 5 - 18 mmol/L     Glucose 99 70 - 125 mg/dL    BUN 14 8 - 22 mg/dL    Creatinine 0.57 (L) 0.60 - 1.10 mg/dL    GFR MDRD Af Amer >60 >60 mL/min/1.73m2    GFR MDRD Non Af Amer >60 >60 mL/min/1.73m2    Bilirubin, Total 0.5 0.0 - 1.0 mg/dL    Calcium 9.3 8.5 - 10.5 mg/dL    Protein, Total 6.3 6.0 - 8.0 g/dL    Albumin 3.3 (L) 3.5 - 5.0 g/dL    Alkaline Phosphatase 152 (H) 45 - 120 U/L    AST 16 0 - 40 U/L    ALT 22 0 - 45 U/L   Lactate Dehydrogenase (LDH)   Result Value Ref Range    LD (LDH) 217 125 - 220 U/L   Uric Acid   Result Value Ref Range    Uric Acid <2.0 (L) 2.0 - 7.5 mg/dL   Phosphorus   Result Value Ref Range    Phosphorus 3.4 2.5 - 4.5 mg/dL   HM1 (CBC with Diff)   Result Value Ref Range    WBC 0.7 (LL) 4.0 - 11.0 thou/uL    RBC 3.28 (L) 3.80 - 5.40 mill/uL    Hemoglobin 10.1 (L) 12.0 - 16.0 g/dL    Hematocrit 31.3 (L) 35.0 - 47.0 %    MCV 95 80 - 100 fL    MCH 30.8 27.0 - 34.0 pg    MCHC 32.3 32.0 - 36.0 g/dL    RDW 15.6 (H) 11.0 - 14.5 %    Platelets 280 140 - 440 thou/uL    MPV 11.0 8.5 - 12.5 fL   Manual Differential   Result Value Ref Range    Total Neutrophils % 26 (L) 50 - 70 %    Lymphocytes % 48 (H) 20 - 40 %    Monocytes % 12 (H) 2 - 10 %    Eosinophils %  8 (H) 0 - 6 %    Basophils % 6 (H) 0 - 2 %    Total Neutrophils Absolute 0.2 (L) 2.0 - 7.7 thou/ul    Lymphocytes Absolute 0.3 (L) 0.8 - 4.4 thou/uL    Monocytes Absolute 0.1 0.0 - 0.9 thou/uL    Eosinophils Absolute 0.1 0.0 - 0.4 thou/uL    Basophils Absolute 0.0 0.0 - 0.2 thou/uL    Platelet Estimate Normal Normal    Ovalocytes 1+ (!) Negative         Imaging Results     No results found.    Total time spent was 30 minutes, in review of her chart, ordering tests and counseling regarding disease state, treatment, side effects and management.    Bam Gallardo MD

## 2021-06-07 NOTE — TELEPHONE ENCOUNTER
Thank you for the message regarding Chuyita.  Her  has been trying to get her to drink more fluids so that could explain the higher pulse rate.  If she is feeling more short of breath she should alert us right away but otherwise okay to monitor with a heart rate of 105.  I am concerned with her leukopenia the temperature of 100.0  F so if she does continue to get regular elevation in her temperature hopefully a phone number was provided by her oncology department to contact them even if it is after hours for further direction

## 2021-06-07 NOTE — TELEPHONE ENCOUNTER
RN Triage:    Spoke with 69 yr old Chuyita who is calling to schedule labs 1 day prior to her chemo treatment on 4/24/20 at the [facility].    Pt reports slight cough per travel screening questions.    Pt states on 4/2/20 she was dx with pneumonia from a PET Scan at Oncology visit.    Started on AB, powder form in a drink (azithromycin) and symptoms are improved.  Still has a slight cough from pneumonia but this is improving.    Denies fever.      Pt requests lab appointment prior to chemotherapy on 4/23/20.    PLAN:  Advised home care per protocol and to monitor symptoms/call back if new symptoms develop or symptoms worsen.  Pt transferred to scheduling and lab appointment scheduled for 4/23/20 at 10:15 am at the [facility].  Pt is to contact her oncologist to fax over lab orders.  Pt voiced understanding.    Shereen Mcfarland RN   [facility] RN Triage    Reason for Disposition    Reasonable improvement on antibiotic    Protocols used: PNEUMONIA ON ANTIBIOTIC FOLLOW-UP CALL-A-

## 2021-06-07 NOTE — PROGRESS NOTES
"hCuyita Porter is a 69 y.o. female who is being evaluated via a billable telephone visit.      The patient has been notified of following:     \"This telephone visit will be conducted via a call between you and your physician/provider. We have found that certain health care needs can be provided without the need for a physical exam.  This service lets us provide the care you need with a short phone conversation.  If a prescription is necessary we can send it directly to your pharmacy.  If lab work is needed we can place an order for that and you can then stop by our lab to have the test done at a later time.    If during the course of the call the physician/provider feels a telephone visit is not appropriate, you will not be charged for this service.\"         Chuyita Porter complains of hospitalization for throat cancer.  Home 3-13-20, going to chemotherapy.  Doing well.  Has OT and PT.  Following with oncology and home care.  Had blood work done by Dr. Vickers yesterday.     I have reviewed and updated the patient's Past Medical History, Social History, Family History and Medication List.    ALLERGIES  Patient has no known allergies.    Additional provider notes:   Chuyita Porter is a 69 year old female with past medical history of breast cancer (right breast cancer in 2005 and left breast cancer in 2015 followed by Dr. Gallardo) with diffuse large B cell lymphoma. She presented with SOB and pressure on her neck/chest. She was found to have mediastinal mass and FNA biopsy on 1/30/20 showed DLBCL. She was initially managed with oral steroids and then was taken to the OR on 2/17 for open neck biopsy, tracheal stent, and PEG tube placement however in the OR decision was made to intubate patient due to concern of threatened airway. She was then admitted and started two fractions of radiation on 2/18 and 2/19 and then started R-CHOP on 2/21. CT chest on 2/25 showed decrease size of mass. Complications of neutropenic " fever due to HCAP treated with vanco + cefepime --> zosyn and completed treatment with Levaquin. She was extubated on 2/26.        She was discharged to rehab due to debility and severe malnutrition. She was discharged on TFs. Will benefit from continued PT/OT. Discharged on 3/12.       She is scheduled for an office appointment today for hospital discharge follow-up however given current concern with Covid 19 we converted it to a phone conversation.    Earlier interval  Dr. Valdez from [facility] met with them on feb 14- was set up for stent for windpipe feb 17 routine overnight procedure. Into the surgery, they came out and noted they couldn't do surgery as planned and inserted the breathing tube. She was admitted to the hospital Monday the 17th and had breathing tube in for 10 days before was able to be taken out. On the 18th and 19th of feb she received radiation both days to try to shrink the tumor. They wanted quicker option. They went to chemo Friday 21st-22nd. On Tuesday the 25th the news was that she had CT scan and was determined that tumor had shrunk enough to away from wind pipe to successfully proceed with surgery.   On 17th feeding tube placed precaution. .   On 28th moved out of ICU to regular hospital bed x 1 week until moved into acute rehab center at [facility] march 6- got home march 12 from hospital in afternoon.   Last Friday the 13th, went back to [facility] for 2nd round of chemo. Been in home other than quick visit.   Since the ventilator has been out, has been eating regular food. Been having breakfast lunch and supper. Still hooked up to G tube at night.   Now under care of Rosy Sprague. Working with Brandi.   April 2nd will have follow up with tests and another round of chemo therapy at [facility]  -scheduled every 21 days. Set up 6x total of 18 treatments.   No issues with nausea or constipation. Initially had issues in the hospital but they dealt with it.   Having daily BM. Normal for her. No  bladder symptoms.   trying to keep her up with hydration- 2 16 oz bottles, + boost to help with eating. Drinks sprite . Swallowing ok -very cautious. Very small bites. -advancing to more solids.    -home care nurse came over yesterday to do the labs but couldn't get the blood. Wanted to make sure labs for WBC done . Still has PT/OT and home nurse. But doing well. Moves very slowly.   -no port placed for chemo- previous issue with breast cancer when had lumpectomy done. First round of chemo did in L arm. Had some bruises but doing ok   Needs no refills today. Done with prednisone. Allopurinol for prevention elevated uric acid.     Assessment/Plan:  Chuyita was seen today for hospital visit follow up.    Diagnoses and all orders for this visit:    Hospital discharge follow-up    Diffuse large B-cell lymphoma of extranodal site (H)    Diffuse large B-cell lymphoma, unspecified body region (H)    Mediastinal mass    Thyroid mass      Patient was contacted via virtual phone visit today for hospital discharge follow-up regarding above diagnosis.  Currently being managed by oncology at the [facility].  We reviewed her entire hospital course.  Reviewed labs that were done yesterday at the [facility] and nothing to suggest any concern.  Neutropenia secondary to recent chemotherapy.  She is not showing signs of infection and they are trying to quarantine themselves as best as possible and limit the need for visitors.  She is progressing with her oral feedings and hopefully they will be able to discontinue the peg tube soon.  No respiratory concerns.  Next appointment is early April.  Has a home care nurse that is coming out to the home and she is trying to push fluids.  She is getting stronger and so hopefully they will not need physical therapy much longer as we are trying to keep people out of the home as noted.  They will let me know if they need refills on anything.  Currently on allopurinol to  reduce uric acid related to chemotherapy.  Follow-up with oncology as scheduled and does not need to be seen here in the near future only as needed.    Phone call duration:  26 minutes    Phyllis Juarez DO

## 2021-06-07 NOTE — TELEPHONE ENCOUNTER
"FYI: HR at home care visit this morning 105. Pt says she can feel that her HR is faster mostly right away in the morning when she wakes up.    Will have pt take her BP/check her pulse daily.     Pt had a temp over the weekend up to 100.0. She is afebrile and feels fine today.     Pt also says she had some cramping in her ring finger and pinky fingers that she describes as a \"erica horse\" in her fingers that lasted for a few minutes twice yesterday. Pt has not had this pain since then.     OK to respond here or leave  [phone] if any questions/recommendations.     Thanks,   María DOBBS"

## 2021-06-07 NOTE — TELEPHONE ENCOUNTER
Please keep an eye on this, if you dont see labs that have been faxed in by oncology later in the week, let me know and I can help

## 2021-06-07 NOTE — TELEPHONE ENCOUNTER
New Appointment Needed  What is the reason for the visit:    Same Date/Next Day Appt Request  What is the reason for your visit?: Need Same Day LAB Only appointment.    Brandi, care coordinator for [facility], was calling to assist in setting up this appointment.  She states these labs are needed for a tele-med appointment with Oncology for today(03/19) as patient has recently had new cancer diagnosis and labs are related to treatment.    Needs labs: CBC, CMP, LDH, Uric Acid, Phosphorus  Provider Preference: [facility]   How soon do you need to be seen?: today  Waitlist offered?: No  Okay to leave a detailed message:  Yes

## 2021-06-07 NOTE — TELEPHONE ENCOUNTER
patient and spouse both passed screening questions. Lab only appointment scheduled today for patient.

## 2021-06-07 NOTE — TELEPHONE ENCOUNTER
Please place future orders if appropriate and let me know once placed to arrange a lab only appointment.

## 2021-06-07 NOTE — TELEPHONE ENCOUNTER
[place]  [facility] calling in critical result for WBC of 0.7. Drawn at 1528 today. Last WBC 3/13/20 9.9. Pt has left breast cancer and labs were ordered for oncology tele health visit today per telephone note today.     Writer spoke with on call provider Dr. Schwartz who advises no further action necessary by Writer.     Routed to Dr. Schwartz for further review and follow up.     Reason for Disposition    Lab or radiology calling with CRITICAL test results    Protocols used: PCP CALL - NO TRIAGE-A-

## 2021-06-09 NOTE — PROGRESS NOTES
Chief Complaint   Patient presents with     Cough     X2 DAYS-- POSS PNEUMONIA     Fever     AFTER CHEMO TREATMENT 6/11-- SINCE HAS HAD LOW GRADE FEVERS. TEMPS OF 99.5-100.       ASSESSMENT & PLAN:   Diagnoses and all orders for this visit:    Cough  -     XR Chest 2 Views  -     Symptomatic COVID-19 Virus (CORONAVIRUS) PCR; Future; Expected date: 06/22/2020    Fever, unspecified fever cause  -     XR Chest 2 Views  -     Symptomatic COVID-19 Virus (CORONAVIRUS) PCR; Future; Expected date: 06/22/2020    Tachycardia  -     HM1(CBC and Differential)  -     HM1 (CBC with Diff)  -     Manual Differential    Diffuse large B-cell lymphoma of extranodal site (H)  -     Symptomatic COVID-19 Virus (CORONAVIRUS) PCR; Future; Expected date: 06/22/2020        MDM:  Patient with low-grade temps which she says is not unusual for chemotherapy with new cough with clear sputum in the last 2 days.    Screening CBC is consistent with previous including monocytosis.    X-ray was negative for pneumonia.  She is however tachycardic at 103-115.  She states her normal heart rates are in the 70s to 80s.    Patient states she is not much worse than usual with exception of this mild cough and vital signs are otherwise normal with repeat heart rate of 104, recommended pushing fluids, resting, rechecking heart rate at home tomorrow and discussing with oncology if continues to be over 100 bpm.  Patient states she has a blood pressure cuff and heart rate monitor at home.    COVID to be done at a later time per our protocol.  Testing tomorrow.    Is discharged in no acute distress.  Should go to emergency room for shortness of breath or other new severe symptoms.    Supportive care discussed.  See discharge instructions below for specific recommendations given.    At the end of the encounter, I discussed results, diagnosis, medications. Discussed red flags for immediate return to clinic/ER, as well as indications for follow up if no improvement.  "Patient and/or caregiver understood and agreed to plan. Patient was stable for discharge.    SUBJECTIVE    HPI:  HPI  Chuyita Porter presents to the walk-in clinic with   Chief Complaint   Patient presents with     Cough     X2 DAYS-- POSS PNEUMONIA     Fever     AFTER CHEMO TREATMENT 6/11-- SINCE HAS HAD LOW GRADE FEVERS. TEMPS OF 99.5-100.     Pt has been running temps in the 99s starting 6/11, notes this is typical for her post chemo.     Pt received C6 D1 Rituxan (rapid), Adriamycin, Vincristine, Cytoxan, Prednisone + Neulasta On-Pro on 6/5. Being treated for lymphoma.    Pt developing a productive cough at bedtime on Saturday 6/20. On Sunday pt used cough drops and cough medicine (mucinex congtesion and cough). Pt states cough \"comes and goes\" and will have coughing fits about q 30 minutes.     Has not had covid test for this.      Xray done prior to my arrival in room is negative for pneumonia.      Associated with: Clear sputum, chronic fatigue which is unchanged, hx seasonal allergies, on claritin    Patient relates that her initial lymphoma symptom was persistent cough associated with a chest mass that was later discovered.    Denies: Shortness of breath, myalgias, significant headaches, sore throat, ear pain.    Known exposures: Has been isolating at home other than doctor appointments.    See ROS for additional symptoms and/or pertinent negatives.       History obtained from the patient.    Past Medical History:   Diagnosis Date     Breast cancer (H) 2005    right     Breast cancer (H) 2015    left     Gastritis     autoimmune atrophic gastritis      Hx of radiation therapy 2005       Active Ambulatory (Non-Hospital) Problems    Diagnosis     Adverse reaction to antineoplastic antibiotics     DLBCL (diffuse large B cell lymphoma) (H)     Diffuse large B-cell lymphoma of extranodal site (H)     Mediastinal mass     Thyroid mass     Autoimmune metaplastic  Atrophic gastritis EGD 1/2019, repeat mapping in " 1 yr      Polyp of duodenum     Abnormal cervix finding     Malignant neoplasm of lower-outer quadrant of left breast of female, estrogen receptor positive (H)       Family History   Problem Relation Age of Onset     Breast cancer Paternal Aunt 70     Stroke Mother          75     Prostate cancer Father 70     Osteoporosis Father      Breast cancer Paternal Aunt 70     Breast cancer Paternal Grandmother 30     Ovarian cancer Cousin 60     Stomach cancer Maternal Aunt 90     Prostate cancer Maternal Uncle 70     Prostate cancer Maternal Uncle 70     Prostate cancer Cousin 60     Lung cancer Cousin      Kidney cancer Cousin 60     No Medical Problems Daughter      No Medical Problems Daughter        Social History     Tobacco Use     Smoking status: Never Smoker     Smokeless tobacco: Never Used   Substance Use Topics     Alcohol use: No       Review of Systems   Constitutional: Positive for fatigue (chronic ) and fever. Negative for appetite change.   HENT: Positive for postnasal drip and rhinorrhea. Negative for congestion, ear pain and sore throat.    Respiratory: Positive for cough. Negative for shortness of breath.    Allergic/Immunologic: Positive for environmental allergies and immunocompromised state.   Neurological: Negative for dizziness and light-headedness.       OBJECTIVE    Vitals:    20 1443 20 1548   BP: 115/77    Pulse: (!) 113 (!) 104   Resp: 16    Temp: 99.2  F (37.3  C)    SpO2: 99%        Physical Exam  Constitutional:       General: She is not in acute distress.     Appearance: She is well-developed. She is not ill-appearing, toxic-appearing or diaphoretic.   HENT:      Right Ear: Tympanic membrane normal.      Left Ear: Tympanic membrane normal.      Nose: No congestion or rhinorrhea.      Mouth/Throat:      Pharynx: No oropharyngeal exudate or posterior oropharyngeal erythema.   Eyes:      Conjunctiva/sclera: Conjunctivae normal.   Neck:      Musculoskeletal: Normal range of  motion.   Cardiovascular:      Rate and Rhythm: Tachycardia present.      Pulses: Normal pulses.      Heart sounds: Normal heart sounds. No murmur.   Pulmonary:      Effort: Pulmonary effort is normal. No respiratory distress.      Breath sounds: Normal breath sounds. No wheezing, rhonchi or rales.   Musculoskeletal: Normal range of motion.      Comments: Normal gait    Lymphadenopathy:      Cervical: No cervical adenopathy.   Skin:     General: Skin is warm and dry.      Comments: Old feeding tube site and abdomen with some erythematous irritation around old opening.  No indication of cellulitis.     Neurological:      Mental Status: She is alert and oriented to person, place, and time.   Psychiatric:         Mood and Affect: Mood normal.         Behavior: Behavior normal.         Thought Content: Thought content normal.         Judgment: Judgment normal.         Labs:  Recent Results (from the past 240 hour(s))   HM1 (CBC with Diff)   Result Value Ref Range    WBC 6.3 4.0 - 11.0 thou/uL    RBC 3.11 (L) 3.80 - 5.40 mill/uL    Hemoglobin 10.0 (L) 12.0 - 16.0 g/dL    Hematocrit 30.7 (L) 35.0 - 47.0 %    MCV 99 80 - 100 fL    MCH 32.2 27.0 - 34.0 pg    MCHC 32.6 32.0 - 36.0 g/dL    RDW 14.0 11.0 - 14.5 %    Platelets 479 (H) 140 - 440 thou/uL    MPV 9.5 8.5 - 12.5 fL   Manual Differential   Result Value Ref Range    Total Neutrophils % 67 50 - 70 %    Lymphocytes % 7 (L) 20 - 40 %    Monocytes % 23 (H) 2 - 10 %    Eosinophils %  1 0 - 6 %    Basophils % 3 (H) 0 - 2 %    Metamyelocytes % 1 <=1 %    Total Neutrophils Absolute 4.2 2.0 - 7.7 thou/ul    Lymphocytes Absolute 0.4 (L) 0.8 - 4.4 thou/uL    Monocytes Absolute 1.4 (H) 0.0 - 0.9 thou/uL    Eosinophils Absolute 0.1 0.0 - 0.4 thou/uL    Basophils Absolute 0.2 0.0 - 0.2 thou/uL    Metamyelocytes Absolute 0.0 <=0.1 thou/uL    Toxic Granulation 2+ (!) Negative    Platelet Estimate Normal Normal   COVID-19 Virus PCR MRF    Specimen: Respiratory   Result Value Ref Range     COVID-19 VIRUS SPECIMEN SOURCE Nasopharyngeal     2019-nCOV Not Detected          Radiology:    Xr Chest 2 Views    Result Date: 6/22/2020  EXAM DATE:         06/22/2020 EXAM: X-RAY CHEST, 2 VIEWS, FRONTAL AND LATERAL LOCATION: [facility] DATE/TIME: 6/22/2020 1:45 PM INDICATION: Cough, fever. Oncology/chemo patient. COMPARISON: Chest x-ray 12/31/2019 IMPRESSION: Stable minimal left basilar opacities likely reflecting prominent pericardial fat with adjacent atelectasis/scarring. The lungs are otherwise clear. No pneumonic consolidation or pleural effusion. Normal heart size.       PATIENT INSTRUCTIONS:   Patient Instructions   Continue home allergy and cough medications.  Please see COVID discharge instructions.  COVID testing tomorrow as planned.  Drink extra fluids.  Rest at home.  Call oncologist tomorrow if your heart rate is still over 100 beats in 1 minute, sooner if fever over 100.4 or feeling worse overall.    Discharge Instructions for COVID-19 Patients  You have--or may have--COVID-19. Please follow the instructions listed below.   If you have a weakened immune system, discuss with your doctor any other actions you need to take.  How can I protect others?  If you have symptoms (fever, cough, body aches or trouble breathing):    Stay home and away from others (self-isolate) until:  ? At least 10 days have passed since your symptoms started. And   ? You've had no fever--and no medicine that reduces fever--for 3 full days (72 hours). And   ? Your other symptoms have resolved (gotten better).  If you don't show symptoms, but testing showed that you have COVID-19:    Stay home and away from others (self-isolate) until at least 10 days have passed since the date of your first positive COVID-19 test.  During this time    Stay in your own room, even for meals. Use your own bathroom if you can.    Stay away from others in your home. No hugging, kissing or shaking hands. No visitors.    Don't go  "to work, school or anywhere else.    Clean \"high touch\" surfaces often (doorknobs, counters, handles). Use household cleaning spray or wipes. You'll find a full list of  on the EPA website: www.epa.gov/pesticide-registration/list-n-disinfectants-use-against-sars-cov-2.    Cover your mouth and nose with a mask, tissue or wash cloth to avoid spreading germs.    Wash your hands and face often. Use soap and water.    Caregivers in these groups are at risk for severe illness due to COVID-19:  ? People 65 years and older  ? People who live in a nursing home or long-term care facility  ? People with chronic disease (lung, heart, cancer, diabetes, kidney, liver, immunologic)  ? People who have a weakened immune system, including those who:    Are in cancer treatment    Take medicine that weakens the immune system, such as corticosteroids    Had a bone marrow or organ transplant    Have an immune deficiency    Have poorly controlled HIV or AIDS    Are obese (body mass index of 40 or higher)    Smoke regularly    Caregivers should wear gloves while washing dishes, handling laundry and cleaning bedrooms and bathrooms.    Use caution when washing and drying laundry: Don't shake dirty laundry and use the warmest water setting that you can.    For more tips on managing your health at home, go to www.cdc.gov/coronavirus/2019-ncov/downloads/10Things.pdf.  How can I take care of myself at home?  1. Get lots of rest. Drink extra fluids (unless a doctor has told you not to).  2. Take Tylenol (acetaminophen) for fever or pain. If you have liver or kidney problems, ask your family doctor if it's okay to take Tylenol.     Adults can take either:  ? 650 mg (two 325 mg pills) every 4 to 6 hours, or   ? 1,000 mg (two 500 mg pills) every 8 hours as needed.  ? Note: Don't take more than 3,000 mg in one day. Acetaminophen is found in many medicines (both prescribed and over-the-counter medicines). Read all labels to be sure you don't " take too much.  For children, check the Tylenol bottle for the right dose. The dose is based on the child's age or weight.  3. If you have other health problems (like cancer, heart failure, an organ transplant or severe kidney disease): Call your specialty clinic if you don't feel better in the next 2 days.  4. Know when to call 911. Emergency warning signs include:  ? Trouble breathing or shortness of breath  ? Pain or pressure in the chest that doesn't go away  ? Feeling confused like you haven't felt before, or not being able to wake up  ? Bluish-colored lips or face  5. Your doctor may have prescribed a blood thinner medicine. Follow their instructions.  Where can I get more information?    [facility] - About COVID-19:   [email or web address]    CDC - What to Do If You're Sick: www.cdc.gov/coronavirus/2019-ncov/about/steps-when-sick.html    [facility] - Ending Home Isolation: www.cdc.gov/coronavirus/2019-ncov/hcp/disposition-in-home-patients.html    CDC - Caring for Someone: www.cdc.gov/coronavirus/2019-ncov/if-you-are-sick/care-for-someone.html    [facility] - Interim Guidance for Hospital Discharge to Home: www.[facility].mn.[place]/diseases/coronavirus/hcp/hospdischarge.pdf    [facility] clinical trials (COVID-19 research studies): clinicalaffairs.[facility]-clinical-trials    Below are the COVID-19 hotlines at the [facility] ([facility]). Interpreters are available.  ? For health questions: Call 923-216-1631 or 1-849.943.4783 (7 a.m. to 7 p.m.)  ? For questions about schools and childcare: Call 756-334-6559 or 1-547.837.4406 (7 a.m. to 7 p.m.)    For informational purposes only. Not to replace the advice of your health care provider. Clinically reviewed by the Infection Prevention Team.Copyright   2020 [employer]. All rights reserved. [employer] [id number] - 06/20.

## 2021-06-09 NOTE — PROGRESS NOTES
[facility] Progress Note    Patient: Chuyita Porter  MRN: [id number]  Date of Service: 3/6/2017        Reason for visit      1. Breast cancer of lower-outer quadrant of left female breast        Assessment     1. Left breast cancer. 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  2. Prior h/o right sided breast cancer, stage I, ER/NE positive, and Her2 -ve, treated with lumpectomy and XRT. She took Anastrozole for 5 years.  3. Mild Osteopenia.  4. Family h/o cancer  5. Good general health.      Plan     1.  I advised the patient to continue anastrozole 1 mg p.o. daily.  2.  Good diet and exercise.  3.  She will need a mammogram in September.  4.  Calcium and Vit D supplementation.  5.  Follow-up in 6 months.    Clinical stage      Breast cancer of lower-outer quadrant of left female breast    Primary site: Breast    Staging method: AJCC 7th Edition    Clinical: Stage IA (T1a, N0, cM0) - Signed by Bam Gallardo MD on 9/24/2015    Summary: Stage IA (T1a, N0, cM0)      History     Chuyita Porter is a very pleasant 66 y.o. old female with a history of invasive ductal ca on the left side, measuring few mm in size diagnosed in August 2015 when she presented with abnormal mammogram. She had no symptoms, but the mammogram showed microcalcification. She had biopsy on August 2015 and that showed invasive ductal ca. Then she had lumpectomy on 9/4/2015 which also showed DCIS. SLN was negative. She is recovering well from her surgery.  She has prior h/o right sided breast cancer, 0.6 cm in size, ER/NE positive, and her2 negative. She had XRT and and had 5 years of Anastrozole.  This diagnosis was in 2005.  She has since been treated with radiation therapy per Dr. Hammond.  Started back on anastrozole in November 2015.  She is tolerating it well.  Minimal hot flashes.    Comes in today for scheduled follow up. Doing well. She was found to have bad GERD when she had some ER visits with throat pain and  fullness in November 2016. Felt well after protonix which she took for 2 months. Doing well.    Past Medical History     Past Medical History:   Diagnosis Date     Breast cancer 2005    right     Breast cancer 2015    left     Hx of radiation therapy 2005       Review of Systems   Constitutional  Constitutional (WDL): Exceptions to WDL  Weight Loss: to <10% from baseline, intervention not indicated (down 6 lbs)  Neurosensory  Neurosensory (WDL): All neurosensory elements are within defined limits  Cardiovascular  Cardiovascular (WDL): All cardiovascular elements are within defined limits  Pulmonary  Respiratory (WDL): Within Defined Limits  Gastrointestinal  Gastrointestinal (WDL): All gastrointestinal elements are within defined limits  Genitourinary  Genitourinary (WDL): All genitourinary elements are within defined limits  Integumentary  Integumentary (WDL): All integumentary elements are within defined limits  Patient Coping  Patient Coping: Accepting  Accompanied by  Accompanied by: Alone    ECOG performance status and Distress Assessment      ECOG Performance:    ECOG Performance Status: 0    Distress Assessment  Distress Assessment Score: No distress:     Pain Status  Currently in Pain: No/denies        Vital Signs     Vitals:    03/06/17 1450   BP: 136/68   Pulse: 70   Temp: 98.4  F (36.9  C)   SpO2: 97%       Physical Exam     GENERAL: No acute distress. Cooperative in conversation.   HEENT: Pupils are equal, round and reactive. Oral mucosa is clean and intact. No ulcerations or mucositis noted. No bleeding noted.  RESP:Chest symmetric lungs are clear bilaterally per auscultation. Regular respiratory rate. No wheezes or rhonchi.  CV: Normal S1 S2 Regular, rate and rhythm. No murmurs.  ABD: Nondistended, soft, nontender. Positive bowel sounds. No organomegaly.   EXTREMITIES: No lower extremity edema.   NEURO: Non- focal. Alert and oriented x3.  Cranial nerves appear intact.  PSYCH: Within normal limits. No  depression or anxiety.  SKIN: Warm dry intact.    LYMPH NODES: Bilateral cervical, supraclavicular, axillary lymph node examination was done.  Negative for any palpable adenopathy.  BREAST: breasts appear normal, no suspicious masses, no skin or nipple changes or axillary nodes. She has post op and radiation changes more so on her right breast. Nipple on that side is slightly retracted and some induration on upper outer quadrant.      Lab Results     Results for orders placed or performed in visit on 03/06/17   Comprehensive Metabolic Panel   Result Value Ref Range    Sodium 140 136 - 145 mmol/L    Potassium 4.0 3.5 - 5.0 mmol/L    Chloride 105 98 - 107 mmol/L    CO2 30 22 - 31 mmol/L    Anion Gap, Calculation 5 5 - 18 mmol/L    Glucose 92 70 - 125 mg/dL    BUN 16 8 - 22 mg/dL    Creatinine 0.82 0.60 - 1.10 mg/dL    GFR MDRD Af Amer >60 >60 mL/min/1.73m2    GFR MDRD Non Af Amer >60 >60 mL/min/1.73m2    Bilirubin, Total 0.5 0.0 - 1.0 mg/dL    Calcium 9.8 8.5 - 10.5 mg/dL    Protein, Total 7.6 6.0 - 8.0 g/dL    Albumin 3.9 3.5 - 5.0 g/dL    Alkaline Phosphatase 107 45 - 120 U/L    AST 22 0 - 40 U/L    ALT 17 0 - 45 U/L   HM1 (CBC with Diff)   Result Value Ref Range    WBC 8.3 4.0 - 11.0 thou/uL    RBC 4.07 3.80 - 5.40 mill/uL    Hemoglobin 12.4 12.0 - 16.0 g/dL    Hematocrit 37.6 35.0 - 47.0 %    MCV 92 80 - 100 fL    MCH 30.5 27.0 - 34.0 pg    MCHC 33.0 32.0 - 36.0 g/dL    RDW 13.7 11.0 - 14.5 %    Platelets 313 140 - 440 thou/uL    MPV 7.7 7.0 - 10.0 fL    Neutrophils % 58 50 - 70 %    Lymphocytes % 30 20 - 40 %    Monocytes % 8 2 - 10 %    Eosinophils % 4 0 - 6 %    Basophils % 1 0 - 2 %    Neutrophils Absolute 4.8 2.0 - 7.7 thou/uL    Lymphocytes Absolute 2.5 0.8 - 4.4 thou/uL    Monocytes Absolute 0.7 0.0 - 0.9 thou/uL    Eosinophils Absolute 0.3 0.0 - 0.4 thou/uL    Basophils Absolute 0.1 0.0 - 0.2 thou/uL         Imaging Results     No results found.      Bam Gallardo MD

## 2021-06-09 NOTE — PATIENT INSTRUCTIONS - HE
"Continue home allergy and cough medications.  Please see COVID discharge instructions.  COVID testing tomorrow as planned.  Drink extra fluids.  Rest at home.  Call oncologist tomorrow if your heart rate is still over 100 beats in 1 minute, sooner if fever over 100.4 or feeling worse overall.    Discharge Instructions for COVID-19 Patients  You have--or may have--COVID-19. Please follow the instructions listed below.   If you have a weakened immune system, discuss with your doctor any other actions you need to take.  How can I protect others?  If you have symptoms (fever, cough, body aches or trouble breathing):    Stay home and away from others (self-isolate) until:  ? At least 10 days have passed since your symptoms started. And   ? You've had no fever--and no medicine that reduces fever--for 3 full days (72 hours). And   ? Your other symptoms have resolved (gotten better).  If you don't show symptoms, but testing showed that you have COVID-19:    Stay home and away from others (self-isolate) until at least 10 days have passed since the date of your first positive COVID-19 test.  During this time    Stay in your own room, even for meals. Use your own bathroom if you can.    Stay away from others in your home. No hugging, kissing or shaking hands. No visitors.    Don't go to work, school or anywhere else.    Clean \"high touch\" surfaces often (doorknobs, counters, handles). Use household cleaning spray or wipes. You'll find a full list of  on the EPA website: www.epa.gov/pesticide-registration/list-n-disinfectants-use-against-sars-cov-2.    Cover your mouth and nose with a mask, tissue or wash cloth to avoid spreading germs.    Wash your hands and face often. Use soap and water.    Caregivers in these groups are at risk for severe illness due to COVID-19:  ? People 65 years and older  ? People who live in a nursing home or long-term care facility  ? People with chronic disease (lung, heart, cancer, diabetes, " kidney, liver, immunologic)  ? People who have a weakened immune system, including those who:    Are in cancer treatment    Take medicine that weakens the immune system, such as corticosteroids    Had a bone marrow or organ transplant    Have an immune deficiency    Have poorly controlled HIV or AIDS    Are obese (body mass index of 40 or higher)    Smoke regularly    Caregivers should wear gloves while washing dishes, handling laundry and cleaning bedrooms and bathrooms.    Use caution when washing and drying laundry: Don't shake dirty laundry and use the warmest water setting that you can.    For more tips on managing your health at home, go to www.cdc.gov/coronavirus/2019-ncov/downloads/10Things.pdf.  How can I take care of myself at home?  1. Get lots of rest. Drink extra fluids (unless a doctor has told you not to).  2. Take Tylenol (acetaminophen) for fever or pain. If you have liver or kidney problems, ask your family doctor if it's okay to take Tylenol.     Adults can take either:  ? 650 mg (two 325 mg pills) every 4 to 6 hours, or   ? 1,000 mg (two 500 mg pills) every 8 hours as needed.  ? Note: Don't take more than 3,000 mg in one day. Acetaminophen is found in many medicines (both prescribed and over-the-counter medicines). Read all labels to be sure you don't take too much.  For children, check the Tylenol bottle for the right dose. The dose is based on the child's age or weight.  3. If you have other health problems (like cancer, heart failure, an organ transplant or severe kidney disease): Call your specialty clinic if you don't feel better in the next 2 days.  4. Know when to call 911. Emergency warning signs include:  ? Trouble breathing or shortness of breath  ? Pain or pressure in the chest that doesn't go away  ? Feeling confused like you haven't felt before, or not being able to wake up  ? Bluish-colored lips or face  5. Your doctor may have prescribed a blood thinner medicine. Follow their  instructions.  Where can I get more information?    [facility] - About COVID-19:   [email or web address]    CDC - What to Do If You're Sick: www.cdc.gov/coronavirus/2019-ncov/about/steps-when-sick.html    CDC - Ending Home Isolation: www.cdc.gov/coronavirus/2019-ncov/hcp/disposition-in-home-patients.html    CDC - Caring for Someone: www.cdc.gov/coronavirus/2019-ncov/if-you-are-sick/care-for-someone.html    [facility] - Interim Guidance for Hospital Discharge to Home: www.health.[facility].[place]/diseases/coronavirus/hcp/hospdischarge.pdf    [facility] clinical trials (COVID-19 research studies): [email or web address]-clinical-trials    Below are the COVID-19 hotlines at the [place] Department of Health ([facility]). Interpreters are available.  ? For health questions: Call 642-785-3599 or 1-258.322.6658 (7 a.m. to 7 p.m.)  ? For questions about schools and childcare: Call 752-132-7364 or 1-399.182.8266 (7 a.m. to 7 p.m.)    For informational purposes only. Not to replace the advice of your health care provider. Clinically reviewed by the Infection Prevention Team.Copyright   2020 [facility]. All rights reserved. [employer] [id number] - 06/20.

## 2021-06-11 NOTE — PROGRESS NOTES
Assessment & Plan:  1. Pneumonia of right lower lobe due to infectious organism       She has finished antibiotics as an is improving.  Lung sounds are clear today on exam.  Patient still with postinfectious cough.  Discussed Mucinex to loosen and thin secretions.  Discussed good water intake and increased rest.  She is to return for follow-up chest x-ray in 4-5 weeks.  Return sooner if cough worsens, fever develops or feeling more short of breath.  Discussed that likely her  has the same strain, but not guaranteed.  She is to monitor symptoms and return to clinic or call if symptoms worsen at any point in time.    There are no Patient Instructions on file for this visit.    No orders of the defined types were placed in this encounter.    There are no discontinued medications.        Chief Complaint:   Chief Complaint   Patient presents with     Follow-up     seen at [facility] 6/26, dx with pneumonia       History of Present Illness:  Chuyita is a 67 y.o. female presenting to the clinic today with for follow-up from walk-in clinic visit on 6/26/2017.  At that visit she was diagnosed with right lower lobe pneumonia after chest x-ray.  She had been having symptoms of cough and worsening fatigue and fevers since 6/18/2017.  She was prescribed azithromycin, took her complete course and is feeling improved.  She still has some lingering fatigue, however no shortness of breath on exertion.  No lingering fevers.  She does have some productive cough that is still occurring throughout the day.  It is at worst in the morning.  She has not been taking any over-the-counter remedies.  She does have some codeine cough syrup to use nightly if she needs it for sleep.  Otherwise feeling some slow improvement.  Her  also recently got diagnosed with pneumonia about 5 days ago, she is wondering if there is any possibility she could catch it from him or if she gave it to him.    Review of Systems:  All other systems are  negative except as noted above.     PFSH:  Reviewed and updated.     Tobacco Use:  History   Smoking Status     Never Smoker   Smokeless Tobacco     Never Used       Vitals:  Vitals:    07/05/17 1143   BP: 140/72   Patient Site: Right Arm   Patient Position: Sitting   Cuff Size: Adult Regular   Pulse: 88   Resp: 16   Temp: 98.3  F (36.8  C)   TempSrc: Oral   Weight: 156 lb 9 oz (71 kg)     Wt Readings from Last 3 Encounters:   07/05/17 156 lb 9 oz (71 kg)   06/26/17 158 lb 8 oz (71.9 kg)   03/06/17 161 lb 1.6 oz (73.1 kg)       Physical Exam:  Constitutional:  Reveals an alert, cooperative, 67-year-old female in no acute distress.  Vitals:  Per nursing notes.  Eyes: PERRLA, funduscopic exam within normal limits.  HENT: TMs clear bilaterally,Oropharynx with erythema, clear posterior nasal drainage, no thrush. Neck supple,  thyroid not palpable. Nasal mucosa pink with out rhinorrhea. Sinuses nontender to palpation.   Cardiovascular:  Regular rate and rhythm without murmurs, rubs, or gallops. Carotids without bruits. Legs without edema.   Respiratory: Clear.  Respiratory effort normal.  Lymph: Anterior cervical lymphadenopathy  present, Posterior cervical lymphadenopathy not present, lymph nodes slightly tender to palpation.   Psychiatric:  Mood appropriate, alert and oriented x3.    Data Reviewed:  Additional History from Old Records or Another Person Summarized (2 total): Reviewed walk-in clinic notes.  Imaging.    Decision to Obtain Extra information (1 total): None.     Radiology Tests Summarized and Ordered (XRAY/CT/MRI/DXA) (1 total): None.    Labs Reviewed and Ordered (1 total): None ordered today.    Medicine Tests Summarized and Ordered (EKG/ECHO/COLONOSCOPY/EGD) (1 total): None.    Independent Review of EKG or X-Ray (2 each): None.    The visit lasted a total of 20 minutes face to face with the patient. Over 50% of the time was spent counseling and educating the patient about plan of  care.    Medications:  Current Outpatient Prescriptions   Medication Sig Dispense Refill     anastrozole (ARIMIDEX) 1 mg tablet TAKE 1 TABLET DAILY 90 tablet 3     calcium carbonate-vitamin D3 (CALCIUM 600 + D,3,) 600 mg(1,500mg) -200 unit per tablet Take 1 tablet by mouth daily. As directed       MULTIVITAMIN ORAL Take 1 tablet by mouth daily.       No current facility-administered medications for this visit.        Total Data Points: 2    BRITNEY Jim, CNP    This note has been dictated using voice recognition software. Any grammatical or context distortions are unintentional and inherent to the software

## 2021-06-12 NOTE — PROGRESS NOTES
Chief Complaint   Patient presents with     Cough     since        HPI    Patient is here for mostly nonproductive cough since , can be moderate, partially improved with OTC Robitussin. No fever, chills, chest pain, shortness of breath.     ROS: Pertinent ROS noted in HPI.     No Known Allergies    Patient Active Problem List   Diagnosis     Breast cancer of lower-outer quadrant of left female breast     Osteopenia     Abnormal cervix finding       Family History   Problem Relation Age of Onset     Breast cancer Paternal Aunt 70     Stroke Mother       75     Prostate cancer Father 70     Osteoporosis Father      Breast cancer Paternal Aunt 70     Breast cancer Paternal Grandmother 30     Ovarian cancer Cousin 60     Stomach cancer Maternal Aunt 90     Prostate cancer Maternal Uncle 70     Prostate cancer Maternal Uncle 70     Prostate cancer Cousin 60     Lung cancer Cousin      Kidney cancer Cousin 60     No Medical Problems Daughter      No Medical Problems Daughter      Social History     Social History     Marital status:      Spouse name: N/A     Number of children: N/A     Years of education: N/A     Occupational History     administrative support        Social History Main Topics     Smoking status: Never Smoker     Smokeless tobacco: Never Used     Alcohol use No     Drug use: No     Sexual activity: No     Other Topics Concern     Not on file     Social History Narrative    18 - Patient resides with her . She is currently employed.          Objective:    Vitals:    17 1300   BP: 112/64   Pulse: 62   Resp: 16   Temp: 99.3  F (37.4  C)   SpO2: 99%       Gen:NAD  Oropharynx:Normal  CV: RRR, no M, R, G  Pulm: CTAB, normal effort    CXR - negative chest per my interpretation, discussed during visit.        Cough  -     XR Chest PA and Lateral      Reassuring exam. Advised a trial of OTC PPI. F/u as directed.

## 2021-06-12 NOTE — PROGRESS NOTES
[facility] Progress Note    Patient: Chuyita Porter  MRN: [id number]  Date of Service: 8/29/2017        Reason for visit      1. Breast cancer of lower-outer quadrant of left female breast    2. Osteopenia    3. Age-related osteoporosis without current pathological fracture         Assessment     1. Left breast cancer. 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  2. Prior h/o right sided breast cancer, stage I, ER/IN positive, and Her2 -ve, treated with lumpectomy and XRT. She took Anastrozole for 5 years.  3. Mild Osteopenia.  4. Family h/o cancer  5. Good general health.      Plan     1.  I advised the patient to continue anastrozole 1 mg p.o. daily.  2.  Good diet and exercise.  3.  She will need a mammogram in September.  4.  Calcium and Vit D supplementation.  5.  Follow-up in 6 months. We will check DEXA scan at the time.    Clinical stage      Breast cancer of lower-outer quadrant of left female breast    Primary site: Breast    Staging method: AJCC 7th Edition    Clinical: Stage IA (T1a, N0, cM0) - Signed by Bam Gallardo MD on 9/24/2015    Summary: Stage IA (T1a, N0, cM0)      History     Chuyita Porter is a very pleasant 67 y.o. old female with a history of invasive ductal ca on the left side, measuring few mm in size diagnosed in August 2015 when she presented with abnormal mammogram. She had no symptoms, but the mammogram showed microcalcification. She had biopsy on August 2015 and that showed invasive ductal ca. Then she had lumpectomy on 9/4/2015 which also showed DCIS. SLN was negative. She is recovering well from her surgery.  She has prior h/o right sided breast cancer, 0.6 cm in size, ER/IN positive, and her2 negative. She had XRT and and had 5 years of Anastrozole.  This diagnosis was in 2005.  She was treated with radiation therapy by Dr. Hammond.  Started back on anastrozole in November 2015.  She is tolerating it well.  Minimal hot flashes.    Comes in today for  scheduled follow up. Doing well.No new medical event.    Past Medical History     Past Medical History:   Diagnosis Date     Breast cancer 2005    right     Breast cancer 2015    left     Hx of radiation therapy 2005       Review of Systems   Constitutional  Constitutional (WDL): All constitutional elements are within defined limits  Neurosensory  Neurosensory (WDL): All neurosensory elements are within defined limits  Cardiovascular  Cardiovascular (WDL): All cardiovascular elements are within defined limits  Pulmonary  Respiratory (WDL): Exceptions to WDL  Cough: Mild symptoms, nonprescription intervention indicated (dry)  Gastrointestinal  Gastrointestinal (WDL): All gastrointestinal elements are within defined limits  Genitourinary  Genitourinary (WDL): All genitourinary elements are within defined limits  Integumentary  Integumentary (WDL): All integumentary elements are within defined limits  Patient Coping  Patient Coping: Accepting  Accompanied by  Accompanied by: Alone    ECOG performance status and Distress Assessment      ECOG Performance:    ECOG Performance Status: 0    Distress Assessment  Distress Assessment Score: No distress:     Pain Status  Currently in Pain: No/denies        Vital Signs     Vitals:    08/29/17 1142   BP: 131/77   Pulse: 86   Temp: 98.6  F (37  C)   SpO2: 97%       Physical Exam     GENERAL: No acute distress. Cooperative in conversation.   HEENT: Pupils are equal, round and reactive. Oral mucosa is clean and intact. No ulcerations or mucositis noted. No bleeding noted.  RESP:Chest symmetric lungs are clear bilaterally per auscultation. Regular respiratory rate. No wheezes or rhonchi.  CV: Normal S1 S2 Regular, rate and rhythm. No murmurs.  ABD: Nondistended, soft, nontender. Positive bowel sounds. No organomegaly.   EXTREMITIES: No lower extremity edema.   NEURO: Non- focal. Alert and oriented x3.  Cranial nerves appear intact.  PSYCH: Within normal limits. No depression or  anxiety.  SKIN: Warm dry intact.    LYMPH NODES: Bilateral cervical, supraclavicular, axillary lymph node examination was done.  Negative for any palpable adenopathy.      Lab Results     Results for orders placed or performed in visit on 08/29/17   Comprehensive Metabolic Panel   Result Value Ref Range    Sodium 141 136 - 145 mmol/L    Potassium 3.7 3.5 - 5.0 mmol/L    Chloride 105 98 - 107 mmol/L    CO2 28 22 - 31 mmol/L    Anion Gap, Calculation 8 5 - 18 mmol/L    Glucose 115 70 - 125 mg/dL    BUN 13 8 - 22 mg/dL    Creatinine 0.91 0.60 - 1.10 mg/dL    GFR MDRD Af Amer >60 >60 mL/min/1.73m2    GFR MDRD Non Af Amer >60 >60 mL/min/1.73m2    Bilirubin, Total 0.7 0.0 - 1.0 mg/dL    Calcium 10.0 8.5 - 10.5 mg/dL    Protein, Total 8.4 (H) 6.0 - 8.0 g/dL    Albumin 4.1 3.5 - 5.0 g/dL    Alkaline Phosphatase 115 45 - 120 U/L    AST 21 0 - 40 U/L    ALT 16 0 - 45 U/L         Imaging Results     Mammo Screening Bilateral    Result Date: 8/11/2017  BILATERAL FULL FIELD DIGITAL SCREENING MAMMOGRAM Performed on: 8/11/17 Compared to: 08/09/2016 Mammo Diagnostic Bilateral, 08/07/2015 Mammo Screening Bilateral, and 08/05/2014 Mammo Screening Bilateral Findings: The breasts have scattered areas of fibroglandular densities.  There are postsurgical lumpectomy changes in both breasts. No evidence for malignancy and no change from the previous exam(s). Continue routine screening mammogram as recommended. ACR BI-RADS Category 2: Benign Findings        Bam Gallardo MD

## 2021-06-12 NOTE — PROGRESS NOTES
"Assessment/Plan:    1. Rash  Somewhat unclear cause of rash, appearance suspicious for contact dermatitis. Recommend daily moisturizing and triamcinolone as below. No evidence of cellulitis, exam not consistent with fungal infection. Pt asks if this could be chemotherapy related and I am not certain of this though have somewhat low suspicion given she completed this in June; rash also unlikely to be radiation related as this was last done in February, though still to consider. Plan f/u in a few weeks with PCP at physical.   - triamcinolone (KENALOG) 0.1 % cream; Apply topically 2 (two) times a day. Use for 2-3 weeks  Dispense: 45 g; Refill: 1      Follow up: at scheduled physical in a few weeks    Sofy Liang MD  [facility]    Subjective:    Patient ID: Chuyita Porter is a 70 y.o. female is here today for rash    Rash   -on chest  -started approx 1 month ago - initially just a little pimple and then has changed  -has tried lubriderm (using intermittently), aloe and hydrocortisone (seems to have made things worse)   -doesn't bother much during the day but mostly at night  -sometimes itchy  -rough dry skin but not cracking, feels like \"washboard\"  -wonders if related to prior chemo - they did 2 episodes of radiation in Feb  -sometimes gets a little itchy on bilateral arms  -past few days has noticed mild finger swelling and some numbness/tingling, bilaterally - not waking overnight because of it - suspect positioning or carpal tunnel - pt will try different pillows/positioning and then if needed could do wrist bracing      Patient Active Problem List   Diagnosis     Malignant neoplasm of lower-outer quadrant of left breast of female, estrogen receptor positive (H)     Abnormal cervix finding     Autoimmune metaplastic  Atrophic gastritis EGD 1/2019, repeat mapping in 1 yr      Diffuse large B-cell lymphoma of extranodal site (H)     DLBCL (diffuse large B cell lymphoma) (H)     Mediastinal mass "     Thyroid mass     Polyp of duodenum     Adverse reaction to antineoplastic antibiotics     Past Medical History:   Diagnosis Date     Breast cancer (H) 2005    right     Breast cancer (H) 2015    left     Gastritis     autoimmune atrophic gastritis      Hx of radiation therapy 2005     Past Surgical History:   Procedure Laterality Date     BREAST BIOPSY       BREAST LUMPECTOMY Right 2005    + radiation     BREAST LUMPECTOMY Left 09/03/2015     TONSILLECTOMY       US THYROID BIOPSY  1/30/2020     Current Outpatient Medications on File Prior to Visit   Medication Sig Dispense Refill     loratadine (CLARITIN) 10 mg tablet Take 1 tablet (10 mg total) by mouth daily. (Patient taking differently: Take 10 mg by mouth as needed (seasonal usually in the Fall). ) 30 tablet 0     acetaminophen (TYLENOL) 325 MG tablet Take 650 mg by mouth every 6 (six) hours as needed.        LORazepam (ATIVAN) 0.5 MG tablet Take 0.5 mg by mouth every 6 (six) hours as needed for anxiety. Indications: anxious       ondansetron (ZOFRAN) 4 MG tablet Take 4 mg by mouth every 8 (eight) hours as needed for nausea. Indications: nausea and vomiting caused by cancer drugs       prochlorperazine (COMPAZINE) 5 MG tablet Take 5 mg by mouth every 6 (six) hours as needed for nausea. Indications: nausea and vomiting       No current facility-administered medications on file prior to visit.      No Known Allergies  Social History     Socioeconomic History     Marital status:      Spouse name: Not on file     Number of children: Not on file     Years of education: Not on file     Highest education level: Not on file   Occupational History     Occupation: administrative support     Social Needs     Financial resource strain: Not on file     Food insecurity     Worry: Not on file     Inability: Not on file     Transportation needs     Medical: Not on file     Non-medical: Not on file   Tobacco Use     Smoking status: Never Smoker     Smokeless tobacco:  Never Used   Substance and Sexual Activity     Alcohol use: No     Drug use: No     Sexual activity: Never   Lifestyle     Physical activity     Days per week: Not on file     Minutes per session: Not on file     Stress: Not on file   Relationships     Social connections     Talks on phone: Not on file     Gets together: Not on file     Attends Tenri[employer] service: Not on file     Active member of club or organization: Not on file     Attends meetings of clubs or organizations: Not on file     Relationship status: Not on file     Intimate partner violence     Fear of current or ex partner: Not on file     Emotionally abused: Not on file     Physically abused: Not on file     Forced sexual activity: Not on file   Other Topics Concern     Not on file   Social History Narrative    She is  to Ranjit, with whom she lives. She is retired from office work. 2018 [employer]     Family History   Problem Relation Age of Onset     Breast cancer Paternal Aunt 70     Stroke Mother          75     Prostate cancer Father 70     Osteoporosis Father      Breast cancer Paternal Aunt 70     Breast cancer Paternal Grandmother 30     Ovarian cancer Cousin 60     Stomach cancer Maternal Aunt 90     Prostate cancer Maternal Uncle 70     Prostate cancer Maternal Uncle 70     Prostate cancer Cousin 60     Lung cancer Cousin      Kidney cancer Cousin 60     No Medical Problems Daughter      No Medical Problems Daughter      Review of systems is as stated in HPI, and the remainder of system review is otherwise negative.    Objective:      /76 (Patient Site: Left Arm, Patient Position: Sitting, Cuff Size: Adult Large)   Pulse 83   Wt 174 lb 1.6 oz (79 kg)   SpO2 98%   BMI 28.75 kg/m      General appearance: awake, NAD  HEENT: atraumatic, normocephalic, no scleral icterus or injection  Lungs: breathing comfortably on room air  Extremities: moving all extremities  Skin: erythematous rash on anterior chest - maculopapular, few  papules on posterior neck as well  Neuro: alert, CNs grossly intact, no focal deficits appreciated  Psych: normal mood/affect/behavior, answering questions appropriately, linear thought process

## 2021-06-12 NOTE — PROGRESS NOTES
Monday before laborday had congestion and developed cough  Went in walk in and evaluated   No problem sleeping.   AM cough but then is good for rest of the day and get coughing fits.   Talking is a trigger. Sounds like loose cough.   Not keeping up at night  Not fatigued or fevers. Just annoying  Cough medicine mildly helpful- robitussin DM but had some with codeine.   Inspiratory wheeze intermittently   Still congested. Not interested in nasal spray   Duration 2 weeks now. Not necessarily better. Humidity worse   Neg cxr

## 2021-06-12 NOTE — PATIENT INSTRUCTIONS - HE
For the hand issues:  -try using different pillows to try different positioning  -if that doesn't work then we could try wrist braces to help with possible mild carpal tunnel    Rash:  -ok to use daily moisturizing lotion - lubriderm, aquaphor, eucerin, cerave  -start two times a day triamcinolone cream (steroid)  -ok to bathe normally but no scrubbing the rash

## 2021-06-12 NOTE — PROGRESS NOTES
ASSESSMENT & PLAN:  Chuyita was seen today for cough.    Diagnoses and all orders for this visit:    Cough    Other orders  -     omeprazole (PRILOSEC OTC) 20 MG tablet; Take 1 tablet (20 mg total) by mouth daily before breakfast.  -     benzonatate (TESSALON PERLES) 100 MG capsule; Take 1 capsule (100 mg total) by mouth every 6 (six) hours as needed for cough.  -     loratadine (CLARITIN) 10 mg tablet; Take 1 tablet (10 mg total) by mouth daily.  -     Pneumococcal conjugate vaccine 13-valent 6wks-17yrs; >50yrs    -Patient presents with persistent cough that likely is secondary to acute upper respiratory infection-viral etiology.  Lungs are clear on exam today.  I reviewed walk-in care note from June indicating pneumonia and follow-up walk-in care note beginning of September showing resolution of the pneumonia on chest x-ray.  She has had persistent coughing symptoms but did worsen acutely likely secondary to viral infection.  No known history of allergy mediated cough or asthma or COPD.  I recommended the following treatment course as outlined below for symptomatic relief.  I think it is less likely that symptoms are secondary to acid reflux but could resume the PPI given history of epigastric pain and possible reflux symptoms.  If symptoms not improving within the next couple weeks she can initiate the PPI rather than now in order to tease out whether or not this is reflux related .  Differential also includes allergy, decided to hold off on an albuterol inhaler for now.  Could be habitual cough in which case would benefit from speech evaluation and therapy.  She is due for pneumonia vaccine today given that she is afebrile I think she can proceed with getting that today.  Could consider pulmonary function testing of symptoms not improving.  Patient refuses to do any type of nasal spray    Patient Instructions   1.For your cough I dont think you need antibiotics at this time.   -for symptoms you can continue  robitussin every 6 hours  Either DM or with codeine (which may make drowsy)  - you can also add in bezonotate perles that I mentioned to help stun the cough reflux can be taken instead of or with the robitussin .  I will send a prescription but dont have to fill    2. Could always consider an albuterol inhaler especially if feeling heaviness on your chest but will hold off for now.     3. For nasal drainage recommend considering non drowsy antihistamine such as generic claritin or zyrtec or allegra once daily in the morning. This treats allergies but sometimes helps symptoms.     4.could be reflux or habitual cough  -so if not improving within a couple weeks resume the prilosec for up to 4-6 weeks.        Orders Placed This Encounter   Procedures     Pneumococcal conjugate vaccine 13-valent 6wks-17yrs; >50yrs     Medications Discontinued During This Encounter   Medication Reason     omeprazole (PRILOSEC OTC) 20 MG tablet Therapy completed       No Follow-up on file.     CHIEF COMPLAINT:  Chief Complaint   Patient presents with     Cough     Had pneumonia in June.  Has had a cough since then.  Gotten worse in the last 2 weeks.  Can be productive at times.          HISTORY OF PRESENT ILLNESS:  Chuyita is a 67 y.o. female presenting to the clinic today to follow up on her cough.   Monday before laborday had congestion and developed cough  Went in walk in and evaluated   No problem sleeping.   AM cough but then is good for rest of the day and get coughing fits.   Talking is a trigger. Sounds like loose cough.   Not keeping up at night  Not fatigued or fevers. Just annoying  Cough medicine mildly helpful- robitussin DM but had some with codeine.   Inspiratory wheeze intermittently   Still congested. Not interested in nasal spray   Duration 2 weeks now. Not necessarily better. Humidity worse   Neg cxr.  Had pneumonia in June and seemed to have recovered but did have mildly persistent cough.  No shortness of breath or  wheeze.  Has been afebrile.       REVIEW OF SYSTEMS:   Complete review of systems reviewed in HPI are otherwise negative.  Had epigastric pain that we were evaluating patient for previously.     PFSH:     TOBACCO USE:  History   Smoking Status     Never Smoker   Smokeless Tobacco     Never Used        VITALS:  Vitals:    09/08/17 1350   BP: 132/84   Pulse: 62   Resp: 14   Temp: 98.6  F (37  C)   TempSrc: Oral   Weight: 160 lb (72.6 kg)     Wt Readings from Last 3 Encounters:   09/08/17 160 lb (72.6 kg)   09/01/17 158 lb 8 oz (71.9 kg)   08/29/17 155 lb 14.4 oz (70.7 kg)     Body mass index is 25.82 kg/(m^2).  No LMP recorded. Patient is postmenopausal.     PHYSICAL EXAM:  GENERAL:  Reveals an alert 67 y.o. female in NAD.  Vitals:  Per nursing notes.  EYES: PERRLA. Extraocular movements intact. Normal conjunctiva and lids.   ENT:  Hearing grossly normal.  Normal appearance to ears and nose.  Bilateral TM s, external canals, oropharynx normal. Normal lips, gums and teeth.  Normal nasal mucosa, septum and turbinates.  NECK:  Supple, thyroid not palpable.  CARDIAC:  Regular rate and rhythm without murmurs, rubs, or gallops. Legs without edema. Carotids without bruits.   LUNGS: Clear.  Respiratory effort normal.  ABDOMEN:  Soft, non-tender, without hepatosplenomegaly, masses, or hernias.   SKIN:   Without rash, bruise, or palpable lesions.  NEURO:  Cranial nerves II-XII intact.     PSYCH:  Mood appropriate, memory intact.          DATA REVIEWED:  ADDITIONAL HISTORY SUMMARIZED (2): Reviewed walk-in note from June and September  DECISION TO OBTAIN EXTRA INFORMATION (1): None.   RADIOLOGY TESTS (1): Reviewed chest x-ray  LABS (1): None.  MEDICINE TESTS (1): None.  INDEPENDENT REVIEW (2 each): None.     The visit lasted a total of 25 minutes face to face with the patient. Over 50% of the time was spent counseling and educating the patient .    This note has been dictated using voice recognition software. Any grammatical or  context distortions are unintentional and inherent to the software.     MEDICATIONS:  Current Outpatient Prescriptions   Medication Sig Dispense Refill     anastrozole (ARIMIDEX) 1 mg tablet TAKE 1 TABLET DAILY 90 tablet 3     calcium carbonate-vitamin D3 (CALCIUM 600 + D,3,) 600 mg(1,500mg) -200 unit per tablet Take 1 tablet by mouth daily. As directed       MULTIVITAMIN ORAL Take 1 tablet by mouth daily.       omeprazole (PRILOSEC OTC) 20 MG tablet Take 1 tablet (20 mg total) by mouth daily before breakfast. 30 tablet 2     benzonatate (TESSALON PERLES) 100 MG capsule Take 1 capsule (100 mg total) by mouth every 6 (six) hours as needed for cough. 30 capsule 0     loratadine (CLARITIN) 10 mg tablet Take 1 tablet (10 mg total) by mouth daily. 30 tablet 0     No current facility-administered medications for this visit.         Total data points: 3

## 2021-06-13 NOTE — PROGRESS NOTES
Assessment and Plan:      Patient has been advised of split billing requirements and indicates understanding: Yes   Chuyita was seen today for annual wellness visit.    Diagnoses and all orders for this visit:    Chuyita was seen today for annual wellness visit.    Diagnoses and all orders for this visit:    Encounter for annual wellness exam in Medicare patient-patient is up-to-date on all immunizations and screening at this time.  See below for further recommendations.  Labs updated today.  -     Comprehensive Metabolic Panel    Diffuse large B-cell lymphoma of extranodal site (H)-according to last PET scan in July she is in remission.  I reviewed last office visit with Dr. Sprague her medical oncologist.  She will continue to follow-up every several months.  Has standing lab orders at [facility] [facility]  -     [facility](CBC and Differential)  -     Magnesium  -     Ambulatory referral to Dermatology    Rash and nonspecific skin eruption-unclear etiology and appears to be improving.  After further consideration after our visit I am concerned for possible cutaneous B-cell lymphoma.  I am going to place a referral for dermatology and contact patient and recommend biopsy to evaluate.  I also contacted her oncologist via email to give her this update.  The following labs were also ordered to make sure no abnormalities associated.  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    Avoid PPI per GI    Malignant neoplasm of lower-outer quadrant of left breast of female, estrogen receptor positive (H)-follows with Dr. Gallardo.  Continues to be in remission.  Normal mammogram annually.    Abnormal  cervix finding-I deferred examination today because patient no longer requiring need for cervical cancer screening however looking back at previous notes I did note that she had an abnormal polyp or proliferative changes on the cervix but she had had since normal Pap smears the last being in 2018.  I think I will contact patient to have her seen by gynecology for evaluation in the event that she needs biopsy.        Chronic cough-this has resolved.        The patient's current medical problems were reviewed.    I have had an Advance Directives discussion with the patient.  The following health maintenance schedule was reviewed with the patient and provided in printed form in the after visit summary:   Health Maintenance   Topic Date Due     DXA SCAN  02/14/2020     MEDICARE ANNUAL WELLNESS VISIT  11/17/2021     FALL RISK ASSESSMENT  11/17/2021     MAMMOGRAM  10/05/2022     TD 18+ HE  10/19/2024     COLORECTAL CANCER SCREENING  07/27/2025     LIPID  11/17/2025     ADVANCE CARE PLANNING  11/17/2025     HEPATITIS C SCREENING  Completed     Pneumococcal Vaccine: Pediatrics (0 to 5 Years) and At-Risk Patients (6 to 64 Years)  Completed     Pneumococcal Vaccine: 65+ Years  Completed     INFLUENZA VACCINE RULE BASED  Completed     ZOSTER VACCINES  Completed     HEPATITIS B VACCINES  Aged Out        Subjective:   Chief Complaint: Chuyita Porter is an 70 y.o. female here for an Annual Wellness visit.   HPI:    Chuyita Porter is a 69 year old female with past medical history of breast cancer (right breast cancer in 2005 and left breast cancer in 2015 followed by Dr. Gallardo) with diffuse large B cell lymphoma.    Disease:  DLBCL nonGCB with mediastinal mass and thyroid gland involvement   - 4-6 BCL2 signals (81%)   - No rearrangement of BCL6, MYC, or BCL2   - No IGH-MYC fusion  Bulky disease, Stage IIB, IPI 3 ()  S/p 6 cycles R-CHOP     Currently in remission with B cell lymphoma. She sees Dr. Sprague at [facility] .  Overall feeling really good. This past summer- Had a chronic cough and saw pulmonology - had ground glass opacity. All other labs normal. Resolved on pet scan so didn't do further labs. Temp 99 all month of July.  Pet scan came back really good. Following up every 3 months.  Energy is good. When the weather was nice going for walks. Feels back to baseline.  When lays flat gets a little dizzy when sits up and goes down  . No nausea or headaches or neck pain.   Saw dr. liang for rash that has been persistent along neck that were itchy and spread down to front of chest. Been going on since September. Was concerned with area of lymphoma. Putting triamcinolone on twice daily.  Worse at night.  Usually uses lubraderm  But not to that area. Still takes claritin every night. Only takes multivitamin . No  new foods.  Lesion right arm   Will zoom thanksgiving   Oct 2020- mammogram normal   dexa  2018 -normal     She also has a history of atrophic metaplastic gastritis that was diagnosed in 2019 and was supposed to be followed up with mapping this past year but she was caught up in the treatment for B-cell lymphoma that she has not yet done so.    Review of Systems:     Please see above.  The rest of the review of systems are negative for all systems.    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, repeat mapping in 1 yr      DLBCL  (diffuse large B cell lymphoma) (H)     Mediastinal mass     Polyp of duodenum     Adverse reaction to antineoplastic antibiotics     Past Medical History:   Diagnosis Date     Breast cancer (H) 2005    right     Breast cancer (H) 2015    left     Gastritis     autoimmune atrophic gastritis      Hx of radiation therapy       Past Surgical History:   Procedure Laterality Date     BREAST BIOPSY       BREAST LUMPECTOMY Right 2005    + radiation     BREAST LUMPECTOMY Left 2015     TONSILLECTOMY       US THYROID BIOPSY  2020      Family History   Problem Relation Age of Onset     Breast cancer Paternal Aunt 70     Stroke Mother          75     Prostate cancer Father 70     Osteoporosis Father      Breast cancer Paternal Aunt 70     Breast cancer Paternal Grandmother 30     Ovarian cancer Cousin 60     Stomach cancer Maternal Aunt 90     Prostate cancer Maternal Uncle 70     Prostate cancer Maternal Uncle 70     Prostate cancer Cousin 60     Lung cancer Cousin      Kidney cancer Cousin 60     No Medical Problems Daughter      No Medical Problems Daughter       Social History     Socioeconomic History     Marital status:      Spouse name: Not on file     Number of children: Not on file     Years of education: Not on file     Highest education level: Not on file   Occupational History     Occupation: administrative support     Social Needs     Financial resource strain: Not on file     Food insecurity     Worry: Not on file     Inability: Not on file     Transportation needs     Medical: Not on file     Non-medical: Not on file   Tobacco Use     Smoking status: Never Smoker     Smokeless tobacco: Never Used   Substance and Sexual Activity     Alcohol use: No     Drug use: No     Sexual activity: Never   Lifestyle     Physical activity     Days per week: Not on file     Minutes per session: Not on file     Stress: Not on file   Relationships     Social connections     Talks on phone: Not on file      "Gets together: Not on file     Attends Buddhism service: Not on file     Active member of club or organization: Not on file     Attends meetings of clubs or organizations: Not on file     Relationship status: Not on file     Intimate partner violence     Fear of current or ex partner: Not on file     Emotionally abused: Not on file     Physically abused: Not on file     Forced sexual activity: Not on file   Other Topics Concern     Not on file   Social History Narrative    She is  to Ranjit, with whom she lives. She is retired from office work. 6/4/2018 [employer]      Current Outpatient Medications   Medication Sig Dispense Refill     acetaminophen (TYLENOL) 325 MG tablet Take 650 mg by mouth every 6 (six) hours as needed.        prochlorperazine (COMPAZINE) 5 MG tablet Take 5 mg by mouth every 6 (six) hours as needed for nausea. Indications: nausea and vomiting       triamcinolone (KENALOG) 0.1 % cream Apply topically 2 (two) times a day. Use for 2-3 weeks 45 g 1     No current facility-administered medications for this visit.       Objective:   Vital Signs:   Visit Vitals  /79 (Patient Site: Left Arm, Patient Position: Sitting, Cuff Size: Adult Regular)   Pulse 80   Temp 97.6  F (36.4  C) (Oral)   Resp 16   Ht 5' 5\" (1.651 m)   Wt 169 lb (76.7 kg)   SpO2 98%   BMI 28.12 kg/m           VisionScreening:  No exam data present     PHYSICAL EXAM    General Appearance:    Alert, cooperative, no distress, appears stated age   Head:    Normocephalic, without obvious abnormality, atraumatic   Eyes:    PERRL, conjunctiva/corneas clear, EOM's intact, fundi     benign, both eyes   Ears:    Normal TM's and external ear canals, both ears   Nose:   Nares normal, septum midline, mucosa normal, no drainage    or sinus tenderness   Throat:   Lips, mucosa, and tongue normal; teeth and gums normal   Neck:   Supple, symmetrical, trachea midline, no adenopathy;     thyroid:  no enlargement/tenderness/nodules; no carotid    " bruit or JVD   Back:     Symmetric, no curvature, ROM normal, no CVA tenderness   Lungs:     Clear to auscultation bilaterally, respirations unlabored   Chest Wall:    No tenderness or deformity fine papular rash resolving along chest wall.     Heart:    Regular rate and rhythm, S1 and S2 normal, no murmur, rub   or gallop   Breast Exam:    No tenderness, masses, or nipple abnormality   Abdomen:     Soft, non-tender, bowel sounds active all four quadrants,     no masses, no organomegaly   Genitalia:   Deferred       Extremities:   Extremities normal, atraumatic, no cyanosis or edema   Pulses:   2+ and symmetric all extremities   Skin:   Skin color, texture, turgor normal, no rashes or lesions   Lymph nodes:   Cervical, supraclavicular, and axillary nodes normal   Neurologic:   CNII-XII intact, normal strength, sensation and reflexes     throughout       Assessment Results 11/17/2020   Activities of Daily Living No help needed   Instrumental Activities of Daily Living No help needed   Mini Cog Total Score 5   Some recent data might be hidden     A Mini-Cog score of 0-2 suggests the possibility of dementia, score of 3-5 suggests no dementia       Identified Health Risks:     Patient's advanced directive was discussed and I am comfortable with the patient's wishes.

## 2021-06-13 NOTE — PROGRESS NOTES
ASSESSMENT & PLAN:  Chuyita was seen today for cough.    Diagnoses and all orders for this visit:    Cough    Other orders  -     Influenza High Dose, Seasonal 65+ yrs      -Patient is here to follow-up on cough which seems to have resolved with initiation of nondrowsy antihistamine daily.  Confirms likely etiology of allergy induced cough.  Okay to continue to take it year round or as needed for flare.  No follow-up needed at this time and no blood work indicated.  Reviewed recent normal labs.  She will get high-dose flu vaccine for prophylaxis for this winter.  Hold off on omeprazole at this time his symptoms have resolved without it    Patient Instructions   You can take loratadine 10 mg year round or as needed, whichever works for you.        Orders Placed This Encounter   Procedures     Influenza High Dose, Seasonal 65+ yrs     Medications Discontinued During This Encounter   Medication Reason     omeprazole (PRILOSEC OTC) 20 MG tablet Therapy completed     benzonatate (TESSALON PERLES) 100 MG capsule Therapy completed       No Follow-up on file.     CHIEF COMPLAINT:  Chief Complaint   Patient presents with     Cough     Cough is better, but thought she should just come in for the recheck anyways.         HISTORY OF PRESENT ILLNESS:  Chuyita is a 67 y.o. female presenting to the clinic today to follow up on her cough. Her cough is better. It is not as hacking and it does not last as long. She takes loratadine 10 mg in the morning; she has a throat clearing in the morning still. She did not continue taking omeprazole. She started taking loratadine right after her office visit on 9/8/2017, and she noticed her cough was better this week. She did not try the Tessalon Perles. She has been buying loratadine over the counter.     Health Maintenance: She is wondering if now is a good time to get a flu shot; she would like the high dose influenza vaccine today. Her  is getting his flu shot next week. She has  not had a physical this year and she is wondering if she should have one.     REVIEW OF SYSTEMS:   She sees Dr. Gallardo regularly; her last visit with him was fine. She denies any hoarse voice, dysphagia, pelvic issues, breast tenderness, or pain. All other systems are negative.     PFSH:    TOBACCO USE:  History   Smoking Status     Never Smoker   Smokeless Tobacco     Never Used        VITALS:  Vitals:    09/21/17 1413   BP: 100/70   Pulse: 64   Resp: 16   Temp: 98.6  F (37  C)   TempSrc: Oral   Weight: 161 lb (73 kg)     Wt Readings from Last 3 Encounters:   09/21/17 161 lb (73 kg)   09/08/17 160 lb (72.6 kg)   09/01/17 158 lb 8 oz (71.9 kg)     Body mass index is 25.99 kg/(m^2).  No LMP recorded. Patient is postmenopausal.      PHYSICAL EXAM:  GENERAL:  Reveals an alert 67 y.o. female in NAD.  Vitals:  Per nursing notes.  CARDIAC:  Regular rate and rhythm without murmurs, rubs, or gallops. Legs without edema. Carotids without bruits.   LUNGS: Clear.  Respiratory effort normal.  PSYCH:  Mood appropriate, memory intact.      DATA REVIEWED:  ADDITIONAL HISTORY SUMMARIZED (2): None.   DECISION TO OBTAIN EXTRA INFORMATION (1): None.   RADIOLOGY TESTS (1): None.  LABS (1): Reviewed last Pap smear and HPV labs 5/17/2016.  MEDICINE TESTS (1): None.  INDEPENDENT REVIEW (2 each): None.     The visit lasted a total of 8 minutes face to face with the patient. Over 50% of the time was spent counseling and educating the patient about her cough and health maintenance.     I, Treva Haas, am scribing for and in the presence of Dr. Juarez.  Phyllis VINECNT DO , personally performed the services described in this documentation, as scribed by Treva Haas in my presence, and it is both accurate and complete.    This note has been dictated using voice recognition software. Any grammatical or context distortions are unintentional and inherent to the software.     MEDICATIONS:  Current Outpatient Prescriptions   Medication Sig  Dispense Refill     anastrozole (ARIMIDEX) 1 mg tablet TAKE 1 TABLET DAILY 90 tablet 3     calcium carbonate-vitamin D3 (CALCIUM 600 + D,3,) 600 mg(1,500mg) -200 unit per tablet Take 1 tablet by mouth daily. As directed       loratadine (CLARITIN) 10 mg tablet Take 1 tablet (10 mg total) by mouth daily. 30 tablet 0     MULTIVITAMIN ORAL Take 1 tablet by mouth daily.       No current facility-administered medications for this visit.         Total data points: 1

## 2021-06-13 NOTE — PATIENT INSTRUCTIONS - HE
Advance Directive  Patient s advance directive was discussed and I am comfortable with the patient s wishes.  Patient Education   Personalized Prevention Plan  You are due for the preventive services outlined below.  Your care team is available to assist you in scheduling these services.  If you have already completed any of these items, please share that information with your care team to update in your medical record.  Health Maintenance   Topic Date Due     DXA SCAN  02/14/2020     MEDICARE ANNUAL WELLNESS VISIT  11/17/2021     FALL RISK ASSESSMENT  11/17/2021     MAMMOGRAM  10/05/2022     LIPID  09/10/2024     TD 18+ HE  10/19/2024     ADVANCE CARE PLANNING  03/18/2025     COLORECTAL CANCER SCREENING  07/27/2025     HEPATITIS C SCREENING  Completed     Pneumococcal Vaccine: Pediatrics (0 to 5 Years) and At-Risk Patients (6 to 64 Years)  Completed     Pneumococcal Vaccine: 65+ Years  Completed     INFLUENZA VACCINE RULE BASED  Completed     ZOSTER VACCINES  Completed     HEPATITIS B VACCINES  Aged Out

## 2021-06-16 NOTE — PROGRESS NOTES
[facility] Cancer Care Progress Note    Patient: Chuyita Porter  MRN: [id number]  Date of Service: 2/28/2018        Reason for visit      1. Malignant neoplasm of lower-outer quadrant of left breast of female, estrogen receptor positive        Assessment     1. Left breast cancer. 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  2. Prior h/o right sided breast cancer, stage I, ER/ID positive, and Her2 -ve, treated with lumpectomy and XRT. She took Anastrozole for 5 years.  3. Mild Osteopenia. Current DEXA shows normal density.  4. Family h/o cancer  5. Good general health.      Plan     1.  I advised the patient to continue anastrozole 1 mg p.o. daily.  2.  Good diet and exercise.  3.  She will need a mammogram in September.  4.  Calcium and Vit D supplementation.  5.  Follow-up in 6 months.     Clinical stage      Breast cancer of lower-outer quadrant of left female breast    Primary site: Breast    Staging method: AJCC 7th Edition    Clinical: Stage IA (T1a, N0, cM0) - Signed by Bam Gallardo MD on 9/24/2015    Summary: Stage IA (T1a, N0, cM0)      History     Chuyita Porter is a very pleasant 67 y.o. old female with a history of invasive ductal ca on the left side, measuring few mm in size diagnosed in August 2015 when she presented with abnormal mammogram. She had no symptoms, but the mammogram showed microcalcification. She had biopsy on August 2015 and that showed invasive ductal ca. Then she had lumpectomy on 9/4/2015 which also showed DCIS. SLN was negative. She is recovering well from her surgery.  She has prior h/o right sided breast cancer, 0.6 cm in size, ER/ID positive, and her2 negative. She had XRT and and had 5 years of Anastrozole.  This diagnosis was in 2005.  She was treated with radiation therapy by Dr. Hammond.  Started back on anastrozole in November 2015.  She is tolerating it well.  Minimal hot flashes.    Comes in today for scheduled follow up. Doing well.No new medical event. Had  DEXA scan which came back showing normal bone density..    Past Medical History     Past Medical History:   Diagnosis Date     Breast cancer 2005    right     Breast cancer 2015    left     Hx of radiation therapy 2005       Review of Systems   Constitutional  Constitutional (WDL): Exceptions to WDL  Weight Gain: 5 - <10% from baseline (up 9 lbs)  Neurosensory  Neurosensory (WDL): All neurosensory elements are within defined limits  Cardiovascular  Cardiovascular (WDL): All cardiovascular elements are within defined limits  Pulmonary  Respiratory (WDL): Within Defined Limits  Gastrointestinal  Gastrointestinal (WDL): All gastrointestinal elements are within defined limits  Genitourinary  Genitourinary (WDL): All genitourinary elements are within defined limits  Integumentary  Integumentary (WDL): All integumentary elements are within defined limits  Patient Coping  Patient Coping: Accepting  Accompanied by  Accompanied by: Alone    ECOG performance status and Distress Assessment      ECOG Performance:    ECOG Performance Status: 0    Distress Assessment  Distress Assessment Score: No distress:     Pain Status  Currently in Pain: No/denies        Vital Signs     Vitals:    02/28/18 1025   BP: 129/80   Pulse: 69   Temp: 98  F (36.7  C)   SpO2: 98%       Physical Exam     GENERAL: No acute distress. Cooperative in conversation.   HEENT: Pupils are equal, round and reactive. Oral mucosa is clean and intact. No ulcerations or mucositis noted. No bleeding noted.  RESP:Chest symmetric lungs are clear bilaterally per auscultation. Regular respiratory rate. No wheezes or rhonchi.  CV: Normal S1 S2 Regular, rate and rhythm. No murmurs.  ABD: Nondistended, soft, nontender. Positive bowel sounds. No organomegaly.   EXTREMITIES: No lower extremity edema.   NEURO: Non- focal. Alert and oriented x3.  Cranial nerves appear intact.  PSYCH: Within normal limits. No depression or anxiety.  SKIN: Warm dry intact.    LYMPH NODES:  Bilateral cervical, supraclavicular, axillary lymph node examination was done.  Negative for any palpable adenopathy.  BREAST: Bilateral breast examination done.  Incision well-healed.  No significant worrisome palpable lesion noted.  No nipple discharge.      Lab Results     Results for orders placed or performed in visit on 02/28/18   Comprehensive Metabolic Panel   Result Value Ref Range    Sodium 140 136 - 145 mmol/L    Potassium 3.9 3.5 - 5.0 mmol/L    Chloride 104 98 - 107 mmol/L    CO2 28 22 - 31 mmol/L    Anion Gap, Calculation 8 5 - 18 mmol/L    Glucose 94 70 - 125 mg/dL    BUN 14 8 - 22 mg/dL    Creatinine 0.89 0.60 - 1.10 mg/dL    GFR MDRD Af Amer >60 >60 mL/min/1.73m2    GFR MDRD Non Af Amer >60 >60 mL/min/1.73m2    Bilirubin, Total 1.1 (H) 0.0 - 1.0 mg/dL    Calcium 9.6 8.5 - 10.5 mg/dL    Protein, Total 8.1 (H) 6.0 - 8.0 g/dL    Albumin 3.9 3.5 - 5.0 g/dL    Alkaline Phosphatase 102 45 - 120 U/L    AST 26 0 - 40 U/L    ALT 19 0 - 45 U/L         Imaging Results     Dxa Bone Density Scan    Result Date: 2/18/2018 2/14/2018 RE: Chuyita Porter YOB: 1950 Dear Bam Gallardo, Patient Profile: 67 y.o. female, postmenopausal, is here for the follow up bone density test. History of fractures - None. Family history of osteoporosis - None.  Family history of hip fracture: None. Smoking history - No. Osteoporosis treatment past -  No. Osteoporosis treatment current - No.  Chronic medical problems - Breast cancer and Radiation treatment. High risk medications -  Aromatase Inhibitor;  Yes, Currently. Assessment: 1. The spine bone density L1-L3 with T-score 2.5 and significant decline of 2.5 % compared to 2015. 2. Femoral bone densities show left femoral neck T- score 0.1 and right femoral neck T-score -0.5 and significant decline of 2.8% on the right hip compared to 2015. 3. Trabecular bone score indicates good trabecular bone architecture. 67 y.o. female with NORMAL BONE DENSITY. Recommendations:  Appropriate calcium and vitamin D supplements recommended with follow up bone density scan in 2 years. Bone densitometry was performed on your patient using our [employer] iDXA densitometer. The results are summarized and a copy of the actual scans are included for your review. In conformity with the International Society of Clinical Densitometry's most recent position statement for DXA interpretation (2015), the diagnosis will be made on the lowest measured T-score of the lumbar spine, femoral neck, total proximal femur or 33% radius. Note the change in terminology for diagnostic classification from OSTEOPENIA to LOW BONE MASS. All trending for sequential exams will be done using multiple vertebrae or the total proximal femur. Fracture risk is based on the WHO Fracture Risk Assessment Tool (FRAX). If additional information is needed or if you would like to discuss the results, please do not hesitate to call me. Thank you for referring this patient to [facility]. We are happy to be of service in support of you and your practice. If you have any questions or suggestions to improve our service, please call me at [phone]. Sincerely, Dannielle Montenegro M.D. C.C.D. Osteoporosis Services, [facility], [place]         Bam Gallardo MD

## 2021-06-17 NOTE — PATIENT INSTRUCTIONS - HE
Patient Instructions by Padmini Michelle CNP at 9/23/2019 10:00 AM     Author: Padmini Michelle CNP Service: -- Author Type: Nurse Practitioner    Filed: 9/23/2019 10:40 AM Encounter Date: 9/23/2019 Status: Signed    : Padmini Michelle CNP (Nurse Practitioner)       Monitor outlined area for expansion.  Okay for mild expansion outside of the lines within the first 24 hours, but after that wound should not be expanding.  Any abrupt increase in area of pinkness or fever, chills should prompt a visit to the emergency room.  Recheck in 4-5 days if infection not getting any better.    Patient Education     Cellulitis  Cellulitis is an infection of the deep layers of skin. A break in the skin, such as a cut or scratch, can let bacteria under the skin. If the bacteria get to deep layers of the skin, it can be serious. If not treated, cellulitis can get into the bloodstream and lymph nodes. The infection can then spread throughout the body. This causes serious illness.  Cellulitis causes the affected skin to become red, swollen, warm, and sore. The reddened areas have a visible border. An open sore may leak fluid (pus). You may have a fever, chills, and pain.  Cellulitis is treated with antibiotics taken for 7 to 10 days. An open sore may be cleaned and covered with cool wet gauze. Symptoms should get better 1 to 2 days after treatment is started. Make sure to take all the antibiotics for the full number of days until they are gone. Keep taking the medicine even if your symptoms go away.  Home care  Follow these tips:    Limit the use of the part of your body with cellulitis.     If the infection is on your leg, keep your leg raised while sitting. This will help to reduce swelling.    Take all of the antibiotic medicine exactly as directed until it is gone. Do not miss any doses, especially during the first 7 days. Dont stop taking the medicine when your symptoms get better.    Keep the affected area clean and  dry.    Wash your hands with soap and warm water before and after touching your skin. Anyone else who touches your skin should also wash his or her hands. Don't share towels.  Follow-up care  Follow up with your healthcare provider, or as advised. If your infection does not go away on the first antibiotic, your healthcare provider will prescribe a different one.  When to seek medical advice  Call your healthcare provider right away if any of these occur:    Red areas that spread    Swelling or pain that gets worse    Fluid leaking from the skin (pus)    Fever higher of 100.4  F (38.0  C) or higher after 2 days on antibiotics  Date Last Reviewed: 9/1/2016 2000-2017 The [employer]. [address] [place] [address], [place] [address]. All rights reserved. This information is not intended as a substitute for professional medical care. Always follow your healthcare professional's instructions.

## 2021-06-17 NOTE — PATIENT INSTRUCTIONS - HE
Patient Instructions by Velia Pham Scribe at 9/10/2019 11:20 AM     Author: Velia Pham Scribe Service: -- Author Type: Kadi    Filed: 9/10/2019 11:50 AM Encounter Date: 9/10/2019 Status: Signed    : Velia Pham Scribe (Kadi)         Patient Education   Understanding USDA MyPlate  The USDA (US Department of Agriculture) has guidelines to help you make healthy food choices. These are called MyPlate. MyPlate shows the food groups that make up healthy meals using the image of a place setting. Before you eat, think about the healthiest choices for what to put onto your plate or into your cup or bowl. To learn more about building a healthy plate, visit www.choosemyplate.gov.       The Food Groups    Fruits: Any fruit or 100% fruit juice counts as part of the Fruit Group. Fruits may be fresh, canned, frozen, or dried, and may be whole, cut-up, or pureed. Make half your plate fruits and vegetables.    Vegetables: Any vegetable or 100% vegetable juice counts as a member of the Vegetable Group. Vegetables may be fresh, frozen, canned, or dried. They can be served raw or cooked and may be whole, cut-up, or mashed. Make half your plate fruits and vegetables.     Grains: All foods made from grains are part of the Grains Group. These include wheat, rice, oats, cornmeal, and barley such as bread, pasta, oatmeal, cereal, tortillas, and grits. Grains should be no more than a quarter of your plate. At least half of your grains should be whole grains.    Protein: This group includes meat, poultry, seafood, beans and peas, eggs, processed soy products (like tofu), nuts (including nut butters), and seeds. Make protein choices no more than a quarter of your plate. Meat and poultry choices should be lean or low fat.    Dairy: All fluid milk products and foods made from milk that contain calcium, like yogurt and cheese are part of the Dairy Group. (Foods that have little calcium, such as cream, butter, and cream  cheese, are not part of the group.) Most dairy choices should be low-fat or fat-free.    Oils: These are fats that are liquid at room temperature. They include canola, corn, olive, soybean, and sunflower oil. Foods that are mainly oil include mayonnaise, certain salad dressings, and soft margarines. You should have only 5 to 7 teaspoons of oils a day. You probably already get this much from the food you eat.  Use [employer] to Help Build Your Meals  The [employer] can help you plan and track your meals and activity. You can look up individual foods to see or compare their nutritional value. You can get guidelines for what and how much you should eat. You can compare your food choices. And you can assess personal physical activities and see ways you can improve. Go to www.CaterCow.gov/[employer]/.    9700-8967 [employer]. [address] [place] [address]. All rights reserved. This information is not intended as a substitute for professional medical care. Always follow your healthcare professional's instructions.           Patient Education   Preventing Falls in the Home  As you get older, falls are more likely. Thats because your reaction time slows. Your muscles and joints may also get stiffer, making them less flexible. Illness, medications, and vision changes can also affect your balance. A fall could leave you unable to live on your own. To make your home safer, follow these tips:    Floors    Put nonskid pads under area rugs.    Remove throw rugs.    Replace worn floor coverings.    Tack carpets firmly to each step on carpeted stairs. Put nonskid strips on the edges of uncarpeted stairs.    Keep floors and stairs free of clutter and cords.    Arrange furniture so there are clear pathways.    Clean up any spills right away.    Bathrooms    Install grab bars in the tub or shower.    Apply nonskid strips or put a nonskid rubber mat in the tub or shower.    Sit on a bath chair to  bathe.    Use bathmats with nonskid backing.    Lighting    Keep a flashlight in each room.    Put a nightlight along the pathway between the bedroom and the bathroom.    8519-3296 [employer]. [address] [place] [address], [place] [address]. All rights reserved. This information is not intended as a substitute for professional medical care. Always follow your healthcare professional's instructions.           Advance Directive  Patients advance directive was discussed and I am comfortable with the patients wishes.  Patient Education   Personalized Prevention Plan  You are due for the preventive services outlined below.  Your care team is available to assist you in scheduling these services.  If you have already completed any of these items, please share that information with your care team to update in your medical record.  Health Maintenance   Topic Date Due   ? HEPATITIS C SCREENING  1950   ? MEDICARE ANNUAL WELLNESS VISIT  06/04/2019   ? INFLUENZA VACCINE RULE BASED (1) 08/01/2019   ? DXA SCAN  02/14/2020   ? FALL RISK ASSESSMENT  06/26/2020   ? MAMMOGRAM  08/13/2020   ? ADVANCE CARE PLANNING  06/04/2023   ? TD 18+ HE  10/19/2024   ? COLONOSCOPY  07/27/2025   ? PNEUMOCOCCAL POLYSACCHARIDE VACCINE AGE 65 AND OVER  Completed   ? PNEUMOCOCCAL CONJUGATE VACCINE FOR ADULTS (PCV13 OR PREVNAR)  Completed   ? ZOSTER VACCINES  Completed

## 2021-06-18 NOTE — LETTER
Letter by Thomas Christianson MD at      Author: Thomas Christianson MD Service: -- Author Type: --    Filed:  Encounter Date: 1/17/2019 Status: (Other)       Chuyita Porter  [address] [place] [address]             January 17, 2019         Dear Ms. 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   I would like you to follow up with [facility] to further discuss this with them.  I have placed a referral.    Please call with questions or contact us using [employer].    Sincerely,        Electronically signed by Thomas Christianson MD

## 2021-06-18 NOTE — PROGRESS NOTES
Assessment and Plan:     Problem List Items Addressed This Visit     Breast cancer of lower-outer quadrant of left female breast      Other Visit Diagnoses     Well woman exam    -  Primary    Relevant Orders    Lipid Cascade    Comprehensive Metabolic Panel    Glycosylated Hemoglobin A1c (Completed)    HM2(CBC w/o Differential) (Completed)    GERD (gastroesophageal reflux disease)        Trigger finger          -Patient here for preventative wellness visit.  Forgot to discuss with her advanced directives so we should do that next office visit otherwise she is up-to-date on all her preventative maintenance.  I did obtain a diagnostic Pap smear today as she did have some cervical changes that I think I have noted before.  No history of any abnormals.  Reflux disease is very minimal and rare we discussed not needing a daily PPI or H2 blocker and only treating as needed with H2 blocker or Tums or Maalox and she is in agreement to the plan.  Other preventative labs ordered today.  Her trigger finger is been injected elsewhere and currently is stable.  Shingles vaccine given today.  She has an upcoming appointment with her oncologist and mammogram will be ordered.  Will order all necessary labs that she does not have to repeat labs for that appointment    The patient's current medical problems were reviewed.       The following health maintenance schedule was reviewed with the patient and provided in printed form in the after visit summary:   Health Maintenance   Topic Date Due     PNEUMOCOCCAL POLYSACCHARIDE VACCINE AGE 65 AND OVER  05/14/2017     FALL RISK ASSESSMENT  09/08/2018     MAMMOGRAM  08/11/2019     DXA SCAN  02/14/2020     ADVANCE DIRECTIVES DISCUSSED WITH PATIENT  05/17/2021     TD 18+ HE  10/19/2024     COLONOSCOPY  07/27/2025     INFLUENZA VACCINE RULE BASED  Completed     PNEUMOCOCCAL CONJUGATE VACCINE FOR ADULTS (PCV13 OR PREVNAR)  Completed     ZOSTER VACCINE  Completed        Subjective:   Chief  Complaint: Chuyita Porter is an 68 y.o. female here for an Annual Wellness visit.     Breast Cancer: She had a lumpectomy for breast cancer in 2005 and again in 2015. She follows with oncology twice per year. She has annual mammograms.     Reflux: She had a root canal and she was treated with antibiotics in the past, and then she developed some acid reflux; she took a reflux pill and some Maalox, and that settled it down. She gets a sour stomach and fullness in her throat about once per month now. She also has seasonal allergies, postnasal drip, and a cough in the mornings. She thinks maybe the reflux is from mucus draining down her throat. Both her parents had peptic ulcers in the past. She has no burping or nausea regularly. She drinks a couple cans of Pepsi per day. She drinks no coffee, and she has an otherwise bland diet.     Trigger Thumb: She has a trigger thumb which is aggravated by crocheting for long periods of time. She had cortisone shots in the past.     Health Maintenance: Her last mammogram was benign on 8/11/2017, her last DXA was normal on 2/14/2018, and her last colonoscopy was done on 7/27/2015. Her recent eye exam was good. She walks for 20 minutes up to 4 miles a couple times per week. She also goes bowling a couple times per week.     Review of Systems:  She denies any changes in weight, headaches, bowel or bladder symptoms, cough, or chest pain.  Please see above.  The rest of the review of systems are negative for all systems.    Patient Care Team:  Phyllis Juarez DO as PCP - General (Family Medicine)  Bam Gallardo MD as Physician (Hematology and Oncology)  Zuleima Hammond MD as Physician (Radiation Oncology)  Candy Ch RN as Registered Nurse (Hematology and Oncology)     Patient Active Problem List   Diagnosis     Breast cancer of lower-outer quadrant of left female breast     Abnormal cervix finding     Past Medical History:   Diagnosis Date     Breast cancer 2005     right     Breast cancer 2015    left     Hx of radiation therapy       Past Surgical History:   Procedure Laterality Date     BREAST BIOPSY       BREAST LUMPECTOMY Right 2005    + radiation     BREAST LUMPECTOMY Left 2015     TONSILLECTOMY        Family History   Problem Relation Age of Onset     Breast cancer Paternal Aunt 70     Stroke Mother       75     Prostate cancer Father 70     Osteoporosis Father      Breast cancer Paternal Aunt 70     Breast cancer Paternal Grandmother 30     Ovarian cancer Cousin 60     Stomach cancer Maternal Aunt 90     Prostate cancer Maternal Uncle 70     Prostate cancer Maternal Uncle 70     Prostate cancer Cousin 60     Lung cancer Cousin      Kidney cancer Cousin 60     No Medical Problems Daughter      No Medical Problems Daughter       Social History     Social History     Marital status:      Spouse name: N/A     Number of children: N/A     Years of education: N/A     Occupational History     administrative support        Social History Main Topics     Smoking status: Never Smoker     Smokeless tobacco: Never Used     Alcohol use No     Drug use: No     Sexual activity: No     Other Topics Concern     Not on file     Social History Narrative    She is  to Ranjit, with whom she lives. She is retired from office work. 2018 [employer]      Current Outpatient Prescriptions   Medication Sig Dispense Refill     anastrozole (ARIMIDEX) 1 mg tablet TAKE 1 TABLET DAILY 90 tablet 3     calcium carbonate-vitamin D3 (CALCIUM 600 + D,3,) 600 mg(1,500mg) -200 unit per tablet Take 1 tablet by mouth daily. As directed       loratadine (CLARITIN) 10 mg tablet Take 1 tablet (10 mg total) by mouth daily. (Patient taking differently: Take 10 mg by mouth as needed (seasonal usually in the Fall). ) 30 tablet 0     MULTIVITAMIN ORAL Take 1 tablet by mouth daily.       No current facility-administered medications for this visit.       Objective:   Vital Signs:   Visit Vitals      /66     Pulse 62     Temp 98.4  F (36.9  C) (Oral)     Resp 14     Wt 173 lb (78.5 kg)     BMI 27.92 kg/m2        PHYSICAL EXAM  GENERAL:  Reveals an alert female in NAD.  Vitals:  Per nursing notes.  EYES: PERRLA. Extraocular movements intact. Normal conjunctiva and lids.   ENT:  Hearing grossly normal.  Normal appearance to ears and nose.  Bilateral TM s, external canals, oropharynx normal. Normal lips, gums and teeth.  Normal nasal mucosa, septum and turbinates.  NECK:  Supple, without thyromegaly or mass.  LUNGS:  Clear to auscultation without crackles, wheezes or distress.  Normal respiratory effort.   CV:  Regular rate and rhythm without murmurs, rubs or gallops. No varicosities or edema. Carotids without bruits.   ABDOMEN:  Soft, non-tender, without organomegaly, masses, or hernias.   BREASTS:  Non-tender, without masses, nipple discharge, erythema, or axillary adenopathy.    :  Normal pelvic exam, including external genitalia with normal appearance and no lesions. Normal vaginal exam, including no discharge and normal pelvic support, and no lesions. Cervix well visualized, with slight proliferation of endocervix at 6:00.  Otherwise normal exam  LYMPH: Normal palpation of neck, groin and axilla. No lymphadenopathy. No bruising.  NEURO:  CN II-XII intact.   PSYCH:  Alert & oriented with normal mood and affect.   SKIN:  Normal inspection and palpation.  MSK: Normal gait and station. Normal inspection, ROM, stability and strength: Spine, Head, Neck, Upper and Lower Extremities.      Assessment Results 6/4/2018   Activities of Daily Living No help needed   Instrumental Activities of Daily Living No help needed   Mini Cog Total Score 5   Some recent data might be hidden     A Mini-Cog score of 0-2 suggests the possibility of dementia, score of 3-5 suggests no dementia    Identified Health Risks:     The patient was counseled and encouraged to consider modifying their diet and eating habits. She was  provided with information on recommended healthy diet options.  Patient's advanced directive was discussed and I have to rediscuss and address  the patient's wishes.    DATA REVIEWED:  ADDITIONAL HISTORY SUMMARIZED (2): Reviewed Dr. Gallardo's note 2/28/2018 regarding breast cancer.  DECISION TO OBTAIN EXTRA INFORMATION (1): None.   RADIOLOGY TESTS (1): Reviewed last DXA 2018 and mammogram 2017 reports.  LABS (1): Reviewed and ordered labs.  MEDICINE TESTS (1): None.  INDEPENDENT REVIEW (2 each): None.     Total Data Points: 4     The visit lasted a total of 16 minutes face to face with the patient. Over 50% of the time was spent counseling and educating the patient about her reflux, chronic health conditions, medications, and health maintenance.    I, Treva Haas, am scribing for and in the presence of Dr. Juarez.  I, Dr. Phyllis Juarez DO , personally performed the services described in this documentation as scribed by Treva Haas in my presence, and it is both accurate and complete.

## 2021-06-19 NOTE — LETTER
Letter by Phyllis Juarez DO at      Author: Phyllis Juarez DO Service: -- Author Type: --    Filed:  Encounter Date: 9/13/2019 Status: (Other)         Chuyita Porter  [address] [place] [address]             September 13, 2019         Dear Ms. Porter,    Below are the results from your recent visit:    Resulted Orders   Comprehensive Metabolic Panel   Result Value Ref Range    Sodium 141 136 - 145 mmol/L    Potassium 4.4 3.5 - 5.0 mmol/L    Chloride 104 98 - 107 mmol/L    CO2 26 22 - 31 mmol/L    Anion Gap, Calculation 11 5 - 18 mmol/L    Glucose 84 70 - 125 mg/dL    BUN 13 8 - 22 mg/dL    Creatinine 0.88 0.60 - 1.10 mg/dL    GFR MDRD Af Amer >60 >60 mL/min/1.73m2    GFR MDRD Non Af Amer >60 >60 mL/min/1.73m2    Bilirubin, Total 0.9 0.0 - 1.0 mg/dL    Calcium 9.9 8.5 - 10.5 mg/dL    Protein, Total 7.7 6.0 - 8.0 g/dL    Albumin 3.9 3.5 - 5.0 g/dL    Alkaline Phosphatase 142 (H) 45 - 120 U/L    AST 25 0 - 40 U/L    ALT 13 0 - 45 U/L    Narrative    Fasting Glucose reference range is 70-99 mg/dL per  American Diabetes Association (ADA) guidelines.   Lipid Cascade   Result Value Ref Range    Cholesterol 212 (H) <=199 mg/dL    Triglycerides 82 <=149 mg/dL    HDL Cholesterol 57 >=50 mg/dL    LDL Calculated 139 (H) <=129 mg/dL    Patient Fasting > 8hrs? Yes    Hepatitis C Antibody (Anti-HCV)   Result Value Ref Range    Hepatitis C Ab Negative Negative   GGT (Gamma GT)   Result Value Ref Range    GGT (Gamma GT) 22 0 - 50 U/L       All labs within normal range.  Very mildly elevated cholesterol but I am not concerned about it.  You had a mildly elevated alkaline phosphatase which could represent your gallbladder but I did follow-up testing with GGT which was negative so this result is not concerning in any way.  You have normal kidney function, liver function, blood glucose.  Hepatitis C screening was negative.  Hope you continue to do well and let me know if you have any concerns before I see you back  next year    Please call with questions or contact us using [employer].    Sincerely,        Electronically signed by Phyllis Juarez DO

## 2021-06-19 NOTE — LETTER
Letter by Phyllis Juarez DO at      Author: Phyllis Juarez DO Service: -- Author Type: --    Filed:  Encounter Date: 10/1/2019 Status: Signed         Chuyita Porter  [address] [place] [address]             October 1, 2019         Dear Ms. Porter,    Below are the results from your recent visit:    Resulted Orders   Mammo Screening Bilateral    Narrative    BILATERAL FULL FIELD DIGITAL SCREENING MAMMOGRAM WITH TOMOSYNTHESIS    Performed on: 10/1/19      Compared to: 08/13/2018 Mammo Screening Bilateral, 08/11/2017 Mammo   Screening Bilateral, 08/09/2016 Mammo Diagnostic Bilateral, 08/13/2015   Mammo Post Clip Placement Left, 08/13/2015 Mammo Diagnostic Left,   08/07/2015 Mammo Screening Bilateral, and 08/05/2014 Mammo Screening   Bilateral    Findings: The breasts have scattered areas of fibroglandular densities.   There is no radiographic evidence of malignancy. Benign post-treatment   changes are noted bilaterally.  This study was evaluated with the   assistance of Computer-Aided Detection. Breast Tomosynthesis was used in   interpretation. Repeat routine screening mammogram in one year is   recommended.    ACR BI-RADS Category 2: Benign       Normal mammogram repeat in 1 to 2 years    Please call with questions or contact us using [employer].    Sincerely,        Electronically signed by Phyllis Juarez DO

## 2021-06-20 NOTE — PROGRESS NOTES
[facility] Progress Note    Patient: Chuyita Porter  MRN: [id number]  Date of Service: 8/29/2018        Reason for visit      1. Malignant neoplasm of lower-outer quadrant of left breast of female, estrogen receptor positive (H)        Assessment     1. Left breast cancer. 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  Current mammogram looks good.  2. Prior h/o right sided breast cancer, stage I, ER/TN positive, and Her2 -ve, treated with lumpectomy and XRT. She took Anastrozole for 5 years.  3. Mild Osteopenia.  DEXA in February 2018 showed normal density.  4. Family h/o cancer  5. Good general health.      Plan     1.  I advised the patient to continue anastrozole 1 mg p.o. daily.  We did talk about extended duration of aromatase inhibitor therapy in patient with breast cancer.  We will discuss that in about a year or 2 when more data comes through.  2.  Good diet and exercise.  3.  Continue with annual mammogram..  4.  Calcium and Vit D supplementation.  5.  Follow-up in 6 months.  After that visit we will see her annually.    Clinical stage      Breast cancer of lower-outer quadrant of left female breast    Primary site: Breast    Staging method: AJCC 7th Edition    Clinical: Stage IA (T1a, N0, cM0) - Signed by Bam Gallardo MD on 9/24/2015    Summary: Stage IA (T1a, N0, cM0)      History     Chuyita Porter is a very pleasant 68 y.o. old female with a history of invasive ductal ca on the left side, measuring few mm in size diagnosed in August 2015 when she presented with abnormal mammogram. She had no symptoms, but the mammogram showed microcalcification. She had biopsy on August 2015 and that showed invasive ductal ca. Then she had lumpectomy on 9/4/2015 which also showed DCIS. SLN was negative. She is recovering well from her surgery.  She has prior h/o right sided breast cancer, 0.6 cm in size, ER/TN positive, and her2 negative. She had XRT and and had 5 years of Anastrozole.  This  diagnosis was in 2005.  She was treated with radiation therapy by Dr. Hammond.  Started back on anastrozole in November 2015.  She is tolerating it well.  Minimal hot flashes.    Comes in today for scheduled follow up. Doing well.No new medical event. Had DEXA scan which came back showing normal bone density..    Past Medical History     Past Medical History:   Diagnosis Date     Breast cancer (H) 2005    right     Breast cancer (H) 2015    left     Hx of radiation therapy 2005       Review of Systems   Constitutional  Constitutional (WDL): Exceptions to WDL  Neurosensory  Neurosensory (WDL): All neurosensory elements are within defined limits  Cardiovascular  Cardiovascular (WDL): All cardiovascular elements are within defined limits  Pulmonary  Respiratory (WDL): Within Defined Limits  Gastrointestinal  Gastrointestinal (WDL): All gastrointestinal elements are within defined limits  Genitourinary  Genitourinary (WDL): All genitourinary elements are within defined limits  Integumentary  Integumentary (WDL): All integumentary elements are within defined limits  Patient Coping  Patient Coping: Accepting  Accompanied by  Accompanied by: Alone    ECOG performance status and Distress Assessment      ECOG Performance:    ECOG Performance Status: 0    Distress Assessment  Distress Assessment Score: No distress:     Pain Status  Currently in Pain: No/denies        Vital Signs     Vitals:    08/29/18 1124   BP: 133/75   Pulse: 74   Temp: 98.5  F (36.9  C)   SpO2: 98%       Physical Exam     GENERAL: No acute distress. Cooperative in conversation.   HEENT: Pupils are equal, round and reactive. Oral mucosa is clean and intact. No ulcerations or mucositis noted. No bleeding noted.  RESP:Chest symmetric lungs are clear bilaterally per auscultation. Regular respiratory rate. No wheezes or rhonchi.  CV: Normal S1 S2 Regular, rate and rhythm. No murmurs.  ABD: Nondistended, soft, nontender. Positive bowel sounds. No organomegaly.    EXTREMITIES: No lower extremity edema.   NEURO: Non- focal. Alert and oriented x3.  Cranial nerves appear intact.  PSYCH: Within normal limits. No depression or anxiety.  SKIN: Warm dry intact.    LYMPH NODES: Bilateral cervical, supraclavicular, axillary lymph node examination was done.  Negative for any palpable adenopathy.      Lab Results     Results for orders placed or performed in visit on 08/29/18   Comprehensive Metabolic Panel   Result Value Ref Range    Sodium 140 136 - 145 mmol/L    Potassium 4.0 3.5 - 5.0 mmol/L    Chloride 102 98 - 107 mmol/L    CO2 28 22 - 31 mmol/L    Anion Gap, Calculation 10 5 - 18 mmol/L    Glucose 94 70 - 125 mg/dL    BUN 17 8 - 22 mg/dL    Creatinine 0.99 0.60 - 1.10 mg/dL    GFR MDRD Af Amer >60 >60 mL/min/1.73m2    GFR MDRD Non Af Amer 56 (L) >60 mL/min/1.73m2    Bilirubin, Total 1.0 0.0 - 1.0 mg/dL    Calcium 10.1 8.5 - 10.5 mg/dL    Protein, Total 8.2 (H) 6.0 - 8.0 g/dL    Albumin 4.2 3.5 - 5.0 g/dL    Alkaline Phosphatase 101 45 - 120 U/L    AST 26 0 - 40 U/L    ALT 14 0 - 45 U/L         Imaging Results     Mammo Screening Bilateral    Result Date: 8/13/2018  BILATERAL FULL FIELD DIGITAL SCREENING MAMMOGRAM Performed on: 8/13/18 Compared to: 08/11/2017 Mammo Screening Bilateral, 08/09/2016 Mammo Diagnostic Bilateral, 08/13/2015 Mammo Diagnostic Left, 08/07/2015 Mammo Screening Bilateral, 08/05/2014 Mammo Screening Bilateral, 07/30/2013 Mammo Screening Bilateral, 07/24/2012 Mammo Screening Bilateral, 07/20/2011 Mammo Screening Bilateral, 07/15/2010 Mammo Screening Bilateral, and 07/14/2009 Mammo Screening Bilateral Findings: The breasts have scattered areas of fibroglandular densities.  There are postsurgical lumpectomy changes in both breasts. No evidence for malignancy and no change from the previous exam(s). Continue routine screening mammogram as recommended. ACR BI-RADS Category 2: Benign Findings        Bam Gallardo MD

## 2021-06-20 NOTE — LETTER
Letter by Kesha Weston RN at      Author: Kesha Weston RN Service: -- Author Type: --    Filed:  Encounter Date: 6/25/2020 Status: (Other)       6/25/2020        Chuyita Porter  [address] [place] [address]    This letter provides a written record that you were tested for COVID-19 on 6/23/20.     Your result was negative. This means that we didnt find the virus that causes COVID-19 in your sample. A test may show negative when you do actually have the virus. This can happen when the virus is in the early stages of infection, before you feel illness symptoms.    If you have symptoms   Stay home and away from others (self-isolate) until you meet ALL of the guidelines below:    Youve had no fever--and no medicine that reduces fever--for 3 full days (72 hours). And ?    Your other symptoms have gotten better. For example, your cough or breathing has improved. And?    At least 10 days have passed since your symptoms started.    During this time:    Stay home. Dont go to work, school or anywhere else.     Stay in your own room, including for meals. Use your own bathroom if you can.    Stay away from others in your home. No hugging, kissing or shaking hands. No visitors.    Clean high touch surfaces often (doorknobs, counters, handles, etc.). Use a household cleaning spray or wipes. You can find a full list on the EPA website at www.epa.gov/pesticide-registration/list-n-disinfectants-use-against-sars-cov-2.    Cover your mouth and nose with a mask, tissue or washcloth to avoid spreading germs.    Wash your hands and face often with soap and water.    Going back to work  Check with your employer for any guidelines to follow for going back to work.    Employers: This document serves as formal notice that your employee tested negative for COVID-19, as of the testing date shown above.

## 2021-06-20 NOTE — PROGRESS NOTES
Dear Dr. Sanders:    I had the pleasure of seeing Chuyita Porter in follow-up today at the [facility].     History of Present Illness:   Chuyita Porter is a 71 year old woman with a large thyroid mass.  She experienced a cough for some time.  There was no improvement with antibiotics.  She developed noisy breathing around New Year's Jodi 2019. She had a CXR and was treated with a nebulizer. She was treated with prednisone with some improvement but then she plateaued and then became worse. She started having to sleep sitting up because she has trouble with breathing. She feels like she breathes better when laying on her right side. She was recently placed back on a longer course of steroids which helped with her breathing.  This was weaned off about a week ago.  She had a CT scan which demonstrated a large thyroid mass with no clear plane between the mass and the esophagus, concerns for intratracheal extension, narrowing of the distal trachea, abutting of carotid and subclavian, no obvious lymphadenopathy.  She had biopsy performed on 1/30/2020 at [facility]. Pathology was reviewed here at the [facility] and was most consistent with a B-cell lymphoma that could not be further characterized. Her case was reviewed at tumor conference with plans for likely urgent radiation.    Interval history:  She comes in today for follow-up. She was last seen in February 2020. 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 She was unable to undergo tracheal stent placement. She had 2 fractions of radiation on 2/18/2020 and 2/19/2020. She had 6 cycles of R-CHOP, complete in June 2020. Her last imaging was in January 2021 which showed mild decrease in the size of the right superior mediastinal soft tissue mass. She saw medical oncology in February 2021. She says that she comes in today because she would  like to get an exam and ensure everything is doing well, as she has only done virtual visits with medical oncology. She has a bit of concern because she feels like her neck is bigger/thicker. She says her neck feels tight in the mornings. She continues to have a dry cough which caused both her and her  a bit of concern given her previous symptoms. She is breathing without issues, says she can lay on either side without shortness of breath. She is eating and drinking without issues. She has no choking or sticking of foods. She is able to take her vitamin pills but does often do it with yogurt. She says her weight is up. She has no fevers, chills, nightsweats.       MEDICATIONS:     Current Outpatient Medications   Medication Sig Dispense Refill     Calcium Carbonate-Vit D-Min (CALTRATE 600+D PLUS MINERALS PO) Caltrate + D3 Plus Minerals       loratadine (CLARITIN) 10 MG tablet Take 1 tablet (10 mg) by mouth daily 30 tablet 3     Multiple Vitamins-Minerals (MULTIVITAMIN ADULTS 50+ PO) Multivitamin 50 Plus tablet   Take by oral route.       acetaminophen (TYLENOL) 325 MG tablet Take 2 tablets (650 mg) by mouth every 4 hours as needed for mild pain or fever (Patient not taking: Reported on 10/15/2020)       fluticasone (FLONASE) 50 MCG/ACT nasal spray Spray 1 spray into both nostrils daily (Patient not taking: Reported on 10/15/2020) 30 mL 3     LORazepam (ATIVAN) 0.5 MG tablet Take 1 tablet (0.5 mg) by mouth every 4 hours as needed (Anxiety, Nausea/Vomiting or Sleep) (Patient not taking: Reported on 7/9/2020) 30 tablet 5     ondansetron (ZOFRAN) 4 MG tablet Take 1 tablet (4 mg) by mouth daily as needed for nausea (Patient not taking: Reported on 7/9/2020) 60 tablet 0     prochlorperazine (COMPAZINE) 10 MG tablet Take 0.5 tablets (5 mg) by mouth every 6 hours as needed (Nausea/Vomiting) (Patient not taking: Reported on 7/9/2020) 30 tablet 7       ALLERGIES:  No Known Allergies    HABITS/SOCIAL HISTORY:   No  smoking, no chewing tobacco, no alcohol use  Retired -  at [employer]  Lives with     Social History     Socioeconomic History     Marital status:      Spouse name: Not on file     Number of children: 2     Years of education: Not on file     Highest education level: Not on file   Occupational History     Not on file   Social Needs     Financial resource strain: Not on file     Food insecurity     Worry: Not on file     Inability: Not on file     Transportation needs     Medical: Not on file     Non-medical: Not on file   Tobacco Use     Smoking status: Never Smoker     Smokeless tobacco: Never Used   Substance and Sexual Activity     Alcohol use: Never     Drug use: Never     Sexual activity: Yes     Partners: Male     Birth control/protection: Post-menopausal   Lifestyle     Physical activity     Days per week: Not on file     Minutes per session: Not on file     Stress: Not on file   Relationships     Social connections     Talks on phone: Not on file     Gets together: Not on file     Attends [employer]al service: Not on file     Active member of club or organization: Not on file     Attends meetings of clubs or organizations: Not on file     Relationship status: Not on file     Intimate partner violence     Fear of current or ex partner: Not on file     Emotionally abused: Not on file     Physically abused: Not on file     Forced sexual activity: Not on file   Other Topics Concern     Not on file   Social History Narrative     Not on file       PAST MEDICAL HISTORY:   Past Medical History:   Diagnosis Date     Breast cancer (H) 2005     Hx of radiation therapy     2005 +2015        PAST SURGICAL HISTORY:   Past Surgical History:   Procedure Laterality Date     ADENOIDECTOMY       BRONCHOSCOPY (RIGID OR FLEXIBLE), DIAGNOSTIC N/A 2/17/2020    Procedure: DIAGNOSTIC BRONCHOSCOPY WITH BIOPSY,.;  Surgeon: Jered Montoya MD;  Location: [facility] OR     ENDOSCOPIC INSERTION TUBE GASTROSTOMY  "Left 2/17/2020    Procedure: Endoscopic insertion tube gastrostomy;  Surgeon: Jaswinder Parker MD;  Location: [facility] OR     LUMPECTOMY BREAST      RT lunmpectomy 2005 with radiation. Lt lumpectomy 2015 with radiation     TONSILLECTOMY         FAMILY HISTORY:    Family History   Problem Relation Age of Onset     Breast Cancer Paternal Aunt         2 aunts who had breast cancer in their 70's       REVIEW OF SYSTEMS:  12 point ROS was negative other than the symptoms noted above in the HPI.  Patient Supplied Answers to Review of Systems  [facility] ROS 2/14/2020   Ears, Nose, Throat -   Cardiopulmonary Breathing problems         PHYSICAL EXAMINATION:   Pulse 96   Temp 98.2  F (36.8  C) (Temporal)   Ht 1.676 m (5' 6\")   Wt 81.5 kg (179 lb 10.8 oz)   SpO2 97%   BMI 29.00 kg/m     Appearance:   normal; NAD, age-appropriate appearance, well-developed, normal habitus   Communication:   normal; communicates verbally, normal voice quality   Head/Face:   inspection -  Normal; no scars or visible lesions   Facial strength -  Normal and symmetric    Skin:  normal, no rash   Ears:  auricle (AD) -  normal  EAC (AD) -  normal  TM (AD) -  Normal, no effusion  auricle (AS) -  normal  EAC (AS) -  normal  TM (AS) -  Normal, no effusion  Normal clinical speech reception   Nose:  Ext. inspection -  Normal  Internal Inspection -  Normal mucosa, septum, and turbinates   Nasopharynx:  normal mucosa, no masses   Oral Cavity:  lips -  Normal mucosa, oral competence, and stoma size   Age-appropriate dentition, healthy gingival mucosa   Hard palate, buccal, floor of mouth mucosa normal   Tongue - normal movement, no lesions   Oropharynx:  mucosa -  Normal, no lesions  soft palate -  Normal, no lesions posteriorly  Base of tongue - normal  Vallecula - clear   Hypopharynx:  Normal pyriform sinus and pharyngeal wall mucosa   No pooled secretions    Larynx:  Epiglottis, AE folds, false vocal cords, true vocal cords, arytenoids normal in " appearance, bilaterally mobile cords   Subglottis and limited view of proximal trachea appears clear   Neck: Improved neck circumference from pre-treatment  No palpable masses   Lymphatic:  no abnormal nodes   Cardiovascular:  warm, pink, well-perfused extremities without swelling, tenderness, or edema   Respiratory:  Normal respiratory effort, no stridor   Neuro/Psych.:  mood/affect -  normal  mental status -  normal       PROCEDURES:         RESULTS REVIEWED:   I reviewed the notes from medical oncology from 2/2021     I reviewed CT results from 1/2021 and PET results from 7/2020    I independently reviewed the CT images      IMPRESSION AND PLAN:   Chuyita Porter is a 71 year old woman with a large thyroid mass, consistent with a lymphoma. She completed R-CHOP therapy in June 2020. She has been followed by medical oncology. I reassured her that I do not see any concerning findings with regards to her neck or airway today. The airway exam was limited to the supraglottis/glottis/subglottis but there is no obvious evidence of narrowing. I am certainly happy to see her back if the need arises.     Thank you very much for the opportunity to participate in the care of your patient.      Kiersten Valdez MD, M.D.  Otolaryngology- Head & Neck Surgery      This note was dictated with voice recognition software and then edited. Please excuse any unintentional errors.               CC:  Josiah Sanders MD  [facility]  [address] [place] [id number]      Charly Ray MD  Department of Radiation Oncology  [facility]

## 2021-06-21 ENCOUNTER — RECORDS - HEALTHEAST (OUTPATIENT)
Dept: ADMINISTRATIVE | Facility: OTHER | Age: 71
End: 2021-06-21

## 2021-06-21 ENCOUNTER — OFFICE VISIT (OUTPATIENT)
Dept: OTOLARYNGOLOGY | Facility: CLINIC | Age: 71
End: 2021-06-21
Payer: COMMERCIAL

## 2021-06-21 VITALS
HEIGHT: 66 IN | OXYGEN SATURATION: 97 % | BODY MASS INDEX: 28.88 KG/M2 | HEART RATE: 96 BPM | TEMPERATURE: 98.2 F | WEIGHT: 179.68 LBS

## 2021-06-21 DIAGNOSIS — C83.32 DIFFUSE LARGE B-CELL LYMPHOMA OF INTRATHORACIC LYMPH NODES (H): Primary | ICD-10-CM

## 2021-06-21 PROCEDURE — 99213 OFFICE O/P EST LOW 20 MIN: CPT | Performed by: OTOLARYNGOLOGY

## 2021-06-21 ASSESSMENT — PAIN SCALES - GENERAL: PAINLEVEL: NO PAIN (0)

## 2021-06-21 ASSESSMENT — MIFFLIN-ST. JEOR: SCORE: 1356.75

## 2021-06-21 NOTE — NURSING NOTE
"Chief Complaint   Patient presents with     RECHECK     follow up       Pulse 96, temperature 98.2  F (36.8  C), temperature source Temporal, height 1.676 m (5' 6\"), weight 81.5 kg (179 lb 10.8 oz), SpO2 97 %.    Anyi Montenegro, EMT    "

## 2021-06-21 NOTE — PROGRESS NOTES
DIAGNOSIS:  1. Dyspepsia     2. Pain in a tooth or teeth         PLAN:  Patient Instructions   RECOMMEND OBSERVATION    IF SINUS SYMPTOMS DECLARE THEMSELVES AND BECOME MORE OBVIOUS THEN COULD START AN ANTIBIOTIC.     TRY IBUPROFEN UP  MG THREE TIMES DAILY FOR 3-5 DAYS WITH FOOD    Option to try omeprazole 20 mg daily otc as a 2 week trial            HPI:   Sensitivity on the left side of the teeth/jaw.   Saw dentist yesterday and did xrays and he couldn't pinpoint a particular problem.  Could be upper and lower teeth.   Face feels a bit more uncomfortable or different on the left side.  Neck feels fat.  Yellow nasal drainage this morning.  Has some sensitivity this morning of the upper and lower teeth on the left side this morning, making it so she doesn't want to chew on the left side.   Hard to determine if it is the left upper or lower teeth.    stomach feels achy like you can feel it.   Some nausea in the morning and bettter as day goes on.  Has never had stomach upset or GERD in the past to compare this with.   Had one loose stool 4 days ago.  No melena.  No chest pressure or shortness of breath.          Current Outpatient Medications on File Prior to Visit   Medication Sig Dispense Refill     anastrozole (ARIMIDEX) 1 mg tablet TAKE 1 TABLET EVERY DAY 90 tablet 3     calcium carbonate-vitamin D3 (CALCIUM 600 + D,3,) 600 mg(1,500mg) -200 unit per tablet Take 1 tablet by mouth daily. As directed       loratadine (CLARITIN) 10 mg tablet Take 1 tablet (10 mg total) by mouth daily. (Patient taking differently: Take 10 mg by mouth as needed (seasonal usually in the Fall). ) 30 tablet 0     MULTIVITAMIN ORAL Take 1 tablet by mouth daily.       No current facility-administered medications on file prior to visit.        Pmh: reviewed  Psh: reviewed  Allergy:  reviewed      EXAM:    /72 (Patient Site: Left Arm, Patient Position: Sitting, Cuff Size: Adult Large)   Pulse 69   Temp 97.7  F (36.5  C) (Oral)    Resp 16   Wt 178 lb (80.7 kg)   BMI 28.73 kg/m    GEN:   ALERT, NAD, ORIENTED TIMES THREE  HEENT: TMS NL, PERRL, THR CLEAR  NECK: SUPPLE WITHOUT ADENOPATHY OR THYROMEGALY  LUNGS: CTA  COR: RRR WITHOUT MURMUR  ABD: SOFT, +BS, NONTX WITHOUT GUARDING OR PERITONEAL SIGNS  EXT: WITHOUT EDEMA OR SWELLING  NEURO:  CN 2-12 INTACT, NORMAL DTRS AND MOTOR STRENGTH  OF THE UE AND LE  No results found for this or any previous visit (from the past 168 hour(s)).

## 2021-06-21 NOTE — LETTER
Letter by Phyllis Juarez DO at      Author: Phyllis Juarez DO Service: -- Author Type: --    Filed:  Encounter Date: 11/19/2020 Status: (Other)         Chuyita Porter  [address] [place] [address]             November 19, 2020         Dear Ms. Porter,    Below are the results from your recent visit:    Resulted Orders   Comprehensive Metabolic Panel   Result Value Ref Range    Sodium 140 136 - 145 mmol/L    Potassium 4.1 3.5 - 5.0 mmol/L    Chloride 105 98 - 107 mmol/L    CO2 22 22 - 31 mmol/L    Anion Gap, Calculation 13 5 - 18 mmol/L    Glucose 90 70 - 125 mg/dL    BUN 17 8 - 22 mg/dL    Creatinine 0.81 0.60 - 1.10 mg/dL    GFR MDRD Af Amer >60 >60 mL/min/1.73m2    GFR MDRD Non Af Amer >60 >60 mL/min/1.73m2    Bilirubin, Total 0.8 0.0 - 1.0 mg/dL    Calcium 9.8 8.5 - 10.5 mg/dL    Protein, Total 7.5 6.0 - 8.0 g/dL    Albumin 4.3 3.5 - 5.0 g/dL    Alkaline Phosphatase 141 (H) 45 - 120 U/L    AST 26 0 - 40 U/L    ALT 17 0 - 45 U/L    Narrative    Fasting Glucose reference range is 70-99 mg/dL per  American Diabetes Association (ADA) guidelines.   Lipid Pope FASTING   Result Value Ref Range    Cholesterol 189 <=199 mg/dL    Triglycerides 102 <=149 mg/dL    HDL Cholesterol 61 >=50 mg/dL    LDL Calculated 108 <=129 mg/dL    Patient Fasting > 8hrs? Yes    Magnesium   Result Value Ref Range    Magnesium 2.0 1.8 - 2.6 mg/dL   Vitamin B12   Result Value Ref Range    Vitamin B-12 1,388 (H) 213 - 816 pg/mL   Thyroid Cascade   Result Value Ref Range    TSH 3.16 0.30 - 5.00 uIU/mL   HM1 (CBC with Diff)   Result Value Ref Range    WBC 6.1 4.0 - 11.0 thou/uL    RBC 4.41 3.80 - 5.40 mill/uL    Hemoglobin 13.3 12.0 - 16.0 g/dL    Hematocrit 40.2 35.0 - 47.0 %    MCV 91 80 - 100 fL    MCH 30.1 27.0 - 34.0 pg    MCHC 33.0 32.0 - 36.0 g/dL    RDW 13.0 11.0 - 14.5 %    Platelets 277 140 - 440 thou/uL    MPV 7.6 7.0 - 10.0 fL    Neutrophils % 76 (H) 50 - 70 %    Lymphocytes % 11 (L) 20 - 40 %    Monocytes % 11  (H) 2 - 10 %    Eosinophils % 2 0 - 6 %    Basophils % 0 0 - 2 %    Neutrophils Absolute 4.6 2.0 - 7.7 thou/uL    Lymphocytes Absolute 0.7 (L) 0.8 - 4.4 thou/uL    Monocytes Absolute 0.6 0.0 - 0.9 thou/uL    Eosinophils Absolute 0.1 0.0 - 0.4 thou/uL    Basophils Absolute 0.0 0.0 - 0.2 thou/uL       All blood work in very normal range.  Was so good to see you the other day.  I hope that this year continues to bring you better health.  Many blessings to you and your family.    Please call with questions or contact us using [employer].    Sincerely,        Electronically signed by Phyllis Juarez DO

## 2021-06-21 NOTE — LETTER
6/21/2021       RE: Chuyita Porter  [address]  [facility] [id number]     Dear Colleague,    Thank you for referring your patient, Chuyita Porter, to the [facility] [place] at [facility]. Please see a copy of my visit note below.    Dear Dr. Sanders:    I had the pleasure of seeing Chuyita Porter in follow-up today at the [facility].     History of Present Illness:   Chuyita Porter is a 71 year old woman with a large thyroid mass.  She experienced a cough for some time.  There was no improvement with antibiotics.  She developed noisy breathing around New Year's Jodi 2019. She had a CXR and was treated with a nebulizer. She was treated with prednisone with some improvement but then she plateaued and then became worse. She started having to sleep sitting up because she has trouble with breathing. She feels like she breathes better when laying on her right side. She was recently placed back on a longer course of steroids which helped with her breathing.  This was weaned off about a week ago.  She had a CT scan which demonstrated a large thyroid mass with no clear plane between the mass and the esophagus, concerns for intratracheal extension, narrowing of the distal trachea, abutting of carotid and subclavian, no obvious lymphadenopathy.  She had biopsy performed on 1/30/2020 at [facility]. Pathology was reviewed here at the [facility] and was most consistent with a B-cell lymphoma that could not be further characterized. Her case was reviewed at tumor conference with plans for likely urgent radiation.    Interval history:  She comes in today for follow-up. She was last seen in February 2020. 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 She was unable to undergo tracheal stent placement. She had 2  fractions of radiation on 2/18/2020 and 2/19/2020. She had 6 cycles of R-CHOP, complete in June 2020. Her last imaging was in January 2021 which showed mild decrease in the size of the right superior mediastinal soft tissue mass. She saw medical oncology in February 2021. She says that she comes in today because she would like to get an exam and ensure everything is doing well, as she has only done virtual visits with medical oncology. She has a bit of concern because she feels like her neck is bigger/thicker. She says her neck feels tight in the mornings. She continues to have a dry cough which caused both her and her  a bit of concern given her previous symptoms. She is breathing without issues, says she can lay on either side without shortness of breath. She is eating and drinking without issues. She has no choking or sticking of foods. She is able to take her vitamin pills but does often do it with yogurt. She says her weight is up. She has no fevers, chills, nightsweats.       MEDICATIONS:     Current Outpatient Medications   Medication Sig Dispense Refill     Calcium Carbonate-Vit D-Min (CALTRATE 600+D PLUS MINERALS PO) Caltrate + D3 Plus Minerals       loratadine (CLARITIN) 10 MG tablet Take 1 tablet (10 mg) by mouth daily 30 tablet 3     Multiple Vitamins-Minerals (MULTIVITAMIN ADULTS 50+ PO) Multivitamin 50 Plus tablet   Take by oral route.       acetaminophen (TYLENOL) 325 MG tablet Take 2 tablets (650 mg) by mouth every 4 hours as needed for mild pain or fever (Patient not taking: Reported on 10/15/2020)       fluticasone (FLONASE) 50 MCG/ACT nasal spray Spray 1 spray into both nostrils daily (Patient not taking: Reported on 10/15/2020) 30 mL 3     LORazepam (ATIVAN) 0.5 MG tablet Take 1 tablet (0.5 mg) by mouth every 4 hours as needed (Anxiety, Nausea/Vomiting or Sleep) (Patient not taking: Reported on 7/9/2020) 30 tablet 5     ondansetron (ZOFRAN) 4 MG tablet Take 1 tablet (4 mg) by mouth daily  as needed for nausea (Patient not taking: Reported on 7/9/2020) 60 tablet 0     prochlorperazine (COMPAZINE) 10 MG tablet Take 0.5 tablets (5 mg) by mouth every 6 hours as needed (Nausea/Vomiting) (Patient not taking: Reported on 7/9/2020) 30 tablet 7       ALLERGIES:  No Known Allergies    HABITS/SOCIAL HISTORY:   No smoking, no chewing tobacco, no alcohol use  Retired -  at [employer]  Lives with     Social History     Socioeconomic History     Marital status:      Spouse name: Not on file     Number of children: 2     Years of education: Not on file     Highest education level: Not on file   Occupational History     Not on file   Social Needs     Financial resource strain: Not on file     Food insecurity     Worry: Not on file     Inability: Not on file     Transportation needs     Medical: Not on file     Non-medical: Not on file   Tobacco Use     Smoking status: Never Smoker     Smokeless tobacco: Never Used   Substance and Sexual Activity     Alcohol use: Never     Drug use: Never     Sexual activity: Yes     Partners: Male     Birth control/protection: Post-menopausal   Lifestyle     Physical activity     Days per week: Not on file     Minutes per session: Not on file     Stress: Not on file   Relationships     Social connections     Talks on phone: Not on file     Gets together: Not on file     Attends Buddhism service: Not on file     Active member of club or organization: Not on file     Attends meetings of clubs or organizations: Not on file     Relationship status: Not on file     Intimate partner violence     Fear of current or ex partner: Not on file     Emotionally abused: Not on file     Physically abused: Not on file     Forced sexual activity: Not on file   Other Topics Concern     Not on file   Social History Narrative     Not on file       PAST MEDICAL HISTORY:   Past Medical History:   Diagnosis Date     Breast cancer (H) 2005     Hx of radiation therapy     2005  "+2015        PAST SURGICAL HISTORY:   Past Surgical History:   Procedure Laterality Date     ADENOIDECTOMY       BRONCHOSCOPY (RIGID OR FLEXIBLE), DIAGNOSTIC N/A 2/17/2020    Procedure: DIAGNOSTIC BRONCHOSCOPY WITH BIOPSY,.;  Surgeon: Jered Montoya MD;  Location: [facility] OR     ENDOSCOPIC INSERTION TUBE GASTROSTOMY Left 2/17/2020    Procedure: Endoscopic insertion tube gastrostomy;  Surgeon: Jaswinder Parker MD;  Location: [facility] OR     LUMPECTOMY BREAST      RT lunmpectomy 2005 with radiation. Lt lumpectomy 2015 with radiation     TONSILLECTOMY         FAMILY HISTORY:    Family History   Problem Relation Age of Onset     Breast Cancer Paternal Aunt         2 aunts who had breast cancer in their 70's       REVIEW OF SYSTEMS:  12 point ROS was negative other than the symptoms noted above in the HPI.  Patient Supplied Answers to Review of Systems  [facility] ROS 2/14/2020   Ears, Nose, Throat -   Cardiopulmonary Breathing problems         PHYSICAL EXAMINATION:   Pulse 96   Temp 98.2  F (36.8  C) (Temporal)   Ht 1.676 m (5' 6\")   Wt 81.5 kg (179 lb 10.8 oz)   SpO2 97%   BMI 29.00 kg/m     Appearance:   normal; NAD, age-appropriate appearance, well-developed, normal habitus   Communication:   normal; communicates verbally, normal voice quality   Head/Face:   inspection -  Normal; no scars or visible lesions   Facial strength -  Normal and symmetric    Skin:  normal, no rash   Ears:  auricle (AD) -  normal  EAC (AD) -  normal  TM (AD) -  Normal, no effusion  auricle (AS) -  normal  EAC (AS) -  normal  TM (AS) -  Normal, no effusion  Normal clinical speech reception   Nose:  Ext. inspection -  Normal  Internal Inspection -  Normal mucosa, septum, and turbinates   Nasopharynx:  normal mucosa, no masses   Oral Cavity:  lips -  Normal mucosa, oral competence, and stoma size   Age-appropriate dentition, healthy gingival mucosa   Hard palate, buccal, floor of mouth mucosa normal   Tongue - normal movement, no lesions "   Oropharynx:  mucosa -  Normal, no lesions  soft palate -  Normal, no lesions posteriorly  Base of tongue - normal  Vallecula - clear   Hypopharynx:  Normal pyriform sinus and pharyngeal wall mucosa   No pooled secretions    Larynx:  Epiglottis, AE folds, false vocal cords, true vocal cords, arytenoids normal in appearance, bilaterally mobile cords   Subglottis and limited view of proximal trachea appears clear   Neck: Improved neck circumference from pre-treatment  No palpable masses   Lymphatic:  no abnormal nodes   Cardiovascular:  warm, pink, well-perfused extremities without swelling, tenderness, or edema   Respiratory:  Normal respiratory effort, no stridor   Neuro/Psych.:  mood/affect -  normal  mental status -  normal       PROCEDURES:         RESULTS REVIEWED:   I reviewed the notes from medical oncology from 2/2021     I reviewed CT results from 1/2021 and PET results from 7/2020    I independently reviewed the CT images      IMPRESSION AND PLAN:   Chuyita Porter is a 71 year old woman with a large thyroid mass, consistent with a lymphoma. She completed R-CHOP therapy in June 2020. She has been followed by medical oncology. I reassured her that I do not see any concerning findings with regards to her neck or airway today. The airway exam was limited to the supraglottis/glottis/subglottis but there is no obvious evidence of narrowing. I am certainly happy to see her back if the need arises.     Thank you very much for the opportunity to participate in the care of your patient.      Kiersten Valdez MD, M.D.  Otolaryngology- Head & Neck Surgery      This note was dictated with voice recognition software and then edited. Please excuse any unintentional errors.       CC:  Josiah Sanders MD  [facility]  [address] [place] [id number]       Charly Ray MD  Department of Radiation Oncology  [facility]

## 2021-06-22 NOTE — PROGRESS NOTES
DIAGNOSIS:  1. Epigastric abdominal pain  Ambulatory referral for Upper GI Endoscopy    omeprazole (PRILOSEC) 20 MG capsule   2. Nausea  ondansetron (ZOFRAN ODT) 4 MG disintegrating tablet       PLAN:  Patient Instructions   continue present meds    Schedule Upper Endoscopy    Avoid all anti-inflammatories    Stop sucralfate    rx called for omeprazole 20 mg twice daily                HPI:  Since last visit had a root canal.  Was doing well for a couple days after the root canal.   Was in the ER on the 17th of Dec 2018.  Was felt to have GERD and famotidine, carafate and zofran were added to her two times a day omeprazole.  Aches in the epigastric area like it is empty.  Food is not appetizing but does not allev or aggrav sxs.  Doesn't feel strong.  No exertional chest pain noted. Has done mall walking (last a week ago) and feels fine doing that.  Walking stairs in her split level home has not brought on symptoms of chest pain.   Right after being seen in the ER the whole combination of  things was helpful.   Hard to say if carafate or famotidine have been helpful.          Current Outpatient Medications on File Prior to Visit   Medication Sig Dispense Refill     anastrozole (ARIMIDEX) 1 mg tablet TAKE 1 TABLET EVERY DAY 90 tablet 3     calcium carbonate-vitamin D3 (CALCIUM 600 + D,3,) 600 mg(1,500mg) -200 unit per tablet Take 1 tablet by mouth daily. As directed       famotidine (PEPCID) 20 MG tablet Take 1 tablet (20 mg total) by mouth 2 (two) times a day as needed for heartburn. 30 tablet 0     loratadine (CLARITIN) 10 mg tablet Take 1 tablet (10 mg total) by mouth daily. (Patient taking differently: Take 10 mg by mouth as needed (seasonal usually in the Fall). ) 30 tablet 0     MULTIVITAMIN ORAL Take 1 tablet by mouth daily.       [DISCONTINUED] omeprazole (PRILOSEC) 20 MG capsule Take 40 mg by mouth daily before breakfast.              [DISCONTINUED] ondansetron (ZOFRAN ODT) 4 MG disintegrating tablet Take 1  tablet (4 mg total) by mouth every 6 (six) hours as needed for nausea. 10 tablet 0     [DISCONTINUED] sucralfate (CARAFATE) 1 gram tablet Take 1 tablet (1 g total) by mouth 4 (four) times a day before meals and at bedtime for 14 days. 56 tablet 0     No current facility-administered medications on file prior to visit.        Pmh: reviewed  Psh: reviewed  Allergy:  reviewed      EXAM:    /86 (Patient Site: Left Arm, Patient Position: Sitting, Cuff Size: Adult Large)   Pulse 93   Temp 98.3  F (36.8  C) (Oral)   Resp 16   Wt 169 lb 6.4 oz (76.8 kg)   BMI 27.34 kg/m    GEN:   ALERT, NAD, ORIENTED TIMES THREE  LUNGS: CTA  COR: RRR WITHOUT MURMUR  ABD: SOFT, +BS, NONTX WITHOUT GUARDING OR PERITONEAL SIGNS  SKIN: NO RASH , ULCERS OR LESIONS NOTED  EXT: WITHOUT EDEMA OR SWELLING    No results found for this or any previous visit (from the past 168 hour(s)).

## 2021-06-23 NOTE — TELEPHONE ENCOUNTER
Patient Returning Call  Reason for call:  Results  Information relayed to patient:    ----- Message from Thomas Christianson MD sent at 1/17/2019 12:26 PM CST -----  Below are the results from your recent visit:     The recent upper endoscopy showed findings of AUTOIMMUNE GASTRITIS.   I would like you to follow up with [facility] to further discuss this with them.  I have placed a referral.  Patient has additional questions:  No. Patient verbalized understanding and had no additional questions at this time.  If YES, what are your questions/concerns:  N/A  Okay to leave a detailed message?: No call back needed

## 2021-06-23 NOTE — TELEPHONE ENCOUNTER
----- Message from Thomas Christianson MD sent at 1/17/2019 12:26 PM CST -----  Below are the results from your recent visit:    The recent upper endoscopy showed findings of AUTOIMMUNE GASTRITIS.   I would like you to follow up with [facility] to further discuss this with them.  I have placed a referral.

## 2021-06-24 NOTE — PROGRESS NOTES
[facility] Progress Note    Patient: Chuyita Porter  MRN: [id number]  Date of Service: 3/7/2019        Reason for visit      1. Malignant neoplasm of lower-outer quadrant of left breast of female, estrogen receptor positive (H)        Assessment     1. Left breast cancer. 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  Current mammogram looks good.  2. Prior h/o right sided breast cancer, stage I, ER/VT positive, and Her2 -ve, treated with lumpectomy and XRT. She took Anastrozole for 5 years.  3. Mild Osteopenia.  DEXA in February 2018 showed normal density.  4. Family h/o cancer  5.  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  On medication.      Plan     1.  I advised the patient to continue anastrozole 1 mg p.o. Daily. Plan to keep it at least until 2020.  2.  Good diet and exercise.  3.  Continue with annual mammogram..  4.  Calcium and Vit D supplementation.  5.  Follow-up annually.    6.  Advised to follow-up with a GI  recommendations about PPIs etc as a part of her treatment for her atrophic gastritis.  We did talk about that she can develop vitamin B12 deficiency which may need to be monitored for several years as it may take a while for her to replete her stores.  She will can also become iron deficient but that will become more obvious quickly.    Clinical stage      Breast cancer of lower-outer quadrant of left female breast    Primary site: Breast    Staging method: AJCC 7th Edition    Clinical: Stage IA (T1a, N0, cM0) - Signed by Bam Gallardo MD on 9/24/2015    Summary: Stage IA (T1a, N0, cM0)      History     Chuyita Porter is a very pleasant 68 y.o. old female with a history of invasive ductal ca on the left side, measuring few mm in size diagnosed in August 2015 when she presented with abnormal mammogram. She had no symptoms, but the mammogram showed microcalcification. She had biopsy on August 2015 and that showed invasive ductal ca. Then she had lumpectomy  on 9/4/2015 which also showed DCIS. SLN was negative. She is recovering well from her surgery.  She has prior h/o right sided breast cancer, 0.6 cm in size, ER/HI positive, and her2 negative. She had XRT and and had 5 years of Anastrozole.  This diagnosis was in 2005.    She was treated with radiation therapy by Dr. Hammond.  Started back on anastrozole in November 2015.  She is tolerating it well.  Minimal hot flashes.    Comes in today for scheduled follow up. Doing well.she has had GI issues after she had a tooth fixed.  She was seen by GI and was found to have atrophic gastritis.  She has been started on medication.  Her evaluation that showed a very slow titers of anti-parietal cell antibodies but rest of the labs are normal.  Her gastrin level is actually not elevated.  Vitamin B12 and iron levels are normal.  She is not anemic at this point.    Past Medical History     Past Medical History:   Diagnosis Date     Breast cancer (H) 2005    right     Breast cancer (H) 2015    left     Gastritis     autoimmune atrophic gastritis      Hx of radiation therapy 2005       Review of Systems     Constitutional  Constitutional (WDL): Exceptions to WDL  Fatigue: No Concerns  Fever: No Concerns  Chills: No Concerns  Weight Gain: No Concerns  Weight Loss: No Concerns  Hot flashes/Night Sweats: Concerns(regularly)  Neurosensory  Neurosensory (WDL): All neurosensory elements are within defined limits  Peripheral Motor Neuropathy: No Concerns  Ataxia: No Concerns  Peripheral Sensory Neuropathy: No Concerns  Confusion: No Concerns  Dizziness: No Concerns  Syncope: No Concerns  Eye   Eye Disorder (WDL): All eye disorder elements are within defined limits  Blurred Vision: No Concerns  Dry Eye: No Concerns  Eye Pain: No Concerns  Watering Eyes: No Concerns  Ear  Ear Disorder (WDL): Exceptions to WDL  Ear Pain: No Concerns  Tinnitus: Concerns(left ear slightly)  Cardiovascular  Cardiovascular (WDL): All cardiovascular elements are  within defined limits  Palpitations: No Concerns  Edema: No Concerns  SVT, DVT/PE: No Concerns  Chest Pain - Cardiac: No Concerns  Pulmonary  Respiratory (WDL): Within Defined Limits  Cough: No Concerns  Dyspnea: No Concerns  Hypoxia: No Concerns  Gastrointestinal  Gastrointestinal (WDL): All gastrointestinal elements are within defined limits  Anorexia: No Concerns  Nausea: No Concerns  Vomiting: No Concerns  Dehydration: No Concerns  Dysgeusia: No Concerns  Dysphagia: No Concerns  Mucositis Oral: No Concerns  Esophagitis: No Concerns  Constipation: No Concerns  Diarrhea: No Concerns  Pharyngitis: No Concerns  Dry Mouth: No Concerns  Genitourinary  Genitourinary (WDL): All genitourinary elements are within defined limits  Urinary Frequency: No Concerns  Urinary Retention: No Concerns  Lymphatic  Lymph (WDL): All lymph disorder elements are within defined limits  Lymphedema: No Concerns  Musculoskeletal and Connective Tissue  Musculoskeletal and Connetive Tissue Disorders (WDL): All Musculoskeletal and Connetive Tissue Disorder elements are within defined limits  Arthralgia: No Concerns  Bone Pain: No Concerns  Muscle Weakness : No Concerns  Myalgia: No Concerns  Integumentary  Integumentary (WDL): All integumentary elements are within defined limits  Alopecia: No Concerns  Rash Maculo-Papular: No Concerns  Pruritus: No Concerns  Urticaria: No Concerns  Palmar-Plantar Erythrodysesthesia Syndrome: No Concerns  Flushing: No Concerns  Patient Coping  Patient Coping: Accepting  Accompanied by  Accompanied by: Alone  Oral Chemo Adherence         Constitutional     Neurosensory     Cardiovascular     Pulmonary     Gastrointestinal     Genitourinary     Integumentary     Patient Coping  Patient Coping: Accepting  Accompanied by  Accompanied by: Alone    ECOG performance status and Distress Assessment      ECOG Performance:    ECOG Performance Status: 0    Distress Assessment  Distress Assessment Score: No distress:      Pain Status  Currently in Pain: No/denies        Vital Signs     Vitals:    03/07/19 1455   BP: 132/72   Pulse: 70   Temp: 99  F (37.2  C)   SpO2: 98%       Physical Exam     GENERAL: No acute distress. Cooperative in conversation.   HEENT: Pupils are equal, round and reactive. Oral mucosa is clean and intact. No ulcerations or mucositis noted. No bleeding noted.  RESP:Chest symmetric lungs are clear bilaterally per auscultation. Regular respiratory rate. No wheezes or rhonchi.  CV: Normal S1 S2 Regular, rate and rhythm. No murmurs.  ABD: Nondistended, soft, nontender. Positive bowel sounds. No organomegaly.   EXTREMITIES: No lower extremity edema.   NEURO: Non- focal. Alert and oriented x3.  Cranial nerves appear intact.  PSYCH: Within normal limits. No depression or anxiety.  SKIN: Warm dry intact.    LYMPH NODES: Bilateral cervical, supraclavicular, axillary lymph node examination was done.  Negative for any palpable adenopathy.      Lab Results     Results for orders placed or performed in visit on 03/07/19   Comprehensive Metabolic Panel   Result Value Ref Range    Sodium 142 136 - 145 mmol/L    Potassium 4.0 3.5 - 5.0 mmol/L    Chloride 106 98 - 107 mmol/L    CO2 26 22 - 31 mmol/L    Anion Gap, Calculation 10 5 - 18 mmol/L    Glucose 89 70 - 125 mg/dL    BUN 15 8 - 22 mg/dL    Creatinine 0.80 0.60 - 1.10 mg/dL    GFR MDRD Af Amer >60 >60 mL/min/1.73m2    GFR MDRD Non Af Amer >60 >60 mL/min/1.73m2    Bilirubin, Total 0.7 0.0 - 1.0 mg/dL    Calcium 9.3 8.5 - 10.5 mg/dL    Protein, Total 7.7 6.0 - 8.0 g/dL    Albumin 3.8 3.5 - 5.0 g/dL    Alkaline Phosphatase 135 (H) 45 - 120 U/L    AST 35 0 - 40 U/L    ALT 25 0 - 45 U/L         Imaging Results     No results found.      Bam Gallardo MD

## 2021-06-24 NOTE — PROGRESS NOTES
Patient is here for labs and provider for Malignant neoplasm of lower-outer quadrant of left breast of female, estrogen receptor positive.

## 2021-06-25 NOTE — PROGRESS NOTES
Progress Notes by Roslyn Menard MD at 6/26/2017  2:10 PM     Author: Roslyn Menard MD Service: -- Author Type: Physician    Filed: 6/26/2017  6:42 PM Encounter Date: 6/26/2017 Status: Signed    : Roslyn Menard MD (Physician)       Provider wore a mask during this visit.   Subjective:   Chuyita Porter is a 67 y.o. female  Roomed by: mary    Refills needed? No    Do you have any forms that need to be filled out? No      Chief Complaint   Patient presents with   ? Cough     x one week, non-productive   Says last fall had cough that ended up being reflux. She started coughing on 6/18 when she woke up. She was out of town. Denies any fevers, chills, CP, or SOB. Denies any sneezing, runny nose, nasal congestion or headed. Her  has been not been coughing. Flew back from [place] yesterday. Was on a bus tour for 5 days from [place] to [place]. No one else was coughing on the bus. Started her Codeine cough syrup when she got home. Tried some Halls cough drops. Appetite has been unchanged. Has not had any urinary incontinence with coughing. Says coughing is worse with going outside or laying down.   Review of Systems  Const - Resp - see HPI  No Known Allergies    Current Outpatient Prescriptions:   ?  anastrozole (ARIMIDEX) 1 mg tablet, TAKE 1 TABLET DAILY, Disp: 90 tablet, Rfl: 3  ?  calcium carbonate-vitamin D3 (CALCIUM 600 + D,3,) 600 mg(1,500mg) -200 unit per tablet, Take 1 tablet by mouth daily. As directed, Disp: , Rfl:   ?  MULTIVITAMIN ORAL, Take 1 tablet by mouth daily., Disp: , Rfl:   Patient Active Problem List   Diagnosis   ? Breast cancer of lower-outer quadrant of left female breast   ? Osteopenia   ? Abnormal cervix finding     Medical History Reviewed  Objective:     Vitals:    06/26/17 1408   BP: 110/64   Pulse: 92   Resp: 24   Temp: 99.3  F (37.4  C)   TempSrc: Oral   SpO2: 96%   Weight: 158 lb 8 oz (71.9 kg)   Gen - Pt in NAD  Eyes - Conjunctiva non injected, no drainage  Face -  non TTP over maxillary and non TTP over frontal sinus areas  Ears - external canals - no induration, Right TM - not injected, Left TM - not injected   Nose - not congested, no nasal drainage  Pharynx - non injected, tonsils 1+ size  Neck - supple, no cervical adenopathy, no masses  Cor - RRR w/o murmur  Lungs - Good air entry; no wheezes, but coarse crackles noted in right lower lung field on auscultation - coarse wet coughing noted  Skin - no lesions, no rashes noted      Xr Chest Pa And Lateral    Result Date: 6/26/2017  XR CHEST PA AND LATERAL 6/26/2017 3:16 PM INDICATION: Has been coughing for 6 days. Crackles noted on right lower lung field on exam. COMPARISON: None. FINDINGS: Hyperexpanded lungs. Subtle opacities over the medial right lung base and middle lobe on the frontal view suggestive of pneumonia given history. Recommend 6-8 week follow-up radiographs for resolution. Remaining lungs are grossly clear. No pleural effusion or pneumothorax. Normal heart size. Mildly tortuous aorta. Mild upper thoracic scoliosis. NOTE: ABNORMAL REPORT THE DICTATION ABOVE DESCRIBES AN ABNORMALITY FOR WHICH FOLLOW-UP IS NEEDED. This report was electronically interpreted by: Dr. Derrick Olmedo DO ON 06/26/2017 at 15:20  Radiologist's report discussed day of visit.      Assessment - Plan   Medical Decision Making -the 67-year-old immunocompetent woman with coarse cough and fatigue.  Clinical findings were consistent with the x-ray findings of a right lower lobe pneumonia.  Recommended the patient take it easy for the next for the rest of the week and follow-up with her primary within a week.    1. Pneumonia of right lower lobe due to infectious organism  azithromycin (ZITHROMAX) 250 MG tablet - 2 tabs QD x 3 days   2. Cough  XR Chest PA and Lateral     At the conclusion of the encounter, assessment and plan were discussed.   All questions were answered.   The patient or guardian acknowledged understanding and was involved in  the decision making regarding the overall care plan.    Patient Instructions     1. Keep well hydrated  2. Take it easy for the next week  3. Follow up with primary clinic in 5-7 days   4. Follow up Chest xray was recommended by the radiologist in 6-8 weeks  5. If you have any questions, call the clinic number    When You Have Pneumonia  You have been diagnosed with pneumonia. This is a serious lung infection. Most cases of pneumonia are caused by bacteria. Pneumonia most often occurs in older adults, young children, and people with chronic health problems.  Home care    Take your medicine exactly as directed. Dont skip doses. Continue taking your antibiotics as until they are all gone, even if you start to feel better. This will prevent the pneumonia from coming back.    Drink at least 8 glasses of water daily, unless directed otherwise. This helps to loosen and thin secretions so that you can cough them up.    Use a cool-mist humidifier in your bedroom. Be sure to clean the humidifier daily.    Dont use medicines to suppress your cough unless your cough is dry, painful, or interferes with your sleep. Coughing up mucus is normal. You may use an expectorant if your healthcare provider says its okay.    You can use warm compresses or a heating pad on the lowest setting to relieve chest discomfort. Use several times a day for 15-20 minutes at a time. To prevent injury to your skin, set the temperature to warm, not hot. Dont put the compress or pad directly on your skin. Make certain it has a cover or wrap it in a towel. This is to prevent skin burns.    Get plenty of rest until your fever, shortness of breath, and chest pain go away.    Plan to get a flu shot every year. The flu is a common cause of pneumonia. Getting a flu shot every year can help prevent both the flu and pneumonia.  Getting the pneumococcal vaccine  Talk with your healthcare provider about getting the pneumococcalvaccine. Pneumococcal pneumonia is  caused by bacteria that spread from person to person. It can cause minor problems, such as ear infections. But it can also turn into life-threatening illnesses of the lungs (pneumonia), the covering of the brain and spinal cord (meningitis), and the blood (bacteremia).  Children under 2 years of age, adults over age 65, people with certain health conditions, and smokers are at the highest risk of pneumococcal disease. This vaccine can help prevent pneumococcal disease in both adults and children. Some people should not have the vaccine. Make sure to ask your healthcare provider if you should have the vaccine.   Follow-up care  Make a follow-up appointment as directed by our staff.  When to call your healthcare provider  Call your healthcare provider if you have any of the following:    Fever above 100.4 F (38 C), or as directed by your healthcare provider    Mucus from the lungs (sputum) thats yellow, green, bloody, or smells bad    A large amount of sputum    Vomiting    Symptoms that get worse  When to call 911  Call 911 right away if you have any of the following:    Chest pain    Trouble breathing    Blue lips or fingernails   Date Last Reviewed: 11/1/2016 2000-2016 [employer]. [address] [place] [address], [place] [address]. All rights reserved. This information is not intended as a substitute for professional medical care. Always follow your healthcare professional's instructions.

## 2021-06-25 NOTE — TELEPHONE ENCOUNTER
Called patient back to update her after speaking with VIRAL marquez who stated patient needs Mammogram and follow up yearly for her breat cancer, she can have this done at her primary or here with us. Patient stated she is following with her primary and already had her mammo. She be will following up with primary doctor. I asked patient to call us if thing change. She verbalized understanding and appreciative for the call back.

## 2021-06-25 NOTE — TELEPHONE ENCOUNTER
Called sabina today to schedule her overdue clinic visit with Dr Gallardo pt was due in march 2021. Patient said she is currently following up with  [facility] doctor treating her for lymphoma. She will be scheduling f/u visit sometime july august 2021. She is questioning if she needs to be seen with us for breast cancer if she is being follow by the [facility]. I told patient I will check with Dr Sami Marcus as he is out of clinic for the summer and let her know.  Pt verbalized understanding and agreed on the plan

## 2021-06-28 NOTE — PROGRESS NOTES
Progress Notes by Padmini Michelle CNP at 9/23/2019 10:00 AM     Author: Padmini Michelle CNP Service: -- Author Type: Nurse Practitioner    Filed: 9/30/2019  8:58 AM Encounter Date: 9/23/2019 Status: Signed    : Padmini Michelle CNP (Nurse Practitioner)       Chief Complaint   Patient presents with   ? Insect Bite     bug bite L ankle that is red, swollen and warm to the touch        ASSESSMENT & PLAN:   Diagnoses and all orders for this visit:    Cellulitis of left lower extremity  -     cephalexin 500 mg tablet; Take 500 mg by mouth 4 (four) times a day for 10 days.  Dispense: 40 tablet; Refill: 0        MDM:  Exam consistent with secondary cellulitis of left ankle area insect bite.  Other area of erythema on calf area appears to be simple insect bite.  Given instruction to monitor outlined area for expansion.  Okay for mild expansion outside of the lines within the first 24 hours, but after that wound should not be expanding.  Any abrupt increase in area of erythema or fever, chills should prompt a visit to the emergency room.    Supportive care discussed.  See discharge instructions below for specific recommendations given.    At the end of the encounter, I discussed results, diagnosis, medications. Discussed red flags for immediate return to clinic/ER, as well as indications for follow up if no improvement. Patient and/or caregiver understood and agreed to plan. Patient was stable for discharge.    SUBJECTIVE    HPI:  HPI  Chuyita Porter presents to the walk-in clinic with left ankle area insect bite and and left upper calf area with concern for infection.  Became red, swollen, and warm to the touch after a period of itching.  Blistering located on ankle area wound.       See ROS for additional symptoms and/or pertinent negatives.         History obtained from the patient.    Past Medical History:   Diagnosis Date   ? Breast cancer (H) 2005    right   ? Breast cancer (H) 2015    left   ? Gastritis      autoimmune atrophic gastritis    ? Hx of radiation therapy        Active Ambulatory (Non-Hospital) Problems    Diagnosis   ? Autoimmune metaplastic  Atrophic gastritis EGD 2019, repeat mapping in 1 yr    ? Abnormal cervix finding   ? Malignant neoplasm of lower-outer quadrant of left breast of female, estrogen receptor positive (H)       Family History   Problem Relation Age of Onset   ? Breast cancer Paternal Aunt 70   ? Stroke Mother          75   ? Prostate cancer Father 70   ? Osteoporosis Father    ? Breast cancer Paternal Aunt 70   ? Breast cancer Paternal Grandmother 30   ? Ovarian cancer Cousin 60   ? Stomach cancer Maternal Aunt 90   ? Prostate cancer Maternal Uncle 70   ? Prostate cancer Maternal Uncle 70   ? Prostate cancer Cousin 60   ? Lung cancer Cousin    ? Kidney cancer Cousin 60   ? No Medical Problems Daughter    ? No Medical Problems Daughter        Social History     Tobacco Use   ? Smoking status: Never Smoker   ? Smokeless tobacco: Never Used   Substance Use Topics   ? Alcohol use: No       Review of Systems   Constitutional: Negative for chills and fever.   All other systems reviewed and are negative.      OBJECTIVE    Vitals:    19 1002   BP: 123/79   Pulse: 74   Resp: 12   Temp: 99.3  F (37.4  C)   TempSrc: Oral   SpO2: 98%   Weight: 169 lb 12.8 oz (77 kg)       Physical Exam  Constitutional:       General: She is not in acute distress.     Appearance: She is well-developed.   HENT:      Right Ear: External ear normal.      Left Ear: External ear normal.   Eyes:      General:         Right eye: No discharge.         Left eye: No discharge.      Conjunctiva/sclera: Conjunctivae normal.   Neck:      Musculoskeletal: Normal range of motion.   Cardiovascular:      Pulses: Normal pulses.   Pulmonary:      Effort: Pulmonary effort is normal.   Musculoskeletal: Normal range of motion.   Skin:     General: Skin is warm and dry.      Capillary Refill: Capillary refill takes less  than 2 seconds.      Findings: Rash (6x6 cm located above left medial ankle with warm, indistinct borders, blistering in center and 4x4 area of erythema and warmth with distinct borders and no blistering left medial calf.  ) present.   Neurological:      Mental Status: She is alert and oriented to person, place, and time.   Psychiatric:         Behavior: Behavior normal.         Thought Content: Thought content normal.         Judgement: Judgment normal.         Labs:  No results found for this or any previous visit (from the past 240 hour(s)).      Radiology:    No results found.    PATIENT INSTRUCTIONS:   Patient Instructions   Monitor outlined area for expansion.  Okay for mild expansion outside of the lines within the first 24 hours, but after that wound should not be expanding.  Any abrupt increase in area of pinkness or fever, chills should prompt a visit to the emergency room.  Recheck in 4-5 days if infection not getting any better.    Patient Education     Cellulitis  Cellulitis is an infection of the deep layers of skin. A break in the skin, such as a cut or scratch, can let bacteria under the skin. If the bacteria get to deep layers of the skin, it can be serious. If not treated, cellulitis can get into the bloodstream and lymph nodes. The infection can then spread throughout the body. This causes serious illness.  Cellulitis causes the affected skin to become red, swollen, warm, and sore. The reddened areas have a visible border. An open sore may leak fluid (pus). You may have a fever, chills, and pain.  Cellulitis is treated with antibiotics taken for 7 to 10 days. An open sore may be cleaned and covered with cool wet gauze. Symptoms should get better 1 to 2 days after treatment is started. Make sure to take all the antibiotics for the full number of days until they are gone. Keep taking the medicine even if your symptoms go away.  Home care  Follow these tips:    Limit the use of the part of your body  with cellulitis.     If the infection is on your leg, keep your leg raised while sitting. This will help to reduce swelling.    Take all of the antibiotic medicine exactly as directed until it is gone. Do not miss any doses, especially during the first 7 days. Dont stop taking the medicine when your symptoms get better.    Keep the affected area clean and dry.    Wash your hands with soap and warm water before and after touching your skin. Anyone else who touches your skin should also wash his or her hands. Don't share towels.  Follow-up care  Follow up with your healthcare provider, or as advised. If your infection does not go away on the first antibiotic, your healthcare provider will prescribe a different one.  When to seek medical advice  Call your healthcare provider right away if any of these occur:    Red areas that spread    Swelling or pain that gets worse    Fluid leaking from the skin (pus)    Fever higher of 100.4  F (38.0  C) or higher after 2 days on antibiotics  Date Last Reviewed: 9/1/2016 2000-2017 [employer]. [address] [place] [address], [place] [address]. All rights reserved. This information is not intended as a substitute for professional medical care. Always follow your healthcare professional's instructions.

## 2021-07-03 NOTE — ADDENDUM NOTE
Addendum Note by Sofy Liang MD at 1/21/2020  5:11 PM     Author: Sofy Liang MD Service: -- Author Type: Physician    Filed: 1/21/2020  5:11 PM Encounter Date: 1/21/2020 Status: Signed    : Sofy Liang MD (Physician)    Addended by: OSFY LIANG on: 1/21/2020 05:11 PM        Modules accepted: Orders

## 2021-07-03 NOTE — ADDENDUM NOTE
Addendum Note by Roderick Christianson MD at 12/31/2018  9:50 AM     Author: Roderick Christianson MD Service: -- Author Type: Physician    Filed: 12/31/2018 10:29 AM Encounter Date: 12/31/2018 Status: Signed    : Roderick Christianson MD (Physician)    Addended by: RODERICK CHRISTIANSON on: 12/31/2018 10:29 AM        Modules accepted: Orders

## 2021-07-13 ENCOUNTER — RECORDS - HEALTHEAST (OUTPATIENT)
Dept: ADMINISTRATIVE | Facility: CLINIC | Age: 71
End: 2021-07-13

## 2021-07-14 ENCOUNTER — RECORDS - HEALTHEAST (OUTPATIENT)
Dept: ADMINISTRATIVE | Facility: CLINIC | Age: 71
End: 2021-07-14

## 2021-07-14 PROBLEM — C83.398 DIFFUSE LARGE B-CELL LYMPHOMA OF EXTRANODAL SITE: Status: RESOLVED | Noted: 2020-02-20 | Resolved: 2020-11-22

## 2021-07-21 ENCOUNTER — RECORDS - HEALTHEAST (OUTPATIENT)
Dept: ADMINISTRATIVE | Facility: CLINIC | Age: 71
End: 2021-07-21

## 2021-07-22 ENCOUNTER — RECORDS - HEALTHEAST (OUTPATIENT)
Dept: FAMILY MEDICINE | Facility: CLINIC | Age: 71
End: 2021-07-22

## 2021-07-22 DIAGNOSIS — Z12.31 OTHER SCREENING MAMMOGRAM: ICD-10-CM

## 2021-08-10 DIAGNOSIS — C83.30 DIFFUSE LARGE B-CELL LYMPHOMA, UNSPECIFIED BODY REGION (H): Primary | ICD-10-CM

## 2021-08-23 ENCOUNTER — LAB (OUTPATIENT)
Dept: LAB | Facility: CLINIC | Age: 71
End: 2021-08-23
Payer: COMMERCIAL

## 2021-08-23 DIAGNOSIS — C83.30 DIFFUSE LARGE B-CELL LYMPHOMA, UNSPECIFIED BODY REGION (H): ICD-10-CM

## 2021-08-23 LAB
ALBUMIN SERPL-MCNC: 3.6 G/DL (ref 3.5–5)
ALP SERPL-CCNC: 125 U/L (ref 45–120)
ALT SERPL W P-5'-P-CCNC: 15 U/L (ref 0–45)
ANION GAP SERPL CALCULATED.3IONS-SCNC: 13 MMOL/L (ref 5–18)
AST SERPL W P-5'-P-CCNC: 23 U/L (ref 0–40)
BASOPHILS # BLD AUTO: 0 10E3/UL (ref 0–0.2)
BASOPHILS NFR BLD AUTO: 0 %
BILIRUB SERPL-MCNC: 0.4 MG/DL (ref 0–1)
BUN SERPL-MCNC: 12 MG/DL (ref 8–22)
CALCIUM SERPL-MCNC: 9.7 MG/DL (ref 8.5–10.5)
CHLORIDE BLD-SCNC: 105 MMOL/L (ref 98–107)
CO2 SERPL-SCNC: 23 MMOL/L (ref 22–31)
CREAT SERPL-MCNC: 0.83 MG/DL (ref 0.6–1.1)
EOSINOPHIL # BLD AUTO: 0.2 10E3/UL (ref 0–0.7)
EOSINOPHIL NFR BLD AUTO: 3 %
ERYTHROCYTE [DISTWIDTH] IN BLOOD BY AUTOMATED COUNT: 14 % (ref 10–15)
GFR SERPL CREATININE-BSD FRML MDRD: 72 ML/MIN/1.73M2
GLUCOSE BLD-MCNC: 83 MG/DL (ref 70–125)
HCT VFR BLD AUTO: 35.8 % (ref 35–47)
HGB BLD-MCNC: 12 G/DL (ref 11.7–15.7)
IMM GRANULOCYTES # BLD: 0 10E3/UL
IMM GRANULOCYTES NFR BLD: 0 %
LYMPHOCYTES # BLD AUTO: 2.2 10E3/UL (ref 0.8–5.3)
LYMPHOCYTES NFR BLD AUTO: 29 %
MCH RBC QN AUTO: 31.5 PG (ref 26.5–33)
MCHC RBC AUTO-ENTMCNC: 33.5 G/DL (ref 31.5–36.5)
MCV RBC AUTO: 94 FL (ref 78–100)
MONOCYTES # BLD AUTO: 0.8 10E3/UL (ref 0–1.3)
MONOCYTES NFR BLD AUTO: 11 %
NEUTROPHILS # BLD AUTO: 4.1 10E3/UL (ref 1.6–8.3)
NEUTROPHILS NFR BLD AUTO: 56 %
PLATELET # BLD AUTO: 245 10E3/UL (ref 150–450)
POTASSIUM BLD-SCNC: 4.1 MMOL/L (ref 3.5–5)
PROT SERPL-MCNC: 6.9 G/DL (ref 6–8)
RBC # BLD AUTO: 3.81 10E6/UL (ref 3.8–5.2)
SODIUM SERPL-SCNC: 141 MMOL/L (ref 136–145)
WBC # BLD AUTO: 7.3 10E3/UL (ref 4–11)

## 2021-08-23 PROCEDURE — 85025 COMPLETE CBC W/AUTO DIFF WBC: CPT

## 2021-08-23 PROCEDURE — 36415 COLL VENOUS BLD VENIPUNCTURE: CPT

## 2021-08-23 PROCEDURE — 80053 COMPREHEN METABOLIC PANEL: CPT

## 2021-08-26 ENCOUNTER — VIRTUAL VISIT (OUTPATIENT)
Dept: TRANSPLANT | Facility: CLINIC | Age: 71
End: 2021-08-26
Payer: COMMERCIAL

## 2021-08-26 DIAGNOSIS — C83.32 DIFFUSE LARGE B-CELL LYMPHOMA OF INTRATHORACIC LYMPH NODES (H): Primary | ICD-10-CM

## 2021-08-26 PROCEDURE — 99442 PR PHYSICIAN TELEPHONE EVALUATION 11-20 MIN: CPT

## 2021-08-26 NOTE — LETTER
"    8/26/2021         RE: Chuyita Porter  [address]  [facility] [address]        Dear Colleague,    Thank you for referring your patient, Chuyita Porter, to the [facility] [place]. Please see a copy of my visit note below.    Chuyita is a 71 year old who is being evaluated via a billable telephone visit.      What phone number would you like to be contacted at? [phone]  How would you like to obtain your AVS? [email or web address]     I have reviewed and updated the patient's allergies and medication list.    Concerns: none  Refills: none     Vitals - Patient Reported  Weight (Patient Reported): 80.7 kg (178 lb)  Height (Patient Reported): 165.1 cm (5' 5\")  BMI (Based on Pt Reported Ht/Wt): 29.62  Pain Score: No Pain (0)    Ruby Perez CMA        Phone call duration: 10 minutes      Oncology HPI:    Chuyita Porter is a 71 year old female with past medical history of breast cancer (right breast cancer in 2005 and left breast cancer in 2015 followed by Dr. Gallardo) with diffuse large B cell lymphoma.    Disease:  DLBCL nonGCB with mediastinal mass and thyroid gland involvement   - 4-6 BCL2 signals (81%)    - No rearrangement of BCL6, MYC, or BCL2   - No IGH-MYC fusion  Bulky disease, Stage IIB, IPI  3 ()  S/p 6 cycles R-CHOP    INTERIM HISTORY: Anneliese is feeling great, has no new complains, her chest and breathing feel normal, no fevers or sweats, energy is great. She is eating normally, weight is stable.      HPI:   She presented with respiratory failure due to large mediastinal mass.    Was intubated and in OR on 2/17/2020 underwent for open neck biopsy, tracheal stent, and PEG tube placement.  S/p two fractions of radiation on 2/18 and 2/19 and then started R-CHOP on 2/21.  Complications: neutropenic fever due to HCAP   debility and severe malnutrition.Completed 6 cycles of R-CHOP in June 2020.     Current Outpatient Medications   Medication     " loratadine (CLARITIN) 10 MG tablet     acetaminophen (TYLENOL) 325 MG tablet     fluticasone (FLONASE) 50 MCG/ACT nasal spray     LORazepam (ATIVAN) 0.5 MG tablet     ondansetron (ZOFRAN) 4 MG tablet     prochlorperazine (COMPAZINE) 10 MG tablet     No current facility-administered medications for this visit.          No Known Allergies        Exam: alert, appears well.  Talking efortlessly, in good spirits.         Labs:   Lab Results   Component Value Date    WBC 7.3 08/23/2021    ANEU 4.0 01/27/2021    HGB 12.0 08/23/2021    HCT 35.8 08/23/2021     08/23/2021     08/23/2021    POTASSIUM 4.1 08/23/2021    CHLORIDE 105 08/23/2021    CO2 23 08/23/2021    GLC 83 08/23/2021    BUN 12 08/23/2021    CR 0.83 08/23/2021    MAG 2.0 11/17/2020    INR 1.21 (H) 02/19/2020    AST 23 08/23/2021    ALT 15 08/23/2021         Chest ct 1/27/2021  IMPRESSION:   In this patient with history of diffuse large B-cell lymphoma and  bilateral breast cancer, there is no evidence of disease progression:  1. Mildly decreased size of right superior mediastinal soft tissue  mass. No suspicious adenopathy or evidence of disease in the remaining  chest, abdomen, or pelvis.  2. Unchanged few small solid pulmonary nodules.   3. Mildly decreased subcentimeter left upper lobe groundglass opacity  with unchanged mild biapical predominant centrilobular nodularity,  nonspecific, though can be seen in hypersensitivity pneumonitis.  4. Mildly prominent appendix up to 9 mm without secondary findings to  suggest acute appendicitis. Internal dense material is present along  with foci of air, further suggesting against acute appendicitis.  Attention on follow-up.  5. Additional incidental/chronic findings as noted above, not  significantly changed since 7/20/2020.          Impression/plan:   DLBCL with mediastinal mass compressing the SVC with associated collateral vessels, no disease in the abdomen/pelvis, marrow not done.   S/p 6 cycles of  R-CHOP and few doses of XRT  MS after 2 cycles  EOT PET with complete metabolic remission. 7/20    Now in ongoing remission - last scan in jan 2021.   Now 1 year post-treatment - clinically in CR.         Pulm: was noted to have a RLL infiltrate on PET/CT from April, now resolved on PET.    ID no active infection.  OVerall, Chuyita did great. She knows to reach us if she develops new problem.   Already had a covid vaccine     rtC IN 6 months with CBC, comp. No more survaillance CT needed.        Rosy Sprague MD  Professor of Medicine  [phone]            Again, thank you for allowing me to participate in the care of your patient.        Sincerely,        Rosy Sprague MD

## 2021-08-26 NOTE — PROGRESS NOTES
"Chuyita is a 71 year old who is being evaluated via a billable telephone visit.      What phone number would you like to be contacted at? [phone]  How would you like to obtain your AVS? Cesar     I have reviewed and updated the patient's allergies and medication list.    Concerns: none  Refills: none     Vitals - Patient Reported  Weight (Patient Reported): 80.7 kg (178 lb)  Height (Patient Reported): 165.1 cm (5' 5\")  BMI (Based on Pt Reported Ht/Wt): 29.62  Pain Score: No Pain (0)    Ruby Perez CMA        Phone call duration: 10 minutes      Oncology HPI:    Chuyita Porter is a 71 year old female with past medical history of breast cancer (right breast cancer in 2005 and left breast cancer in 2015 followed by Dr. Gallardo) with diffuse large B cell lymphoma.    Disease:  DLBCL nonGCB with mediastinal mass and thyroid gland involvement   - 4-6 BCL2 signals (81%)    - No rearrangement of BCL6, MYC, or BCL2   - No IGH-MYC fusion  Bulky disease, Stage IIB, IPI  3 ()  S/p 6 cycles R-CHOP    INTERIM HISTORY: Anneliese is feeling great, has no new complains, her chest and breathing feel normal, no fevers or sweats, energy is great. She is eating normally, weight is stable.      HPI:   She presented with respiratory failure due to large mediastinal mass.    Was intubated and in OR on 2/17/2020 underwent for open neck biopsy, tracheal stent, and PEG tube placement.  S/p two fractions of radiation on 2/18 and 2/19 and then started R-CHOP on 2/21.  Complications: neutropenic fever due to HCAP   debility and severe malnutrition.Completed 6 cycles of R-CHOP in June 2020.     Current Outpatient Medications   Medication     loratadine (CLARITIN) 10 MG tablet     acetaminophen (TYLENOL) 325 MG tablet     fluticasone (FLONASE) 50 MCG/ACT nasal spray     LORazepam (ATIVAN) 0.5 MG tablet     ondansetron (ZOFRAN) 4 MG tablet     prochlorperazine (COMPAZINE) 10 MG tablet     No current facility-administered medications " for this visit.          No Known Allergies        Exam: alert, appears well.  Talking efortlessly, in good spirits.         Labs:   Lab Results   Component Value Date    WBC 7.3 08/23/2021    ANEU 4.0 01/27/2021    HGB 12.0 08/23/2021    HCT 35.8 08/23/2021     08/23/2021     08/23/2021    POTASSIUM 4.1 08/23/2021    CHLORIDE 105 08/23/2021    CO2 23 08/23/2021    GLC 83 08/23/2021    BUN 12 08/23/2021    CR 0.83 08/23/2021    MAG 2.0 11/17/2020    INR 1.21 (H) 02/19/2020    AST 23 08/23/2021    ALT 15 08/23/2021         Chest ct 1/27/2021  IMPRESSION:   In this patient with history of diffuse large B-cell lymphoma and  bilateral breast cancer, there is no evidence of disease progression:  1. Mildly decreased size of right superior mediastinal soft tissue  mass. No suspicious adenopathy or evidence of disease in the remaining  chest, abdomen, or pelvis.  2. Unchanged few small solid pulmonary nodules.   3. Mildly decreased subcentimeter left upper lobe groundglass opacity  with unchanged mild biapical predominant centrilobular nodularity,  nonspecific, though can be seen in hypersensitivity pneumonitis.  4. Mildly prominent appendix up to 9 mm without secondary findings to  suggest acute appendicitis. Internal dense material is present along  with foci of air, further suggesting against acute appendicitis.  Attention on follow-up.  5. Additional incidental/chronic findings as noted above, not  significantly changed since 7/20/2020.          Impression/plan:   DLBCL with mediastinal mass compressing the SVC with associated collateral vessels, no disease in the abdomen/pelvis, marrow not done.   S/p 6 cycles of R-CHOP and few doses of XRT  RI after 2 cycles  EOT PET with complete metabolic remission. 7/20    Now in ongoing remission - last scan in jan 2021.   Now 1 year post-treatment - clinically in CR.         Pulm: was noted to have a RLL infiltrate on PET/CT from April, now resolved on PET.    ID no  active infection.  OVerall, Chuyita did great. She knows to reach us if she develops new problem.   Already had a covid vaccine     rtC IN 6 months with CBC, comp. No more survaillance CT needed.        Rosy Sprague MD  Professor of Medicine  [phone]

## 2021-10-01 ENCOUNTER — MYC MEDICAL ADVICE (OUTPATIENT)
Dept: CARE COORDINATION | Facility: CLINIC | Age: 71
End: 2021-10-01

## 2021-10-01 NOTE — TELEPHONE ENCOUNTER
SURV care plan sent to [facility] for patient to review.   Will follow up with any questions and provide any resources needed.    Dianne LEIGH RN 10/1/2021 12:42 PM

## 2021-10-07 ENCOUNTER — ANCILLARY PROCEDURE (OUTPATIENT)
Dept: MAMMOGRAPHY | Facility: CLINIC | Age: 71
End: 2021-10-07
Attending: FAMILY MEDICINE
Payer: COMMERCIAL

## 2021-10-07 DIAGNOSIS — Z12.31 VISIT FOR SCREENING MAMMOGRAM: ICD-10-CM

## 2021-10-07 PROCEDURE — 77063 BREAST TOMOSYNTHESIS BI: CPT

## 2021-10-15 NOTE — TELEPHONE ENCOUNTER
Called home phone number listed in chart.   No answer and did not leave message.    Dianne Jain RN on 10/18/2021 at 9:01 AM

## 2021-10-22 NOTE — TELEPHONE ENCOUNTER
Attempted to call patient. Left message to return call to writer. Return phone number left on message.   Dianne Jain RN 10/22/2021 2:37 PM

## 2021-10-26 NOTE — TELEPHONE ENCOUNTER
Returned Chuyita's call today.   Chuyita stated she has not reviewed cancer treatment summary as of yet on [email or web address].   We discussed summary, reviewed some of the late and long term side effects, follow recommendation, resources, healthy behaviors.   Patient asked to review next appts with oncology.   Patient asked about if scan is needed. Reviewed 's office visit note, no scan needed for next appt.   Answered questions. Patient will keep writer's phone number for future reference.   Dianne Jain RN on 10/26/2021 at 4:09 PM

## 2022-01-01 ASSESSMENT — ACTIVITIES OF DAILY LIVING (ADL): CURRENT_FUNCTION: NO ASSISTANCE NEEDED

## 2022-01-04 ENCOUNTER — OFFICE VISIT (OUTPATIENT)
Dept: FAMILY MEDICINE | Facility: CLINIC | Age: 72
End: 2022-01-04
Payer: COMMERCIAL

## 2022-01-04 ENCOUNTER — PATIENT OUTREACH (OUTPATIENT)
Dept: ONCOLOGY | Facility: CLINIC | Age: 72
End: 2022-01-04

## 2022-01-04 VITALS
DIASTOLIC BLOOD PRESSURE: 81 MMHG | WEIGHT: 178.8 LBS | SYSTOLIC BLOOD PRESSURE: 123 MMHG | OXYGEN SATURATION: 96 % | HEIGHT: 66 IN | BODY MASS INDEX: 28.73 KG/M2 | HEART RATE: 92 BPM

## 2022-01-04 DIAGNOSIS — M75.42 IMPINGEMENT SYNDROME, SHOULDER, LEFT: ICD-10-CM

## 2022-01-04 DIAGNOSIS — Z17.0 MALIGNANT NEOPLASM OF LOWER-OUTER QUADRANT OF LEFT BREAST OF FEMALE, ESTROGEN RECEPTOR POSITIVE (H): ICD-10-CM

## 2022-01-04 DIAGNOSIS — Z78.0 POSTMENOPAUSAL STATUS: ICD-10-CM

## 2022-01-04 DIAGNOSIS — K29.40 METAPLASTIC GASTRITIS: ICD-10-CM

## 2022-01-04 DIAGNOSIS — K29.40 ATROPHIC GASTRITIS WITHOUT HEMORRHAGE: ICD-10-CM

## 2022-01-04 DIAGNOSIS — C50.512 MALIGNANT NEOPLASM OF LOWER-OUTER QUADRANT OF LEFT BREAST OF FEMALE, ESTROGEN RECEPTOR POSITIVE (H): ICD-10-CM

## 2022-01-04 DIAGNOSIS — Z13.220 LIPID SCREENING: ICD-10-CM

## 2022-01-04 DIAGNOSIS — C83.32 DIFFUSE LARGE B-CELL LYMPHOMA OF INTRATHORACIC LYMPH NODES (H): ICD-10-CM

## 2022-01-04 DIAGNOSIS — Z00.00 ENCOUNTER FOR ANNUAL WELLNESS EXAM IN MEDICARE PATIENT: Primary | ICD-10-CM

## 2022-01-04 LAB
ALBUMIN SERPL-MCNC: 4.2 G/DL (ref 3.5–5)
ALP SERPL-CCNC: 125 U/L (ref 45–120)
ALT SERPL W P-5'-P-CCNC: 16 U/L (ref 0–45)
ANION GAP SERPL CALCULATED.3IONS-SCNC: 12 MMOL/L (ref 5–18)
AST SERPL W P-5'-P-CCNC: 25 U/L (ref 0–40)
BASOPHILS # BLD AUTO: 0 10E3/UL (ref 0–0.2)
BASOPHILS NFR BLD AUTO: 1 %
BILIRUB SERPL-MCNC: 1 MG/DL (ref 0–1)
BUN SERPL-MCNC: 12 MG/DL (ref 8–22)
CALCIUM SERPL-MCNC: 10.3 MG/DL (ref 8.5–10.5)
CHLORIDE BLD-SCNC: 104 MMOL/L (ref 98–107)
CHOLEST SERPL-MCNC: 208 MG/DL
CO2 SERPL-SCNC: 24 MMOL/L (ref 22–31)
CREAT SERPL-MCNC: 0.86 MG/DL (ref 0.6–1.1)
EOSINOPHIL # BLD AUTO: 0.1 10E3/UL (ref 0–0.7)
EOSINOPHIL NFR BLD AUTO: 1 %
ERYTHROCYTE [DISTWIDTH] IN BLOOD BY AUTOMATED COUNT: 13.7 % (ref 10–15)
FASTING STATUS PATIENT QL REPORTED: YES
GFR SERPL CREATININE-BSD FRML MDRD: 73 ML/MIN/1.73M2
GLUCOSE BLD-MCNC: 91 MG/DL (ref 70–125)
HCT VFR BLD AUTO: 41.1 % (ref 35–47)
HDLC SERPL-MCNC: 60 MG/DL
HGB BLD-MCNC: 13.4 G/DL (ref 11.7–15.7)
IMM GRANULOCYTES # BLD: 0 10E3/UL
IMM GRANULOCYTES NFR BLD: 0 %
LDLC SERPL CALC-MCNC: 129 MG/DL
LYMPHOCYTES # BLD AUTO: 1.6 10E3/UL (ref 0.8–5.3)
LYMPHOCYTES NFR BLD AUTO: 25 %
MCH RBC QN AUTO: 30.5 PG (ref 26.5–33)
MCHC RBC AUTO-ENTMCNC: 32.6 G/DL (ref 31.5–36.5)
MCV RBC AUTO: 94 FL (ref 78–100)
MONOCYTES # BLD AUTO: 0.6 10E3/UL (ref 0–1.3)
MONOCYTES NFR BLD AUTO: 8 %
NEUTROPHILS # BLD AUTO: 4.3 10E3/UL (ref 1.6–8.3)
NEUTROPHILS NFR BLD AUTO: 65 %
PLATELET # BLD AUTO: 292 10E3/UL (ref 150–450)
POTASSIUM BLD-SCNC: 4.9 MMOL/L (ref 3.5–5)
PROT SERPL-MCNC: 7.8 G/DL (ref 6–8)
RBC # BLD AUTO: 4.39 10E6/UL (ref 3.8–5.2)
SODIUM SERPL-SCNC: 140 MMOL/L (ref 136–145)
TRIGL SERPL-MCNC: 93 MG/DL
WBC # BLD AUTO: 6.6 10E3/UL (ref 4–11)

## 2022-01-04 PROCEDURE — 99397 PER PM REEVAL EST PAT 65+ YR: CPT | Performed by: FAMILY MEDICINE

## 2022-01-04 PROCEDURE — 85025 COMPLETE CBC W/AUTO DIFF WBC: CPT | Performed by: FAMILY MEDICINE

## 2022-01-04 PROCEDURE — 80061 LIPID PANEL: CPT | Performed by: FAMILY MEDICINE

## 2022-01-04 PROCEDURE — 99214 OFFICE O/P EST MOD 30 MIN: CPT | Mod: 25 | Performed by: FAMILY MEDICINE

## 2022-01-04 PROCEDURE — 80053 COMPREHEN METABOLIC PANEL: CPT | Performed by: FAMILY MEDICINE

## 2022-01-04 PROCEDURE — 36415 COLL VENOUS BLD VENIPUNCTURE: CPT | Performed by: FAMILY MEDICINE

## 2022-01-04 ASSESSMENT — MIFFLIN-ST. JEOR: SCORE: 1352.78

## 2022-01-04 ASSESSMENT — ACTIVITIES OF DAILY LIVING (ADL): CURRENT_FUNCTION: NO ASSISTANCE NEEDED

## 2022-01-04 NOTE — PROGRESS NOTES
"SUBJECTIVE:   Chuyita Porter is a 71 year old female who presents for Preventive Visit.       Patient has been advised of split billing requirements and indicates understanding: Yes   Are you in the first 12 months of your Medicare coverage?  No    Continues in remission from her lymphoma. Pain in upper muscle of left arm and some pain into shoulder. No neck pain. Not treating for pain. 3/10 discomfort.   Doesn't want PT right now. Every once in awhile kink in left knee. Walks daily-not as much in winter.       Healthy Habits:     In general, how would you rate your overall health?  Good    Frequency of exercise:  2-3 days/week    Duration of exercise:  30-45 minutes    Do you usually eat at least 4 servings of fruit and vegetables a day, include whole grains    & fiber and avoid regularly eating high fat or \"junk\" foods?  Yes    Taking medications regularly:  Yes    Barriers to taking medications:  None    Medication side effects:  None    Ability to successfully perform activities of daily living:  No assistance needed    Home Safety:  No safety concerns identified    Hearing Impairment:  No hearing concerns    In the past 6 months, have you been bothered by leaking of urine?  No    In general, how would you rate your overall mental or emotional health?  Good      PHQ-2 Total Score: 0    Additional concerns today:  No    Do you feel safe in your environment? Yes    Have you ever done Advance Care Planning? (For example, a Health Directive, POLST, or a discussion with a medical provider or your loved ones about your wishes): Yes, patient states has an Advance Care Planning document and will bring a copy to the clinic.       Fall risk  Fallen 2 or more times in the past year?: No  Any fall with injury in the past year?: No    Cognitive Screening   1) Repeat 3 items (Leader, Season, Table)    2) Clock draw: NORMAL  3) 3 item recall: Recalls 3 objects  Results: 3 items recalled: COGNITIVE IMPAIRMENT LESS " LIKELY    Mini-CogTM Copyright MARYBETH Wick. Licensed by the author for use in [facility]; reprinted with permission (joan@[email or web address]). All rights reserved.      Do you have sleep apnea, excessive snoring or daytime drowsiness?: no    Reviewed and updated as needed this visit by clinical staff  Tobacco  Allergies  Meds  Problems  Med Hx  Surg Hx  Fam Hx         Reviewed and updated as needed this visit by Provider  Tobacco  Allergies  Meds  Problems  Med Hx  Surg Hx  Fam Hx        Social History     Tobacco Use     Smoking status: Never Smoker     Smokeless tobacco: Never Used   Substance Use Topics     Alcohol use: Never     If you drink alcohol do you typically have >3 drinks per day or >7 drinks per week? No     No flowsheet data found.            Current providers sharing in care for this patient include:   Patient Care Team:  Phyllis Juarez MD as PCP - Bam Oconnor MD, MD as MD (Hematology & Oncology)  Rosy Sprague MD as MD (Hematology & Oncology)  Brandi Hood, RN as Specialty Care Coordinator (Hematology & Oncology)  Priscilla Pretty MD as Assigned Infectious Disease Provider  Kiersten Valdez MD as Assigned Surgical Provider  Rosy Sprague MD as Assigned Cancer Care Provider  Phyllis Juarez MD as Assigned PCP    The following health maintenance items are reviewed in Epic and correct as of today:  There are no preventive care reminders to display for this patient.  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)     Antineoplastic antibiotics causing adverse effect in therapeutic use     Adverse effect of antineoplastic and immunosuppressive drugs, initial encounter     Adverse effect of antineoplastic and immunosuppressive drugs, subsequent encounter     Polyp of duodenum     Malignant neoplasm of lower-outer quadrant of left breast of female, estrogen receptor positive (H)      Atrophic 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 hemorrhage     Metaplastic gastritis     Impingement syndrome, shoulder, left     Past Surgical History:   Procedure Laterality Date     ADENOIDECTOMY       BIOPSY BREAST       BRONCHOSCOPY (RIGID OR FLEXIBLE), DIAGNOSTIC N/A 2020    Procedure: DIAGNOSTIC BRONCHOSCOPY WITH BIOPSY,.;  Surgeon: Jered Montoya MD;  Location: [facility] OR     ENDOSCOPIC INSERTION TUBE GASTROSTOMY Left 2020    Procedure: Endoscopic insertion tube gastrostomy;  Surgeon: Jaswinder Parker MD;  Location: [facility] OR     LUMPECTOMY BREAST      RT lunmpectomy  with radiation. Lt lumpectomy  with radiation     LUMPECTOMY BREAST Right     + radiation     LUMPECTOMY BREAST Left 2015     TONSILLECTOMY       TONSILLECTOMY       US THYROID BIOPSY  2020       Social History     Tobacco Use     Smoking status: Never Smoker     Smokeless tobacco: Never Used   Substance Use Topics     Alcohol use: Never     Family History   Problem Relation Age of Onset     Breast Cancer Paternal Aunt         2 aunts who had breast cancer in their 70's     Breast Cancer Paternal Aunt 70.00     Cerebrovascular Disease Mother          75     Prostate Cancer Father 70.00     Osteoporosis Father      Breast Cancer Paternal Aunt 70.00     Breast Cancer Paternal Grandmother 30.00     Ovarian Cancer Cousin 60.00     Stomach Cancer Maternal Aunt 90.00     Prostate Cancer Maternal Uncle 70.00     Prostate Cancer Maternal Uncle 70.00     Prostate Cancer Cousin 60.00     Lung Cancer Cousin      Kidney Cancer Cousin 60.00     No Known Problems Daughter      No Known Problems Daughter          Current Outpatient Medications   Medication Sig Dispense Refill     Calcium Carbonate-Vit D-Min (CALTRATE 600+D PLUS MINERALS PO) Caltrate + D3 Plus Minerals       loratadine (CLARITIN) 10 MG tablet Take 1 tablet (10 mg) by mouth daily 30 tablet 3     Multiple Vitamins-Minerals (MULTIVITAMIN ADULTS 50+ PO) Multivitamin 50 Plus  "tablet   Take by oral route.       No Known Allergies      FHS-7:   Breast CA Risk Assessment (FHS-7) 10/7/2021   Did any of your first-degree relatives have breast or ovarian cancer? Yes   Did any of your relatives have bilateral breast cancer? No   Did any man in your family have breast cancer? No   Did any woman in your family have breast and ovarian cancer? Yes   Did any woman in your family have breast cancer before age 50 y? No   Do you have 2 or more relatives with breast and/or ovarian cancer? Yes   Do you have 2 or more relatives with breast and/or bowel cancer? Yes       Mammogram Screening: Recommended annual mammography  Pertinent mammograms are reviewed under the imaging tab.    Review of Systems  Review of Systems  Complete ROS reviewed in HPI or otherwise negative      OBJECTIVE:   /81 (BP Location: Left arm, Patient Position: Sitting, Cuff Size: Adult Regular)   Pulse 92   Ht 1.676 m (5' 6\")   Wt 81.1 kg (178 lb 12.8 oz)   SpO2 96%   BMI 28.86 kg/m   Estimated body mass index is 28.86 kg/m  as calculated from the following:    Height as of this encounter: 1.676 m (5' 6\").    Weight as of this encounter: 81.1 kg (178 lb 12.8 oz).   Wt Readings from Last 3 Encounters:   01/04/22 81.1 kg (178 lb 12.8 oz)   06/21/21 81.5 kg (179 lb 10.8 oz)   11/17/20 76.7 kg (169 lb)         Physical Exam  GENERAL: healthy, alert and no distress  EYES: Eyes grossly normal to inspection, PERRL and conjunctivae and sclerae normal  HENT: ear canals and TM's normal, nose and mouth without ulcers or lesions  NECK: no adenopathy, no asymmetry, masses, or scars and thyroid normal to palpation  RESP: lungs clear to auscultation - no rales, rhonchi or wheezes  BREAST: normal without masses, tenderness or nipple discharge and no palpable axillary masses or adenopathy  CV: regular rate and rhythm, normal S1 S2, no S3 or S4, no murmur, click or rub, no peripheral edema and peripheral pulses strong  ABDOMEN: soft, " nontender, no hepatosplenomegaly, no masses and bowel sounds normal   (female): normal female external genitalia, normal urethral meatus, vaginal mucosa pink, moist, well rugated, and normal cervix/adnexa/uterus without masses or discharge  MS: no gross musculoskeletal defects noted, no edema  SKIN: no suspicious lesions or rashes  NEURO: Normal strength and tone, mentation intact and speech normal  PSYCH: mentation appears normal, affect normal/bright  LYMPH: no cervical, supraclavicular, axillary, or inguinal adenopathy  Musculoskeletal exam full range of motion of shoulders in flexion internal rotation external rotation abduction and extension.  Some pain with internal range of motion.  5 out of 5 muscle strength testing of rotator cuff.  Mild weakness on the left with Jobes testing indicating impingement  Diagnostic Test Results:  Labs reviewed in Epic  Results for orders placed or performed in visit on 01/04/22   Comprehensive metabolic panel (BMP + Alb, Alk Phos, ALT, AST, Total. Bili, TP)     Status: Abnormal   Result Value Ref Range    Sodium 140 136 - 145 mmol/L    Potassium 4.9 3.5 - 5.0 mmol/L    Chloride 104 98 - 107 mmol/L    Carbon Dioxide (CO2) 24 22 - 31 mmol/L    Anion Gap 12 5 - 18 mmol/L    Urea Nitrogen 12 8 - 22 mg/dL    Creatinine 0.86 0.60 - 1.10 mg/dL    Calcium 10.3 8.5 - 10.5 mg/dL    Glucose 91 70 - 125 mg/dL    Alkaline Phosphatase 125 (H) 45 - 120 U/L    AST 25 0 - 40 U/L    ALT 16 0 - 45 U/L    Protein Total 7.8 6.0 - 8.0 g/dL    Albumin 4.2 3.5 - 5.0 g/dL    Bilirubin Total 1.0 0.0 - 1.0 mg/dL    GFR Estimate 73 >60 mL/min/1.73m2   Lipid panel reflex to direct LDL Fasting     Status: Abnormal   Result Value Ref Range    Cholesterol 208 (H) <=199 mg/dL    Triglycerides 93 <=149 mg/dL    Direct Measure HDL 60 >=50 mg/dL    LDL Cholesterol Calculated 129 <=129 mg/dL    Patient Fasting > 8hrs? Yes    CBC with platelets and differential     Status: None   Result Value Ref Range    WBC  Count 6.6 4.0 - 11.0 10e3/uL    RBC Count 4.39 3.80 - 5.20 10e6/uL    Hemoglobin 13.4 11.7 - 15.7 g/dL    Hematocrit 41.1 35.0 - 47.0 %    MCV 94 78 - 100 fL    MCH 30.5 26.5 - 33.0 pg    MCHC 32.6 31.5 - 36.5 g/dL    RDW 13.7 10.0 - 15.0 %    Platelet Count 292 150 - 450 10e3/uL    % Neutrophils 65 %    % Lymphocytes 25 %    % Monocytes 8 %    % Eosinophils 1 %    % Basophils 1 %    % Immature Granulocytes 0 %    Absolute Neutrophils 4.3 1.6 - 8.3 10e3/uL    Absolute Lymphocytes 1.6 0.8 - 5.3 10e3/uL    Absolute Monocytes 0.6 0.0 - 1.3 10e3/uL    Absolute Eosinophils 0.1 0.0 - 0.7 10e3/uL    Absolute Basophils 0.0 0.0 - 0.2 10e3/uL    Absolute Immature Granulocytes 0.0 <=0.4 10e3/uL   CBC with platelets and differential     Status: None    Narrative    The following orders were created for panel order CBC with platelets and differential.  Procedure                               Abnormality         Status                     ---------                               -----------         ------                     CBC with platelets and d...[[id number]]                      Final result                 Please view results for these tests on the individual orders.       ASSESSMENT / PLAN:     Assessment & Plan       Encounter for annual wellness exam in Medicare patient    -  Primary    Relevant Orders    Comprehensive metabolic panel (BMP + Alb, Alk Phos, ALT, AST, Total. 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  Following with Dr. Sprague and has upcoming appointment. Labs o labs ordered so available prior to appointment         Malignant neoplasm of lower-outer quadrant of left breast of  "female, estrogen receptor positive (H)     Patient continues with annual mammography which has been normal.  She is not on hormonal therapy.  We discussed genetic testing which she could consider since she has had breast cancer in both breasts and she has daughters and granddaughters that she is concerned about.  Referral placed         Relevant Orders    Cancer Risk Mgmt/Cancer Genetic Counseling Referral    Atrophic 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 hemorrhage    Metaplastic gastritis     Sent message to patient reminding her that she was supposed to go back for mapping and to follow-up with GI.  If she needs a referral she will let me know         Impingement syndrome, shoulder, left     Exercises given to the patient.  If worsens we can consider imaging, steroid injection and formal PT.           Other Visit Diagnoses           Ordering of each unique test  25 minutes spent on the date of the encounter doing chart review, history and exam, documentation and further activities per the note        BMI:   Estimated body mass index is 28.86 kg/m  as calculated from the following:    Height as of this encounter: 1.676 m (5' 6\").    Weight as of this encounter: 81.1 kg (178 lb 12.8 oz).   Weight management plan: Discussed healthy diet and exercise guidelines    Regular exercise    Return in about 1 year (around 1/4/2023) for Routine preventive.    Phyllis Juarez,  [employer]      Patient has been advised of split billing requirements and indicates understanding: Yes  COUNSELING:  Reviewed preventive health counseling, as reflected in patient instructions       Regular exercise       Healthy diet/nutrition       Osteoporosis prevention/bone health    Estimated body mass index is 28.86 kg/m  as calculated from the following:    Height as of this encounter: 1.676 m (5' 6\").    Weight as of this encounter: 81.1 kg (178 lb 12.8 oz).    Weight management plan: Discussed healthy diet and exercise " guidelines    She reports that she has never smoked. She has never used smokeless tobacco.      Appropriate preventive services were discussed with this patient, including applicable screening as appropriate for cardiovascular disease, diabetes, osteopenia/osteoporosis, and glaucoma.  As appropriate for age/gender, discussed screening for colorectal cancer, prostate cancer, breast cancer, and cervical cancer. Checklist reviewing preventive services available has been given to the patient.    Reviewed patients plan of care and provided an AVS. The Basic Care Plan (routine screening as documented in Health Maintenance) for Chuyita meets the Care Plan requirement. This Care Plan has been established and reviewed with the Patient.    Counseling Resources:  ATP IV Guidelines  Pooled Cohorts Equation Calculator  Breast Cancer Risk Calculator  Breast Cancer: Medication to Reduce Risk  FRAX Risk Assessment  ICSI Preventive Guidelines  Dietary Guidelines for Americans, 2010  USDA's MyPlate  ASA Prophylaxis  Lung CA Screening    DO christian Conn  [facility] [place]    Identified Health Risks:

## 2022-01-05 PROBLEM — K29.40: Status: ACTIVE | Noted: 2020-12-10

## 2022-01-05 PROBLEM — J98.59 MEDIASTINAL MASS: Status: RESOLVED | Noted: 2020-02-17 | Resolved: 2022-01-05

## 2022-01-05 PROBLEM — M75.42 IMPINGEMENT SYNDROME, SHOULDER, LEFT: Status: ACTIVE | Noted: 2022-01-05

## 2022-01-05 PROBLEM — C83.30 DLBCL (DIFFUSE LARGE B CELL LYMPHOMA) (H): Status: RESOLVED | Noted: 2020-01-30 | Resolved: 2022-01-05

## 2022-01-05 PROBLEM — E07.9 THYROID MASS: Status: RESOLVED | Noted: 2020-02-14 | Resolved: 2022-01-05

## 2022-01-05 NOTE — ASSESSMENT & PLAN NOTE
Exercises given to the patient.  If worsens we can consider imaging, steroid injection and formal PT.

## 2022-01-05 NOTE — ASSESSMENT & PLAN NOTE
Sent message to patient reminding her that she was supposed to go back for mapping and to follow-up with GI.  If she needs a referral she will let me know

## 2022-01-05 NOTE — ASSESSMENT & PLAN NOTE
Patient continues with annual mammography which has been normal.  She is not on hormonal therapy.  We discussed genetic testing which she could consider since she has had breast cancer in both breasts and she has daughters and granddaughters that she is concerned about.  Referral placed

## 2022-01-05 NOTE — ASSESSMENT & PLAN NOTE
In remission.  Following with Dr. Sprague and has upcoming appointment. Labs o labs ordered so available prior to appointment

## 2022-01-14 ENCOUNTER — ANCILLARY PROCEDURE (OUTPATIENT)
Dept: BONE DENSITY | Facility: CLINIC | Age: 72
End: 2022-01-14
Attending: FAMILY MEDICINE
Payer: COMMERCIAL

## 2022-01-14 DIAGNOSIS — Z78.0 POSTMENOPAUSAL STATUS: ICD-10-CM

## 2022-01-14 PROCEDURE — 77080 DXA BONE DENSITY AXIAL: CPT | Mod: TC | Performed by: RADIOLOGY

## 2022-02-16 ENCOUNTER — ONCOLOGY VISIT (OUTPATIENT)
Dept: TRANSPLANT | Facility: CLINIC | Age: 72
End: 2022-02-16
Payer: COMMERCIAL

## 2022-02-16 ENCOUNTER — APPOINTMENT (OUTPATIENT)
Dept: LAB | Facility: CLINIC | Age: 72
End: 2022-02-16
Payer: COMMERCIAL

## 2022-02-16 VITALS
TEMPERATURE: 98.3 F | RESPIRATION RATE: 18 BRPM | OXYGEN SATURATION: 97 % | SYSTOLIC BLOOD PRESSURE: 155 MMHG | DIASTOLIC BLOOD PRESSURE: 93 MMHG | BODY MASS INDEX: 28.97 KG/M2 | HEART RATE: 91 BPM | WEIGHT: 179.5 LBS

## 2022-02-16 DIAGNOSIS — C83.32 DIFFUSE LARGE B-CELL LYMPHOMA OF INTRATHORACIC LYMPH NODES (H): Primary | ICD-10-CM

## 2022-02-16 LAB
ALBUMIN SERPL-MCNC: 3.8 G/DL (ref 3.4–5)
ALP SERPL-CCNC: 123 U/L (ref 40–150)
ALT SERPL W P-5'-P-CCNC: 21 U/L (ref 0–50)
ANION GAP SERPL CALCULATED.3IONS-SCNC: 9 MMOL/L (ref 3–14)
AST SERPL W P-5'-P-CCNC: 25 U/L (ref 0–45)
BASOPHILS # BLD AUTO: 0.1 10E3/UL (ref 0–0.2)
BASOPHILS NFR BLD AUTO: 1 %
BILIRUB SERPL-MCNC: 0.6 MG/DL (ref 0.2–1.3)
BUN SERPL-MCNC: 12 MG/DL (ref 7–30)
CALCIUM SERPL-MCNC: 9.8 MG/DL (ref 8.5–10.1)
CHLORIDE BLD-SCNC: 104 MMOL/L (ref 94–109)
CO2 SERPL-SCNC: 26 MMOL/L (ref 20–32)
CREAT SERPL-MCNC: 0.81 MG/DL (ref 0.52–1.04)
EOSINOPHIL # BLD AUTO: 0.2 10E3/UL (ref 0–0.7)
EOSINOPHIL NFR BLD AUTO: 2 %
ERYTHROCYTE [DISTWIDTH] IN BLOOD BY AUTOMATED COUNT: 14.2 % (ref 10–15)
GFR SERPL CREATININE-BSD FRML MDRD: 78 ML/MIN/1.73M2
GLUCOSE BLD-MCNC: 89 MG/DL (ref 70–99)
HCT VFR BLD AUTO: 39.8 % (ref 35–47)
HGB BLD-MCNC: 13.2 G/DL (ref 11.7–15.7)
IMM GRANULOCYTES # BLD: 0 10E3/UL
IMM GRANULOCYTES NFR BLD: 0 %
LYMPHOCYTES # BLD AUTO: 2.7 10E3/UL (ref 0.8–5.3)
LYMPHOCYTES NFR BLD AUTO: 32 %
MCH RBC QN AUTO: 30.7 PG (ref 26.5–33)
MCHC RBC AUTO-ENTMCNC: 33.2 G/DL (ref 31.5–36.5)
MCV RBC AUTO: 93 FL (ref 78–100)
MONOCYTES # BLD AUTO: 0.7 10E3/UL (ref 0–1.3)
MONOCYTES NFR BLD AUTO: 8 %
NEUTROPHILS # BLD AUTO: 4.8 10E3/UL (ref 1.6–8.3)
NEUTROPHILS NFR BLD AUTO: 57 %
NRBC # BLD AUTO: 0 10E3/UL
NRBC BLD AUTO-RTO: 0 /100
PLATELET # BLD AUTO: 258 10E3/UL (ref 150–450)
POTASSIUM BLD-SCNC: 3.8 MMOL/L (ref 3.4–5.3)
PROT SERPL-MCNC: 8.1 G/DL (ref 6.8–8.8)
RBC # BLD AUTO: 4.3 10E6/UL (ref 3.8–5.2)
SODIUM SERPL-SCNC: 139 MMOL/L (ref 133–144)
WBC # BLD AUTO: 8.3 10E3/UL (ref 4–11)

## 2022-02-16 PROCEDURE — 99214 OFFICE O/P EST MOD 30 MIN: CPT | Mod: GC

## 2022-02-16 PROCEDURE — 82040 ASSAY OF SERUM ALBUMIN: CPT

## 2022-02-16 PROCEDURE — 80053 COMPREHEN METABOLIC PANEL: CPT

## 2022-02-16 PROCEDURE — 85014 HEMATOCRIT: CPT

## 2022-02-16 PROCEDURE — 36415 COLL VENOUS BLD VENIPUNCTURE: CPT

## 2022-02-16 PROCEDURE — G0463 HOSPITAL OUTPT CLINIC VISIT: HCPCS

## 2022-02-16 NOTE — LETTER
2/16/2022         RE: Chuyita Porter  [address]  [facility] [address]        Dear Colleague,    Thank you for referring your patient, Chuyita Porter, to the [facility] [place]. Please see a copy of my visit note below.    [facility] CLINIC VISIT    Oncology HPI:    Chuyita Porter is a 71 year old female with past medical history of breast cancer (right breast cancer in 2005 and left breast cancer in 2015 followed by Dr. Gallardo) with diffuse large B cell lymphoma.    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 She continues to feel great and does endorse some left arm pain which she associates with crocheting. No fevers, night sweats, weight loss or fatigue; energy is great. She is eating normally, weight is stable.      HPI:   She presented with respiratory failure due to large mediastinal mass.    Was intubated and in OR on 2/17/2020 underwent for open neck biopsy, tracheal stent, and PEG tube placement.  S/p two fractions of radiation on 2/18 and 2/19 and then started R-CHOP on 2/21.  Complications: neutropenic fever due to HCAP   debility and severe malnutrition.Completed 6 cycles of R-CHOP in June 2020.     Current Outpatient Medications   Medication     loratadine (CLARITIN) 10 MG tablet     acetaminophen (TYLENOL) 325 MG tablet     fluticasone (FLONASE) 50 MCG/ACT nasal spray     LORazepam (ATIVAN) 0.5 MG tablet     ondansetron (ZOFRAN) 4 MG tablet     prochlorperazine (COMPAZINE) 10 MG tablet     No current facility-administered medications for this visit.      No Known Allergies     Exam: alert, appears well. Talking effortlessly, in good spirits. No use of accessory muscles. No visible lesions,  petechiae, epistaxis. No scleral icterus. Pupils equal and round.         Labs:   Lab Results   Component Value Date    WBC 8.3 02/16/2022    ANEU 4.0 01/27/2021    HGB 13.2 02/16/2022    HCT 39.8 02/16/2022     02/16/2022     02/16/2022    POTASSIUM 3.8 02/16/2022    CHLORIDE 104 02/16/2022    CO2 26 02/16/2022    GLC 89 02/16/2022    BUN 12 02/16/2022    CR 0.81 02/16/2022    MAG 2.0 11/17/2020    INR 1.21 (H) 02/19/2020    AST 25 02/16/2022    ALT 21 02/16/2022          Impression/plan:   DLBCL with mediastinal mass compressing the SVC with associated collateral vessels, no disease in the abdomen/pelvis, marrow not done.   S/p 6 cycles of R-CHOP and few doses of XRT  NH after 2 cycles  EOT PET with complete metabolic remission. 7/20    Now in ongoing remission - last scan in jan 2021.   Now 1 year post-treatment - clinically in CR.      Pulm: was noted to have a RLL infiltrate on PET/CT from April, now resolved on PET.    ID no active infection.    Overall, Chuyita is still doing well. RTC in 6 months with CBC, CMP. No longer requires surveillance CT. She knows to reach us if she develops new problem.     Patient was seen and plan of care was discussed with attending physician Dr. Herminia Sprague.     Josefa Kumar MD   Heme/Onc/Transplant Fellow      Attestation signed by Rosy Sprague MD at 2/21/2022 11:33 AM:  Today, I  saw and examined the patient independently in clinic and discussed the recommendations. I reviewed the case with the fellow and agree with the note as above.     Pt is CR of her DLBCL - it is nice to see her doing great !  We will cont clinical monitoring of her disease,            Again, thank you for allowing me to participate in the care of your patient.      Sincerely,    Rosy Sprague MD

## 2022-02-16 NOTE — PROGRESS NOTES
[facility] CLINIC VISIT    Oncology HPI:    Chuyita Porter is a 71 year old female with past medical history of breast cancer (right breast cancer in 2005 and left breast cancer in 2015 followed by Dr. Gallardo) with diffuse large B cell lymphoma.    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 She continues to feel great and does endorse some left arm pain which she associates with crocheting. No fevers, night sweats, weight loss or fatigue; energy is great. She is eating normally, weight is stable.      HPI:   She presented with respiratory failure due to large mediastinal mass.    Was intubated and in OR on 2/17/2020 underwent for open neck biopsy, tracheal stent, and PEG tube placement.  S/p two fractions of radiation on 2/18 and 2/19 and then started R-CHOP on 2/21.  Complications: neutropenic fever due to HCAP   debility and severe malnutrition.Completed 6 cycles of R-CHOP in June 2020.     Current Outpatient Medications   Medication     loratadine (CLARITIN) 10 MG tablet     acetaminophen (TYLENOL) 325 MG tablet     fluticasone (FLONASE) 50 MCG/ACT nasal spray     LORazepam (ATIVAN) 0.5 MG tablet     ondansetron (ZOFRAN) 4 MG tablet     prochlorperazine (COMPAZINE) 10 MG tablet     No current facility-administered medications for this visit.      No Known Allergies     Exam: alert, appears well. Talking effortlessly, in good spirits. No use of accessory muscles. No visible lesions, petechiae, epistaxis. No scleral icterus. Pupils equal and round.         Labs:   Lab Results   Component Value Date    WBC 8.3 02/16/2022    ANEU 4.0 01/27/2021    HGB 13.2 02/16/2022    HCT 39.8 02/16/2022     02/16/2022     02/16/2022    POTASSIUM 3.8 02/16/2022     CHLORIDE 104 02/16/2022    CO2 26 02/16/2022    GLC 89 02/16/2022    BUN 12 02/16/2022    CR 0.81 02/16/2022    MAG 2.0 11/17/2020    INR 1.21 (H) 02/19/2020    AST 25 02/16/2022    ALT 21 02/16/2022          Impression/plan:   DLBCL with mediastinal mass compressing the SVC with associated collateral vessels, no disease in the abdomen/pelvis, marrow not done.   S/p 6 cycles of R-CHOP and few doses of XRT  CT after 2 cycles  EOT PET with complete metabolic remission. 7/20    Now in ongoing remission - last scan in jan 2021.   Now 1 year post-treatment - clinically in CR.      Pulm: was noted to have a RLL infiltrate on PET/CT from April, now resolved on PET.    ID no active infection.    Overall, Chuyita is still doing well. RTC in 6 months with CBC, CMP. No longer requires surveillance CT. She knows to reach us if she develops new problem.     Patient was seen and plan of care was discussed with attending physician Dr. Herminia Sprague.     Josefa Kumar MD   Heme/Onc/Transplant Fellow

## 2022-02-16 NOTE — NURSING NOTE
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    Weight as of this encounter: 81.4 kg (179 lb 8 oz). Body surface area is 1.95 meters squared.  Data Unavailable Comment: Data Unavailable   No LMP recorded. Patient is postmenopausal.  Allergies reviewed: Yes  Medications reviewed: Yes    Medications: Medication refills not needed today.  Pharmacy name entered into [employer]: [id number] [place] [address]    Clinical concerns: Patient reports left arm muscle pain and left thumb pain with activity/use.        Mae Underwood LPN February 16, 2022 4:10 PM                "

## 2022-03-23 ENCOUNTER — VIRTUAL VISIT (OUTPATIENT)
Dept: ONCOLOGY | Facility: CLINIC | Age: 72
End: 2022-03-23
Attending: FAMILY MEDICINE
Payer: COMMERCIAL

## 2022-03-23 DIAGNOSIS — Z80.0 FAMILY HISTORY- STOMACH CANCER: ICD-10-CM

## 2022-03-23 DIAGNOSIS — C50.512 MALIGNANT NEOPLASM OF LOWER-OUTER QUADRANT OF LEFT BREAST OF FEMALE, ESTROGEN RECEPTOR POSITIVE (H): ICD-10-CM

## 2022-03-23 DIAGNOSIS — Z17.0 MALIGNANT NEOPLASM OF LOWER-OUTER QUADRANT OF LEFT BREAST OF FEMALE, ESTROGEN RECEPTOR POSITIVE (H): ICD-10-CM

## 2022-03-23 DIAGNOSIS — Z17.0 MALIGNANT NEOPLASM OF RIGHT BREAST IN FEMALE, ESTROGEN RECEPTOR POSITIVE, UNSPECIFIED SITE OF BREAST (H): ICD-10-CM

## 2022-03-23 DIAGNOSIS — Z80.0 FAMILY HISTORY OF CANCER OF EXTRAHEPATIC BILE DUCTS: ICD-10-CM

## 2022-03-23 DIAGNOSIS — C50.912 MALIGNANT NEOPLASM OF BOTH BREASTS (H): Primary | ICD-10-CM

## 2022-03-23 DIAGNOSIS — C50.911 MALIGNANT NEOPLASM OF BOTH BREASTS (H): Primary | ICD-10-CM

## 2022-03-23 DIAGNOSIS — C50.912 MALIGNANT NEOPLASM OF LEFT BREAST IN FEMALE, ESTROGEN RECEPTOR POSITIVE, UNSPECIFIED SITE OF BREAST (H): ICD-10-CM

## 2022-03-23 DIAGNOSIS — Z80.42 FAMILY HISTORY OF PROSTATE CANCER: ICD-10-CM

## 2022-03-23 DIAGNOSIS — Z80.3 FAMILY HISTORY OF MALIGNANT NEOPLASM OF BREAST: ICD-10-CM

## 2022-03-23 DIAGNOSIS — Z80.51 FAMILY HISTORY OF KIDNEY CANCER: ICD-10-CM

## 2022-03-23 DIAGNOSIS — Z17.0 MALIGNANT NEOPLASM OF LEFT BREAST IN FEMALE, ESTROGEN RECEPTOR POSITIVE, UNSPECIFIED SITE OF BREAST (H): ICD-10-CM

## 2022-03-23 DIAGNOSIS — Z80.41 FAMILY HISTORY OF MALIGNANT NEOPLASM OF OVARY: ICD-10-CM

## 2022-03-23 DIAGNOSIS — C50.911 MALIGNANT NEOPLASM OF RIGHT BREAST IN FEMALE, ESTROGEN RECEPTOR POSITIVE, UNSPECIFIED SITE OF BREAST (H): ICD-10-CM

## 2022-03-23 PROCEDURE — 96040 HC GENETIC COUNSELING, EACH 30 MINUTES: CPT | Mod: TEL,95 | Performed by: GENETIC COUNSELOR, MS

## 2022-03-23 NOTE — PROGRESS NOTES
3/23/2022    Telephone Start Time: 8:59am  Telephone End Time: 10:04am    Referring Provider: Phyllis Juarez MD    Present for Today's Visit: Chuyita    Presenting Information:   I met with Chuyita Porter today for genetic counseling (telephone visit due to covid19) to discuss her personal and family history of cancer.  She is here today to review this history, cancer screening recommendations, and available genetic testing options.    Personal History:  Chuyita is a 71 year old female. She was diagnosed with diffuse large B-cell lymphoma of intrathoracic lymph nodes in 2020 at age 69. She underwent 6 cycles R-CHOP and needed no further treatment. She is currently in complete remission.     She also has a history of right breast cancer diagnosed at age 55; treatment included lumpectomy, radiation and 5 years of arimidex. She was subsequently diagnosed with a left breast cancer at age 65; treatment included lumpectomy, radiation, and another course of arimidex.       She had her first menstrual period at age 16, her first child at age 24, and is post-menopausal.  Chuyita has her ovaries, fallopian tubes and uterus in place.  She reports a very short history of oral contraceptive use and that she was never on any hormone replacement therapy.      Her most recent OB-GYN exam and Pap smear in 2018 were normal.  Her most recent mammogram on 10/7/2021 was normal; her breast density has been reported as scattered fibroglandular densities. Her most recent colonoscopy in 2015 was normal and follow-up was recommended in 10 years.  She does not regularly do any other cancer screening at this time.  Chuyita reported no tobacco use, and no alcohol use.    Family History: Cancer family history and pertinent information reviewed below (Please see scanned pedigree for detailed family history information)    Two maternal uncles passed in their 80's from lung cancer diagnosed in their 80's; they both had smoking history.  One of these uncles had a son who was recently diagnosed with bile duct cancer at age 66.    Maternal aunt passed at age 93 from a stomach cancer. She has two sons with kidney cancer history; both diagnosed in their 60's, and a daughter was diagnosed with ovarian cancer and passed in her 60's.     Father passed at age 84 with a history of prostate cancer diagnosed in his 70's.     Two paternal aunts passed in their 70's from breast cancers diagnosed in their 70's. One of these aunt's had two sons who both passed in their 70's from lung cancer; both with smoking history.    Paternal grandmother passed in her 30's from breast cancer.     There is no known Ashkenazi Mandaeism ancestry on either side of her family. There is no reported consanguinity.    Discussion:    Chuyita's personal and family history of breast cancer and family history of prostate and ovarian is suggestive of a hereditary cancer syndrome.    We reviewed the features of sporadic, familial, and hereditary cancers. In looking at Chuyita's family history, it is possible that a cancer susceptibility gene is present due to her history of bilateral breast cancer, her father's prostate cancer history, her two paternal aunts' breast cancer histories, and her paternal grandmother's early-onset breast cancer history as well as her maternal-first-cousin's ovarian cancer history.    We discussed the natural history and genetics of hereditary cancers. A detailed handout regarding the information we discussed will be sent to Chuyita via [employer] [place]. Topics included: inheritance pattern, cancer risks, cancer screening recommendations, and also risks, benefits and limitations of testing.  We reviewed that the most common cause of hereditary breast cancer is Hereditary Breast and Ovarian Cancer (HBOC) syndrome, which is caused by mutations in the genes BRCA1 and BRCA2. Women with a mutation in either of these genes are at increased risk for breast and ovarian cancer.  There is also an increased risk for a second primary breast cancer for women. Men with a mutation in either BRCA1 or BRCA2 are at increased risk for male breast and prostate cancer. Both women and men may also be at increased risk for pancreatic cancer and melanoma.     Based on her personal and family history, Chuyita meets current National Comprehensive Cancer Network (NCCN) criteria for genetic testing of high-penetrance breast cancer susceptibility genes (including BRCA1, BRCA2, CDH1, PALB2, PTEN, and TP53).      We discussed that there are additional genes that could cause increased risk for breast and breast and related cancers. As many of these genes present with overlapping features in a family and accurate cancer risk cannot always be established based upon the pedigree analysis alone, it would be reasonable for Chuyita to consider panel genetic testing to analyze multiple genes at once.    We discussed genetic testing options for Chuyita given her personal and family history including targeted panels of genes associated with increased risk for breast cancers ([facility] Breast and Gyn Cancers panel; 21 genes), genes associated with breast, gynecologic, gastrointestinal, and renal cancers given her personal and additional family history ([facility] Common Hereditary Cancers panel + Renal/Urinary Cancers Panel) as well as expanded options including genes associated with common and rare hereditary cancers ([facility] Multi-Cancer panel; 84 genes). After our discussion, Chuyita shared that she would like to focus on the genes that are relevant to her personal and family history of cancer. She opted to proceed with testing via the Common Hereditary Cancers +  Renal/Urinary Tract Cancers panel through [facility].  Genetic testing is available for 56 genes associated with cancers of the breast, ovary, uterus, prostate, gastrointestinal, and renal/urinary tract systems: [facility] Common Hereditary Cancers panel +  Renal/Urinary Tract Cancers panel (APC, MOHIT, AXIN2, BAP1, BARD1, BMPR1A, BRCA1, BRCA2, BRIP1, CDC73, CDH1, CDK4, CDKN1C, CDKN2A, CHEK2, CTNNA1, DICER1, DIS3L2, EPCAM, FH, FCLN, GPC3, GREM1, HOXB13, KIT, MEN1, MET, MLH1, MSH2, MSH3, MSH6, MUTYH, NBN, NF1, NTHL1, PALB2, PDGFRA, PMS2, POLD1, POLE, PTEN,RAD50, RAD51C, RAD51D, REST, SDHA, SDHB, SDHC, SDHD, SMAD4, SMARCA4, STK11, TP53,TSC1, TSC2, VHL).    We discussed that many of these genes are associated with specific hereditary cancer syndromes and published management guidelines. Additional genes are associated with increased cancer risk and have published management guidelines for certain cancers. The remaining genes are associated with increased cancer risk and may allow us to make medical recommendations when mutations are identified.     Consent was obtained over the phone with no witness required due to the current covid19 global pandemic.    Medical Management: For Chuyita, we reviewed that the information from genetic testing may determine:    additional cancer screening for which Chuyita may qualify (i.e. mammogram and breast MRI, more frequent colonoscopies, more frequent dermatologic exams, etc.),    options for risk reducing surgeries Chuyita could consider (i.e. bilateral mastectomy, surgery to remove her ovaries and/or uterus, etc.),      and targeted chemotherapies for Chuyita if she were to develop certain cancers in the future (i.e. immunotherapy for individuals with Oleary syndrome, PARP inhibitors, etc.).     These recommendations and possible targeted chemotherapies will be discussed in detail once genetic testing is completed.     Chuyita will schedule a lab appointment at the [facility] at her earliest convenience.     Plan:  1) Today Chuyita elected to proceed with Common Hereditary Cancers panel + Renal/Urinary Tract Cancers panel genetic testing (56 genes) through [facility].  2) This information should be available in approximately 3-4  weeks.  3) Chuyita will be contacted by our scheduling department to set up a result disclosure appointment.     Laurie Calle MS, [facility]  Licensed, Certified Genetic Counselor  [facility]  [phone]  [email or web address]

## 2022-03-23 NOTE — LETTER
3/23/2022         RE: Chuyita Porter  [address] [place] [address]        Dear Colleague,    Thank you for referring your patient, Chuyita Porter, to the [facility]. Please see a copy of my visit note below.    3/23/2022    Telephone Start Time: 8:59am  Telephone End Time: 10:04am    Referring Provider: Phyllis Juarez MD    Present for Today's Visit: Chuyita    Presenting Information:   I met with Chuyita Porter today for genetic counseling (telephone visit due to covid19) to discuss her personal and family history of cancer.  She is here today to review this history, cancer screening recommendations, and available genetic testing options.    Personal History:  Chuyita is a 71 year old female. She was diagnosed with diffuse large B-cell lymphoma of intrathoracic lymph nodes in 2020 at age 69. She underwent 6 cycles R-CHOP and needed no further treatment. She is currently in complete remission.     She also has a history of right breast cancer diagnosed at age 55; treatment included lumpectomy, radiation and 5 years of arimidex. She was subsequently diagnosed with a left breast cancer at age 65; treatment included lumpectomy, radiation, and another course of arimidex.       She had her first menstrual period at age 16, her first child at age 24, and is post-menopausal.  Chuyita has her ovaries, fallopian tubes and uterus in place.  She reports a very short history of oral contraceptive use and that she was never on any hormone replacement therapy.      Her most recent OB-GYN exam and Pap smear in 2018 were normal.  Her most recent mammogram on 10/7/2021 was normal; her breast density has been reported as scattered fibroglandular densities. Her most recent colonoscopy in 2015 was normal and follow-up was recommended in 10 years.  She does not regularly do any other cancer screening at this time.  Chuyita reported no tobacco use, and no alcohol use.    Family  History: Cancer family history and pertinent information reviewed below (Please see scanned pedigree for detailed family history information)    Two maternal uncles passed in their 80's from lung cancer diagnosed in their 80's; they both had smoking history. One of these uncles had a son who was recently diagnosed with bile duct cancer at age 66.    Maternal aunt passed at age 93 from a stomach cancer. She has two sons with kidney cancer history; both diagnosed in their 60's, and a daughter was diagnosed with ovarian cancer and passed in her 60's.     Father passed at age 84 with a history of prostate cancer diagnosed in his 70's.     Two paternal aunts passed in their 70's from breast cancers diagnosed in their 70's. One of these aunt's had two sons who both passed in their 70's from lung cancer; both with smoking history.    Paternal grandmother passed in her 30's from breast cancer.     There is no known Ashkenazi Religious ancestry on either side of her family. There is no reported consanguinity.    Discussion:    Chuyita's personal and family history of breast cancer and family history of prostate and ovarian is suggestive of a hereditary cancer syndrome.    We reviewed the features of sporadic, familial, and hereditary cancers. In looking at Chuyita's family history, it is possible that a cancer susceptibility gene is present due to her history of bilateral breast cancer, her father's prostate cancer history, her two paternal aunts' breast cancer histories, and her paternal grandmother's early-onset breast cancer history as well as her maternal-first-cousin's ovarian cancer history.    We discussed the natural history and genetics of hereditary cancers. A detailed handout regarding the information we discussed will be sent to Chuyita via [employer]. Topics included: inheritance pattern, cancer risks, cancer screening recommendations, and also risks, benefits and limitations of testing.  We reviewed that the most  common cause of hereditary breast cancer is Hereditary Breast and Ovarian Cancer (HBOC) syndrome, which is caused by mutations in the genes BRCA1 and BRCA2. Women with a mutation in either of these genes are at increased risk for breast and ovarian cancer. There is also an increased risk for a second primary breast cancer for women. Men with a mutation in either BRCA1 or BRCA2 are at increased risk for male breast and prostate cancer. Both women and men may also be at increased risk for pancreatic cancer and melanoma.     Based on her personal and family history, Chuyita meets current National Comprehensive Cancer Network (NCCN) criteria for genetic testing of high-penetrance breast cancer susceptibility genes (including BRCA1, BRCA2, CDH1, PALB2, PTEN, and TP53).      We discussed that there are additional genes that could cause increased risk for breast and breast and related cancers. As many of these genes present with overlapping features in a family and accurate cancer risk cannot always be established based upon the pedigree analysis alone, it would be reasonable for Chuyita to consider panel genetic testing to analyze multiple genes at once.    We discussed genetic testing options for Chuyita given her personal and family history including targeted panels of genes associated with increased risk for breast cancers ([facility] Breast and Gyn Cancers panel; 21 genes), genes associated with breast, gynecologic, gastrointestinal, and renal cancers given her personal and additional family history ([facility] Common Hereditary Cancers panel + Renal/Urinary Cancers Panel) as well as expanded options including genes associated with common and rare hereditary cancers ([facility] Multi-Cancer panel; 84 genes). After our discussion, Chuyita shared that she would like to focus on the genes that are relevant to her personal and family history of cancer. She opted to proceed with testing via the Common Hereditary Cancers +   Renal/Urinary Tract Cancers panel through [facility].  Genetic testing is available for 56 genes associated with cancers of the breast, ovary, uterus, prostate, gastrointestinal, and renal/urinary tract systems: [facility] Common Hereditary Cancers panel + Renal/Urinary Tract Cancers panel (APC, MOHIT, AXIN2, BAP1, BARD1, BMPR1A, BRCA1, BRCA2, BRIP1, CDC73, CDH1, CDK4, CDKN1C, CDKN2A, CHEK2, CTNNA1, DICER1, DIS3L2, EPCAM, FH, FCLN, GPC3, GREM1, HOXB13, KIT, MEN1, MET, MLH1, MSH2, MSH3, MSH6, MUTYH, NBN, NF1, NTHL1, PALB2, PDGFRA, PMS2, POLD1, POLE, PTEN,RAD50, RAD51C, RAD51D, REST, SDHA, SDHB, SDHC, SDHD, SMAD4, SMARCA4, STK11, TP53,TSC1, TSC2, VHL).    We discussed that many of these genes are associated with specific hereditary cancer syndromes and published management guidelines. Additional genes are associated with increased cancer risk and have published management guidelines for certain cancers. The remaining genes are associated with increased cancer risk and may allow us to make medical recommendations when mutations are identified.     Consent was obtained over the phone with no witness required due to the current covid19 global pandemic.    Medical Management: For Chuyita, we reviewed that the information from genetic testing may determine:    additional cancer screening for which Chuyita may qualify (i.e. mammogram and breast MRI, more frequent colonoscopies, more frequent dermatologic exams, etc.),    options for risk reducing surgeries Chuyita could consider (i.e. bilateral mastectomy, surgery to remove her ovaries and/or uterus, etc.),      and targeted chemotherapies for Chuyita if she were to develop certain cancers in the future (i.e. immunotherapy for individuals with Oleary syndrome, PARP inhibitors, etc.).     These recommendations and possible targeted chemotherapies will be discussed in detail once genetic testing is completed.     Chuyita will schedule a lab appointment at the [facility] at  her earliest convenience.     Plan:  1) Today Chuyita elected to proceed with Common Hereditary Cancers panel + Renal/Urinary Tract Cancers panel genetic testing (56 genes) through [facility].  2) This information should be available in approximately 3-4 weeks.  3) Chuyita will be contacted by our scheduling department to set up a result disclosure appointment.         Again, thank you for allowing me to participate in the care of your patient.      Sincerely,    Laurie Cooper, GC

## 2022-03-23 NOTE — PATIENT INSTRUCTIONS
Assessing Cancer Risk  Only about 5-10% of cancers are thought to be due to an inherited cancer susceptibility gene.    These families often have:    Several people with the same or related types of cancer    Cancers diagnosed at a young age (before age 50)    Individuals with more than one primary cancer    Multiple generations of the family affected with cancer    Some people may be candidates for genetic testing of more than one gene.  For these families, genetic testing using a multi-gene cancer panel may be offered.  These panels may test genes known to increase the risk for breast (and other) cancers: MOHIT, BRCA1, BRCA2, CDH1, CHEK2, PALB2, PTEN, and TP53.  The purpose of this handout is to serve as a brief summary of the high/moderate-risk breast cancer genes which have published clinical management guidelines for individuals who are found to carry a mutation.    Hereditary Breast and Ovarian Cancer Syndrome (HBOC)  A single mutation in one of the copies of BRCA1 or BRCA2 increases the risk for breast and ovarian cancer, among others.  The risk for pancreatic cancer and melanoma may also be slightly increased in some families.  The tables below list the chance that someone with a BRCA mutation would develop cancer in his or her lifetime1,2,3,4.          Women   Men     General Population BRCA+    General Population BRCA+   Breast  12% 60-85%  Breast <1% ~7%   Ovarian  1-2% Up to 58%  Prostate 16% 20%     A person s ethnic background is also important to consider, as individuals of Ashkenazi Temple ancestry have a higher chance of having a BRCA gene mutation.  There are three BRCA mutations that occur more frequently in this population.    Li-Fraumeni Syndrome (LFS)  LFS is a cancer predisposition syndrome. Individuals with LFS are at an increased risk for developing cancer at a young age. The general lifetime risk for development of cancer is 50% by age 30 and 90% by age 60.  LFS is caused by a mutation in  the TP53 gene.  A single mutation in one of the copies of TP53 increases the risk for multiple cancers.     Core Cancers: Sarcomas, Breast, Brain, Lung, Leukemias/Lymphomas, Adrenocortical carcinomas  Other Cancers: Gastrointestinal, Thyroid, Skin, Genitourinary    Cowden Syndrome  Cowden syndrome is a hereditary condition that increases the risk for breast, thyroid, endometrial, and kidney cancer.  Cowden syndrome is caused by a mutation in the PTEN gene.  A single mutation in one of the copies of PTEN causes Cowden syndrome and increases cancer risk.  The table below shows the chance that someone with a PTEN mutation would develop cancer in their lifetime5,6.  Other benign features seen in some individuals with Cowden syndrome include benign skin lesions (facial papules, keratoses, lipomas), learning disability, autism, thyroid nodules, colon polyps, and larger head size.      Lifetime Cancer Risk   Cancer Type General Population Cowden Syndrome   Breast  12% 25-50%*   Thyroid  1% 35%   Renal 1-2% 35%   Endometrial  2-3% 28%   Colon 5% 9%   Melanoma 2-3% >5%     *One recent study found breast cancer risk to be increased to 85%    Hereditary Diffuse Gastric Cancer (HDGC)  Currently, one gene is known to cause hereditary diffuse gastric cancer: CDH1.  Individuals with HDGC are at increased risk for diffuse gastric cancer and lobular breast cancer. Of people diagnosed with HDGC, 30-50% have a mutation in the CDH1 gene.  This suggests there are likely other genes that may cause HDGC that have not been identified yet.      Lifetime Cancer Risks    General Pop HDGC    Diffuse Gastric  <1% ~80%   Breast 12% 39-52%     Additional Genes Associated with Increased Breast Cancer Risk  PALB2  Mutations in PALB2 have been shown to increase the risk of breast cancer up to 33-58% in some families; where individuals fall within this risk range is dependent upon family history.9 PALB2 mutations have also been associated with  increased risk for pancreatic cancer, although this risk has not been quantified yet.  Individuals who inherit two PALB2 mutations--one from their mother and one from their father--have a condition called Fanconi Anemia.  This rare autosomal recessive condition is associated with short stature, developmental delay, bone marrow failure, and increased risk for childhood cancers.    MOHIT  MOHIT is a moderate-risk breast cancer gene. Women who have a mutation in MOHIT can have between a 2-4 fold increased risk for breast cancer compared to the general population.10  MOHIT mutations have also been associated with increased risk for pancreatic cancer, however an estimate of this cancer risk is not well understood.11  Individuals who inherit two MOHIT mutations have a condition called ataxia-telangiectasia (AT).  This rare autosomal recessive condition affects the nervous system and immune system, and is associated with progressive cerebellar ataxia beginning in childhood.  Individuals with ataxia-telangiectasia often have a weakened immune system and have an increased risk for childhood cancers.           CHEK2   CHEK2 is a moderate-risk breast cancer gene.  Women who have a mutation in CHEK2 have around a 2-fold increased risk for breast cancer compared to the general population, and this risk may be higher depending upon family history.12,13,14  Mutations in CHEK2 have also been shown to increase the risk of a number of other cancers, including colon and prostate, however these cancer risks are currently not well understood.         Inheritance   All of the genes reviewed above are inherited in an autosomal dominant pattern.  This means that if a parent has a mutation, each of his or her children will have a 50% chance of inheriting that same mutation.  Therefore, each child--male or female--would have a 50% chance of being at increased risk for developing cancer.    Image obtained from [employer], 2013      Mutations in some genes can occur de franci, which means that a person s mutation occurred for the first time in them and was not inherited from a parent.  Now that they have the mutation, however, it can be passed on to future generations.    Genetic Testing  Genetic testing involves a blood test and will look for any harmful mutations within genes that are associated with increased cancer risk.  If possible, it is recommended that the person(s) who has had cancer be tested before other family members.  That person will give us the most useful information about whether or not a specific gene is associated with the cancer in the family.    Results  There are three possible results of genetic testing:    Positive--a harmful mutation was identified in one or more of the genes    Negative--no mutation was identified in any of the genes on this panel    Variant of unknown significance--a variation in one of the genes was identified, but it is unclear how this impacts cancer risk in the family    Advantages and Disadvantages  There are advantages and disadvantages to genetic testing.  Advantages    May clarify your cancer risk    Can help you make medical decisions    May explain the cancers in your family    May give useful information to your family members (if you share your results)    Disadvantages    Possible negative emotional impact of learning about inherited cancer risk    Uncertainty in interpreting a negative test result in some situations    Possible genetic discrimination concerns (see below)    Genetic Information Nondiscrimination Act (SOTO)  SOTO is a federal law that protects individuals from health insurance or employment discrimination based on a genetic test result alone.  Although rare, there are currently no legal discrimination protections in terms of life insurance, long term care, or disability insurances.  Visit the National Human Genome Research [place] genome.gov/80985148 to learn  more.    Reducing Cancer Risk  Each of the genes listed within this handout have nationally recognized cancer screening guidelines that would be recommended for individuals who test positive.  In addition to increased cancer screening, surgeries may be offered or recommended to reduce cancer risk.  Recommendations are based upon an individual s genetic test result as well as their personal and family history of cancer.    Questions to Think About Regarding Genetic Testing    What effect will the test result have on me and my relationship with my family members if I have an inherited gene mutation?  If I don t have a gene mutation?    Should I share my test results, and how will my family react to this news, which may also affect them?    Are my children ready to learn new information that may one day affect their own health?    Resources  FORCE: Facing Our Risk of Cancer Empowered facingourrisk.org   Bright Pink bebrightpink.org   Li-Fraumeni Syndrome Association lfsassociation.org   PTEN World PTENworld.com   No stomach for cancer, Inc. nostomachforcancer.org   Stomach cancer relief network scrnet.org   Collaborative Group of the Americas on Inherited Colorectal Cancer (CGA) cgaicc.com    Cancer Care cancercare.org   American Cancer Society (ACS) cancer.org   National Cancer [place] (NCI) cancer.gov     Please call us if you have any questions or concerns.   Cancer Risk Management Program [phone] ([phone])  ? Anastacio Chela, MS [facility] [phone]  ? Sara Soriano, MS, [facility] [phone]  ? Nadeen Joyce, MS, [facility]  [phone]  ? Marce Garcia, MS, [facility]  [phone]  ? Saadia Darden, MS, [facility]  [phone]  ? Anyi Goldsmith, MS, [facility] [phone]  ? Laurie Calle, MS, [facility] [phone]    -References  1. A-Santhohs Saldivar PDP, Champ S, Bret PORTILLO, Oliver JE, Henry JL, Ruslan N, Liv H, Shashi O, Maury A, Umberto B, Sierra P, Pee S, Ramón DM, Bertram N, Lori E, Danielle H, Hermilo TRINH, Renan DOBBS,  Vanda J, Estella B, Fay H, Thorlacius S, Eerola H, Hayden H, Jamel K, Marquis OP. Average risks of breast and ovarian cancer associated with BRCA1 or BRCA2 mutations detected in case series unselected for family history: a combined analysis of 222 studies. Am J Hum Shanelle. 2003;72:1117-30.  2. Jason N, Nunu M, Nga G.  BRCA1 and BRCA2 Hereditary Breast and Ovarian Cancer. Gene Reviews online. 2013.  3. Jake YC, Lizzeth S, Evy G, Heredia S. Breast cancer risk among male BRCA1 and BRCA2 mutation carriers. J Natl Cancer Inst. 2007;99:1811-4.  4. Malvin MARTÍNEZ, Iglesia I, Keron J, Ceasar E, Dano ER, Lars F. Risk of breast cancer in male BRCA2 carriers. J Med Shanelle. 2010;47:710-1.  5. José Miguel MH, Christiana J, Lashell J, Sally REED, Alicja KANG, Eng C. Lifetime cancer risks in individuals with germline PTEN mutations. Clin Cancer Res. 2012;18:400-7.  6. Pilarski R. Cowden Syndrome: A Critical Review of the Clinical Literature. J Shanelle . 2009:18:13-27.  7. National Comprehensive Cancer Network. Clinical practice guidelines in oncology, colorectal cancer screening. Available online (registration required). 2013.  8. National Cancer [place]. SEER Cancer Stat Fact Sheets.  December 2013.  9. Nicholas DAVIS, et al. Breast-Cancer Risk in Families with Mutations in PALB2. NEJM. 2014; 371(6):497-506.  10. Jayde LOPEZ, Justin D, Jennifer S, Padmini P, Sandipi T, Damien M, José B, Rosalba H, Ally R, Roxana K, Gab L, Malvin MARTÍNEZ, Ramón D, Bradford DF, Debbi MR, The Breast Cancer Susceptibility Collaboration ([place]) & Palmira NUNEZ. MOHIT mutations that cause ataxia-telangiectasia are breast cancer susceptibility alleles. Nature Genetics. 2006;38:873-875  11. Andrew N , Pedro Y, Katharine J, Suyapa L, Joseluis GM , Liudmila ML, Cinthia S, Xavier AG, Deandre S, Juan ML, Jimmy J , Teresa R, Mauricio SZ, Trenton JR, Hiram VE, Robyn M, Alize B, Francoise N, Beverly RH, Migel KW, and Kamila KANG. MOHIT  mutations in patients with hereditary pancreatic cancer. Cancer Discover. 2012;2:41-46  12. CHEK2 Breast Cancer Case-Control Consortium. CHEK2*1100delC and susceptibility to breast cancer: A collaborative analysis involving 10,860 breast cancer cases and 9,065 controls from 10 studies. Am J Hum Shanelle, 74 (2004), pp. 5728-3530  13. Herberth T, Yumi S, Edd K, et al. Spectrum of Mutations in BRCA1, BRCA2, CHEK2, and TP53 in Families at High Risk of Breast Cancer. WILD. 2006;295(12):4921-2018.   14. Fernando C, Mary D, Paula A, et al. Risk of breast cancer in women with a CHEK2 mutation with and without a family history of breast cancer. J Clin Oncol. 2011;29:1349-5945

## 2022-03-28 ENCOUNTER — LAB (OUTPATIENT)
Dept: LAB | Facility: CLINIC | Age: 72
End: 2022-03-28
Payer: COMMERCIAL

## 2022-03-28 DIAGNOSIS — Z80.42 FAMILY HISTORY OF PROSTATE CANCER: ICD-10-CM

## 2022-03-28 DIAGNOSIS — C50.912 MALIGNANT NEOPLASM OF BOTH BREASTS (H): ICD-10-CM

## 2022-03-28 DIAGNOSIS — Z80.3 FAMILY HISTORY OF MALIGNANT NEOPLASM OF BREAST: ICD-10-CM

## 2022-03-28 DIAGNOSIS — C50.911 MALIGNANT NEOPLASM OF BOTH BREASTS (H): ICD-10-CM

## 2022-03-28 DIAGNOSIS — Z80.0 FAMILY HISTORY OF CANCER OF EXTRAHEPATIC BILE DUCTS: ICD-10-CM

## 2022-03-28 DIAGNOSIS — Z80.41 FAMILY HISTORY OF MALIGNANT NEOPLASM OF OVARY: ICD-10-CM

## 2022-03-28 DIAGNOSIS — Z80.0 FAMILY HISTORY- STOMACH CANCER: ICD-10-CM

## 2022-03-28 DIAGNOSIS — Z80.51 FAMILY HISTORY OF KIDNEY CANCER: ICD-10-CM

## 2022-03-28 PROCEDURE — 36415 COLL VENOUS BLD VENIPUNCTURE: CPT

## 2022-03-28 PROCEDURE — 99000 SPECIMEN HANDLING OFFICE-LAB: CPT

## 2022-04-07 LAB — SCANNED LAB RESULT: NORMAL

## 2022-04-12 NOTE — PROGRESS NOTES
"4/12/2022    Referring Provider: Phyllis Juarez MD    Presenting Information:  I spoke to Chuyita by phone today to discuss her genetic testing results. Her blood was drawn on 03/28/2022. Common Hereditary Cancers panel + Renal/Urinary Tract Cancers panel genetic testing was ordered through [facility]. This testing was done because of Chuyita's personal and family history of breast cancer and her family history of ovarian, kidney, prostate, and bile duct cancer.    Genetic Testing Result: NEGATIVE  Chuyita is negative for mutations in the APC, MOHIT, AXIN2, BAP1, BARD1, BMPR1A, BRCA1, BRCA2, BRIP1, CDC73, CDH1, CDK4, CDKN1C, CDKN2A, CHEK2, CTNNA1, DICER1, DIS3L2, EPCAM, FH, FCLN, GPC3, GREM1, HOXB13, KIT, MEN1, MET, MLH1, MSH2, MSH3, MSH6, MUTYH, NBN, NF1, NTHL1, PALB2, PDGFRA, PMS2, POLD1, POLE, PTEN,RAD50, RAD51C, RAD51D, REST, SDHA, SDHB, SDHC, SDHD, SMAD4, SMARCA4, STK11, TP53,TSC1, TSC2, andVHL genes. No mutations were found in any of the 58 genes analyzed. Please see copy of test report for testing methodology/limitations.     A copy of the test report can be found in the Laboratory tab, dated 3/28/2022, and named \"LABORATORY MISCELLANEOUS ORDER\". The report is scanned in as a linked document.    Interpretation:  We discussed several different interpretations of this negative test result.    1. One explanation may be that there is a different gene or combination of genes and environment that are associated with the cancers in this family.  2. It is possible that her other relatives with cancer history did have a mutation in one of the above genes,  and she did not inherit it.  3. There is also a small possibility that there is a mutation in one of these genes, and the testing laboratory could not find it with their current testing methods.       Screening:  Based on this negative test result, it is important for Chuyita and her relatives to refer back to the family history for appropriate cancer screening. "      Chuyita should continue to follow her oncology team's recommendations for the follow-up for her cancer history.     Chuyita s close female relatives remain at increased risk for breast cancer given their family history. Breast cancer screening is generally recommended to begin approximately 10 years younger than the earliest age of breast cancer diagnosis in the family, or at age 40, whichever comes first. Breast screening options should be discussed with an individual's primary care provider and a genetic counselor, to determine at what age to begin screening, what screening is appropriate, and if additional screening (such as breast MRI) is necessary based on personal/family history factors.    Other population cancer screening options, such as those recommended by the American Cancer Society and the National Comprehensive Cancer Network (NCCN), are also appropriate for Chuyita and her family. These screening recommendations may change if there are changes to Chuyita's personal and/or family history. Final screening recommendations should be made by each individual's managing physician.       Inheritance:  We reviewed the autosomal dominant inheritance of mutations in these 58 genes. We discussed that Chuyita cannot/did not pass on an identifiable mutation in these genes to her children based on this test result.  Mutations in these genes do not skip generations.      Additional Testing Considerations:  Although Chuyita's genetic testing result was negative, other relatives may still carry a gene mutation associated with their personal or family history of cancer. Genetic counseling is recommended for her paternal and paternal relatives to discuss genetic testing options. If any of these relatives do pursue genetic testing, Chuyita is encouraged to contact me so that we may review the impact of their test results on her.    Summary:  We do not have an explanation for Chuyita's personal and family  history of cancer. While no genetic changes were identified, Chuyita may still be at risk for certain cancers due to family history, environmental factors, or other genetic causes not identified by this test.  Because of that, it is important that she continue with cancer screening based on her personal and family history as discussed above.    Genetic testing is rapidly advancing, and new cancer susceptibility genes will most likely be identified in the future.  Therefore, I encouraged Chuyita to contact me annually or if there are changes in her personal or family history.  This may change how we assess her cancer risk, screening, and the testing we would offer.    Plan:  1. A copy of the test results will be mailed to Chuyita.  2. She plans to follow-up with her primary care providers for routine care.  3. She should contact me regularly, or sooner if her family history changes.    If Chuyita has any further questions, I encouraged her to contact me at [phone].    Time spent on the phone: 10 minutes.    Laurie Calle MS, CGC  Licensed, Certified Genetic Counselor  [facility]

## 2022-04-13 ENCOUNTER — VIRTUAL VISIT (OUTPATIENT)
Dept: ONCOLOGY | Facility: CLINIC | Age: 72
End: 2022-04-13
Attending: GENETIC COUNSELOR, MS
Payer: COMMERCIAL

## 2022-04-13 DIAGNOSIS — Z80.42 FAMILY HISTORY OF PROSTATE CANCER: ICD-10-CM

## 2022-04-13 DIAGNOSIS — Z80.41 FAMILY HISTORY OF MALIGNANT NEOPLASM OF OVARY: ICD-10-CM

## 2022-04-13 DIAGNOSIS — Z17.0 MALIGNANT NEOPLASM OF RIGHT BREAST IN FEMALE, ESTROGEN RECEPTOR POSITIVE, UNSPECIFIED SITE OF BREAST (H): ICD-10-CM

## 2022-04-13 DIAGNOSIS — Z80.0 FAMILY HISTORY- STOMACH CANCER: ICD-10-CM

## 2022-04-13 DIAGNOSIS — Z80.3 FAMILY HISTORY OF MALIGNANT NEOPLASM OF BREAST: ICD-10-CM

## 2022-04-13 DIAGNOSIS — Z17.0 MALIGNANT NEOPLASM OF LOWER-OUTER QUADRANT OF LEFT BREAST OF FEMALE, ESTROGEN RECEPTOR POSITIVE (H): ICD-10-CM

## 2022-04-13 DIAGNOSIS — C50.911 MALIGNANT NEOPLASM OF RIGHT BREAST IN FEMALE, ESTROGEN RECEPTOR POSITIVE, UNSPECIFIED SITE OF BREAST (H): ICD-10-CM

## 2022-04-13 DIAGNOSIS — Z80.51 FAMILY HISTORY OF KIDNEY CANCER: ICD-10-CM

## 2022-04-13 DIAGNOSIS — C50.512 MALIGNANT NEOPLASM OF LOWER-OUTER QUADRANT OF LEFT BREAST OF FEMALE, ESTROGEN RECEPTOR POSITIVE (H): ICD-10-CM

## 2022-04-13 DIAGNOSIS — C83.30 DIFFUSE LARGE B-CELL LYMPHOMA, UNSPECIFIED BODY REGION (H): Primary | ICD-10-CM

## 2022-04-13 PROCEDURE — 999N000069 HC STATISTIC GENETIC COUNSELING, < 16 MIN: Mod: TEL,95 | Performed by: GENETIC COUNSELOR, MS

## 2022-04-13 NOTE — PATIENT INSTRUCTIONS
Negative Genetic Test Result    Genetic Testing  You had a blood test that looked at the genetic information in one or more genes associated with increased cancer risk.  The testing looked for any harmful changes that would stop this particular gene from working like it should. If an individual does not have any harmful changes or variants of unknown significance found from their blood test, their genetic test result is reported as negative.       Results  The genetic test did not identify any pathogenic (harmful) changes in the genes that were tested. There are several possible explanations for a negative test result. Without knowing the gene mutation in your family, the cause of the cancer in you or your relatives is still unknown. Your genetic counselor can help interpret the result for you and your relatives. In this case, there are several reasons that may explain the negative test result:  There may be a gene mutation in the family that you did not inherit.   You may have a gene mutation in a different gene that was not included in the test, or has not yet been discovered.   The cancers in you or your family may be due to a combination of genetic factors and environment (multifactorial/familial).  The cancers in you or your family may be sporadic/random cancers.  There is very small chance that a mutation was not found by current testing methods.  As testing technology evolves over time, it may still be possible to identify a mutation in a gene that was not found on this test.    It is important to note which genes were included in your test. A list of these genes can be found on your test result.    Screening Recommendations  Due to this negative test result, cancer screening recommendations should be based on your personal and family history. This may include increased cancer screening for you and/or your family members. Your genetic counselor and health care provider can help make appropriate  recommendations.      Please call us if you have any questions or concerns.   Cancer Risk Management Program [phone]-CANCER ([phone])  Anastacio York, MS [facility] [phone]  Sara Soriano, MS, [facility] [phone]  Nadeen Joyce, MS, [facility]  [phone]  Marce Garcia, MS, [facility]  [phone]  Saadia Darden, MS, [facility]  [phone]  Anyi Goldsmith, MS, [facility] [phone]  Laurie Calle, MS, [facility] [phone]

## 2022-04-13 NOTE — LETTER
"    4/13/2022         RE: Chuyita Porter  [address]  [facility] [address]        Dear Colleague,    Thank you for referring your patient, Chuyita Porter, to the [facility]. Please see a copy of my visit note below.    4/12/2022    Referring Provider: Phyllis Juarez MD    Presenting Information:  I spoke to Chuyita by phone today to discuss her genetic testing results. Her blood was drawn on 03/28/2022. Common Hereditary Cancers panel + Renal/Urinary Tract Cancers panel genetic testing was ordered through [facility]. This testing was done because of Chuyita's personal and family history of breast cancer and her family history of ovarian, kidney, prostate, and bile duct cancer.    Genetic Testing Result: NEGATIVE  Chuyita is negative for mutations in the APC, MOHIT, AXIN2, BAP1, BARD1, BMPR1A, BRCA1, BRCA2, BRIP1, CDC73, CDH1, CDK4, CDKN1C, CDKN2A, CHEK2, CTNNA1, DICER1, DIS3L2, EPCAM, FH, FCLN, GPC3, GREM1, HOXB13, KIT, MEN1, MET, MLH1, MSH2, MSH3, MSH6, MUTYH, NBN, NF1, NTHL1, PALB2, PDGFRA, PMS2, POLD1, POLE, PTEN,RAD50, RAD51C, RAD51D, REST, SDHA, SDHB, SDHC, SDHD, SMAD4, SMARCA4, STK11, TP53,TSC1, TSC2, andVHL genes. No mutations were found in any of the 58 genes analyzed. Please see copy of test report for testing methodology/limitations.     A copy of the test report can be found in the Laboratory tab, dated 3/28/2022, and named \"LABORATORY MISCELLANEOUS ORDER\". The report is scanned in as a linked document.    Interpretation:  We discussed several different interpretations of this negative test result.    1. One explanation may be that there is a different gene or combination of genes and environment that are associated with the cancers in this family.  2. It is possible that her other relatives with cancer history did have a mutation in one of the above genes,  and she did not inherit it.  3. There is also a small possibility that there is a mutation in one of " these genes, and the testing laboratory could not find it with their current testing methods.       Screening:  Based on this negative test result, it is important for Chuyita and her relatives to refer back to the family history for appropriate cancer screening.      Chuyita should continue to follow her oncology team's recommendations for the follow-up for her cancer history.     Chuyita s close female relatives remain at increased risk for breast cancer given their family history. Breast cancer screening is generally recommended to begin approximately 10 years younger than the earliest age of breast cancer diagnosis in the family, or at age 40, whichever comes first. Breast screening options should be discussed with an individual's primary care provider and a genetic counselor, to determine at what age to begin screening, what screening is appropriate, and if additional screening (such as breast MRI) is necessary based on personal/family history factors.    Other population cancer screening options, such as those recommended by the American Cancer Society and the National Comprehensive Cancer Network (NCCN), are also appropriate for Chuyita and her family. These screening recommendations may change if there are changes to Chuyita's personal and/or family history. Final screening recommendations should be made by each individual's managing physician.       Inheritance:  We reviewed the autosomal dominant inheritance of mutations in these 58 genes. We discussed that Chuyita cannot/did not pass on an identifiable mutation in these genes to her children based on this test result.  Mutations in these genes do not skip generations.      Additional Testing Considerations:  Although Chuyita's genetic testing result was negative, other relatives may still carry a gene mutation associated with their personal or family history of cancer. Genetic counseling is recommended for her paternal and paternal relatives to  discuss genetic testing options. If any of these relatives do pursue genetic testing, Chuyita is encouraged to contact me so that we may review the impact of their test results on her.    Summary:  We do not have an explanation for Chuyita's personal and family history of cancer. While no genetic changes were identified, Chuyita may still be at risk for certain cancers due to family history, environmental factors, or other genetic causes not identified by this test.  Because of that, it is important that she continue with cancer screening based on her personal and family history as discussed above.    Genetic testing is rapidly advancing, and new cancer susceptibility genes will most likely be identified in the future.  Therefore, I encouraged Chuyita to contact me annually or if there are changes in her personal or family history.  This may change how we assess her cancer risk, screening, and the testing we would offer.    Plan:  1. A copy of the test results will be mailed to Chuyita.  2. She plans to follow-up with her primary care providers for routine care.  3. She should contact me regularly, or sooner if her family history changes.    If Chuyita has any further questions, I encouraged her to contact me at [phone].    Time spent on the phone: 10 minutes.      Chuyita is a 71 year old who is being evaluated via a billable telephone visit.      Pt is in [place]    What phone number would you like to be contacted at?[phone]     How would you like to obtain your AVS? Mail a copy    Tita Montgomery'          Again, thank you for allowing me to participate in the care of your patient.      Sincerely,    Laurie Cooper, GC

## 2022-04-13 NOTE — PROGRESS NOTES
Chuyita is a 71 year old who is being evaluated via a billable telephone visit.      Pt is in [place]    What phone number would you like to be contacted at?[phone]     How would you like to obtain your AVS? Mail a copy    Tita Montgomery'

## 2022-04-13 NOTE — LETTER
Cancer Risk Management  Program [facility]  [facility]  Mailing Address  Cancer Risk Management Program  [address]  [place] [address]    New patient appointments  [phone]  April 13, 2022    Chuyita Porter  [address]  [place] [address]    Dear Chuyita,    It was a pleasure speaking with you on the phone on 4/13/2022.  Here is a copy of the progress note from our discussion. If you have any additional questions, please feel free to call.    Referring Provider: Phyllis Juarez MD    Presenting Information:  I spoke to Chuyita by phone today to discuss her genetic testing results. Her blood was drawn on 03/28/2022. Common Hereditary Cancers panel + Renal/Urinary Tract Cancers panel genetic testing was ordered through [facility]. This testing was done because of Chuyita's personal and family history of breast cancer and her family history of ovarian, kidney, prostate, and bile duct cancer.    Genetic Testing Result: NEGATIVE  Chuyita is negative for mutations in the APC, MOHIT, AXIN2, BAP1, BARD1, BMPR1A, BRCA1, BRCA2, BRIP1, CDC73, CDH1, CDK4, CDKN1C, CDKN2A, CHEK2, CTNNA1, DICER1, DIS3L2, EPCAM, FH, FCLN, GPC3, GREM1, HOXB13, KIT, MEN1, MET, MLH1, MSH2, MSH3, MSH6, MUTYH, NBN, NF1, NTHL1, PALB2, PDGFRA, PMS2, POLD1, POLE, PTEN,RAD50, RAD51C, RAD51D, REST, SDHA, SDHB, SDHC, SDHD, SMAD4, SMARCA4, STK11, TP53,TSC1, TSC2, andVHL genes. No mutations were found in any of the 58 genes analyzed. Please see copy of test report for testing methodology/limitations.     Interpretation:  We discussed several different interpretations of this negative test result.    1. One explanation may be that there is a different gene or combination of genes and environment that are associated with the  cancers in this family.  2. It is possible that her other relatives with cancer history did have a mutation in one of the above genes,  and she did not inherit it.  3. There is also a small possibility that there is a mutation in one of these genes, and the testing laboratory could not find it with their current testing methods.     Screening:  Based on this negative test result, it is important for Chuyita and her relatives to refer back to the family history for appropriate cancer screening.      Chuyita should continue to follow her oncology team's recommendations for the follow-up for her cancer history.     Chuyita s close female relatives remain at increased risk for breast cancer given their family history. Breast cancer screening is generally recommended to begin approximately 10 years younger than the earliest age of breast cancer diagnosis in the family, or at age 40, whichever comes first. Breast screening options should be discussed with an individual's primary care provider and a genetic counselor, to determine at what age to begin screening, what screening is appropriate, and if additional screening (such as breast MRI) is necessary based on personal/family history factors.    Other population cancer screening options, such as those recommended by the American Cancer Society and the National Comprehensive Cancer Network (NCCN), are also appropriate for Chuyita and her family. These screening recommendations may change if there are changes to Chuyita's personal and/or family history. Final screening recommendations should be made by each individual's managing physician.     Inheritance:  We reviewed the autosomal dominant inheritance of mutations in these 58 genes. We discussed that Chuyita cannot/did not pass on an identifiable mutation in these genes to her children based on this test result.  Mutations in these genes do not skip generations.      Additional Testing Considerations:  Although  Chuyita's genetic testing result was negative, other relatives may still carry a gene mutation associated with their personal or family history of cancer. Genetic counseling is recommended for her paternal and paternal relatives to discuss genetic testing options. If any of these relatives do pursue genetic testing, Chuyita is encouraged to contact me so that we may review the impact of their test results on her.    Summary:  We do not have an explanation for Chuyita's personal and family history of cancer. While no genetic changes were identified, Chuyita may still be at risk for certain cancers due to family history, environmental factors, or other genetic causes not identified by this test.  Because of that, it is important that she continue with cancer screening based on her personal and family history as discussed above.    Genetic testing is rapidly advancing, and new cancer susceptibility genes will most likely be identified in the future.  Therefore, I encouraged Chuyita to contact me annually or if there are changes in her personal or family history.  This may change how we assess her cancer risk, screening, and the testing we would offer.    Plan:  1. A copy of the test results will be mailed to Chuyita.  2. She plans to follow-up with her primary care providers for routine care.  3. She should contact me regularly, or sooner if her family history changes.    If Chuyita has any further questions, I encouraged her to contact me at [phone].    Laurie Calle MS, [facility]  Licensed, Certified Genetic Counselor  [facility]    Negative Genetic Test Result  Genetic Testing  You had a blood test that looked at the genetic information in one or more genes associated with increased cancer risk.  The testing looked for any harmful changes that would stop this particular gene from working like it should. If an individual does not have any harmful changes or variants of unknown significance  found from their blood test, their genetic test result is reported as negative.       Results  The genetic test did not identify any pathogenic (harmful) changes in the genes that were tested. There are several possible explanations for a negative test result. Without knowing the gene mutation in your family, the cause of the cancer in you or your relatives is still unknown. Your genetic counselor can help interpret the result for you and your relatives. In this case, there are several reasons that may explain the negative test result:    There may be a gene mutation in the family that you did not inherit.     You may have a gene mutation in a different gene that was not included in the test, or has not yet been discovered.     The cancers in you or your family may be due to a combination of genetic factors and environment (multifactorial/familial).    The cancers in you or your family may be sporadic/random cancers.    There is very small chance that a mutation was not found by current testing methods.  As testing technology evolves over time, it may still be possible to identify a mutation in a gene that was not found on this test.    It is important to note which genes were included in your test. A list of these genes can be found on your test result.    Screening Recommendations  Due to this negative test result, cancer screening recommendations should be based on your personal and family history. This may include increased cancer screening for you and/or your family members. Your genetic counselor and health care provider can help make appropriate recommendations.      Please call us if you have any questions or concerns.   Cancer Risk Management Program [phone]-CANCER ([phone])  ? Anastacio York, MS [facility] [phone]  ? Sara Soriano, MS, [facility] [phone]  ? Nadeen Joyce, MS, [facility]  [phone]  ? Marce Garcia, MS, [facility]  [phone]  ? Saadia Darden, MS, [facility]  [phone]  ? Anyi Goldsmith, MS,  [facility] [phone]  ? Laurie Calle, MS, [facility] [phone]

## 2022-04-13 NOTE — Clinical Note
Please send copy of letter and copy of scanned report from Laboratory Miscellaneous Order: [id number]    Laurie Calle MS, [facility]  Licensed, Certified Genetic Counselor  [facility]  [phone]  [email or web address]

## 2022-06-23 ENCOUNTER — TELEPHONE (OUTPATIENT)
Dept: ONCOLOGY | Facility: CLINIC | Age: 72
End: 2022-06-23

## 2022-06-23 NOTE — TELEPHONE ENCOUNTER
"1255 Per Dimple Harrington PA-C Pt moved on pt's schedule for tomorrow 6/24/22 however pt does not have active cancer and concern reported chest tightness    Oncology Nurse Triage    Situation:   Chuyita (pt) reporting the following symptoms:  -chest tightness  -cough  -congestion    Background:   Treating Provider:  Dr. Sprague    Date of last office visit: 2/16/22 Dr Sprague    Recent Treatments: Currently does not have active cancer  COVID vaccine total of 4 shots    Assessment:     Onset: Monday 6/20/22  \"has intermittent chest tightness located up at top of chest/below neck where lymphoma was located in 2020\"  \"Has a dry cough\"   \" Feels congested\".  \"some SOB with walking faster\".   Runny Nose, clear.     Pt rates tightness is 2/10, but denies it being any kind of pain.     Denies any known COVID exposure.  Has not taken a home COVID test, Pt does have a home COVID test and willing to do COVID test.   Denies any travel/know exposure to persons with symptoms.    Pt denies hx of family cardiac/stroke.      Denies fevers, chills, night sweats, unexplained weight changes, headaches, dizziness, vision or hearing changes, new lumps or bumps, abdominal pain, nausea, vomiting, changes to bowel or bladder, swelling of extremities, bleeding issues.     Pt stated, thought about following up with PCP however \"it takes forever for them to get in with PCP\"   This writer discussed, still important to set an appt with PCP and get restablished with one as Oncology is specialty and pt to still be followed by PCP for non-oncology related issues pt can still be exposed too.     Recommendations:   Instructed patient to seek care immediately for worsening symptoms, including: fever, chest pain, shortness of breath, dizziness.    Paged provider [phone] Dimple Harrington PA-C    1343 Per Dimple Harrington PA-C, symptoms unlikely related to returning of lymphoma as symptoms seem more likely an infection, recommends pt follow up with PCP as " they are better at dealing with non-cancer related issues, will continue with current oncology follow up every 6 months.     1542 This writer spoke to Chuyita who is in agreement with the plan to seeing oncology team at the regular schedule interval of 6months (August)      1552 This writer spoke to Esperanza in scheduling who will call pt to reschedule original appts.

## 2022-07-27 ENCOUNTER — OFFICE VISIT (OUTPATIENT)
Dept: FAMILY MEDICINE | Facility: CLINIC | Age: 72
End: 2022-07-27
Payer: COMMERCIAL

## 2022-07-27 VITALS
DIASTOLIC BLOOD PRESSURE: 89 MMHG | HEART RATE: 96 BPM | SYSTOLIC BLOOD PRESSURE: 148 MMHG | RESPIRATION RATE: 12 BRPM | OXYGEN SATURATION: 95 %

## 2022-07-27 DIAGNOSIS — L60.0 INGROWN TOENAIL OF RIGHT FOOT: Primary | ICD-10-CM

## 2022-07-27 DIAGNOSIS — B35.1 ONYCHOMYCOSIS: ICD-10-CM

## 2022-07-27 PROCEDURE — 99214 OFFICE O/P EST MOD 30 MIN: CPT | Performed by: PHYSICIAN ASSISTANT

## 2022-07-27 RX ORDER — CEFADROXIL 500 MG/1
500 CAPSULE ORAL 2 TIMES DAILY
Qty: 20 CAPSULE | Refills: 0 | Status: SHIPPED | OUTPATIENT
Start: 2022-07-27 | End: 2022-08-06

## 2022-07-27 NOTE — PATIENT INSTRUCTIONS
Hot packs to the area 3 times per day.  Take the antibiotic as written  Follow-up with your primary care provider or podiatry if you are not getting good resolution or the symptoms recur after 2 weeks

## 2022-07-27 NOTE — PROGRESS NOTES
Patient presents with:  Toe Pain: Swelling redness and pain in R big toe       Clinical Decision Making:  Patient is started on antibiotic and treated for cellulitis.  This may resolve with course of treatment.  If she gets anything less than 100% of resolution and have her follow-up with podiatry for definitive evaluation and treatment of possible ingrown toenail.  At that time treatment for fungal nail infection can be reviewed.  Questions were answered to her satisfaction before discharge.      ICD-10-CM    1. Ingrown toenail of right foot  L60.0 cefadroxil (DURICEF) 500 MG capsule   2. Onychomycosis  B35.1        Patient Instructions   Hot packs to the area 3 times per day.  Take the antibiotic as written  Follow-up with your primary care provider or podiatry if you are not getting good resolution or the symptoms recur after 2 weeks          HPI:  Chuyita Porter is a 72 year old female who presents today for pain to the right big toe.  Patient has had redness and swelling on the big toe.  She did try to cut the toenail back on the corner.  She has not had drainage or other constitutional symptoms include fever chills night sweats or fatigue symptoms have been worsening over the last 2 days    History obtained from chart review and the patient.    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  2020-03: Antineoplastic antibiotics causing adverse effect in   therapeutic use  2020-03: Adverse effect of antineoplastic and immunosuppressive drugs,  initial encounter  2020-03: Adverse effect of antineoplastic and immunosuppressive drugs,  subsequent encounter  2020-02: Diffuse large B-cell lymphoma of intrathoracic lymph nodes   (H)  2020-02: Diffuse large B-cell lymphoma of extranodal site (H)  2020-02: Mediastinal mass  2020-02: Thyroid mass  2020-01: DLBCL (diffuse large B cell lymphoma) (H)  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  2019-01: Polyp of  duodenum  2015-08: Malignant neoplasm of lower-outer quadrant of left breast of   female, estrogen receptor positive (H)      Past Medical History:   Diagnosis Date     Breast cancer (H) 2005     Breast cancer (H) 2005    right     Breast cancer (H) 2015    left     Gastritis     autoimmune atrophic gastritis      Hx of radiation therapy     2005 +2015     Hx of radiation therapy 2005       Social History     Tobacco Use     Smoking status: Never Smoker     Smokeless tobacco: Never Used   Substance Use Topics     Alcohol use: Never       Review of Systems  As above in HPI otherwise negative.    Vitals:    07/27/22 1125   BP: (!) 148/89   BP Location: Right arm   Patient Position: Sitting   Cuff Size: Adult Regular   Pulse: 96   Resp: 12   SpO2: 95%       General: Patient is resting comfortably no acute distress is afebrile  HEENT: Head is normocephalic atraumatic   eyes are PERRL EOMI sclera anicteric   Skin: Without rash non-diaphoretic  Musculoskeletal: Patient has redness and swelling on the right great toe.  There is tenderness to palpation but no drainage.  There is also onychomycosis and other fungal changes to the nail    Physical Exam     At the end of the encounter, I discussed results, diagnosis, medications. Discussed red flags for immediate return to clinic/ER, as well as indications for follow up if no improvement. Patient understood and agreed to plan. Patient was stable for discharge.

## 2022-08-16 DIAGNOSIS — C83.30 DIFFUSE LARGE B-CELL LYMPHOMA, UNSPECIFIED BODY REGION (H): Primary | ICD-10-CM

## 2022-08-17 ENCOUNTER — APPOINTMENT (OUTPATIENT)
Dept: LAB | Facility: CLINIC | Age: 72
End: 2022-08-17
Attending: PHYSICIAN ASSISTANT
Payer: COMMERCIAL

## 2022-08-17 ENCOUNTER — ONCOLOGY VISIT (OUTPATIENT)
Dept: ONCOLOGY | Facility: CLINIC | Age: 72
End: 2022-08-17
Attending: PHYSICIAN ASSISTANT
Payer: COMMERCIAL

## 2022-08-17 VITALS
SYSTOLIC BLOOD PRESSURE: 141 MMHG | BODY MASS INDEX: 29.55 KG/M2 | HEART RATE: 87 BPM | OXYGEN SATURATION: 98 % | RESPIRATION RATE: 16 BRPM | TEMPERATURE: 98.2 F | WEIGHT: 183.1 LBS | DIASTOLIC BLOOD PRESSURE: 84 MMHG

## 2022-08-17 DIAGNOSIS — C83.30 DIFFUSE LARGE B-CELL LYMPHOMA, UNSPECIFIED BODY REGION (H): Primary | ICD-10-CM

## 2022-08-17 LAB
ALBUMIN SERPL-MCNC: 3.8 G/DL (ref 3.4–5)
ALP SERPL-CCNC: 131 U/L (ref 40–150)
ALT SERPL W P-5'-P-CCNC: 21 U/L (ref 0–50)
ANION GAP SERPL CALCULATED.3IONS-SCNC: 5 MMOL/L (ref 3–14)
AST SERPL W P-5'-P-CCNC: 24 U/L (ref 0–45)
BASOPHILS # BLD AUTO: 0.1 10E3/UL (ref 0–0.2)
BASOPHILS NFR BLD AUTO: 1 %
BILIRUB SERPL-MCNC: 0.8 MG/DL (ref 0.2–1.3)
BUN SERPL-MCNC: 13 MG/DL (ref 7–30)
CALCIUM SERPL-MCNC: 9.7 MG/DL (ref 8.5–10.1)
CHLORIDE BLD-SCNC: 107 MMOL/L (ref 94–109)
CO2 SERPL-SCNC: 26 MMOL/L (ref 20–32)
CREAT SERPL-MCNC: 0.83 MG/DL (ref 0.52–1.04)
EOSINOPHIL # BLD AUTO: 0.1 10E3/UL (ref 0–0.7)
EOSINOPHIL NFR BLD AUTO: 1 %
ERYTHROCYTE [DISTWIDTH] IN BLOOD BY AUTOMATED COUNT: 14 % (ref 10–15)
GFR SERPL CREATININE-BSD FRML MDRD: 75 ML/MIN/1.73M2
GLUCOSE BLD-MCNC: 98 MG/DL (ref 70–99)
HCT VFR BLD AUTO: 38.9 % (ref 35–47)
HGB BLD-MCNC: 13.3 G/DL (ref 11.7–15.7)
IMM GRANULOCYTES # BLD: 0.1 10E3/UL
IMM GRANULOCYTES NFR BLD: 1 %
LDH SERPL L TO P-CCNC: 224 U/L (ref 81–234)
LYMPHOCYTES # BLD AUTO: 2 10E3/UL (ref 0.8–5.3)
LYMPHOCYTES NFR BLD AUTO: 24 %
MCH RBC QN AUTO: 30.9 PG (ref 26.5–33)
MCHC RBC AUTO-ENTMCNC: 34.2 G/DL (ref 31.5–36.5)
MCV RBC AUTO: 90 FL (ref 78–100)
MONOCYTES # BLD AUTO: 0.7 10E3/UL (ref 0–1.3)
MONOCYTES NFR BLD AUTO: 9 %
NEUTROPHILS # BLD AUTO: 5.4 10E3/UL (ref 1.6–8.3)
NEUTROPHILS NFR BLD AUTO: 64 %
NRBC # BLD AUTO: 0 10E3/UL
NRBC BLD AUTO-RTO: 0 /100
PLATELET # BLD AUTO: 299 10E3/UL (ref 150–450)
POTASSIUM BLD-SCNC: 3.9 MMOL/L (ref 3.4–5.3)
PROT SERPL-MCNC: 8.2 G/DL (ref 6.8–8.8)
RBC # BLD AUTO: 4.31 10E6/UL (ref 3.8–5.2)
SODIUM SERPL-SCNC: 138 MMOL/L (ref 133–144)
WBC # BLD AUTO: 8.2 10E3/UL (ref 4–11)

## 2022-08-17 PROCEDURE — 80053 COMPREHEN METABOLIC PANEL: CPT | Performed by: PHYSICIAN ASSISTANT

## 2022-08-17 PROCEDURE — 36415 COLL VENOUS BLD VENIPUNCTURE: CPT | Performed by: PHYSICIAN ASSISTANT

## 2022-08-17 PROCEDURE — G0463 HOSPITAL OUTPT CLINIC VISIT: HCPCS

## 2022-08-17 PROCEDURE — 85025 COMPLETE CBC W/AUTO DIFF WBC: CPT | Performed by: PHYSICIAN ASSISTANT

## 2022-08-17 PROCEDURE — 99213 OFFICE O/P EST LOW 20 MIN: CPT | Performed by: PHYSICIAN ASSISTANT

## 2022-08-17 PROCEDURE — 83615 LACTATE (LD) (LDH) ENZYME: CPT | Performed by: PHYSICIAN ASSISTANT

## 2022-08-17 ASSESSMENT — PAIN SCALES - GENERAL: PAINLEVEL: NO PAIN (0)

## 2022-08-17 NOTE — NURSING NOTE
Chief Complaint   Patient presents with     Blood Draw     Labs drawn via  by RN. Vitals taken.     Labs drawn with  by RN. Vitals taken. Patient checked into next appointment.    Radha Lewis RN

## 2022-08-17 NOTE — NURSING NOTE
"Oncology Rooming Note    August 17, 2022 12:52 PM   Chuyita Porter is a 72 year old female who presents for:    Chief Complaint   Patient presents with     Blood Draw     Labs drawn via  by RN. Vitals taken.     Oncology Clinic Visit     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)     Initial Vitals: BP (!) 141/84 (BP Location: Left arm, Patient Position: Sitting, Cuff Size: Adult Regular)   Pulse 87   Temp 98.2  F (36.8  C) (Oral)   Resp 16   Wt 83.1 kg (183 lb 1.6 oz)   SpO2 98%   BMI 29.55 kg/m   Estimated body mass index is 29.55 kg/m  as calculated from the following:    Height as of 1/4/22: 1.676 m (5' 6\").    Weight as of this encounter: 83.1 kg (183 lb 1.6 oz). Body surface area is 1.97 meters squared.  No Pain (0) Comment: Data Unavailable   No LMP recorded. Patient is postmenopausal.  Allergies reviewed: Yes  Medications reviewed: Yes    Medications: Medication refills not needed today.  Pharmacy name entered into [employer]: [id number] [place] [address]    Clinical concerns: None.        Mae Underwood LPN August 17, 2022 12:52 PM              "

## 2022-08-17 NOTE — LETTER
8/17/2022         RE: Chuyita Porter  [address] [place] [address]        Dear Colleague,    Thank you for referring your patient, Chuyita Porter, to the [facility]. Please see a copy of my visit note below.    [facility] CLINIC VISIT  Aug 17, 2022    Oncology HPI:    Chuyita Porter is a 72 year old female with past medical history of breast cancer (right breast cancer in 2005 and left breast cancer in 2015 followed by Dr. Gallardo) with diffuse large B cell lymphoma.    Disease:  DLBCL nonGCB with mediastinal mass and thyroid gland involvement   - 4-6 BCL2 signals (81%)    - No rearrangement of BCL6, MYC, or BCL2   - No IGH-MYC fusion  Bulky disease, Stage IIB, IPI  3 ()  S/p 6 cycles R-CHOP      HPI:   She presented with respiratory failure due to large mediastinal mass.    Was intubated and in OR on 2/17/2020 underwent for open neck biopsy, tracheal stent, and PEG tube placement.  S/p two fractions of radiation on 2/18 and 2/19 and then started R-CHOP on 2/21.  Complications: neutropenic fever due to HCAP   debility and severe malnutrition.Completed 6 cycles of R-CHOP in June 2020.    Continues on active surveillance.     Interim History:   Anneliese is accompanied by her  today.     She is doing great. Continues to cherrie often. Appetite is good. Energy is good. No f/c/ns. No new lumps or bumps. Does have some PND which is worse in the morning and has a cough but this resolves soon after she wakes up. No other difficulties breathing or swallowing. No GI symptoms.      ROS: 10 point ROS neg other than the symptoms noted above in the HPI.     No Known Allergies     Exam:   BP (!) 141/84 (BP Location: Left arm, Patient Position: Sitting, Cuff Size: Adult Regular)   Pulse 87   Temp 98.2  F (36.8  C) (Oral)   Resp 16   Wt 83.1 kg (183 lb 1.6 oz)   SpO2 98%   BMI 29.55 kg/m    General: No acute distress  HEENT: EOMI, PERRLA  Lymph: Neck is supple and  shows no nodes in cervical or supraclavicular, axillary or inguinal  Abdomen: Soft, non tender, no rebound or guarding, no palpable masses  Extremities: No pitting edema in BLE  Skin: No rashes or lesions noted on exposed skin  Neuro: CN II-XII are intact     Labs:   I personally reviewed labs today   Most Recent 3 CBC's:Recent Labs   Lab Test 08/17/22  1157 02/16/22  1532 01/04/22  1204   WBC 8.2 8.3 6.6   HGB 13.3 13.2 13.4   MCV 90 93 94    258 292     Most Recent 3 BMP's:  Recent Labs   Lab Test 08/17/22  1157 02/16/22  1532 01/04/22  1204 08/23/21  1027    139 140 141   POTASSIUM 3.9 3.8 4.9 4.1   CHLORIDE 107 104 104 105   CO2  --  26 24 23   BUN  --  12 12 12   CR  --  0.81 0.86 0.83   ANIONGAP  --  9 12 13   GABI  --  9.8 10.3 9.7   GLC  --  89 91 83     Most Recent 2 LFT's:  Recent Labs   Lab Test 02/16/22  1532 01/04/22  1204   AST 25 25   ALT 21 16   ALKPHOS 123 125*   BILITOTAL 0.6 1.0        Impression/plan:     #DLBCL with mediastinal mass compressing the SVC with associated collateral vessels, no disease in the abdomen/pelvis, marrow not done.   -S/p 6 cycles of R-CHOP (last 06/2020) and few doses of XRT  -DC after 2 cycles  -EOT PET with complete metabolic remission. 7/2020  -Last scan in jan 2021 showed ongoing response/continued CR. No further scans planned, only if symptomatic   -She continues to do well. No s/s of recurrent disease. Will continue with active surveillance  -will follow-up with Dr. Sprague in 6 months, sooner if needed. She knows she can always call     #PND  -currently using claritin. Ok to continue. Could use flonase or the like to help with PND as well      Dimple Harrington PA-C  [facility]  [address] [place] [address] [phone]

## 2022-08-17 NOTE — PROGRESS NOTES
[facility] CLINIC VISIT  Aug 17, 2022    Oncology HPI:    Chuyita Porter is a 72 year old female with past medical history of breast cancer (right breast cancer in 2005 and left breast cancer in 2015 followed by Dr. Gallardo) with diffuse large B cell lymphoma.    Disease:  DLBCL nonGCB with mediastinal mass and thyroid gland involvement   - 4-6 BCL2 signals (81%)    - No rearrangement of BCL6, MYC, or BCL2   - No IGH-MYC fusion  Bulky disease, Stage IIB, IPI  3 ()  S/p 6 cycles R-CHOP      HPI:   She presented with respiratory failure due to large mediastinal mass.    Was intubated and in OR on 2/17/2020 underwent for open neck biopsy, tracheal stent, and PEG tube placement.  S/p two fractions of radiation on 2/18 and 2/19 and then started R-CHOP on 2/21.  Complications: neutropenic fever due to HCAP   debility and severe malnutrition.Completed 6 cycles of R-CHOP in June 2020.    Continues on active surveillance.     Interim History:   Anneliese is accompanied by her  today.     She is doing great. Continues to cherrie often. Appetite is good. Energy is good. No f/c/ns. No new lumps or bumps. Does have some PND which is worse in the morning and has a cough but this resolves soon after she wakes up. No other difficulties breathing or swallowing. No GI symptoms.      ROS: 10 point ROS neg other than the symptoms noted above in the HPI.     No Known Allergies     Exam:   BP (!) 141/84 (BP Location: Left arm, Patient Position: Sitting, Cuff Size: Adult Regular)   Pulse 87   Temp 98.2  F (36.8  C) (Oral)   Resp 16   Wt 83.1 kg (183 lb 1.6 oz)   SpO2 98%   BMI 29.55 kg/m    General: No acute distress  HEENT: EOMI, PERRLA  Lymph: Neck is supple and shows no nodes in cervical or supraclavicular, axillary or inguinal  Abdomen: Soft, non tender, no rebound or guarding, no palpable masses  Extremities: No pitting edema in BLE  Skin: No rashes or lesions noted on exposed skin  Neuro: CN II-XII are  intact     Labs:   I personally reviewed labs today   Most Recent 3 CBC's:Recent Labs   Lab Test 08/17/22  1157 02/16/22  1532 01/04/22  1204   WBC 8.2 8.3 6.6   HGB 13.3 13.2 13.4   MCV 90 93 94    258 292     Most Recent 3 BMP's:  Recent Labs   Lab Test 08/17/22  1157 02/16/22  1532 01/04/22  1204 08/23/21  1027    139 140 141   POTASSIUM 3.9 3.8 4.9 4.1   CHLORIDE 107 104 104 105   CO2  --  26 24 23   BUN  --  12 12 12   CR  --  0.81 0.86 0.83   ANIONGAP  --  9 12 13   GABI  --  9.8 10.3 9.7   GLC  --  89 91 83     Most Recent 2 LFT's:  Recent Labs   Lab Test 02/16/22  1532 01/04/22  1204   AST 25 25   ALT 21 16   ALKPHOS 123 125*   BILITOTAL 0.6 1.0        Impression/plan:     #DLBCL with mediastinal mass compressing the SVC with associated collateral vessels, no disease in the abdomen/pelvis, marrow not done.   -S/p 6 cycles of R-CHOP (last 06/2020) and few doses of XRT  -SC after 2 cycles  -EOT PET with complete metabolic remission. 7/2020  -Last scan in jan 2021 showed ongoing response/continued CR. No further scans planned, only if symptomatic   -She continues to do well. No s/s of recurrent disease. Will continue with active surveillance  -will follow-up with Dr. Sprague in 6 months, sooner if needed. She knows she can always call     #PND  -currently using claritin. Ok to continue. Could use flonase or the like to help with PND as well    Dimple Harrington PA-C  [facility]  [address] [place] [address] [phone]

## 2022-09-18 ENCOUNTER — HEALTH MAINTENANCE LETTER (OUTPATIENT)
Age: 72
End: 2022-09-18

## 2022-10-01 NOTE — PROGRESS NOTES
02/28/20 2300   Vitals   Temp 101.2  F (38.4  C)   Temp src Oral   Provider [phone] paged of this temp, first temp   Yes

## 2022-10-10 ENCOUNTER — ANCILLARY PROCEDURE (OUTPATIENT)
Dept: MAMMOGRAPHY | Facility: HOSPITAL | Age: 72
End: 2022-10-10
Attending: FAMILY MEDICINE
Payer: COMMERCIAL

## 2022-10-10 DIAGNOSIS — Z12.31 VISIT FOR SCREENING MAMMOGRAM: ICD-10-CM

## 2022-10-10 PROCEDURE — 77067 SCR MAMMO BI INCL CAD: CPT

## 2023-01-05 ENCOUNTER — OFFICE VISIT (OUTPATIENT)
Dept: FAMILY MEDICINE | Facility: CLINIC | Age: 73
End: 2023-01-05
Payer: COMMERCIAL

## 2023-01-05 VITALS
TEMPERATURE: 97.4 F | OXYGEN SATURATION: 98 % | WEIGHT: 181 LBS | BODY MASS INDEX: 30.16 KG/M2 | SYSTOLIC BLOOD PRESSURE: 130 MMHG | HEIGHT: 65 IN | HEART RATE: 66 BPM | RESPIRATION RATE: 16 BRPM | DIASTOLIC BLOOD PRESSURE: 80 MMHG

## 2023-01-05 DIAGNOSIS — Z00.00 MEDICARE ANNUAL WELLNESS VISIT, SUBSEQUENT: ICD-10-CM

## 2023-01-05 DIAGNOSIS — C83.32 DIFFUSE LARGE B-CELL LYMPHOMA OF INTRATHORACIC LYMPH NODES (H): ICD-10-CM

## 2023-01-05 DIAGNOSIS — K29.40 METAPLASTIC GASTRITIS: ICD-10-CM

## 2023-01-05 DIAGNOSIS — Z76.89 ENCOUNTER TO ESTABLISH CARE: Primary | ICD-10-CM

## 2023-01-05 DIAGNOSIS — Z13.220 LIPID SCREENING: ICD-10-CM

## 2023-01-05 DIAGNOSIS — K29.40 ATROPHIC GASTRITIS WITHOUT HEMORRHAGE: ICD-10-CM

## 2023-01-05 DIAGNOSIS — Z85.3 HISTORY OF BILATERAL BREAST CANCER: ICD-10-CM

## 2023-01-05 LAB
CHOLEST SERPL-MCNC: 217 MG/DL
ERYTHROCYTE [DISTWIDTH] IN BLOOD BY AUTOMATED COUNT: 13.9 % (ref 10–15)
HCT VFR BLD AUTO: 37.8 % (ref 35–47)
HDLC SERPL-MCNC: 63 MG/DL
HGB BLD-MCNC: 12.7 G/DL (ref 11.7–15.7)
LDLC SERPL CALC-MCNC: 135 MG/DL
MCH RBC QN AUTO: 31.1 PG (ref 26.5–33)
MCHC RBC AUTO-ENTMCNC: 33.6 G/DL (ref 31.5–36.5)
MCV RBC AUTO: 92 FL (ref 78–100)
NONHDLC SERPL-MCNC: 154 MG/DL
PLATELET # BLD AUTO: 261 10E3/UL (ref 150–450)
RBC # BLD AUTO: 4.09 10E6/UL (ref 3.8–5.2)
TRIGL SERPL-MCNC: 95 MG/DL
WBC # BLD AUTO: 7.1 10E3/UL (ref 4–11)

## 2023-01-05 PROCEDURE — 36415 COLL VENOUS BLD VENIPUNCTURE: CPT | Performed by: FAMILY MEDICINE

## 2023-01-05 PROCEDURE — 85027 COMPLETE CBC AUTOMATED: CPT | Performed by: FAMILY MEDICINE

## 2023-01-05 PROCEDURE — G0438 PPPS, INITIAL VISIT: HCPCS | Performed by: FAMILY MEDICINE

## 2023-01-05 PROCEDURE — 80061 LIPID PANEL: CPT | Performed by: FAMILY MEDICINE

## 2023-01-05 ASSESSMENT — ENCOUNTER SYMPTOMS
HEADACHES: 0
PALPITATIONS: 0
SORE THROAT: 0
PARESTHESIAS: 0
CHILLS: 0
ABDOMINAL PAIN: 0
NAUSEA: 0
HEMATURIA: 0
EYE PAIN: 0
MYALGIAS: 0
CONSTIPATION: 0
WEAKNESS: 0
JOINT SWELLING: 0
FREQUENCY: 0
HEMATOCHEZIA: 0
NERVOUS/ANXIOUS: 0
BREAST MASS: 0
SHORTNESS OF BREATH: 0
FEVER: 0
DYSURIA: 0
DIARRHEA: 0
ARTHRALGIAS: 0
COUGH: 0
DIZZINESS: 0

## 2023-01-05 ASSESSMENT — ACTIVITIES OF DAILY LIVING (ADL): CURRENT_FUNCTION: NO ASSISTANCE NEEDED

## 2023-01-05 ASSESSMENT — PAIN SCALES - GENERAL: PAINLEVEL: NO PAIN (0)

## 2023-01-05 NOTE — PROGRESS NOTES
"SUBJECTIVE:   Chuyita is a 72 year old who presents for Preventive Visit.      Are you in the first 12 months of your Medicare coverage?   no    No concerns.  Here to establish care with me.  We reviewed past history of B-cell lymphoma, followed by oncology.  Prior history of 2 separate breast cancers in 2005 and 2015, now without evidence of disease.  Prior history of metaplastic gastritis diagnosed by scope.  Mild morning congestion managed with over-the-counter antihistamine.    She is  to Ranjit, 49 years now.  2 daughters Kayla and Malou, 2 granddaughters.  Enjoys crocheting.    Healthy Habits:     In general, how would you rate your overall health?  Good    Frequency of exercise:  4-5 days/week    Duration of exercise:  15-30 minutes    Do you usually eat at least 4 servings of fruit and vegetables a day, include whole grains    & fiber and avoid regularly eating high fat or \"junk\" foods?  No    Taking medications regularly:  Yes    Medication side effects:  None    Ability to successfully perform activities of daily living:  No assistance needed    Home Safety:  No safety concerns identified    Hearing Impairment:  No hearing concerns    In the past 6 months, have you been bothered by leaking of urine?  No    In general, how would you rate your overall mental or emotional health?  Excellent      PHQ-2 Total Score: 0    Additional concerns today:  No      Have you ever done Advance Care Planning? (For example, a Health Directive, POLST, or a discussion with a medical provider or your loved ones about your wishes): Yes, patient states has an Advance Care Planning document and will bring a copy to the clinic.       Fall risk  Fallen 2 or more times in the past year?: No  Any fall with injury in the past year?: No    Cognitive Screening   1) Repeat 3 items (Leader, Season, Table)    2) Clock draw: NORMAL  3) 3 item recall: Recalls 3 objects  Results: 3 items recalled: COGNITIVE IMPAIRMENT LESS " LIKELY    Mini-CogTM Copyright MARYBETH Wick. Licensed by the author for use in [facility]; reprinted with permission (sodayo@[facility]). All rights reserved.        Reviewed and updated as needed this visit by clinical staff   Tobacco  Allergies  Meds              Reviewed and updated as needed this visit by Provider                 Social History     Tobacco Use     Smoking status: Never     Smokeless tobacco: Never   Substance Use Topics     Alcohol use: Never         Alcohol Use 1/5/2023   Prescreen: >3 drinks/day or >7 drinks/week? No   Prescreen: >3 drinks/day or >7 drinks/week? -     Current providers sharing in care for this patient include:   Patient Care Team:  Zofia Rivas MD as PCP - Bam Oconnor MD MD as MD (Hematology & Oncology)  Rosy Sprague MD as MD (Hematology & Oncology)  Brandi Hood, MARIKA as Specialty Care Coordinator (Hematology & Oncology)  Rosy Sprague MD as Assigned Cancer Care Provider  Phyllis Juarez MD as Assigned PCP    The following health maintenance items are reviewed in Epic and correct as of today:  Health Maintenance   Topic Date Due     ANNUAL REVIEW OF HM ORDERS  01/04/2023     MEDICARE ANNUAL WELLNESS VISIT  01/04/2023     FALL RISK ASSESSMENT  01/05/2024     MAMMO SCREENING  10/10/2024     DTAP/TDAP/TD IMMUNIZATION (7 - Td or Tdap) 10/19/2024     COLORECTAL CANCER SCREENING  07/27/2025     LIPID  01/04/2027     ADVANCE CARE PLANNING  01/05/2027     DEXA  01/14/2037     HEPATITIS C SCREENING  Completed     PHQ-2 (once per calendar year)  Completed     INFLUENZA VACCINE  Completed     Pneumococcal Vaccine: 65+ Years  Completed     ZOSTER IMMUNIZATION  Completed     COVID-19 Vaccine  Completed     IPV IMMUNIZATION  Aged Out     MENINGITIS IMMUNIZATION  Aged Out       Review of Systems   Constitutional: Negative for chills and fever.   HENT: Positive for congestion. Negative for ear pain, hearing loss and sore throat.    Eyes:  "Negative for pain and visual disturbance.   Respiratory: Negative for cough and shortness of breath.    Cardiovascular: Negative for chest pain, palpitations and peripheral edema.   Gastrointestinal: Negative for abdominal pain, constipation, diarrhea, hematochezia and nausea.   Breasts:  Negative for tenderness, breast mass and discharge.   Genitourinary: Negative for dysuria, frequency, genital sores, hematuria, pelvic pain, urgency, vaginal bleeding and vaginal discharge.   Musculoskeletal: Negative for arthralgias, joint swelling and myalgias.   Skin: Negative for rash.   Neurological: Negative for dizziness, weakness, headaches and paresthesias.   Psychiatric/Behavioral: Negative for mood changes. The patient is not nervous/anxious.        OBJECTIVE:   /80   Pulse 66   Temp 97.4  F (36.3  C) (Oral)   Resp 16   Ht 1.651 m (5' 5\")   Wt 82.1 kg (181 lb)   SpO2 98%   BMI 30.12 kg/m   Estimated body mass index is 30.12 kg/m  as calculated from the following:    Height as of this encounter: 1.651 m (5' 5\").    Weight as of this encounter: 82.1 kg (181 lb).  Physical Exam  GENERAL APPEARANCE: healthy, alert and no distress  EYES: Eyes grossly normal to inspection, PERRL and conjunctivae and sclerae normal  HENT: ear canals and TM's normal, nose and mouth without ulcers or lesions, oropharynx clear and oral mucous membranes moist  NECK: no adenopathy, no asymmetry, masses, or scars and thyroid normal to palpation  RESP: lungs clear to auscultation - no rales, rhonchi or wheezes  BREAST: normal without masses, tenderness or nipple discharge and no palpable axillary masses or adenopathy; well-healed scars from prior lumpectomies and lymph node surgeries noted bilaterally  CV: regular rate and rhythm, normal S1 S2, no S3 or S4, no murmur, click or rub, no peripheral edema and peripheral pulses strong  ABDOMEN: soft, nontender, no hepatosplenomegaly, no masses and bowel sounds normal  MS: no musculoskeletal " "defects are noted and gait is age appropriate without ataxia  SKIN: no suspicious lesions or rashes  NEURO: Normal strength and tone, sensory exam grossly normal, mentation intact and speech normal  PSYCH: mentation appears normal and affect normal/bright    Diagnostic Test Results:  Labs reviewed in Epic    ASSESSMENT / PLAN:     Encounter to establish care  Medicare annual wellness visit, subsequent  At today's visit, we discussed lifestyle interventions to promote self-management and wellness, including maintenance of a healthy weight, healthy diet, regular physical activity and exercise, and falls prevention.  We will obtain fasting lipids and fasting glucose today to screen for dyslipidemia and diabetes.  Up-to-date with cancer screening.  Immunizations reviewed and up-to-date.  - PRIMARY CARE FOLLOW-UP SCHEDULING; Future  - REVIEW OF HEALTH MAINTENANCE PROTOCOL ORDERS    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)  Followed by oncology.  We will obtain oncology labs today.    History of bilateral breast cancer  No evidence of active disease.  CMP and CBC already ordered by oncology.  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  CBC ordered per oncology.  - CBC with platelets; Future    Lipid screening  - Lipid panel reflex to direct LDL Fasting; Future         COUNSELING:  Reviewed preventive health counseling, as reflected in patient instructions      BMI:   Estimated body mass index is 30.12 kg/m  as calculated from the following:    Height as of this encounter: 1.651 m (5' 5\").    Weight as of this encounter: 82.1 kg (181 lb).   Weight management plan: Discussed healthy diet and exercise guidelines      She reports that she has never smoked. She has never used smokeless tobacco.      Appropriate preventive services were discussed with this patient, including applicable screening as appropriate for " cardiovascular disease, diabetes, osteopenia/osteoporosis, and glaucoma.  As appropriate for age/gender, discussed screening for colorectal cancer, prostate cancer, breast cancer, and cervical cancer. Checklist reviewing preventive services available has been given to the patient.    Reviewed patients plan of care and provided an AVS. The Basic Care Plan (routine screening as documented in Health Maintenance) for Chuyita meets the Care Plan requirement. This Care Plan has been established and reviewed with the Patient.          Zofia Rivas MD  [facility]    Identified Health Risks:    The patient was counseled and encouraged to consider modifying their diet and eating habits. She was provided with information on recommended healthy diet options.

## 2023-01-19 NOTE — PROGRESS NOTES
COLONOSCOPY: GOLYTELY PREP     Re: Kirill Diaz   [address] [place] [address]    Provider: Rupinder Kim MD    Your colon must be cleaned of stool to have a good view. You should follow these directions to have a successful colonoscopy.     Your exam is scheduled as an outpatient procedure on:     Day: Thursday    Date: JUNE 1 2023    Arrival Time: 1:00 PM (You will receive a confirmation message 3 days before your appointment, if you do not receive a message or have questions, contact [phone] or visit the patient portal for details.). Be aware your procedure time is subject to change.)     You will be receiving sedation for your procedure and MUST have an adult over 18 to drive you home and be with you after procedure.     Location: [facility], [address] [place] [id number] - Directions: Come all the way into the main lobby of the building and take the interior elevator down to the lower level. Turn left off the elevator and walk straight ahead to the Endoscopy reception area.     You will need the following in order to complete your prep:   1. Golytely/Nulytely  2. Two Simethicone tablets (OVER THE COUNTER)  3. Two Dulcolax (Bisacodyl) tablets (OVER THE COUNTER)    Your prep kit has been sent to   [id number] - [address] AT [facility] [address] [place] [address]  [address] [facility] [address]  Phone: [phone] Fax: [phone]  Please  the kit today. If not picked up within 7 days contact your pharmacy directly as prescription would been placed back and re-stock.      7 days before colonoscopy: Review your colonoscopy instructions. (Instructions can also be found on [employer] under the letters tab under communication)  • Stop eating popcorn, nuts, flax/sesame seeds, or any food that contains seeds    3 BUSINESS DAYS BEFORE your procedure:  • Stop taking all anti-inflammatory medicines. These include Advil, Aleve,  "Hematology&Oncology Progress Note:  Chuyita Porter    Date: 02/24/2020  Admission Date: 2/17/2020  Primary Heme/Oncologist:    INTERVAL HISTORY:   Continues to be on ventilator.  No fever or chills and VSS.   She has some constipation and KUB was unremarkable. No nausea or vomiting. Osborn cath got changed yesterday. Her WBC 22.3 from 25K.   Plan to have CT chest Tues/Wed to evaluate mediastinal mass.     ROS: Negative other than as stated in above interval history.  PHYSICAL EXAM:  /75   Pulse 72   Temp 98  F (36.7  C) (Axillary)   Resp 10   Ht 1.676 m (5' 6\")   Wt 87.7 kg (193 lb 5.5 oz)   SpO2 97%   BMI 31.21 kg/m    Wt Readings from Last 3 Encounters:   02/24/20 87.7 kg (193 lb 5.5 oz)   02/14/20 75.3 kg (166 lb 1.6 oz)   02/14/20 75.3 kg (165 lb 14.4 oz)     General:  no acute distress. Intubated. PEG and osborn.   Heme/Lymph: No overt bleeding. No cervical or clavicular adenopathy.  HEENT: NCAT. PERRL, EOMI, anicteric sclera. ETT.   Neck: supple. No JVD.  Lungs clear to auscultation bilaterally.  CVS: RRR. No murmur or rub.   Abd: Soft, NT, ND, BS+. No palpable masses or organomegaly. PEG tube in place.   Skin: No rash.  EXTs: No edema.   Neurologic: A&O x 3.      Current Facility-Administered Medications   Medication     acetylcysteine (MUCOMYST) 10 % nebulizer solution 4 mL     albuterol (PROVENTIL) neb solution 2.5 mg     [START ON 2/25/2020] allopurinol (ZYLOPRIM) tablet 100 mg     artificial saliva (BIOTENE MT) solution 2 spray     dextrose 10% infusion     dextrose 5 % flush PRE/POST medication     dextrose 5 % flush PRE/POST medication     glucose gel 15-30 g    Or     dextrose 50 % injection 25-50 mL    Or     glucagon injection 1 mg     enoxaparin ANTICOAGULANT (LOVENOX) injection 40 mg     EPINEPHrine PF (ADRENALIN) injection 0.3 mg     fentaNYL (PF) (SUBLIMAZE) injection 25-50 mcg     fentaNYL (SUBLIMAZE) infusion     [Held by provider] filgrastim 15 mcg/mL (in Dextrose) (NEUPOGEN) " Naprosyn, (Tylenol is okay to take)  1 DAY BEFORE the procedure:   •Start a strict, clear liquid diet from the moment you wake. A clear liquid diet can include: Apple juice, white grape juice, and white cranberry juice; Beef or chicken broths that are clear - no noodles, vegetables, rice, etc.Tea and coffee without milk; -Soda pop, Gatorade, Francis-Aid and various -Jell-O Flavors (any color except red or purple)  •Avoid: juices with pulp, milk, cream, solid food, alcohol, Gum, and hard candy.    • In the morning when you wake, follow the directions on the prep packaging to mix your prep.  Fill the gallon jug half full with lukewarm tap water, shake vigorously. Continue to fill the jug with lukewarm water until full. Shake until the powder is completely dissolved. Chill in refrigerator before drinking. You may add Crystal Light powder to flavor the prep.     Drink half of the gallon preparation at 4:00 p.m, drinking 8 ounces of solution every 10 minutes. Also chew one Simethicone tablet with the first glass of solution. Place remaining solution in the refrigerator.     Continue to drink clear liquids throughout the evening but do not eat any solid food.    6 hours prior to arrival time:  Drink 8 ounces of solution every 10 minutes until complete.  Take one Dulcolax (Bisacodyl) tablet with each of the last two glasses of the solution. Chew the remaining Simethicone tablet. For a successful prep, you must drink the entire solution.     4 hours prior to your arrival time, STOP ALL LIQUIDS INCLUDING WATER AT THIS TIME.    • Take your medications; none with a sip of water 4 hours prior to your arrival time. STOP ALL LIQUIDS INCLUDING WATER AT THIS TIME.    If you are still having solid/formed stools or trouble completing this preparation, please call the doctor's office at [phone].   infusion 400 mcg     guaiFENesin-codeine (ROBITUSSIN AC) 100-10 MG/5ML solution 5 mL     heparin lock flush 10 UNIT/ML injection 2-5 mL     insulin aspart (NovoLOG) injection (RAPID ACTING)     ipratropium - albuterol 0.5 mg/2.5 mg/3 mL (DUONEB) neb solution 3 mL     lidocaine (LMX4) cream     lidocaine 1 % 0.1-1 mL     loratadine (CLARITIN) tablet 10 mg     LORazepam (ATIVAN) injection 0.5-1 mg     LORazepam (ATIVAN) tablet 0.5 mg     LORazepam (ATIVAN) tablet 0.5-1 mg     magnesium sulfate 4 g in 100 mL sterile water (premade)     MEDICATION INSTRUCTION     meperidine (DEMEROL) injection 25 mg     naloxone (NARCAN) injection 0.1-0.4 mg     ondansetron (ZOFRAN-ODT) ODT tab 4 mg    Or     ondansetron (ZOFRAN) injection 4 mg     oxyCODONE (ROXICODONE) solution 5 mg     pantoprazole (PROTONIX) EC tablet 40 mg    Or     pantoprazole (PROTONIX) 2 mg/mL suspension 40 mg     polyethylene glycol (MIRALAX) Packet 17 g     potassium chloride (KLOR-CON) Packet 20-40 mEq     potassium chloride 10 mEq in 100 mL intermittent infusion with 10 mg lidocaine     potassium chloride 10 mEq in 100 mL sterile water intermittent infusion (premix)     potassium chloride 20 mEq in 50 mL intermittent infusion     potassium chloride ER (KLOR-CON M) CR tablet 20-40 mEq     predniSONE (DELTASONE) tablet 100 mg     prochlorperazine (COMPAZINE) injection 5-10 mg     prochlorperazine (COMPAZINE) tablet 5-10 mg     senna-docusate (SENOKOT-S/PERICOLACE) 8.6-50 MG per tablet 1 tablet     sodium chloride (PF) 0.9% PF flush 3 mL     sodium chloride (PF) 0.9% PF flush 3 mL     sodium chloride (PF) 0.9% PF flush 5-50 mL     sodium chloride 0.9% infusion     sodium phosphate 15 mmol in D5W intermittent infusion     sodium phosphate 20 mmol in D5W intermittent infusion     sodium phosphate 25 mmol in D5W intermittent infusion       LABS:  CBC  Recent Labs   Lab 02/24/20  0442 02/23/20  0344 02/22/20  0442 02/21/20  1053   WBC 22.3* 25.0* 12.0* 13.5*   RBC  3.36* 3.64* 3.58* 3.77*   HGB 10.1* 11.0* 10.8* 11.5*   HCT 31.8* 34.5* 34.4* 36.5   MCV 95 95 96 97   MCH 30.1 30.2 30.2 30.5   MCHC 31.8 31.9 31.4* 31.5   RDW 14.0 14.0 14.1 14.9    244 271 332     CMP  Recent Labs   Lab 02/24/20  0442 02/23/20  1537 02/23/20  0344 02/22/20  1608 02/22/20  1331 02/22/20  0442 02/22/20  0150  02/21/20  1053  02/17/20  1735     --  142  --  140 140  --    < > 142   < > 137   POTASSIUM 4.2  --  4.2  --  3.8 3.6 3.7   < > 4.2   < > 3.4   CHLORIDE 106  --  108  --  106 106  --    < > 107   < > 106   CO2 32  --  33*  --  32 33*  --    < > 32   < > 29   ANIONGAP 1*  --  2*  --  1* 1*  --    < > 2*   < > 2*   *  --  124*  --  148* 142*  --    < > 130*   < > 102*   BUN 21  --  20  --  20 22  --    < > 23   < > 14   CR 0.54  --  0.53  --  0.58 0.62  --    < > 0.60   < > 0.80   GFRESTIMATED >90  --  >90  --  >90 >90  --    < > >90   < > 76   GFRESTBLACK >90  --  >90  --  >90 >90  --    < > >90   < > 88   GABI 8.0*  --  8.1*  --  7.6* 8.1*  --    < > 8.6   < > 8.8   MAG  --   --   --   --   --   --  2.2  --   --   --  2.2   PHOS 2.4* 2.8 2.1* 2.6  --  1.6*  --    < > 2.0*   < > 2.3*   PROTTOTAL  --   --   --   --   --  5.4*  --   --  6.2*  --  6.5*   ALBUMIN  --   --   --   --   --  2.2*  --   --  2.6*  --  3.0*   BILITOTAL  --   --   --   --   --  0.2  --   --  0.4  --  0.3   ALKPHOS  --   --   --   --   --  125  --   --  124  --  88   AST  --   --   --   --   --  52*  --   --  88*  --  21   ALT  --   --   --   --   --  86*  --   --  102*  --  20    < > = values in this interval not displayed.     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  FNA of right thyroid  suggests B-cell lymphoma.  This was confirmed by endobronchial biopsy (taken 2/17/20). Patient went to the OR on 2/17/2020 where she had PEG tube and elective ET tube to protect her airway (  tracheal tube was risky).  Patient case was discussed in ENT tumor board and plan to have 10 sessions of radiotherapy.   Patient received radiation only 2 sessions on 2/17-18. However oncology was consulted patient started on R-CHOP 2/21/ 2020.     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   - 2/14/20 CT chest showed 8.0 x 6.7 x 10.0 cm mass centered in the right anterior mediastinum, appearing to arise from the right lobe of the thyroid gland. Mass effect within the mediastinum, including compression of  the SVC with associated collateral vessels seen in the neck.  - No mediastinal LN involvement   - 2/19 CT abdomen and pelvis unremarkable  - PET scan is preferable but could not have it given patient on ventilator   -  Unilateral bone marrow biopsy is still considered at some point for staging.   - IR consulted for port placement however, could not have for H/o radiation and mass effect on her chest.   - Has IV peripheral   - Coag (INR, aPTT, fibrinogen)  WNL   -  HIV and Hep panel negative   - Echo:EF of 60-65%, no valve issues.  - FISH studies for BCL-2, BCL-6, and MYC rearrangement are negative.  - Dex 4 mg q 6h was started then changed to prednisone 100 mg bid on 2/18/20  - R-EPOCH started 2/21/20. Rituximab scheduled 2/22/2020.   - Issues in having a port and instead we consider Midline.  Patient can not have PICC line in her extremities giving previous radiation for breast cancer.  - Plan to have CT chest Tues/Wed. 2/23 ICU team tried to deflate cuff of ET tube and was no leak likely for small tube or still pressure from the mass effect.      Leukocytosis   - WBC increased today to 25K 2/23 from 12k 2/22  - No sign/sym of  infection and leukocytosis is likely from G-CSF  - 2/23 held G-CSF.     OI PPx:   - none so far    #TLS prophylaxis:   - TLS labs (BMP, Phos, uric acid) BID and LDH daily  - Allopurinol and IV fluid    # Airway obstruction 2/2 to mediastinal mass  -  now intubated    # History of right breast cancer in 2005 and left breast cancer in 2015  - Would hold patient's home anastrozole for now due to increased VTE risk currently.      GI Prophylaxis  - Pantoprazole 40mg daily   Constipation   - on Mirlax and senna-S  - Have lactulose for now till have BM  - Patient on fentanyl drip and may opoid related at some degree, Relistor is suggested tomorrow if she did not have BM.      Nutrition  PEG tube placed 2/17. Nutrition consulted for TF.     Patient is seen and examined by Rosy Ellis and CARLTON.  Assessment and plan are discussed and delivered to the patient.     Latha Pickett MD  Hematology&Oncology Fellow  Pager: [phone]

## 2023-02-21 ENCOUNTER — LAB (OUTPATIENT)
Dept: LAB | Facility: CLINIC | Age: 73
End: 2023-02-21
Payer: COMMERCIAL

## 2023-02-21 DIAGNOSIS — C83.30 DIFFUSE LARGE B-CELL LYMPHOMA, UNSPECIFIED BODY REGION (H): ICD-10-CM

## 2023-02-21 LAB
ALBUMIN SERPL BCG-MCNC: 4.1 G/DL (ref 3.5–5.2)
ALP SERPL-CCNC: 128 U/L (ref 35–104)
ALT SERPL W P-5'-P-CCNC: 16 U/L (ref 10–35)
ANION GAP SERPL CALCULATED.3IONS-SCNC: 13 MMOL/L (ref 7–15)
AST SERPL W P-5'-P-CCNC: 30 U/L (ref 10–35)
BASOPHILS # BLD AUTO: 0 10E3/UL (ref 0–0.2)
BASOPHILS NFR BLD AUTO: 1 %
BILIRUB SERPL-MCNC: 0.3 MG/DL
BUN SERPL-MCNC: 11.8 MG/DL (ref 8–23)
CALCIUM SERPL-MCNC: 9.6 MG/DL (ref 8.8–10.2)
CHLORIDE SERPL-SCNC: 103 MMOL/L (ref 98–107)
CREAT SERPL-MCNC: 0.82 MG/DL (ref 0.51–0.95)
DEPRECATED HCO3 PLAS-SCNC: 24 MMOL/L (ref 22–29)
EOSINOPHIL # BLD AUTO: 0.2 10E3/UL (ref 0–0.7)
EOSINOPHIL NFR BLD AUTO: 2 %
ERYTHROCYTE [DISTWIDTH] IN BLOOD BY AUTOMATED COUNT: 13.8 % (ref 10–15)
GFR SERPL CREATININE-BSD FRML MDRD: 77 ML/MIN/1.73M2
GLUCOSE SERPL-MCNC: 89 MG/DL (ref 70–99)
HCT VFR BLD AUTO: 36.2 % (ref 35–47)
HGB BLD-MCNC: 12.4 G/DL (ref 11.7–15.7)
IMM GRANULOCYTES # BLD: 0 10E3/UL
IMM GRANULOCYTES NFR BLD: 0 %
LDH SERPL L TO P-CCNC: 248 U/L (ref 0–250)
LYMPHOCYTES # BLD AUTO: 2.1 10E3/UL (ref 0.8–5.3)
LYMPHOCYTES NFR BLD AUTO: 29 %
MCH RBC QN AUTO: 31.4 PG (ref 26.5–33)
MCHC RBC AUTO-ENTMCNC: 34.3 G/DL (ref 31.5–36.5)
MCV RBC AUTO: 92 FL (ref 78–100)
MONOCYTES # BLD AUTO: 0.7 10E3/UL (ref 0–1.3)
MONOCYTES NFR BLD AUTO: 10 %
NEUTROPHILS # BLD AUTO: 4.3 10E3/UL (ref 1.6–8.3)
NEUTROPHILS NFR BLD AUTO: 59 %
PLATELET # BLD AUTO: 273 10E3/UL (ref 150–450)
POTASSIUM SERPL-SCNC: 3.9 MMOL/L (ref 3.4–5.3)
PROT SERPL-MCNC: 7.7 G/DL (ref 6.4–8.3)
RBC # BLD AUTO: 3.95 10E6/UL (ref 3.8–5.2)
SODIUM SERPL-SCNC: 140 MMOL/L (ref 136–145)
WBC # BLD AUTO: 7.3 10E3/UL (ref 4–11)

## 2023-02-21 PROCEDURE — 36415 COLL VENOUS BLD VENIPUNCTURE: CPT

## 2023-02-21 PROCEDURE — 83615 LACTATE (LD) (LDH) ENZYME: CPT

## 2023-02-21 PROCEDURE — 85025 COMPLETE CBC W/AUTO DIFF WBC: CPT

## 2023-02-21 PROCEDURE — 80053 COMPREHEN METABOLIC PANEL: CPT

## 2023-02-22 ENCOUNTER — VIRTUAL VISIT (OUTPATIENT)
Dept: TRANSPLANT | Facility: CLINIC | Age: 73
End: 2023-02-22
Payer: COMMERCIAL

## 2023-02-22 DIAGNOSIS — C83.32 DIFFUSE LARGE B-CELL LYMPHOMA OF INTRATHORACIC LYMPH NODES (H): Primary | ICD-10-CM

## 2023-02-22 PROCEDURE — 99213 OFFICE O/P EST LOW 20 MIN: CPT | Mod: VID

## 2023-02-22 NOTE — LETTER
2/22/2023         RE: Chuyita Porter  [address]  [place] [address]        Dear Colleague,    Thank you for referring your patient, Chuyita Porter, to the [facility]. Please see a copy of my visit note below.    Video-Visit Details    Type of service:  Video Visit    Video Start Time (time video started):3:15    Video End Time (time video stopped): 3:30pm    Originating Location (pt. Location): Home         Distant Location (provider location): off site    Mode of Communication:  Video Conference via [facility] VISIT  02/22/2023    Oncology HPI:    Chuyita Porter is a 72 year old female with past medical history of breast cancer (right breast cancer in 2005 and left breast cancer in 2015 followed by Dr. Gallardo) with diffuse large B cell lymphoma.    Disease:  DLBCL nonGCB with mediastinal mass and thyroid gland involvement dg in 2/ 2020  - 4-6 BCL2 signals (81%)    - No rearrangement of BCL6, MYC, or BCL2   - No IGH-MYC fusion  Bulky disease, Stage IIB, IPI  3 ()  S/p 6 cycles R-CHOP  Disease response: CR  Now in observation.     Interim History: Anneliese is seen via video visit, doing great, no new complains.   No new lumps or pains.   ROS: No fevers, night sweats, weight loss or fatigue; energy is great. She is eating normally, weight is stable.      HPI:   She presented with respiratory failure due to large mediastinal mass.    Was intubated and in OR on 2/17/2020 underwent for open neck biopsy, tracheal stent, and PEG tube placement.  S/p two fractions of radiation on 2/18-19/2020 and then started R-CHOP.  Complications: neutropenic fever due to HCAP   debility and severe malnutrition.Completed 6 cycles of R-CHOP in June 2020.     Current Outpatient Medications   Medication     loratadine (CLARITIN) 10 MG tablet     acetaminophen (TYLENOL) 325 MG tablet     fluticasone (FLONASE) 50 MCG/ACT nasal spray      LORazepam (ATIVAN) 0.5 MG tablet     ondansetron (ZOFRAN) 4 MG tablet     prochlorperazine (COMPAZINE) 10 MG tablet     No current facility-administered medications for this visit.      No Known Allergies     Exam: alert, appears well. Talking effortlessly, in good spirits. No use of accessory muscles. No visible lesions, petechiae, epistaxis. No scleral icterus. Pupils equal and round.         Labs:   Lab Results   Component Value Date    WBC 7.3 02/21/2023    ANEU 4.0 01/27/2021    HGB 12.4 02/21/2023    HCT 36.2 02/21/2023     02/21/2023     02/21/2023    POTASSIUM 3.9 02/21/2023    CHLORIDE 103 02/21/2023    CO2 24 02/21/2023    GLC 89 02/21/2023    BUN 11.8 02/21/2023    CR 0.82 02/21/2023    MAG 2.0 11/17/2020    INR 1.21 (H) 02/19/2020    AST 30 02/21/2023    ALT 16 02/21/2023          Impression/plan:   DLBCL with mediastinal mass compressing the SVC with associated collateral vessels, no disease in the abdomen/pelvis, marrow not done. Stage IIIB/ IPI 3   S/p 6 cycles of R-CHOP and few doses of XRT  MI after 2 cycles  EOT PET with complete metabolic remission. 7/20    Now in ongoing remission - last scan in jan 2021.   Now 3 years post diagnosis - clinically in CR.      Pulm: was noted to have a RLL infiltrate on PET/CT from April, now resolved on PET.    ID no active infection.    We will see Chuyita again in 1 year and plan survivorship visit at 5 years anniversary.      Rosy Sprague MD  Professor of Medicine  [phone]

## 2023-02-22 NOTE — PROGRESS NOTES
Video-Visit Details    Type of service:  Video Visit    Video Start Time (time video started):3:15    Video End Time (time video stopped): 3:30pm    Originating Location (pt. Location): Home         Distant Location (provider location): off site    Mode of Communication:  Video Conference via [facility] VISIT  02/22/2023    Oncology HPI:    Chuyita Porter is a 72 year old female with past medical history of breast cancer (right breast cancer in 2005 and left breast cancer in 2015 followed by Dr. Gallardo) with diffuse large B cell lymphoma.    Disease:  DLBCL nonGCB with mediastinal mass and thyroid gland involvement dg in 2/ 2020  - 4-6 BCL2 signals (81%)    - No rearrangement of BCL6, MYC, or BCL2   - No IGH-MYC fusion  Bulky disease, Stage IIB, IPI  3 ()  S/p 6 cycles R-CHOP  Disease response: CR  Now in observation.     Interim History: Anneliese is seen via video visit, doing great, no new complains.   No new lumps or pains.   ROS: No fevers, night sweats, weight loss or fatigue; energy is great. She is eating normally, weight is stable.      HPI:   She presented with respiratory failure due to large mediastinal mass.    Was intubated and in OR on 2/17/2020 underwent for open neck biopsy, tracheal stent, and PEG tube placement.  S/p two fractions of radiation on 2/18-19/2020 and then started R-CHOP.  Complications: neutropenic fever due to HCAP   debility and severe malnutrition.Completed 6 cycles of R-CHOP in June 2020.     Current Outpatient Medications   Medication     loratadine (CLARITIN) 10 MG tablet     acetaminophen (TYLENOL) 325 MG tablet     fluticasone (FLONASE) 50 MCG/ACT nasal spray     LORazepam (ATIVAN) 0.5 MG tablet     ondansetron (ZOFRAN) 4 MG tablet     prochlorperazine (COMPAZINE) 10 MG tablet     No current facility-administered medications for this visit.      No Known Allergies     Exam: alert, appears well. Talking effortlessly, in good spirits. No use of  accessory muscles. No visible lesions, petechiae, epistaxis. No scleral icterus. Pupils equal and round.         Labs:   Lab Results   Component Value Date    WBC 7.3 02/21/2023    ANEU 4.0 01/27/2021    HGB 12.4 02/21/2023    HCT 36.2 02/21/2023     02/21/2023     02/21/2023    POTASSIUM 3.9 02/21/2023    CHLORIDE 103 02/21/2023    CO2 24 02/21/2023    GLC 89 02/21/2023    BUN 11.8 02/21/2023    CR 0.82 02/21/2023    MAG 2.0 11/17/2020    INR 1.21 (H) 02/19/2020    AST 30 02/21/2023    ALT 16 02/21/2023          Impression/plan:   DLBCL with mediastinal mass compressing the SVC with associated collateral vessels, no disease in the abdomen/pelvis, marrow not done. Stage IIIB/ IPI 3   S/p 6 cycles of R-CHOP and few doses of XRT  SD after 2 cycles  EOT PET with complete metabolic remission. 7/20    Now in ongoing remission - last scan in jan 2021.   Now 3 years post diagnosis - clinically in CR.      Pulm: was noted to have a RLL infiltrate on PET/CT from April, now resolved on PET.    ID no active infection.    We will see Chuyita again in 1 year and plan survivorship visit at 5 years anniversary.      Rosy Sprague MD  Professor of Medicine  [phone]

## 2023-02-22 NOTE — NURSING NOTE
Is the patient currently in the state of [place]? YES    Visit mode:VIDEO    If the visit is dropped, the patient can be reconnected by: VIDEO VISIT: Send to e-mail at: [email or web address]    Will anyone else be joining the visit? YES: How would they like to receive their invitation? Other e-mail: n/a      How would you like to obtain your AVS? MyChart    Are changes needed to the allergy or medication list? NO    Reason for visit: follow up    Padmini Griggs

## 2023-03-13 ENCOUNTER — OFFICE VISIT (OUTPATIENT)
Dept: FAMILY MEDICINE | Facility: CLINIC | Age: 73
End: 2023-03-13
Payer: COMMERCIAL

## 2023-03-13 VITALS
HEIGHT: 65 IN | SYSTOLIC BLOOD PRESSURE: 120 MMHG | BODY MASS INDEX: 30.34 KG/M2 | RESPIRATION RATE: 16 BRPM | HEART RATE: 71 BPM | TEMPERATURE: 98.1 F | OXYGEN SATURATION: 99 % | WEIGHT: 182.1 LBS | DIASTOLIC BLOOD PRESSURE: 74 MMHG

## 2023-03-13 DIAGNOSIS — E78.5 DYSLIPIDEMIA, GOAL LDL BELOW 130: Primary | ICD-10-CM

## 2023-03-13 PROCEDURE — 99213 OFFICE O/P EST LOW 20 MIN: CPT | Performed by: FAMILY MEDICINE

## 2023-03-13 RX ORDER — ATORVASTATIN CALCIUM 20 MG/1
20 TABLET, FILM COATED ORAL DAILY
Qty: 90 TABLET | Refills: 4 | Status: SHIPPED | OUTPATIENT
Start: 2023-03-13 | End: 2023-05-11

## 2023-03-13 ASSESSMENT — PAIN SCALES - GENERAL: PAINLEVEL: NO PAIN (0)

## 2023-03-13 NOTE — PROGRESS NOTES
"  Assessment & Plan     Dyslipidemia, goal LDL below 130  We discussed initial ASCVD risk of 9.7%, recalculated based on blood pressure today at 8.4%.  Discussed that starting a statin could reduce this to 6.9%.  We discussed risk versus benefits of statin medication.  As her risk is in the intermediate range, we also discussed consideration of coronary artery calcium score.  She is interested in initiating statin.  Prescription provided for Lipitor.  As we are not aiming for specific LDL, I think it is reasonable for her to wait to have her cholesterol checked again at her routine preventive care visit next year.  - atorvastatin (LIPITOR) 20 MG tablet; Take 1 tablet (20 mg) by mouth daily                 No follow-ups on file.    Zofia Rivas MD  [facility] is a 72 year old, presenting for the following health issues:  Elevated Cholesterol. (Talk about possible starting a medication)      Here to discuss labs from oncology and from her preventive care visit with me.  We review slightly elevated LDH, slightly elevated alkaline phosphatase, neither which are of immediate concern.  Could give consider return to checking vitamin D level in the future.  We discussed her cholesterol results including her ASCVD risk of 9.7% based on those results.  Of note her LDL was 135, HDL 65.  Notes family history of stroke in her mother, \"small heart attack\" in her father.  She does not smoke and never has.    History of Present Illness       Reason for visit:  Follow-up on Dr. mujica for Lipitor    She eats 2-3 servings of fruits and vegetables daily.She consumes 2 sweetened beverage(s) daily.She exercises with enough effort to increase her heart rate 20 to 29 minutes per day.  She exercises with enough effort to increase her heart rate 4 days per week.   She is taking medications regularly.             Review of Systems         Objective    /74   Pulse 71   Temp 98.1 " " F (36.7  C) (Oral)   Resp 16   Ht 1.651 m (5' 5\")   Wt 82.6 kg (182 lb 1.6 oz)   SpO2 99%   BMI 30.30 kg/m    Body mass index is 30.3 kg/m .  Physical Exam   Alert and very pleasant.  Further exam not indicated today.                    "

## 2023-05-08 DIAGNOSIS — E78.5 DYSLIPIDEMIA, GOAL LDL BELOW 130: ICD-10-CM

## 2023-05-09 RX ORDER — ATORVASTATIN CALCIUM 20 MG/1
20 TABLET, FILM COATED ORAL DAILY
Qty: 90 TABLET | Refills: 4 | OUTPATIENT
Start: 2023-05-09

## 2023-05-09 NOTE — TELEPHONE ENCOUNTER
"Last Written Prescription Date:  3/13/2023 Should still have refills remaining--message sent to pharmacy.   Last Fill Quantity: 90,  # refills: 4   Last office visit provider: 3/13/2023 with PCP Dr TRINI Rivas     Requested Prescriptions   Pending Prescriptions Disp Refills     atorvastatin (LIPITOR) 20 MG tablet 90 tablet 4     Sig: Take 1 tablet (20 mg) by mouth daily       Statins Protocol Passed - 5/8/2023 12:30 PM        Passed - LDL on file in past 12 months     Recent Labs   Lab Test 01/05/23  0920   *             Passed - No abnormal creatine kinase in past 12 months     No lab results found.             Passed - Recent (12 mo) or future (30 days) visit within the authorizing provider's specialty     Patient has had an office visit with the authorizing provider or a provider within the authorizing providers department within the previous 12 mos or has a future within next 30 days. See \"Patient Info\" tab in inbasket, or \"Choose Columns\" in Meds & Orders section of the refill encounter.              Passed - Medication is active on med list        Passed - Patient is age 18 or older        Passed - No active pregnancy on record        Passed - No positive pregnancy test in past 12 months             Nelida Mclean RN 05/09/23 4:32 PM  "

## 2023-05-10 DIAGNOSIS — E78.5 DYSLIPIDEMIA, GOAL LDL BELOW 130: ICD-10-CM

## 2023-05-11 RX ORDER — ATORVASTATIN CALCIUM 20 MG/1
20 TABLET, FILM COATED ORAL DAILY
Qty: 90 TABLET | Refills: 3 | Status: SHIPPED | OUTPATIENT
Start: 2023-05-11 | End: 2024-04-12

## 2023-05-11 NOTE — TELEPHONE ENCOUNTER
Recently filled to [facility] 3/13/2023 disp 90 refills 3  [facility] requesting refills  Routing remaining refills to Rose Thomas RN   05/11/23 11:13 AM  [facility] Nurse Advisor

## 2023-07-06 ENCOUNTER — NURSE TRIAGE (OUTPATIENT)
Dept: FAMILY MEDICINE | Facility: CLINIC | Age: 73
End: 2023-07-06
Payer: COMMERCIAL

## 2023-07-06 NOTE — TELEPHONE ENCOUNTER
Provider Recommendation Follow Up:   Reached patient/caregiver. Informed of provider's recommendations. Patient verbalized understanding and agrees with the plan.     XENA DupontN, RN  [facility]

## 2023-07-06 NOTE — TELEPHONE ENCOUNTER
Nurse Triage SBAR    Is this a 2nd Level Triage? YES, LICENSED PRACTITIONER REVIEW IS REQUIRED    Situation:   Patient calling with concerns of constant back pain.     Background:   Hx of lymphoma   No recent back injurt, fall, etc    Assessment:   1. ONSET:       About 4 days ago 7/2/23  2. LOCATION:       Right side in the middle part of back   3. SEVERITY:       3-4/10  4. PATTERN:     Constant - worse when sitting, can't get comfortable   5. RADIATION:      Denies radiation   6. CAUSE:        Unsure of cause   7. BACK OVERUSE:        Denies back overuse   8. MEDICATIONS      Advil for a few days but started bothering stomach - was not very effective   9. NEUROLOGIC SYMPTOMS:       Denies any neurologic symptoms   10. OTHER SYMPTOMS:        More frequent urination   11. PREGNANCY:         Denies pregnancy    Protocol Recommended Disposition:   See in Office Today    Recommendation:   Home care advice given  Patient advised to be seen today, patient states she will got to [facility] tomorrow   Routing to PCP for recommendations      Routed to provider    Does the patient meet one of the following criteria for ADS visit consideration? 16+ years old, with an [facility] PCP     TIP  Providers, please consider if this condition is appropriate for management at one of our Acute and Diagnostic Services sites.     If patient is a good candidate, please use dotphrase <dot>triageresponse and select Refer to ADS to document.    Reason for Disposition    High-risk adult (e.g., history of cancer, history of HIV, or history of IV Drug Use)    Additional Information    Negative: Passed out (i.e., fainted, collapsed and was not responding)    Negative: Shock suspected (e.g., cold/pale/clammy skin, too weak to stand, low BP, rapid pulse)    Negative: Sounds like a life-threatening emergency to the triager    Negative: SEVERE back pain of sudden onset and age > 60 years    Negative: SEVERE abdominal pain (e.g., excruciating)    Negative:  Abdominal pain and age > 60 years    Negative: Unable to urinate (or only a few drops) and bladder feels very full    Negative: Loss of bladder or bowel control (urine or bowel incontinence; wetting self, leaking stool) of new-onset    Negative: Numbness (loss of sensation) in groin or rectal area    Negative: Pain radiates into groin, scrotum    Negative: Blood in urine (red, pink, or tea-colored)    Negative: Vomiting and pain over lower ribs of back (i.e., flank - kidney area)    Negative: Weakness of a leg or foot (e.g., unable to bear weight, dragging foot)    Negative: Patient sounds very sick or weak to the triager    Negative: Fever > 100.4 F (38.0 C) and flank pain    Negative: Pain or burning with passing urine (urination)    Negative: SEVERE back pain (e.g., excruciating, unable to do any normal activities) and not improved after pain medicine and CARE ADVICE    Negative: Numbness in an arm or hand (i.e., loss of sensation) and upper back pain    Negative: Numbness in a leg or foot (i.e., loss of sensation)    Answer Assessment - Initial Assessment Questions  1. ONSET:       About 4 days ago 7/2/23  2. LOCATION:       Right side in the middle part of back   3. SEVERITY:       3-4/10  4. PATTERN:     Constant - worse when sitting, can't get comfortable   5. RADIATION:      Denies radiation   6. CAUSE:        Unsure of cause   7. BACK OVERUSE:        Denies back overuse   8. MEDICATIONS      Advil for a few days but started bothering stomach - was not very effective   9. NEUROLOGIC SYMPTOMS:       Denies any neurologic symptoms   10. OTHER SYMPTOMS:        More frequent urination   11. PREGNANCY:         Denies pregnancy    Protocols used: BACK PAIN-A-OH    XENA CardenasN, RN  [facility]

## 2023-07-06 NOTE — TELEPHONE ENCOUNTER
Reason for Call:  Appointment Request    Patient requesting this type of appt:  Pain    Requested provider: Zofia Rivas    Reason patient unable to be scheduled: Not within requested timeframe    When does patient want to be seen/preferred time: 1-2 days    Comments: Pt is having constant upper back pain on her right side.    Could we send this information to you in [employer] or would you prefer to receive a phone call?:   Patient would prefer a phone call   Okay to leave a detailed message?: Yes at Cell number on file:    Telephone Information:   Mobile [phone]       Call taken on 7/6/2023 at 2:34 PM by Edda Mckeon

## 2023-07-06 NOTE — TELEPHONE ENCOUNTER
As long as pain is controlled and not worsening, and as long as she is not having any additional symptoms, I think it is okay for her to be seen tomorrow at office visit or walk-in clinic.  VJ

## 2023-07-07 ENCOUNTER — OFFICE VISIT (OUTPATIENT)
Dept: FAMILY MEDICINE | Facility: CLINIC | Age: 73
End: 2023-07-07
Payer: COMMERCIAL

## 2023-07-07 ENCOUNTER — HOSPITAL ENCOUNTER (OUTPATIENT)
Dept: GENERAL RADIOLOGY | Facility: HOSPITAL | Age: 73
Discharge: HOME | End: 2023-07-07
Attending: FAMILY MEDICINE | Admitting: FAMILY MEDICINE
Payer: COMMERCIAL

## 2023-07-07 VITALS
OXYGEN SATURATION: 96 % | TEMPERATURE: 99 F | BODY MASS INDEX: 30.29 KG/M2 | HEART RATE: 92 BPM | RESPIRATION RATE: 14 BRPM | WEIGHT: 182 LBS | DIASTOLIC BLOOD PRESSURE: 89 MMHG | SYSTOLIC BLOOD PRESSURE: 148 MMHG

## 2023-07-07 DIAGNOSIS — R05.2 SUBACUTE COUGH: Primary | ICD-10-CM

## 2023-07-07 DIAGNOSIS — R05.2 SUBACUTE COUGH: ICD-10-CM

## 2023-07-07 PROCEDURE — 71046 X-RAY EXAM CHEST 2 VIEWS: CPT

## 2023-07-07 PROCEDURE — 99213 OFFICE O/P EST LOW 20 MIN: CPT | Performed by: FAMILY MEDICINE

## 2023-07-07 RX ORDER — AZITHROMYCIN 250 MG/1
TABLET, FILM COATED ORAL
Qty: 6 TABLET | Refills: 0 | Status: SHIPPED | OUTPATIENT
Start: 2023-07-07 | End: 2023-07-12

## 2023-07-07 NOTE — PROGRESS NOTES
"  Assessment & Plan     Subacute cough  Patient has pain in her back that I thought was likely musculoskeletal however it is in the right lower lobe she has not had a cough with it and has a history of lymphoma with treatment in her lung.  We will cover her for possible pneumonia causing some pain and treated with azithromycin chest x-ray reading by radiology is pending  - XR Chest 2 Views; Future  - azithromycin (ZITHROMAX) 250 MG tablet; Take 2 tablets (500 mg) by mouth daily for 1 day, THEN 1 tablet (250 mg) daily for 4 days.             BMI:   Estimated body mass index is 30.29 kg/m  as calculated from the following:    Height as of 3/13/23: 1.651 m (5' 5\").    Weight as of this encounter: 82.6 kg (182 lb).           No follow-ups on file.    Daniel Fulton MD  [facility]    Malinda Boogie is a 73 year old, presenting for the following health issues:  Back Pain (Rt mid back x 5 day. Aching.) and Cough (Nasal congestion. Slight cough.)        1/4/2022    11:03 AM   Additional Questions   Roomed by Elvie Hunt CMA   Accompanied by N/A     HPI     73-year-old woman is here because of some right lower back pain.  Says it hurts primarily if she sits.  She has been coughing.  There is some loose phlegm she has not coughed anything particularly up.  She has not had any fever or chills and she is not short of breath.  She worries about the cough because that is how it started when she was diagnosed with lymphoma and then treated.  That was a couple years ago she had a follow-up exam in February that was negative for any recurrence.    The pain does not change with movements.        Review of Systems         Objective    BP (!) 148/89   Pulse 92   Temp 99  F (37.2  C) (Oral)   Resp 14   Wt 82.6 kg (182 lb)   SpO2 96%   BMI 30.29 kg/m    Body mass index is 30.29 kg/m .  Physical Exam   Alert and oriented and pleasant  Talkative with no coughing during exam normal respiratory " effort  Lungs are clear to auscultation  Area of tenderness is right over the lower rib cage without any skin changes or muscle spasms

## 2023-07-24 ENCOUNTER — OFFICE VISIT (OUTPATIENT)
Dept: FAMILY MEDICINE | Facility: CLINIC | Age: 73
End: 2023-07-24
Payer: COMMERCIAL

## 2023-07-24 VITALS
SYSTOLIC BLOOD PRESSURE: 118 MMHG | TEMPERATURE: 99.9 F | HEART RATE: 83 BPM | BODY MASS INDEX: 30.32 KG/M2 | OXYGEN SATURATION: 95 % | WEIGHT: 182 LBS | DIASTOLIC BLOOD PRESSURE: 72 MMHG | RESPIRATION RATE: 16 BRPM | HEIGHT: 65 IN

## 2023-07-24 DIAGNOSIS — R05.2 SUBACUTE COUGH: Primary | ICD-10-CM

## 2023-07-24 DIAGNOSIS — M54.6 ACUTE RIGHT-SIDED THORACIC BACK PAIN: ICD-10-CM

## 2023-07-24 PROBLEM — C50.919 MALIGNANT TUMOR OF BREAST (H): Status: ACTIVE | Noted: 2020-12-09

## 2023-07-24 PROCEDURE — 99213 OFFICE O/P EST LOW 20 MIN: CPT | Performed by: FAMILY MEDICINE

## 2023-07-24 ASSESSMENT — PAIN SCALES - GENERAL: PAINLEVEL: NO PAIN (1)

## 2023-07-24 NOTE — PROGRESS NOTES
Subacute cough  Lung exam unremarkable.  Chest x-ray findings unremarkable.  She has completed course of azithromycin with some improvement I think it is unlikely that she had true bacterial infection based on symptoms, I suspect the azithromycin benefit was related to mild anti-inflammatory effect.  Advised trial of Flonase.  Stay hydrated.  Low threshold for further evaluation should symptoms persist.    Acute right-sided thoracic back pain  Exam unremarkable.  Imaging at urgent care unremarkable.  No red flag symptoms.  Reviewed symptomatic treatments, good posture.    Malinda Boogie is a 73 year old, presenting for the following health issues:  Follow Up (Urgent care visit on 7/7/23 for back pain and couch had chest xray done )      7/24/2023     3:04 PM   Additional Questions   Roomed by Gosia     Here for follow-up of urgent care visit on 7/7/2023 with right lower rib cage pain with sitting.  Patient states she developed significant spasm and pain in her right mid back after she had been leaning forward and then straightened up.  Found that it was hard to sit comfortably over the next few days so she went to urgent care suspecting a muscle pain and spasm.  At the time she also reported a cough that had been persisting but increasing.  Chest x-ray there was unremarkable.  She was treated with a 5-day course of azithromycin.  Notes that her cough improved significantly even as soon as the next day after starting azithromycin.  In general she is doing better from a cough standpoint but has not resolved entirely.  Notes that she has a dry throat and this will trigger cough when she talks or laughs.  Notes that she is needing to clear her throat first thing in the morning and sometimes will have some stomach upset or nausea from some postnasal drainage first thing in the morning.  Back pain has improved significantly though not gone.                 Review of Systems         Objective    /72    "Pulse 83   Temp 99.9  F (37.7  C)   Resp 16   Ht 1.651 m (5' 5\")   Wt 82.6 kg (182 lb)   SpO2 95%   BMI 30.29 kg/m    Body mass index is 30.29 kg/m .  Physical Exam   Alert very pleasant.  Intermittent loose cough noted.  Able to speak in full sentences without respiratory distress or wheeze.  Tympanic membrane's pearly and translucent.  Oropharynx is clear.  No sinus tenderness.  Neck supple without adenopathy.  Heart with regular rate and rhythm.  Lungs clear and well aerated.  No focal tenderness palpation on examination of her back and chest wall.  Extremities without edema.                    "

## 2023-07-24 NOTE — PATIENT INSTRUCTIONS
For your muscular back pain, you may use ice or heat as needed.  Continue to work on good posture.  Consider using a small pillow or rolled up towel and supporting your low back as this will encourage good posture.    Try Flonase nasal spray 1 to 2 sprays each side of your nose once daily for the next month.  This can help with the postnasal drainage you are experiencing.  If your cough is not improving or if you have onset of additional symptoms, be sure to let me know.

## 2023-10-11 ENCOUNTER — ANCILLARY PROCEDURE (OUTPATIENT)
Dept: MAMMOGRAPHY | Facility: CLINIC | Age: 73
End: 2023-10-11
Attending: FAMILY MEDICINE
Payer: COMMERCIAL

## 2023-10-11 DIAGNOSIS — Z12.31 VISIT FOR SCREENING MAMMOGRAM: ICD-10-CM

## 2023-10-11 PROCEDURE — 77067 SCR MAMMO BI INCL CAD: CPT

## 2023-12-06 ENCOUNTER — PATIENT OUTREACH (OUTPATIENT)
Dept: CARE COORDINATION | Facility: CLINIC | Age: 73
End: 2023-12-06
Payer: COMMERCIAL

## 2023-12-20 ENCOUNTER — PATIENT OUTREACH (OUTPATIENT)
Dept: CARE COORDINATION | Facility: CLINIC | Age: 73
End: 2023-12-20
Payer: COMMERCIAL

## 2024-01-04 ENCOUNTER — PATIENT OUTREACH (OUTPATIENT)
Dept: CARE COORDINATION | Facility: CLINIC | Age: 74
End: 2024-01-04
Payer: COMMERCIAL

## 2024-01-18 ENCOUNTER — PATIENT OUTREACH (OUTPATIENT)
Dept: CARE COORDINATION | Facility: CLINIC | Age: 74
End: 2024-01-18
Payer: COMMERCIAL

## 2024-02-08 ENCOUNTER — PATIENT OUTREACH (OUTPATIENT)
Dept: ONCOLOGY | Facility: CLINIC | Age: 74
End: 2024-02-08
Payer: COMMERCIAL

## 2024-02-08 DIAGNOSIS — C83.32 DIFFUSE LARGE B-CELL LYMPHOMA OF INTRATHORACIC LYMPH NODES (H): Primary | ICD-10-CM

## 2024-02-13 DIAGNOSIS — E78.5 DYSLIPIDEMIA, GOAL LDL BELOW 130: ICD-10-CM

## 2024-02-14 RX ORDER — ATORVASTATIN CALCIUM 20 MG/1
20 TABLET, FILM COATED ORAL DAILY
Qty: 100 TABLET | Refills: 2 | OUTPATIENT
Start: 2024-02-14

## 2024-02-25 ENCOUNTER — HEALTH MAINTENANCE LETTER (OUTPATIENT)
Age: 74
End: 2024-02-25

## 2024-03-19 ENCOUNTER — LAB (OUTPATIENT)
Dept: LAB | Facility: CLINIC | Age: 74
End: 2024-03-19
Payer: COMMERCIAL

## 2024-03-19 DIAGNOSIS — C83.32 DIFFUSE LARGE B-CELL LYMPHOMA OF INTRATHORACIC LYMPH NODES (H): ICD-10-CM

## 2024-03-19 LAB
BASOPHILS # BLD AUTO: 0.1 10E3/UL (ref 0–0.2)
BASOPHILS NFR BLD AUTO: 1 %
EOSINOPHIL # BLD AUTO: 0.1 10E3/UL (ref 0–0.7)
EOSINOPHIL NFR BLD AUTO: 2 %
ERYTHROCYTE [DISTWIDTH] IN BLOOD BY AUTOMATED COUNT: 13.6 % (ref 10–15)
HCT VFR BLD AUTO: 36.3 % (ref 35–47)
HGB BLD-MCNC: 12 G/DL (ref 11.7–15.7)
IMM GRANULOCYTES # BLD: 0 10E3/UL
IMM GRANULOCYTES NFR BLD: 0 %
LYMPHOCYTES # BLD AUTO: 2.3 10E3/UL (ref 0.8–5.3)
LYMPHOCYTES NFR BLD AUTO: 29 %
MCH RBC QN AUTO: 30.8 PG (ref 26.5–33)
MCHC RBC AUTO-ENTMCNC: 33.1 G/DL (ref 31.5–36.5)
MCV RBC AUTO: 93 FL (ref 78–100)
MONOCYTES # BLD AUTO: 0.8 10E3/UL (ref 0–1.3)
MONOCYTES NFR BLD AUTO: 10 %
NEUTROPHILS # BLD AUTO: 4.5 10E3/UL (ref 1.6–8.3)
NEUTROPHILS NFR BLD AUTO: 58 %
PLATELET # BLD AUTO: 266 10E3/UL (ref 150–450)
RBC # BLD AUTO: 3.9 10E6/UL (ref 3.8–5.2)
WBC # BLD AUTO: 7.7 10E3/UL (ref 4–11)

## 2024-03-19 PROCEDURE — 85025 COMPLETE CBC W/AUTO DIFF WBC: CPT

## 2024-03-19 PROCEDURE — 36415 COLL VENOUS BLD VENIPUNCTURE: CPT

## 2024-03-19 PROCEDURE — 80053 COMPREHEN METABOLIC PANEL: CPT

## 2024-03-19 PROCEDURE — 83615 LACTATE (LD) (LDH) ENZYME: CPT

## 2024-03-20 ENCOUNTER — ONCOLOGY VISIT (OUTPATIENT)
Dept: TRANSPLANT | Facility: CLINIC | Age: 74
End: 2024-03-20
Payer: COMMERCIAL

## 2024-03-20 VITALS
HEART RATE: 88 BPM | OXYGEN SATURATION: 98 % | WEIGHT: 182.4 LBS | SYSTOLIC BLOOD PRESSURE: 127 MMHG | DIASTOLIC BLOOD PRESSURE: 83 MMHG | TEMPERATURE: 98.3 F | RESPIRATION RATE: 16 BRPM | BODY MASS INDEX: 30.35 KG/M2

## 2024-03-20 DIAGNOSIS — C83.32 DIFFUSE LARGE B-CELL LYMPHOMA OF INTRATHORACIC LYMPH NODES (H): Primary | ICD-10-CM

## 2024-03-20 LAB
ALBUMIN SERPL BCG-MCNC: 4.2 G/DL (ref 3.5–5.2)
ALP SERPL-CCNC: 150 U/L (ref 40–150)
ALT SERPL W P-5'-P-CCNC: 16 U/L (ref 0–50)
ANION GAP SERPL CALCULATED.3IONS-SCNC: 11 MMOL/L (ref 7–15)
AST SERPL W P-5'-P-CCNC: 26 U/L (ref 0–45)
BILIRUB SERPL-MCNC: 0.8 MG/DL
BUN SERPL-MCNC: 13.8 MG/DL (ref 8–23)
CALCIUM SERPL-MCNC: 9.6 MG/DL (ref 8.8–10.2)
CHLORIDE SERPL-SCNC: 102 MMOL/L (ref 98–107)
CREAT SERPL-MCNC: 0.83 MG/DL (ref 0.51–0.95)
DEPRECATED HCO3 PLAS-SCNC: 26 MMOL/L (ref 22–29)
EGFRCR SERPLBLD CKD-EPI 2021: 75 ML/MIN/1.73M2
GLUCOSE SERPL-MCNC: 80 MG/DL (ref 70–99)
LDH SERPL L TO P-CCNC: 222 U/L (ref 0–250)
POTASSIUM SERPL-SCNC: 3.7 MMOL/L (ref 3.4–5.3)
PROT SERPL-MCNC: 7.5 G/DL (ref 6.4–8.3)
SODIUM SERPL-SCNC: 139 MMOL/L (ref 135–145)

## 2024-03-20 PROCEDURE — 99213 OFFICE O/P EST LOW 20 MIN: CPT

## 2024-03-20 PROCEDURE — G0463 HOSPITAL OUTPT CLINIC VISIT: HCPCS

## 2024-03-20 ASSESSMENT — PAIN SCALES - GENERAL: PAINLEVEL: NO PAIN (0)

## 2024-03-20 NOTE — LETTER
3/20/2024         RE: Chuyita Porter  [address]  [place] [place] [facility] [address]        Dear Colleague,    Thank you for referring your patient, Chuyita Porter, to the [facility] [place]. Please see a copy of my visit note below.        [facility] CLINIC VISIT  03/20/2024    Oncology HPI:    Chuyita Porter is a 72 year old female with past medical history of breast cancer (right breast cancer in 2005 and left breast cancer in 2015 followed by Dr. Gallardo) with diffuse large B cell lymphoma.    Disease:  DLBCL nonGCB with mediastinal mass and thyroid gland involvement dg in 2/ 2020  - 4-6 BCL2 signals (81%)    - No rearrangement of BCL6, MYC, or BCL2   - No IGH-MYC fusion  Bulky disease, Stage IIB, IPI  3 ()  S/p 6 cycles R-CHOP  Disease response: CR  Now in observation.     Interim History: Anneliese is feeling great, healthy, active, celebrated 50th wedding anniversary. no new complains.   No new lumps or pains.   ROS: No fevers, night sweats, weight loss or fatigue; energy is great. She is eating normally, weight is stable.      HPI:   She presented with respiratory failure due to large mediastinal mass.    Was intubated and in OR on 2/17/2020 underwent for open neck biopsy, tracheal stent, and PEG tube placement.  S/p two fractions of radiation on 2/18-19/2020 and then started R-CHOP.  Complications: neutropenic fever due to HCAP   debility and severe malnutrition.Completed 6 cycles of R-CHOP in June 2020.     Current Outpatient Medications   Medication    loratadine (CLARITIN) 10 MG tablet    acetaminophen (TYLENOL) 325 MG tablet    fluticasone (FLONASE) 50 MCG/ACT nasal spray    LORazepam (ATIVAN) 0.5 MG tablet    ondansetron (ZOFRAN) 4 MG tablet    prochlorperazine (COMPAZINE) 10 MG tablet     No current facility-administered medications for this visit.      No Known Allergies     Exam: alert, appears well. Talking effortlessly, in good spirits. No use of  accessory muscles. No visible lesions, petechiae, epistaxis. No scleral icterus. Pupils equal and round.         Labs:   Lab Results   Component Value Date    WBC 7.7 03/19/2024    ANEU 4.0 01/27/2021    HGB 12.0 03/19/2024    HCT 36.3 03/19/2024     03/19/2024     03/19/2024    POTASSIUM 3.7 03/19/2024    CHLORIDE 102 03/19/2024    CO2 26 03/19/2024    GLC 80 03/19/2024    BUN 13.8 03/19/2024    CR 0.83 03/19/2024    MAG 2.0 11/17/2020    INR 1.21 (H) 02/19/2020    AST 26 03/19/2024    ALT 16 03/19/2024          Impression/plan:     72yo female with DLBCL with mediastinal mass compressing the SVC with associated collateral vessels, no disease in the abdomen/pelvis, marrow not done. Stage IIIB/ IPI 3   S/p 6 cycles of R-CHOP and few doses of XRT  WV after 2 cycles  EOT PET with complete metabolic remission. 7/20    Now in ongoing remission - last scan in jan 2021.   Now 4 years post diagnosis - clinically in CR.      ID no active infection. I recommended pneumonia vaccine booster.    We will see Chuyita again in 1 year and plan survivorship visit at 5 years anniversary.      Rosy Sprague MD  Professor of Medicine  [phone]

## 2024-03-20 NOTE — PROGRESS NOTES
[facility] CLINIC VISIT  03/20/2024    Oncology HPI:    Chuyita Porter is a 72 year old female with past medical history of breast cancer (right breast cancer in 2005 and left breast cancer in 2015 followed by Dr. Gallardo) with diffuse large B cell lymphoma.    Disease:  DLBCL nonGCB with mediastinal mass and thyroid gland involvement dg in 2/ 2020  - 4-6 BCL2 signals (81%)    - No rearrangement of BCL6, MYC, or BCL2   - No IGH-MYC fusion  Bulky disease, Stage IIB, IPI  3 ()  S/p 6 cycles R-CHOP  Disease response: CR  Now in observation.     Interim History: Anneliese is feeling great, healthy, active, celebrated 50th wedding anniversary. no new complains.   No new lumps or pains.   ROS: No fevers, night sweats, weight loss or fatigue; energy is great. She is eating normally, weight is stable.      HPI:   She presented with respiratory failure due to large mediastinal mass.    Was intubated and in OR on 2/17/2020 underwent for open neck biopsy, tracheal stent, and PEG tube placement.  S/p two fractions of radiation on 2/18-19/2020 and then started R-CHOP.  Complications: neutropenic fever due to HCAP   debility and severe malnutrition.Completed 6 cycles of R-CHOP in June 2020.     Current Outpatient Medications   Medication    loratadine (CLARITIN) 10 MG tablet    acetaminophen (TYLENOL) 325 MG tablet    fluticasone (FLONASE) 50 MCG/ACT nasal spray    LORazepam (ATIVAN) 0.5 MG tablet    ondansetron (ZOFRAN) 4 MG tablet    prochlorperazine (COMPAZINE) 10 MG tablet     No current facility-administered medications for this visit.      No Known Allergies     Exam: alert, appears well. Talking effortlessly, in good spirits. No use of accessory muscles. No visible lesions, petechiae, epistaxis. No scleral icterus. Pupils equal and round.         Labs:   Lab Results   Component Value Date    WBC 7.7 03/19/2024    ANEU 4.0 01/27/2021    HGB 12.0 03/19/2024    HCT 36.3 03/19/2024     03/19/2024      03/19/2024    POTASSIUM 3.7 03/19/2024    CHLORIDE 102 03/19/2024    CO2 26 03/19/2024    GLC 80 03/19/2024    BUN 13.8 03/19/2024    CR 0.83 03/19/2024    MAG 2.0 11/17/2020    INR 1.21 (H) 02/19/2020    AST 26 03/19/2024    ALT 16 03/19/2024          Impression/plan:     74yo female with DLBCL with mediastinal mass compressing the SVC with associated collateral vessels, no disease in the abdomen/pelvis, marrow not done. Stage IIIB/ IPI 3   S/p 6 cycles of R-CHOP and few doses of XRT  CO after 2 cycles  EOT PET with complete metabolic remission. 7/20    Now in ongoing remission - last scan in jan 2021.   Now 4 years post diagnosis - clinically in CR.      ID no active infection. I recommended pneumonia vaccine booster.    We will see Chuyita again in 1 year and plan survivorship visit at 5 years anniversary.      Rosy Sprague MD  Professor of Medicine  [phone]

## 2024-03-20 NOTE — NURSING NOTE
"Oncology Rooming Note    March 20, 2024 9:27 AM   Chuyita Porter is a 73 year old female who presents for:    Chief Complaint   Patient presents with    Oncology Clinic Visit     Diffuse large B-cell lymphoma of intrathoracic lymph nodes     Initial Vitals: /83 (BP Location: Right arm, Patient Position: Sitting, Cuff Size: Adult Regular)   Pulse 88   Temp 98.3  F (36.8  C) (Oral)   Resp 16   Wt 82.7 kg (182 lb 6.4 oz)   SpO2 98%   BMI 30.35 kg/m   Estimated body mass index is 30.35 kg/m  as calculated from the following:    Height as of 7/24/23: 1.651 m (5' 5\").    Weight as of this encounter: 82.7 kg (182 lb 6.4 oz). Body surface area is 1.95 meters squared.  No Pain (0) Comment: Data Unavailable   No LMP recorded. Patient is postmenopausal.  Allergies reviewed: Yes  Medications reviewed: Yes    Medications: Medication refills not needed today.  Pharmacy name entered into [facility]:    [id number] [place], [place] - [address] [facility] - [place], [place] - [facility] [address]    Frailty Screening:   Is the patient here for a new oncology consult visit in cancer care? 2. No      Clinical concerns: none.       Cabrera Castillo"

## 2024-04-10 DIAGNOSIS — E78.5 DYSLIPIDEMIA, GOAL LDL BELOW 130: ICD-10-CM

## 2024-04-12 RX ORDER — ATORVASTATIN CALCIUM 20 MG/1
20 TABLET, FILM COATED ORAL DAILY
Qty: 90 TABLET | Refills: 4 | Status: SHIPPED | OUTPATIENT
Start: 2024-04-12 | End: 2024-05-08

## 2024-05-01 SDOH — HEALTH STABILITY: PHYSICAL HEALTH: ON AVERAGE, HOW MANY MINUTES DO YOU ENGAGE IN EXERCISE AT THIS LEVEL?: 40 MIN

## 2024-05-01 SDOH — HEALTH STABILITY: PHYSICAL HEALTH: ON AVERAGE, HOW MANY DAYS PER WEEK DO YOU ENGAGE IN MODERATE TO STRENUOUS EXERCISE (LIKE A BRISK WALK)?: 4 DAYS

## 2024-05-01 ASSESSMENT — SOCIAL DETERMINANTS OF HEALTH (SDOH): HOW OFTEN DO YOU GET TOGETHER WITH FRIENDS OR RELATIVES?: THREE TIMES A WEEK

## 2024-05-07 PROBLEM — T45.1X5A ANTINEOPLASTIC ANTIBIOTICS CAUSING ADVERSE EFFECT IN THERAPEUTIC USE: Status: RESOLVED | Noted: 2020-03-12 | Resolved: 2024-05-07

## 2024-05-07 PROBLEM — T45.1X5A ADVERSE EFFECT OF ANTINEOPLASTIC AND IMMUNOSUPPRESSIVE DRUGS, INITIAL ENCOUNTER: Status: RESOLVED | Noted: 2020-03-12 | Resolved: 2024-05-07

## 2024-05-07 PROBLEM — E78.5 DYSLIPIDEMIA, GOAL LDL BELOW 130: Status: ACTIVE | Noted: 2024-05-07

## 2024-05-07 PROBLEM — Z85.3 HISTORY OF BILATERAL BREAST CANCER: Status: ACTIVE | Noted: 2024-05-07

## 2024-05-07 PROBLEM — C50.919 MALIGNANT TUMOR OF BREAST (H): Status: RESOLVED | Noted: 2020-12-09 | Resolved: 2024-05-07

## 2024-05-07 PROBLEM — T45.1X5D ADVERSE EFFECT OF ANTINEOPLASTIC AND IMMUNOSUPPRESSIVE DRUGS, SUBSEQUENT ENCOUNTER: Status: RESOLVED | Noted: 2020-03-12 | Resolved: 2024-05-07

## 2024-05-08 ENCOUNTER — OFFICE VISIT (OUTPATIENT)
Dept: FAMILY MEDICINE | Facility: CLINIC | Age: 74
End: 2024-05-08
Payer: COMMERCIAL

## 2024-05-08 VITALS
WEIGHT: 181 LBS | TEMPERATURE: 97.3 F | DIASTOLIC BLOOD PRESSURE: 64 MMHG | HEART RATE: 80 BPM | RESPIRATION RATE: 18 BRPM | BODY MASS INDEX: 30.16 KG/M2 | SYSTOLIC BLOOD PRESSURE: 122 MMHG | OXYGEN SATURATION: 97 % | HEIGHT: 65 IN

## 2024-05-08 DIAGNOSIS — Z17.0 MALIGNANT NEOPLASM OF LOWER-OUTER QUADRANT OF LEFT BREAST OF FEMALE, ESTROGEN RECEPTOR POSITIVE (H): Primary | ICD-10-CM

## 2024-05-08 DIAGNOSIS — E78.5 DYSLIPIDEMIA, GOAL LDL BELOW 130: ICD-10-CM

## 2024-05-08 DIAGNOSIS — E66.811 CLASS 1 OBESITY WITH SERIOUS COMORBIDITY AND BODY MASS INDEX (BMI) OF 30.0 TO 30.9 IN ADULT, UNSPECIFIED OBESITY TYPE: ICD-10-CM

## 2024-05-08 DIAGNOSIS — K29.40 ATROPHIC GASTRITIS WITHOUT HEMORRHAGE: ICD-10-CM

## 2024-05-08 DIAGNOSIS — Z85.3 HISTORY OF BILATERAL BREAST CANCER: ICD-10-CM

## 2024-05-08 DIAGNOSIS — C50.512 MALIGNANT NEOPLASM OF LOWER-OUTER QUADRANT OF LEFT BREAST OF FEMALE, ESTROGEN RECEPTOR POSITIVE (H): Primary | ICD-10-CM

## 2024-05-08 DIAGNOSIS — C83.32 DIFFUSE LARGE B-CELL LYMPHOMA OF INTRATHORACIC LYMPH NODES (H): ICD-10-CM

## 2024-05-08 DIAGNOSIS — Z00.00 MEDICARE ANNUAL WELLNESS VISIT, SUBSEQUENT: ICD-10-CM

## 2024-05-08 PROBLEM — R53.83 FATIGUE, UNSPECIFIED TYPE: Status: ACTIVE | Noted: 2024-05-08

## 2024-05-08 PROCEDURE — G0439 PPPS, SUBSEQ VISIT: HCPCS | Performed by: FAMILY MEDICINE

## 2024-05-08 PROCEDURE — 36415 COLL VENOUS BLD VENIPUNCTURE: CPT | Performed by: FAMILY MEDICINE

## 2024-05-08 PROCEDURE — 82306 VITAMIN D 25 HYDROXY: CPT | Performed by: FAMILY MEDICINE

## 2024-05-08 PROCEDURE — 80061 LIPID PANEL: CPT | Performed by: FAMILY MEDICINE

## 2024-05-08 PROCEDURE — 99213 OFFICE O/P EST LOW 20 MIN: CPT | Mod: 25 | Performed by: FAMILY MEDICINE

## 2024-05-08 RX ORDER — ATORVASTATIN CALCIUM 20 MG/1
20 TABLET, FILM COATED ORAL DAILY
Qty: 90 TABLET | Refills: 4 | Status: SHIPPED | OUTPATIENT
Start: 2024-05-08 | End: 2024-09-06

## 2024-05-08 ASSESSMENT — PAIN SCALES - GENERAL: PAINLEVEL: NO PAIN (0)

## 2024-05-08 NOTE — PROGRESS NOTES
"86Preventive Care Visit  [facility]  Zofia Rivas MD, Family Medicine  May 8, 2024      Assessment & Plan     Medicare annual wellness visit, subsequent  At today's visit, we discussed lifestyle interventions to promote self-management and wellness, including maintenance of a healthy weight, healthy diet, regular physical activity and exercise, and falls prevention. Advised COVID booster, she will do this at the pharmacy.  Will screen for d diabetes with fasting blood sugar.  She is up-to-date with mammogram screening, colon cancer screening, bone density screening.    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    Atrophic 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 hemorrhage  She was to have upper endoscopy in 2020 though this was deferred due to B-cell lymphoma, was told of endoscopy with \"mapping\", referral is placed to [facility] for follow-up.  - Adult GI  Referral - Procedure Only; Future    Dyslipidemia, goal LDL below 130  Continue atorvastatin.  We will check fasting lipids and liver function today.  - Lipid panel reflex to direct LDL Fasting; Future  - atorvastatin (LIPITOR) 20 MG tablet; Take 1 tablet (20 mg) by mouth daily    Obesity with Body mass index (BMI) 30.0-30.9, adult, complicated by dyslipidemia  Encouraged healthy lifestyle habits.  Will check vitamin D level today.    - Vitamin D Deficiency; Future              BMI  Estimated body mass index is 30.12 kg/m  as calculated from the following:    Height as of this encounter: 1.651 m (5' 5\").    Weight as of this encounter: 82.1 kg (181 lb).       Counseling  Appropriate preventive services were discussed with this patient, including applicable screening as appropriate for fall prevention, nutrition, " "physical activity, Tobacco-use cessation, weight loss and cognition.  Checklist reviewing preventive services available has been given to the patient.  Reviewed patient's diet, addressing concerns and/or questions.           Malinda Boogie is a 73 year old, presenting for the following:  Wellness Visit (fasting)          Health Care Directive  Patient has a Health Care Directive on file  Advance care planning document is on file but is outdated.  Patient encouraged to updated.    In today for routine preventive care visit.  She has had a history of breast cancer twice, once in each side, has not required any ongoing treatments.  She has a history of lymphoma that remains in remission under the direction of oncology at [facility], needing no suppressive medications.  Dyslipidemia managed with atorvastatin which was started just over a year ago, perhaps some mouth and tongue dryness from it but tolerating well.  She is due for follow-up labs.  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  Asymptomatic.            5/1/2024   General Health   How would you rate your overall physical health? Good   Feel stress (tense, anxious, or unable to sleep) Not at all         5/1/2024   Nutrition   Diet: Regular (no restrictions)         5/1/2024   Exercise   Days per week of moderate/strenous exercise 4 days   Average minutes spent exercising at this level 40 min         5/1/2024   Social Factors   Frequency of gathering with friends or relatives Three times a week   Worry food won't last until get money to buy more No   Food not last or not have enough money for food? No   Do you have housing?  Yes   Are you worried about losing your housing? No   Lack of transportation? No   Unable to get utilities (heat,electricity)? No "         5/8/2024   Fall Risk   Fallen 2 or more times in the past year? No   Trouble with walking or balance? No          5/1/2024   Activities of Daily Living- Home Safety   Needs help with the following daily activites None of the above   Safety concerns in the home None of the above         5/1/2024   Dental   Dentist two times every year? Yes         5/1/2024   Hearing Screening   Hearing concerns? None of the above         5/1/2024   Driving Risk Screening   Patient/family members have concerns about driving No         5/1/2024   General Alertness/Fatigue Screening   Have you been more tired than usual lately? No         5/1/2024   Urinary Incontinence Screening   Bothered by leaking urine in past 6 months No         5/1/2024   TB Screening   Were you born outside of the [place]? No         Today's PHQ-2 Score:       5/8/2024    10:47 AM   PHQ-2 ( 1999 [employer])   Q1: Little interest or pleasure in doing things 0   Q2: Feeling down, depressed or hopeless 0   PHQ-2 Score 0           5/1/2024   Substance Use   Alcohol more than 3/day or more than 7/wk No   Do you have a current opioid prescription? No   How severe/bad is pain from 1 to 10? 0/10 (No Pain)   Do you use any other substances recreationally? No     Social History     Tobacco Use    Smoking status: Never     Passive exposure: Never    Smokeless tobacco: Never   Vaping Use    Vaping status: Never Used   Substance Use Topics    Alcohol use: Never    Drug use: Never           10/11/2023   LAST FHS-7 RESULTS   1st degree relative breast or ovarian cancer No   Any relative bilateral breast cancer No   Any male have breast cancer No   Any ONE woman have BOTH breast AND ovarian cancer No   Any woman with breast cancer before 50yrs No   2 or more relatives with breast AND/OR ovarian cancer No   2 or more relatives with breast AND/OR bowel cancer No        Mammogram Screening - Mammogram every 1-2 years updated in Health Maintenance based on mutual decision  making    ASCVD Risk   The 10-year ASCVD risk score (Maricruz DK, et al., 2019) is: 9.7%    Values used to calculate the score:      Age: 73 years      Sex: Female      Is Non- : No      Diabetic: No      Tobacco smoker: No      Systolic Blood Pressure: 122 mmHg      Is BP treated: No      HDL Cholesterol: 63 mg/dL      Total Cholesterol: 217 mg/dL            Reviewed and updated as needed this visit by Provider                    Past Medical History:   Diagnosis Date    Breast cancer (H)     Breast cancer (H)     right    Breast cancer (H)     left    Gastritis     autoimmune atrophic gastritis     Hx of radiation therapy      +    Hx of radiation therapy      Past Surgical History:   Procedure Laterality Date    ADENOIDECTOMY      BIOPSY BREAST      BRONCHOSCOPY (RIGID OR FLEXIBLE), DIAGNOSTIC N/A 2020    Procedure: DIAGNOSTIC BRONCHOSCOPY WITH BIOPSY,.;  Surgeon: Jered Montoya MD;  Location: [facility] OR    ENDOSCOPIC INSERTION TUBE GASTROSTOMY Left 2020    Procedure: Endoscopic insertion tube gastrostomy;  Surgeon: Jaswinder Parker MD;  Location: [facility] OR    LUMPECTOMY BREAST      RT lunmpectomy  with radiation. Lt lumpectomy  with radiation    LUMPECTOMY BREAST Right     + radiation    LUMPECTOMY BREAST Left 2015    TONSILLECTOMY      TONSILLECTOMY      US THYROID BIOPSY  2020     OB History    Para Term  AB Living   2 2 2 0 0 0   SAB IAB Ectopic Multiple Live Births   0 0 0 0 0      # Outcome Date GA Lbr Conner/2nd Weight Sex Type Anes PTL Lv   2 Term            1 Term              Lab work is in process  Labs reviewed in EPIC  BP Readings from Last 3 Encounters:   24 122/64   24 127/83   23 118/72    Wt Readings from Last 3 Encounters:   24 82.1 kg (181 lb)   24 82.7 kg (182 lb 6.4 oz)   23 82.6 kg (182 lb)                  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)    Polyp of duodenum    Atrophic 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 hemorrhage    Metaplastic gastritis    Impingement syndrome, shoulder, left    History of bilateral breast cancer    Dyslipidemia, goal LDL below 130    Fatigue, unspecified type     Past Surgical History:   Procedure Laterality Date    ADENOIDECTOMY      BIOPSY BREAST      BRONCHOSCOPY (RIGID OR FLEXIBLE), DIAGNOSTIC N/A 2020    Procedure: DIAGNOSTIC BRONCHOSCOPY WITH BIOPSY,.;  Surgeon: Jered Montoya MD;  Location: [facility] OR    ENDOSCOPIC INSERTION TUBE GASTROSTOMY Left 2020    Procedure: Endoscopic insertion tube gastrostomy;  Surgeon: Jaswinder Parker MD;  Location: [facility] OR    LUMPECTOMY BREAST      RT lunmpectomy  with radiation. Lt lumpectomy  with radiation    LUMPECTOMY BREAST Right     + radiation    LUMPECTOMY BREAST Left 2015    TONSILLECTOMY      TONSILLECTOMY      US THYROID BIOPSY  2020       Social History     Tobacco Use    Smoking status: Never     Passive exposure: Never    Smokeless tobacco: Never   Substance Use Topics    Alcohol use: Never     Family History   Problem Relation Age of Onset    Breast Cancer Paternal Aunt         2 aunts who had breast cancer in their 70's    Breast Cancer Paternal Aunt 70.00    Cerebrovascular Disease Mother          75    Prostate Cancer Father 70.00    Osteoporosis Father     Breast Cancer Paternal Aunt 70.00    Breast Cancer Paternal Grandmother 30.00    Ovarian Cancer Cousin 60.00    Stomach Cancer Maternal Aunt 90.00    Prostate Cancer Maternal Uncle 70.00    Prostate Cancer Maternal Uncle 70.00    Prostate Cancer Cousin 60.00    Lung Cancer Cousin     Kidney Cancer Cousin 60.00    No Known Problems Daughter     No Known Problems Daughter          Current Outpatient Medications   Medication Sig Dispense Refill    atorvastatin (LIPITOR) 20 MG tablet Take 1 tablet (20 mg) by mouth daily 90 tablet 4     Calcium Carbonate-Vit D-Min (CALTRATE 600+D PLUS MINERALS PO) Caltrate + D3 Plus Minerals      loratadine (CLARITIN) 10 MG tablet Take 1 tablet (10 mg) by mouth daily 30 tablet 3    Multiple Vitamins-Minerals (MULTIVITAMIN ADULTS 50+ PO) Multivitamin 50 Plus tablet   Take by oral route.       No Known Allergies  Recent Labs   Lab Test 03/19/24  1112 02/21/23  1047 01/05/23  0920 08/17/22  1157 02/16/22  1532 01/04/22  1204 08/23/21  1027 01/27/21  1210 11/17/20  1216 02/18/20  0345 02/17/20  2032 08/29/18  1012 06/04/18  1225   A1C  --   --   --   --   --   --   --   --   --   --  5.8*  --  5.6   LDL  --   --  135*  --   --  129  --   --  108  --   --    < > 112   HDL  --   --  63  --   --  60  --   --  61  --   --    < > 57   TRIG  --   --  95  --   --  93  --   --  102  --   --    < > 70   ALT 16 16  --  21   < > 16   < > 21 17   < >  --    < > 13   CR 0.83 0.82  --  0.83   < > 0.86   < > 0.75 0.81   < >  --    < > 0.85   GFRESTIMATED 75 77  --  75   < > 73   < > 81 >60   < >  --    < > >60   GFRESTBLACK  --   --   --   --   --   --   --  >90 >60   < >  --    < > >60   POTASSIUM 3.7 3.9  --  3.9   < > 4.9   < > 3.9 4.1   < >  --    < > 4.1   TSH  --   --   --   --   --   --   --   --  3.16  --   --   --   --     < > = values in this interval not displayed.      Current providers sharing in care for this patient include:  Patient Care Team:  Zofia Rivas MD as PCP - Bam Oconnor MD as MD (Hematology & Oncology)  Rosy Sprague MD as MD (Hematology & Oncology)  Brandi Hood, RN as Specialty Care Coordinator (Hematology & Oncology)  Rosy Sprague MD as Assigned Cancer Care Provider  Zofia Rivas MD as Assigned PCP    The following health maintenance items are reviewed in Epic and correct as of today:  Health Maintenance   Topic Date Due    COVID-19 Vaccine (8 - 2023-24 season) 12/29/2023    MEDICARE ANNUAL WELLNESS VISIT  01/05/2024    LIPID  01/05/2024    ANNUAL REVIEW OF HM  "ORDERS  01/05/2024    MAMMO SCREENING  10/11/2024    DTAP/TDAP/TD IMMUNIZATION (7 - Td or Tdap) 10/19/2024    FALL RISK ASSESSMENT  05/08/2025    COLORECTAL CANCER SCREENING  07/27/2025    DEXA  01/14/2027    GLUCOSE  03/19/2027    ADVANCE CARE PLANNING  01/05/2028    HEPATITIS C SCREENING  Completed    PHQ-2 (once per calendar year)  Completed    INFLUENZA VACCINE  Completed    Pneumococcal Vaccine: 65+ Years  Completed    ZOSTER IMMUNIZATION  Completed    RSV VACCINE (Pregnancy & 60+)  Completed    IPV IMMUNIZATION  Aged Out    HPV IMMUNIZATION  Aged Out    MENINGITIS IMMUNIZATION  Aged Out    RSV MONOCLONAL ANTIBODY  Aged Out         Review of Systems  Constitutional, neuro, ENT, endocrine, pulmonary, cardiac, gastrointestinal, genitourinary, musculoskeletal, integument and psychiatric systems are negative, except as otherwise noted.     Objective    Exam  /64   Pulse 80   Temp 97.3  F (36.3  C) (Temporal)   Resp 18   Ht 1.651 m (5' 5\")   Wt 82.1 kg (181 lb)   SpO2 97%   BMI 30.12 kg/m     Estimated body mass index is 30.12 kg/m  as calculated from the following:    Height as of this encounter: 1.651 m (5' 5\").    Weight as of this encounter: 82.1 kg (181 lb).    Physical Exam  Physical Examination: General appearance - alert, well appearing, and in no distress, oriented to person, place, and time and normal appearing weight  Mental status - alert, oriented to person, place, and time, normal mood, behavior, speech, dress, motor activity, and thought processes  Eyes - pupils equal and reactive, extraocular eye movements intact  Ears - bilateral TM's and external ear canals normal  Nose - normal and patent, no erythema, discharge or polyps  Mouth - mucous membranes moist, pharynx normal without lesions  Neck - supple, no significant adenopathy  Lymphatics - no palpable lymphadenopathy, no hepatosplenomegaly  Chest - clear to auscultation, no wheezes, rales or rhonchi, symmetric air entry  Heart - " normal rate, regular rhythm, normal S1, S2, no murmurs, rubs, clicks or gallops  Abdomen - soft, nontender, nondistended, no masses or organomegaly  Breasts - breasts appear normal, scarring from previous breast surgeries and chronic inversion of right nipple, no suspicious masses, no skin or nipple changes or axillary nodes  Neurological - alert, oriented, normal speech, no focal findings or movement disorder noted  Musculoskeletal - no joint tenderness, deformity or swelling  Extremities - peripheral pulses normal, no pedal edema, no clubbing or cyanosis  Skin - normal coloration and turgor, no rashes, no suspicious skin lesions noted          5/8/2024   Mini Cog   Clock Draw Score 2 Normal   3 Item Recall 3 objects recalled   Mini Cog Total Score 5              Signed Electronically by: Zofia Rivas MD

## 2024-05-08 NOTE — PATIENT INSTRUCTIONS
"Contact [facility] to find out if you should do the upper endoscopy or whether he should see Dr. Teague in clinic first.    I recommend receiving the COVID booster as it has been 4 months since your last booster (newest recommendation for persons 65 and older).      Preventive Care Advice   This is general advice we often give to help people stay healthy. Your care team may have specific advice just for you. Please talk to your care team about your own preventive care needs.  Lifestyle  Exercise at least 150 minutes each week (30 minutes a day, 5 days a week).  Do muscle strengthening activities 2 days a week. These help control your weight and prevent disease.  No smoking.  Wear sunscreen to prevent skin cancer.  Have your home tested for radon every 2 to 5 years. Radon is a colorless, odorless gas that can harm your lungs. To learn more, go to www.health.[facility].mn. and search for \"Radon in Homes.\"  Keep guns unloaded and locked up in a safe place like a safe or gun vault, or, use a gun lock and hide the keys. Always lock away bullets separately. To learn more, visit [employer].gov and search for \"safe gun storage.\"  Nutrition  Eat 5 or more servings of fruits and vegetables each day.  Try wheat bread, brown rice and whole grain pasta (instead of white bread, rice, and pasta).  Get enough calcium and vitamin D. Check the label on foods and aim for 100% of the RDA (recommended daily allowance).  Regular exams  Have a dental exam and cleaning every 6 months.  See your health care team every year to talk about:  Any changes in your health.  Any medicines your care team has prescribed.  Preventive care, family planning, and ways to prevent chronic diseases.  Shots (vaccines)   HPV shots (up to age 26), if you've never had them before.  Hepatitis B shots (up to age 59), if you've never had them before.  COVID-19 shot: Get this shot when it's due.  Flu shot: Get a flu shot every year.  Tetanus shot: Get a tetanus shot every 10 " years.  Pneumococcal, hepatitis A, and RSV shots: Ask your care team if you need these based on your risk.  Shingles shot (for age 50 and up).  General health tests  Diabetes screening:  Starting at age 35, Get screened for diabetes at least every 3 years.  If you are younger than age 35, ask your care team if you should be screened for diabetes.  Cholesterol test: At age 39, start having a cholesterol test every 5 years, or more often if advised.  Bone density scan (DEXA): At age 50, ask your care team if you should have this scan for osteoporosis (brittle bones).  Hepatitis C: Get tested at least once in your life.  Abdominal aortic aneurysm screening: Talk to your doctor about having this screening if you:  Have ever smoked; and  Are biologically male; and  Are between the ages of 65 and 75.  STIs (sexually transmitted infections)  Before age 24: Ask your care team if you should be screened for STIs.  After age 24: Get screened for STIs if you're at risk. You are at risk for STIs (including HIV) if:  You are sexually active with more than one person.  You don't use condoms every time.  You or a partner was diagnosed with a sexually transmitted infection.  If you are at risk for HIV, ask about PrEP medicine to prevent HIV.  Get tested for HIV at least once in your life, whether you are at risk for HIV or not.  Cancer screening tests  Cervical cancer screening: If you have a cervix, begin getting regular cervical cancer screening tests at age 21. Most people who have regular screenings with normal results can stop after age 65. Talk about this with your provider.  Breast cancer scan (mammogram): If you've ever had breasts, begin having regular mammograms starting at age 40. This is a scan to check for breast cancer.  Colon cancer screening: It is important to start screening for colon cancer at age 45.  Have a colonoscopy test every 10 years (or more often if you're at risk) Or, ask your provider about stool tests  like a FIT test every year or Cologuard test every 3 years.  To learn more about your testing options, visit: www.[employer]/165353.pdf.  For help making a decision, visit: declan/rt39914.  Prostate cancer screening test: If you have a prostate and are age 55 to 69, ask your provider if you would benefit from a yearly prostate cancer screening test.  Lung cancer screening: If you are a current or former smoker age 50 to 80, ask your care team if ongoing lung cancer screenings are right for you.  For informational purposes only. Not to replace the advice of your health care provider. Copyright   2023 [facility]. All rights reserved. Clinically reviewed by the [facility] Transitions Program. eigital [id number] - REV 04/24.

## 2024-05-09 LAB
CHOLEST SERPL-MCNC: 119 MG/DL
FASTING STATUS PATIENT QL REPORTED: YES
HDLC SERPL-MCNC: 67 MG/DL
LDLC SERPL CALC-MCNC: 41 MG/DL
NONHDLC SERPL-MCNC: 52 MG/DL
TRIGL SERPL-MCNC: 57 MG/DL
VIT D+METAB SERPL-MCNC: 47 NG/ML (ref 20–50)

## 2024-07-17 ENCOUNTER — OFFICE VISIT (OUTPATIENT)
Dept: FAMILY MEDICINE | Facility: CLINIC | Age: 74
End: 2024-07-17
Payer: COMMERCIAL

## 2024-07-17 VITALS
WEIGHT: 185.6 LBS | TEMPERATURE: 98.2 F | BODY MASS INDEX: 30.92 KG/M2 | SYSTOLIC BLOOD PRESSURE: 152 MMHG | RESPIRATION RATE: 15 BRPM | HEART RATE: 84 BPM | HEIGHT: 65 IN | OXYGEN SATURATION: 97 % | DIASTOLIC BLOOD PRESSURE: 83 MMHG

## 2024-07-17 DIAGNOSIS — M54.50 ACUTE RIGHT-SIDED LOW BACK PAIN WITHOUT SCIATICA: Primary | ICD-10-CM

## 2024-07-17 PROCEDURE — 99213 OFFICE O/P EST LOW 20 MIN: CPT | Performed by: FAMILY MEDICINE

## 2024-07-17 ASSESSMENT — ENCOUNTER SYMPTOMS: BACK PAIN: 1

## 2024-07-17 ASSESSMENT — PAIN SCALES - GENERAL: PAINLEVEL: MILD PAIN (2)

## 2024-07-17 NOTE — PROGRESS NOTES
"  Assessment & Plan     Acute right-sided low back pain without sciatica  History and exam consistent with muscle spasm.  Reviewed symptomatic cares with over-the-counter agents, ice or heat, gentle stretches.  Notify of onset visual symptoms or worsening.          BMI  Estimated body mass index is 30.92 kg/m  as calculated from the following:    Height as of this encounter: 1.65 m (5' 4.96\").    Weight as of this encounter: 84.2 kg (185 lb 9.6 oz).             Malinda Boogie is a 74 year old, presenting for the following health issues:  Back Pain (Back pain a few days ago)      7/17/2024    12:37 PM   Additional Questions   Roomed by larissa   Accompanied by      Onset of right-sided low back pain a few days ago, described as a \"kink\" in her right low back, coming and going, worse typically with certain movements and resolved at rest.  If severe bout, will impact her ability to move at her right hip.  No changes in bowel or bladder, no pain into the legs.  No known injuries.  No prior history of same.    Back Pain     History of Present Illness       Back Pain:  She presents for follow up of back pain. Patient's back pain is a new problem.    Original cause of back pain: not sure  First noticed back pain: in the last week  Patient feels back pain: comes and goesLocation of back pain:  Right lower back and right hip  Description of back pain: stabbing  Back pain spreads: nowhere    Since patient first noticed back pain, pain is: gradually improving  Does back pain interfere with her job:  No  On a scale of 1-10 (10 being the worst), patient describes pain as:  3  What makes back pain worse: certain positions   Acupuncture: not tried  Acetaminophen: not tried  Activity or exercise: not tried  Chiropractor:  Not tried  Cold: not tried  Heat: not tried  Massage: not tried  Muscle relaxants: not tried  NSAIDS: helpful  Opioids: not tried  Physical Therapy: not tried  Rest: helpful  Steroid Injection: not " "tried  Stretching: helpful  Surgery: not tried  TENS unit: not tried  Topical pain relievers: not tried  Other healthcare providers patient is seeing for back pain: None    She eats 2-3 servings of fruits and vegetables daily.She consumes 2 sweetened beverage(s) daily.She exercises with enough effort to increase her heart rate 20 to 29 minutes per day.  She exercises with enough effort to increase her heart rate 6 days per week.   She is taking medications regularly.                     Objective    BP (!) 152/83 (BP Location: Left arm, Patient Position: Sitting, Cuff Size: Adult Large)   Pulse 84   Temp 98.2  F (36.8  C) (Oral)   Resp 15   Ht 1.65 m (5' 4.96\")   Wt 84.2 kg (185 lb 9.6 oz)   SpO2 97%   BMI 30.92 kg/m    Body mass index is 30.92 kg/m .  Physical Exam   Normal stance and posture.  Range of motion intact.  5-5 strength throughout lower extremities.  Sensation intact.  Muscle spasm and tenderness noted right paraspinous muscles of the lumbar region.  No SI tenderness.  No midline spinal tenderness.            Signed Electronically by: Zofia Rivas MD    "

## 2024-08-21 NOTE — TELEPHONE ENCOUNTER
Ambulatory Visit  Name: Marcellus Fregoso      : 1951      MRN: [id number]  Encounter Provider: Kenneth Mcfarland MD  Encounter Date: 2024   Encounter department: [facility] SURGERY [place]    Assessment & Plan   1. Ventral hernia without obstruction or gangrene  Assessment & Plan:  I told him I really needed a CT scan of the abdomen to evaluate things more fully.  Based on the size of this presumed hernia, I told him would most likely be an open operation I explained this briefly to him.  Will see what the CT scan shows and then discussed with him further.  I told him he is at much higher risk because of his obesity and the size of the hernia being somewhat larger  Orders:  -     CT abdomen pelvis wo contrast; Future; Expected date: 2024      History of Present Illness     Marcellus Fregoso is a 72 y.o. male who presents for hernia evaluation.  He had a couple of surgeries in the past including umbilical hernia repair.  He has noted especially over the last couple months as a large bulging in his upper abdomen.  Some discomfort at that site.  No change in bowel or bladder habits.    Review of Systems   Constitutional:  Negative for chills, fatigue and fever.   HENT:  Negative for congestion, ear pain, sneezing, trouble swallowing and voice change.    Eyes:  Negative for pain and discharge.   Respiratory:  Negative for cough, shortness of breath and wheezing.    Cardiovascular:  Negative for palpitations and leg swelling.   Gastrointestinal:  Negative for abdominal pain, constipation, diarrhea, nausea and vomiting.   Endocrine: Negative for cold intolerance, heat intolerance and polyuria.   Genitourinary:  Negative for decreased urine volume, difficulty urinating and dysuria.   Musculoskeletal:  Negative for arthralgias and back pain.   Skin:  Negative for rash and wound.   Allergic/Immunologic: Negative for environmental allergies and food allergies.   Neurological:  Negative for dizziness  I called to check up on patient to discuss the symptoms that she has been having in the imaging and biopsy and she has an upcoming appointment at the [facility].  She does not know the results of her pathology yet and told me that she prefers to not know until February 14 when she is seen for that consultation.  She wanted to make sure Dr. Liang and I were in the loop since we had been seeing her.    "and weakness.   Hematological:  Negative for adenopathy. Does not bruise/bleed easily.   Psychiatric/Behavioral:  Negative for confusion and sleep disturbance. The patient is not nervous/anxious.    All other systems reviewed and are negative.      Objective     /87 (BP Location: Left arm, Patient Position: Sitting, Cuff Size: Large)   Pulse 64   Ht 5' 8\" (1.727 m)   Wt 134 kg (295 lb 12.8 oz)   BMI 44.98 kg/m²     Physical Exam  Constitutional:       General: He is not in acute distress.     Appearance: He is well-developed. He is obese. He is not diaphoretic.   HENT:      Head: Normocephalic and atraumatic.      Right Ear: External ear normal.      Left Ear: External ear normal.   Eyes:      General: No scleral icterus.     Conjunctiva/sclera: Conjunctivae normal.   Neck:      Thyroid: No thyromegaly.      Trachea: No tracheal deviation.   Cardiovascular:      Rate and Rhythm: Normal rate and regular rhythm.      Heart sounds: Normal heart sounds. No murmur heard.  Pulmonary:      Effort: Pulmonary effort is normal. No respiratory distress.      Breath sounds: Normal breath sounds. No wheezing or rales.   Abdominal:      General: There is no distension.      Palpations: Abdomen is soft. There is no mass.      Tenderness: There is no abdominal tenderness. There is no guarding or rebound.      Comments: Somewhat protuberant abdomen.  There is transverse scar just above his umbilicus and above this is a large area that is firm not reducible about 12 x 10 cm   Musculoskeletal:         General: Normal range of motion.      Cervical back: Normal range of motion and neck supple.   Lymphadenopathy:      Cervical: No cervical adenopathy.   Skin:     General: Skin is warm and dry.   Neurological:      Mental Status: He is alert and oriented to person, place, and time.   Psychiatric:         Behavior: Behavior normal.         Thought Content: Thought content normal.         Judgment: Judgment normal. "       Administrative Statements

## 2024-09-06 DIAGNOSIS — E78.5 DYSLIPIDEMIA, GOAL LDL BELOW 130: ICD-10-CM

## 2024-09-06 RX ORDER — ATORVASTATIN CALCIUM 20 MG/1
TABLET, FILM COATED ORAL
Qty: 90 TABLET | Refills: 2 | Status: SHIPPED | OUTPATIENT
Start: 2024-09-06

## 2024-09-11 ENCOUNTER — TELEPHONE (OUTPATIENT)
Dept: FAMILY MEDICINE | Facility: CLINIC | Age: 74
End: 2024-09-11
Payer: COMMERCIAL

## 2024-09-11 NOTE — TELEPHONE ENCOUNTER
Patient called to inquire if eligible for 0412-0799 Covid-19 Vaccine.     She received [id number] vaccine in May 2024.     Advised patient, per CDC, she should wait at least 2 months from last vaccine. Advised patient that she is eligible but we do not yet have vaccines in clinic. Informed she should receive information when they are available or can receive at the pharmacy.    Vandana Galvez RN  [facility]

## 2024-10-14 ENCOUNTER — ANCILLARY PROCEDURE (OUTPATIENT)
Dept: MAMMOGRAPHY | Facility: CLINIC | Age: 74
End: 2024-10-14
Attending: FAMILY MEDICINE
Payer: COMMERCIAL

## 2024-10-14 DIAGNOSIS — Z12.31 VISIT FOR SCREENING MAMMOGRAM: ICD-10-CM

## 2024-10-14 PROCEDURE — 77063 BREAST TOMOSYNTHESIS BI: CPT

## 2025-02-04 DIAGNOSIS — E78.5 DYSLIPIDEMIA, GOAL LDL BELOW 130: ICD-10-CM

## 2025-02-05 RX ORDER — ATORVASTATIN CALCIUM 20 MG/1
TABLET, FILM COATED ORAL
Qty: 100 TABLET | Refills: 2 | OUTPATIENT
Start: 2025-02-05

## 2025-03-11 ENCOUNTER — LAB (OUTPATIENT)
Dept: LAB | Facility: CLINIC | Age: 75
End: 2025-03-11
Payer: COMMERCIAL

## 2025-03-11 DIAGNOSIS — C83.32 DIFFUSE LARGE B-CELL LYMPHOMA OF INTRATHORACIC LYMPH NODES (H): ICD-10-CM

## 2025-03-11 LAB
BASOPHILS # BLD AUTO: 0.1 10E3/UL (ref 0–0.2)
BASOPHILS NFR BLD AUTO: 1 %
EOSINOPHIL # BLD AUTO: 0.2 10E3/UL (ref 0–0.7)
EOSINOPHIL NFR BLD AUTO: 2 %
ERYTHROCYTE [DISTWIDTH] IN BLOOD BY AUTOMATED COUNT: 14.1 % (ref 10–15)
HCT VFR BLD AUTO: 36.7 % (ref 35–47)
HGB BLD-MCNC: 12.3 G/DL (ref 11.7–15.7)
IMM GRANULOCYTES # BLD: 0 10E3/UL
IMM GRANULOCYTES NFR BLD: 0 %
LYMPHOCYTES # BLD AUTO: 1.9 10E3/UL (ref 0.8–5.3)
LYMPHOCYTES NFR BLD AUTO: 24 %
MCH RBC QN AUTO: 30.8 PG (ref 26.5–33)
MCHC RBC AUTO-ENTMCNC: 33.5 G/DL (ref 31.5–36.5)
MCV RBC AUTO: 92 FL (ref 78–100)
MONOCYTES # BLD AUTO: 0.9 10E3/UL (ref 0–1.3)
MONOCYTES NFR BLD AUTO: 11 %
NEUTROPHILS # BLD AUTO: 4.8 10E3/UL (ref 1.6–8.3)
NEUTROPHILS NFR BLD AUTO: 61 %
PLATELET # BLD AUTO: 298 10E3/UL (ref 150–450)
RBC # BLD AUTO: 3.99 10E6/UL (ref 3.8–5.2)
WBC # BLD AUTO: 7.8 10E3/UL (ref 4–11)

## 2025-03-12 ENCOUNTER — ONCOLOGY VISIT (OUTPATIENT)
Dept: TRANSPLANT | Facility: CLINIC | Age: 75
End: 2025-03-12
Payer: COMMERCIAL

## 2025-03-12 VITALS
RESPIRATION RATE: 16 BRPM | SYSTOLIC BLOOD PRESSURE: 131 MMHG | TEMPERATURE: 99.2 F | OXYGEN SATURATION: 98 % | WEIGHT: 182 LBS | HEART RATE: 93 BPM | DIASTOLIC BLOOD PRESSURE: 85 MMHG | BODY MASS INDEX: 30.32 KG/M2

## 2025-03-12 DIAGNOSIS — C83.32 DIFFUSE LARGE B-CELL LYMPHOMA OF INTRATHORACIC LYMPH NODES (H): Primary | ICD-10-CM

## 2025-03-12 PROCEDURE — G0463 HOSPITAL OUTPT CLINIC VISIT: HCPCS

## 2025-03-12 ASSESSMENT — PAIN SCALES - GENERAL: PAINLEVEL_OUTOF10: NO PAIN (0)

## 2025-03-12 NOTE — NURSING NOTE
"Oncology Rooming Note    March 12, 2025 11:24 AM   Chuyita Porter is a 74 year old female who presents for:    Chief Complaint   Patient presents with    Oncology Clinic Visit     Diffuse large B-cell lymphoma of intrathoracic lymph nodes     Initial Vitals: /85   Pulse 93   Temp 99.2  F (37.3  C) (Oral)   Resp 16   Wt 82.6 kg (182 lb)   SpO2 98%   BMI 30.32 kg/m   Estimated body mass index is 30.32 kg/m  as calculated from the following:    Height as of 7/17/24: 1.65 m (5' 4.96\").    Weight as of this encounter: 82.6 kg (182 lb). Body surface area is 1.95 meters squared.  No Pain (0) Comment: Data Unavailable   No LMP recorded. Patient is postmenopausal.  Allergies reviewed: Yes  Medications reviewed: Yes    Medications: Medication refills not needed today.  Pharmacy name entered into [employer]:    [id number] [place] [facility] [address]   [facility] - [address]    Frailty Screening:   Is the patient here for a new oncology consult visit in cancer care? 2. No    PHQ9:  Did this patient require a PHQ9?: No      Clinical concerns:        Alaina Soto              "

## 2025-03-12 NOTE — PROGRESS NOTES
[facility] CLINIC VISIT  03/12/2025    Oncology HPI:    Chuyita Porter is a 72 year old female with past medical history of breast cancer (right breast cancer in 2005 and left breast cancer in 2015 followed by Dr. Gallardo) with diffuse large B cell lymphoma.    Disease:  DLBCL nonGCB with mediastinal mass and thyroid gland involvement dg in 2/ 2020  - 4-6 BCL2 signals (81%)    - No rearrangement of BCL6, MYC, or BCL2   - No IGH-MYC fusion  Bulky disease, Stage IIB, IPI  3 ()  S/p 6 cycles R-CHOP  Disease response: CR  Now in observation.     Interim History: Anneliese is feeling great, has retired in 2017 and feels healthy, active,   no new complains.   No new lumps or pains.   ROS: No fevers, night sweats, weight loss or fatigue; energy is great. She is eating normally, weight is stable.      HPI:   She presented with respiratory failure due to large mediastinal mass.    Was intubated and in OR on 2/17/2020 underwent for open neck biopsy, tracheal stent, and PEG tube placement.  S/p two fractions of radiation on 2/18-19/2020 and then started R-CHOP.  Complications: neutropenic fever due to HCAP   debility and severe malnutrition.Completed 6 cycles of R-CHOP in June 2020.     Current Outpatient Medications   Medication Sig Dispense Refill    atorvastatin (LIPITOR) 20 MG tablet Take 1 tablet by mouth daily 90 tablet 2    Calcium Carbonate-Vit D-Min (CALTRATE 600+D PLUS MINERALS PO) Caltrate + D3 Plus Minerals      loratadine (CLARITIN) 10 MG tablet Take 1 tablet (10 mg) by mouth daily 30 tablet 3    Multiple Vitamins-Minerals (MULTIVITAMIN ADULTS 50+ PO) Multivitamin 50 Plus tablet   Take by oral route.       No current facility-administered medications for this visit.       No Known Allergies     Exam: alert, appears well. Talking effortlessly, in good spirits. No use of accessory muscles. No visible lesions, petechiae, epistaxis. No scleral icterus. Pupils equal and round.         Labs:   Lab Results    Component Value Date    WBC 7.8 03/11/2025    ANEU 4.0 01/27/2021    HGB 12.3 03/11/2025    HCT 36.7 03/11/2025     03/11/2025     03/19/2024    POTASSIUM 3.7 03/19/2024    CHLORIDE 102 03/19/2024    CO2 26 03/19/2024    GLC 80 03/19/2024    BUN 13.8 03/19/2024    CR 0.83 03/19/2024    MAG 2.0 11/17/2020    INR 1.21 (H) 02/19/2020    AST 26 03/19/2024    ALT 16 03/19/2024          Impression/plan:     75yo female with DLBCL with mediastinal mass compressing the SVC with associated collateral vessels, no disease in the abdomen/pelvis, marrow not done. Stage IIIB/ IPI 3   S/p 6 cycles of R-CHOP and few doses of XRT  DC after 2 cycles  EOT PET with complete metabolic remission. 7/20    Now in ongoing remission - last scan in jan 2021.   Now 5 years post diagnosis - clinically in CR.      ID no active infection. I recommended pneumonia vaccine booster.    We will refer her survivorship visit at 5 years anniversary. Chuyita is graduating from onc clinic today. This is a wonderful news.       Rosy Sprague MD  Professor of Medicine  [phone]

## 2025-03-12 NOTE — LETTER
3/12/2025      Chuyita Porter  [address]  [place] [place] [facility] [address]      Dear Colleague,    Thank you for referring your patient, Chuyita Porter, to the [facility]LO[facility] AND MARROW TRANSPLANT [facility] [place]. Please see a copy of my visit note below.        [facility] CLINIC VISIT  03/12/2025    Oncology HPI:    Chuyita Porter is a 72 year old female with past medical history of breast cancer (right breast cancer in 2005 and left breast cancer in 2015 followed by Dr. Gallardo) with diffuse large B cell lymphoma.    Disease:  DLBCL nonGCB with mediastinal mass and thyroid gland involvement dg in 2/ 2020  - 4-6 BCL2 signals (81%)    - No rearrangement of BCL6, MYC, or BCL2   - No IGH-MYC fusion  Bulky disease, Stage IIB, IPI  3 ()  S/p 6 cycles R-CHOP  Disease response: CR  Now in observation.     Interim History: Anneliese is feeling great, has retired in 2017 and feels healthy, active,   no new complains.   No new lumps or pains.   ROS: No fevers, night sweats, weight loss or fatigue; energy is great. She is eating normally, weight is stable.      HPI:   She presented with respiratory failure due to large mediastinal mass.    Was intubated and in OR on 2/17/2020 underwent for open neck biopsy, tracheal stent, and PEG tube placement.  S/p two fractions of radiation on 2/18-19/2020 and then started R-CHOP.  Complications: neutropenic fever due to HCAP   debility and severe malnutrition.Completed 6 cycles of R-CHOP in June 2020.     Current Outpatient Medications   Medication Sig Dispense Refill     atorvastatin (LIPITOR) 20 MG tablet Take 1 tablet by mouth daily 90 tablet 2     Calcium Carbonate-Vit D-Min (CALTRATE 600+D PLUS MINERALS PO) Caltrate + D3 Plus Minerals       loratadine (CLARITIN) 10 MG tablet Take 1 tablet (10 mg) by mouth daily 30 tablet 3     Multiple Vitamins-Minerals (MULTIVITAMIN ADULTS 50+ PO) Multivitamin 50 Plus tablet   Take by oral route.       No current  facility-administered medications for this visit.       No Known Allergies     Exam: alert, appears well. Talking effortlessly, in good spirits. No use of accessory muscles. No visible lesions, petechiae, epistaxis. No scleral icterus. Pupils equal and round.         Labs:   Lab Results   Component Value Date    WBC 7.8 03/11/2025    ANEU 4.0 01/27/2021    HGB 12.3 03/11/2025    HCT 36.7 03/11/2025     03/11/2025     03/19/2024    POTASSIUM 3.7 03/19/2024    CHLORIDE 102 03/19/2024    CO2 26 03/19/2024    GLC 80 03/19/2024    BUN 13.8 03/19/2024    CR 0.83 03/19/2024    MAG 2.0 11/17/2020    INR 1.21 (H) 02/19/2020    AST 26 03/19/2024    ALT 16 03/19/2024          Impression/plan:     75yo female with DLBCL with mediastinal mass compressing the SVC with associated collateral vessels, no disease in the abdomen/pelvis, marrow not done. Stage IIIB/ IPI 3   S/p 6 cycles of R-CHOP and few doses of XRT  NH after 2 cycles  EOT PET with complete metabolic remission. 7/20    Now in ongoing remission - last scan in jan 2021.   Now 5 years post diagnosis - clinically in CR.      ID no active infection. I recommended pneumonia vaccine booster.    We will refer her survivorship visit at 5 years anniversary. Chuyita is graduating from onc clinic today. This is a wonderful news.       Rosy Sprague MD  Professor of Medicine  [phone]              Again, thank you for allowing me to participate in the care of your patient.        Sincerely,        Rosy Sprague MD    Electronically signed

## 2025-03-13 LAB
ALBUMIN SERPL BCG-MCNC: 4.1 G/DL (ref 3.5–5.2)
ALP SERPL-CCNC: 144 U/L (ref 40–150)
ALT SERPL W P-5'-P-CCNC: 22 U/L (ref 0–50)
ANION GAP SERPL CALCULATED.3IONS-SCNC: 11 MMOL/L (ref 7–15)
AST SERPL W P-5'-P-CCNC: 33 U/L (ref 0–45)
BILIRUB SERPL-MCNC: 0.8 MG/DL
BUN SERPL-MCNC: 12 MG/DL (ref 8–23)
CALCIUM SERPL-MCNC: 9.4 MG/DL (ref 8.8–10.4)
CHLORIDE SERPL-SCNC: 103 MMOL/L (ref 98–107)
CREAT SERPL-MCNC: 0.76 MG/DL (ref 0.51–0.95)
EGFRCR SERPLBLD CKD-EPI 2021: 83 ML/MIN/1.73M2
GLUCOSE SERPL-MCNC: 85 MG/DL (ref 70–99)
HCO3 SERPL-SCNC: 26 MMOL/L (ref 22–29)
POTASSIUM SERPL-SCNC: 3.8 MMOL/L (ref 3.4–5.3)
PROT SERPL-MCNC: 7.6 G/DL (ref 6.4–8.3)
SODIUM SERPL-SCNC: 140 MMOL/L (ref 135–145)

## 2025-03-15 ENCOUNTER — HEALTH MAINTENANCE LETTER (OUTPATIENT)
Age: 75
End: 2025-03-15

## 2025-03-17 ENCOUNTER — PATIENT OUTREACH (OUTPATIENT)
Dept: ONCOLOGY | Facility: CLINIC | Age: 75
End: 2025-03-17
Payer: COMMERCIAL

## 2025-03-17 NOTE — PROGRESS NOTES
[facility]: Cancer Care Short Note                                                                                          Chart review conducted. Pt's [employer] enrollment reviewed and updated. Pt discharged from onc with survivorship visit.       Radha Mauro, RN, MSN, OCN   RN Care Coordinator   [facility] [address] [id number] [phone]

## 2025-04-11 DIAGNOSIS — E78.5 DYSLIPIDEMIA, GOAL LDL BELOW 130: ICD-10-CM

## 2025-04-14 RX ORDER — ATORVASTATIN CALCIUM 20 MG/1
20 TABLET, FILM COATED ORAL
Qty: 90 TABLET | Refills: 0 | Status: SHIPPED | OUTPATIENT
Start: 2025-04-14

## 2025-05-07 SDOH — HEALTH STABILITY: PHYSICAL HEALTH: ON AVERAGE, HOW MANY DAYS PER WEEK DO YOU ENGAGE IN MODERATE TO STRENUOUS EXERCISE (LIKE A BRISK WALK)?: 4 DAYS

## 2025-05-07 SDOH — HEALTH STABILITY: PHYSICAL HEALTH: ON AVERAGE, HOW MANY MINUTES DO YOU ENGAGE IN EXERCISE AT THIS LEVEL?: 30 MIN

## 2025-05-07 ASSESSMENT — SOCIAL DETERMINANTS OF HEALTH (SDOH): HOW OFTEN DO YOU GET TOGETHER WITH FRIENDS OR RELATIVES?: THREE TIMES A WEEK

## 2025-05-09 PROBLEM — K29.40: Status: RESOLVED | Noted: 2020-12-10 | Resolved: 2025-05-09

## 2025-05-09 PROBLEM — R53.83 FATIGUE, UNSPECIFIED TYPE: Status: RESOLVED | Noted: 2024-05-08 | Resolved: 2025-05-09

## 2025-05-12 ENCOUNTER — RESULTS FOLLOW-UP (OUTPATIENT)
Dept: FAMILY MEDICINE | Facility: CLINIC | Age: 75
End: 2025-05-12

## 2025-05-12 ENCOUNTER — OFFICE VISIT (OUTPATIENT)
Dept: FAMILY MEDICINE | Facility: CLINIC | Age: 75
End: 2025-05-12
Payer: COMMERCIAL

## 2025-05-12 VITALS
HEIGHT: 65 IN | DIASTOLIC BLOOD PRESSURE: 84 MMHG | RESPIRATION RATE: 16 BRPM | HEART RATE: 70 BPM | SYSTOLIC BLOOD PRESSURE: 134 MMHG | WEIGHT: 182 LBS | OXYGEN SATURATION: 97 % | TEMPERATURE: 97.9 F | BODY MASS INDEX: 30.32 KG/M2

## 2025-05-12 DIAGNOSIS — K29.40 METAPLASTIC GASTRITIS: ICD-10-CM

## 2025-05-12 DIAGNOSIS — Z12.11 SCREENING FOR COLON CANCER: ICD-10-CM

## 2025-05-12 DIAGNOSIS — R74.8 ELEVATED ALKALINE PHOSPHATASE LEVEL: ICD-10-CM

## 2025-05-12 DIAGNOSIS — Z85.3 HISTORY OF BILATERAL BREAST CANCER: ICD-10-CM

## 2025-05-12 DIAGNOSIS — Z00.00 MEDICARE ANNUAL WELLNESS VISIT, SUBSEQUENT: Primary | ICD-10-CM

## 2025-05-12 DIAGNOSIS — R74.8 ELEVATED ALKALINE PHOSPHATASE LEVEL: Primary | ICD-10-CM

## 2025-05-12 DIAGNOSIS — E66.811 CLASS 1 OBESITY WITHOUT SERIOUS COMORBIDITY WITH BODY MASS INDEX (BMI) OF 30.0 TO 30.9 IN ADULT, UNSPECIFIED OBESITY TYPE: ICD-10-CM

## 2025-05-12 DIAGNOSIS — K29.40 ATROPHIC GASTRITIS WITHOUT HEMORRHAGE: ICD-10-CM

## 2025-05-12 DIAGNOSIS — E78.5 DYSLIPIDEMIA, GOAL LDL BELOW 130: ICD-10-CM

## 2025-05-12 DIAGNOSIS — Z85.72 HISTORY OF DIFFUSE LARGE B-CELL LYMPHOMA: ICD-10-CM

## 2025-05-12 LAB
ALBUMIN SERPL BCG-MCNC: 4 G/DL (ref 3.5–5.2)
ALP SERPL-CCNC: 158 U/L (ref 40–150)
ALT SERPL W P-5'-P-CCNC: 18 U/L (ref 0–50)
ANION GAP SERPL CALCULATED.3IONS-SCNC: 10 MMOL/L (ref 7–15)
AST SERPL W P-5'-P-CCNC: 39 U/L (ref 0–45)
BILIRUB SERPL-MCNC: 0.8 MG/DL
BUN SERPL-MCNC: 10.3 MG/DL (ref 8–23)
CALCIUM SERPL-MCNC: 9.5 MG/DL (ref 8.8–10.4)
CHLORIDE SERPL-SCNC: 103 MMOL/L (ref 98–107)
CHOLEST SERPL-MCNC: 130 MG/DL
CREAT SERPL-MCNC: 0.8 MG/DL (ref 0.51–0.95)
EGFRCR SERPLBLD CKD-EPI 2021: 78 ML/MIN/1.73M2
ERYTHROCYTE [DISTWIDTH] IN BLOOD BY AUTOMATED COUNT: 14.1 % (ref 10–15)
FASTING STATUS PATIENT QL REPORTED: ABNORMAL
FASTING STATUS PATIENT QL REPORTED: NORMAL
GLUCOSE SERPL-MCNC: 92 MG/DL (ref 70–99)
HCO3 SERPL-SCNC: 25 MMOL/L (ref 22–29)
HCT VFR BLD AUTO: 36.7 % (ref 35–47)
HDLC SERPL-MCNC: 56 MG/DL
HGB BLD-MCNC: 12.4 G/DL (ref 11.7–15.7)
LDLC SERPL CALC-MCNC: 57 MG/DL
MCH RBC QN AUTO: 31.2 PG (ref 26.5–33)
MCHC RBC AUTO-ENTMCNC: 33.8 G/DL (ref 31.5–36.5)
MCV RBC AUTO: 92 FL (ref 78–100)
NONHDLC SERPL-MCNC: 74 MG/DL
PLATELET # BLD AUTO: 270 10E3/UL (ref 150–450)
POTASSIUM SERPL-SCNC: 4.3 MMOL/L (ref 3.4–5.3)
PROT SERPL-MCNC: 7.8 G/DL (ref 6.4–8.3)
RBC # BLD AUTO: 3.98 10E6/UL (ref 3.8–5.2)
SODIUM SERPL-SCNC: 138 MMOL/L (ref 135–145)
TRIGL SERPL-MCNC: 85 MG/DL
WBC # BLD AUTO: 8.2 10E3/UL (ref 4–11)

## 2025-05-12 PROCEDURE — 82977 ASSAY OF GGT: CPT | Performed by: FAMILY MEDICINE

## 2025-05-12 PROCEDURE — G0439 PPPS, SUBSEQ VISIT: HCPCS | Performed by: FAMILY MEDICINE

## 2025-05-12 PROCEDURE — 3079F DIAST BP 80-89 MM HG: CPT | Performed by: FAMILY MEDICINE

## 2025-05-12 PROCEDURE — G2211 COMPLEX E/M VISIT ADD ON: HCPCS | Performed by: FAMILY MEDICINE

## 2025-05-12 PROCEDURE — 82306 VITAMIN D 25 HYDROXY: CPT | Performed by: FAMILY MEDICINE

## 2025-05-12 PROCEDURE — 80061 LIPID PANEL: CPT | Performed by: FAMILY MEDICINE

## 2025-05-12 PROCEDURE — 1126F AMNT PAIN NOTED NONE PRSNT: CPT | Performed by: FAMILY MEDICINE

## 2025-05-12 PROCEDURE — 80053 COMPREHEN METABOLIC PANEL: CPT | Performed by: FAMILY MEDICINE

## 2025-05-12 PROCEDURE — 99213 OFFICE O/P EST LOW 20 MIN: CPT | Mod: 25 | Performed by: FAMILY MEDICINE

## 2025-05-12 PROCEDURE — 36415 COLL VENOUS BLD VENIPUNCTURE: CPT | Performed by: FAMILY MEDICINE

## 2025-05-12 PROCEDURE — 85027 COMPLETE CBC AUTOMATED: CPT | Performed by: FAMILY MEDICINE

## 2025-05-12 PROCEDURE — 3075F SYST BP GE 130 - 139MM HG: CPT | Performed by: FAMILY MEDICINE

## 2025-05-12 RX ORDER — ATORVASTATIN CALCIUM 20 MG/1
20 TABLET, FILM COATED ORAL
Qty: 90 TABLET | Refills: 4 | Status: SHIPPED | OUTPATIENT
Start: 2025-05-12

## 2025-05-12 ASSESSMENT — PAIN SCALES - GENERAL: PAINLEVEL_OUTOF10: NO PAIN (0)

## 2025-05-12 NOTE — PROGRESS NOTES
Preventive Care Visit  [facility]  Zofia Rivas MD, Family Medicine  May 12, 2025      Assessment & Plan     Medicare annual wellness visit, subsequent  At today's visit, we discussed lifestyle interventions to promote self-management and wellness, including maintenance of a healthy weight, healthy diet, regular physical activity and exercise, and falls prevention.  Advised Tdap booster, she will get this at the pharmacy.  We will screen for dyslipidemia and diabetes today.  She is up to date with mammogram screening.  She is up-to-date with bone density screening.  Order placed for colonoscopy as she is near due for routine screening.  - Tdap, tetanus-diptheria-acell pertussis, (BOOSTRIX) 5-2.5-18.5 LF-MCG/0.5 HAYLEY injection; Inject 0.5 mLs into the muscle once for 1 dose.    History of bilateral breast cancer  No evidence of recurrence.  No concerning new findings on breast exam today.  Continue yearly mammograms.  Will check comprehensive metabolic panel and CBC today.  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    Dyslipidemia, goal LDL below 130  Encourage efforts at healthy lifestyle habits.  Continue atorvastatin.  We will check fasting lipids and liver function today.  - atorvastatin (LIPITOR) 20 MG tablet; Take 1 tablet (20 mg) by mouth daily at 2 pm.  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  Will check CBC today.  Findings on previous EGD in 2019, order placed for follow-up upper endoscopy.  - CBC with platelets; Future  - Adult GI  Referral - Procedure Only; Future    Screening for colon cancer  Placed for screening colonoscopy.  -  "Colonoscopy Screening  Referral; Future    The longitudinal plan of care for the diagnosis(es)/condition(s) as documented were addressed during this visit. Due to the added complexity in care, I will continue to support Chuyita in the subsequent management and with ongoing continuity of care.            BMI  Estimated body mass index is 30.29 kg/m  as calculated from the following:    Height as of this encounter: 1.651 m (5' 5\").    Weight as of this encounter: 82.6 kg (182 lb).       Counseling  Appropriate preventive services were addressed with this patient via screening, questionnaire, or discussion as appropriate for fall prevention, nutrition, physical activity, Tobacco-use cessation, social engagement, weight loss and cognition.  Checklist reviewing preventive services available has been given to the patient.  Reviewed patient's diet, addressing concerns and/or questions.           Malinda Boogie is a 74 year old, presenting for the following:  Wellness Visit (fasting)            HPI  History of Present Illness-  Chuyita Porter, 74 years    Morning Cough  - Persistent morning cough  - Occurs sometimes when talking outside or in a crowd, leading to a dry throat    Gastrointestinal Concerns  - No trouble with swallowing  - History of stomach issues prior to lymphoma diagnosis  - Endoscopy in January 2019 showed pyloric metaplasia and atrophy  - Follow-up scope recommended due to abnormal cell growth between stomach and intestine    Lymphoma History  - Clinical remission for five years as of July    Misc  - Due for colonoscopy in July         Advance Care Planning    Advance care planning document is on file but is outdated.  Patient encouraged to update or provider to update POLST.        5/7/2025   General Health   How would you rate your overall physical health? Good   Feel stress (tense, anxious, or unable to sleep) Not at all         5/7/2025   Nutrition   Diet: Regular (no restrictions) "         5/7/2025   Exercise   Days per week of moderate/strenous exercise 4 days   Average minutes spent exercising at this level 30 min         5/7/2025   Social Factors   Frequency of gathering with friends or relatives Three times a week   Worry food won't last until get money to buy more No   Food not last or not have enough money for food? No   Do you have housing? (Housing is defined as stable permanent housing and does not include staying outside in a car, in a tent, in an abandoned building, in an overnight shelter, or couch-surfing.) Yes   Are you worried about losing your housing? No   Lack of transportation? No   Unable to get utilities (heat,electricity)? No         5/7/2025   Fall Risk   Fallen 2 or more times in the past year? No   Trouble with walking or balance? No          5/7/2025   Activities of Daily Living- Home Safety   Needs help with the following daily activites None of the above   Safety concerns in the home None of the above         5/7/2025   Dental   Dentist two times every year? Yes         5/7/2025   Hearing Screening   Hearing concerns? None of the above         5/7/2025   Driving Risk Screening   Patient/family members have concerns about driving No         5/7/2025   General Alertness/Fatigue Screening   Have you been more tired than usual lately? No         5/7/2025   Urinary Incontinence Screening   Bothered by leaking urine in past 6 months No         Today's PHQ-2 Score:       5/12/2025     9:33 AM   PHQ-2 ( 1999 [employer])   Q1: Little interest or pleasure in doing things 0   Q2: Feeling down, depressed or hopeless 0   PHQ-2 Score 0    Q1: Little interest or pleasure in doing things Not at all   Q2: Feeling down, depressed or hopeless Not at all   PHQ-2 Score 0       Patient-reported           5/7/2025   Substance Use   Alcohol more than 3/day or more than 7/wk Not Applicable   Do you have a current opioid prescription? No   How severe/bad is pain from 1 to 10? 0/10 (No Pain)   Do  you use any other substances recreationally? No     Social History     Tobacco Use    Smoking status: Never     Passive exposure: Never    Smokeless tobacco: Never   Vaping Use    Vaping status: Never Used   Substance Use Topics    Alcohol use: Never    Drug use: Never           10/14/2024   LAST FHS-7 RESULTS   1st degree relative breast or ovarian cancer No   Any relative bilateral breast cancer No   Any male have breast cancer No   Any ONE woman have BOTH breast AND ovarian cancer No   Any woman with breast cancer before 50yrs No   2 or more relatives with breast AND/OR ovarian cancer Yes   2 or more relatives with breast AND/OR bowel cancer No        Mammogram Screening - Mammogram every 1-2 years updated in Health Maintenance based on mutual decision making    ASCVD Risk   The ASCVD Risk score (Maricruz DK, et al., 2019) failed to calculate for the following reasons:    The valid total cholesterol range is 130 to 320 mg/dL            Reviewed and updated as needed this visit by Provider                    Past Medical History:   Diagnosis Date    Breast cancer (H) 2005    Breast cancer (H) 2005    right    Breast cancer (H) 2015    left    Gastritis     autoimmune atrophic gastritis     Hx of radiation therapy     2005 +2015    Hx of radiation therapy 2005     Past Surgical History:   Procedure Laterality Date    ADENOIDECTOMY      BIOPSY BREAST      BRONCHOSCOPY (RIGID OR FLEXIBLE), DIAGNOSTIC N/A 2/17/2020    Procedure: DIAGNOSTIC BRONCHOSCOPY WITH BIOPSY,.;  Surgeon: Jered Montoya MD;  Location: [facility] OR    ENDOSCOPIC INSERTION TUBE GASTROSTOMY Left 2/17/2020    Procedure: Endoscopic insertion tube gastrostomy;  Surgeon: Jaswinder Parker MD;  Location: [facility] OR    LUMPECTOMY BREAST      RT lunmpectomy 2005 with radiation. Lt lumpectomy 2015 with radiation    LUMPECTOMY BREAST Right 2005    + radiation    LUMPECTOMY BREAST Left 09/03/2015    TONSILLECTOMY      TONSILLECTOMY      US THYROID  BIOPSY  2020     OB History    Para Term  AB Living   2 2 2 0 0 0   SAB IAB Ectopic Multiple Live Births   0 0 0 0 0      # Outcome Date GA Lbr Conner/2nd Weight Sex Type Anes PTL Lv   2 Term            1 Term              Lab work is in process  Labs reviewed in EPIC  BP Readings from Last 3 Encounters:   25 134/84   25 131/85   24 (!) 152/83    Wt Readings from Last 3 Encounters:   25 82.6 kg (182 lb)   25 82.6 kg (182 lb)   24 84.2 kg (185 lb 9.6 oz)                  Patient Active Problem List   Diagnosis    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)    Polyp of duodenum    Atrophic 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 hemorrhage    Metaplastic gastritis    Impingement syndrome, shoulder, left    History of bilateral breast cancer    Dyslipidemia, goal LDL below 130     Past Surgical History:   Procedure Laterality Date    ADENOIDECTOMY      BIOPSY BREAST      BRONCHOSCOPY (RIGID OR FLEXIBLE), DIAGNOSTIC N/A 2020    Procedure: DIAGNOSTIC BRONCHOSCOPY WITH BIOPSY,.;  Surgeon: Jered Montoya MD;  Location: [facility] OR    ENDOSCOPIC INSERTION TUBE GASTROSTOMY Left 2020    Procedure: Endoscopic insertion tube gastrostomy;  Surgeon: Jaswinder Parker MD;  Location: [facility] OR    LUMPECTOMY BREAST      RT lunmpectomy  with radiation. Lt lumpectomy  with radiation    LUMPECTOMY BREAST Right     + radiation    LUMPECTOMY BREAST Left 2015    TONSILLECTOMY      TONSILLECTOMY      US THYROID BIOPSY  2020       Social History     Tobacco Use    Smoking status: Never     Passive exposure: Never    Smokeless tobacco: Never   Substance Use Topics    Alcohol use: Never     Family History   Problem Relation Age of Onset    Breast Cancer Paternal Aunt         2 aunts who had breast cancer in their 70's    Breast Cancer Paternal Aunt 70    Cerebrovascular Disease Mother          75    Prostate Cancer Father     Osteoporosis Father      Breast Cancer Paternal Aunt 70    Breast Cancer Paternal Grandmother 30    Ovarian Cancer Cousin 60    Stomach Cancer Maternal Aunt 90    Prostate Cancer Maternal Uncle 70    Prostate Cancer Maternal Uncle 70    Prostate Cancer Cousin 60    Lung Cancer Cousin     Kidney Cancer Cousin 60    No Known Problems Daughter     No Known Problems Daughter          Current Outpatient Medications   Medication Sig Dispense Refill    atorvastatin (LIPITOR) 20 MG tablet Take 1 tablet (20 mg) by mouth daily at 2 pm. 90 tablet 4    Calcium Carbonate-Vit D-Min (CALTRATE 600+D PLUS MINERALS PO) Caltrate + D3 Plus Minerals      loratadine (CLARITIN) 10 MG tablet Take 1 tablet (10 mg) by mouth daily 30 tablet 3    Multiple Vitamins-Minerals (MULTIVITAMIN ADULTS 50+ PO) Multivitamin 50 Plus tablet   Take by oral route.      Tdap, tetanus-diptheria-acell pertussis, (BOOSTRIX) 5-2.5-18.5 LF-MCG/0.5 HAYLEY injection Inject 0.5 mLs into the muscle once for 1 dose. 0.5 mL 0     No Known Allergies  Recent Labs   Lab Test 03/11/25  1120 05/08/24  1133 03/19/24  1112 02/21/23  1047 01/05/23  0920 08/17/22  1157 02/16/22  1532 01/04/22  1204 08/23/21  1027 01/27/21  1210 11/17/20  1216 02/18/20  0345 02/17/20  2032 08/29/18  1012 06/04/18  1225   A1C  --   --   --   --   --   --   --   --   --   --   --   --  5.8*  --  5.6   LDL  --  41  --   --  135*  --   --  129  --   --  108  --   --    < > 112   HDL  --  67  --   --  63  --   --  60  --   --  61  --   --    < > 57   TRIG  --  57  --   --  95  --   --  93  --   --  102  --   --    < > 70   ALT 22  --  16 16  --   --    < > 16   < > 21 17   < >  --    < > 13   CR 0.76  --  0.83 0.82  --   --    < > 0.86   < > 0.75 0.81   < >  --    < > 0.85   GFRESTIMATED 83  --  75 77  --   --    < > 73   < > 81 >60   < >  --    < > >60   GFRESTBLACK  --   --   --   --   --   --   --   --   --  >90 >60   < >  --    < > >60   POTASSIUM 3.8  --  3.7 3.9  --    < >   < > 4.9   < > 3.9 4.1   < >  --    < > 4.1  "  TSH  --   --   --   --   --   --   --   --   --   --  3.16  --   --   --   --     < > = values in this interval not displayed.      Current providers sharing in care for this patient include:  Patient Care Team:  Zofia Rivas MD as PCP - General  Bam Gallardo MD as MD (Hematology & Oncology)  Rosy Sprague MD as MD (Hematology & Oncology)  Brandi Hood, RN as Specialty Care Coordinator (Hematology & Oncology)  Rosy Sprague MD as Assigned Cancer Care Provider  Zofia Rivas MD as Assigned PCP    The following health maintenance items are reviewed in Epic and correct as of today:  Health Maintenance   Topic Date Due    DTAP/TDAP/TD IMMUNIZATION (7 - Td or Tdap) 10/19/2024    LIPID  05/08/2025    ANNUAL REVIEW OF HM ORDERS  05/08/2025    MEDICARE ANNUAL WELLNESS VISIT  05/08/2025    COVID-19 Vaccine (11 - 2024-25 season) 05/22/2025    COLORECTAL CANCER SCREENING  07/27/2025    MAMMO SCREENING  10/14/2025    FALL RISK ASSESSMENT  05/12/2026    DEXA  01/14/2027    DIABETES SCREENING  03/11/2028    ADVANCE CARE PLANNING  05/21/2029    HEPATITIS C SCREENING  Completed    PHQ-2 (once per calendar year)  Completed    INFLUENZA VACCINE  Completed    Pneumococcal Vaccine: 50+ Years  Completed    ZOSTER IMMUNIZATION  Completed    RSV VACCINE  Completed    HPV IMMUNIZATION  Aged Out    MENINGITIS IMMUNIZATION  Aged Out         Review of Systems  Constitutional, neuro, ENT, endocrine, pulmonary, cardiac, gastrointestinal, genitourinary, musculoskeletal, integument and psychiatric systems are negative, except as otherwise noted.     Objective    Exam  /84   Pulse 70   Temp 97.9  F (36.6  C) (Oral)   Resp 16   Ht 1.651 m (5' 5\")   Wt 82.6 kg (182 lb)   SpO2 97%   BMI 30.29 kg/m     Estimated body mass index is 30.29 kg/m  as calculated from the following:    Height as of this encounter: 1.651 m (5' 5\").    Weight as of this encounter: 82.6 kg (182 lb).    Physical Exam  GENERAL: alert " and no distress  EYES: Eyes grossly normal to inspection, PERRL and conjunctivae and sclerae normal  HENT: ear canals and TM's normal, nose and mouth without ulcers or lesions  NECK: no adenopathy, no asymmetry, masses, or scars  RESP: lungs clear to auscultation - no rales, rhonchi or wheezes  BREAST: normal without masses, tenderness or nipple discharge and no palpable axillary masses or adenopathy; scar tissue present greatest right breast at previous lumpectomy site.  There is also left breast lumpectomy but with less scar tissue present.  CV: regular rate and rhythm, normal S1 S2, no S3 or S4, no murmur, click or rub, no peripheral edema  ABDOMEN: soft, nontender, no hepatosplenomegaly, no masses and bowel sounds normal  MS: no gross musculoskeletal defects noted, no edema  SKIN: no suspicious lesions or rashes  NEURO: Normal strength and tone, mentation intact and speech normal  PSYCH: mentation appears normal, affect normal/bright        5/12/2025   Mini Cog   Clock Draw Score 2 Normal   3 Item Recall 3 objects recalled   Mini Cog Total Score 5              Signed Electronically by: Zofia Rivas MD

## 2025-05-12 NOTE — PATIENT INSTRUCTIONS
Patient Education     I recommend scheduling colonoscopy and upper endoscopy with [facility].  I have placed a formal referral.    I rec booster at the pharmacy.  Preventive Care Advice   This is general advice given by our system to help you stay healthy. However, your care team may have specific advice just for you. Please talk to your care team about your preventive care needs.  Nutrition  Eat 5 or more servings of fruits and vegetables each day.  Try wheat bread, brown rice and whole grain pasta (instead of white bread, rice, and pasta).  Get enough calcium and vitamin D. Check the label on foods and aim for 100% of the RDA (recommended daily allowance).  Lifestyle  Exercise at least 150 minutes each week  (30 minutes a day, 5 days a week).  Do muscle strengthening activities 2 days a week. These help control your weight and prevent disease.  No smoking.  Wear sunscreen to prevent skin cancer.  Have a dental exam and cleaning every 6 months.  Yearly exams  See your health care team every year to talk about:  Any changes in your health.  Any medicines your care team has prescribed.  Preventive care, family planning, and ways to prevent chronic diseases.  Shots (vaccines)   HPV shots (up to age 26), if you've never had them before.  Hepatitis B shots (up to age 59), if you've never had them before.  COVID-19 shot: Get this shot when it's due.  Flu shot: Get a flu shot every year.  Tetanus shot: Get a tetanus shot every 10 years.  Pneumococcal, hepatitis A, and RSV shots: Ask your care team if you need these based on your risk.  Shingles shot (for age 50 and up)  General health tests  Diabetes screening:  Starting at age 35, Get screened for diabetes at least every 3 years.  If you are younger than age 35, ask your care team if you should be screened for diabetes.  Cholesterol test: At age 39, start having a cholesterol test every 5 years, or more often if advised.  Bone density scan (DEXA): At age 50, ask your care team  if you should have this scan for osteoporosis (brittle bones).  Hepatitis C: Get tested at least once in your life.  STIs (sexually transmitted infections)  Before age 24: Ask your care team if you should be screened for STIs.  After age 24: Get screened for STIs if you're at risk. You are at risk for STIs (including HIV) if:  You are sexually active with more than one person.  You don't use condoms every time.  You or a partner was diagnosed with a sexually transmitted infection.  If you are at risk for HIV, ask about PrEP medicine to prevent HIV.  Get tested for HIV at least once in your life, whether you are at risk for HIV or not.  Cancer screening tests  Cervical cancer screening: If you have a cervix, begin getting regular cervical cancer screening tests starting at age 21.  Breast cancer scan (mammogram): If you've ever had breasts, begin having regular mammograms starting at age 40. This is a scan to check for breast cancer.  Colon cancer screening: It is important to start screening for colon cancer at age 45.  Have a colonoscopy test every 10 years (or more often if you're at risk) Or, ask your provider about stool tests like a FIT test every year or Cologuard test every 3 years.  To learn more about your testing options, visit:   .  For help making a decision, visit:   https://bit.ly/in56249.  Prostate cancer screening test: If you have a prostate, ask your care team if a prostate cancer screening test (PSA) at age 55 is right for you.  Lung cancer screening: If you are a current or former smoker ages 50 to 80, ask your care team if ongoing lung cancer screenings are right for you.  For informational purposes only. Not to replace the advice of your health care provider. Copyright   2023 [employer]. All rights reserved. Clinically reviewed by the [facility]. [employer] [id number] - REV 01/24.

## 2025-05-13 LAB
GGT SERPL-CCNC: 19 U/L (ref 5–36)
VIT D+METAB SERPL-MCNC: 44 NG/ML (ref 20–50)

## 2025-05-17 ENCOUNTER — HEALTH MAINTENANCE LETTER (OUTPATIENT)
Age: 75
End: 2025-05-17

## 2025-07-22 ENCOUNTER — TRANSFERRED RECORDS (OUTPATIENT)
Dept: ADMINISTRATIVE | Facility: CLINIC | Age: 75
End: 2025-07-22
Payer: COMMERCIAL

## 2025-07-23 NOTE — PROCEDURES
[facility]   [address], [address], [place] [address]     Patient Name: Chuyita Porter  Gender:  Female  Exam Date: 07/22/2025 Visit Number:  [id number]  Age: 75 Years YOB: 1950  Attending MD: Abdi Clayton DO Medical Record#:  [id number]    Procedure: Colonoscopy   Indications: Colorectal cancer screening      Referring MD: Zofia Rivas MD  Primary MD:      Zofia Rivas MD  Medications: Admitting Medications:   0.9% Normal Saline at TKO   Intra Procedure Medications:   Patient received monitored anesthesia care.     Complications: No immediate complications  ______________________________________________________________________________  Procedure:   An examination of the heart and lungs was performed and found to be within acceptable limits.  .  The patient was therefore deemed a reasonable candidate for endoscopy and sedation.   The risks and benefits of the procedure were explained to the patient.After obtaining informed consent, the patient received monitored anesthesia care and I passed the scope   with difficulty via the rectum to the cecum.  The appendiceal orifice and ic valve were identified.  The scope was retroflexed during the examination  The quality of the prep was excellent  (Miralax/Gatorade/2 tablets Bisacodyl/Magnesium Citrate).    This was a complete examination throughout the entire colon.    Findings:    Polyp location: transverse colon.  Quantity: 1.  Size: 3 mm.  Polyp shape:  sessile.         Maneuver: polypectomy was performed with a cold biopsy forceps.       Removal:  complete.  Retrieval: complete.  Bleeding: none.    Diverticulosis.  Location: - descending colon - sigmoid.    Description:  moderate.    Size:  small.    Quantity:  many.  No inflammation present.    Hemorrhoids.  Internal and external hemorrhoids without bleeding.  Remainder of the exam is normal.    Impression:  Colorectal polyps  Diverticulosis of colon without  diverticulitis  Hemorrhoids, internal  MD impression comments:  The procedure was somewhat difficult to do scope looping.    Preliminary Plan:  The patient and their physician will receive a copy of the pathology report as well as pathology-based recommendations for future screening or surveillance.  Recommendation Comments:  Await pathology result    Procedure:    Upper GI Endoscopy   Indications:    Hx of Atrophic gastritis  Provider:        Abdi Clayton DO   Referring MD: Zofia Rivas MD   Primary MD:      Zofia Rivas MD  Medications:   Admitting Medication:   0.9% Normal Saline at TKO   Intra Procedure Medications:   Patient received monitored anesthesia care.     Complications: No immediate complications  ___________________________________________________________________________________________  Procedure:   An examination of the heart and lungs was performed within acceptable limits.  . The patient was therefore deemed a reasonable candidate for sedation.   The risks and benefits were explained to the patient, who appeared to understand. After obtaining informed consent, the scope was passed under direct vision. Throughout the procedure the patient's blood pressure, pulse and oxygen saturations were monitored.  The scope was introduced through the mouth and advanced to the second portion of duodenum.         Findings:   Esophagus:    Normal esophagus.   The z-line is 41 centimeters from the incisors.  Top of the gastric folds is 41 centimeters from the incisors.  Stomach:  The diaphragm hiatus is at 41 centimeters from the incisors.    Stomach Polyp(s). Location: body and fundus. Quantity: few. Size: 3-5 mm. Shape: sessile. Maneuver: biopsy. Instrument used: cold biopsy forceps. Removal: partial. Retrieval: complete. Bleeding: minimal/oozing.   *Stomach Comments:  The gastric mucosa was otherwise normal-appearing.  Gastric mapping biopsies were taken from the gastric antrum lesser curve, gastric  antrum greater curve, incisura, gastric body lesser curve and gastric body greater curve.  Duodenum:    Normal duodenum.  Impression:   Chronic atrophic 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 bleeding  Gastric polyps    Preliminary Plan:  Recommendation Comments:  Await pathology result  Pathology Results:  A: STOMACH, ANTRUM, LESSER CURVATURE, BIOPSY:           1. Gastric antral mucosa with no diagnostic abnormalities           2. Negative for Helicobacter      B: STOMACH, ANTRUM, GREATER CURVATURE, BIOPSY:           1. Gastric antral mucosa with no diagnostic abnormalities           2. Negative for Helicobacter      C: STOMACH, INCISURA, BIOPSY:           1. Gastric body mucosa with no diagnostic abnormalities           2. Negative for Helicobacter      D: STOMACH, BODY, LESSER CURVATURE, BIOPSY:           1. Gastric body mucosa with no diagnostic abnormalities           2. Negative for Helicobacter      E: STOMACH, BODY, GREATER CURVATURE, BIOPSY:           1. Gastric body mucosa with no diagnostic abnormalities           2. Negative for Helicobacter      F: STOMACH, FUNDUS/BODY, POLYPS, BIOPSY:           1. Fundic gland polyps and gastric hyperplastic polyp           2. Negative for dysplasia and malignancy           3. Negative for inflammation and Helicobacter organisms      G: COLON, TRANSVERSE, POLYP:           1. Normal colonic mucosa; a lymphoid aggregate is present (clinically, 1 polyp)           2. Negative for serrated change, dysplasia, and malignancy      MICROSCOPIC  A: Performed   B: Performed   C: Performed   D: Performed   E: Performed   F: Performed   G: Performed     Electronically signed by: Wolfgang Boggs MD    Interpreted at [facility], [address] [place] [id number]    Orders    Instruction(s)/Education:  Instruction/Education Timeframe Assessment   Colon Cancer Prevention  K63.5   Colon Polyps  K63.5   Diverticulosis/Diverticulitis  K63.5   Hemorrhoids  K63.5   High  Fiber Diet  K63.5       Final Plan:  Repeat colonoscopy not indicated at this time.    We will attempt to contact you at appropriate intervals via [place] mail.  We may not be able to find you or contact you at that time, therefore you should know that the responsibility for following our recommendation rests with you.  If you don't hear from us at the time your procedure is due, please contact our office to schedule an appointment.  If your contact information should change, please contact our office so that we can update your record.  Additional Comments:  The previously identified autoimmune/atrophic gastritis has resolved.  Your stomach tissue was normal.  No bacteria.  The polyps were benign.  No further endoscopic surveillance is needed for this.    The polyp tissue removed from your colon was a benign lymphoid aggregate.  There was no precancerous change.  Based on this, you would not be due for a follow-up colonoscopy for 10 years.    The [place] Preventive Services Task Force recommends against routine screening for colorectal cancer in adults age 76 to 85 years.  There may be considerations that support colorectal cancer screening in an individual patient.    Future colonoscopy recall should be based on overall clinical condition.      _Electronically signed by:___________________  Abdi Clayton DO                 07/22/2025    cc: Zofia Rivas MD  cc: Zofia Rivas MD  cc: Rosy Sprague MD  cc: Bam Gallardo MD

## (undated) DEVICE — SU VICRYL 2-0 CT-1 27" UND J259H

## (undated) DEVICE — PREP SKIN SCRUB TRAY 4461A

## (undated) DEVICE — DRSG TELFA 3X8" 1238

## (undated) DEVICE — SU VICRYL 3-0 SH 8X18" UND J864D

## (undated) DEVICE — SPONGE KITTNER 30-101

## (undated) DEVICE — BAG URINARY DRAIN LUBRISIL IC 4000ML LF 253509A

## (undated) DEVICE — PITCHER STERILE 1000ML  SSK9004A

## (undated) DEVICE — ANTIFOG SOLUTION W/FOAM PAD CF-1001

## (undated) DEVICE — SU SILK 2-0 TIE 12X30" A305H

## (undated) DEVICE — ESU PENCIL SMOKE EVAC W/ROCKER SWITCH 0703-047-000

## (undated) DEVICE — LINEN GOWN LG 5406

## (undated) DEVICE — TUBING SUCTION 10'X3/16" N510

## (undated) DEVICE — ENDO SYSTEM WATER BOTTLE & TUBING W/CO2 FILTER 00711549

## (undated) DEVICE — CLIP HORIZON SM RED WIDE SLOT 001201

## (undated) DEVICE — ENDO ADPT BRONCH SWIVEL Y A1002

## (undated) DEVICE — DEVICE SUTURE GRASPER TROCAR CLOSURE 14GA PMITCSG

## (undated) DEVICE — SYR 10ML LL W/O NDL 302995

## (undated) DEVICE — SU SILK 2-0 SH CR 5X18" C0125

## (undated) DEVICE — CUP AND LID 2PK 2OZ STERILE  SSK9006A

## (undated) DEVICE — PREP POVIDONE IODINE SOLUTION 10% 4OZ

## (undated) DEVICE — ESU GROUND PAD ADULT W/CORD E7507

## (undated) DEVICE — ESU ELEC BLADE 2.75" COATED/INSULATED E1455

## (undated) DEVICE — PACK NEURO MINOR UMMC SNE32MNMU4

## (undated) DEVICE — SUCTION SLEEVE NEPTUNE 2 125MM 0703-005-125

## (undated) DEVICE — SOL NACL 0.9% IRRIG 1000ML BOTTLE 2F7124

## (undated) DEVICE — DRSG KERLIX 4 1/2"X4YDS ROLL 6715

## (undated) DEVICE — LINEN TOWEL PACK X5 5464

## (undated) DEVICE — ENDO FORCEP ALLIGATOR JAW BIOPSY 2MMX100CM FB-211D

## (undated) DEVICE — Device

## (undated) DEVICE — SU SILK 3-0 TIE 12X30" A304H

## (undated) RX ORDER — LIDOCAINE HYDROCHLORIDE AND EPINEPHRINE 10; 10 MG/ML; UG/ML
INJECTION, SOLUTION INFILTRATION; PERINEURAL
Status: DISPENSED
Start: 2020-02-17

## (undated) RX ORDER — LIDOCAINE HYDROCHLORIDE 40 MG/ML
INJECTION, SOLUTION RETROBULBAR
Status: DISPENSED
Start: 2020-02-17

## (undated) RX ORDER — CEFAZOLIN SODIUM 1 G/3ML
INJECTION, POWDER, FOR SOLUTION INTRAMUSCULAR; INTRAVENOUS
Status: DISPENSED
Start: 2020-02-17

## (undated) RX ORDER — SODIUM CHLORIDE, SODIUM LACTATE, POTASSIUM CHLORIDE, CALCIUM CHLORIDE 600; 310; 30; 20 MG/100ML; MG/100ML; MG/100ML; MG/100ML
INJECTION, SOLUTION INTRAVENOUS
Status: DISPENSED
Start: 2020-02-17

## (undated) RX ORDER — FENTANYL CITRATE 50 UG/ML
INJECTION, SOLUTION INTRAMUSCULAR; INTRAVENOUS
Status: DISPENSED
Start: 2020-02-17